# Patient Record
Sex: MALE | Race: WHITE | NOT HISPANIC OR LATINO | Employment: OTHER | URBAN - METROPOLITAN AREA
[De-identification: names, ages, dates, MRNs, and addresses within clinical notes are randomized per-mention and may not be internally consistent; named-entity substitution may affect disease eponyms.]

---

## 2017-11-17 ENCOUNTER — APPOINTMENT (INPATIENT)
Dept: ULTRASOUND IMAGING | Facility: HOSPITAL | Age: 72
DRG: 682 | End: 2017-11-17
Payer: MEDICARE

## 2017-11-17 ENCOUNTER — HOSPITAL ENCOUNTER (INPATIENT)
Facility: HOSPITAL | Age: 72
LOS: 14 days | DRG: 682 | End: 2017-12-01
Attending: EMERGENCY MEDICINE | Admitting: INTERNAL MEDICINE
Payer: MEDICARE

## 2017-11-17 DIAGNOSIS — F32.A DEPRESSION: Primary | ICD-10-CM

## 2017-11-17 DIAGNOSIS — R31.9 HEMATURIA: ICD-10-CM

## 2017-11-17 DIAGNOSIS — N39.490 OVERFLOW INCONTINENCE OF URINE: ICD-10-CM

## 2017-11-17 DIAGNOSIS — R33.8 ACUTE URINARY RETENTION: ICD-10-CM

## 2017-11-17 DIAGNOSIS — N17.9 ACUTE RENAL FAILURE (ARF) (HCC): ICD-10-CM

## 2017-11-17 DIAGNOSIS — R41.0 DELIRIUM: ICD-10-CM

## 2017-11-17 DIAGNOSIS — N19 ACUTE UREMIA: ICD-10-CM

## 2017-11-17 DIAGNOSIS — Z01.89 ENCOUNTER FOR COMPETENCY EVALUATION: ICD-10-CM

## 2017-11-17 DIAGNOSIS — R94.31 ABNORMAL EKG: ICD-10-CM

## 2017-11-17 PROBLEM — R32 URINARY INCONTINENCE: Status: ACTIVE | Noted: 2017-11-17

## 2017-11-17 LAB
ALBUMIN SERPL BCP-MCNC: 4 G/DL (ref 3.5–5)
ALP SERPL-CCNC: 64 U/L (ref 46–116)
ALT SERPL W P-5'-P-CCNC: 16 U/L (ref 12–78)
ANION GAP SERPL CALCULATED.3IONS-SCNC: 17 MMOL/L (ref 4–13)
ANION GAP SERPL CALCULATED.3IONS-SCNC: 18 MMOL/L (ref 4–13)
APTT PPP: 28 SECONDS (ref 23–35)
AST SERPL W P-5'-P-CCNC: 7 U/L (ref 5–45)
ATRIAL RATE: 77 BPM
BASE EX.OXY STD BLDV CALC-SCNC: 53.2 % (ref 60–80)
BASE EXCESS BLDV CALC-SCNC: -11.5 MMOL/L
BASOPHILS # BLD AUTO: 0.04 THOUSANDS/ΜL (ref 0–0.1)
BASOPHILS NFR BLD AUTO: 1 % (ref 0–1)
BILIRUB DIRECT SERPL-MCNC: 0.15 MG/DL (ref 0–0.2)
BILIRUB SERPL-MCNC: 0.7 MG/DL (ref 0.2–1)
BILIRUB UR QL STRIP: NEGATIVE
BUN SERPL-MCNC: 121 MG/DL (ref 5–25)
BUN SERPL-MCNC: 124 MG/DL (ref 5–25)
CALCIUM SERPL-MCNC: 8.6 MG/DL (ref 8.3–10.1)
CALCIUM SERPL-MCNC: 9.2 MG/DL (ref 8.3–10.1)
CHLORIDE SERPL-SCNC: 100 MMOL/L (ref 100–108)
CHLORIDE SERPL-SCNC: 102 MMOL/L (ref 100–108)
CLARITY UR: CLEAR
CLARITY, POC: CLEAR
CO2 SERPL-SCNC: 18 MMOL/L (ref 21–32)
CO2 SERPL-SCNC: 18 MMOL/L (ref 21–32)
COLOR UR: YELLOW
COLOR, POC: YELLOW
CREAT SERPL-MCNC: 11.49 MG/DL (ref 0.6–1.3)
CREAT SERPL-MCNC: 12.47 MG/DL (ref 0.6–1.3)
EOSINOPHIL # BLD AUTO: 0.12 THOUSAND/ΜL (ref 0–0.61)
EOSINOPHIL NFR BLD AUTO: 2 % (ref 0–6)
ERYTHROCYTE [DISTWIDTH] IN BLOOD BY AUTOMATED COUNT: 14.8 % (ref 11.6–15.1)
EXT BILIRUBIN, UA: NEGATIVE
EXT BLOOD URINE: NEGATIVE
EXT GLUCOSE, UA: NEGATIVE
EXT KETONES: NEGATIVE
EXT NITRITE, UA: NORMAL
EXT PH, UA: 5
EXT PROTEIN, UA: NORMAL
EXT SPECIFIC GRAVITY, UA: 1
EXT UROBILINOGEN: NEGATIVE
GFR SERPL CREATININE-BSD FRML MDRD: 4 ML/MIN/1.73SQ M
GFR SERPL CREATININE-BSD FRML MDRD: 4 ML/MIN/1.73SQ M
GLUCOSE SERPL-MCNC: 120 MG/DL (ref 65–140)
GLUCOSE SERPL-MCNC: 217 MG/DL (ref 65–140)
GLUCOSE UR STRIP-MCNC: NEGATIVE MG/DL
HCO3 BLDV-SCNC: 14.8 MMOL/L (ref 24–30)
HCT VFR BLD AUTO: 28 % (ref 36.5–49.3)
HCT VFR BLD AUTO: 31.8 % (ref 36.5–49.3)
HGB BLD-MCNC: 10.8 G/DL (ref 12–17)
HGB BLD-MCNC: 9.8 G/DL (ref 12–17)
HGB UR QL STRIP.AUTO: NEGATIVE
INR PPP: 1.01 (ref 0.86–1.16)
KETONES UR STRIP-MCNC: NEGATIVE MG/DL
LACTATE SERPL-SCNC: 1.2 MMOL/L (ref 0.5–2)
LEUKOCYTE ESTERASE UR QL STRIP: NEGATIVE
LYMPHOCYTES # BLD AUTO: 1.16 THOUSANDS/ΜL (ref 0.6–4.47)
LYMPHOCYTES NFR BLD AUTO: 17 % (ref 14–44)
MCH RBC QN AUTO: 27.6 PG (ref 26.8–34.3)
MCHC RBC AUTO-ENTMCNC: 34 G/DL (ref 31.4–37.4)
MCV RBC AUTO: 81 FL (ref 82–98)
MONOCYTES # BLD AUTO: 0.4 THOUSAND/ΜL (ref 0.17–1.22)
MONOCYTES NFR BLD AUTO: 6 % (ref 4–12)
NEUTROPHILS # BLD AUTO: 5.29 THOUSANDS/ΜL (ref 1.85–7.62)
NEUTS SEG NFR BLD AUTO: 74 % (ref 43–75)
NITRITE UR QL STRIP: NEGATIVE
O2 CT BLDV-SCNC: 9 ML/DL
P AXIS: 63 DEGREES
PCO2 BLDV: 35.2 MM HG (ref 42–50)
PH BLDV: 7.24 [PH] (ref 7.3–7.4)
PH UR STRIP.AUTO: 5 [PH] (ref 4.5–8)
PLATELET # BLD AUTO: 201 THOUSANDS/UL (ref 149–390)
PLATELET # BLD AUTO: 216 THOUSANDS/UL (ref 149–390)
PMV BLD AUTO: 9.5 FL (ref 8.9–12.7)
PMV BLD AUTO: 9.9 FL (ref 8.9–12.7)
PO2 BLDV: 29.2 MM HG (ref 35–45)
POTASSIUM SERPL-SCNC: 4.5 MMOL/L (ref 3.5–5.3)
POTASSIUM SERPL-SCNC: 4.6 MMOL/L (ref 3.5–5.3)
PR INTERVAL: 144 MS
PROT SERPL-MCNC: 7.6 G/DL (ref 6.4–8.2)
PROT UR STRIP-MCNC: NEGATIVE MG/DL
PROTHROMBIN TIME: 13.6 SECONDS (ref 12.1–14.4)
QRS AXIS: -66 DEGREES
QRSD INTERVAL: 136 MS
QT INTERVAL: 412 MS
QTC INTERVAL: 466 MS
RBC # BLD AUTO: 3.92 MILLION/UL (ref 3.88–5.62)
SODIUM SERPL-SCNC: 135 MMOL/L (ref 136–145)
SODIUM SERPL-SCNC: 138 MMOL/L (ref 136–145)
SP GR UR STRIP.AUTO: <=1.005 (ref 1–1.03)
T WAVE AXIS: 113 DEGREES
TROPONIN I SERPL-MCNC: 0.02 NG/ML
UROBILINOGEN UR QL STRIP.AUTO: 0.2 E.U./DL
VENTRICULAR RATE: 77 BPM
WBC # BLD AUTO: 7.01 THOUSAND/UL (ref 4.31–10.16)
WBC # BLD EST: NEGATIVE 10*3/UL

## 2017-11-17 PROCEDURE — 80048 BASIC METABOLIC PNL TOTAL CA: CPT | Performed by: PHYSICIAN ASSISTANT

## 2017-11-17 PROCEDURE — 82805 BLOOD GASES W/O2 SATURATION: CPT | Performed by: EMERGENCY MEDICINE

## 2017-11-17 PROCEDURE — 81002 URINALYSIS NONAUTO W/O SCOPE: CPT | Performed by: EMERGENCY MEDICINE

## 2017-11-17 PROCEDURE — 76770 US EXAM ABDO BACK WALL COMP: CPT

## 2017-11-17 PROCEDURE — 36415 COLL VENOUS BLD VENIPUNCTURE: CPT | Performed by: EMERGENCY MEDICINE

## 2017-11-17 PROCEDURE — 85018 HEMOGLOBIN: CPT | Performed by: PHYSICIAN ASSISTANT

## 2017-11-17 PROCEDURE — 84484 ASSAY OF TROPONIN QUANT: CPT | Performed by: EMERGENCY MEDICINE

## 2017-11-17 PROCEDURE — 85049 AUTOMATED PLATELET COUNT: CPT | Performed by: INTERNAL MEDICINE

## 2017-11-17 PROCEDURE — 81003 URINALYSIS AUTO W/O SCOPE: CPT | Performed by: EMERGENCY MEDICINE

## 2017-11-17 PROCEDURE — 85025 COMPLETE CBC W/AUTO DIFF WBC: CPT | Performed by: EMERGENCY MEDICINE

## 2017-11-17 PROCEDURE — 80048 BASIC METABOLIC PNL TOTAL CA: CPT | Performed by: EMERGENCY MEDICINE

## 2017-11-17 PROCEDURE — 85014 HEMATOCRIT: CPT | Performed by: PHYSICIAN ASSISTANT

## 2017-11-17 PROCEDURE — 99285 EMERGENCY DEPT VISIT HI MDM: CPT

## 2017-11-17 PROCEDURE — 83605 ASSAY OF LACTIC ACID: CPT | Performed by: EMERGENCY MEDICINE

## 2017-11-17 PROCEDURE — 85610 PROTHROMBIN TIME: CPT | Performed by: EMERGENCY MEDICINE

## 2017-11-17 PROCEDURE — 87040 BLOOD CULTURE FOR BACTERIA: CPT | Performed by: EMERGENCY MEDICINE

## 2017-11-17 PROCEDURE — 93005 ELECTROCARDIOGRAM TRACING: CPT | Performed by: EMERGENCY MEDICINE

## 2017-11-17 PROCEDURE — 85730 THROMBOPLASTIN TIME PARTIAL: CPT | Performed by: EMERGENCY MEDICINE

## 2017-11-17 PROCEDURE — 80076 HEPATIC FUNCTION PANEL: CPT | Performed by: EMERGENCY MEDICINE

## 2017-11-17 RX ORDER — ONDANSETRON 2 MG/ML
4 INJECTION INTRAMUSCULAR; INTRAVENOUS EVERY 6 HOURS PRN
Status: DISCONTINUED | OUTPATIENT
Start: 2017-11-17 | End: 2017-11-30

## 2017-11-17 RX ORDER — ACETAMINOPHEN 325 MG/1
650 TABLET ORAL EVERY 6 HOURS PRN
Status: DISCONTINUED | OUTPATIENT
Start: 2017-11-17 | End: 2017-12-01 | Stop reason: HOSPADM

## 2017-11-17 RX ORDER — DULOXETIN HYDROCHLORIDE 60 MG/1
60 CAPSULE, DELAYED RELEASE ORAL DAILY
Status: DISCONTINUED | OUTPATIENT
Start: 2017-11-18 | End: 2017-12-01 | Stop reason: HOSPADM

## 2017-11-17 RX ORDER — LISINOPRIL 5 MG/1
TABLET ORAL
COMMUNITY
End: 2017-11-17

## 2017-11-17 RX ORDER — NORTRIPTYLINE HYDROCHLORIDE 25 MG/1
50 CAPSULE ORAL
Status: DISCONTINUED | OUTPATIENT
Start: 2017-11-17 | End: 2017-11-17

## 2017-11-17 RX ORDER — HEPARIN SODIUM 5000 [USP'U]/ML
5000 INJECTION, SOLUTION INTRAVENOUS; SUBCUTANEOUS EVERY 8 HOURS SCHEDULED
Status: DISCONTINUED | OUTPATIENT
Start: 2017-11-17 | End: 2017-11-17

## 2017-11-17 RX ORDER — LEVOTHYROXINE SODIUM 0.07 MG/1
75 TABLET ORAL DAILY
Status: DISCONTINUED | OUTPATIENT
Start: 2017-11-18 | End: 2017-12-01 | Stop reason: HOSPADM

## 2017-11-17 RX ORDER — AMLODIPINE BESYLATE 5 MG/1
5 TABLET ORAL DAILY
Status: DISCONTINUED | OUTPATIENT
Start: 2017-11-17 | End: 2017-11-19

## 2017-11-17 RX ORDER — DULOXETIN HYDROCHLORIDE 30 MG/1
CAPSULE, DELAYED RELEASE ORAL
COMMUNITY
End: 2017-11-17

## 2017-11-17 RX ORDER — DOCUSATE SODIUM 100 MG/1
100 CAPSULE, LIQUID FILLED ORAL 2 TIMES DAILY
Status: DISCONTINUED | OUTPATIENT
Start: 2017-11-17 | End: 2017-12-01 | Stop reason: HOSPADM

## 2017-11-17 RX ORDER — DIVALPROEX SODIUM 250 MG/1
TABLET, EXTENDED RELEASE ORAL
COMMUNITY
End: 2017-12-11 | Stop reason: HOSPADM

## 2017-11-17 RX ORDER — MIRTAZAPINE 7.5 MG/1
7.5 TABLET, FILM COATED ORAL
COMMUNITY
End: 2017-12-01 | Stop reason: HOSPADM

## 2017-11-17 RX ORDER — SODIUM CHLORIDE 9 MG/ML
125 INJECTION, SOLUTION INTRAVENOUS CONTINUOUS
Status: DISCONTINUED | OUTPATIENT
Start: 2017-11-17 | End: 2017-11-17

## 2017-11-17 RX ORDER — NORTRIPTYLINE HYDROCHLORIDE 50 MG/1
50 CAPSULE ORAL
COMMUNITY
End: 2017-12-11 | Stop reason: HOSPADM

## 2017-11-17 RX ORDER — DULOXETIN HYDROCHLORIDE 60 MG/1
CAPSULE, DELAYED RELEASE ORAL
COMMUNITY
End: 2017-11-17

## 2017-11-17 RX ORDER — NORTRIPTYLINE HYDROCHLORIDE 25 MG/1
25 CAPSULE ORAL
Status: DISCONTINUED | OUTPATIENT
Start: 2017-11-17 | End: 2017-11-17

## 2017-11-17 RX ORDER — DIVALPROEX SODIUM 250 MG/1
250 TABLET, EXTENDED RELEASE ORAL DAILY
Status: DISCONTINUED | OUTPATIENT
Start: 2017-11-17 | End: 2017-12-01 | Stop reason: HOSPADM

## 2017-11-17 RX ORDER — CALCIUM CARBONATE 200(500)MG
1000 TABLET,CHEWABLE ORAL DAILY PRN
Status: DISCONTINUED | OUTPATIENT
Start: 2017-11-17 | End: 2017-12-01 | Stop reason: HOSPADM

## 2017-11-17 RX ORDER — DULOXETIN HYDROCHLORIDE 30 MG/1
30 CAPSULE, DELAYED RELEASE ORAL DAILY
Status: DISCONTINUED | OUTPATIENT
Start: 2017-11-18 | End: 2017-12-01 | Stop reason: HOSPADM

## 2017-11-17 RX ORDER — MIRTAZAPINE 15 MG/1
7.5 TABLET, FILM COATED ORAL
Status: DISCONTINUED | OUTPATIENT
Start: 2017-11-17 | End: 2017-11-22

## 2017-11-17 RX ORDER — TAMSULOSIN HYDROCHLORIDE 0.4 MG/1
0.4 CAPSULE ORAL
Status: DISCONTINUED | OUTPATIENT
Start: 2017-11-17 | End: 2017-12-01 | Stop reason: HOSPADM

## 2017-11-17 RX ADMIN — SODIUM BICARBONATE 125 ML/HR: 84 INJECTION, SOLUTION INTRAVENOUS at 13:57

## 2017-11-17 RX ADMIN — AMLODIPINE BESYLATE 5 MG: 5 TABLET ORAL at 16:45

## 2017-11-17 RX ADMIN — DIVALPROEX SODIUM 250 MG: 250 TABLET, FILM COATED, EXTENDED RELEASE ORAL at 21:06

## 2017-11-17 RX ADMIN — SODIUM CHLORIDE 1000 ML: 0.9 INJECTION, SOLUTION INTRAVENOUS at 11:03

## 2017-11-17 RX ADMIN — TAMSULOSIN HYDROCHLORIDE 0.4 MG: 0.4 CAPSULE ORAL at 16:45

## 2017-11-17 RX ADMIN — HEPARIN SODIUM 5000 UNITS: 5000 INJECTION, SOLUTION INTRAVENOUS; SUBCUTANEOUS at 16:46

## 2017-11-17 RX ADMIN — MIRTAZAPINE 7.5 MG: 15 TABLET, FILM COATED ORAL at 21:06

## 2017-11-17 NOTE — ED PROVIDER NOTES
History  Chief Complaint   Patient presents with    Urinary Incontinence     Pt states that he is having incontinence  Pt states that he doesn't feel like he has to go and tthen all of a sudden it just lets go  History provided by:  Patient and relative (Sister-in-law)  Urinary Frequency   Location:  Bladder  Quality:  Unable to control bladder function, stating he is urinating on himself and increased frequency of urination  Severity:  Moderate  Onset quality:  Gradual  Duration:  1 week  Timing:  Intermittent  Progression:  Worsening  Chronicity:  Recurrent  Context:  As per sister-in-law he gets very depressed this time here and she feels it is related to this, but he has also had Wood catheters in the past due to urinary incontinence has not had 1 in over a year  Increasing confusion as per patient and sister in law  Relieved by:  Nothing  Worsened by:  None tried  Ineffective treatments:  Nothing  Associated symptoms: no abdominal pain, no chest pain, no congestion, no cough, no diarrhea, no fever, no headaches, no nausea, no rash, no shortness of breath, no sore throat, no vomiting and no wheezing        Prior to Admission Medications   Prescriptions Last Dose Informant Patient Reported? Taking? DULoxetine (CYMBALTA) 30 mg delayed release capsule   Yes No   Sig: Take 30 mg by mouth daily  DULoxetine (CYMBALTA) 60 mg delayed release capsule   Yes No   Sig: Take 60 mg by mouth daily  acetaminophen (TYLENOL) 325 mg tablet   No No   Sig: Take 2 tablets (650 mg total) by mouth every 6 (six) hours as needed for mild pain, headaches or fever (temp > 100 5)  levothyroxine 50 mcg tablet   Yes No   Sig: Take 75 mcg by mouth daily  lisinopril (ZESTRIL) 5 mg tablet   Yes No   Sig: Take 5 mg by mouth daily Indications: High Blood Pressure  nortriptyline (PAMELOR) 25 mg capsule   Yes No   Sig: Take 25 mg by mouth daily at bedtime        Facility-Administered Medications: None       Past Medical History:   Diagnosis Date    Disease of thyroid gland     H/O Clostridium difficile infection     5/16    Hypertension     Psychiatric disorder     Seizures ([facility] [address] )        History reviewed  No pertinent surgical history  History reviewed  No pertinent family history  I have reviewed and agree with the history as documented  Social History   Substance Use Topics    Smoking status: Former Smoker    Smokeless tobacco: Not on file    Alcohol use No        Review of Systems   Constitutional: Negative for activity change, chills, diaphoresis and fever  HENT: Negative for congestion, sinus pressure and sore throat  Eyes: Negative for pain and visual disturbance  Respiratory: Negative for cough, chest tightness, shortness of breath, wheezing and stridor  Cardiovascular: Negative for chest pain and palpitations  Gastrointestinal: Negative for abdominal distention, abdominal pain, constipation, diarrhea, nausea and vomiting  Genitourinary: Positive for difficulty urinating ( Urinary incontinence frequently urinating on self), frequency and urgency  Negative for dysuria  Musculoskeletal: Negative for neck pain and neck stiffness  Skin: Negative for rash  Neurological: Negative for dizziness, speech difficulty, light-headedness, numbness and headaches  Psychiatric/Behavioral: Positive for confusion and decreased concentration  Physical Exam  ED Triage Vitals [11/17/17 1042]   Temp Pulse Respirations Blood Pressure SpO2   -- 89 16 157/92 95 %      Temp src Heart Rate Source Patient Position - Orthostatic VS BP Location FiO2 (%)   -- Monitor Lying Right arm --      Pain Score       No Pain           Orthostatic Vital Signs  Vitals:    11/17/17 1042   BP: 157/92   Pulse: 89   Patient Position - Orthostatic VS: Lying       Physical Exam   Constitutional: He is oriented to person, place, and time  He appears well-developed  No distress  HENT:   Head: Normocephalic and atraumatic     Eyes: Pupils are equal, round, and reactive to light  Neck: Normal range of motion  Neck supple  No tracheal deviation present  Cardiovascular: Normal rate, regular rhythm, normal heart sounds and intact distal pulses  No murmur heard  Pulmonary/Chest: Effort normal and breath sounds normal  No stridor  No respiratory distress  Abdominal: Soft  He exhibits no distension  There is no tenderness  There is no rebound and no guarding  Musculoskeletal: Normal range of motion  Neurological: He is alert and oriented to person, place, and time  GCS 15 a degree of slurred speech, but as per sister-in-law this is unchanged from baseline  Nonfocal neurological examination   Skin: Skin is warm and dry  He is not diaphoretic  No erythema  No pallor  Psychiatric: He has a normal mood and affect  Vitals reviewed        ED Medications  Medications   sodium chloride 0 9 % bolus 1,000 mL (1,000 mL Intravenous New Bag 11/17/17 1103)   sodium chloride 0 9 % bolus 1,000 mL (not administered)   sodium chloride 0 9 % infusion (not administered)       Diagnostic Studies  Results Reviewed     Procedure Component Value Units Date/Time    UA w Reflex to Microscopic w Reflex to Culture [[id number]]  (Normal) Collected:  11/17/17 1132    Lab Status:  Final result Specimen:  Urine from Urine, Indwelling Wood Catheter Updated:  11/17/17 1141     Color, UA Yellow     Clarity, UA Clear     Specific Gravity, UA <=1 005     pH, UA 5 0     Leukocytes, UA Negative     Nitrite, UA Negative     Protein, UA Negative mg/dl      Glucose, UA Negative mg/dl      Ketones, UA Negative mg/dl      Urobilinogen, UA 0 2 E U /dl      Bilirubin, UA Negative     Blood, UA Negative    Troponin I [[id number]]     Lab Status:  No result Specimen:  Blood     POCT urinalysis dipstick [[id number]]  (Normal) Resulted:  11/17/17 1137    Lab Status:  Final result Specimen:  Urine Updated:  11/17/17 1137     Color, UA yellow     Clarity, UA clear     EXT Glucose, UA negative     EXT Bilirubin, UA (Ref: Negative) negative     EXT Ketones, UA (Ref: Negative) negative     EXT Spec Grav, UA 1 005     EXT Blood, UA (Ref: Negative) negative     EXT pH, UA 5 0     EXT Protein, UA (Ref: Negative) trace     EXT Urobilinogen, UA (Ref: 0 2- 1 0) negative     EXT Leukocytes, UA (Ref: Negative) negative     EXT Nitrite, UA (Ref: Negative) negatve    Lactic acid, plasma [[id number]]  (Normal) Collected:  11/17/17 1045    Lab Status:  Final result Specimen:  Blood from Arm, Right Updated:  11/17/17 1113     LACTIC ACID 1 2 mmol/L     Narrative:         Result may be elevated if tourniquet was used during collection  Basic metabolic panel [[id number]]  (Abnormal) Collected:  11/17/17 1044    Lab Status:  Final result Specimen:  Blood from Arm, Right Updated:  11/17/17 1111     Sodium 138 mmol/L      Potassium 4 6 mmol/L      Chloride 102 mmol/L      CO2 18 (L) mmol/L      Anion Gap 18 (H) mmol/L       (H) mg/dL      Creatinine 12 47 (H) mg/dL      Glucose 120 mg/dL      Calcium 9 2 mg/dL      eGFR 4 ml/min/1 73sq m     Narrative:         National Kidney Disease Education Program recommendations are as follows:  GFR calculation is accurate only with a steady state creatinine  Chronic Kidney disease less than 60 ml/min/1 73 sq  meters  Kidney failure less than 15 ml/min/1 73 sq  meters  Hepatic function panel [[id number]]  (Normal) Collected:  11/17/17 1044    Lab Status:  Final result Specimen:  Blood from Arm, Right Updated:  11/17/17 1111     Total Bilirubin 0 70 mg/dL      Bilirubin, Direct 0 15 mg/dL      Alkaline Phosphatase 64 U/L      AST 7 U/L      ALT 16 U/L      Total Protein 7 6 g/dL      Albumin 4 0 g/dL     Blood culture #1 [[id number]] Collected:  11/17/17 1101    Lab Status:   In process Specimen:  Blood from Arm, Left Updated:  11/17/17 1105    Protime-INR [[id number]]  (Normal) Collected:  11/17/17 1044    Lab Status:  Final result Specimen:  Blood from Arm, Right Updated: 11/17/17 1103     Protime 13 6 seconds      INR 1 01    APTT [[id number]]  (Normal) Collected:  11/17/17 1044    Lab Status:  Final result Specimen:  Blood from Arm, Right Updated:  11/17/17 1103     PTT 28 seconds     Narrative: Therapeutic Heparin Range = 60-90 seconds    CBC and differential [[id number]]  (Abnormal) Collected:  11/17/17 1044    Lab Status:  Final result Specimen:  Blood from Arm, Right Updated:  11/17/17 1054     WBC 7 01 Thousand/uL      RBC 3 92 Million/uL      Hemoglobin 10 8 (L) g/dL      Hematocrit 31 8 (L) %      MCV 81 (L) fL      MCH 27 6 pg      MCHC 34 0 g/dL      RDW 14 8 %      MPV 9 5 fL      Platelets 016 Thousands/uL      Neutrophils Relative 74 %      Lymphocytes Relative 17 %      Monocytes Relative 6 %      Eosinophils Relative 2 %      Basophils Relative 1 %      Neutrophils Absolute 5 29 Thousands/µL      Lymphocytes Absolute 1 16 Thousands/µL      Monocytes Absolute 0 40 Thousand/µL      Eosinophils Absolute 0 12 Thousand/µL      Basophils Absolute 0 04 Thousands/µL     Blood gas, venous [[id number]]  (Abnormal) Collected:  11/17/17 1044    Lab Status:  Final result Specimen:  Blood from Arm, Right Updated:  11/17/17 1053     pH, Domingo 7 243 (L)     pCO2, Domingo 35 2 (L) mm Hg      pO2, Domingo 29 2 (L) mm Hg      HCO3, Domingo 14 8 (L) mmol/L      Base Excess, Domingo -11 5 mmol/L      O2 Content, Domingo 9 0 ml/dL      O2 HGB, VENOUS 53 2 (L) %     Blood culture #2 [[id number]] Collected:  11/17/17 1044    Lab Status:   In process Specimen:  Blood from Arm, Right Updated:  11/17/17 1051                 US retroperitoneal complete    (Results Pending)              Procedures  CriticalCare Time  Performed by: Mary Grace Alves  Authorized by: Mary Grace Alves     Critical care provider statement:     Critical care time (minutes):  50    Critical care time was exclusive of:  Separately billable procedures and treating other patients    Critical care was necessary to treat or prevent imminent or life-threatening deterioration of the following conditions:  Renal failure    Critical care was time spent personally by me on the following activities:  Obtaining history from patient or surrogate, development of treatment plan with patient or surrogate, discussions with consultants, evaluation of patient's response to treatment, examination of patient, ordering and performing treatments and interventions, ordering and review of laboratory studies, ordering and review of radiographic studies, re-evaluation of patient's condition and review of old charts    ECG 12 Lead Documentation  Date/Time: 11/17/2017 11:41 AM  Performed by: Anayeli Apodaca by: Katt Colby     ECG reviewed by me, the ED Provider: yes    Patient location:  ED  Previous ECG:     Previous ECG:  Compared to current    Comparison ECG info:  5 8 2016    Similarity:  Changes noted  Interpretation:     Interpretation: abnormal    Rate:     ECG rate:  77    ECG rate assessment: normal    Rhythm:     Rhythm: sinus rhythm    Ectopy:     Ectopy: none    QRS:     QRS axis:  Left    QRS intervals: Wide  Conduction:     Conduction: abnormal      Abnormal conduction: bifascicular block    ST segments:     ST segments:  Non-specific  T waves:     T waves: inverted      Inverted:  I, aVL and V2           Phone Contacts  ED Phone Contact    ED Course  ED Course as of Nov 17 1142   Fri Nov 17, 2017   1120 Patient noted to be in severe acute renal failure, creatinine of 12 47, bladder scan showing greater than 800+    Will place a Wood catheter, will consult Nephrology as patient does have signs of acidosis, pH 7 24 with a bicarb of 18, anion gap of 18     1129 Discussed caseWith Nephrology, Dr Ryne Stevens, agrees with plan to aggressively hydrate patient especially with base excess of-11 5, no rush to dialyze the patient at this time but will follow BMPs closely and will see in consult urgently in the hospital , also requesting retroperitoneal ultrasound  if condition worsens then patient may receive dialysis but no indication for emergent dialysis right now    1141 Patient with an abnormal EKG, deep T-wave inversions in lead 1 and aVL, nonspecific changes throughout, no evidence of ST-elevation MI in patient has not been having any chest pain, tripped will check a troponin although I do expect this to be elevated due to patient's significantly elevated creatinine, this will be monitored closely by admitting team                                MDM  Number of Diagnoses or Management Options  Abnormal EKG:   Acute renal failure (ARF) ([facility] [address] ): new and requires workup  Acute uremia: new and requires workup  Acute urinary retention: new and requires workup  Delirium: new and requires workup  Overflow incontinence of urine: new and requires workup  Diagnosis management comments: 68-year-old male presents with increasing frequency of urination, urinary incontinence     DDx includes but is not limited to:   Bladder infection, overflow incontinence, depression, uremia, acute renal failure      Initial ED Plan:   Blood work, bladder scan urinalysis, catheter if indicated , disposition pending ED workup       Amount and/or Complexity of Data Reviewed  Clinical lab tests: reviewed and ordered  Decide to obtain previous medical records or to obtain history from someone other than the patient: yes  Obtain history from someone other than the patient: yes  Review and summarize past medical records: yes      The patient presented with a condition in which there was a high probability of imminent or life-threatening deterioration, and critical care services (excluding separately billable procedures) totalled 30-74 minutes          Disposition  Final diagnoses:   Acute renal failure (ARF) ([facility] [address] )   Acute urinary retention   Delirium   Acute uremia   Overflow incontinence of urine   Abnormal EKG     Time reflects when diagnosis was documented in both MDM as applicable and the Disposition within this note     Time User Action Codes Description Comment    11/17/2017 11:23 AM Caleb KOLB Add [N17 9] Acute renal failure (ARF) ([address] )     11/17/2017 11:28 AM Caleb KOLB Add [R33 8] Acute urinary retention     11/17/2017 11:28 AM Caleb KOLB Add [R41 0] Delirium     11/17/2017 11:28 AM Fely Claudette Add [N19] Uremia     11/17/2017 11:28 AM Fely Claudette Add [N19] Acute uremia     11/17/2017 11:28 AM Fely Claudette Add [N39 490] Overflow incontinence of urine     11/17/2017 11:28 AM Krishna Garcia Remove [N19] Uremia     11/17/2017 11:40 AM Fely Claudette Add [R94 31] Abnormal EKG       ED Disposition     ED Disposition Condition Comment    Admit  Case was discussed with Dr Amber Perkins and the patient's admission status was agreed to be Admission Status: inpatient status to the service of Dr Amber Perkins   Follow-up Information    None       Patient's Medications   Discharge Prescriptions    No medications on file     No discharge procedures on file      ED Provider  Electronically Signed by           Brenna Kellgog DO  11/17/17 2596

## 2017-11-17 NOTE — CONSULTS
Consultation - Nephrology   Jorge Watson 81 y o  male MRN: [id number]  Unit/Bed#: ED 11 Encounter: [id number]    ASSESSMENT:  1  Acute Kidney Injury- Likely secondary to post renal urinary retention plus prerenal with decreased oral intake  - unknown baseline creatinine  - continue IVF  - no acute indication for hemodialysis at this time  - monitor labs, repeat bmp at 1600  - monitor intake/output/weights  - avoid hypotension  - avoid nephrotoxics, hold lisinopril  - pending renal ultrasound  2  Urinary Retention- frias catheter placed  - PVR before catheterization was 800ml  - previously followed with urology  - would recommend urology consult pending renal ultrasound  - urology thoughts were BPH vs neurogenic bladder in the past  - he was supposed to be on tamsulosin according to past urology notes, will restart this  - renal ultrasound pending, rule out hydronephrosis  3  Low bicarbonate- change IVF to bicarbonate drip  - secondary to SHENA  4  Hypertension- BP acceptable, monitor  - hold lisinopril due to SHENA  - can use amlodipine 5mg daily instead if BP rises    PLAN:  Start bicarb drip with 1/2NS and 75meq sodium bicarbonate  Frias catheter placed  Renal ultrasound  Repeat BMP at 4pm  Start tamsulosin    HISTORY OF PRESENT ILLNESS:  Requesting Physician: Claude Jeffries DO  Reason for Consult: SHENA    Jorge Watson is a 81y o  year old male who was admitted to [address] [place] [address] after presenting with urinary incontinence noticed by family  The patient is a poor historian as he keeps saying, "I don't know, I am very confused "  Unfortunately, family is no longer in his ER room  A renal consultation is requested today for assistance in the management of acute kidney injury  A frias catheter was placed by the ER staff and he was started on IVF  The patient is able to tell me that he was not eating a lot at home and whenever he ate he vomited  He is unsure if he was nauseated    He is unsure if he was taking any NSAIDs  He cannot describe his problems with urination or give specifics as to why he was brought to the ER  He denies any history of kidney stones or kidney problems  He knows he had a frias catheter in the past but cannot elaborate  It appears he followed with urology in their office previously in 2016  PAST MEDICAL HISTORY:  Past Medical History:   Diagnosis Date    Disease of thyroid gland     H/O Clostridium difficile infection     5/16    Hypertension     Psychiatric disorder     Seizures ([facility] [place] [address] )        PAST SURGICAL HISTORY:  History reviewed  No pertinent surgical history  ALLERGIES:  No Known Allergies    SOCIAL HISTORY:  History   Alcohol Use No     History   Drug Use No     History   Smoking Status    Former Smoker   Smokeless Tobacco    Not on file       FAMILY HISTORY:  History reviewed  No pertinent family history  MEDICATIONS:    Current Facility-Administered Medications:     sodium bicarbonate 75 mEq in sodium chloride 0 45 % 1,000 mL infusion, 125 mL/hr, Intravenous, Continuous, Ariana Berrios PA-C    sodium chloride 0 9 % bolus 1,000 mL, 1,000 mL, Intravenous, Once, Krishna Harrison DO, Last Rate: 1,000 mL/hr at 11/17/17 1103, 1,000 mL at 11/17/17 1103    sodium chloride 0 9 % bolus 1,000 mL, 1,000 mL, Intravenous, Once, Krishna Cottrell DO    Current Outpatient Prescriptions:     acetaminophen (TYLENOL) 325 mg tablet, Take 2 tablets (650 mg total) by mouth every 6 (six) hours as needed for mild pain, headaches or fever (temp > 100 5)  , Disp: 30 tablet, Rfl: 0    DULoxetine (CYMBALTA) 30 mg delayed release capsule, Take 30 mg by mouth daily  , Disp: , Rfl:     DULoxetine (CYMBALTA) 60 mg delayed release capsule, Take 60 mg by mouth daily  , Disp: , Rfl:     levothyroxine 50 mcg tablet, Take 75 mcg by mouth daily    , Disp: , Rfl:     lisinopril (ZESTRIL) 5 mg tablet, Take 5 mg by mouth daily Indications: High Blood Pressure , Disp: , Rfl:     nortriptyline (PAMELOR) 25 mg capsule, Take 25 mg by mouth daily at bedtime  , Disp: , Rfl:     REVIEW OF SYSTEMS:  A complete review of systems was performed and found to be negative unless otherwise noted in the history of present illness  General: No fevers, chills  Cardiovascular:  No chest pain, No leg edema  Respiratory: No cough, sputum production,  No shortness of breath  Gastrointestinal:  + nausea/vomiting,  No diarrhea,  No abdominal pain  Genitourinary: Unable to describe due to confusion    PHYSICAL EXAM:  Current Weight: Weight - Scale: 96 kg (211 lb 10 3 oz)  First Weight: Weight - Scale: 96 kg (211 lb 10 3 oz)  Vitals:    11/17/17 1042   BP: 157/92   Pulse: 89   Resp: 16   SpO2: 95%   Weight: 96 kg (211 lb 10 3 oz)       Intake/Output Summary (Last 24 hours) at 11/17/17 1145  Last data filed at 11/17/17 1133   Gross per 24 hour   Intake                0 ml   Output             2000 ml   Net            -2000 ml     General: NAD  Skin: no rash  HEENT: normocephalic atraumatic  Neck: supple  Chest: CTAB  CVS: RRR  Abdomen: soft, nt, nd  Extremities: no edema  Neuro: alert awake  Psych: confused    Invasive Devices:   Urethral Catheter  16 Fr   (Active)   Amt returned on insertion(mL) 1000 mL 11/17/2017 11:33 AM   Site Assessment Clean;Skin intact 11/17/2017 11:33 AM   Securement Method Securing device (Describe) 11/17/2017 11:33 AM   Output (mL) 1000 mL 11/17/2017 11:33 AM     Lab Results:     Results from last 7 days  Lab Units 11/17/17  1044   WBC Thousand/uL 7 01   HEMOGLOBIN g/dL 10 8*   HEMATOCRIT % 31 8*   PLATELETS Thousands/uL 216   SODIUM mmol/L 138   POTASSIUM mmol/L 4 6   CHLORIDE mmol/L 102   CO2 mmol/L 18*   BUN mg/dL 124*   CREATININE mg/dL 12 47*   CALCIUM mg/dL 9 2   ALBUMIN g/dL 4 0   TOTAL PROTEIN g/dL 7 6   BILIRUBIN TOTAL mg/dL 0 70   ALK PHOS U/L 64   ALT U/L 16   AST U/L 7   GLUCOSE RANDOM mg/dL 120

## 2017-11-17 NOTE — H&P
History and Physical - [facility] Internal Medicine    Patient Information: Dona Burgos 81 y o  male MRN: [id number]  Unit/Bed#: -01 Encounter: [id number]  Admitting Physician: Kayla Savage MD  PCP: Dayna Ritchie MD  Date of Admission:  11/17/17    Assessment/Plan:    Hospital Problem List:     Principal Problem:    Acute renal failure (ARF) ([address] )  Active Problems:    Multiple falls    Delirium    Hypothyroidism    Depression    Acute urinary retention    Urinary incontinence    Acute uremia      Plan for the Primary Problem(s):    · Acute renal failure likely postobstructive a Wood catheter has been placed, continue IV fluids avoid nephrotoxins continue Wood catheter follow-up on retroperitoneal ultrasound, nephrology input noted  · Urinary retention - Wood catheter has been placed, he had similar presentation previously and follows with Urology as outpatient, continue Flomax  · Metabolic acidosis from acute renal failure on bicarbonate GTT monitor kidney functions electrolytes  · Delirium likely due to SHENA      Plan for Additional Problems:     · Hypothyroidism continue levothyroxine  · Depression continue anti depressants monitor    VTE Prophylaxis: Heparin  / sequential compression device   Code Status:  Full code  POLST: There is no POLST form on file for this patient (pre-hospital)    Anticipated Length of Stay:  Patient will be admitted on an Inpatient basis with an anticipated length of stay of  More than 2 midnights  Justification for Hospital Stay:  Acute renal failure metabolic acidosis urinary retention requiring close monitoring as outlined      Chief Complaint:       Urinary hesitancy, retention 2-3 weeks      History of Present Illness:    Dona Burgos is a 81 y o  male who presents with urinary hesitancy, retention of 2-3 weeks duration  Patient is a poor historian, he reports he is confused  History is obtained from the ED physician and review of charts    He presented with urinary retention he was evaluated in the ER a Wood catheter was placed he is noted to have a creatinine of 12 with metabolic acidosis  He has been evaluated by Nephrology and is placed on a bicarbonate GTT  Patient denies history of dysuria hematuria  Denies fever chills sweats or constitutional symptom  No history of chest pain shortness of breath palpitations presyncope or syncope  Denies cough chest tightness wheezing  Denies abdominal pain nausea vomiting diarrhea  He reports poor appetite and poor p o  intake    Review of Systems:    Review of Systems   All other systems reviewed and are negative  Past Medical and Surgical History:     Past Medical History:   Diagnosis Date    Disease of thyroid gland     H/O Clostridium difficile infection     5/16    Hypertension     Psychiatric disorder     Seizures ([facility] [address] )        History reviewed  No pertinent surgical history  Meds/Allergies:    Prior to Admission medications    Medication Sig Start Date End Date Taking? Authorizing Provider   divalproex sodium (DEPAKOTE ER) 250 mg 24 hr tablet Take by mouth   Yes Historical Provider, MD   DULoxetine (CYMBALTA) 30 mg delayed release capsule Take 30 mg by mouth daily  Yes Historical Provider, MD   DULoxetine (CYMBALTA) 60 mg delayed release capsule Take 60 mg by mouth daily  Yes Historical Provider, MD   levothyroxine 50 mcg tablet Take 75 mcg by mouth daily  Yes Historical Provider, MD   lisinopril (ZESTRIL) 5 mg tablet Take 5 mg by mouth daily Indications: High Blood Pressure  Yes Historical Provider, MD   mirtazapine (REMERON) 7 5 MG tablet Take 7 5 mg by mouth daily at bedtime   Yes Historical Provider, MD   nortriptyline (PAMELOR) 25 mg capsule Take 25 mg by mouth daily at bedtime     Yes Historical Provider, MD   nortriptyline (PAMELOR) 50 mg capsule Take 50 mg by mouth daily at bedtime   Yes Historical Provider, MD   acetaminophen (TYLENOL) 325 mg tablet Take 2 tablets (650 mg total) by mouth every 6 (six) hours as needed for mild pain, headaches or fever (temp > 100 5)  5/20/16 11/17/17  Shelbie Deras MD   DULoxetine (CYMBALTA) 30 mg delayed release capsule Take by mouth  11/17/17  Historical Provider, MD   DULoxetine (CYMBALTA) 60 mg delayed release capsule Take by mouth  11/17/17  Historical Provider, MD   lisinopril (ZESTRIL) 5 mg tablet Take by mouth  11/17/17  Historical Provider, MD     I have reviewed home medications with patient personally  Allergies: No Known Allergies    Social History:     Marital Status: Single   Occupation:   Patient Pre-hospital Living Situation: home  Patient Pre-hospital Level of Mobility:  Independent  Patient Pre-hospital Diet Restrictions: no  Substance Use History:   History   Alcohol Use No     History   Smoking Status    Former Smoker   Smokeless Tobacco    Not on file     History   Drug Use No       Family History:    History reviewed  No pertinent family history  Physical Exam:     Vitals:   Blood Pressure: 152/70 (11/17/17 1543)  Pulse: 80 (11/17/17 1437)  Temperature: 98 2 °F (36 8 °C) (11/17/17 1437)  Temp Source: Oral (11/17/17 1437)  Respirations: 16 (11/17/17 1437)  Weight - Scale: 90 6 kg (199 lb 11 8 oz) (11/17/17 1239)  SpO2: 100 % (11/17/17 1437)    Physical Exam   Constitutional: No distress  Awake  Tolerating p o  feeds  Reports confusion   HENT:   Head: Normocephalic  Mouth/Throat: No oropharyngeal exudate  Eyes: Pupils are equal, round, and reactive to light  Right eye exhibits no discharge  Left eye exhibits no discharge  No scleral icterus  Neck: Normal range of motion  No JVD present  No tracheal deviation present  Cardiovascular: Normal rate  No murmur heard  Pulmonary/Chest: Effort normal  No respiratory distress  He has no wheezes  He has no rales  He exhibits no tenderness  Abdominal: Soft  He exhibits no mass  There is no tenderness  There is no rebound and no guarding     Musculoskeletal: Normal range of motion  He exhibits no edema  Neurological:   Awake, obeys simple commands  Reports confusion   Skin: Skin is warm  No rash noted  He is not diaphoretic  Vitals reviewed  Additional Data:     Lab Results: I have personally reviewed pertinent reports  Results from last 7 days  Lab Units 11/17/17  1044   WBC Thousand/uL 7 01   HEMOGLOBIN g/dL 10 8*   HEMATOCRIT % 31 8*   PLATELETS Thousands/uL 216   NEUTROS PCT % 74   LYMPHS PCT % 17   MONOS PCT % 6   EOS PCT % 2       Results from last 7 days  Lab Units 11/17/17  1044   SODIUM mmol/L 138   POTASSIUM mmol/L 4 6   CHLORIDE mmol/L 102   CO2 mmol/L 18*   BUN mg/dL 124*   CREATININE mg/dL 12 47*   CALCIUM mg/dL 9 2   TOTAL PROTEIN g/dL 7 6   BILIRUBIN TOTAL mg/dL 0 70   ALK PHOS U/L 64   ALT U/L 16   AST U/L 7   GLUCOSE RANDOM mg/dL 120       Results from last 7 days  Lab Units 11/17/17  1044   INR  1 01       Imaging: I have personally reviewed pertinent reports  No results found  EKG, Pathology, and Other Studies Reviewed on Admission:   · EKG:  Right bundle-branch block, left ventricular hypertrophy, normal sinus rhythm, poor R-wave progression    Allscripts / Epic Records Reviewed: Yes     ** Please Note: This note has been constructed using a voice recognition system   **

## 2017-11-18 LAB
ANION GAP SERPL CALCULATED.3IONS-SCNC: 15 MMOL/L (ref 4–13)
BUN SERPL-MCNC: 118 MG/DL (ref 5–25)
CALCIUM SERPL-MCNC: 7.8 MG/DL (ref 8.3–10.1)
CHLORIDE SERPL-SCNC: 102 MMOL/L (ref 100–108)
CO2 SERPL-SCNC: 18 MMOL/L (ref 21–32)
CREAT SERPL-MCNC: 10.52 MG/DL (ref 0.6–1.3)
ERYTHROCYTE [DISTWIDTH] IN BLOOD BY AUTOMATED COUNT: 14.9 % (ref 11.6–15.1)
FERRITIN SERPL-MCNC: 385 NG/ML (ref 8–388)
GFR SERPL CREATININE-BSD FRML MDRD: 4 ML/MIN/1.73SQ M
GLUCOSE SERPL-MCNC: 108 MG/DL (ref 65–140)
HCT VFR BLD AUTO: 27 % (ref 36.5–49.3)
HGB BLD-MCNC: 9.3 G/DL (ref 12–17)
IRON SATN MFR SERPL: 24 %
IRON SERPL-MCNC: 88 UG/DL (ref 65–175)
MAGNESIUM SERPL-MCNC: 1.9 MG/DL (ref 1.6–2.6)
MCH RBC QN AUTO: 28.4 PG (ref 26.8–34.3)
MCHC RBC AUTO-ENTMCNC: 34.4 G/DL (ref 31.4–37.4)
MCV RBC AUTO: 82 FL (ref 82–98)
PHOSPHATE SERPL-MCNC: 6.4 MG/DL (ref 2.3–4.1)
PLATELET # BLD AUTO: 192 THOUSANDS/UL (ref 149–390)
PMV BLD AUTO: 10.1 FL (ref 8.9–12.7)
POTASSIUM SERPL-SCNC: 4.2 MMOL/L (ref 3.5–5.3)
RBC # BLD AUTO: 3.28 MILLION/UL (ref 3.88–5.62)
SODIUM SERPL-SCNC: 135 MMOL/L (ref 136–145)
TIBC SERPL-MCNC: 366 UG/DL (ref 250–450)
TSH SERPL DL<=0.05 MIU/L-ACNC: 1.53 UIU/ML (ref 0.36–3.74)
WBC # BLD AUTO: 8.24 THOUSAND/UL (ref 4.31–10.16)

## 2017-11-18 PROCEDURE — 80177 DRUG SCRN QUAN LEVETIRACETAM: CPT | Performed by: INTERNAL MEDICINE

## 2017-11-18 PROCEDURE — 83550 IRON BINDING TEST: CPT | Performed by: INTERNAL MEDICINE

## 2017-11-18 PROCEDURE — 85027 COMPLETE CBC AUTOMATED: CPT | Performed by: PHYSICIAN ASSISTANT

## 2017-11-18 PROCEDURE — 84443 ASSAY THYROID STIM HORMONE: CPT | Performed by: INTERNAL MEDICINE

## 2017-11-18 PROCEDURE — 80048 BASIC METABOLIC PNL TOTAL CA: CPT | Performed by: PHYSICIAN ASSISTANT

## 2017-11-18 PROCEDURE — 84100 ASSAY OF PHOSPHORUS: CPT | Performed by: PHYSICIAN ASSISTANT

## 2017-11-18 PROCEDURE — 83735 ASSAY OF MAGNESIUM: CPT | Performed by: PHYSICIAN ASSISTANT

## 2017-11-18 PROCEDURE — 83540 ASSAY OF IRON: CPT | Performed by: INTERNAL MEDICINE

## 2017-11-18 PROCEDURE — 82728 ASSAY OF FERRITIN: CPT | Performed by: INTERNAL MEDICINE

## 2017-11-18 RX ORDER — HEPARIN SODIUM 5000 [USP'U]/ML
5000 INJECTION, SOLUTION INTRAVENOUS; SUBCUTANEOUS EVERY 8 HOURS SCHEDULED
Status: DISCONTINUED | OUTPATIENT
Start: 2017-11-18 | End: 2017-12-01 | Stop reason: HOSPADM

## 2017-11-18 RX ORDER — LANOLIN ALCOHOL/MO/W.PET/CERES
6 CREAM (GRAM) TOPICAL
Status: DISCONTINUED | OUTPATIENT
Start: 2017-11-18 | End: 2017-12-01 | Stop reason: HOSPADM

## 2017-11-18 RX ADMIN — HEPARIN SODIUM 5000 UNITS: 5000 INJECTION, SOLUTION INTRAVENOUS; SUBCUTANEOUS at 21:24

## 2017-11-18 RX ADMIN — LEVOTHYROXINE SODIUM 75 MCG: 75 TABLET ORAL at 05:37

## 2017-11-18 RX ADMIN — MIRTAZAPINE 7.5 MG: 15 TABLET, FILM COATED ORAL at 21:23

## 2017-11-18 RX ADMIN — MELATONIN 6 MG: 3 TAB ORAL at 21:23

## 2017-11-18 RX ADMIN — TAMSULOSIN HYDROCHLORIDE 0.4 MG: 0.4 CAPSULE ORAL at 16:53

## 2017-11-18 RX ADMIN — SODIUM BICARBONATE 125 ML/HR: 84 INJECTION, SOLUTION INTRAVENOUS at 09:00

## 2017-11-18 RX ADMIN — HEPARIN SODIUM 5000 UNITS: 5000 INJECTION, SOLUTION INTRAVENOUS; SUBCUTANEOUS at 16:45

## 2017-11-18 RX ADMIN — DOCUSATE SODIUM 100 MG: 100 CAPSULE, LIQUID FILLED ORAL at 17:45

## 2017-11-18 RX ADMIN — SODIUM BICARBONATE 125 ML/HR: 84 INJECTION, SOLUTION INTRAVENOUS at 18:54

## 2017-11-18 RX ADMIN — DULOXETINE HYDROCHLORIDE 30 MG: 30 CAPSULE, DELAYED RELEASE ORAL at 09:01

## 2017-11-18 RX ADMIN — DULOXETINE HYDROCHLORIDE 60 MG: 60 CAPSULE, DELAYED RELEASE ORAL at 09:00

## 2017-11-18 RX ADMIN — AMLODIPINE BESYLATE 5 MG: 5 TABLET ORAL at 09:00

## 2017-11-18 RX ADMIN — DOCUSATE SODIUM 100 MG: 100 CAPSULE, LIQUID FILLED ORAL at 09:00

## 2017-11-18 RX ADMIN — CALCIUM GLUCONATE 1 G: 94 INJECTION, SOLUTION INTRAVENOUS at 16:45

## 2017-11-18 RX ADMIN — DIVALPROEX SODIUM 250 MG: 250 TABLET, FILM COATED, EXTENDED RELEASE ORAL at 21:27

## 2017-11-18 NOTE — PLAN OF CARE
RN brought to my attention that patient is now having vianney hematuria with clot mixed in coming out of Wood  Does not seem occluded at this time  Hgb was 10 today  Will recheck H&H, monitor UOP, consult urology  Can consider CBI during the night if Wood becomes occluded  Reddy Sapp PA-C

## 2017-11-18 NOTE — CONSULTS
Consultation - Urology   Josef Peraza 81 y o  male MRN: [id number]  Unit/Bed#: -01 Encounter: [id number]      Assessment/Plan      Assessment:  Urinary retention in a hostile, noncompliant bladder with thickening of the bladder wall  There may be a BPH component to this as well causing obstruction  This has led to bilateral hydronephrosis with acute renal failure  His creatinine is decreasing with Wood catheter drainage and hydration  Plan:  Continue Flomax and Wood catheter drainage  He will need another imaging study such as a CT scan without contrast to make sure hydronephrosis resolves with Wood catheter drainage  I discussed removing the catheter eventually with the patient, but I am pessimistic in that he may go back into urinary retention and suffer an of another episode of renal failure quite easily  Eventually he will need cystoscopy, but this could be done as an outpatient  Will follow and order reimaging in a couple of days  History of Present Illness   Attending: Mari Hawkins MD  Reason for Consult / Principal Problem:  Urinary retention, gross hematuria  HPI: Josef Peraza is a 81y o  year old male who presents with a history of traumatic brain injury who presented with urinary incontinence and was found to have 800+ cc of urine in his bladder when a Wood catheter was placed  He also was found to be in acute renal failure with a creatinine of 12 47 and it has come down to 10 52 today with Wood catheter drainage  After that, the urine became bloody  Urinalysis on admission was completely normal   He is normally seen in [facility] office of [address] [facility]  Last office visit was September 2016  He has had episodes of retention requiring Wood catheter placement in the past   He also is having delirium and what is thought to be and encephalopathy  He has been afebrile, lactic acid is normal and with his negative urinalysis I doubt that he is uroseptic  Hemoglobin is 10  Platelets are normal and his INR is normal   Renal ultrasound shows moderate right-sided hydronephrosis and moderate to severe left-sided hydronephrosis  No calculi  His bladder wall is thickened and the Wood catheter is in good position  Bladder is decompressed with a Wood catheter  He is currently on Flomax  He was not on that prior to admission  He says he currently resides at home  Today his urine is clear yellow  Inpatient consult to Urology  Consult performed by: Verónica Magana ordered by: Isaiah Nagel          Review of Systems   Gastrointestinal: Negative  Genitourinary: Positive for difficulty urinating and hematuria  Hematological: Bruises/bleeds easily  Psychiatric/Behavioral: Positive for confusion  Historical Information   Past Medical History:   Diagnosis Date    Disease of thyroid gland     H/O Clostridium difficile infection     5/16    Hypertension     Psychiatric disorder     Seizures ([address] )      History reviewed  No pertinent surgical history    Social History   History   Alcohol Use No     History   Drug Use No     History   Smoking Status    Former Smoker   Smokeless Tobacco    Not on file     Family History: non-contributory    Meds/Allergies   current meds:   Current Facility-Administered Medications   Medication Dose Route Frequency    acetaminophen (TYLENOL) tablet 650 mg  650 mg Oral Q6H PRN    amLODIPine (NORVASC) tablet 5 mg  5 mg Oral Daily    calcium carbonate (TUMS) chewable tablet 1,000 mg  1,000 mg Oral Daily PRN    divalproex sodium (DEPAKOTE ER) 24 hr tablet 250 mg  250 mg Oral Daily    docusate sodium (COLACE) capsule 100 mg  100 mg Oral BID    DULoxetine (CYMBALTA) delayed release capsule 30 mg  30 mg Oral Daily    DULoxetine (CYMBALTA) delayed release capsule 60 mg  60 mg Oral Daily    levothyroxine tablet 75 mcg  75 mcg Oral Daily    melatonin tablet 6 mg  6 mg Oral HS    mirtazapine (REMERON) tablet 7 5 mg  7 5 mg Oral HS    ondansetron (ZOFRAN) injection 4 mg  4 mg Intravenous Q6H PRN    sodium bicarbonate 75 mEq in sodium chloride 0 45 % 1,000 mL infusion  125 mL/hr Intravenous Continuous    sodium chloride 0 9 % bolus 1,000 mL  1,000 mL Intravenous Once    tamsulosin (FLOMAX) capsule 0 4 mg  0 4 mg Oral Daily With Dinner    and PTA meds:   Prior to Admission Medications   Prescriptions Last Dose Informant Patient Reported? Taking? DULoxetine (CYMBALTA) 30 mg delayed release capsule   Yes Yes   Sig: Take 30 mg by mouth daily  DULoxetine (CYMBALTA) 60 mg delayed release capsule   Yes Yes   Sig: Take 60 mg by mouth daily  divalproex sodium (DEPAKOTE ER) 250 mg 24 hr tablet   Yes Yes   Sig: Take by mouth   levothyroxine 50 mcg tablet   Yes Yes   Sig: Take 75 mcg by mouth daily  lisinopril (ZESTRIL) 5 mg tablet   Yes Yes   Sig: Take 5 mg by mouth daily Indications: High Blood Pressure  mirtazapine (REMERON) 7 5 MG tablet   Yes Yes   Sig: Take 7 5 mg by mouth daily at bedtime   nortriptyline (PAMELOR) 25 mg capsule   Yes Yes   Sig: Take 25 mg by mouth daily at bedtime  nortriptyline (PAMELOR) 50 mg capsule   Yes Yes   Sig: Take 50 mg by mouth daily at bedtime      Facility-Administered Medications: None     No Known Allergies    Objective   Vitals: Blood pressure 119/60, pulse 94, temperature 99 5 °F (37 5 °C), temperature source Oral, resp  rate 20, weight 91 kg (200 lb 9 9 oz), SpO2 98 %  I/O last 24 hours: In: 2460 [P O :2460]  Out: 5800 [Urine:5800]    Invasive Devices     Peripheral Intravenous Line            Peripheral IV 11/17/17 Left Antecubital less than 1 day          Drain            Urethral Catheter  16 Fr  less than 1 day                Physical Exam   Constitutional: He appears well-developed and well-nourished  HENT:   Head: Normocephalic and atraumatic  Eyes: Conjunctivae and EOM are normal    Neck: Normal range of motion  Pulmonary/Chest: Effort normal    Abdominal: Soft   He exhibits no distension and no mass  There is no tenderness  There is no rebound and no guarding  Genitourinary: Rectum normal, prostate normal and penis normal    Genitourinary Comments: Prostate exam reveals a gland that is about 40 g smooth symmetric and benign  Musculoskeletal: Normal range of motion  Neurological: He is alert  Skin: Skin is warm and dry  Psychiatric: He has a normal mood and affect  His behavior is normal  Thought content normal        Lab Results:   CBC:   Lab Results   Component Value Date    WBC 8 24 11/18/2017    HGB 9 3 (L) 11/18/2017    HCT 27 0 (L) 11/18/2017    MCV 82 11/18/2017     11/18/2017    MCH 28 4 11/18/2017    MCHC 34 4 11/18/2017    RDW 14 9 11/18/2017    MPV 10 1 11/18/2017     CMP:   Lab Results   Component Value Date     (L) 11/18/2017     11/18/2017    CO2 18 (L) 11/18/2017    ANIONGAP 15 (H) 11/18/2017     (H) 11/18/2017    CREATININE 10 52 (H) 11/18/2017    GLUCOSE 108 11/18/2017    CALCIUM 7 8 (L) 11/18/2017    AST 7 11/17/2017    ALT 16 11/17/2017    ALKPHOS 64 11/17/2017    PROT 7 6 11/17/2017    ALBUMIN 4 0 11/17/2017    BILITOT 0 70 11/17/2017    EGFR 4 11/18/2017     Urinalysis:   Lab Results   Component Value Date    COLORU yellow 11/17/2017    COLORU Yellow 11/17/2017    CLARITYU clear 11/17/2017    CLARITYU Clear 11/17/2017    SPECGRAV <=1 005 11/17/2017    PHUR 5 0 11/17/2017    LEUKOCYTESUR Negative 11/17/2017    NITRITE Negative 11/17/2017    PROTEINUA Negative 11/17/2017    GLUCOSEU Negative 11/17/2017    KETONESU Negative 11/17/2017    BILIRUBINUR Negative 11/17/2017    BLOODU Negative 11/17/2017     Urine Culture: No results found for: URINECX  Imaging Studies: I have personally reviewed pertinent reports  EKG, Pathology, and Other Studies: I have personally reviewed pertinent reports      VTE Prophylaxis: Sequential compression device (Venodyne)     Code Status: Level 1 - Full Code  Advance Directive and Living Will: Power of :    POLST:      Counseling / Coordination of Care  Total floor / unit time spent today 30 minutes  Greater than 50% of total time was spent with the patient and / or family counseling and / or coordination of care

## 2017-11-18 NOTE — PROGRESS NOTES
NEPHROLOGY PROGRESS NOTE   Wing Felton 81 y o  male MRN: [id number]  Unit/Bed#: -01 Encounter: [id number]  Reason for Consult: SHENA    ASSESSMENT and PLAN:    80 yo male with PMHx of HTN, hypothyroid, prior remote TBI, urinary retention with prior frias, admission in May of 2016 for urinary retention, mult falls, C diff, encephalopathy who now presents after having difficulty voiding  Pt is currently eating lunch and allows for interview to take place, but then did not allow me to complete an exam, and agree with exam findings of Jaky Ramos, PAC  Urine frias bag with clearing urine  Pt denies fevers, chills, nausea, vomiting, diarrhea, prior renal dysfunction  During admission in May of 2016, Cr had peaked to 2 5       1) SHENA - baseline Cr appears to be 1 mg/dL  Rising to 12 5 on admission  Pt states symptoms were present for one week with urinary symptoms, but has Hb 10 8 which is lower than prior  If this is due to renal disease, then suggests that patient has had renal dysfunction for longer period  Does have microcytic anemia  Will evaluate further also       Etiology - felt to be secondary to post obstructive given  cc in ER and now with UOP with frias placement  Rule out other etiologies also       - renal u/s with mod R hydro and mod to severe L sided hydro  No calculi  Abnormal appearance of urinary bladder  - Urology on board - future cysto possible to evaluate further  - repeat imaging per Urology - likely Monday or tuesday  - UA bland  - electrolytes stable  - azotemia slowly improving    - continue frias   - IVF with 1/2 NS and 75 meq bicarb - continue  - give calcium gluconate 1 gm IV - ordered  - monitor for post obstructive diuresis  Na thus far stable  - renal diet  - tamsulosin  - no urgent indication for RRT  - I/O; Avoid Nephrotoxic agents      2) Anemia -      - iron sat not sig low 24     - Hb relatively stable  - hematuria   - monitoring for now     3) HTN     - amlodipine - if BP remains marginal, hold amlodipine  Hold parameters on board  - hold ACEi     6) acid/base - fluids as above       7) hypothyroid     - as per Primary Team  - would restart levothyroxine  Defer to Primary Team  - check TSH in AM    8) hyperphos - cont renal diet    - check phos Monday  - cautiously replete calcium given phos elevation    SUBJECTIVE / INTERVAL HISTORY:    Patient denies complaints  Urine output 5 8 L  Input 2 4 L  Blood pressure is now 378 systolic  Yesterday 170s to 190s      OBJECTIVE:  Current Weight: Weight - Scale: 91 kg (200 lb 9 9 oz)  Vitals:    11/17/17 1543 11/17/17 2147 11/18/17 0600 11/18/17 0659   BP: 152/70 116/65  119/60   Pulse:  85  94   Resp:  20  20   Temp:  98 4 °F (36 9 °C)  99 5 °F (37 5 °C)   TempSrc:  Oral  Oral   SpO2:  96%  98%   Weight:   91 kg (200 lb 9 9 oz)        Intake/Output Summary (Last 24 hours) at 11/18/17 1533  Last data filed at 11/18/17 1444   Gross per 24 hour   Intake             1260 ml   Output             4450 ml   Net            -3190 ml     General: pt allows for limited exam  Skin: no rash  Eyes: anicteric sclera  ENT: dry mucous membrane  Neck: supple  Chest: CTA  CVS: s1s2  Abdomen: soft  Extremities: no edema  : frias with some blood  Neuro: alert  Psych: alert      Medications:    Current Facility-Administered Medications:     acetaminophen (TYLENOL) tablet 650 mg, 650 mg, Oral, Q6H PRN, Mari Hawkins MD    amLODIPine (NORVASC) tablet 5 mg, 5 mg, Oral, Daily, Corazon Mir MD, 5 mg at 11/18/17 0900    calcium carbonate (TUMS) chewable tablet 1,000 mg, 1,000 mg, Oral, Daily PRN, Mari Hawkins MD    divalproex sodium (DEPAKOTE ER) 24 hr tablet 250 mg, 250 mg, Oral, Daily, Mari Hawkins MD, 250 mg at 11/17/17 2106    docusate sodium (COLACE) capsule 100 mg, 100 mg, Oral, BID, Mari Hawkins MD, 100 mg at 11/18/17 0900    DULoxetine (CYMBALTA) delayed release capsule 30 mg, 30 mg, Oral, Daily, Kemar Gtz MD, 30 mg at 11/18/17 0901    DULoxetine (CYMBALTA) delayed release capsule 60 mg, 60 mg, Oral, Daily, Kemar Gtz MD, 60 mg at 11/18/17 0900    heparin (porcine) subcutaneous injection 5,000 Units, 5,000 Units, Subcutaneous, Q8H [address], Kemar Gtz MD    levothyroxine tablet 75 mcg, 75 mcg, Oral, Daily, Kemar Gtz MD, 75 mcg at 11/18/17 0537    melatonin tablet 6 mg, 6 mg, Oral, HS, Chayo Muñiz PA-C    mirtazapine (REMERON) tablet 7 5 mg, 7 5 mg, Oral, HS, Kemar Gtz MD, 7 5 mg at 11/17/17 2106    ondansetron (ZOFRAN) injection 4 mg, 4 mg, Intravenous, Q6H PRN, Kemar Gtz MD    sodium bicarbonate 75 mEq in sodium chloride 0 45 % 1,000 mL infusion, 125 mL/hr, Intravenous, Continuous, Ariana Berrios PA-C, Last Rate: 125 mL/hr at 11/18/17 0900, 125 mL/hr at 11/18/17 0900    sodium chloride 0 9 % bolus 1,000 mL, 1,000 mL, Intravenous, Once, Krishna Harrison DO    tamsulosin [facility]) capsule 0 4 mg, 0 4 mg, Oral, Daily With Dinner, [facility] Hospital[facility] Berrios PA-C, 0 4 mg at 11/17/17 1645    Laboratory Results:    Results from last 7 days  Lab Units 11/18/17  0559 11/18/17  0545 11/17/17  2105 11/17/17  1702 11/17/17  1605 11/17/17  1044   WBC Thousand/uL 8 24  --   --   --   --  7 01   HEMOGLOBIN g/dL 9 3*  --  9 8*  --   --  10 8*   HEMATOCRIT % 27 0*  --  28 0*  --   --  31 8*   PLATELETS Thousands/uL 192  --   --   --  201 216   SODIUM mmol/L  --  135*  --  135*  --  138   POTASSIUM mmol/L  --  4 2  --  4 5  --  4 6   CHLORIDE mmol/L  --  102  --  100  --  102   CO2 mmol/L  --  18*  --  18*  --  18*   BUN mg/dL  --  118*  --  121*  --  124*   CREATININE mg/dL  --  10 52*  --  11 49*  --  12 47*   CALCIUM mg/dL  --  7 8*  --  8 6  --  9 2   MAGNESIUM mg/dL  --  1 9  --   --   --   --    PHOSPHORUS mg/dL  --  6 4*  --   --   --   --    ALBUMIN g/dL  --   --   --   --   --  4 0   TOTAL PROTEIN g/dL  --   --   --   --   --  7 6   GLUCOSE RANDOM mg/dL  --  108  --  217*  --  120

## 2017-11-18 NOTE — PROGRESS NOTES
[facility] Progress Note  Patient: Angelina Sung 81 y o  male   MRN: [id number]  PCP: Ricarda Stephen MD  Unit/Bed#: -81 Encounter: [id number]  Date Of Visit: 17    Assessment:    Principal Problem:    Acute renal failure (ARF) ([facility] [address] )  Active Problems:    Multiple falls    Delirium    Hypothyroidism    Depression    Acute urinary retention    Urinary incontinence    Acute uremia      Plan:    · Acute renal failure likely due to obstructive uropathy Wood catheter has been placed he may have underlying chronic kidney disease monitor kidney function closely, avoid nephrotoxins, nephrology input noted  · Metabolic acidosis on bicarbonate drip continue to monitor  · Obstructive uropathy due to bladder outlet obstruction, likely BPH a Wood catheter has been placed, urology input noted  · Hematuria urology input noted  · Depression continue anti depressants  · Hypothyroid  · Ambulatory dysfunction with history of falls physical therapy out of bed as able, safe ambulation      VTE Pharmacologic Prophylaxis:   Pharmacologic: Heparin  Mechanical VTE Prophylaxis in Place: Yes    Patient Centered Rounds: I have performed bedside rounds with nursing staff today  Discussions with Specialists or Other Care Team Provider:     Education and Discussions with Family / Patient: pt    Time Spent for Care: 30 minutes  More than 50% of total time spent on counseling and coordination of care as described above      Current Length of Stay: 1 day(s)    Current Patient Status: Inpatient   Certification Statement: The patient will continue to require additional inpatient hospital stay due to As mentioned    Discharge Plan / Estimated Discharge Date:  Acute kidney injury as outlined requiring close monitoring    Code Status: Level 1 - Full Code      Subjective:     Reports feeling okay  Patient is not very communicative    Objective:     Vitals:   Temp (24hrs), Av °F (37 2 °C), Min:98 4 °F (36 9 °C), Max:99 5 °F (37 5 °C)    HR:  [85-94] 94  Resp:  [20] 20  BP: (116-152)/(60-70) 119/60  SpO2:  [96 %-98 %] 98 %  Body mass index is 27 98 kg/m²  Input and Output Summary (last 24 hours): Intake/Output Summary (Last 24 hours) at 11/18/17 1503  Last data filed at 11/18/17 1444   Gross per 24 hour   Intake             1440 ml   Output             4450 ml   Net            -3010 ml       Physical Exam:     Physical Exam     Comfortably lying in bed  Neck supple  Lungs diminished breath sounds at bases  Heart sounds distant no murmurs appreciable  Abdomen soft nontender  Pulses present  No pedal edema  Awake but not very communicative        Additional Data:     Labs:      Results from last 7 days  Lab Units 11/18/17  0559  11/17/17  1044   WBC Thousand/uL 8 24  --  7 01   HEMOGLOBIN g/dL 9 3*  < > 10 8*   HEMATOCRIT % 27 0*  < > 31 8*   PLATELETS Thousands/uL 192  < > 216   NEUTROS PCT %  --   --  74   LYMPHS PCT %  --   --  17   MONOS PCT %  --   --  6   EOS PCT %  --   --  2   < > = values in this interval not displayed  Results from last 7 days  Lab Units 11/18/17  0545  11/17/17  1044   SODIUM mmol/L 135*  < > 138   POTASSIUM mmol/L 4 2  < > 4 6   CHLORIDE mmol/L 102  < > 102   CO2 mmol/L 18*  < > 18*   BUN mg/dL 118*  < > 124*   CREATININE mg/dL 10 52*  < > 12 47*   CALCIUM mg/dL 7 8*  < > 9 2   TOTAL PROTEIN g/dL  --   --  7 6   BILIRUBIN TOTAL mg/dL  --   --  0 70   ALK PHOS U/L  --   --  64   ALT U/L  --   --  16   AST U/L  --   --  7   GLUCOSE RANDOM mg/dL 108  < > 120   < > = values in this interval not displayed  Results from last 7 days  Lab Units 11/17/17  1044   INR  1 01       * I Have Reviewed All Lab Data Listed Above  * Additional Pertinent Lab Tests Reviewed:  All Labs Within Last 24 Hours Reviewed    Imaging:    Imaging Reports Reviewed Today Include:   Imaging Personally Reviewed by Myself Includes:     Recent Cultures (last 7 days):       Results from last 7 days  Lab Units 11/17/17  1101 11/17/17  1044   BLOOD CULTURE  No Growth at 24 hrs  No Growth at 24 hrs  Last 24 Hours Medication List:     amLODIPine 5 mg Oral Daily   divalproex sodium 250 mg Oral Daily   docusate sodium 100 mg Oral BID   DULoxetine 30 mg Oral Daily   DULoxetine 60 mg Oral Daily   levothyroxine 75 mcg Oral Daily   melatonin 6 mg Oral HS   mirtazapine 7 5 mg Oral HS   sodium chloride 1,000 mL Intravenous Once   tamsulosin 0 4 mg Oral Daily With Dinner        Today, Patient Was Seen By: Jimmy Carroll MD    ** Please Note: This note has been constructed using a voice recognition system   **

## 2017-11-19 LAB
ALBUMIN SERPL BCP-MCNC: 2.6 G/DL (ref 3.5–5)
ALP SERPL-CCNC: 42 U/L (ref 46–116)
ALT SERPL W P-5'-P-CCNC: 15 U/L (ref 12–78)
ANION GAP SERPL CALCULATED.3IONS-SCNC: 12 MMOL/L (ref 4–13)
AST SERPL W P-5'-P-CCNC: 8 U/L (ref 5–45)
BILIRUB SERPL-MCNC: 0.3 MG/DL (ref 0.2–1)
BUN SERPL-MCNC: 108 MG/DL (ref 5–25)
CALCIUM SERPL-MCNC: 7.3 MG/DL (ref 8.3–10.1)
CHLORIDE SERPL-SCNC: 101 MMOL/L (ref 100–108)
CO2 SERPL-SCNC: 23 MMOL/L (ref 21–32)
CREAT SERPL-MCNC: 8.93 MG/DL (ref 0.6–1.3)
ERYTHROCYTE [DISTWIDTH] IN BLOOD BY AUTOMATED COUNT: 14.9 % (ref 11.6–15.1)
GFR SERPL CREATININE-BSD FRML MDRD: 5 ML/MIN/1.73SQ M
GLUCOSE SERPL-MCNC: 133 MG/DL (ref 65–140)
HCT VFR BLD AUTO: 22.7 % (ref 36.5–49.3)
HGB BLD-MCNC: 7.5 G/DL (ref 12–17)
MCH RBC QN AUTO: 27.5 PG (ref 26.8–34.3)
MCHC RBC AUTO-ENTMCNC: 33 G/DL (ref 31.4–37.4)
MCV RBC AUTO: 83 FL (ref 82–98)
PLATELET # BLD AUTO: 144 THOUSANDS/UL (ref 149–390)
PMV BLD AUTO: 10.6 FL (ref 8.9–12.7)
POTASSIUM SERPL-SCNC: 3.2 MMOL/L (ref 3.5–5.3)
PROT SERPL-MCNC: 5 G/DL (ref 6.4–8.2)
RBC # BLD AUTO: 2.73 MILLION/UL (ref 3.88–5.62)
SODIUM SERPL-SCNC: 136 MMOL/L (ref 136–145)
WBC # BLD AUTO: 5.86 THOUSAND/UL (ref 4.31–10.16)

## 2017-11-19 PROCEDURE — 85027 COMPLETE CBC AUTOMATED: CPT | Performed by: INTERNAL MEDICINE

## 2017-11-19 PROCEDURE — 80053 COMPREHEN METABOLIC PANEL: CPT | Performed by: INTERNAL MEDICINE

## 2017-11-19 RX ORDER — POTASSIUM CHLORIDE 20 MEQ/1
20 TABLET, EXTENDED RELEASE ORAL ONCE
Status: COMPLETED | OUTPATIENT
Start: 2017-11-19 | End: 2017-11-19

## 2017-11-19 RX ADMIN — HEPARIN SODIUM 5000 UNITS: 5000 INJECTION, SOLUTION INTRAVENOUS; SUBCUTANEOUS at 14:15

## 2017-11-19 RX ADMIN — HEPARIN SODIUM 5000 UNITS: 5000 INJECTION, SOLUTION INTRAVENOUS; SUBCUTANEOUS at 05:51

## 2017-11-19 RX ADMIN — LEVOTHYROXINE SODIUM 75 MCG: 75 TABLET ORAL at 05:51

## 2017-11-19 RX ADMIN — SODIUM BICARBONATE 125 ML/HR: 84 INJECTION, SOLUTION INTRAVENOUS at 14:15

## 2017-11-19 RX ADMIN — DIVALPROEX SODIUM 250 MG: 250 TABLET, FILM COATED, EXTENDED RELEASE ORAL at 21:46

## 2017-11-19 RX ADMIN — POTASSIUM CHLORIDE 20 MEQ: 1500 TABLET, EXTENDED RELEASE ORAL at 15:41

## 2017-11-19 RX ADMIN — SODIUM BICARBONATE 125 ML/HR: 84 INJECTION, SOLUTION INTRAVENOUS at 04:53

## 2017-11-19 RX ADMIN — TAMSULOSIN HYDROCHLORIDE 0.4 MG: 0.4 CAPSULE ORAL at 15:41

## 2017-11-19 RX ADMIN — MELATONIN 6 MG: 3 TAB ORAL at 21:47

## 2017-11-19 RX ADMIN — DULOXETINE HYDROCHLORIDE 60 MG: 60 CAPSULE, DELAYED RELEASE ORAL at 08:48

## 2017-11-19 RX ADMIN — DOCUSATE SODIUM 100 MG: 100 CAPSULE, LIQUID FILLED ORAL at 08:48

## 2017-11-19 RX ADMIN — DOCUSATE SODIUM 100 MG: 100 CAPSULE, LIQUID FILLED ORAL at 18:09

## 2017-11-19 RX ADMIN — MIRTAZAPINE 7.5 MG: 15 TABLET, FILM COATED ORAL at 21:46

## 2017-11-19 RX ADMIN — DULOXETINE HYDROCHLORIDE 30 MG: 30 CAPSULE, DELAYED RELEASE ORAL at 08:48

## 2017-11-19 RX ADMIN — HEPARIN SODIUM 5000 UNITS: 5000 INJECTION, SOLUTION INTRAVENOUS; SUBCUTANEOUS at 21:47

## 2017-11-19 NOTE — PROGRESS NOTES
[facility] Internal Medicine Progress Note  Patient: Maryjane Slaughter 81 y o  male   MRN: [id number]  PCP: Arabella Ramirez MD  Unit/Bed#: -90 Encounter: [id number]  Date Of Visit: 11/19/17    Assessment:    Principal Problem:    Acute renal failure (ARF) ([address] )  Active Problems:    Multiple falls    Delirium    Hypothyroidism    Depression    Acute urinary retention    Urinary incontinence    Acute uremia      Plan:    · Acute renal failure slowly improving continue to monitor, he likely has underlying chronic kidney disease, discussed with Nephrology avoid nephrotoxins monitor kidney function closely  · Obstructive uropathy due to bladder outlet obstruction continue Wood catheter, urology following  · Hematuria monitor  · Anemia normocytic continue to monitor no obvious source of bleeding, follow up on Hemoccult  · Hypertension his blood pressures have been low, amlodipine on hold monitor blood pressures, avoid hypotension  · Hypothyroid  · Ambulatory dysfunction with history of falls  · Depression      VTE Pharmacologic Prophylaxis:   Pharmacologic: Heparin  Mechanical VTE Prophylaxis in Place: Yes    Patient Centered Rounds: I have performed bedside rounds with nursing staff today  Discussions with Specialists or Other Care Team Provider:  Nephrology Dr Elen Reyes    Education and Discussions with Family / Patient: pt, called and left a message for Gerardo Damian     Time Spent for Care: 30 minutes  More than 50% of total time spent on counseling and coordination of care as described above      Current Length of Stay: 2 day(s)    Current Patient Status: Inpatient   Certification Statement: The patient will continue to require additional inpatient hospital stay due to as mentioned    Discharge Plan / Estimated Discharge Date: SHENA needs close monitoring as outlined    Code Status: Level 1 - Full Code      Subjective:     Patient is not communicative  He only answers briefly with a "yes" and nods his head Objective:     Vitals:   Temp (24hrs), Av °F (36 7 °C), Min:97 7 °F (36 5 °C), Max:98 2 °F (36 8 °C)    HR:  [78-82] 78  Resp:  [18-20] 18  BP: ()/(54-62) 101/57  SpO2:  [94 %-98 %] 94 %  Body mass index is 27 83 kg/m²  Input and Output Summary (last 24 hours): Intake/Output Summary (Last 24 hours) at 17 1512  Last data filed at 17 1431   Gross per 24 hour   Intake              720 ml   Output             3550 ml   Net            -2830 ml       Physical Exam:     Physical Exam     Comfortably sitting up in chair  Neck supple  Lungs diminished breath sounds at the bases  Heart sounds no murmurs appreciable  Abdomen soft  Pulses present  Awake  Obeys simple commands      Additional Data:     Labs:      Results from last 7 days  Lab Units 17  0436  17  1044   WBC Thousand/uL 5 86  < > 7 01   HEMOGLOBIN g/dL 7 5*  < > 10 8*   HEMATOCRIT % 22 7*  < > 31 8*   PLATELETS Thousands/uL 144*  < > 216   NEUTROS PCT %  --   --  74   LYMPHS PCT %  --   --  17   MONOS PCT %  --   --  6   EOS PCT %  --   --  2   < > = values in this interval not displayed  Results from last 7 days  Lab Units 17  0436   SODIUM mmol/L 136   POTASSIUM mmol/L 3 2*   CHLORIDE mmol/L 101   CO2 mmol/L 23   BUN mg/dL 108*   CREATININE mg/dL 8 93*   CALCIUM mg/dL 7 3*   TOTAL PROTEIN g/dL 5 0*   BILIRUBIN TOTAL mg/dL 0 30   ALK PHOS U/L 42*   ALT U/L 15   AST U/L 8   GLUCOSE RANDOM mg/dL 133       Results from last 7 days  Lab Units 17  1044   INR  1 01       * I Have Reviewed All Lab Data Listed Above  * Additional Pertinent Lab Tests Reviewed: All Labs Within Last 24 Hours Reviewed    Imaging:    Imaging Reports Reviewed Today Include:   Imaging Personally Reviewed by Myself Includes:      Recent Cultures (last 7 days):       Results from last 7 days  Lab Units 17  1101 17  1044   BLOOD CULTURE  No Growth at 48 hrs  No Growth at 48 hrs         Last 24 Hours Medication List: divalproex sodium 250 mg Oral Daily   docusate sodium 100 mg Oral BID   DULoxetine 30 mg Oral Daily   DULoxetine 60 mg Oral Daily   heparin (porcine) 5,000 Units Subcutaneous Q8H [facility]   levothyroxine 75 mcg Oral Daily   melatonin 6 mg Oral HS   mirtazapine 7 5 mg Oral HS   potassium chloride 20 mEq Oral Once   sodium chloride 1,000 mL Intravenous Once   tamsulosin 0 4 mg Oral Daily With Dinner        Today, Patient Was Seen By: Madina Sosa MD    ** Please Note: This note has been constructed using a voice recognition system   **

## 2017-11-19 NOTE — PROGRESS NOTES
NEPHROLOGY PROGRESS NOTE   Lianne Chacon 81 y o  male MRN: [id number]  Unit/Bed#: -01 Encounter: [id number]  Reason for Consult: SHENA     ASSESSMENT and PLAN:    82 yo male with PMHx of HTN, hypothyroid, prior remote TBI, urinary retention with prior frias, admission in May of 2016 for urinary retention, mult falls, C diff, encephalopathy who now presents after having difficulty voiding  Pt is currently eating lunch and allows for interview to take place, but then did not allow me to complete an exam, and agree with exam findings of [facility], PAC  Urine frias bag with clearing urine  Pt denies fevers, chills, nausea, vomiting, diarrhea, prior renal dysfunction  During admission in May of 2016, Cr had peaked to 2 5       1) SHENA - baseline Cr appears to be 1 mg/dL  Rising to 12 5 on admission  Pt states symptoms were present for one week with urinary symptoms, but has Hb 10 8 which is lower than prior       Etiology - felt to be secondary to post obstructive given  cc in ER and now with UOP with frias placement       - renal u/s with mod R hydro and mod to severe L sided hydro  No calculi  Abnormal appearance of urinary bladder  - Urology on board - future cysto possible to evaluate further  - repeat imaging per Urology - likely Monday or Tuesday  - to note, Cr is improving, but not as robust as expected  Therefore, long standing obstruction vs CKD of other etiology  - UA bland  - electrolytes stable  - azotemia slowly improving    - continue frias   - hold IVF and bolus as needed for hypotension  - Ca corrected stable  - potassium repleted with 20 meq kdur  - monitor for post obstructive diuresis  Na thus far stable  - renal diet  - tamsulosin  - no urgent indication for RRT  - I/O;  Avoid Nephrotoxic agents      2) Anemia -      - iron sat not sig low 24    - hematuria   - Hb decreased 2 gm today; dilutional? Vs bleed given hypotension - discussed with Primary Team  No sign of bleed that is noticeable  Primary Team Attending will monitor and determine next step     3) HTN - now with marginal BP     - amlodipine - held  - no ACEi     6) acid/base - improving bicarb     7) hypothyroid     - as per Primary Team     8) hyperphos - cont renal diet     - check phos Monday    9) mentation    - certainly uremia could be contributing, but renal function is improving  Baseline unclear  SUBJECTIVE / INTERVAL HISTORY:    Pt denies complaints, but then states does not sleep well  Does not elaborate   Does not allow for full exam      OBJECTIVE:  Current Weight: Weight - Scale: 90 5 kg (199 lb 8 3 oz)  Vitals:    11/18/17 1935 11/18/17 2306 11/19/17 0600 11/19/17 0728   BP: 98/58 93/54  101/57   Pulse:  80  78   Resp:  20  18   Temp:  98 2 °F (36 8 °C)  98 1 °F (36 7 °C)   TempSrc:  Oral  Oral   SpO2:  97%  94%   Weight:   90 5 kg (199 lb 8 3 oz)        Intake/Output Summary (Last 24 hours) at 11/19/17 1445  Last data filed at 11/19/17 1431   Gross per 24 hour   Intake              720 ml   Output             3550 ml   Net            -2830 ml     General: pt did not allow for full exam  Skin: no rash  Eyes: anicteric sclera  ENT: dry mucous membrane  Neck:   Chest:   CVS:  Abdomen:   Extremities: no edema  : frias with some blood  Neuro: alert  Psych: alert    Medications:    Current Facility-Administered Medications:     acetaminophen (TYLENOL) tablet 650 mg, 650 mg, Oral, Q6H PRN, Maria M Soto MD    amLODIPine (NORVASC) tablet 5 mg, 5 mg, Oral, Daily, Cyndi Pereyra MD, 5 mg at 11/18/17 0900    calcium carbonate (TUMS) chewable tablet 1,000 mg, 1,000 mg, Oral, Daily PRN, Maria M Soto MD    divalproex sodium (DEPAKOTE ER) 24 hr tablet 250 mg, 250 mg, Oral, Daily, Maria M Soto MD, 250 mg at 11/18/17 2127    docusate sodium (COLACE) capsule 100 mg, 100 mg, Oral, BID, Maria M Soto MD, 100 mg at 11/19/17 0848    DULoxetine (CYMBALTA) delayed release capsule 30 mg, 30 mg, Oral, Daily, Gudelia Stewart MD, 30 mg at 11/19/17 0848    DULoxetine (CYMBALTA) delayed release capsule 60 mg, 60 mg, Oral, Daily, Gudelia Stewart MD, 60 mg at 11/19/17 0848    heparin (porcine) subcutaneous injection 5,000 Units, 5,000 Units, Subcutaneous, Q8H [facility], Gudelia Stewart MD, 5,000 Units at 11/19/17 1415    levothyroxine tablet 75 mcg, 75 mcg, Oral, Daily, Gudelia Stewart MD, 75 mcg at 11/19/17 0551    melatonin tablet 6 mg, 6 mg, Oral, HS, Cecilia Mustafa PA-C, 6 mg at 11/18/17 2123    mirtazapine (REMERON) tablet 7 5 mg, 7 5 mg, Oral, HS, Gudelia Stewart MD, 7 5 mg at 11/18/17 2123    ondansetron (ZOFRAN) injection 4 mg, 4 mg, Intravenous, Q6H PRN, Gudelia Stewart MD    sodium bicarbonate 75 mEq in sodium chloride 0 45 % 1,000 mL infusion, 125 mL/hr, Intravenous, Continuous, Ariana Berrios PA-C, Last Rate: 125 mL/hr at 11/19/17 1415, 125 mL/hr at 11/19/17 1415    sodium chloride 0 9 % bolus 1,000 mL, 1,000 mL, Intravenous, Once, Krishna Harrison DO    tamsulosin [facility]) capsule 0 4 mg, 0 4 mg, Oral, Daily With Dinner, Ariana Berrios PA-C, 0 4 mg at 11/18/17 1653    Laboratory Results:    Results from last 7 days  Lab Units 11/19/17  0436 11/18/17  0559 11/18/17  0545 11/17/17  2105 11/17/17  1702 11/17/17  1605 11/17/17  1044   WBC Thousand/uL 5 86 8 24  --   --   --   --  7 01   HEMOGLOBIN g/dL 7 5* 9 3*  --  9 8*  --   --  10 8*   HEMATOCRIT % 22 7* 27 0*  --  28 0*  --   --  31 8*   PLATELETS Thousands/uL 144* 192  --   --   --  201 216   SODIUM mmol/L 136  --  135*  --  135*  --  138   POTASSIUM mmol/L 3 2*  --  4 2  --  4 5  --  4 6   CHLORIDE mmol/L 101  --  102  --  100  --  102   CO2 mmol/L 23  --  18*  --  18*  --  18*   BUN mg/dL 108*  --  118*  --  121*  --  124*   CREATININE mg/dL 8 93*  --  10 52*  --  11 49*  --  12 47*   CALCIUM mg/dL 7 3*  --  7 8*  --  8 6  --  9 2   MAGNESIUM mg/dL  --   --  1 9  --   --   --   -- PHOSPHORUS mg/dL  --   --  6 4*  --   --   --   --    ALBUMIN g/dL 2 6*  --   --   --   --   --  4 0   TOTAL PROTEIN g/dL 5 0*  --   --   --   --   --  7 6   GLUCOSE RANDOM mg/dL 133  --  108  --  217*  --  120

## 2017-11-19 NOTE — CASE MANAGEMENT
Initial Clinical Review    Admission: Date/Time/Statement: 11/17/17 @ 1128     Orders Placed This Encounter   Procedures    Inpatient Admission (expected length of stay for this patient is greater than two midnights)     Standing Status:   Standing     Number of Occurrences:   1     Order Specific Question:   Admitting Physician     Answer:   Janis Partida     Order Specific Question:   Level of Care     Answer:   Med Surg [16]     Order Specific Question:   Estimated length of stay     Answer:   More than 2 Midnights     Order Specific Question:   Certification     Answer:   I certify that inpatient services are medically necessary for this patient for a duration of greater than two midnights  See H&P and MD Progress Notes for additional information about the patient's course of treatment  ED: Date/Time/Mode of Arrival:   ED Arrival Information     Expected Arrival Acuity Means of Arrival Escorted By Service Admission Type    - 11/17/2017 10:20 Urgent Walk-In Family Member General Medicine Urgent    Arrival Complaint    urinary incontience          Chief Complaint:   Chief Complaint   Patient presents with    Urinary Incontinence     Pt states that he is having incontinence  Pt states that he doesn't feel like he has to go and tthen all of a sudden it just lets go  History of Illness: 81 y o  male who presents with urinary hesitancy, retention of 2-3 weeks duration  Patient is a poor historian, he reports he is confused  History is obtained from the ED physician and review of charts  He presented with urinary retention he was evaluated in the ER a Wood catheter was placed he is noted to have a creatinine of 12 with metabolic acidosis  He has been evaluated by Nephrology and is placed on a bicarbonate GTT        Patient denies history of dysuria hematuria  Denies fever chills sweats or constitutional symptom    No history of chest pain shortness of breath palpitations presyncope or syncope  Denies cough chest tightness wheezing  Denies abdominal pain nausea vomiting diarrhea  He reports poor appetite and poor p o  Intake  ED Vital Signs:   ED Triage Vitals   Temperature Pulse Respirations Blood Pressure SpO2   11/17/17 1239 11/17/17 1042 11/17/17 1042 11/17/17 1042 11/17/17 1042   98 °F (36 7 °C) 89 16 157/92 95 %      Temp Source Heart Rate Source Patient Position - Orthostatic VS BP Location FiO2 (%)   11/17/17 1239 11/17/17 1042 11/17/17 1042 11/17/17 1042 --   Oral Monitor Lying Right arm       Pain Score       11/17/17 1042       No Pain        Wt Readings from Last 1 Encounters:   11/19/17 90 5 kg (199 lb 8 3 oz)     Abnormal Labs/Diagnostic Test Results:   HEMOGLOBIN g/dL 10 8*   HEMATOCRIT % 31 8*     CO2 mmol/L 18*   BUN mg/dL 124*   CREATININE mg/dL 12 47*     EKG: Normal Sinus Rhythm, RBBB    Blood gas, venous [[id number]] (Abnormal)   Order Status: Completed  11/17/2017 Lab Status: Final result   Specimen: Blood from Arm, Right    pH, Domingo 7 243 (L) 7 300 - 7 400    pCO2, Domingo 35 2 (L) 42 0 - 50 0 mm Hg    pO2, Domingo 29 2 (L) 35 0 - 45 0 mm Hg    HCO3, Domingo 14 8 (L) 24 - 30 mmol/L    Base Excess, Domingo -11 5 mmol/L    O2 Content, Domingo 9 0 ml/dL    O2 HGB, VENOUS 53 2 (L) 60 0 - 80 0 %     Retroperitoneal Ultrasound: 1   Moderate right-sided hydronephrosis and moderate to severe left-sided hydronephrosis   No obstructing renal calculi  2   Abnormal appearance of the urinary bladder   Wood catheter present in the urinary bladder   Although the findings may be related to decompressed urinary bladder, bladder neoplasm not excluded        ED Treatment:   Medication Administration from 11/17/2017 1020 to 11/17/2017 1235       Date/Time Order Dose Route Action Action by Comments     11/17/2017 1103 sodium chloride 0 9 % bolus 1,000 mL 1,000 mL Intravenous New Bag Josafat Osborne RN         Physical Exam   Constitutional: No distress     Awake  Tolerating p o  feeds  Reports confusion Cardiovascular: Normal rate  No murmur heard  Pulmonary/Chest: Effort normal  No respiratory distress  He has no wheezes  He has no rales  He exhibits no tenderness  Past Medical/Surgical History:    Active Ambulatory Problems     Diagnosis Date Noted    Fall 05/08/2016    Multiple falls 05/08/2016    Delirium 05/08/2016    Scalp Laceration 05/08/2016    Multiple bruises 05/08/2016    Hypothyroidism 05/09/2016    HTN (hypertension) 05/09/2016    Depression 05/09/2016    Acute urinary retention 05/16/2016    Clostridium difficile infection 05/16/2016     Resolved Ambulatory Problems     Diagnosis Date Noted    No Resolved Ambulatory Problems     Past Medical History:   Diagnosis Date    Disease of thyroid gland     H/O Clostridium difficile infection     Hypertension     Psychiatric disorder     Seizures ([facility] [address] )        Admitting Diagnosis: Delirium [R41 0]  Urinary incontinence [R32]  Abnormal EKG [R94 31]  Overflow incontinence of urine [N39 490]  Acute renal failure (ARF) (HCC) [N17 9]  Acute uremia [N19]  Acute urinary retention [R33 8]    Age/Sex: 81 y o  male    Assessment/Plan:     Hospital Problem List:      Principal Problem:    Acute renal failure (ARF) ([facility] [address] )  Active Problems:    Multiple falls    Delirium    Hypothyroidism    Depression    Acute urinary retention    Urinary incontinence    Acute uremia        Plan for the Primary Problem(s):     · Acute renal failure likely postobstructive a Wood catheter has been placed, continue IV fluids avoid nephrotoxins continue Wood catheter follow-up on retroperitoneal ultrasound, nephrology input noted  · Urinary retention - Wood catheter has been placed, he had similar presentation previously and follows with Urology as outpatient, continue Flomax  · Metabolic acidosis from acute renal failure on bicarbonate GTT monitor kidney functions electrolytes  · Delirium likely due to SHENA        Plan for Additional Problems:      · Hypothyroidism continue levothyroxine  · Depression continue anti depressants monitor     VTE Prophylaxis: Heparin  / sequential compression device   Code Status:  Full code  POLST: There is no POLST form on file for this patient (pre-hospital)     Anticipated Length of Stay:  Patient will be admitted on an Inpatient basis with an anticipated length of stay of  More than 2 midnights     Justification for Hospital Stay:  Acute renal failure metabolic acidosis urinary retention requiring close monitoring as outlined    Admission Orders:  Inpatient/MedSurg  Consult Renal r/e acute renal failure  Consult Uro r/e hematuria  Bilateral Sequential Compression Device  PT Consult  Wood Cath  IV Na BiCarb 75 mEq @ 125    Scheduled Meds:   amLODIPine 5 mg Oral Daily   divalproex sodium 250 mg Oral Daily   docusate sodium 100 mg Oral BID   DULoxetine 30 mg Oral Daily   DULoxetine 60 mg Oral Daily   heparin (porcine) 5,000 Units Subcutaneous Q8H [facility]   levothyroxine 75 mcg Oral Daily   melatonin 6 mg Oral HS   mirtazapine 7 5 mg Oral HS   sodium chloride 1,000 mL Intravenous Once   tamsulosin 0 4 mg Oral Daily With [employer]

## 2017-11-20 PROBLEM — G93.40 ENCEPHALOPATHY: Status: ACTIVE | Noted: 2017-11-20

## 2017-11-20 LAB
ANION GAP SERPL CALCULATED.3IONS-SCNC: 12 MMOL/L (ref 4–13)
BUN SERPL-MCNC: 89 MG/DL (ref 5–25)
CALCIUM SERPL-MCNC: 7.4 MG/DL (ref 8.3–10.1)
CHLORIDE SERPL-SCNC: 104 MMOL/L (ref 100–108)
CO2 SERPL-SCNC: 26 MMOL/L (ref 21–32)
CREAT SERPL-MCNC: 7.15 MG/DL (ref 0.6–1.3)
ERYTHROCYTE [DISTWIDTH] IN BLOOD BY AUTOMATED COUNT: 15.1 % (ref 11.6–15.1)
GFR SERPL CREATININE-BSD FRML MDRD: 7 ML/MIN/1.73SQ M
GLUCOSE SERPL-MCNC: 104 MG/DL (ref 65–140)
HCT VFR BLD AUTO: 23.8 % (ref 36.5–49.3)
HGB BLD-MCNC: 8 G/DL (ref 12–17)
MCH RBC QN AUTO: 28.5 PG (ref 26.8–34.3)
MCHC RBC AUTO-ENTMCNC: 33.6 G/DL (ref 31.4–37.4)
MCV RBC AUTO: 85 FL (ref 82–98)
PHOSPHATE SERPL-MCNC: 5.2 MG/DL (ref 2.3–4.1)
PLATELET # BLD AUTO: 155 THOUSANDS/UL (ref 149–390)
PMV BLD AUTO: 10.2 FL (ref 8.9–12.7)
POTASSIUM SERPL-SCNC: 3.5 MMOL/L (ref 3.5–5.3)
RBC # BLD AUTO: 2.81 MILLION/UL (ref 3.88–5.62)
SODIUM SERPL-SCNC: 142 MMOL/L (ref 136–145)
WBC # BLD AUTO: 5.78 THOUSAND/UL (ref 4.31–10.16)

## 2017-11-20 PROCEDURE — 85027 COMPLETE CBC AUTOMATED: CPT | Performed by: INTERNAL MEDICINE

## 2017-11-20 PROCEDURE — G8978 MOBILITY CURRENT STATUS: HCPCS

## 2017-11-20 PROCEDURE — G8979 MOBILITY GOAL STATUS: HCPCS

## 2017-11-20 PROCEDURE — 80048 BASIC METABOLIC PNL TOTAL CA: CPT | Performed by: INTERNAL MEDICINE

## 2017-11-20 PROCEDURE — 97163 PT EVAL HIGH COMPLEX 45 MIN: CPT

## 2017-11-20 PROCEDURE — 84100 ASSAY OF PHOSPHORUS: CPT | Performed by: INTERNAL MEDICINE

## 2017-11-20 RX ORDER — POTASSIUM CHLORIDE 20 MEQ/1
20 TABLET, EXTENDED RELEASE ORAL ONCE
Status: COMPLETED | OUTPATIENT
Start: 2017-11-20 | End: 2017-11-20

## 2017-11-20 RX ORDER — POTASSIUM CHLORIDE 20 MEQ/1
40 TABLET, EXTENDED RELEASE ORAL ONCE
Status: COMPLETED | OUTPATIENT
Start: 2017-11-20 | End: 2017-11-20

## 2017-11-20 RX ADMIN — HEPARIN SODIUM 5000 UNITS: 5000 INJECTION, SOLUTION INTRAVENOUS; SUBCUTANEOUS at 15:03

## 2017-11-20 RX ADMIN — ACETAMINOPHEN 650 MG: 325 TABLET ORAL at 18:10

## 2017-11-20 RX ADMIN — POTASSIUM CHLORIDE 40 MEQ: 1500 TABLET, EXTENDED RELEASE ORAL at 18:15

## 2017-11-20 RX ADMIN — TAMSULOSIN HYDROCHLORIDE 0.4 MG: 0.4 CAPSULE ORAL at 17:08

## 2017-11-20 RX ADMIN — DOCUSATE SODIUM 100 MG: 100 CAPSULE, LIQUID FILLED ORAL at 09:27

## 2017-11-20 RX ADMIN — LEVOTHYROXINE SODIUM 75 MCG: 75 TABLET ORAL at 05:46

## 2017-11-20 RX ADMIN — DOCUSATE SODIUM 100 MG: 100 CAPSULE, LIQUID FILLED ORAL at 17:08

## 2017-11-20 RX ADMIN — MIRTAZAPINE 7.5 MG: 15 TABLET, FILM COATED ORAL at 21:24

## 2017-11-20 RX ADMIN — DULOXETINE HYDROCHLORIDE 60 MG: 60 CAPSULE, DELAYED RELEASE ORAL at 09:27

## 2017-11-20 RX ADMIN — HEPARIN SODIUM 5000 UNITS: 5000 INJECTION, SOLUTION INTRAVENOUS; SUBCUTANEOUS at 05:46

## 2017-11-20 RX ADMIN — DULOXETINE HYDROCHLORIDE 30 MG: 30 CAPSULE, DELAYED RELEASE ORAL at 09:27

## 2017-11-20 RX ADMIN — DIVALPROEX SODIUM 250 MG: 250 TABLET, FILM COATED, EXTENDED RELEASE ORAL at 21:24

## 2017-11-20 RX ADMIN — HEPARIN SODIUM 5000 UNITS: 5000 INJECTION, SOLUTION INTRAVENOUS; SUBCUTANEOUS at 21:25

## 2017-11-20 RX ADMIN — POTASSIUM CHLORIDE 20 MEQ: 1500 TABLET, EXTENDED RELEASE ORAL at 15:03

## 2017-11-20 RX ADMIN — MELATONIN 6 MG: 3 TAB ORAL at 21:24

## 2017-11-20 NOTE — PHYSICAL THERAPY NOTE
PHYSICAL THERAPY NOTE          Patient Name: Justin FLOWERS Date: 11/20/2017 11/20/17 1132   Note Type   Note type Eval only   Pain Assessment   Pain Assessment No/denies pain   Pain Score No Pain   Home Living   Type of [address] Two level   Home Equipment Walker   Additional Comments Pt reports "2-3" ANDRAE with rail  Prior Function   Level of [place] Independent with ADLs and functional mobility   Lives With Alone   Receives Help From (None)   ADL Assistance Independent   IADLs Independent   Falls in the last 6 months 1 to 4   Comments Pt verbally reports no falls  Per documentation this visit in [facility] pt has mutiple falls  Per documentation on 5/30/16 pt has > 15 falls  Restrictions/Precautions   Weight Bearing Precautions Per Order No   Other Precautions Chair Alarm; Bed Alarm; Fall Risk;Visual impairment;Multiple lines   General   Additional Pertinent History Per documentation: pt has history of delerium, depression, HTN, seizures, and mutiple fallls  11/20 at 0555: Hemoglobin 8 0 g/dl and hematocrit 23 8 % and creatinine 8 93 mg/dlL  Family/Caregiver Present No   Cognition   Overall Cognitive Status Impaired   Arousal/Participation Lethargic   Orientation Level Oriented to person;Oriented to place   Memory Decreased recall of recent events   Following Commands Follows one step commands inconsistently   Comments Difficulty staying awake during evaluation  Pt redirected numerous times throughout evaluation      RUE Assessment   RUE Assessment X  (grossly 4/5)   LUE Assessment   LUE Assessment X  (grossly 4/5)   RLE Assessment   RLE Assessment WFL  (grossly 5/5)   LLE Assessment   LLE Assessment WFL  (grossly 5/5)   Coordination   Movements are Fluid and Coordinated 0   Coordination and Movement Description Decreased speed with movement   Sensation X  (vision impaired, unable to assess hearing  ) Light Touch   RLE Light Touch Grossly intact  (Pt able to identify bilateral light touch but not location  )   LLE Light Touch Grossly intact  (falling asleep during sensation testing)   Sharp/Dull   RLE Sharp/Dull Not tested   LLE Sharp/Dull Not tested   Proprioception   RLE Proprioception Not tested   LLE Proprioception Not tested   Bed Mobility   Rolling R Unable to assess   Sit to Supine 6  Modified independent   Additional items Increased time required;Verbal cues  (cues for scooting up in bed)   Transfers   Stand to Sit 5  Supervision   Additional items Increased time required; Impulsive;Verbal cues   Ambulation/Elevation   Gait pattern Shuffling; Forward Flexion; Inconsistent lina; Short stride; Excessively slow  (Pt up and OOB when entering room  walked 5 feet  )   Gait Assistance 5  Supervision  (Attempted to redirect walking toward door  Pt declined  )   Additional items Assist x 1   Assistive Device Rolling walker   Distance 5 ft   Stair Management Assistance Not tested   Balance   Static Sitting Fair   Static Standing Fair   Ambulatory Fair   Endurance Deficit   Endurance Deficit Yes   Endurance Deficit Description Pt fatigue requiring him to sit at EOB  Activity Tolerance   Activity Tolerance Patient limited by fatigue   Nurse Made Aware Spoke to RN    Assessment   Prognosis Fair   Problem List Decreased endurance; Impaired balance;Decreased mobility; Decreased cognition;Decreased coordination; Impaired judgement;Decreased safety awareness; Impaired vision  (Gait deviations)   Assessment Pt is 81year old male diagnosed acute renal failure on 11/17/17  PT ordered for eval and treat with activity orders of up with A  Prior to admission pt was living in a 2  with 2-3 ANDRAE with rail  Pt was mod I for transfers, ambulation, and stair negotiation with rolling walker  Pt was I with ADL's  Pt is currently supervision sit to stand and  ambulation with rolling walker and mod I for bed mobility   Pt presents as unstable/unpredictable given the following functional mobility deficits of impaired mobility, impaired balance, decreased endurance with the addition of impaired vision, impaired judgement, decreased safety awareness, fall risk due to recent history of falls(inconsistent reports), and abnormal lab values including H &H and renal funtion  Pt recommended for PT to improve the above listed deficits and return to baseline functional mobility  Recommend performance and assessment of TUG, continue gait training, perform 3 steps with least restrictive device, OT consult  Comorbidities adding to the complexity of the pt presentation consist of delirium, HTN and reports of a TBI per Urology  Personal factors consist of depression, recent history of falls, steps to enter environment, limited home support, inability to ambulate household distances and inability to navigate community distances, and noncompliance vs lack of follow up  Barriers to Discharge Inaccessible home environment;Decreased caregiver support   Goals   Patient Goals to go home    STG Expiration Date 11/27/17   Short Term Goal #1 Pt will be mod I for ambulation with rolling walker  Pt will be I with transfers and bed mobility  Perform and assess 3 steps with least restrictive device at mod I  Pt will improve barthel index to > 60/100  Perform TUG and set goals for classification cutoff  Treatment Day 0   Plan   Treatment/Interventions Functional transfer training; Therapeutic exercise; Endurance training;LE strengthening/ROM; Elevations; Bed mobility;Gait training;Spoke to nursing;Cognitive reorientation;Equipment eval/education;Patient/family training   PT Frequency 5x/wk   Recommendation   Recommendation Post acute IP rehab  (Pt would bene fit from rehab upon discharge )   Equipment Recommended Walker   Barthel Index   Feeding 10   Bathing 5   Grooming Score 5   Dressing Score 10   Bladder Score 0   Bowels Score 10   Toilet Use Score 5   Transfers (Bed/Chair) Score 10   Mobility (Level Surface) Score 0   Stairs Score 0   Barthel Index Score 55   Ankit Moreira SPT

## 2017-11-20 NOTE — PROGRESS NOTES
[facility] Progress Note  Patient: Nithin Martins 81 y o  male   MRN: [id number]  PCP: Mark Jovel MD  Unit/Bed#: -26 Encounter: [id number]  Date Of Visit: 11/20/17    Assessment:    Principal Problem:    Acute renal failure (ARF) ([facility] [address] )  Active Problems:    Multiple falls    Delirium    Hypothyroidism    Depression    Acute urinary retention    Urinary incontinence    Acute uremia    Encephalopathy      Plan:    · Acute renal failure slowly improving obstructive uropathy contributing likely has underlying chronic kidney disease nephrology following, avoid nephrotoxins  · Obstructive uropathy due to bladder outlet obstruction continue Wood catheter, Urology input noted follow-up on renal ultrasound  · Encephalopathy likely toxin mediated due to renal failure improving continue to monitor  · Anemia normocytic monitor hemoglobin likely due to chronic inflammation  · Hypothyroid  · Depression  · Ambulatory dysfunction history of falls discussed with physical therapy occupational therapy consultation, PT recommends inpatient rehab      VTE Pharmacologic Prophylaxis:   Pharmacologic: Heparin  Mechanical VTE Prophylaxis in Place: Yes    Patient Centered Rounds: I have performed bedside rounds with nursing staff today  Discussions with Specialists or Other Care Team Provider:     Education and Discussions with Family / Patient:  Discussed with the patient, his brother and sister-in-law are at bedside detailed updated was provided    Time Spent for Care: 30 minutes  More than 50% of total time spent on counseling and coordination of care as described above      Current Length of Stay: 3 day(s)    Current Patient Status: Inpatient   Certification Statement: The patient will continue to require additional inpatient hospital stay due to As mentioned    Discharge Plan / Estimated Discharge Date:  Pending nephrology urology clearance, will likely need inpatient rehab discussed with case management    Code Status: Level 1 - Full Code      Subjective:     Reports feeling slightly better today  Appetite fair    Objective:     Vitals:   Temp (24hrs), Av 1 °F (36 7 °C), Min:97 5 °F (36 4 °C), Max:98 8 °F (37 1 °C)    HR:  [81-93] 85  Resp:  [18] 18  BP: ()/(52-59) 129/58  SpO2:  [96 %-100 %] 96 %  Body mass index is 27 83 kg/m²  Input and Output Summary (last 24 hours): Intake/Output Summary (Last 24 hours) at 17 1452  Last data filed at 17 1357   Gross per 24 hour   Intake             2740 ml   Output             5025 ml   Net            -2285 ml       Physical Exam:     Physical Exam     Comfortably sitting up in bed  Neck supple  Lungs diminished breath sounds at bases  Heart sounds no murmurs appreciable  Abdomen soft nontender  Pulses present  No pedal edema  Awake alert, obeys simple verbal commands  No rash      Additional Data:     Labs:      Results from last 7 days  Lab Units 17  0555  17  1044   WBC Thousand/uL 5 78  < > 7 01   HEMOGLOBIN g/dL 8 0*  < > 10 8*   HEMATOCRIT % 23 8*  < > 31 8*   PLATELETS Thousands/uL 155  < > 216   NEUTROS PCT %  --   --  74   LYMPHS PCT %  --   --  17   MONOS PCT %  --   --  6   EOS PCT %  --   --  2   < > = values in this interval not displayed  Results from last 7 days  Lab Units 17  0555 17  0436   SODIUM mmol/L 142 136   POTASSIUM mmol/L 3 5 3 2*   CHLORIDE mmol/L 104 101   CO2 mmol/L 26 23   BUN mg/dL 89* 108*   CREATININE mg/dL 7 15* 8 93*   CALCIUM mg/dL 7 4* 7 3*   TOTAL PROTEIN g/dL  --  5 0*   BILIRUBIN TOTAL mg/dL  --  0 30   ALK PHOS U/L  --  42*   ALT U/L  --  15   AST U/L  --  8   GLUCOSE RANDOM mg/dL 104 133       Results from last 7 days  Lab Units 17  1044   INR  1 01       * I Have Reviewed All Lab Data Listed Above  * Additional Pertinent Lab Tests Reviewed:  All Labs Within Last 24 Hours Reviewed    Imaging:    Imaging Reports Reviewed Today Include:   Imaging Personally Reviewed by Myself Includes:     Recent Cultures (last 7 days):       Results from last 7 days  Lab Units 11/17/17  1101 11/17/17  1044   BLOOD CULTURE  No Growth at 48 hrs  No Growth at 48 hrs  Last 24 Hours Medication List:     divalproex sodium 250 mg Oral Daily   docusate sodium 100 mg Oral BID   DULoxetine 30 mg Oral Daily   DULoxetine 60 mg Oral Daily   heparin (porcine) 5,000 Units Subcutaneous Q8H [address]   levothyroxine 75 mcg Oral Daily   melatonin 6 mg Oral HS   mirtazapine 7 5 mg Oral HS   potassium chloride 20 mEq Oral Once   sodium chloride 1,000 mL Intravenous Once   tamsulosin 0 4 mg Oral Daily With Dinner        Today, Patient Was Seen By: Matteo Cooper MD    ** Please Note: This note has been constructed using a voice recognition system   **

## 2017-11-20 NOTE — PROGRESS NOTES
NEPHROLOGY PROGRESS NOTE   Rebecca Bullock 81 y o  male MRN: [id number]  Unit/Bed#: -01 Encounter: [id number]  Reason for Consult:  Acute kidney injury    ASSESSMENT and PLAN:  The patient is a 17-year-old male with a history of hypertension, urinary retention with prior Wood catheter, hypothyroidism encephalopathy, C diff, ambulatory dysfunction who presents with difficulty voiding x 2 weeks  1  Acute kidney injury:  Baseline creatinine appears to be ~1 mg/dL  SHENA related to obstructive uropathy  · Creatinine down to 7 15 (peak 12 47 on admission)  · UA bland  · Monitor for postobstructive diuresis-urine output increasing  Urine output 4800 mL the last 24 hrs  Patient and family report robust fluid intake  Sodium level 142 will continue to monitor  May need to add back half-normal saline  2  Urinary retention with moderate right hydronephrosis and moderate to severe left hydronephrosis noted on imaging  Likely a component of BPH  Patient will need cystoscopy follow-up  · Urology following  · History of urinary retention last seen by urologist July 2016 but lost to follow-up  · Wood catheter placed with improvement in renal function  · Re imaging planned when renal function/creatinine reaches mayuri  · On Flomax  3  Anemia:  Iron saturation 24%, ferritin 385  Hemoglobin fluctuating- currently 8 0  4  Hyperphosphatemia:  Improving    SUMMARY OF RECOMMENDATIONS:  · Patient encouraged to drink plenty of fluids  His family reports that he is drinking a great deal  · Avoid nephrotoxic agents, hypotension  · Wood catheter per Urology  · Check labs in the a m  · Give 1 dose of K-Dur 20 mEq    SUBJECTIVE / INTERVAL HISTORY:  Complaints that food does not taste good  Intermittent nausea    Inability to sleep    OBJECTIVE:  Current Weight: Weight - Scale: 90 5 kg (199 lb 8 3 oz)  Vitals:    11/19/17 0728 11/19/17 1605 11/19/17 2203 11/20/17 0747   BP: 101/57 97/53 107/59 94/52   Pulse: 78 81 93 85   Resp: 18 18 18 18   Temp: 98 1 °F (36 7 °C) 98 3 °F (36 8 °C) 98 8 °F (37 1 °C) 97 8 °F (36 6 °C)   TempSrc: Oral Oral Oral Oral   SpO2: 94% 96% 96% 96%   Weight:           Intake/Output Summary (Last 24 hours) at 11/20/17 1238  Last data filed at 11/20/17 0900   Gross per 24 hour   Intake             1660 ml   Output             4525 ml   Net            -2865 ml     General:  No acute distress  Skin:  Warm and dry, no rash  Eyes:  Sclera clear, anicteric  ENT:  Oropharynx moist  Neck:  supple no JVD  Chest:  Clear bilaterally  CVS:  Regular rhythm, no murmur, rub, gallop appreciated  Abdomen:  Soft, nondistended, nontender, bowel sounds present  Extremities:  No edema  :  Wood catheter draining clear yellow urine  Neuro:  Alert, oriented, no acute deficits  Psych:  Appropriate    Medications:    Current Facility-Administered Medications:     acetaminophen (TYLENOL) tablet 650 mg, 650 mg, Oral, Q6H PRN, Meseret Keith MD    calcium carbonate (TUMS) chewable tablet 1,000 mg, 1,000 mg, Oral, Daily PRN, Meseret Keith MD    divalproex sodium (DEPAKOTE ER) 24 hr tablet 250 mg, 250 mg, Oral, Daily, Meseret Keith MD, 250 mg at 11/19/17 2146    docusate sodium (COLACE) capsule 100 mg, 100 mg, Oral, BID, Meseret Keith MD, 100 mg at 11/20/17 0090    DULoxetine (CYMBALTA) delayed release capsule 30 mg, 30 mg, Oral, Daily, Meseret Keith MD, 30 mg at 11/20/17 2606    DULoxetine (CYMBALTA) delayed release capsule 60 mg, 60 mg, Oral, Daily, Meseret Keith MD, 60 mg at 11/20/17 0927    heparin (porcine) subcutaneous injection 5,000 Units, 5,000 Units, Subcutaneous, Q8H [facility], Meseret Keith MD, 5,000 Units at 11/20/17 0546    levothyroxine tablet 75 mcg, 75 mcg, Oral, Daily, Meseret Keith MD, 75 mcg at 11/20/17 0546    melatonin tablet 6 mg, 6 mg, Oral, HS, Rodriguez Matters, PA-C, 6 mg at 11/19/17 2147    mirtazapine (REMERON) tablet 7 5 mg, 7 5 mg, Oral, HS, Loi Don MD, 7 5 mg at 11/19/17 2146    ondansetron [facility]) injection 4 mg, 4 mg, Intravenous, Q6H PRN, Loi Don MD    sodium chloride 0 9 % bolus 1,000 mL, 1,000 mL, Intravenous, Once, Krishna Harrison DO    tamsulosin [facility]) capsule 0 4 mg, 0 4 mg, Oral, Daily With Dinner, Ariana Berrios PA-C, 0 4 mg at 11/19/17 1541    Laboratory Results:    Results from last 7 days  Lab Units 11/20/17  0555 11/19/17  0436 11/18/17  0559 11/18/17  0545 11/17/17  2105 11/17/17  1702 11/17/17  1605 11/17/17  1044   WBC Thousand/uL 5 78 5 86 8 24  --   --   --   --  7 01   HEMOGLOBIN g/dL 8 0* 7 5* 9 3*  --  9 8*  --   --  10 8*   HEMATOCRIT % 23 8* 22 7* 27 0*  --  28 0*  --   --  31 8*   PLATELETS Thousands/uL 155 144* 192  --   --   --  201 216   SODIUM mmol/L 142 136  --  135*  --  135*  --  138   POTASSIUM mmol/L 3 5 3 2*  --  4 2  --  4 5  --  4 6   CHLORIDE mmol/L 104 101  --  102  --  100  --  102   CO2 mmol/L 26 23  --  18*  --  18*  --  18*   BUN mg/dL 89* 108*  --  118*  --  121*  --  124*   CREATININE mg/dL 7 15* 8 93*  --  10 52*  --  11 49*  --  12 47*   CALCIUM mg/dL 7 4* 7 3*  --  7 8*  --  8 6  --  9 2   MAGNESIUM mg/dL  --   --   --  1 9  --   --   --   --    PHOSPHORUS mg/dL 5 2*  --   --  6 4*  --   --   --   --    ALBUMIN g/dL  --  2 6*  --   --   --   --   --  4 0   TOTAL PROTEIN g/dL  --  5 0*  --   --   --   --   --  7 6   GLUCOSE RANDOM mg/dL 104 133  --  108  --  217*  --  120     Previous work up:

## 2017-11-20 NOTE — PROGRESS NOTES
Progress Note - Gilberto Meyers 81 y o  male MRN: [id number]    Unit/Bed#: -01 Encounter: [id number]      Assessment:  82-year-old male known to our service refractory urinary retention last seen in our office July 2016  Patient was lost to follow-up and in the interim chronic Wood catheter was removed  Presents this admission with urinary hesitancy and retention for 2 weeks  He has a history of TBI and is a poor historian, admitting to being confused and is slow to respond  On admission, Wood catheter placed and drained 800 mL with SHENA and  creatinine of 12  Patient has been afebrile and there is no evidence of leukocytosis  Renal ultrasound revealed bilateral hydronephrosis left >right with no obstructing calculi  Flomax was initiated  Patient afebrile  Creatinine improving; currently 7  15    C/o intermittent headache and general fatigue and no other irritative or obstructive  symtpoms  States he just "does not feel well"; but could not specifically articulate  symtpoms  Denies pain, nausea, dizziness, and SOB  Plan:  Maintain urinary catheter  Do not remove  Will Repeat Ultrasound when creatinine nadirs  Will follow intermittently  Subjective:   Complaints of headache and general fatigue  States he just does not feel well  Denies shortness of breath, chest pain, nausea, dizziness, vomiting, fevers and chills  Objective:     Vitals: Blood pressure 94/52, pulse 85, temperature 97 8 °F (36 6 °C), temperature source Oral, resp  rate 18, weight 90 5 kg (199 lb 8 3 oz), SpO2 96 %  ,Body mass index is 27 83 kg/m²        Intake/Output Summary (Last 24 hours) at 11/20/17 0853  Last data filed at 11/20/17 0749   Gross per 24 hour   Intake             1820 ml   Output             5475 ml   Net            -3655 ml       Physical Exam: General appearance: alert, appears older than stated age and cooperative  Lungs: clear to auscultation bilaterally  Heart: regular rate and rhythm, S1, S2 normal, no murmur, click, rub or gallop  Abdomen: soft, non-tender; bowel sounds normal; no masses,  no organomegaly  Pulses: 2+ and symmetric  Skin: Skin color, texture, turgor normal  No rashes or lesions  Neurologic: Slow to respond  Responses are appropriate when he answers  Invasive Devices     Peripheral Intravenous Line            Peripheral IV 11/17/17 Left Antecubital 2 days    Peripheral IV 11/18/17 Right Hand 1 day          Drain            Urethral Catheter  16 Fr  2 days              Lab Results   Component Value Date    WBC 5 78 11/20/2017    HGB 8 0 (L) 11/20/2017    HCT 23 8 (L) 11/20/2017    MCV 85 11/20/2017     11/20/2017     Lab Results   Component Value Date    GLUCOSE 104 11/20/2017    CALCIUM 7 4 (L) 11/20/2017     11/20/2017    K 3 5 11/20/2017    CO2 26 11/20/2017     11/20/2017    BUN 89 (H) 11/20/2017    CREATININE 7 15 (H) 11/20/2017       Lab, Imaging and other studies: I have personally reviewed pertinent reports      VTE Pharmacologic Prophylaxis: Heparin  VTE Mechanical Prophylaxis: sequential compression device

## 2017-11-20 NOTE — SOCIAL WORK
CM attempted to meet with pt to discuss BPCI and DCP but Pt refused to speak with CM and said "I'm tired"  CM dept will attempt to meet with Pt again to discuss DCP

## 2017-11-20 NOTE — PLAN OF CARE
Problem: DISCHARGE PLANNING - CARE MANAGEMENT  Goal: Discharge to post-acute care or home with appropriate resources  INTERVENTIONS:  - Conduct assessment to determine patient/family and health care team treatment goals, and need for post-acute services based on payer coverage, community resources, and patient preferences, and barriers to discharge  - Address psychosocial, clinical, and financial barriers to discharge as identified in assessment in conjunction with the patient/family and health care team  - Arrange appropriate level of post-acute services according to patients   needs and preference and payer coverage in collaboration with the physician and health care team  - Communicate with and update the patient/family, physician, and health care team regarding progress on the discharge plan  - Arrange appropriate transportation to post-acute venues  Outcome: Progressing  CM attempted to meet with pt to discuss BPCI and DCP but Pt refused to speak with CM and said "I'm tired"  CM dept will attempt to meet with Pt again to discuss DCP

## 2017-11-20 NOTE — PLAN OF CARE
Problem: PHYSICAL THERAPY ADULT  Goal: Performs mobility at highest level of function for planned discharge setting  See evaluation for individualized goals  Treatment/Interventions: Functional transfer training, Therapeutic exercise, Endurance training, LE strengthening/ROM, Elevations, Bed mobility, Gait training, Spoke to nursing, Cognitive reorientation, Equipment eval/education, Patient/family training  Equipment Recommended: Alessandra Flores       See flowsheet documentation for full assessment, interventions and recommendations  Prognosis: Fair  Problem List: Decreased endurance, Impaired balance, Decreased mobility, Decreased cognition, Decreased coordination, Impaired judgement, Decreased safety awareness, Impaired vision (Gait deviations)  Assessment: Pt is 81year old male diagnosed acute renal failure on 11/17/17  PT ordered for eval and treat with activity orders of up with A  Prior to admission pt was living in a 2 SH with 2-3 ANDRAE with rail  Pt was mod I for transfers, ambulation, and stair negotiation with rolling walker  Pt was I with ADL's  Pt is currently supervision sit to stand and  ambulation with rolling walker and mod I for bed mobility  Pt presents as unstable/unpredictable given the following functional mobility deficits of impaired mobility, impaired balance, decreased endurance with the addition of impaired vision, impaired judgement, decreased safety awareness, fall risk due to recent history of falls(inconsistent reports), and abnormal lab values including H &H and renal funtion  Pt recommended for PT to improve the above listed deficits and return to baseline functional mobility  Recommend performance and assessment of TUG, continue gait training, perform 3 steps with least restrictive device, OT consult  Comorbidities adding to the complexity of the pt presentation consist of delirium, HTN and reports of a TBI per Urology   Personal factors consist of depression, recent history of falls, steps to enter environment, limited home support, inability to ambulate household distances and inability to navigate community distances, and noncompliance vs lack of follow up  Barriers to Discharge: Inaccessible home environment, Decreased caregiver support     Recommendation: Post acute IP rehab (Pt would bene fit from rehab upon discharge )          See flowsheet documentation for full assessment

## 2017-11-21 PROBLEM — R35.89 POLYURIA: Status: ACTIVE | Noted: 2017-11-21

## 2017-11-21 PROBLEM — E83.42 HYPOMAGNESEMIA: Status: ACTIVE | Noted: 2017-11-21

## 2017-11-21 LAB
ANION GAP SERPL CALCULATED.3IONS-SCNC: 10 MMOL/L (ref 4–13)
BUN SERPL-MCNC: 74 MG/DL (ref 5–25)
CALCIUM SERPL-MCNC: 7.8 MG/DL (ref 8.3–10.1)
CHLORIDE SERPL-SCNC: 105 MMOL/L (ref 100–108)
CO2 SERPL-SCNC: 27 MMOL/L (ref 21–32)
CREAT SERPL-MCNC: 6.14 MG/DL (ref 0.6–1.3)
GFR SERPL CREATININE-BSD FRML MDRD: 8 ML/MIN/1.73SQ M
GLUCOSE SERPL-MCNC: 100 MG/DL (ref 65–140)
MAGNESIUM SERPL-MCNC: 1.5 MG/DL (ref 1.6–2.6)
POTASSIUM SERPL-SCNC: 4.1 MMOL/L (ref 3.5–5.3)
SODIUM SERPL-SCNC: 142 MMOL/L (ref 136–145)

## 2017-11-21 PROCEDURE — 80048 BASIC METABOLIC PNL TOTAL CA: CPT | Performed by: NURSE PRACTITIONER

## 2017-11-21 PROCEDURE — 83735 ASSAY OF MAGNESIUM: CPT | Performed by: NURSE PRACTITIONER

## 2017-11-21 RX ORDER — MAGNESIUM SULFATE HEPTAHYDRATE 40 MG/ML
2 INJECTION, SOLUTION INTRAVENOUS ONCE
Status: COMPLETED | OUTPATIENT
Start: 2017-11-21 | End: 2017-11-29

## 2017-11-21 RX ADMIN — DULOXETINE HYDROCHLORIDE 60 MG: 60 CAPSULE, DELAYED RELEASE ORAL at 08:48

## 2017-11-21 RX ADMIN — HEPARIN SODIUM 5000 UNITS: 5000 INJECTION, SOLUTION INTRAVENOUS; SUBCUTANEOUS at 21:56

## 2017-11-21 RX ADMIN — MELATONIN 6 MG: 3 TAB ORAL at 21:55

## 2017-11-21 RX ADMIN — MAGNESIUM SULFATE HEPTAHYDRATE 2 G: 40 INJECTION, SOLUTION INTRAVENOUS at 18:29

## 2017-11-21 RX ADMIN — HEPARIN SODIUM 5000 UNITS: 5000 INJECTION, SOLUTION INTRAVENOUS; SUBCUTANEOUS at 06:28

## 2017-11-21 RX ADMIN — DULOXETINE HYDROCHLORIDE 30 MG: 30 CAPSULE, DELAYED RELEASE ORAL at 08:48

## 2017-11-21 RX ADMIN — DIVALPROEX SODIUM 250 MG: 250 TABLET, FILM COATED, EXTENDED RELEASE ORAL at 21:55

## 2017-11-21 RX ADMIN — DOCUSATE SODIUM 100 MG: 100 CAPSULE, LIQUID FILLED ORAL at 17:02

## 2017-11-21 RX ADMIN — DOCUSATE SODIUM 100 MG: 100 CAPSULE, LIQUID FILLED ORAL at 08:47

## 2017-11-21 RX ADMIN — TAMSULOSIN HYDROCHLORIDE 0.4 MG: 0.4 CAPSULE ORAL at 17:02

## 2017-11-21 RX ADMIN — LEVOTHYROXINE SODIUM 75 MCG: 75 TABLET ORAL at 06:28

## 2017-11-21 RX ADMIN — MIRTAZAPINE 7.5 MG: 15 TABLET, FILM COATED ORAL at 21:55

## 2017-11-21 RX ADMIN — HEPARIN SODIUM 5000 UNITS: 5000 INJECTION, SOLUTION INTRAVENOUS; SUBCUTANEOUS at 13:43

## 2017-11-21 NOTE — PROGRESS NOTES
NEPHROLOGY PROGRESS NOTE   Angelina Sung 81 y o  male MRN: [id number]  Unit/Bed#: -01 Encounter: [id number]  Reason for Consult: SHENA    ASSESSMENT and PLAN:  The patient is a 72-year-old male with a history of hypertension, urinary retention with prior Wood catheter, hypothyroidism encephalopathy, C diff, ambulatory dysfunction who presents with difficulty voiding x 2 weeks  1  Acute kidney injury:  Baseline creatinine appears to be ~1 mg/dL  SHENA related to obstructive uropathy  · Creatinine down to 6 14 (peak 12 47 on admission)  · UA bland  · Urine output remains ~4 L  Good intake, electrolytes stable  2  Urinary retention with moderate right hydronephrosis and moderate to severe left hydronephrosis noted on imaging  Likely a component of BPH  Patient will need cystoscopy follow-up  · Urology following  · History of urinary retention last seen by urologist July 2016 but lost to follow-up  · Wood catheter continued  · Re- imaging planned when renal function/creatinine reaches mayuri  · On Flomax  3  Anemia:  Iron saturation 24%, ferritin 385  Hemoglobin fluctuating- currently 8 0  4  Hyperphosphatemia:  Improving  5  Hypomagnesemia: replete    SUMMARY OF RECOMMENDATIONS:  · Wood per urology  · Replete magnesium    SUBJECTIVE / INTERVAL HISTORY:  Complaining that he can't sleep  This is the only issue right now       OBJECTIVE:  Current Weight: Weight - Scale: 94 2 kg (207 lb 10 8 oz)  Vitals:    11/20/17 1444 11/20/17 2215 11/21/17 0545 11/21/17 0716   BP: 129/58 100/60  124/74   Pulse: 85 79  83   Resp: 18 18  18   Temp: 97 5 °F (36 4 °C) 98 4 °F (36 9 °C)  98 3 °F (36 8 °C)   TempSrc: Oral Oral  Oral   SpO2: 96% 95%  98%   Weight:   94 2 kg (207 lb 10 8 oz)        Intake/Output Summary (Last 24 hours) at 11/21/17 1358  Last data filed at 11/21/17 1300   Gross per 24 hour   Intake             3040 ml   Output             2925 ml   Net              115 ml      General:  No acute distress  Skin:  Warm and dry, no rash  Eyes:  Sclera clear, anicteric  ENT:  Oropharynx moist  Neck:  supple no JVD  Chest:  Clear bilaterally  CVS:  Regular rhythm, no murmur, rub, gallop appreciated  Abdomen:  Soft, nondistended, nontender, bowel sounds present  Extremities:  No edema  :  Wood catheter draining clear yellow urine  Neuro:  Alert, oriented, no acute deficits  Psych:  Appropriate      Medications:    Current Facility-Administered Medications:     acetaminophen (TYLENOL) tablet 650 mg, 650 mg, Oral, Q6H PRN, Aminah Rose MD, 650 mg at 11/20/17 1810    calcium carbonate (TUMS) chewable tablet 1,000 mg, 1,000 mg, Oral, Daily PRN, Aminah Rose MD    divalproex sodium (DEPAKOTE ER) 24 hr tablet 250 mg, 250 mg, Oral, Daily, Aminah Rose MD, 250 mg at 11/20/17 2124    docusate sodium (COLACE) capsule 100 mg, 100 mg, Oral, BID, Aminah Rose MD, 100 mg at 11/21/17 0847    DULoxetine (CYMBALTA) delayed release capsule 30 mg, 30 mg, Oral, Daily, Aminah Rose MD, 30 mg at 11/21/17 0848    DULoxetine (CYMBALTA) delayed release capsule 60 mg, 60 mg, Oral, Daily, Aminah Rose MD, 60 mg at 11/21/17 0848    heparin (porcine) subcutaneous injection 5,000 Units, 5,000 Units, Subcutaneous, Q8H [address], Aminah Rose MD, 5,000 Units at 11/21/17 1343    levothyroxine tablet 75 mcg, 75 mcg, Oral, Daily, Aminah Rose MD, 75 mcg at 11/21/17 4602    melatonin tablet 6 mg, 6 mg, Oral, HS, Rosamaria Luna PA-C, 6 mg at 11/20/17 2124    mirtazapine (REMERON) tablet 7 5 mg, 7 5 mg, Oral, HS, Aminah Rose MD, 7 5 mg at 11/20/17 2124    ondansetron (ZOFRAN) injection 4 mg, 4 mg, Intravenous, Q6H PRN, Aminah Rose MD    sodium chloride 0 9 % bolus 1,000 mL, 1,000 mL, Intravenous, Once, Krishna Harrison DO    tamsulosin (FLOMAX) capsule 0 4 mg, 0 4 mg, Oral, Daily With Dinner, [employer] RUB[employer] Ruzi, 0 4 mg at 11/20/17 1708    Laboratory Results:    Results from last 7 days  Lab Units 11/21/17  0445 11/20/17  0555 11/19/17  0436 11/18/17  0559 11/18/17  0545 11/17/17  2105 11/17/17  1702 11/17/17  1605 11/17/17  1044   WBC Thousand/uL  --  5 78 5 86 8 24  --   --   --   --  7 01   HEMOGLOBIN g/dL  --  8 0* 7 5* 9 3*  --  9 8*  --   --  10 8*   HEMATOCRIT %  --  23 8* 22 7* 27 0*  --  28 0*  --   --  31 8*   PLATELETS Thousands/uL  --  155 144* 192  --   --   --  201 216   SODIUM mmol/L 142 142 136  --  135*  --  135*  --  138   POTASSIUM mmol/L 4 1 3 5 3 2*  --  4 2  --  4 5  --  4 6   CHLORIDE mmol/L 105 104 101  --  102  --  100  --  102   CO2 mmol/L 27 26 23  --  18*  --  18*  --  18*   BUN mg/dL 74* 89* 108*  --  118*  --  121*  --  124*   CREATININE mg/dL 6 14* 7 15* 8 93*  --  10 52*  --  11 49*  --  12 47*   CALCIUM mg/dL 7 8* 7 4* 7 3*  --  7 8*  --  8 6  --  9 2   MAGNESIUM mg/dL 1 5*  --   --   --  1 9  --   --   --   --    PHOSPHORUS mg/dL  --  5 2*  --   --  6 4*  --   --   --   --    ALBUMIN g/dL  --   --  2 6*  --   --   --   --   --  4 0   TOTAL PROTEIN g/dL  --   --  5 0*  --   --   --   --   --  7 6   GLUCOSE RANDOM mg/dL 100 104 133  --  108  --  217*  --  120     Previous work up:

## 2017-11-21 NOTE — PROGRESS NOTES
Progress Note - Monisha Salamanca 81 y o  male MRN: [id number]    Unit/Bed#: -01 Encounter: [id number]      Assessment:  82-year-old male known to our service refractory urinary retention last seen in our office July 2016  Patient was lost to follow-up and in the interim chronic Wood catheter was removed  Presents this admission with urinary hesitancy and retention for 2 weeks  He has a history of TBI and is a poor historian, admitting to being confused and is slow to respond  On admission, Wood catheter placed and drained 800 mL with SHENA and  creatinine of 12  Patient has been afebrile and there is no evidence of leukocytosis  Patient remains comfortable without complaints of bladder spasms  Today's creatinine is 6 14    Plan:  Continue to monitor creatinine and repeat ultrasound once normalized  Will follow along with you  Subjective:   Complaints of headache and general fatigue  States he just does not feel well  Denies shortness of breath, chest pain, nausea, dizziness, vomiting, fevers and chills  Objective:     Vitals: Blood pressure 124/74, pulse 83, temperature 98 3 °F (36 8 °C), temperature source Oral, resp  rate 18, weight 94 2 kg (207 lb 10 8 oz), SpO2 98 %  ,Body mass index is 28 96 kg/m²  Intake/Output Summary (Last 24 hours) at 11/21/17 0827  Last data filed at 11/21/17 8655   Gross per 24 hour   Intake             3120 ml   Output             4125 ml   Net            -1005 ml       Physical Exam: General appearance: alert, appears older than stated age and cooperative  Lungs: clear to auscultation bilaterally  Heart: regular rate and rhythm, S1, S2 normal, no murmur, click, rub or gallop  Abdomen: soft, non-tender; bowel sounds normal; no masses,  no organomegaly  Pulses: 2+ and symmetric  Skin: Skin color, texture, turgor normal  No rashes or lesions  Neurologic: Slow to respond  Responses are appropriate when he answers       Invasive Devices     Peripheral Intravenous Line Peripheral IV 11/17/17 Left Antecubital 3 days    Peripheral IV 11/18/17 Right Hand 2 days          Drain            Urethral Catheter  16 Fr  3 days              Lab Results   Component Value Date    WBC 5 78 11/20/2017    HGB 8 0 (L) 11/20/2017    HCT 23 8 (L) 11/20/2017    MCV 85 11/20/2017     11/20/2017     Lab Results   Component Value Date    GLUCOSE 100 11/21/2017    CALCIUM 7 8 (L) 11/21/2017     11/21/2017    K 4 1 11/21/2017    CO2 27 11/21/2017     11/21/2017    BUN 74 (H) 11/21/2017    CREATININE 6 14 (H) 11/21/2017       Lab, Imaging and other studies: I have personally reviewed pertinent reports      VTE Pharmacologic Prophylaxis: Heparin  VTE Mechanical Prophylaxis: sequential compression device

## 2017-11-21 NOTE — PLAN OF CARE
Problem: Potential for Falls  Goal: Patient will remain free of falls  INTERVENTIONS:  - Assess patient frequently for physical needs  -  Identify cognitive and physical deficits and behaviors that affect risk of falls    -  [place] fall precautions as indicated by assessment   - Educate patient/family on patient safety including physical limitations  - Instruct patient to call for assistance with activity based on assessment  - Modify environment to reduce risk of injury  - Consider OT/PT consult to assist with strengthening/mobility   Outcome: Progressing      Problem: Prexisting or High Potential for Compromised Skin Integrity  Goal: Skin integrity is maintained or improved  INTERVENTIONS:  - Identify patients at risk for skin breakdown  - Assess and monitor skin integrity  - Assess and monitor nutrition and hydration status  - Monitor labs (i e  albumin)  - Assess for incontinence   - Turn and reposition patient  - Assist with mobility/ambulation  - Relieve pressure over bony prominences  - Avoid friction and shearing  - Provide appropriate hygiene as needed including keeping skin clean and dry  - Evaluate need for skin moisturizer/barrier cream  - Collaborate with interdisciplinary team (i e  Nutrition, Rehabilitation, etc )   - Patient/family teaching   Outcome: Progressing      Problem: DISCHARGE PLANNING - CARE MANAGEMENT  Goal: Discharge to post-acute care or home with appropriate resources  INTERVENTIONS:  - Conduct assessment to determine patient/family and health care team treatment goals, and need for post-acute services based on payer coverage, community resources, and patient preferences, and barriers to discharge  - Address psychosocial, clinical, and financial barriers to discharge as identified in assessment in conjunction with the patient/family and health care team  - Arrange appropriate level of post-acute services according to patient's   needs and preference and payer coverage in collaboration with the physician and health care team  - Communicate with and update the patient/family, physician, and health care team regarding progress on the discharge plan  - Arrange appropriate transportation to post-acute venues    Outcome: Progressing

## 2017-11-21 NOTE — CASE MANAGEMENT
Continued Stay Review    Date: 11/21/2017     Vital Signs: /74   Pulse 83   Temp 98 3 °F (36 8 °C) (Oral)   Resp 18   Wt 94 2 kg (207 lb 10 8 oz)   SpO2 98%   BMI 28 96 kg/m²     Medications:   Scheduled Meds:   divalproex sodium 250 mg Oral Daily   docusate sodium 100 mg Oral BID   DULoxetine 30 mg Oral Daily   DULoxetine 60 mg Oral Daily   heparin (porcine) 5,000 Units Subcutaneous Q8H [address]   levothyroxine 75 mcg Oral Daily   melatonin 6 mg Oral HS   mirtazapine 7 5 mg Oral HS   sodium chloride 1,000 mL Intravenous Once   tamsulosin 0 4 mg Oral Daily With Dinner     Continuous Infusions:    PRN Meds: not used:   acetaminophen    calcium carbonate    ondansetron    Abnormal Labs/Diagnostic Results:   Bun 74  Creatinine 6  14  Magnesium 1 5  Age/Sex: 81 y o  male   Assessment/Plan: Acute renal failure 2/2 obstructive uropathy   ? BMP daily  ? Appreciate urology input  ? Continue frias  · Obstructive uropathy due to bladder outlet obstruction   ? continue Frias catheter, Urology input noted   ? Renal US:   · Encephalopathy likely toxin mediated due to renal failure  ?  Improving   ? continue to monitor  · Anemia normocytic likely due to chronic inflammation  ? monitor hemoglobin   · Hypothyroid  · Depression  · Ambulatory dysfunction history of falls   ? discussed with physical therapy, occupational therapy consultation      Discharge Plan: PT recommends inpatient rehab

## 2017-11-22 LAB
ALBUMIN SERPL BCP-MCNC: 3.4 G/DL (ref 3.5–5)
ANION GAP SERPL CALCULATED.3IONS-SCNC: 9 MMOL/L (ref 4–13)
BACTERIA BLD CULT: NORMAL
BACTERIA BLD CULT: NORMAL
BUN SERPL-MCNC: 65 MG/DL (ref 5–25)
CALCIUM SERPL-MCNC: 8.8 MG/DL (ref 8.3–10.1)
CHLORIDE SERPL-SCNC: 102 MMOL/L (ref 100–108)
CO2 SERPL-SCNC: 30 MMOL/L (ref 21–32)
CREAT SERPL-MCNC: 5.03 MG/DL (ref 0.6–1.3)
GFR SERPL CREATININE-BSD FRML MDRD: 11 ML/MIN/1.73SQ M
GLUCOSE SERPL-MCNC: 101 MG/DL (ref 65–140)
HEMOCCULT STL QL: NEGATIVE
LEVETIRACETAM SERPL-MCNC: NORMAL UG/ML (ref 10–40)
MAGNESIUM SERPL-MCNC: 1.8 MG/DL (ref 1.6–2.6)
PHOSPHATE SERPL-MCNC: 3.5 MG/DL (ref 2.3–4.1)
POTASSIUM SERPL-SCNC: 4.5 MMOL/L (ref 3.5–5.3)
SODIUM SERPL-SCNC: 141 MMOL/L (ref 136–145)

## 2017-11-22 PROCEDURE — G8987 SELF CARE CURRENT STATUS: HCPCS

## 2017-11-22 PROCEDURE — 80069 RENAL FUNCTION PANEL: CPT | Performed by: NURSE PRACTITIONER

## 2017-11-22 PROCEDURE — G8988 SELF CARE GOAL STATUS: HCPCS

## 2017-11-22 PROCEDURE — 97167 OT EVAL HIGH COMPLEX 60 MIN: CPT

## 2017-11-22 PROCEDURE — 82272 OCCULT BLD FECES 1-3 TESTS: CPT | Performed by: INTERNAL MEDICINE

## 2017-11-22 PROCEDURE — 83735 ASSAY OF MAGNESIUM: CPT | Performed by: INTERNAL MEDICINE

## 2017-11-22 RX ORDER — MIRTAZAPINE 15 MG/1
15 TABLET, FILM COATED ORAL
Status: DISCONTINUED | OUTPATIENT
Start: 2017-11-22 | End: 2017-12-01 | Stop reason: HOSPADM

## 2017-11-22 RX ADMIN — HEPARIN SODIUM 5000 UNITS: 5000 INJECTION, SOLUTION INTRAVENOUS; SUBCUTANEOUS at 05:28

## 2017-11-22 RX ADMIN — TAMSULOSIN HYDROCHLORIDE 0.4 MG: 0.4 CAPSULE ORAL at 15:45

## 2017-11-22 RX ADMIN — DULOXETINE HYDROCHLORIDE 60 MG: 60 CAPSULE, DELAYED RELEASE ORAL at 09:27

## 2017-11-22 RX ADMIN — HEPARIN SODIUM 5000 UNITS: 5000 INJECTION, SOLUTION INTRAVENOUS; SUBCUTANEOUS at 21:19

## 2017-11-22 RX ADMIN — LEVOTHYROXINE SODIUM 75 MCG: 75 TABLET ORAL at 05:30

## 2017-11-22 RX ADMIN — MAGNESIUM OXIDE TAB 400 MG (241.3 MG ELEMENTAL MG) 400 MG: 400 (241.3 MG) TAB at 14:19

## 2017-11-22 RX ADMIN — DOCUSATE SODIUM 100 MG: 100 CAPSULE, LIQUID FILLED ORAL at 17:54

## 2017-11-22 RX ADMIN — MELATONIN 6 MG: 3 TAB ORAL at 21:19

## 2017-11-22 RX ADMIN — DIVALPROEX SODIUM 250 MG: 250 TABLET, FILM COATED, EXTENDED RELEASE ORAL at 21:19

## 2017-11-22 RX ADMIN — HEPARIN SODIUM 5000 UNITS: 5000 INJECTION, SOLUTION INTRAVENOUS; SUBCUTANEOUS at 14:13

## 2017-11-22 RX ADMIN — MIRTAZAPINE 15 MG: 15 TABLET, FILM COATED ORAL at 21:19

## 2017-11-22 RX ADMIN — DULOXETINE HYDROCHLORIDE 30 MG: 30 CAPSULE, DELAYED RELEASE ORAL at 09:34

## 2017-11-22 RX ADMIN — DOCUSATE SODIUM 100 MG: 100 CAPSULE, LIQUID FILLED ORAL at 09:34

## 2017-11-22 NOTE — PHYSICAL THERAPY NOTE
Physical Therapy Cancellation Note      Chart reviewed and spoke to pt  PT cancelled today per pt request due to lethargy  Will f/o and treat as appropriate       Beau HOPE

## 2017-11-22 NOTE — PROGRESS NOTES
Progress Note - Vicki Joaquin 81 y o  male MRN: [id number]    Unit/Bed#: -01 Encounter: [id number]      Assessment:  68-year-old male with traumatic brain injury known to our service refractory urinary retention last seen in our office July 2016  Patient was lost to follow-up and in the interim chronic Wood catheter was removed  Patient presented to the emergency room with several day history of stranguria  Wood catheter was inserted for bladder volumes exceeding 800 mL  On admission lab values were demonstrative for severe acute kidney injury with creatinine greater than 12  Patient is post bladder decompression for several days and creatinine today is slightly greater than 5  Patient has remained afebrile, comfortable and nontoxic  Plan:  Maintain urinary catheter  Do not remove  Not nursing or other department managed  Monitor creatinine trend  We will follow patient peripherally and repeat ultrasound to re-evaluate for resolution of bilateral hydronephrosis once creatinine is at baseline  Subjective:   Complaints of headache and general fatigue  States he just does not feel well  Denies shortness of breath, chest pain, nausea, dizziness, vomiting, fevers and chills  Objective:     Vitals: Blood pressure 135/68, pulse 82, temperature 98 1 °F (36 7 °C), temperature source Oral, resp  rate 18, weight 93 kg (205 lb 0 4 oz), SpO2 96 %  ,Body mass index is 28 6 kg/m²        Intake/Output Summary (Last 24 hours) at 11/22/17 0800  Last data filed at 11/22/17 0201   Gross per 24 hour   Intake             3180 ml   Output             4200 ml   Net            -1020 ml       Physical Exam: General appearance: alert, appears older than stated age and cooperative  Lungs: clear to auscultation bilaterally  Heart: regular rate and rhythm, S1, S2 normal, no murmur, click, rub or gallop  Abdomen: soft, non-tender; bowel sounds normal; no masses,  no organomegaly  Pulses: 2+ and symmetric  Skin: Skin color, texture, turgor normal  No rashes or lesions  Neurologic: Slow to respond  Responses are appropriate when he answers  Invasive Devices     Peripheral Intravenous Line            Peripheral IV 11/18/17 Right Hand 3 days          Drain            Urethral Catheter  16 Fr  4 days              Lab Results   Component Value Date    WBC 5 78 11/20/2017    HGB 8 0 (L) 11/20/2017    HCT 23 8 (L) 11/20/2017    MCV 85 11/20/2017     11/20/2017     Lab Results   Component Value Date    GLUCOSE 101 11/22/2017    CALCIUM 8 8 11/22/2017     11/22/2017    K 4 5 11/22/2017    CO2 30 11/22/2017     11/22/2017    BUN 65 (H) 11/22/2017    CREATININE 5 03 (H) 11/22/2017       Lab, Imaging and other studies: I have personally reviewed pertinent reports      VTE Pharmacologic Prophylaxis: Heparin  VTE Mechanical Prophylaxis: sequential compression device

## 2017-11-22 NOTE — PLAN OF CARE
Problem: OCCUPATIONAL THERAPY ADULT  Goal: Performs self-care activities at highest level of function for planned discharge setting  See evaluation for individualized goals  Treatment Interventions: ADL retraining, Functional transfer training, Cognitive reorientation, Patient/family training, Equipment evaluation/education, Activityengagement, Continued evaluation          See flowsheet documentation for full assessment, interventions and recommendations  Limitation: Decreased ADL status, Decreased cognition, Decreased Safe judgement during ADL, Decreased self-care trans, Decreased high-level ADLs     Assessment: Pt is a 82 yo male admitted to [address] [place] [address] on 11/17/2017  Pt presents w/ acute renal failure and significant PMH impacting his occupational performance including HTN, depression,  Pt reports no falls, but per chart + fall history  Pt reports living in [facility] w/ 5 [facility]RA[facility] PTA  Pt reports living alone and limited support / assistance PTA  Pt reports I w/ ADL, and walking to [employer] restaurant next door for meals daily  Pt completed functional transfer to arm chair w/ S and increaed time using RW  Pt engaged in UB ADL's w/ S and increaed time to complete after set- up  Pt benefits  from  cues throughout for encoruagement to participate  Pt demonstrated B UE AROM WFL, and reports right hand dominance  Pt presents w/ decreased activity tolerance, decreased activity engagement, generalized weakness, and limited insight into deficits impacting his I w/ dressing, bathing, oral hygiene, clothing mgmt, functional mobility, functional transfers, community mobility  Pt would benefit from OT while in acute care to address deficits  From an OT perspective, pt would benefit from post acute rehab when medically stable for discharge from acute care  Recommend continued evaluation to assess functional cognitive skills  Will continue to follow pt while in acute care to assist in safe dsicharge planning       OT Discharge Recommendation: (post acute rehab)

## 2017-11-22 NOTE — OCCUPATIONAL THERAPY NOTE
[address]  Evaluation     Patient Name: Molli Barthel  [id number] Date: 11/22/2017  Problem List  Patient Active Problem List   Diagnosis    Fall    Multiple falls    Delirium    Scalp Laceration    Multiple bruises    Hypothyroidism    HTN (hypertension)    Depression    Acute urinary retention    Clostridium difficile infection    Urinary incontinence    Acute uremia    Acute renal failure (ARF) (HCC)    Encephalopathy    Hypomagnesemia    Polyuria     Past Medical History  Past Medical History:   Diagnosis Date    Disease of thyroid gland     H/O Clostridium difficile infection     5/16    Hypertension     Psychiatric disorder     Seizures ([facility] [address] )      Past Surgical History  History reviewed  No pertinent surgical history  11/22/17 5197   Note Type   Note type Eval/Treat   Restrictions/Precautions   Weight Bearing Precautions Per Order No   Other Precautions Chair Alarm;Cognitive; Fall Risk;Multiple lines   Pain Assessment   Pain Assessment No/denies pain   Pain Score No Pain   Home Living   Type of [address] [place] [address] Two level   Bathroom Shower/Tub Tub/shower unit   Bathroom Toilet Standard   Bathroom Equipment Shower chair   Bathroom Accessibility Other (Comment)  (1/2 bath main level of house)   [address] [facility]   Additional Comments Pt reports living alone in [address] [place] [address] w/ 5 ANDRAE  Pt reports 1/2 bath on main level, and tub/shower up flight of steps   Prior Function   Level of Towns Independent with ADLs and functional mobility   Lives With Alone   Receives Help From Other (Comment)  (pt reports that his sister in law assists w/ cleaning/laundr)   ADL Assistance Independent   IADLs Independent   Falls in the last 6 months 1 to 4  (per chart; pt reports 0 falls)   Vocational Retired   Comments Pt reports I w/ ADL and using walker for functional mobility  Pt reports walking next door to [employer] for meals     Lifestyle   Autonomy Pt reports I w/ ADL PTA, and ambulating w/ walker  Pt reports walking to [employer] restaurant next door for meals  Pt reports that his sister in law assists w/ cleaning and laundry   Reciprocal Relationships Pt reports living alone and limited support system   Service to Others Pt reports retired  from [employer] Pt reports enjoying dogs  Pt added that he misses his dog, but enjoys spending time w/ his frirends [facility]  Pt reports limited leisure interests   ADL   Eating Assistance 5  Supervision/Setup   Eating Deficit Setup;Supervision/safety   Grooming Assistance 5  Supervision/Setup   Grooming Deficit Setup;Verbal cueing; Other (Comment)  (cues for encouragement to actively participate)   [address]  Minimal Assistance  (seated in chair)   UB Bathing Deficit Setup;Supervision/safety   LB Bathing Assistance 3  Moderate Assistance   LB Bathing Deficit Buttocks    [address]  Supervision/Setup   UB Dressing Deficit Setup   Additional Comments Pt benefit from cues throughout for encoaurgement to actively participate   Bed Mobility   Supine to Sit Unable to assess   Sit to Supine Unable to assess   Additional Comments Pt standing at EOB w/ Robert CABRAL upon therapist arrival   Transfers   Sit to Stand 5  Supervision   Additional items Assist x 1; Armrests; Verbal cues; Increased time required   Stand to Sit 5  Supervision   Additional items Increased time required;Armrests;Assist x 1;Verbal cues   Stand pivot 5  Supervision   Additional items Assist x 1; Increased time required;Verbal cues   Balance   Static Sitting Fair +   Dynamic Sitting Fair -   Static Standing Fair   Activity Tolerance   Activity Tolerance Patient limited by fatigue   Nurse Made Aware per Robert CABRAL ok to see pt   RUE Assessment   RUE Assessment WFL   RUE Strength   RUE Overall Strength Within Functional Limits - able to perform ADL tasks with strength   LUE Assessment   LUE Assessment WFL   LUE Strength   LUE Overall Strength Within Functional Limits - able to perform ADL tasks with strength   Hand Function   Fine Motor Coordination Functional  (right hand dominance)   Vision-Basic Assessment   Current Vision (pt has glasses with him, not wearing)   Cognition   Overall Cognitive Status Impaired   Arousal/Participation Alert   Attention Attends with cues to redirect   Orientation Level Oriented to person;Disoriented to time;Disoriented to situation;Oriented to place   Memory Decreased recall of precautions;Decreased recall of recent events   Following Commands Follows one step commands with increased time or repetition  (encouragement)   Comments Pt agreed to participate in OT eval w/ encouragement  Pt able to provide limited social history  Flat affedt; pt reports being depressed  Pt moer animated when engaged in converation about dogs  Assessment   Limitation Decreased ADL status; Decreased cognition;Decreased Safe judgement during ADL;Decreased self-care trans;Decreased high-level ADLs   Assessment Pt is a 80 yo male [facility]  Pt presents w/ acute renal failure and significant PMH impacting his occupational performance including HTN, depression,  Pt reports no falls, but per chart + fall history  Pt reports living in [address] [place] [address] w/ 5 ANDRAE PTA  Pt reports living alone and limited support / assistance PTA  Pt reports I w/ ADL, and walking to [employer] next door for meals daily  Pt completed functional transfer to arm chair w/ S and increaed time using RW  Pt engaged in UB ADL's w/ S and increaed time to complete after set- up  Pt benefits  from  cues throughout for encoruagement to participate  Pt demonstrated B UE AROM WFL, and reports right hand dominance   Pt presents w/ decreased activity tolerance, decreased activity engagement, generalized weakness, and limited insight into deficits impacting his I w/ dressing, bathing, oral hygiene, clothing mgmt, functional mobility, functional transfers, community mobility  Pt would benefit from OT while in acute care to address deficits  From an OT perspective, pt would benefit from post acute rehab when medically stable for discharge from acute care  Recommend continued evaluation to assess functional cognitive skills  Will continue to follow pt while in acute care to assist in safe dsicharge planning  Goals   Patient Goals to go home   Plan   Treatment Interventions ADL retraining;Functional transfer training;Cognitive reorientation;Patient/family training;Equipment evaluation/education; Activityengagement;Continued evaluation   Goal Expiration Date 11/29/17   OT Frequency 3-5x/wk   Recommendation   OT Discharge Recommendation (post acute rehab)   Barthel Index   Feeding 5   Bathing 0   Grooming Score 5   Dressing Score 5   Bladder Score 0   Bowels Score 10   Toilet Use Score 5   Transfers (Bed/Chair) Score 10   Mobility (Level Surface) Score 0   Stairs Score 0   Barthel Index Score 40   Pt goals to be met in 7 days:  1  Pt will demonstrate good attention and continued evaluation to assess functional cognitive skills to assist in safe discharge planning  2  Pt will complete functional transfers to bed, chair, and toilet w/ mod I using AD  3  Pt will complete functional tub transfer to tub seat w/ mod I using AD  4  Pt will complete UBD w/ mod I after set- up  5  Pt will complete LBD w/ mod I after set- up  6  Pt will complete oral hygiene w/ mod I after set- up standing at sink w/ AD  7  Pt will demonstrate increased activity tolerance and good balance while seated at EOB during ADL performance for at least 15 minutes  8  Pt will demonstrate good attention and participation in leisure activity exploration and will identify three interests  9  Pt will demonstrate good attention and participation in continued evaluation to assess for DME needs upon discharge from acute care    Ban Li OT

## 2017-11-22 NOTE — PLAN OF CARE
Problem: DISCHARGE PLANNING - CARE MANAGEMENT  Goal: Discharge to post-acute care or home with appropriate resources  INTERVENTIONS:  - Conduct assessment to determine patient/family and health care team treatment goals, and need for post-acute services based on payer coverage, community resources, and patient preferences, and barriers to discharge  - Address psychosocial, clinical, and financial barriers to discharge as identified in assessment in conjunction with the patient/family and health care team  - Arrange appropriate level of post-acute services according to patient's   needs and preference and payer coverage in collaboration with the physician and health care team  - Communicate with and update the patient/family, physician, and health care team regarding progress on the discharge plan  - Arrange appropriate transportation to post-acute venues    Outcome: Progressing  Met with pt this am  Pt refusing to talk with Cm, stating he is so sleepy  Will f/u with family  TC to sister-in-law Xiao Avila at [phone] to discuss discharge plan with her  Explained about the IMM and left copy in room  Pt has been here 5 days and is a bundle  Explained about the bundle and left a copy in room  Pt live in [place] alone in a 2 story house with 5 steps to enter  There is a half bathroom on the first floor  Pt was independent with ADLs and drove a car  Pt would amb at times with Rw  Pt was not currently receiving VNA  Pt uses [employer] or through the [place] [employer]  Pt does not have a hx of D&A  Pt does have a hx of MH and last had inpt [facility] stay last year  Colin Crockett stated pt often gets very depressed at this time of year  Pt will need to be option from the [facility] for SNF level of care  Pt has POA/AD  Family could transport to SNF and Colin Crockett stated pt was at [facility] for rehab in the past and agreeable to referral made there  This was done today  Referral made to [facility] and pt needs option by [facility]

## 2017-11-22 NOTE — SOCIAL WORK
Met with pt this am  Pt refusing to talk with Cm, stating he is so sleepy  Will f/u with family  TC to sister-in-law Matt Baker at [phone] to discuss discharge plan with her  Explained about the IMM and left copy in room  Pt has been here 5 days and is a bundle  Explained about the bundle and left a copy in room  Pt live in [facility] alone in a 2 story house with 5 steps to enter  There is a half bathroom on the first floor  Pt was independent with ADLs and drove a car  Pt would amb at times with Rw  Pt was not currently receiving VNA  Pt uses [employer] or through the [place] [employer]  Pt does not have a hx of D&A  Pt does have a hx of MH and last had inpt [address] stay last year  Denise Stroud stated pt often gets very depressed at this time of year  [facility]  Pt has POA/AD  Family could transport to SNF and Denise Stroud stated pt was at [facility] for rehab in the past and agreeable to referral made there  This was done today  [facility] and [facility] needs [facility] by [facility]

## 2017-11-22 NOTE — PROGRESS NOTES
[facility] Internal Medicine Progress Note  Patient: Mayra Kemp 81 y o  male   MRN: [id number]  PCP: Jocy Martinez MD  Unit/Bed#: [facility] Encounter: [id number]  Date Of Visit: 11/22/17    Assessment:    Principal Problem:    Acute renal failure (ARF) ([facility] [address] )  Active Problems:    Multiple falls    Delirium    Hypothyroidism    Depression    Acute urinary retention    Urinary incontinence    Acute uremia    Encephalopathy    Hypomagnesemia    Polyuria      Plan:    · Acute renal failure 2/2 obstructive uropathy   ? BMP daily  ? Appreciate urology input  ? Continue frias  · Obstructive uropathy due to bladder outlet obstruction   ? continue Frias catheter, Urology input noted   ? Repeat Renal US once Cr plateaus  · Encephalopathy likely toxin mediated due to renal failure  ? Improving   ? continue to monitor  · Anemia normocytic likely due to chronic inflammation  ? monitor hemoglobin  · Insomnia: chronic issue  ? Increased remeron to 15mg from 7 5mg   ? Nortriptyline held due to concern for SS (with multiple serotinergic agents)  · Hypothyroid  · Depression  · Ambulatory dysfunction history of falls   ? PT recommends inpatient rehab     VTE Pharmacologic Prophylaxis:   Pharmacologic: Heparin  Mechanical VTE Prophylaxis in Place: Yes    Patient Centered Rounds: I have performed bedside rounds with nursing staff today  Discussions with Specialists or Other Care Team Provider: nephrology    Education and Discussions with Family / Patient: patient     Time Spent for Care: 20 minutes  More than 50% of total time spent on counseling and coordination of care as described above  Current Length of Stay: 5 day(s)    Current Patient Status: Inpatient   Certification Statement: The patient will continue to require additional inpatient hospital stay due to obstructive uropathy requiring lab monitoring       Discharge Plan / Estimated Discharge Date: TBD based on clinical course, pending timing of Cr improvement     Code Status: Level 1 - Full Code    Subjective:   Pt is very tired  Not sleeping well  Denies other complaints at this time, but not very participatory in exam/assessment     Objective:     Vitals:   Temp (24hrs), Av 2 °F (36 8 °C), Min:98 1 °F (36 7 °C), Max:98 3 °F (36 8 °C)    HR:  [82-87] 82  Resp:  [18] 18  BP: ()/(55-68) 135/68  SpO2:  [96 %-100 %] 96 %  Body mass index is 28 6 kg/m²  Input and Output Summary (last 24 hours): Intake/Output Summary (Last 24 hours) at 17 0845  Last data filed at 17 0201   Gross per 24 hour   Intake             3180 ml   Output             4200 ml   Net            -1020 ml       Physical Exam:     Physical Exam   Constitutional: He appears well-developed  No distress  HENT:   Mouth/Throat: Oropharynx is clear and moist  No oropharyngeal exudate  Cardiovascular: Normal rate  No murmur heard  Pulmonary/Chest: Effort normal and breath sounds normal  No respiratory distress  He has no wheezes  Abdominal: Soft  Bowel sounds are normal  He exhibits no distension  There is no tenderness  Musculoskeletal: He exhibits no edema  Skin: Skin is warm and dry  He is not diaphoretic  No erythema  Psychiatric: He has a normal mood and affect  His behavior is normal    Nursing note and vitals reviewed  Additional Data:     Labs:      Results from last 7 days  Lab Units 17  0555  17  1044   WBC Thousand/uL 5 78  < > 7 01   HEMOGLOBIN g/dL 8 0*  < > 10 8*   HEMATOCRIT % 23 8*  < > 31 8*   PLATELETS Thousands/uL 155  < > 216   NEUTROS PCT %  --   --  74   LYMPHS PCT %  --   --  17   MONOS PCT %  --   --  6   EOS PCT %  --   --  2   < > = values in this interval not displayed      Results from last 7 days  Lab Units 17  0545  17  0436   SODIUM mmol/L 141  < > 136   POTASSIUM mmol/L 4 5  < > 3 2*   CHLORIDE mmol/L 102  < > 101   CO2 mmol/L 30  < > 23   BUN mg/dL 65*  < > 108*   CREATININE mg/dL 5 03*  < > 8 93*   CALCIUM mg/dL 8 8  < > 7 3*   TOTAL PROTEIN g/dL  --   --  5 0*   BILIRUBIN TOTAL mg/dL  --   --  0 30   ALK PHOS U/L  --   --  42*   ALT U/L  --   --  15   AST U/L  --   --  8   GLUCOSE RANDOM mg/dL 101  < > 133   < > = values in this interval not displayed  Results from last 7 days  Lab Units 11/17/17  1044   INR  1 01       * I Have Reviewed All Lab Data Listed Above  * Additional Pertinent Lab Tests Reviewed: All Labs Within Last 24 Hours Reviewed    Imaging:    Imaging Reports Reviewed Today Include:   Imaging Personally Reviewed by Myself Includes:      Recent Cultures (last 7 days):       Results from last 7 days  Lab Units 11/17/17  1101 11/17/17  1044   BLOOD CULTURE  No Growth After 4 Days  No Growth After 4 Days  Last 24 Hours Medication List:     divalproex sodium 250 mg Oral Daily   docusate sodium 100 mg Oral BID   DULoxetine 30 mg Oral Daily   DULoxetine 60 mg Oral Daily   heparin (porcine) 5,000 Units Subcutaneous Q8H [address]   levothyroxine 75 mcg Oral Daily   melatonin 6 mg Oral HS   mirtazapine 7 5 mg Oral HS   sodium chloride 1,000 mL Intravenous Once   tamsulosin 0 4 mg Oral Daily With Dinner        Today, Patient Was Seen By: Natalie Hendrix MD    ** Please Note: This note has been constructed using a voice recognition system   **

## 2017-11-22 NOTE — PROGRESS NOTES
NEPHROLOGY PROGRESS NOTE   Gilberto Meyers 81 y o  male MRN: [id number]  Unit/Bed#: -01 Encounter: [id number]  Reason for Consult: SHENA    ASSESSMENT and PLAN:  The patient is a 63-year-old male with a history of hypertension, urinary retention with prior Wood catheter, hypothyroidism encephalopathy, C diff, ambulatory dysfunction who presents with difficulty voiding x 2 weeks and SHENA  1  Acute kidney injury:  Baseline creatinine appears to be ~1 mg/dL   SHENA related to obstructive uropathy  · Creatinine down to 5 03 (peak 12 47 on admission)  · UA bland  · Continues to have robust urine output with stable electrolytes  2  Urinary retention with moderate right hydronephrosis and moderate to severe left hydronephrosis noted on imaging   Likely a component of BPH   Patient will need cystoscopy follow-up  · Urology following  · History of urinary retention last seen by urologist July 2016 but lost to follow-up  · Wood catheter continued  · Re- imaging planned when renal function/creatinine reaches mayuri  · On Flomax  3  Anemia:  Iron saturation 24%, ferritin 385   Hemoglobin fluctuating  No evidence of blood loss  4  Hyperphosphatemia:  Resolved  Phosphorus 3 5  5  Hypomagnesemia:  Resolved- Magnesium level 1 8  Received 2 g IV on 11/21  · Will start patient on a daily dose of oral magnesium  6  History of traumatic brain injury    SUMMARY OF RECOMMENDATIONS:  · Wood catheter management per Urology  · Daily magnesium supplement  · Check labs in the a m  SUBJECTIVE / INTERVAL HISTORY:  Very sleepy  No complaints       OBJECTIVE:  Current Weight: Weight - Scale: 93 kg (205 lb 0 4 oz)  Vitals:    11/21/17 1452 11/21/17 2300 11/22/17 0600 11/22/17 0724   BP: 93/55 105/55  135/68   Pulse: 86 87  82   Resp: 18 18  18   Temp: 98 1 °F (36 7 °C) 98 3 °F (36 8 °C)  98 1 °F (36 7 °C)   TempSrc: Oral Oral  Oral   SpO2: 100% 96%  96%   Weight:   93 kg (205 lb 0 4 oz)        Intake/Output Summary (Last 24 hours) at 11/22/17 1251  Last data filed at 11/22/17 1001   Gross per 24 hour   Intake             2340 ml   Output             5350 ml   Net            -3010 ml   General:  No acute distress  Skin:  Warm and dry, no rash  Eyes:  Sclera clear, anicteric  ENT:  Oropharynx moist  Neck:  supple no JVD  Chest:  Clear bilaterally  CVS:  Regular rhythm, no murmur, rub, gallop appreciated  Abdomen:  Soft, nondistended, nontender, bowel sounds present  Extremities:  No edema  :  Wood catheter draining clear yellow urine  Neuro:  Alert, oriented, no acute deficits  Psych:  Appropriate      Medications:    Current Facility-Administered Medications:     acetaminophen (TYLENOL) tablet 650 mg, 650 mg, Oral, Q6H PRN, Melissa Allen MD, 650 mg at 11/20/17 1810    calcium carbonate (TUMS) chewable tablet 1,000 mg, 1,000 mg, Oral, Daily PRN, Melissa Allen MD    divalproex sodium (DEPAKOTE ER) 24 hr tablet 250 mg, 250 mg, Oral, Daily, Melissa Allen MD, 250 mg at 11/21/17 2155    docusate sodium (COLACE) capsule 100 mg, 100 mg, Oral, BID, Melissa Allen MD, 100 mg at 11/22/17 0934    DULoxetine (CYMBALTA) delayed release capsule 30 mg, 30 mg, Oral, Daily, Melissa Allen MD, 30 mg at 11/22/17 0934    DULoxetine (CYMBALTA) delayed release capsule 60 mg, 60 mg, Oral, Daily, Melissa Allen MD, 60 mg at 11/22/17 6276    heparin (porcine) subcutaneous injection 5,000 Units, 5,000 Units, Subcutaneous, Q8H [address], Melissa Allen MD, 5,000 Units at 11/22/17 8225    levothyroxine tablet 75 mcg, 75 mcg, Oral, Daily, Melissa Allen MD, 75 mcg at 11/22/17 0530    melatonin tablet 6 mg, 6 mg, Oral, HS, Sheila Carias MD, 6 mg at 11/21/17 2155    mirtazapine (REMERON) tablet 15 mg, 15 mg, Oral, HS, Sheila Carias MD    ondansetron [facility]) injection 4 mg, 4 mg, Intravenous, Q6H PRN, Melissa Allen MD    sodium chloride 0 9 % bolus 1,000 mL, 1,000 mL, Intravenous, Once, Krishna KOLB Mukund Henley DO    tamsulosin [facility]) capsule 0 4 mg, 0 4 mg, Oral, Daily With Dinner, Ariana Berrios PA-C, 0 4 mg at 11/21/17 1702    Laboratory Results:    Results from last 7 days  Lab Units 11/22/17  0545 11/21/17  0445 11/20/17  0555 11/19/17  0436 11/18/17  0559 11/18/17  0545 11/17/17  2105 11/17/17  1702 11/17/17  1605 11/17/17  1044   WBC Thousand/uL  --   --  5 78 5 86 8 24  --   --   --   --  7 01   HEMOGLOBIN g/dL  --   --  8 0* 7 5* 9 3*  --  9 8*  --   --  10 8*   HEMATOCRIT %  --   --  23 8* 22 7* 27 0*  --  28 0*  --   --  31 8*   PLATELETS Thousands/uL  --   --  155 144* 192  --   --   --  201 216   SODIUM mmol/L 141 142 142 136  --  135*  --  135*  --  138   POTASSIUM mmol/L 4 5 4 1 3 5 3 2*  --  4 2  --  4 5  --  4 6   CHLORIDE mmol/L 102 105 104 101  --  102  --  100  --  102   CO2 mmol/L 30 27 26 23  --  18*  --  18*  --  18*   BUN mg/dL 65* 74* 89* 108*  --  118*  --  121*  --  124*   CREATININE mg/dL 5 03* 6 14* 7 15* 8 93*  --  10 52*  --  11 49*  --  12 47*   CALCIUM mg/dL 8 8 7 8* 7 4* 7 3*  --  7 8*  --  8 6  --  9 2   MAGNESIUM mg/dL 1 8 1 5*  --   --   --  1 9  --   --   --   --    PHOSPHORUS mg/dL 3 5  --  5 2*  --   --  6 4*  --   --   --   --    ALBUMIN g/dL 3 4*  --   --  2 6*  --   --   --   --   --  4 0   TOTAL PROTEIN g/dL  --   --   --  5 0*  --   --   --   --   --  7 6   GLUCOSE RANDOM mg/dL 101 100 104 133  --  108  --  217*  --  120     Previous work up:

## 2017-11-23 LAB
ANION GAP SERPL CALCULATED.3IONS-SCNC: 8 MMOL/L (ref 4–13)
BUN SERPL-MCNC: 64 MG/DL (ref 5–25)
CALCIUM SERPL-MCNC: 8.7 MG/DL (ref 8.3–10.1)
CHLORIDE SERPL-SCNC: 100 MMOL/L (ref 100–108)
CO2 SERPL-SCNC: 29 MMOL/L (ref 21–32)
CREAT SERPL-MCNC: 4.47 MG/DL (ref 0.6–1.3)
ERYTHROCYTE [DISTWIDTH] IN BLOOD BY AUTOMATED COUNT: 14.7 % (ref 11.6–15.1)
GFR SERPL CREATININE-BSD FRML MDRD: 12 ML/MIN/1.73SQ M
GLUCOSE SERPL-MCNC: 102 MG/DL (ref 65–140)
HCT VFR BLD AUTO: 29.8 % (ref 36.5–49.3)
HGB BLD-MCNC: 9.5 G/DL (ref 12–17)
MCH RBC QN AUTO: 27.5 PG (ref 26.8–34.3)
MCHC RBC AUTO-ENTMCNC: 31.9 G/DL (ref 31.4–37.4)
MCV RBC AUTO: 86 FL (ref 82–98)
PLATELET # BLD AUTO: 190 THOUSANDS/UL (ref 149–390)
PMV BLD AUTO: 10.4 FL (ref 8.9–12.7)
POTASSIUM SERPL-SCNC: 4.4 MMOL/L (ref 3.5–5.3)
RBC # BLD AUTO: 3.46 MILLION/UL (ref 3.88–5.62)
SODIUM SERPL-SCNC: 137 MMOL/L (ref 136–145)
WBC # BLD AUTO: 6.68 THOUSAND/UL (ref 4.31–10.16)

## 2017-11-23 PROCEDURE — 80048 BASIC METABOLIC PNL TOTAL CA: CPT | Performed by: NURSE PRACTITIONER

## 2017-11-23 PROCEDURE — 85027 COMPLETE CBC AUTOMATED: CPT | Performed by: NURSE PRACTITIONER

## 2017-11-23 RX ADMIN — DULOXETINE HYDROCHLORIDE 30 MG: 30 CAPSULE, DELAYED RELEASE ORAL at 09:14

## 2017-11-23 RX ADMIN — DOCUSATE SODIUM 100 MG: 100 CAPSULE, LIQUID FILLED ORAL at 16:03

## 2017-11-23 RX ADMIN — MAGNESIUM OXIDE TAB 400 MG (241.3 MG ELEMENTAL MG) 400 MG: 400 (241.3 MG) TAB at 09:13

## 2017-11-23 RX ADMIN — DOCUSATE SODIUM 100 MG: 100 CAPSULE, LIQUID FILLED ORAL at 09:14

## 2017-11-23 RX ADMIN — LEVOTHYROXINE SODIUM 75 MCG: 75 TABLET ORAL at 06:27

## 2017-11-23 RX ADMIN — HEPARIN SODIUM 5000 UNITS: 5000 INJECTION, SOLUTION INTRAVENOUS; SUBCUTANEOUS at 14:04

## 2017-11-23 RX ADMIN — DULOXETINE HYDROCHLORIDE 60 MG: 60 CAPSULE, DELAYED RELEASE ORAL at 09:14

## 2017-11-23 RX ADMIN — TAMSULOSIN HYDROCHLORIDE 0.4 MG: 0.4 CAPSULE ORAL at 16:03

## 2017-11-23 RX ADMIN — MELATONIN 6 MG: 3 TAB ORAL at 21:27

## 2017-11-23 RX ADMIN — HEPARIN SODIUM 5000 UNITS: 5000 INJECTION, SOLUTION INTRAVENOUS; SUBCUTANEOUS at 21:27

## 2017-11-23 RX ADMIN — HEPARIN SODIUM 5000 UNITS: 5000 INJECTION, SOLUTION INTRAVENOUS; SUBCUTANEOUS at 06:27

## 2017-11-23 RX ADMIN — MIRTAZAPINE 15 MG: 15 TABLET, FILM COATED ORAL at 21:27

## 2017-11-23 RX ADMIN — DIVALPROEX SODIUM 250 MG: 250 TABLET, FILM COATED, EXTENDED RELEASE ORAL at 21:27

## 2017-11-23 NOTE — PROGRESS NOTES
[address] Internal Medicine Progress Note  Patient: Maryjane Slaughter 81 y o  male   MRN: [id number]  PCP: Arabella Ramirez MD  Unit/Bed#: -69 Encounter: [id number]  Date Of Visit: 11/23/17    Assessment:    Principal Problem:    Acute renal failure (ARF) ([facility] [address] )  Active Problems:    Multiple falls    Delirium    Hypothyroidism    Depression    Acute urinary retention    Urinary incontinence    Acute uremia    Encephalopathy    Hypomagnesemia    Polyuria      Plan:    · Acute renal failure 2/2 obstructive uropathy   ? BMP daily  ? Appreciate urology input  ? Continue frias  · Obstructive uropathy due to bladder outlet obstruction   ? continue Frias catheter, Urology input noted   ? Repeat Renal US   · Encephalopathy likely toxin mediated due to renal failure  ? resolved  · Anemia normocytic likely due to chronic inflammation  ? monitor hemoglobin  · Insomnia: chronic issue  ? Increased remeron to 15mg from 7 5mg   ? Nortriptyline held due to concern for SS (with multiple serotinergic agents)  · Hypothyroid  · Depression  · Ambulatory dysfunction history of falls   ? PT recommends inpatient rehab      VTE Pharmacologic Prophylaxis:   Pharmacologic: Heparin  Mechanical VTE Prophylaxis in Place: Yes    Patient Centered Rounds: I have performed bedside rounds with nursing staff today  Discussions with Specialists or Other Care Team Provider: nephrology    Education and Discussions with Family / Patient: patient     Time Spent for Care: 20 minutes  More than 50% of total time spent on counseling and coordination of care as described above      Current Length of Stay: 6 day(s)    Current Patient Status: Inpatient   Certification Statement: The patient will continue to require additional inpatient hospital stay due to obstructive uropathy with encephalopathy, requires lab monitoring      Discharge Plan / Estimated Discharge Date: TBD based on clinical course; anticipate dc in 24-48hrs pending labs     Code Status: Level 1 - Full Code    Subjective:   Pt still complaining of being tired (nursing reports he slept overnight and through the morning)  C/o poor appetite but is eating full meals  Objective:     Vitals:   Temp (24hrs), Av °F (36 7 °C), Min:97 5 °F (36 4 °C), Max:98 2 °F (36 8 °C)    HR:  [] 82  Resp:  [18-20] 18  BP: (111-122)/(56-83) 114/56  SpO2:  [95 %-99 %] 95 %  Body mass index is 28 41 kg/m²  Input and Output Summary (last 24 hours): Intake/Output Summary (Last 24 hours) at 17 1137  Last data filed at 17 1101   Gross per 24 hour   Intake             1320 ml   Output             2675 ml   Net            -1355 ml       Physical Exam:     Physical Exam   Constitutional: He is oriented to person, place, and time  He appears well-developed  Cardiovascular: Normal rate and regular rhythm  No murmur heard  Pulmonary/Chest: Effort normal and breath sounds normal    Abdominal: Soft  Bowel sounds are normal  He exhibits no distension  There is no tenderness  Musculoskeletal: He exhibits no edema  Neurological: He is alert and oriented to person, place, and time  Skin: Skin is warm and dry  He is not diaphoretic  No erythema  Nursing note and vitals reviewed  Additional Data:     Labs:      Results from last 7 days  Lab Units 17  0617  1044   WBC Thousand/uL 6 68  < > 7 01   HEMOGLOBIN g/dL 9 5*  < > 10 8*   HEMATOCRIT % 29 8*  < > 31 8*   PLATELETS Thousands/uL 190  < > 216   NEUTROS PCT %  --   --  74   LYMPHS PCT %  --   --  17   MONOS PCT %  --   --  6   EOS PCT %  --   --  2   < > = values in this interval not displayed      Results from last 7 days  Lab Units 17  0617  0436   SODIUM mmol/L 137  < > 136   POTASSIUM mmol/L 4 4  < > 3 2*   CHLORIDE mmol/L 100  < > 101   CO2 mmol/L 29  < > 23   BUN mg/dL 64*  < > 108*   CREATININE mg/dL 4 47*  < > 8 93*   CALCIUM mg/dL 8 7  < > 7 3*   TOTAL PROTEIN g/dL  --   --  5 0*   BILIRUBIN TOTAL mg/dL  -- --  0 30   ALK PHOS U/L  --   --  42*   ALT U/L  --   --  15   AST U/L  --   --  8   GLUCOSE RANDOM mg/dL 102  < > 133   < > = values in this interval not displayed  Results from last 7 days  Lab Units 11/17/17  1044   INR  1 01       * I Have Reviewed All Lab Data Listed Above  * Additional Pertinent Lab Tests Reviewed: All Labs Within Last 24 Hours Reviewed    Imaging:    Imaging Reports Reviewed Today Include:   Imaging Personally Reviewed by Myself Includes:      Recent Cultures (last 7 days):       Results from last 7 days  Lab Units 11/17/17  1101 11/17/17  1044   BLOOD CULTURE  No Growth After 5 Days  No Growth After 5 Days  Last 24 Hours Medication List:     divalproex sodium 250 mg Oral Daily   docusate sodium 100 mg Oral BID   DULoxetine 30 mg Oral Daily   DULoxetine 60 mg Oral Daily   heparin (porcine) 5,000 Units Subcutaneous Q8H [address]   levothyroxine 75 mcg Oral Daily   magnesium oxide 400 mg Oral Daily   melatonin 6 mg Oral HS   mirtazapine 15 mg Oral HS   sodium chloride 1,000 mL Intravenous Once   tamsulosin 0 4 mg Oral Daily With Dinner        Today, Patient Was Seen By: Avis Samuel MD    ** Please Note: This note has been constructed using a voice recognition system   **

## 2017-11-23 NOTE — PROGRESS NOTES
Progress Note - Urology Progress  Luis Escobedo 81 y o  male MRN: [id number]  Unit/Bed#: -01 Encounter: [id number]    Assessment:  Frias remains, slow and steady improvement in creat  No apparent penile irritation from frias  Blood pressure 114/56, pulse 82, temperature 98 2 °F (36 8 °C), temperature source Oral, resp  rate 18, weight 92 4 kg (203 lb 11 3 oz), SpO2 95 %  ,Body mass index is 28 41 kg/m²        Intake/Output Summary (Last 24 hours) at 11/23/17 1208  Last data filed at 11/23/17 1101   Gross per 24 hour   Intake             1320 ml   Output             2675 ml   Net            -1355 ml       Invasive Devices     Peripheral Intravenous Line            Peripheral IV 11/22/17 Left Arm less than 1 day          Drain            Urethral Catheter  16 Fr  6 days                Physical Exam: General appearance: alert, appears stated age and cooperative  Abdomen: soft, non-tender; bowel sounds normal; no masses,  no organomegaly  Male genitalia: normal    Lab, Imaging and other studies:  CBC:   Lab Results   Component Value Date    WBC 6 68 11/23/2017    HGB 9 5 (L) 11/23/2017    HCT 29 8 (L) 11/23/2017    MCV 86 11/23/2017     11/23/2017    MCH 27 5 11/23/2017    MCHC 31 9 11/23/2017    RDW 14 7 11/23/2017    MPV 10 4 11/23/2017   , CMP:   Lab Results   Component Value Date     11/23/2017    K 4 4 11/23/2017     11/23/2017    CO2 29 11/23/2017    ANIONGAP 8 11/23/2017    BUN 64 (H) 11/23/2017    CREATININE 4 47 (H) 11/23/2017    GLUCOSE 102 11/23/2017    CALCIUM 8 7 11/23/2017    EGFR 12 11/23/2017     }

## 2017-11-23 NOTE — PROGRESS NOTES
NEPHROLOGY PROGRESS NOTE   Rebecca Bullock 81 y o  male MRN: [id number]  Unit/Bed#: -01 Encounter: [id number]  Reason for Consult:     ASSESSMENT and PLAN:  The patient is a 74-year-old male with a history of hypertension, urinary retention with prior Wood catheter, hypothyroidism encephalopathy, C diff, ambulatory dysfunction who presents with difficulty voiding x 2 weeks and SHENA       1  Acute kidney injury:  Baseline creatinine appears to be ~1 mg/dL   SHENA related to obstructive uropathy  · Creatinine down to 4 47 (peak 12 47 on admission)  · UA bland  · Good urine output with stable electrolytes  Output remains extremely good but slowing slightly there was likely an element of postobstructive diuresis previously  2  Urinary retention with moderate right hydronephrosis and moderate to severe left hydronephrosis noted on imaging   Likely a component of BPH   Patient will need cystoscopy follow-up  · Urology following  · History of urinary retention last seen by urologist July 2016 but lost to follow-up  · Wood catheter continued  · Re- imaging planned when renal function/creatinine reaches mayuri  · On Flomax  3  Anemia:  Iron saturation 24%, ferritin 385   Hemoglobin fluctuating  No evidence of blood loss  Hemoglobin improving  4  Hyperphosphatemia:  Resolved  Phosphorus 3 5  5  Hypomagnesemia:  Resolved-  last Magnesium level 1 8  Received 2 g IV on 11/21  · Started on a oral dose of magnesium on 11/22  6  History of traumatic brain injury    SUMMARY OF RECOMMENDATIONS:  · Continue to monitor renal function  · Avoid hypotension and nephrotoxic agents  · Wood management per Urology    SUBJECTIVE / INTERVAL HISTORY:  Patient tired wants to sleep    No complaints    OBJECTIVE:  Current Weight: Weight - Scale: 92 4 kg (203 lb 11 3 oz)  Vitals:    11/22/17 1430 11/22/17 2124 11/23/17 0600 11/23/17 0806   BP: 111/83 122/71  114/56   Pulse: 100 87  82   Resp: 20 18  18   Temp: 98 2 °F (36 8 °C) 97 5 °F (36 4 °C)  98 2 °F (36 8 °C)   TempSrc: Axillary Oral  Oral   SpO2: 95% 99%  95%   Weight:   92 4 kg (203 lb 11 3 oz)        Intake/Output Summary (Last 24 hours) at 11/23/17 1114  Last data filed at 11/23/17 6306   Gross per 24 hour   Intake             1320 ml   Output             2075 ml   Net             -755 ml   General:  No acute distress  Skin:  Warm and dry, no rash  Eyes:  Sclera clear, anicteric  ENT:  Oropharynx moist  Neck:  supple no JVD  Chest:  Clear bilaterally  CVS:  Regular rhythm, no murmur, rub, gallop appreciated  Abdomen:  Soft, nondistended, nontender, bowel sounds present  Extremities:  No edema  :  Wood catheter draining clear yellow urine  Neuro:  Alert, oriented, no acute deficits      Medications:    Current Facility-Administered Medications:     acetaminophen (TYLENOL) tablet 650 mg, 650 mg, Oral, Q6H PRN, Gildardo Betancourt MD, 650 mg at 11/20/17 1810    calcium carbonate (TUMS) chewable tablet 1,000 mg, 1,000 mg, Oral, Daily PRN, Gildardo Betancourt MD    divalproex sodium (DEPAKOTE ER) 24 hr tablet 250 mg, 250 mg, Oral, Daily, Gildardo Betancourt MD, 250 mg at 11/22/17 2119    docusate sodium (COLACE) capsule 100 mg, 100 mg, Oral, BID, Gildardo Betancourt MD, 100 mg at 11/23/17 0914    DULoxetine (CYMBALTA) delayed release capsule 30 mg, 30 mg, Oral, Daily, Gildardo Betancourt MD, 30 mg at 11/23/17 0914    DULoxetine (CYMBALTA) delayed release capsule 60 mg, 60 mg, Oral, Daily, Gildardo Betancourt MD, 60 mg at 11/23/17 0914    heparin (porcine) subcutaneous injection 5,000 Units, 5,000 Units, Subcutaneous, Q8H [address], Gildardo Betancourt MD, 5,000 Units at 11/23/17 9649    levothyroxine tablet 75 mcg, 75 mcg, Oral, Daily, Gildardo Betancourt MD, 75 mcg at 11/23/17 1708    magnesium oxide (MAG-OX) tablet 400 mg, 400 mg, Oral, Daily, SHERRIE Thibodeaux, 400 mg at 11/23/17 0913    melatonin tablet 6 mg, 6 mg, Oral, HS, Karinadg Oakley MD, 6 mg at 11/22/17 4271   mirtazapine (REMERON) tablet 15 mg, 15 mg, Oral, HS, Judah Tavarez MD, 15 mg at 11/22/17 2119    ondansetron [facility]) injection 4 mg, 4 mg, Intravenous, Q6H PRN, Vaelria Carcamo MD    sodium chloride 0 9 % bolus 1,000 mL, 1,000 mL, Intravenous, Once, Krishna Harrison DO    tamsulosin [facility]) capsule 0 4 mg, 0 4 mg, Oral, Daily With Dinner, Ariana Berrios PA-C, 0 4 mg at 11/22/17 1545    Laboratory Results:    Results from last 7 days  Lab Units 11/23/17  0627 11/22/17  0545 11/21/17  0445 11/20/17  0555 11/19/17  0436 11/18/17  0559 11/18/17  0545 11/17/17  2105 11/17/17  1702 11/17/17  1605 11/17/17  1044   WBC Thousand/uL 6 68  --   --  5 78 5 86 8 24  --   --   --   --  7 01   HEMOGLOBIN g/dL 9 5*  --   --  8 0* 7 5* 9 3*  --  9 8*  --   --  10 8*   HEMATOCRIT % 29 8*  --   --  23 8* 22 7* 27 0*  --  28 0*  --   --  31 8*   PLATELETS Thousands/uL 190  --   --  155 144* 192  --   --   --  201 216   SODIUM mmol/L 137 141 142 142 136  --  135*  --  135*  --  138   POTASSIUM mmol/L 4 4 4 5 4 1 3 5 3 2*  --  4 2  --  4 5  --  4 6   CHLORIDE mmol/L 100 102 105 104 101  --  102  --  100  --  102   CO2 mmol/L 29 30 27 26 23  --  18*  --  18*  --  18*   BUN mg/dL 64* 65* 74* 89* 108*  --  118*  --  121*  --  124*   CREATININE mg/dL 4 47* 5 03* 6 14* 7 15* 8 93*  --  10 52*  --  11 49*  --  12 47*   CALCIUM mg/dL 8 7 8 8 7 8* 7 4* 7 3*  --  7 8*  --  8 6  --  9 2   MAGNESIUM mg/dL  --  1 8 1 5*  --   --   --  1 9  --   --   --   --    PHOSPHORUS mg/dL  --  3 5  --  5 2*  --   --  6 4*  --   --   --   --    ALBUMIN g/dL  --  3 4*  --   --  2 6*  --   --   --   --   --  4 0   TOTAL PROTEIN g/dL  --   --   --   --  5 0*  --   --   --   --   --  7 6   GLUCOSE RANDOM mg/dL 102 101 100 104 133  --  108  --  217*  --  120     Previous work up:

## 2017-11-24 ENCOUNTER — APPOINTMENT (INPATIENT)
Dept: ULTRASOUND IMAGING | Facility: HOSPITAL | Age: 72
DRG: 682 | End: 2017-11-24
Payer: MEDICARE

## 2017-11-24 LAB
ANION GAP SERPL CALCULATED.3IONS-SCNC: 8 MMOL/L (ref 4–13)
BUN SERPL-MCNC: 62 MG/DL (ref 5–25)
CALCIUM SERPL-MCNC: 9 MG/DL (ref 8.3–10.1)
CHLORIDE SERPL-SCNC: 101 MMOL/L (ref 100–108)
CO2 SERPL-SCNC: 30 MMOL/L (ref 21–32)
CREAT SERPL-MCNC: 4.21 MG/DL (ref 0.6–1.3)
GFR SERPL CREATININE-BSD FRML MDRD: 13 ML/MIN/1.73SQ M
GLUCOSE SERPL-MCNC: 98 MG/DL (ref 65–140)
POTASSIUM SERPL-SCNC: 4.3 MMOL/L (ref 3.5–5.3)
SODIUM SERPL-SCNC: 139 MMOL/L (ref 136–145)

## 2017-11-24 PROCEDURE — 80048 BASIC METABOLIC PNL TOTAL CA: CPT | Performed by: HOSPITALIST

## 2017-11-24 PROCEDURE — 76770 US EXAM ABDO BACK WALL COMP: CPT

## 2017-11-24 RX ADMIN — MELATONIN 6 MG: 3 TAB ORAL at 21:41

## 2017-11-24 RX ADMIN — DOCUSATE SODIUM 100 MG: 100 CAPSULE, LIQUID FILLED ORAL at 09:59

## 2017-11-24 RX ADMIN — DULOXETINE HYDROCHLORIDE 60 MG: 60 CAPSULE, DELAYED RELEASE ORAL at 09:59

## 2017-11-24 RX ADMIN — MIRTAZAPINE 15 MG: 15 TABLET, FILM COATED ORAL at 21:41

## 2017-11-24 RX ADMIN — TAMSULOSIN HYDROCHLORIDE 0.4 MG: 0.4 CAPSULE ORAL at 16:41

## 2017-11-24 RX ADMIN — DIVALPROEX SODIUM 250 MG: 250 TABLET, FILM COATED, EXTENDED RELEASE ORAL at 21:41

## 2017-11-24 RX ADMIN — DOCUSATE SODIUM 100 MG: 100 CAPSULE, LIQUID FILLED ORAL at 17:18

## 2017-11-24 RX ADMIN — HEPARIN SODIUM 5000 UNITS: 5000 INJECTION, SOLUTION INTRAVENOUS; SUBCUTANEOUS at 06:01

## 2017-11-24 RX ADMIN — LEVOTHYROXINE SODIUM 75 MCG: 75 TABLET ORAL at 06:01

## 2017-11-24 RX ADMIN — HEPARIN SODIUM 5000 UNITS: 5000 INJECTION, SOLUTION INTRAVENOUS; SUBCUTANEOUS at 14:19

## 2017-11-24 RX ADMIN — HEPARIN SODIUM 5000 UNITS: 5000 INJECTION, SOLUTION INTRAVENOUS; SUBCUTANEOUS at 21:41

## 2017-11-24 RX ADMIN — MAGNESIUM OXIDE TAB 400 MG (241.3 MG ELEMENTAL MG) 400 MG: 400 (241.3 MG) TAB at 09:59

## 2017-11-24 RX ADMIN — DULOXETINE HYDROCHLORIDE 30 MG: 30 CAPSULE, DELAYED RELEASE ORAL at 09:59

## 2017-11-24 NOTE — PROGRESS NOTES
[address] Internal Medicine Progress Note  Patient: Callie Mitchell 81 y o  male   MRN: [id number]  PCP: Savanah Fuentes MD  Unit/Bed#: [facility] [address] Encounter: [id number]  Date Of Visit: 11/24/17    Assessment:    Principal Problem:    Acute renal failure (ARF) ([facility] [address] )  Active Problems:    Multiple falls    Delirium    Hypothyroidism    Depression    Acute urinary retention    Urinary incontinence    Acute uremia    Encephalopathy    Hypomagnesemia    Polyuria      Plan:    · Acute renal failure 2/2 obstructive uropathy   ? BMP daily  ? Appreciate urology input  ? Continue frias  · Obstructive uropathy due to bladder outlet obstruction   ? continue Frias catheter, Urology input noted   ? Repeat Renal US   · Encephalopathy likely toxin mediated due to renal failure  ? resolved  · Anemia normocytic likely due to chronic inflammation  ? monitor hemoglobin  · Insomnia: chronic issue  ? Increased remeron to 15mg from 7 5mg   ? Nortriptyline held due to concern for SS (with multiple serotinergic agents)  · Hypothyroid  · Depression  · Ambulatory dysfunction history of falls   ? PT recommends inpatient rehab    VTE Pharmacologic Prophylaxis:   Pharmacologic: Heparin  Mechanical VTE Prophylaxis in Place: Yes    Patient Centered Rounds: I have performed bedside rounds with nursing staff today  Discussions with Specialists or Other Care Team Provider: nephrology    Education and Discussions with Family / Patient: patient     Time Spent for Care: 20 minutes  More than 50% of total time spent on counseling and coordination of care as described above  Current Length of Stay: 7 day(s)    Current Patient Status: Inpatient   Certification Statement: The patient will continue to require additional inpatient hospital stay due to resolving obstructive uropathy  Discharge Plan / Estimated Discharge Date: TBD based on clinical course, but hopeful over the weekend    Code Status: Level 1 - Full Code    Subjective:   Pt doing well   No pain or discomfort  Still with good UOP  Objective:     Vitals:   Temp (24hrs), Av 4 °F (36 9 °C), Min:98 2 °F (36 8 °C), Max:98 6 °F (37 °C)    HR:  [] 88  Resp:  [14-16] 16  BP: (101-116)/(55-67) 116/63  SpO2:  [94 %-98 %] 98 %  Body mass index is 28 41 kg/m²  Input and Output Summary (last 24 hours): Intake/Output Summary (Last 24 hours) at 17 1144  Last data filed at 17 0601   Gross per 24 hour   Intake             1960 ml   Output             3925 ml   Net            -1965 ml       Physical Exam:     Physical Exam   Constitutional: He is oriented to person, place, and time  He appears well-developed  No distress  HENT:   Mouth/Throat: Oropharynx is clear and moist    Cardiovascular: Normal rate and regular rhythm  No murmur heard  Pulmonary/Chest: Effort normal and breath sounds normal  No respiratory distress  He has no wheezes  Abdominal: Soft  Bowel sounds are normal  He exhibits no distension  There is no tenderness  Neurological: He is alert and oriented to person, place, and time  Skin: Skin is warm and dry  He is not diaphoretic  Nursing note and vitals reviewed  Additional Data:     Labs:      Results from last 7 days  Lab Units 17  0627   WBC Thousand/uL 6 68   HEMOGLOBIN g/dL 9 5*   HEMATOCRIT % 29 8*   PLATELETS Thousands/uL 190       Results from last 7 days  Lab Units 17  1004  17  0436   SODIUM mmol/L 139  < > 136   POTASSIUM mmol/L 4 3  < > 3 2*   CHLORIDE mmol/L 101  < > 101   CO2 mmol/L 30  < > 23   BUN mg/dL 62*  < > 108*   CREATININE mg/dL 4 21*  < > 8 93*   CALCIUM mg/dL 9 0  < > 7 3*   TOTAL PROTEIN g/dL  --   --  5 0*   BILIRUBIN TOTAL mg/dL  --   --  0 30   ALK PHOS U/L  --   --  42*   ALT U/L  --   --  15   AST U/L  --   --  8   GLUCOSE RANDOM mg/dL 98  < > 133   < > = values in this interval not displayed  * I Have Reviewed All Lab Data Listed Above  * Additional Pertinent Lab Tests Reviewed:  All Labs Within Last 24 Hours Reviewed    Imaging:    Imaging Reports Reviewed Today Include:   Imaging Personally Reviewed by Myself Includes:      Recent Cultures (last 7 days):           Last 24 Hours Medication List:     divalproex sodium 250 mg Oral Daily   docusate sodium 100 mg Oral BID   DULoxetine 30 mg Oral Daily   DULoxetine 60 mg Oral Daily   heparin (porcine) 5,000 Units Subcutaneous Q8H [address]   levothyroxine 75 mcg Oral Daily   magnesium oxide 400 mg Oral Daily   melatonin 6 mg Oral HS   mirtazapine 15 mg Oral HS   sodium chloride 1,000 mL Intravenous Once   tamsulosin 0 4 mg Oral Daily With Dinner        Today, Patient Was Seen By: Blanka Rios MD    ** Please Note: This note has been constructed using a voice recognition system   **

## 2017-11-24 NOTE — PROGRESS NOTES
Progress Note - Adrianlong Llanes 81 y o  male MRN: [id number]    Unit/Bed#: -01 Encounter: [id number]      Assessment:  81-year-old male with traumatic brain injury known to our service refractory urinary retention last seen in our office July 2016  Patient was lost to follow-up and in the interim chronic Wood catheter was removed  Patient presented to the emergency room with several day history of stranguria  Wood catheter was inserted for bladder volumes exceeding 800 mL  On admission lab values were demonstrative for severe acute kidney injury with creatinine greater than 12  Patient is post bladder decompression for several days and creatinine today is slightly greater than 4 21  Patient has remained afebrile, comfortable and nontoxic  Plan:  Creatinine continues to gradually declined  Slow decrease in creatinine may be indicative of longstanding upper tract obstruction  Maintain urinary catheter  Do not remove  Not nursing or other department managed  Encourage ambulation, or fluid intake if not otherwise medically contraindicated, and prevent/treat constipation  These measures how to improve/maximize bladder function  Will follow-up patient on Monday  Subjective:   Patient denies fever or chills  Objective:     Vitals: Blood pressure 116/63, pulse 88, temperature 98 4 °F (36 9 °C), temperature source Oral, resp  rate 16, weight 92 4 kg (203 lb 11 3 oz), SpO2 98 %  ,Body mass index is 28 41 kg/m²        Intake/Output Summary (Last 24 hours) at 11/24/17 1052  Last data filed at 11/24/17 0601   Gross per 24 hour   Intake             1960 ml   Output             4525 ml   Net            -2565 ml       Physical Exam: General appearance: alert, appears older than stated age and cooperative  Lungs: clear to auscultation bilaterally  Heart: regular rate and rhythm, S1, S2 normal, no murmur, click, rub or gallop  Abdomen: soft, non-tender; bowel sounds normal; no masses,  no organomegaly  Pulses: 2+ and symmetric  Skin: Skin color, texture, turgor normal  No rashes or lesions  Neurologic: Slow to respond  Responses are appropriate when he answers  Invasive Devices     Peripheral Intravenous Line            Peripheral IV 11/22/17 Left Arm 1 day          Drain            Urethral Catheter  16 Fr  6 days              Lab Results   Component Value Date    WBC 6 68 11/23/2017    HGB 9 5 (L) 11/23/2017    HCT 29 8 (L) 11/23/2017    MCV 86 11/23/2017     11/23/2017     Lab Results   Component Value Date    GLUCOSE 98 11/24/2017    CALCIUM 9 0 11/24/2017     11/24/2017    K 4 3 11/24/2017    CO2 30 11/24/2017     11/24/2017    BUN 62 (H) 11/24/2017    CREATININE 4 21 (H) 11/24/2017       Lab, Imaging and other studies: I have personally reviewed pertinent reports      VTE Pharmacologic Prophylaxis: Heparin  VTE Mechanical Prophylaxis: sequential compression device

## 2017-11-24 NOTE — PROGRESS NOTES
NEPHROLOGY PROGRESS NOTE   Zheng Vieira 81 y o  male MRN: [id number]  Unit/Bed#: -01 Encounter: [id number]  Reason for Consult:  Acute kidney injury    ASSESSMENT and PLAN:  The patient is a 82-year-old male with a history of hypertension, urinary retention with prior Wood catheter, hypothyroidism encephalopathy, C diff, ambulatory dysfunction who presents with difficulty voiding x 2 weeks and SHENA       1  Acute kidney injury(POA):  Baseline creatinine appears to be ~1 mg/dL   SHENA related to obstructive uropathy  · Creatinine down to 4 47 as of 11/23  Awaiting a m  labs   (peak 12 47 on admission)  · UA bland  · Urine output 4 5 L  · Renal ultrasound 11/24 (repeat) resolving/resolved hydronephrosis  Echogenic kidneys bilaterally  2  Urinary retention with moderate right hydronephrosis and moderate to severe left hydronephrosis noted on imaging   Likely a component of BPH   Patient will need cystoscopy follow-up  · Urology following  · History of urinary retention last seen by urologist July 2016 but lost to follow-up  · Wood catheter continued  · Follow-up ultrasound shows resolution of right hydronephrosis and mild left hydronephrosis  · On Flomax  3  Anemia:  Iron saturation 24%, ferritin 385  Hemoglobin stable 9 5  4  Hyperphosphatemia:  Resolved   Phosphorus 3 5  5  Hypomagnesemia:  Resolved-  last Magnesium level 1 8   Received 2 g IV on 11/21     · Started on a oral dose of magnesium on 11/22  6  History of traumatic brain injury   Disposition:  Stabilizing from a renal standpoint  SUMMARY OF RECOMMENDATIONS:  Check labs       SUBJECTIVE / INTERVAL HISTORY:  Tired  Just wants to sleep      OBJECTIVE:  Current Weight: Weight - Scale: 92 4 kg (203 lb 11 3 oz)  Vitals:    11/23/17 0806 11/23/17 1520 11/23/17 2246 11/24/17 0739   BP: 114/56 105/67 101/55 116/63   Pulse: 82 100 88 88   Resp: 18 14 16 16   Temp: 98 2 °F (36 8 °C) 98 2 °F (36 8 °C) 98 6 °F (37 °C) 98 4 °F (36 9 °C)   TempSrc: Oral Oral Oral Oral   SpO2: 95% 98% 94% 98%   Weight:           Intake/Output Summary (Last 24 hours) at 11/24/17 4835  Last data filed at 11/24/17 0601   Gross per 24 hour   Intake             1960 ml   Output             4525 ml   Net            -2565 ml     General:  No acute distress  Skin:  Warm and dry, no rash  Eyes:  Sclera clear, anicteric  ENT:  Oropharynx moist  Neck:  supple no JVD  Chest:  Clear bilaterally  CVS:  Regular rhythm, no murmur, rub, gallop appreciated  Abdomen:  Soft, nondistended, nontender, bowel sounds present  Extremities:  No edema  :  Wood catheter draining clear yellow urine  Neuro:  No acute deficits    Medications:    Current Facility-Administered Medications:     acetaminophen (TYLENOL) tablet 650 mg, 650 mg, Oral, Q6H PRN, Noe Salamanca MD, 650 mg at 11/20/17 1810    calcium carbonate (TUMS) chewable tablet 1,000 mg, 1,000 mg, Oral, Daily PRN, Noe Salamanca MD    divalproex sodium (DEPAKOTE ER) 24 hr tablet 250 mg, 250 mg, Oral, Daily, Noe Salamanca MD, 250 mg at 11/23/17 2127    docusate sodium (COLACE) capsule 100 mg, 100 mg, Oral, BID, Noe Salamanca MD, 100 mg at 11/23/17 1603    DULoxetine (CYMBALTA) delayed release capsule 30 mg, 30 mg, Oral, Daily, Noe Salamanca MD, 30 mg at 11/23/17 0914    DULoxetine (CYMBALTA) delayed release capsule 60 mg, 60 mg, Oral, Daily, Noe Salamanca MD, 60 mg at 11/23/17 0914    heparin (porcine) subcutaneous injection 5,000 Units, 5,000 Units, Subcutaneous, Q8H [address], Noe Salamanca MD, 5,000 Units at 11/24/17 0601    levothyroxine tablet 75 mcg, 75 mcg, Oral, Daily, Noe Salamanca MD, 75 mcg at 11/24/17 0601    magnesium oxide (MAG-OX) tablet 400 mg, 400 mg, Oral, Daily, Sperry Gal, CRNP, 400 mg at 11/23/17 0913    melatonin tablet 6 mg, 6 mg, Oral, HS, Gladys Anaya MD, 6 mg at 11/23/17 2127    mirtazapine (REMERON) tablet 15 mg, 15 mg, Oral, HS, Gladys Anaya MD, 15 mg at 11/23/17 2127    ondansetron (ZOFRAN) injection 4 mg, 4 mg, Intravenous, Q6H PRN, Mari Hawkins MD    sodium chloride 0 9 % bolus 1,000 mL, 1,000 mL, Intravenous, Once, Krishna Harrison DO    tamsulosin [facility]) capsule 0 4 mg, 0 4 mg, Oral, Daily With Dinner, Ariana Berrios PA-C, 0 4 mg at 11/23/17 1603    Laboratory Results:    Results from last 7 days  Lab Units 11/23/17  0627 11/22/17  0545 11/21/17  0445 11/20/17  0555 11/19/17  0436 11/18/17  0559 11/18/17  0545 11/17/17  2105 11/17/17  1702 11/17/17  1605 11/17/17  1044   WBC Thousand/uL 6 68  --   --  5 78 5 86 8 24  --   --   --   --  7 01   HEMOGLOBIN g/dL 9 5*  --   --  8 0* 7 5* 9 3*  --  9 8*  --   --  10 8*   HEMATOCRIT % 29 8*  --   --  23 8* 22 7* 27 0*  --  28 0*  --   --  31 8*   PLATELETS Thousands/uL 190  --   --  155 144* 192  --   --   --  201 216   SODIUM mmol/L 137 141 142 142 136  --  135*  --  135*  --  138   POTASSIUM mmol/L 4 4 4 5 4 1 3 5 3 2*  --  4 2  --  4 5  --  4 6   CHLORIDE mmol/L 100 102 105 104 101  --  102  --  100  --  102   CO2 mmol/L 29 30 27 26 23  --  18*  --  18*  --  18*   BUN mg/dL 64* 65* 74* 89* 108*  --  118*  --  121*  --  124*   CREATININE mg/dL 4 47* 5 03* 6 14* 7 15* 8 93*  --  10 52*  --  11 49*  --  12 47*   CALCIUM mg/dL 8 7 8 8 7 8* 7 4* 7 3*  --  7 8*  --  8 6  --  9 2   MAGNESIUM mg/dL  --  1 8 1 5*  --   --   --  1 9  --   --   --   --    PHOSPHORUS mg/dL  --  3 5  --  5 2*  --   --  6 4*  --   --   --   --    ALBUMIN g/dL  --  3 4*  --   --  2 6*  --   --   --   --   --  4 0   TOTAL PROTEIN g/dL  --   --   --   --  5 0*  --   --   --   --   --  7 6   GLUCOSE RANDOM mg/dL 102 101 100 104 133  --  108  --  217*  --  120     Previous work up:

## 2017-11-24 NOTE — PROGRESS NOTES
This RN called by ultrasound tech to check on pt due to increased confusion  Pt found to be resting comfortably in bed with c/o "I'm so tired "  Denies pain  Drowsy but OX4 with delayed responses    Will continue to monitor

## 2017-11-25 LAB
ANION GAP SERPL CALCULATED.3IONS-SCNC: 9 MMOL/L (ref 4–13)
BUN SERPL-MCNC: 63 MG/DL (ref 5–25)
CALCIUM SERPL-MCNC: 8.8 MG/DL (ref 8.3–10.1)
CHLORIDE SERPL-SCNC: 100 MMOL/L (ref 100–108)
CO2 SERPL-SCNC: 26 MMOL/L (ref 21–32)
CREAT SERPL-MCNC: 3.82 MG/DL (ref 0.6–1.3)
GFR SERPL CREATININE-BSD FRML MDRD: 15 ML/MIN/1.73SQ M
GLUCOSE SERPL-MCNC: 105 MG/DL (ref 65–140)
POTASSIUM SERPL-SCNC: 3.9 MMOL/L (ref 3.5–5.3)
SODIUM SERPL-SCNC: 135 MMOL/L (ref 136–145)

## 2017-11-25 PROCEDURE — 80048 BASIC METABOLIC PNL TOTAL CA: CPT | Performed by: HOSPITALIST

## 2017-11-25 RX ADMIN — MAGNESIUM OXIDE TAB 400 MG (241.3 MG ELEMENTAL MG) 400 MG: 400 (241.3 MG) TAB at 08:14

## 2017-11-25 RX ADMIN — MELATONIN 6 MG: 3 TAB ORAL at 21:00

## 2017-11-25 RX ADMIN — DOCUSATE SODIUM 100 MG: 100 CAPSULE, LIQUID FILLED ORAL at 08:14

## 2017-11-25 RX ADMIN — HEPARIN SODIUM 5000 UNITS: 5000 INJECTION, SOLUTION INTRAVENOUS; SUBCUTANEOUS at 13:23

## 2017-11-25 RX ADMIN — LEVOTHYROXINE SODIUM 75 MCG: 75 TABLET ORAL at 06:21

## 2017-11-25 RX ADMIN — HEPARIN SODIUM 5000 UNITS: 5000 INJECTION, SOLUTION INTRAVENOUS; SUBCUTANEOUS at 06:21

## 2017-11-25 RX ADMIN — DULOXETINE HYDROCHLORIDE 60 MG: 60 CAPSULE, DELAYED RELEASE ORAL at 08:14

## 2017-11-25 RX ADMIN — MIRTAZAPINE 15 MG: 15 TABLET, FILM COATED ORAL at 21:00

## 2017-11-25 RX ADMIN — DOCUSATE SODIUM 100 MG: 100 CAPSULE, LIQUID FILLED ORAL at 17:13

## 2017-11-25 RX ADMIN — HEPARIN SODIUM 5000 UNITS: 5000 INJECTION, SOLUTION INTRAVENOUS; SUBCUTANEOUS at 21:00

## 2017-11-25 RX ADMIN — DULOXETINE HYDROCHLORIDE 30 MG: 30 CAPSULE, DELAYED RELEASE ORAL at 08:14

## 2017-11-25 RX ADMIN — TAMSULOSIN HYDROCHLORIDE 0.4 MG: 0.4 CAPSULE ORAL at 17:13

## 2017-11-25 RX ADMIN — DIVALPROEX SODIUM 250 MG: 250 TABLET, FILM COATED, EXTENDED RELEASE ORAL at 20:59

## 2017-11-25 NOTE — PROGRESS NOTES
NEPHROLOGY PROGRESS NOTE   Kg Riojas 81 y o  male MRN: [id number]  Unit/Bed#: -01 Encounter: [id number]  Reason for Consult: SHENA    ASSESSMENT AND PLAN:  Patient is 74-year-old male with hypertension, hypothyroidism, urinary retention history, presented with difficulty voiding and was found to have severe SHENA   We are consulted for the same      SHENA with baseline creatinine one  - peak creatinine on admission 12 4 continued to significantly improved to 3 8 today  - SHENA likely secondary to obstructive uropathy  - currently has Wood catheter placed with excellent urine output  - avoid nephrotoxins or NSAIDs  - electrolytes overall stable  - now remains off IV fluid  - encourage liquid intake  - overall stable from Nephrology standpoint for discharge with repeat BMP need in one week      Polyuria, likely post obstructive diuresis  - closely monitor electrolytes which are overall stable  - overall much improved on last 24 hours     Hypomagnesemia, improved with replacement  azotemia, significantly improving with improving renal function   Continue to closely monitor     Urinary retention with bilateral hydronephrosis  - currently has Wood catheter placed, close urology follow-up  Signa Dakins secondary to BPH  - repeat renal ultrasound shows significant improving hydronephrosis      Blood pressure overall acceptable with often lower readings    SUBJECTIVE:  Patient seen and examined at bedside  Overall sleepy  Does not answer all my questions  Urine output remains good       OBJECTIVE:  Current Weight: Weight - Scale: 92 4 kg (203 lb 11 3 oz)  Vitals:    11/25/17 0828   BP: 106/59   Pulse: 78   Resp: 17   Temp: 98 6 °F (37 °C)   SpO2: 97%       Intake/Output Summary (Last 24 hours) at 11/25/17 1017  Last data filed at 11/25/17 0801   Gross per 24 hour   Intake              240 ml   Output             2150 ml   Net            -1910 ml       Physical Examination:  General:  Lying in bed, no acute distress   HEENT:  Mild conjunctival pallor,  Neck:  Supple  Respiratory:  Bilateral air entry present, poor respiratory effort  CVS:  S1, S2 present  GI:  Soft, nontender, nondistended  CNS:  overall sleepy although opens eyes and answer some questions, does not maintain eye contact and goes back to sleep  Extremities:  No significant edema in lower extremities  Skin:  Unremarkable    Medications:    Current Facility-Administered Medications:     acetaminophen (TYLENOL) tablet 650 mg, 650 mg, Oral, Q6H PRN, Sammie Peralta MD, 650 mg at 11/20/17 1810    calcium carbonate (TUMS) chewable tablet 1,000 mg, 1,000 mg, Oral, Daily PRN, Sammie Peralta MD    divalproex sodium (DEPAKOTE ER) 24 hr tablet 250 mg, 250 mg, Oral, Daily, Sammie Peralta MD, 250 mg at 11/24/17 2141    docusate sodium (COLACE) capsule 100 mg, 100 mg, Oral, BID, Smamie Peralta MD, 100 mg at 11/25/17 0814    DULoxetine (CYMBALTA) delayed release capsule 30 mg, 30 mg, Oral, Daily, Sammie Peralta MD, 30 mg at 11/25/17 0814    DULoxetine (CYMBALTA) delayed release capsule 60 mg, 60 mg, Oral, Daily, Sammie Peralta MD, 60 mg at 11/25/17 0814    heparin (porcine) subcutaneous injection 5,000 Units, 5,000 Units, Subcutaneous, Q8H [address], Sammie Peralta MD, 5,000 Units at 11/25/17 2546    levothyroxine tablet 75 mcg, 75 mcg, Oral, Daily, Sammie Peralta MD, 75 mcg at 11/25/17 1253    magnesium oxide (MAG-OX) tablet 400 mg, 400 mg, Oral, Daily, SHERRIE Grimaldo, 400 mg at 11/25/17 6471    melatonin tablet 6 mg, 6 mg, Oral, HS, Eric Hamm MD, 6 mg at 11/24/17 2141    mirtazapine (REMERON) tablet 15 mg, 15 mg, Oral, HS, Eric Hamm MD, 15 mg at 11/24/17 2141    ondansetron (ZOFRAN) injection 4 mg, 4 mg, Intravenous, Q6H PRN, Sammie Peralta MD    sodium chloride 0 9 % bolus 1,000 mL, 1,000 mL, Intravenous, Once, Krishna Harrison DO    tamsulosin (FLOMAX) capsule 0 4 mg, 0 4 mg, Oral, Daily With Dinner, Ariana Berrios PA-C, 0 4 mg at 11/24/17 1641    Laboratory Results:    Results from last 7 days  Lab Units 11/25/17  0515 11/24/17  1004 11/23/17  0627 11/22/17  0545 11/21/17  0445 11/20/17  0555 11/19/17  0436   WBC Thousand/uL  --   --  6 68  --   --  5 78 5 86   HEMOGLOBIN g/dL  --   --  9 5*  --   --  8 0* 7 5*   HEMATOCRIT %  --   --  29 8*  --   --  23 8* 22 7*   PLATELETS Thousands/uL  --   --  190  --   --  155 144*   SODIUM mmol/L 135* 139 137 141 142 142 136   POTASSIUM mmol/L 3 9 4 3 4 4 4 5 4 1 3 5 3 2*   CHLORIDE mmol/L 100 101 100 102 105 104 101   CO2 mmol/L 26 30 29 30 27 26 23   BUN mg/dL 63* 62* 64* 65* 74* 89* 108*   CREATININE mg/dL 3 82* 4 21* 4 47* 5 03* 6 14* 7 15* 8 93*   CALCIUM mg/dL 8 8 9 0 8 7 8 8 7 8* 7 4* 7 3*   MAGNESIUM mg/dL  --   --   --  1 8 1 5*  --   --    PHOSPHORUS mg/dL  --   --   --  3 5  --  5 2*  --    ALBUMIN g/dL  --   --   --  3 4*  --   --  2 6*   TOTAL PROTEIN g/dL  --   --   --   --   --   --  5 0*   GLUCOSE RANDOM mg/dL 105 98 102 101 100 104 133       No results found for this or any previous visit  Results for orders placed during the hospital encounter of 05/08/16   XR chest 2 views    Narrative CHEST    INDICATION: Frequent falls    COMPARISON: August 31, 2004    VIEWS:  AP and lateral; 2 images    FINDINGS: Submaximal inspiration  The cardiomediastinal silhouette is unremarkable  The lungs are clear  No pleural effusions  No pneumothorax  Bony thorax unremarkable  Impression No active pulmonary disease         Workstation performed: [id number]       No results found for this or any previous visit  No results found for this or any previous visit  No results found for this or any previous visit  Results for orders placed during the hospital encounter of 11/17/17   US retroperitoneal complete    Narrative RENAL ULTRASOUND    INDICATION: Acute renal failure  COMPARISON: None      TECHNIQUE:   Ultrasound of the retroperitoneum was performed with a curvilinear transducer utilizing volumetric sweeps and still imaging techniques  FINDINGS:    KIDNEYS:  Symmetric and normal size  Right kidney:  9 3 x 6 1 cm  Normal echogenicity and contour  No suspicious masses detected  Moderate hydronephrosis  No shadowing calculi  No perinephric fluid collections  Left kidney:  10 7 x 6 0 cm  Normal echogenicity and contour  No suspicious masses detected  Moderate to severe hydronephrosis  No shadowing calculi  No perinephric fluid collections  URETERS:  Nonvisualized  BLADDER:   Decompressed  Wood catheter present  Irregular, circumferential bladder wall thickening  Ureteral jets are not detected  Impression 1  Moderate right-sided hydronephrosis and moderate to severe left-sided hydronephrosis  No obstructing renal calculi  2   Abnormal appearance of the urinary bladder  Wood catheter present in the urinary bladder  Although the findings may be related to decompressed urinary bladder, bladder neoplasm not excluded  Recommend follow-up urologic consultation and   cystoscopy for further evaluation  Workstation performed: [id number]         Portions of the record may have been created with voice recognition software  Occasional wrong word or "sound a like" substitutions may have occurred due to the inherent limitations of voice recognition software  Read the chart carefully and recognize, using context, where substitutions have occurred

## 2017-11-25 NOTE — PROGRESS NOTES
Progress Note - Urology Progress  Arturo Barry 81 y o  male MRN: [id number]  Unit/Bed#: -01 Encounter: [id number]    Assessment:  Slow improvement of creatinine  With renal atrophy seen on ultrasound, I suspect this has been really long-term, and if we do perform TURP at some point he would still have significant chronic renal insufficiency  Patient not very conversant today, our discussions with him have shown he does not really grasp the seriousness of the situation  (I called home number to speak with wife, no answer)    Plan:  Continue to follow creatinine, if patient understands and agreeable, cysto at some point to further evaluate once creatinine has nadired  Blood pressure 106/59, pulse 78, temperature 98 6 °F (37 °C), temperature source Oral, resp  rate 17, weight 92 4 kg (203 lb 11 3 oz), SpO2 97 %  ,Body mass index is 28 41 kg/m²        Intake/Output Summary (Last 24 hours) at 11/25/17 1013  Last data filed at 11/25/17 0801   Gross per 24 hour   Intake                0 ml   Output             2150 ml   Net            -2150 ml       Invasive Devices     Peripheral Intravenous Line            Peripheral IV 11/22/17 Left Arm 2 days          Drain            Urethral Catheter  16 Fr  7 days                Physical Exam: General appearance: alert, appears stated age, cooperative and distracted  Abdomen: soft, non-tender; bowel sounds normal; no masses,  no organomegaly  Male genitalia: normal    Lab, Imaging and other studies:  CBC: No results found for: WBC, HGB, HCT, MCV, PLT, ADJUSTEDWBC, MCH, MCHC, RDW, MPV, NRBC, CMP:   Lab Results   Component Value Date     (L) 11/25/2017    K 3 9 11/25/2017     11/25/2017    CO2 26 11/25/2017    ANIONGAP 9 11/25/2017    BUN 63 (H) 11/25/2017    CREATININE 3 82 (H) 11/25/2017    GLUCOSE 105 11/25/2017    CALCIUM 8 8 11/25/2017    EGFR 15 11/25/2017     }

## 2017-11-25 NOTE — CASE MANAGEMENT
Continued Stay Review    Date: 11/25/2017     Vital Signs: /59   Pulse 78   Temp 98 6 °F (37 °C) (Oral)   Resp 17   Wt 92 4 kg (203 lb 11 3 oz)   SpO2 97%   BMI 28 41 kg/m²     Medications:   Scheduled Meds:   divalproex sodium 250 mg Oral Daily   docusate sodium 100 mg Oral BID   DULoxetine 30 mg Oral Daily   DULoxetine 60 mg Oral Daily   heparin (porcine) 5,000 Units Subcutaneous Q8H [address]   levothyroxine 75 mcg Oral Daily   magnesium oxide 400 mg Oral Daily   melatonin 6 mg Oral HS   mirtazapine 15 mg Oral HS   sodium chloride 1,000 mL Intravenous Once   tamsulosin 0 4 mg Oral Daily With Dinner     Continuous Infusions:    PRN Meds:  Not used:   acetaminophen    calcium carbonate    ondansetron    Abnormal Labs/Diagnostic Results:   Na 135  Bun 62  Creatinine 3 82    Age/Sex: 81 y o  male   Assessment/Plan: Acute renal failure 2/2 obstructive uropathy   ? BMP daily  ? Appreciate urology input  ? Continue frias  · Obstructive uropathy due to bladder outlet obstruction   ? continue Frias catheter, Urology input noted   ? Renal US: completed;   · Encephalopathy likely toxin mediated due to renal failure  ? resolved  · Anemia normocytic likely due to chronic inflammation  ? monitor hemoglobin  · Insomnia: chronic issue  ? Increased remeron to 15mg from 7 5mg   ?  Nortriptyline held due to concern for SS (with multiple serotinergic agents)  · Hypothyroid  · Depression  · Ambulatory dysfunction history of falls   PT recommends inpatient rehab    Discharge Plan:  PT recommends inpatient rehab

## 2017-11-25 NOTE — PROGRESS NOTES
[address] Internal Medicine Progress Note  Patient: Ja Jensen 81 y o  male   MRN: [id number]  PCP: Janneth Oswald MD  Unit/Bed#: -55 Encounter: [id number]  Date Of Visit: 11/25/17    Assessment:    Principal Problem:    Acute renal failure (ARF) ([facility] [address] )  Active Problems:    Multiple falls    Delirium    Hypothyroidism    Depression    Acute urinary retention    Urinary incontinence    Acute uremia    Encephalopathy    Hypomagnesemia    Polyuria      Plan:    · Acute renal failure 2/2 obstructive uropathy   ? BMP daily  ? Appreciate urology input  ? Continue frias  · Obstructive uropathy due to bladder outlet obstruction   ? continue Frias catheter, Urology input noted   ? Renal US: completed;   · Encephalopathy likely toxin mediated due to renal failure  ? resolved  · Anemia normocytic likely due to chronic inflammation  ? monitor hemoglobin  · Insomnia: chronic issue  ? Increased remeron to 15mg from 7 5mg   ? Nortriptyline held due to concern for SS (with multiple serotinergic agents)  · Hypothyroid  · Depression  · Ambulatory dysfunction history of falls   ? PT recommends inpatient rehab     VTE Pharmacologic Prophylaxis:   Pharmacologic: Heparin  Mechanical VTE Prophylaxis in Place: Yes    Patient Centered Rounds: I have performed bedside rounds with nursing staff today  Discussions with Specialists or Other Care Team Provider: nephrology    Education and Discussions with Family / Patient: patient     Time Spent for Care: 20 minutes  More than 50% of total time spent on counseling and coordination of care as described above  Current Length of Stay: 8 day(s)    Current Patient Status: Inpatient   Certification Statement: Pt is medically stable for discharge; awaiting authorization/approval for discharge plan  Discharge Plan / Estimated Discharge Date: TBD based on clinical course; pending insurance authorization     Code Status: Level 1 - Full Code    Subjective:   Pt c/o fatigue   Also states he is confused about everything  Unable to be more specific, but is oriented appropriately  Objective:     Vitals:   Temp (24hrs), Av 6 °F (37 °C), Min:98 3 °F (36 8 °C), Max:98 8 °F (37 1 °C)    HR:  [78-88] 78  Resp:  [17-18] 17  BP: ()/(54-59) 106/59  SpO2:  [94 %-97 %] 97 %  Body mass index is 28 41 kg/m²  Input and Output Summary (last 24 hours): Intake/Output Summary (Last 24 hours) at 17 1202  Last data filed at 17 0801   Gross per 24 hour   Intake              240 ml   Output             2150 ml   Net            -1910 ml       Physical Exam:   Physical Exam   Constitutional: He appears well-developed  No distress  Cardiovascular: Normal rate  No murmur heard  Pulmonary/Chest: Effort normal and breath sounds normal  No respiratory distress  Abdominal: Soft  Bowel sounds are normal  He exhibits no distension  There is no tenderness  Musculoskeletal: He exhibits no edema  Neurological: He is alert  Oriented to person and place    Skin: Skin is warm and dry  He is not diaphoretic  Psychiatric: He has a normal mood and affect  His behavior is normal    Nursing note and vitals reviewed  Additional Data:     Labs:    Results from last 7 days  Lab Units 17  0627   WBC Thousand/uL 6 68   HEMOGLOBIN g/dL 9 5*   HEMATOCRIT % 29 8*   PLATELETS Thousands/uL 190       Results from last 7 days  Lab Units 17  0515  17  0436   SODIUM mmol/L 135*  < > 136   POTASSIUM mmol/L 3 9  < > 3 2*   CHLORIDE mmol/L 100  < > 101   CO2 mmol/L 26  < > 23   BUN mg/dL 63*  < > 108*   CREATININE mg/dL 3 82*  < > 8 93*   CALCIUM mg/dL 8 8  < > 7 3*   TOTAL PROTEIN g/dL  --   --  5 0*   BILIRUBIN TOTAL mg/dL  --   --  0 30   ALK PHOS U/L  --   --  42*   ALT U/L  --   --  15   AST U/L  --   --  8   GLUCOSE RANDOM mg/dL 105  < > 133   < > = values in this interval not displayed  * I Have Reviewed All Lab Data Listed Above    * Additional Pertinent Lab Tests Reviewed: All Labs Within Last 24 Hours Reviewed    Imaging:    Imaging Reports Reviewed Today Include:   Imaging Personally Reviewed by Myself Includes:      Recent Cultures (last 7 days):           Last 24 Hours Medication List:     divalproex sodium 250 mg Oral Daily   docusate sodium 100 mg Oral BID   DULoxetine 30 mg Oral Daily   DULoxetine 60 mg Oral Daily   heparin (porcine) 5,000 Units Subcutaneous Q8H [address]   levothyroxine 75 mcg Oral Daily   magnesium oxide 400 mg Oral Daily   melatonin 6 mg Oral HS   mirtazapine 15 mg Oral HS   sodium chloride 1,000 mL Intravenous Once   tamsulosin 0 4 mg Oral Daily With Dinner        Today, Patient Was Seen By: Meaghan Rivera MD    ** Please Note: This note has been constructed using a voice recognition system   **

## 2017-11-26 LAB
ANION GAP SERPL CALCULATED.3IONS-SCNC: 8 MMOL/L (ref 4–13)
BUN SERPL-MCNC: 62 MG/DL (ref 5–25)
CALCIUM SERPL-MCNC: 8.6 MG/DL (ref 8.3–10.1)
CHLORIDE SERPL-SCNC: 100 MMOL/L (ref 100–108)
CO2 SERPL-SCNC: 28 MMOL/L (ref 21–32)
CREAT SERPL-MCNC: 3.59 MG/DL (ref 0.6–1.3)
GFR SERPL CREATININE-BSD FRML MDRD: 16 ML/MIN/1.73SQ M
GLUCOSE SERPL-MCNC: 101 MG/DL (ref 65–140)
POTASSIUM SERPL-SCNC: 4.2 MMOL/L (ref 3.5–5.3)
SODIUM SERPL-SCNC: 136 MMOL/L (ref 136–145)
TROPONIN I SERPL-MCNC: <0.02 NG/ML

## 2017-11-26 PROCEDURE — 80048 BASIC METABOLIC PNL TOTAL CA: CPT | Performed by: HOSPITALIST

## 2017-11-26 PROCEDURE — 84484 ASSAY OF TROPONIN QUANT: CPT | Performed by: HOSPITALIST

## 2017-11-26 RX ADMIN — DULOXETINE HYDROCHLORIDE 30 MG: 30 CAPSULE, DELAYED RELEASE ORAL at 08:56

## 2017-11-26 RX ADMIN — DOCUSATE SODIUM 100 MG: 100 CAPSULE, LIQUID FILLED ORAL at 16:28

## 2017-11-26 RX ADMIN — HEPARIN SODIUM 5000 UNITS: 5000 INJECTION, SOLUTION INTRAVENOUS; SUBCUTANEOUS at 13:23

## 2017-11-26 RX ADMIN — DIVALPROEX SODIUM 250 MG: 250 TABLET, FILM COATED, EXTENDED RELEASE ORAL at 22:02

## 2017-11-26 RX ADMIN — TAMSULOSIN HYDROCHLORIDE 0.4 MG: 0.4 CAPSULE ORAL at 16:28

## 2017-11-26 RX ADMIN — DOCUSATE SODIUM 100 MG: 100 CAPSULE, LIQUID FILLED ORAL at 08:55

## 2017-11-26 RX ADMIN — MELATONIN 6 MG: 3 TAB ORAL at 22:02

## 2017-11-26 RX ADMIN — HEPARIN SODIUM 5000 UNITS: 5000 INJECTION, SOLUTION INTRAVENOUS; SUBCUTANEOUS at 22:03

## 2017-11-26 RX ADMIN — DULOXETINE HYDROCHLORIDE 60 MG: 60 CAPSULE, DELAYED RELEASE ORAL at 08:56

## 2017-11-26 RX ADMIN — MIRTAZAPINE 15 MG: 15 TABLET, FILM COATED ORAL at 22:03

## 2017-11-26 RX ADMIN — MAGNESIUM OXIDE TAB 400 MG (241.3 MG ELEMENTAL MG) 400 MG: 400 (241.3 MG) TAB at 08:55

## 2017-11-26 RX ADMIN — ACETAMINOPHEN 650 MG: 325 TABLET ORAL at 17:04

## 2017-11-26 RX ADMIN — HEPARIN SODIUM 5000 UNITS: 5000 INJECTION, SOLUTION INTRAVENOUS; SUBCUTANEOUS at 05:28

## 2017-11-26 RX ADMIN — LEVOTHYROXINE SODIUM 75 MCG: 75 TABLET ORAL at 05:30

## 2017-11-26 NOTE — PROGRESS NOTES
[facility] Internal Medicine Progress Note  Patient: Maryjane Slaughter 81 y o  male   MRN: [id number]  PCP: Arabella Ramirez MD  Unit/Bed#: -21 Encounter: [id number]  Date Of Visit: 11/26/17    Assessment:    Principal Problem:    Acute renal failure (ARF) ([address] )  Active Problems:    Multiple falls    Delirium    Hypothyroidism    Depression    Acute urinary retention    Urinary incontinence    Acute uremia    Encephalopathy    Hypomagnesemia    Polyuria      Plan:    · Acute renal failure 2/2 obstructive uropathy   ? BMP daily  ? Appreciate urology input  ? Continue frias  · Obstructive uropathy due to bladder outlet obstruction   ? continue Frias catheter, Urology input noted   ? Renal US: completed;   · Encephalopathy likely toxin mediated due to renal failure  ? resolved  · Anemia normocytic likely due to chronic inflammation  ? monitor hemoglobin  · Insomnia: chronic issue  ? Increased remeron to 15mg from 7 5mg   ? Nortriptyline held due to concern for SS (with multiple serotinergic agents)  · Hypothyroid  · Depression  ? Psychiatry consult   · Ambulatory dysfunction history of falls   ? PT recommends inpatient rehab    VTE Pharmacologic Prophylaxis:   Pharmacologic: Heparin  Mechanical VTE Prophylaxis in Place: Yes    Patient Centered Rounds: I have performed bedside rounds with nursing staff today  Discussions with Specialists or Other Care Team Provider: none    Education and Discussions with Family / Patient: patient     Time Spent for Care: 20 minutes  More than 50% of total time spent on counseling and coordination of care as described above      Current Length of Stay: 9 day(s)    Current Patient Status: Inpatient   Certification Statement: The patient will continue to require additional inpatient hospital stay due to awaiting placement in SNF     Discharge Plan / Estimated Discharge Date: TBD based on clinical course; awaiting placement     Code Status: Level 1 - Full Code    Subjective:   No complaints  Not very interactive but denies specific complaints  Objective:     Vitals:   Temp (24hrs), Av 4 °F (36 9 °C), Min:97 9 °F (36 6 °C), Max:98 6 °F (37 °C)    HR:  [80-92] 80  Resp:  [16-18] 18  BP: ()/(55-58) 129/58  SpO2:  [94 %-99 %] 98 %  Body mass index is 28 41 kg/m²  Input and Output Summary (last 24 hours): Intake/Output Summary (Last 24 hours) at 17 1355  Last data filed at 17 1101   Gross per 24 hour   Intake              960 ml   Output             2600 ml   Net            -1640 ml       Physical Exam:     Physical Exam   Constitutional: He appears well-developed  No distress  HENT:   Mouth/Throat: Oropharynx is clear and moist  No oropharyngeal exudate  Cardiovascular: Normal rate and regular rhythm  Pulmonary/Chest: Effort normal and breath sounds normal  No respiratory distress  He has no wheezes  Abdominal: Soft  Bowel sounds are normal  He exhibits no distension  There is no tenderness  Musculoskeletal: He exhibits no edema  Neurological: He is alert  Skin: Skin is warm and dry  No rash noted  He is not diaphoretic  No erythema  Nursing note and vitals reviewed  Additional Data:     Labs:      Results from last 7 days  Lab Units 17  0627   WBC Thousand/uL 6 68   HEMOGLOBIN g/dL 9 5*   HEMATOCRIT % 29 8*   PLATELETS Thousands/uL 190       Results from last 7 days  Lab Units 17  0525   SODIUM mmol/L 136   POTASSIUM mmol/L 4 2   CHLORIDE mmol/L 100   CO2 mmol/L 28   BUN mg/dL 62*   CREATININE mg/dL 3 59*   CALCIUM mg/dL 8 6   GLUCOSE RANDOM mg/dL 101           * I Have Reviewed All Lab Data Listed Above  * Additional Pertinent Lab Tests Reviewed:  All Labs Within Last 24 Hours Reviewed    Imaging:    Imaging Reports Reviewed Today Include:   Imaging Personally Reviewed by Myself Includes:      Recent Cultures (last 7 days):           Last 24 Hours Medication List:     divalproex sodium 250 mg Oral Daily   docusate sodium 100 mg Oral BID   DULoxetine 30 mg Oral Daily   DULoxetine 60 mg Oral Daily   heparin (porcine) 5,000 Units Subcutaneous Q8H [address]   levothyroxine 75 mcg Oral Daily   magnesium oxide 400 mg Oral Daily   melatonin 6 mg Oral HS   mirtazapine 15 mg Oral HS   sodium chloride 1,000 mL Intravenous Once   tamsulosin 0 4 mg Oral Daily With Dinner        Today, Patient Was Seen By: Rosa Painting MD    ** Please Note: This note has been constructed using a voice recognition system   **

## 2017-11-26 NOTE — CONSULTS
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  PLAN:   1) patient currently has depression and is started on medication for that he is reported responding to medication however he has complied with the treatment is willing to go to physical rehab  He is currently not a danger to himself or anybody else and would benefit from a physical rehab facility  2) continue current psychotropic medication as prescribed  3)  Communicated with patients' medical team       Chief Complaint: "I am just ready confused  "    History of Present Illness: This is a 74-year-old male with a traumatic brain injury presented to emergency room with several days history of stranguria, on 11/17/2017  Patient been treated for his medical condition since then and now medically stable plans of made to  transfer him to a medical rehab facility psych consult was requested to evaluate him for his current psych condition and evaluate is psychiatrically stable to be transferred to physical rehab facility  Patient has depression has been treated for depression he is responding well for his medication he continues to be withdrawn and does not want to talk much however he has never attempted to harm himself taking his medication compliant with his treatment  He told this writer if you confused and he just wants to move from her now he is not responding to unseen stimuli he said he is not suicidal homicidal       PAST PSYCH HISTORY:    Patient history of major depression    Substance Abuse History:    History   Alcohol Use No         Family Psychiatric History:   none    Social History:  Social History     Social History    Marital status: Single     Spouse name: N/A    Number of children: N/A    Years of education: N/A     Occupational History    Not on file       Social History Main Topics    Smoking status: Former Smoker  Smokeless tobacco: Not on file    Alcohol use No    Drug use: No    Sexual activity: Not on file     Other Topics Concern    Not on file     Social History Narrative    No narrative on file         Traumatic History:   Abuse: None  Other Traumatic Events: None  Past Medical History:   Diagnosis Date    Disease of thyroid gland     H/O Clostridium difficile infection     5/16    Hypertension     Psychiatric disorder     Seizures [facility])        Medical Review Of Systems:  All 12 point review of system is normal except for what is mention in medical hisotry  Scheduled Meds:  divalproex sodium 250 mg Oral Daily   docusate sodium 100 mg Oral BID   DULoxetine 30 mg Oral Daily   DULoxetine 60 mg Oral Daily   heparin (porcine) 5,000 Units Subcutaneous Q8H [address]   levothyroxine 75 mcg Oral Daily   magnesium oxide 400 mg Oral Daily   melatonin 6 mg Oral HS   mirtazapine 15 mg Oral HS   sodium chloride 1,000 mL Intravenous Once   tamsulosin 0 4 mg Oral Daily With Dinner     Continuous Infusions:   PRN Meds:   acetaminophen    calcium carbonate    ondansetron     No Known Allergies     Vitals:    11/26/17 0720   BP: 129/58   Pulse: 80   Resp: 18   Temp: 98 5 °F (36 9 °C)   SpO2: 98%             Mental Status Evaluation:  Appearance:   appeared of age and had good hygiene    Behavior:  Patient was not very cooperative however he gave most of the answers he kept his eyes closed and said that he feels very confused   Speech:  Patient was spontaneous goal-directed   Mood:  Depressed   Affect Sad attempt   Language: naming objects and Goal directed  Thought Process:   logical and linear    Thought Content:  Normal   Perceptual Disturbances:  denying any auditory and visual hallucinations      Risk Potential: Any suicidal homicidal ideas not exhibited any self-harm behaviors compliant with his treatment   Sensorium:  person, place, time/date, situation, day of week, month of year and year   Cognition:  grossly intact   Consciousness:   alert, awake, and oriented ×3    Attention: Good attention span   Intellect: Normal   Fund of Knowledge: awareness of current events: normal    Insight:  Fair insight   Judgment: Fair judgment   Muscle Strength and Tone: Normal    Gait/Station:  gait was not assessed    Motor Activity: no abnormal movements     Lab Results: I have personally reviewed pertinent lab results  NOTE:  Total of 30 minutes were spent in talking to patient completing this medical record reviewing medical chart medical decision making    Abbie Riedel, MD

## 2017-11-26 NOTE — PROGRESS NOTES
NEPHROLOGY PROGRESS NOTE   Jose Manuel Bell 81 y o  male MRN: [id number]  Unit/Bed#: -01 Encounter: [id number]  Reason for Consult: SHENA    ASSESSMENT AND PLAN:  Patient is 73-year-old male with hypertension, hypothyroidism, urinary retention history, presented with difficulty voiding and was found to have severe SHENA   We are consulted for the same      SHENA with baseline creatinine one  - peak creatinine on admission 12 4 continued to significantly improved to 3 5 today  - SHENA likely secondary to obstructive uropathy  - currently has Wood catheter placed with excellent urine output  - avoid nephrotoxins or NSAIDs  - electrolytes overall stable  - now remains off IV fluid  - encourage liquid intake  - overall stable from Nephrology standpoint for discharge with repeat BMP need in one week      Polyuria, improving, likely post obstructive diuresis  - closely monitor electrolytes which are overall stable  - overall much improved      Hypomagnesemia, improved with replacement  azotemia, significantly improving with improving renal function   Continue to closely monitor     Urinary retention with bilateral hydronephrosis  - currently has Wood catheter placed, close urology follow-up  Denis Locket secondary to BPH  - will eventually need cystoscopy as per Urology  - repeat renal ultrasound shows significant improving hydronephrosis      Blood pressure overall acceptable with often lower readings    SUBJECTIVE:  Patient seen and examined at bedside  He remains overall sleepy and does not answer any of my questions       OBJECTIVE:  Current Weight: Weight - Scale: 92 4 kg (203 lb 11 3 oz)  Vitals:    11/26/17 0720   BP: 129/58   Pulse: 80   Resp: 18   Temp: 98 5 °F (36 9 °C)   SpO2: 98%       Intake/Output Summary (Last 24 hours) at 11/26/17 0927  Last data filed at 11/26/17 0228   Gross per 24 hour   Intake              480 ml   Output             1750 ml   Net            -1270 ml       Physical Examination:  General: Lying in bed, no acute distress   HEENT:  Mild conjunctival pallor  Neck:  Supple  Respiratory:  Poor respiratory effort, bilateral air entry present  CVS:  S1, S2 present  GI:  Soft, nontender, nondistended, Wood catheter present  CNS:  Overall sleepy, answers his name although does not answer other questions    Seems to be not interested in communicating  Extremities:  No significant edema  Skin:  Unremarkable    Medications:    Current Facility-Administered Medications:     acetaminophen (TYLENOL) tablet 650 mg, 650 mg, Oral, Q6H PRN, Gildardo Betancourt MD, 650 mg at 11/20/17 1810    calcium carbonate (TUMS) chewable tablet 1,000 mg, 1,000 mg, Oral, Daily PRN, Gildardo Betancourt MD    divalproex sodium (DEPAKOTE ER) 24 hr tablet 250 mg, 250 mg, Oral, Daily, Gildardo Betancourt MD, 250 mg at 11/25/17 2059    docusate sodium (COLACE) capsule 100 mg, 100 mg, Oral, BID, Gildardo Betancourt MD, 100 mg at 11/26/17 0855    DULoxetine (CYMBALTA) delayed release capsule 30 mg, 30 mg, Oral, Daily, Gildardo Betancourt MD, 30 mg at 11/26/17 0856    DULoxetine (CYMBALTA) delayed release capsule 60 mg, 60 mg, Oral, Daily, Gildardo Betancourt MD, 60 mg at 11/26/17 0856    heparin (porcine) subcutaneous injection 5,000 Units, 5,000 Units, Subcutaneous, Q8H [facility], Gildardo Betancourt MD, 5,000 Units at 11/26/17 6724    levothyroxine tablet 75 mcg, 75 mcg, Oral, Daily, Gildardo Betancourt MD, 75 mcg at 11/26/17 0530    magnesium oxide (MAG-OX) tablet 400 mg, 400 mg, Oral, Daily, SHERRIE Thibodeaux, 400 mg at 11/26/17 0855    melatonin tablet 6 mg, 6 mg, Oral, HS, Karina Oakley MD, 6 mg at 11/25/17 2100    mirtazapine (REMERON) tablet 15 mg, 15 mg, Oral, HS, Karina Oakley MD, 15 mg at 11/25/17 2100    ondansetron (ZOFRAN) injection 4 mg, 4 mg, Intravenous, Q6H PRN, Gildardo Betancourt MD    sodium chloride 0 9 % bolus 1,000 mL, 1,000 mL, Intravenous, Once, Krishna Harrison DO    tamsulosin [facility]) capsule 0 4 mg, 0 4 mg, Oral, Daily With Dinner, Ariana Berrios, PA-ISAIAH, 0 4 mg at 11/25/17 1713    Laboratory Results:    Results from last 7 days  Lab Units 11/26/17  0525 11/25/17  0515 11/24/17  1004 11/23/17  0627 11/22/17  0545 11/21/17  0445 11/20/17  0555   WBC Thousand/uL  --   --   --  6 68  --   --  5 78   HEMOGLOBIN g/dL  --   --   --  9 5*  --   --  8 0*   HEMATOCRIT %  --   --   --  29 8*  --   --  23 8*   PLATELETS Thousands/uL  --   --   --  190  --   --  155   SODIUM mmol/L 136 135* 139 137 141 142 142   POTASSIUM mmol/L 4 2 3 9 4 3 4 4 4 5 4 1 3 5   CHLORIDE mmol/L 100 100 101 100 102 105 104   CO2 mmol/L 28 26 30 29 30 27 26   BUN mg/dL 62* 63* 62* 64* 65* 74* 89*   CREATININE mg/dL 3 59* 3 82* 4 21* 4 47* 5 03* 6 14* 7 15*   CALCIUM mg/dL 8 6 8 8 9 0 8 7 8 8 7 8* 7 4*   MAGNESIUM mg/dL  --   --   --   --  1 8 1 5*  --    PHOSPHORUS mg/dL  --   --   --   --  3 5  --  5 2*   ALBUMIN g/dL  --   --   --   --  3 4*  --   --    GLUCOSE RANDOM mg/dL 101 105 98 102 101 100 104       No results found for this or any previous visit  Results for orders placed during the hospital encounter of 05/08/16   XR chest 2 views    Narrative CHEST    INDICATION: Frequent falls    COMPARISON: August 31, 2004    VIEWS:  AP and lateral; 2 images    FINDINGS: Submaximal inspiration  The cardiomediastinal silhouette is unremarkable  The lungs are clear  No pleural effusions  No pneumothorax  Bony thorax unremarkable  Impression No active pulmonary disease         Workstation performed: [id number]       No results found for this or any previous visit  No results found for this or any previous visit  No results found for this or any previous visit  Results for orders placed during the hospital encounter of 11/17/17   US retroperitoneal complete    Narrative RENAL ULTRASOUND    INDICATION: Acute renal failure  COMPARISON: None      TECHNIQUE:   Ultrasound of the retroperitoneum was performed with a curvilinear transducer utilizing volumetric sweeps and still imaging techniques  FINDINGS:    KIDNEYS:  Symmetric and normal size  Right kidney:  9 3 x 6 1 cm  Normal echogenicity and contour  No suspicious masses detected  Moderate hydronephrosis  No shadowing calculi  No perinephric fluid collections  Left kidney:  10 7 x 6 0 cm  Normal echogenicity and contour  No suspicious masses detected  Moderate to severe hydronephrosis  No shadowing calculi  No perinephric fluid collections  URETERS:  Nonvisualized  BLADDER:   Decompressed  Wood catheter present  Irregular, circumferential bladder wall thickening  Ureteral jets are not detected  Impression 1  Moderate right-sided hydronephrosis and moderate to severe left-sided hydronephrosis  No obstructing renal calculi  2   Abnormal appearance of the urinary bladder  Wood catheter present in the urinary bladder  Although the findings may be related to decompressed urinary bladder, bladder neoplasm not excluded  Recommend follow-up urologic consultation and   cystoscopy for further evaluation  Workstation performed: [id number]         Portions of the record may have been created with voice recognition software  Occasional wrong word or "sound a like" substitutions may have occurred due to the inherent limitations of voice recognition software  Read the chart carefully and recognize, using context, where substitutions have occurred

## 2017-11-27 LAB
ANION GAP SERPL CALCULATED.3IONS-SCNC: 10 MMOL/L (ref 4–13)
BUN SERPL-MCNC: 57 MG/DL (ref 5–25)
CALCIUM SERPL-MCNC: 8.7 MG/DL (ref 8.3–10.1)
CHLORIDE SERPL-SCNC: 100 MMOL/L (ref 100–108)
CO2 SERPL-SCNC: 28 MMOL/L (ref 21–32)
CREAT SERPL-MCNC: 3.55 MG/DL (ref 0.6–1.3)
GFR SERPL CREATININE-BSD FRML MDRD: 16 ML/MIN/1.73SQ M
GLUCOSE SERPL-MCNC: 98 MG/DL (ref 65–140)
POTASSIUM SERPL-SCNC: 4.1 MMOL/L (ref 3.5–5.3)
SODIUM SERPL-SCNC: 138 MMOL/L (ref 136–145)

## 2017-11-27 PROCEDURE — 80048 BASIC METABOLIC PNL TOTAL CA: CPT | Performed by: HOSPITALIST

## 2017-11-27 RX ADMIN — HEPARIN SODIUM 5000 UNITS: 5000 INJECTION, SOLUTION INTRAVENOUS; SUBCUTANEOUS at 21:47

## 2017-11-27 RX ADMIN — LEVOTHYROXINE SODIUM 75 MCG: 75 TABLET ORAL at 06:38

## 2017-11-27 RX ADMIN — HEPARIN SODIUM 5000 UNITS: 5000 INJECTION, SOLUTION INTRAVENOUS; SUBCUTANEOUS at 14:16

## 2017-11-27 RX ADMIN — MIRTAZAPINE 15 MG: 15 TABLET, FILM COATED ORAL at 21:47

## 2017-11-27 RX ADMIN — DULOXETINE HYDROCHLORIDE 30 MG: 30 CAPSULE, DELAYED RELEASE ORAL at 09:16

## 2017-11-27 RX ADMIN — MAGNESIUM OXIDE TAB 400 MG (241.3 MG ELEMENTAL MG) 400 MG: 400 (241.3 MG) TAB at 09:15

## 2017-11-27 RX ADMIN — DULOXETINE HYDROCHLORIDE 60 MG: 60 CAPSULE, DELAYED RELEASE ORAL at 09:14

## 2017-11-27 RX ADMIN — MELATONIN 6 MG: 3 TAB ORAL at 21:47

## 2017-11-27 RX ADMIN — DIVALPROEX SODIUM 250 MG: 250 TABLET, FILM COATED, EXTENDED RELEASE ORAL at 21:47

## 2017-11-27 RX ADMIN — DOCUSATE SODIUM 100 MG: 100 CAPSULE, LIQUID FILLED ORAL at 09:15

## 2017-11-27 RX ADMIN — DOCUSATE SODIUM 100 MG: 100 CAPSULE, LIQUID FILLED ORAL at 17:54

## 2017-11-27 RX ADMIN — HEPARIN SODIUM 5000 UNITS: 5000 INJECTION, SOLUTION INTRAVENOUS; SUBCUTANEOUS at 06:38

## 2017-11-27 RX ADMIN — TAMSULOSIN HYDROCHLORIDE 0.4 MG: 0.4 CAPSULE ORAL at 16:16

## 2017-11-27 NOTE — SOCIAL WORK
CCm met with patient a bedside to review mental health Hx for PASSAR and Option process  Patient was willing to give CCM information about inpatient hospital stay within the last year however, does not know which hospital and state that he believed that it was [facility]  CCm also asked patient to sign MA-51 after reviewing the information and patient stated that he wanted to give verbal permission  CCM documented that on the MA-51 and notified the treatment team  CCM completed and faxed option packet to Isabel Esteban from [facility]   Norma Cunningham will follow patient until discharge to SNF  Information passed back to Norma Cunningham

## 2017-11-27 NOTE — PROGRESS NOTES
NEPHROLOGY PROGRESS NOTE   Jorge Watson 81 y o  male MRN: [id number]  Unit/Bed#: -01 Encounter: [id number]  Reason for Consult: SHENA    ASSESSMENT/PLAN:  1  Acute Kidney Injury- Secondary to obstructive uropathy with bilateral hydronephrosis  - baseline creatinine of 1  - unclear if he will have some residual renal dysfunction after prolonged hydronephrosis  - creatinine slowly improving  - encourage oral intake  2  Polyuria- improved  3  Hypomagnesemia- improved  4  Bilateral Hydronephrosis with urinary retention s/p frias catheter  - follow up outpatient with urology for renal ultrasound and possible cystoscopy  5  Blood pressure- soft, monitor, not on antihypertensives  6  Depression- on cymbalta    Disposition:  Okay for discharge from a renal standpoint  Will need follow up in our office    SUBJECTIVE:  Patient states, "I am depressed and I don't know if anything will help me out of it "  States, "I can't comprehend anything    I don't know "    OBJECTIVE:  Current Weight: Weight - Scale: 90 6 kg (199 lb 11 8 oz)  Vitals:    11/26/17 1532 11/26/17 2208 11/27/17 0600 11/27/17 0700   BP: 97/53 105/61  116/60   Pulse: 97 81  80   Resp: 20 20  18   Temp: 97 9 °F (36 6 °C) 99 6 °F (37 6 °C)  97 6 °F (36 4 °C)   TempSrc: Oral Oral  Oral   SpO2: 99% 96%  97%   Weight:   90 6 kg (199 lb 11 8 oz)        Intake/Output Summary (Last 24 hours) at 11/27/17 1321  Last data filed at 11/27/17 1108   Gross per 24 hour   Intake              120 ml   Output             2375 ml   Net            -2255 ml     General: NAD  Skin: no rash  HEENT: normocephalic atraumatic  Neck: supple  Chest: decreased breath sounds  Heart: RRR  Abdomen: soft, nt, nd  Extremities: no edema  Neuro: alert awake  Psych: depressed    Medications:    Current Facility-Administered Medications:     acetaminophen (TYLENOL) tablet 650 mg, 650 mg, Oral, Q6H PRN, Jaycee Reynoso MD, 650 mg at 11/26/17 1704    calcium carbonate (TUMS) chewable tablet 1,000 mg, 1,000 mg, Oral, Daily PRN, Matteo Cooper MD    divalproex sodium (DEPAKOTE ER) 24 hr tablet 250 mg, 250 mg, Oral, Daily, Matteo Cooper MD, 250 mg at 11/26/17 2202    docusate sodium (COLACE) capsule 100 mg, 100 mg, Oral, BID, Matteo Cooper MD, 100 mg at 11/27/17 0915    DULoxetine (CYMBALTA) delayed release capsule 30 mg, 30 mg, Oral, Daily, Matteo Cooper MD, 30 mg at 11/27/17 0916    DULoxetine (CYMBALTA) delayed release capsule 60 mg, 60 mg, Oral, Daily, Matteo Cooper MD, 60 mg at 11/27/17 0914    heparin (porcine) subcutaneous injection 5,000 Units, 5,000 Units, Subcutaneous, Q8H [address], Matteo Cooper MD, 5,000 Units at 11/27/17 1178    levothyroxine tablet 75 mcg, 75 mcg, Oral, Daily, Matteo Cooper MD, 75 mcg at 11/27/17 9103    magnesium oxide (MAG-OX) tablet 400 mg, 400 mg, Oral, Daily, Eduardo Inks, CRNP, 400 mg at 11/27/17 0915    melatonin tablet 6 mg, 6 mg, Oral, HS, Avis Samuel MD, 6 mg at 11/26/17 2202    mirtazapine (REMERON) tablet 15 mg, 15 mg, Oral, HS, Avis Samuel MD, 15 mg at 11/26/17 2203    ondansetron (ZOFRAN) injection 4 mg, 4 mg, Intravenous, Q6H PRN, Matteo Cooper MD    sodium chloride 0 9 % bolus 1,000 mL, 1,000 mL, Intravenous, Once, Krishna Harrison DO    tamsulosin [facility]) capsule 0 4 mg, 0 4 mg, Oral, Daily With Dinner, Ariana Berrios PA-C, 0 4 mg at 11/26/17 1628    Laboratory Results:    Results from last 7 days  Lab Units 11/27/17  0635 11/26/17  0525 11/25/17  0515 11/24/17  1004 11/23/17  0627 11/22/17  0545 11/21/17  0445   WBC Thousand/uL  --   --   --   --  6 68  --   --    HEMOGLOBIN g/dL  --   --   --   --  9 5*  --   --    HEMATOCRIT %  --   --   --   --  29 8*  --   --    PLATELETS Thousands/uL  --   --   --   --  190  --   --    SODIUM mmol/L 138 136 135* 139 137 141 142   POTASSIUM mmol/L 4 1 4 2 3 9 4 3 4 4 4 5 4 1   CHLORIDE mmol/L 100 100 100 101 100 102 105   CO2 mmol/L 28 28 26 30 29 30 27   BUN mg/dL 57* 62* 63* 62* 64* 65* 74*   CREATININE mg/dL 3 55* 3 59* 3 82* 4 21* 4 47* 5 03* 6 14*   CALCIUM mg/dL 8 7 8 6 8 8 9 0 8 7 8 8 7 8*   MAGNESIUM mg/dL  --   --   --   --   --  1 8 1 5*   PHOSPHORUS mg/dL  --   --   --   --   --  3 5  --    ALBUMIN g/dL  --   --   --   --   --  3 4*  --    GLUCOSE RANDOM mg/dL 98 101 105 98 102 101 100

## 2017-11-27 NOTE — PROGRESS NOTES
[address] Internal Medicine Progress Note  Patient: Rebecca Bullock 81 y o  male   MRN: [id number]  PCP: Santiago Christiansen MD  Unit/Bed#: -55 Encounter: [id number]  Date Of Visit: 11/27/17    Assessment:    Principal Problem:    Acute renal failure (ARF) ([facility] [address] )  Active Problems:    Multiple falls    Delirium    Hypothyroidism    Depression    Acute urinary retention    Urinary incontinence    Acute uremia    Encephalopathy    Hypomagnesemia    Polyuria      Plan:    · Acute renal failure 2/2 obstructive uropathy   ? BMP daily  ? Appreciate urology input  ? Continue frias  · Obstructive uropathy due to bladder outlet obstruction   ? continue Frias catheter, Urology input noted   ? Renal US: completed;   · Encephalopathy likely toxin mediated due to renal failure  ? resolved  · Anemia normocytic likely due to chronic inflammation  ? monitor hemoglobin  · Insomnia: chronic issue  ? Increased remeron to 15mg from 7 5mg   ? Nortriptyline held due to concern for SS (with multiple serotinergic agents)  · Hypothyroid  · Depression  ? Psychiatry consult   · Ambulatory dysfunction history of falls   ? PT recommends inpatient rehab      VTE Pharmacologic Prophylaxis:   Pharmacologic: Heparin  Mechanical VTE Prophylaxis in Place: Yes    Patient Centered Rounds: I have performed bedside rounds with nursing staff today  Discussions with Specialists or Other Care Team Provider: urology     Education and Discussions with Family / Patient: patient     Time Spent for Care: 20 minutes  More than 50% of total time spent on counseling and coordination of care as described above  Current Length of Stay: 10 day(s)    Current Patient Status: Inpatient   Certification Statement: The patient will continue to require additional inpatient hospital stay due to resolving obstructive uropathy   Awaiting placement determination     Discharge Plan / Estimated Discharge Date: TBD based on clinical course; pending placement     Code Status: Level 1 - Full Code    Subjective:   Pt continues to complain of not understanding the plan or "anything"  Denies pain or discomfort  Objective:     Vitals:   Temp (24hrs), Av 4 °F (36 9 °C), Min:97 6 °F (36 4 °C), Max:99 6 °F (37 6 °C)    HR:  [80-97] 80  Resp:  [18-20] 18  BP: ()/(53-61) 116/60  SpO2:  [96 %-99 %] 97 %  Body mass index is 27 86 kg/m²  Input and Output Summary (last 24 hours): Intake/Output Summary (Last 24 hours) at 17 1004  Last data filed at 17 0410   Gross per 24 hour   Intake              240 ml   Output             2550 ml   Net            -2310 ml       Physical Exam:     Physical Exam   Constitutional: He is oriented to person, place, and time  No distress  Cardiovascular: Normal rate and regular rhythm  Pulmonary/Chest: Effort normal  No respiratory distress  He has no wheezes  He has no rales  Abdominal: Soft  He exhibits no distension  There is no tenderness  Musculoskeletal: He exhibits no edema  Neurological: He is alert and oriented to person, place, and time  No cranial nerve deficit  Despite endorsing confusion, pt is completely oriented on exam    Skin: Skin is warm and dry  He is not diaphoretic  No erythema  Additional Data:     Labs:      Results from last 7 days  Lab Units 17  0627   WBC Thousand/uL 6 68   HEMOGLOBIN g/dL 9 5*   HEMATOCRIT % 29 8*   PLATELETS Thousands/uL 190       Results from last 7 days  Lab Units 17  0635   SODIUM mmol/L 138   POTASSIUM mmol/L 4 1   CHLORIDE mmol/L 100   CO2 mmol/L 28   BUN mg/dL 57*   CREATININE mg/dL 3 55*   CALCIUM mg/dL 8 7   GLUCOSE RANDOM mg/dL 98           * I Have Reviewed All Lab Data Listed Above  * Additional Pertinent Lab Tests Reviewed:  All Labs Within Last 24 Hours Reviewed    Imaging:    Imaging Reports Reviewed Today Include:   Imaging Personally Reviewed by Myself Includes:      Recent Cultures (last 7 days):           Last 24 Hours Medication List: divalproex sodium 250 mg Oral Daily   docusate sodium 100 mg Oral BID   DULoxetine 30 mg Oral Daily   DULoxetine 60 mg Oral Daily   heparin (porcine) 5,000 Units Subcutaneous Q8H [address]   levothyroxine 75 mcg Oral Daily   magnesium oxide 400 mg Oral Daily   melatonin 6 mg Oral HS   mirtazapine 15 mg Oral HS   sodium chloride 1,000 mL Intravenous Once   tamsulosin 0 4 mg Oral Daily With Dinner        Today, Patient Was Seen By: Ashley Anton MD    ** Please Note: This note has been constructed using a voice recognition system   **

## 2017-11-27 NOTE — PROGRESS NOTES
Progress Note - Luis Escobedo 81 y o  male MRN: [id number]    Unit/Bed#: -01 Encounter: [id number]      Assessment:  26-year-old male with traumatic brain injury known to our service refractory urinary retention last seen in our office July 2016  Patient was lost to follow-up and in the interim chronic Wood catheter was removed  Patient presented to the emergency room with several day history of stranguria  Wood catheter was inserted for bladder volumes exceeding 800 mL  On admission lab values were demonstrative for severe acute kidney injury with creatinine greater than 12  Patient is post bladder decompression for several days and creatinine today is 2 55  Patient has remained afebrile, comfortable and nontoxic  Plan:  Slowly declining creatinine  As mentioned on prior notes, modest improvement in creatinine is due to long-standing hydronephrosis  Maintain Wood catheter and continue to monitor creatinine trend  Patient will require repeat renal ultrasound for evaluation; as well as, cystoscopic examination as an outpatient for direct visualization and determine resectability of prostate  Subjective:   Patient denies fever or chills  Objective:     Vitals: Blood pressure 116/60, pulse 80, temperature 97 6 °F (36 4 °C), temperature source Oral, resp  rate 18, weight 90 6 kg (199 lb 11 8 oz), SpO2 97 %  ,Body mass index is 27 86 kg/m²        Intake/Output Summary (Last 24 hours) at 11/27/17 0804  Last data filed at 11/27/17 0410   Gross per 24 hour   Intake              720 ml   Output             2550 ml   Net            -1830 ml       Physical Exam: General appearance: alert, appears older than stated age and cooperative  Lungs: clear to auscultation bilaterally  Heart: regular rate and rhythm, S1, S2 normal, no murmur, click, rub or gallop  Abdomen: soft, non-tender; bowel sounds normal; no masses,  no organomegaly  Pulses: 2+ and symmetric  Skin: Skin color, texture, turgor normal  No rashes or lesions  Neurologic: Slow to respond  Responses are appropriate when he answers  Invasive Devices     Peripheral Intravenous Line            Peripheral IV 11/22/17 Left Arm 4 days          Drain            Urethral Catheter  16 Fr  9 days              Lab Results   Component Value Date    WBC 6 68 11/23/2017    HGB 9 5 (L) 11/23/2017    HCT 29 8 (L) 11/23/2017    MCV 86 11/23/2017     11/23/2017     Lab Results   Component Value Date    GLUCOSE 98 11/27/2017    CALCIUM 8 7 11/27/2017     11/27/2017    K 4 1 11/27/2017    CO2 28 11/27/2017     11/27/2017    BUN 57 (H) 11/27/2017    CREATININE 3 55 (H) 11/27/2017       Lab, Imaging and other studies: I have personally reviewed pertinent reports      VTE Pharmacologic Prophylaxis: Heparin  VTE Mechanical Prophylaxis: sequential compression device

## 2017-11-27 NOTE — PLAN OF CARE
Problem: DISCHARGE PLANNING - CARE MANAGEMENT  Goal: Discharge to post-acute care or home with appropriate resources  INTERVENTIONS:  - Conduct assessment to determine patient/family and health care team treatment goals, and need for post-acute services based on payer coverage, community resources, and patient preferences, and barriers to discharge  - Address psychosocial, clinical, and financial barriers to discharge as identified in assessment in conjunction with the patient/family and health care team  - Arrange appropriate level of post-acute services according to patient's   needs and preference and payer coverage in collaboration with the physician and health care team  - Communicate with and update the patient/family, physician, and health care team regarding progress on the discharge plan  - Arrange appropriate transportation to post-acute venues    Outcome: Progressing  CCm met with patient a bedside to review mental health Hx for [facility] and Option process  Patient was willing to give CCM information about inpatient hospital stay within the last year however, does not know which hospital and state that he believed that it was [facility]  CCm also asked patient to sign MA-51 after reviewing the information and patient stated that he wanted to give verbal permission  CCM documented that on the MA-51 and notified the treatment team  CCM completed and faxed option packet to Alison from [facility] will follow patient until discharge to SNF  Information passed back to [facility]

## 2017-11-27 NOTE — PHYSICAL THERAPY NOTE
Physical Therapy Cancellation Note  PT session cancelled as patient refused  Patient reported "I wish I was not here" repeatedly  Patient would not answer any questions asked except the above

## 2017-11-28 PROCEDURE — 97164 PT RE-EVAL EST PLAN CARE: CPT

## 2017-11-28 PROCEDURE — G8978 MOBILITY CURRENT STATUS: HCPCS

## 2017-11-28 PROCEDURE — 97535 SELF CARE MNGMENT TRAINING: CPT

## 2017-11-28 PROCEDURE — G8979 MOBILITY GOAL STATUS: HCPCS

## 2017-11-28 PROCEDURE — 97116 GAIT TRAINING THERAPY: CPT

## 2017-11-28 RX ADMIN — HEPARIN SODIUM 5000 UNITS: 5000 INJECTION, SOLUTION INTRAVENOUS; SUBCUTANEOUS at 13:42

## 2017-11-28 RX ADMIN — DULOXETINE HYDROCHLORIDE 60 MG: 60 CAPSULE, DELAYED RELEASE ORAL at 09:23

## 2017-11-28 RX ADMIN — LEVOTHYROXINE SODIUM 75 MCG: 75 TABLET ORAL at 09:22

## 2017-11-28 RX ADMIN — DULOXETINE HYDROCHLORIDE 30 MG: 30 CAPSULE, DELAYED RELEASE ORAL at 09:23

## 2017-11-28 RX ADMIN — MELATONIN 6 MG: 3 TAB ORAL at 21:32

## 2017-11-28 RX ADMIN — TAMSULOSIN HYDROCHLORIDE 0.4 MG: 0.4 CAPSULE ORAL at 16:39

## 2017-11-28 RX ADMIN — HEPARIN SODIUM 5000 UNITS: 5000 INJECTION, SOLUTION INTRAVENOUS; SUBCUTANEOUS at 21:32

## 2017-11-28 RX ADMIN — DIVALPROEX SODIUM 250 MG: 250 TABLET, FILM COATED, EXTENDED RELEASE ORAL at 21:32

## 2017-11-28 RX ADMIN — MIRTAZAPINE 15 MG: 15 TABLET, FILM COATED ORAL at 21:32

## 2017-11-28 RX ADMIN — DOCUSATE SODIUM 100 MG: 100 CAPSULE, LIQUID FILLED ORAL at 09:23

## 2017-11-28 RX ADMIN — ACETAMINOPHEN 650 MG: 325 TABLET ORAL at 21:38

## 2017-11-28 RX ADMIN — MAGNESIUM OXIDE TAB 400 MG (241.3 MG ELEMENTAL MG) 400 MG: 400 (241.3 MG) TAB at 09:23

## 2017-11-28 RX ADMIN — DOCUSATE SODIUM 100 MG: 100 CAPSULE, LIQUID FILLED ORAL at 17:30

## 2017-11-28 NOTE — PLAN OF CARE
Problem: DISCHARGE PLANNING - CARE MANAGEMENT  Goal: Discharge to post-acute care or home with appropriate resources  INTERVENTIONS:  - Conduct assessment to determine patient/family and health care team treatment goals, and need for post-acute services based on payer coverage, community resources, and patient preferences, and barriers to discharge  - Address psychosocial, clinical, and financial barriers to discharge as identified in assessment in conjunction with the patient/family and health care team  - Arrange appropriate level of post-acute services according to patient's   needs and preference and payer coverage in collaboration with the physician and health care team  - Communicate with and update the patient/family, physician, and health care team regarding progress on the discharge plan  - Arrange appropriate transportation to post-acute venues    Outcome: Progressing  Referral also sent to [facility] for possible short term placement  CCM will follow pt

## 2017-11-28 NOTE — PHYSICAL THERAPY NOTE
Physical Therapy Cancellation Note  Attempted to see pt who reports " I don't want to do nothing", " I am so depressed", " I want to be 6 foot under"  Pt did not response when asked why he did not participate with Dr Yojana Carpenter consultation  I spoke to Luma Diaz from case management by phone re: pt's 3 refusals over the last 3 attempts @ PT  Per P&P, we will sign off from PT services  Please reconsult us if pt's participation improves   Kathy Gomez, PT

## 2017-11-28 NOTE — PLAN OF CARE
Problem: OCCUPATIONAL THERAPY ADULT  Goal: Performs self-care activities at highest level of function for planned discharge setting  See evaluation for individualized goals  Treatment Interventions: ADL retraining, Functional transfer training, Cognitive reorientation, Patient/family training, Equipment evaluation/education, Activityengagement, Continued evaluation          See flowsheet documentation for full assessment, interventions and recommendations  Outcome: Progressing  Limitation: Decreased ADL status, Decreased cognition, Decreased Safe judgement during ADL, Decreased self-care trans, Decreased high-level ADLs     Assessment: Pt seen for OT session focusing on activity engagement  Pt completed bed mobility supine <> sit w/ min A and cues for encouragement  Pt engaged in grooming seated at EOB w/ S after set- up to coomb hair  Pt enjoys talking about dogs, and reports that he has had 3 [address]  Pt also enjoyed talking about his favorite football team, [employer]  Pt benefits from encouragement to participate, and engaging in conversation is helpful distraction  Pt would benefit from continued OT while in acute care to address deficits and goals for inital evaluation  Goals extended to 12/6/2017  Continue to recommend post acute rehab when medically stable for discharge from acute care to return to baseline level of I   Will continue to follow pt while in acute care  Recommendation: Psych Consult  OT Discharge Recommendation:  (post acute rehab)  OT - OK to Discharge:  (to post acute rehab when medically stable)

## 2017-11-28 NOTE — SOCIAL WORK
Referral also sent to [facility] for possible short term placement  CCM will follow pt

## 2017-11-28 NOTE — CONSULTS
Consultation - Neuropsychology/Psychology Department  Tracie Sherwood 81 y o  male MRN: [id number]  Unit/Bed#: -01 Encounter: [id number]        BACKGROUND:  Tracie Sherwood is a 81y o  year old male who was referred for a Neuropsychological Exam to assess cognitive functioning and comment on capacity to make decisions  History of Present Illness  Presented with urinary hesitancy, retention 2-3 week duration  Physician Requesting Consult: Patric Penn MD      Consults  Historical Information   Past Medical History:   Diagnosis Date    Disease of thyroid gland     H/O Clostridium difficile infection     5/16    Hypertension     Psychiatric disorder     Seizures ([address] )      History reviewed  No pertinent surgical history  Social History   History   Alcohol Use No     History   Drug Use No     History   Smoking Status    Former Smoker   Smokeless Tobacco    Not on file     Family History: History reviewed  No pertinent family history      Meds/Allergies   current meds:   Current Facility-Administered Medications   Medication Dose Route Frequency    acetaminophen (TYLENOL) tablet 650 mg  650 mg Oral Q6H PRN    calcium carbonate (TUMS) chewable tablet 1,000 mg  1,000 mg Oral Daily PRN    divalproex sodium (DEPAKOTE ER) 24 hr tablet 250 mg  250 mg Oral Daily    docusate sodium (COLACE) capsule 100 mg  100 mg Oral BID    DULoxetine (CYMBALTA) delayed release capsule 30 mg  30 mg Oral Daily    DULoxetine (CYMBALTA) delayed release capsule 60 mg  60 mg Oral Daily    heparin (porcine) subcutaneous injection 5,000 Units  5,000 Units Subcutaneous Q8H [facility]    levothyroxine tablet 75 mcg  75 mcg Oral Daily    magnesium oxide (MAG-OX) tablet 400 mg  400 mg Oral Daily    melatonin tablet 6 mg  6 mg Oral HS    mirtazapine (REMERON) tablet 15 mg  15 mg Oral HS    ondansetron (ZOFRAN) injection 4 mg  4 mg Intravenous Q6H PRN    sodium chloride 0 9 % bolus 1,000 mL  1,000 mL Intravenous Once    tamsulosin (FLOMAX) capsule 0 4 mg  0 4 mg Oral Daily With Dinner       No Known Allergies    Imaging Studies:       Family and Social Support:   CM Handoff Comments: Patient is CCM case as of 11/28/17  patient has capacity eval, optioning process in the works but require Neruppsych eval prior to its completion  CCM or covering CCM will need to continue to follow case  Patient refused to participate in examination  Please re-consult if there are changes in patient's level of cooperation

## 2017-11-28 NOTE — PLAN OF CARE
Problem: DISCHARGE PLANNING - CARE MANAGEMENT  Goal: Discharge to post-acute care or home with appropriate resources  INTERVENTIONS:  - Conduct assessment to determine patient/family and health care team treatment goals, and need for post-acute services based on payer coverage, community resources, and patient preferences, and barriers to discharge  - Address psychosocial, clinical, and financial barriers to discharge as identified in assessment in conjunction with the patient/family and health care team  - Arrange appropriate level of post-acute services according to patient's   needs and preference and payer coverage in collaboration with the physician and health care team  - Communicate with and update the patient/family, physician, and health care team regarding progress on the discharge plan  - Arrange appropriate transportation to post-acute venues    Outcome: Progressing  CCM received call from Esau Ewing from [facility] and determined that patient was not willing to proceed with evaluation yesterday and she believes that after the TBI and psych Hx that a neruopsychiatry evaluation as most appropriate to ensure patient has capacity  CCM will work with treatment team concrete discharge plan

## 2017-11-28 NOTE — PLAN OF CARE
Problem: PHYSICAL THERAPY ADULT  Goal: Performs mobility at highest level of function for planned discharge setting  See evaluation for individualized goals  Treatment/Interventions: Functional transfer training, Therapeutic exercise, Endurance training, LE strengthening/ROM, Elevations, Bed mobility, Gait training, Spoke to nursing, Cognitive reorientation, Equipment eval/education, Patient/family training  Equipment Recommended: Hamida Salcido       See flowsheet documentation for full assessment, interventions and recommendations  Outcome: Progressing  Prognosis: Fair  Problem List: Decreased endurance, Impaired balance, Decreased mobility, Decreased cognition, Decreased coordination, Impaired judgement, Decreased safety awareness, Impaired vision (Gait deviations)  Assessment: Pt is a 81 y o  male seen for PT re-evaluation s/p admit to [facility] on 11/17/2017 w/ Acute renal failure (ARF) ([address] )  PT evaluated earlier in this admission, but PT DCed due to limited participation  PT reordered by [facility]IM post escalation of care meeting  Per Prior PT eval this admission, pt was living in a 2  with 2-3 ANDRAE with rail  Pt was mod I for transfers, ambulation, and stair negotiation with rolling walker  Pt was I with ADL's  Upon evaluation: Pt requires A of 1 for bed mobility, intermittent min A for transfers, and min A for ambulation with rolling walker  Impairments and limitations also listed above, especially due to  weakness, impaired balance, decreased endurance, gait deviations, decreased activity tolerance, altered sensation, impaired judgement, fall risk and decreased cognition  The following objective measures performed on IE also reveal limitations: Barthel Index 45/100   Pt's clinical presentation is currently unstable/unpredictable seen in pt's presentation of limited mobility compared to his premorbid baseline, coupled with fall risk due to limited cognition, hx of falls per prior PT eval (inconsistent reports), impaired vision, decreased safety awareness, and limited participation  Pt to benefit from continued skilled PT tx while in hospital and upon DC to address deficits as defined above and maximize level of functional mobility  From PT/mobility standpoint, recommendation at time of d/c would be IP rehab and with walker pending progress in order to maximize pt's functional independence and consistency w/ mobility in order to facilitate return to PLOF  Recommend trial with walker next session and ther ex with goal directed tasks as able     Barriers to Discharge: Inaccessible home environment, Decreased caregiver support     Recommendation: Post acute IP rehab          See flowsheet documentation for full assessment

## 2017-11-28 NOTE — SOCIAL WORK
[facility] held treatment team meeting today at 12:20pm  Presented at meeting was LIO, CM intern, attending physician, attending nurse, and PCA  Patient brother was also present during meeting  Meeting consisted of discuss about lack of cooperation by the patient with treatment and explained that in order to get him place at a SNF he would need to be more compliant with treatment  CCM reviewed information with patient and verified that he still was agreeable to going to [place]  Patient is agreeable to [place] and is interest in "better food"  Possible change in diet order a this time pending attending determination  At this time, [facility] spoke with [facility] worker who asked for psychiatrist to meet with the patient to discuss capacity due to TBI  CCM explained that he has the ability to refuse the assessment  CCM consulted for additional assessment and will wait for determination  CCM will continue to communicate with family

## 2017-11-28 NOTE — PLAN OF CARE
Problem: DISCHARGE PLANNING - CARE MANAGEMENT  Goal: Discharge to post-acute care or home with appropriate resources  INTERVENTIONS:  - Conduct assessment to determine patient/family and health care team treatment goals, and need for post-acute services based on payer coverage, community resources, and patient preferences, and barriers to discharge  - Address psychosocial, clinical, and financial barriers to discharge as identified in assessment in conjunction with the patient/family and health care team  - Arrange appropriate level of post-acute services according to patient's   needs and preference and payer coverage in collaboration with the physician and health care team  - Communicate with and update the patient/family, physician, and health care team regarding progress on the discharge plan  - Arrange appropriate transportation to post-acute venues    Outcome: Progressing  CCM spoke with emily from [employer] and informed her of the refusal of Nerupsych and PT  At this time, CCM, nurse, and attending will be meeting with patient to discuss discharge plan  Patient will need to cooperate with discharge plan otherwise it will be hard to get him into placement  CCM will document follow up not from meeting treatment team meeting

## 2017-11-28 NOTE — SOCIAL WORK
CCM spoke with emily from [facility] [employer] [facility] and informed her of the refusal of Nerupsych and PT  At this time, CCM, nurse, and attending will be meeting with patient to discuss discharge plan  Patient will need to cooperate with discharge plan otherwise it will be hard to get him into placement  CCM will document follow up not from meeting treatment team meeting

## 2017-11-28 NOTE — PLAN OF CARE
Problem: DISCHARGE PLANNING - CARE MANAGEMENT  Goal: Discharge to post-acute care or home with appropriate resources  INTERVENTIONS:  - Conduct assessment to determine patient/family and health care team treatment goals, and need for post-acute services based on payer coverage, community resources, and patient preferences, and barriers to discharge  - Address psychosocial, clinical, and financial barriers to discharge as identified in assessment in conjunction with the patient/family and health care team  - Arrange appropriate level of post-acute services according to patient's   needs and preference and payer coverage in collaboration with the physician and health care team  - Communicate with and update the patient/family, physician, and health care team regarding progress on the discharge plan  - Arrange appropriate transportation to post-acute venues    Outcome: Progressing  [facility] held treatment team meeting today at 12:20pm  Presented at meeting was LIO, CM intern, attending physician, attending nurse, and PCA  Patient brother was also present during meeting  Meeting consisted of discuss about lack of cooperation by the patient with treatment and explained that in order to get him place at a SNF he would need to be more compliant with treatment  CCM reviewed information with patient and verified that he still was agreeable to going to [facility]  Patient is agreeable to [facility] and is interest in "better food"  Possible change in diet order a this time pending attending determination  At this time, [facility] spoke with [facility] worker who asked for psychiatrist to meet with the patient to discuss capacity due to TBI  CCM explained that he has the ability to refuse the assessment  CCM consulted for additional assessment and will wait for determination  CCM will continue to communicate with family

## 2017-11-28 NOTE — PROGRESS NOTES
NEPHROLOGY PROGRESS NOTE   Saint Crick 81 y o  male MRN: [id number]  Unit/Bed#: -01 Encounter: [id number]  Reason for Consult: SHENA/hydronephrosis    ASSESSMENT/PLAN:  1  Acute Kidney Injury- Secondary to obstructive uropathy with bilateral hydronephrosis  - baseline creatinine of 1  - unclear if he will have some residual renal dysfunction after prolonged hydronephrosis  - creatinine slowly improving, no longer on IVF  - encourage oral intake  2  Polyuria- improved  3  Hypomagnesemia- improved  4  Bilateral Hydronephrosis with urinary retention s/p frias catheter  - follow up outpatient with urology for renal ultrasound and possible cystoscopy  5  Blood pressure- soft, monitor, not on antihypertensives  6  Depression- on cymbalta     Disposition:  Okay for discharge from a renal standpoint  Will need follow up in our office    SUBJECTIVE:  Patient unwilling to talk to me today      OBJECTIVE:  Current Weight: Weight - Scale: 90 8 kg (200 lb 2 8 oz)  Vitals:    11/27/17 1519 11/27/17 2232 11/28/17 0600 11/28/17 0818   BP: 100/58 112/55  119/68   Pulse: 87 89  76   Resp: 18 18  20   Temp: 98 5 °F (36 9 °C) 98 8 °F (37 1 °C)  97 6 °F (36 4 °C)   TempSrc: Oral Oral  Oral   SpO2: 98% 96%  94%   Weight:   90 8 kg (200 lb 2 8 oz)        Intake/Output Summary (Last 24 hours) at 11/28/17 1227  Last data filed at 11/28/17 0900   Gross per 24 hour   Intake             1380 ml   Output             2100 ml   Net             -720 ml     General: NAD  Skin: no rash  HEENT: normocephalic atraumatic  Neck: supple  Chest: CTAB  Heart: RRR  Abdomen: soft, nt, nd  Extremities: no edema  Neuro: alert awake    Medications:    Current Facility-Administered Medications:     acetaminophen (TYLENOL) tablet 650 mg, 650 mg, Oral, Q6H PRN, Sunil Rogers MD, 650 mg at 11/26/17 1704    calcium carbonate (TUMS) chewable tablet 1,000 mg, 1,000 mg, Oral, Daily PRN, Sunil Rogers MD    divalproex sodium (DEPAKOTE ER) 24 hr tablet 250 mg, 250 mg, Oral, Daily, Gildardo Betancourt MD, 250 mg at 11/27/17 2147    docusate sodium (COLACE) capsule 100 mg, 100 mg, Oral, BID, Gildardo Betancourt MD, 100 mg at 11/28/17 5867    DULoxetine (CYMBALTA) delayed release capsule 30 mg, 30 mg, Oral, Daily, Gildardo Betancourt MD, 30 mg at 11/28/17 0154    DULoxetine (CYMBALTA) delayed release capsule 60 mg, 60 mg, Oral, Daily, Gildardo Betancourt MD, 60 mg at 11/28/17 0204    heparin (porcine) subcutaneous injection 5,000 Units, 5,000 Units, Subcutaneous, Q8H [address], Gildardo Betancourt MD, 5,000 Units at 11/27/17 2147    levothyroxine tablet 75 mcg, 75 mcg, Oral, Daily, Gildardo Betancourt MD, 75 mcg at 11/28/17 3092    magnesium oxide (MAG-OX) tablet 400 mg, 400 mg, Oral, Daily, SHERRIE Thibodeaux, 400 mg at 11/28/17 8023    melatonin tablet 6 mg, 6 mg, Oral, HS, Karina Oakley MD, 6 mg at 11/27/17 2147    mirtazapine (REMERON) tablet 15 mg, 15 mg, Oral, HS, Karina Oakley MD, 15 mg at 11/27/17 2147    ondansetron (ZOFRAN) injection 4 mg, 4 mg, Intravenous, Q6H PRN, Gildardo Betancourt MD    sodium chloride 0 9 % bolus 1,000 mL, 1,000 mL, Intravenous, Once, Krishna Harrison DO    tamsulosin [facility]) capsule 0 4 mg, 0 4 mg, Oral, Daily With Dinner, Ariana Berrios PA-C, 0 4 mg at 11/27/17 1616    Laboratory Results:    Results from last 7 days  Lab Units 11/27/17  0635 11/26/17  0525 11/25/17  0515 11/24/17  1004 11/23/17  0627 11/22/17  0545   WBC Thousand/uL  --   --   --   --  6 68  --    HEMOGLOBIN g/dL  --   --   --   --  9 5*  --    HEMATOCRIT %  --   --   --   --  29 8*  --    PLATELETS Thousands/uL  --   --   --   --  190  --    SODIUM mmol/L 138 136 135* 139 137 141   POTASSIUM mmol/L 4 1 4 2 3 9 4 3 4 4 4 5   CHLORIDE mmol/L 100 100 100 101 100 102   CO2 mmol/L 28 28 26 30 29 30   BUN mg/dL 57* 62* 63* 62* 64* 65*   CREATININE mg/dL 3 55* 3 59* 3 82* 4 21* 4 47* 5 03*   CALCIUM mg/dL 8 7 8 6 8 8 9 0 8 7 8 8   MAGNESIUM mg/dL  --   --   --   --   --  1 8   PHOSPHORUS mg/dL  --   --   --   --   --  3 5   ALBUMIN g/dL  --   --   --   --   --  3 4*   GLUCOSE RANDOM mg/dL 98 101 105 98 102 101

## 2017-11-28 NOTE — PLAN OF CARE
GENITOURINARY - ADULT     Absence of urinary retention Progressing     Urinary catheter remains patent Progressing        Potential for Falls     Patient will remain free of falls Progressing        Prexisting or High Potential for Compromised Skin Integrity     Skin integrity is maintained or improved Progressing

## 2017-11-28 NOTE — PROGRESS NOTES
[facility] Internal Medicine Progress Note  Patient: Hal Alexander 81 y o  male   MRN: [id number]  PCP: Hoa Love MD  Unit/Bed#: -01 Encounter: [id number]  Date Of Visit: 17    Assessment:    Principal Problem:    Acute renal failure (ARF) ([facility] [address] )  Active Problems:    Multiple falls    Delirium    Hypothyroidism    Depression    Acute urinary retention    Urinary incontinence    Acute uremia    Encephalopathy    Hypomagnesemia    Polyuria      Plan:    · Acute renal failure due to obstructive uropathy improving, nephrology input noted  · Obstructive uropathy due to bladder outlet obstruction continue Wood catheter, urology input noted outpatient Urology follow-up  · Encephalopathy likely toxin mediated improved  · Anemia monitor  · Depression continue anti depressants, psychiatry consulted  · Hypothyroid  · Ambulatory dysfunction physical therapy      VTE Pharmacologic Prophylaxis:   Pharmacologic: Heparin  Mechanical VTE Prophylaxis in Place: Yes    Patient Centered Rounds: I have performed bedside rounds with nursing staff today  Discussions with Specialists or Other Care Team Provider:     Education and Discussions with Family / Patient:  Discussed with the patient and his brother who is here at bedside    Time Spent for Care: 30 minutes  More than 50% of total time spent on counseling and coordination of care as described above      Current Length of Stay: 11 day(s)    Current Patient Status: Inpatient   Certification Statement: The patient will continue to require additional inpatient hospital stay due to As mentioned    Discharge Plan / Estimated Discharge Date:  Pending placement discussed with case management    Code Status: Level 1 - Full Code      Subjective:     Comfortably lying in bed  Chart history Labs medications reviewed    Objective:     Vitals:   Temp (24hrs), Av °F (36 7 °C), Min:97 6 °F (36 4 °C), Max:98 8 °F (37 1 °C)    HR:  [76-89] 84  Resp:  [17-20] 17  BP: (112-138)/(55-85) 138/85  SpO2:  [94 %-99 %] 99 %  Body mass index is 27 92 kg/m²  Input and Output Summary (last 24 hours): Intake/Output Summary (Last 24 hours) at 11/28/17 1643  Last data filed at 11/28/17 1441   Gross per 24 hour   Intake             1500 ml   Output             2100 ml   Net             -600 ml       Physical Exam:     Physical Exam     Comfortably lying in bed  Neck supple  Lungs clear to auscultation  Heart sounds no murmurs appreciable  Abdomen soft  Pulses present  Awake, obeys simple commands  No rash      Additional Data:     Labs:      Results from last 7 days  Lab Units 11/23/17  0627   WBC Thousand/uL 6 68   HEMOGLOBIN g/dL 9 5*   HEMATOCRIT % 29 8*   PLATELETS Thousands/uL 190       Results from last 7 days  Lab Units 11/27/17  0635   SODIUM mmol/L 138   POTASSIUM mmol/L 4 1   CHLORIDE mmol/L 100   CO2 mmol/L 28   BUN mg/dL 57*   CREATININE mg/dL 3 55*   CALCIUM mg/dL 8 7   GLUCOSE RANDOM mg/dL 98           * I Have Reviewed All Lab Data Listed Above  * Additional Pertinent Lab Tests Reviewed: All Labs Within Last 24 Hours Reviewed    Imaging:    Imaging Reports Reviewed Today Include:   Imaging Personally Reviewed by Myself Includes:     Recent Cultures (last 7 days):           Last 24 Hours Medication List:     divalproex sodium 250 mg Oral Daily   docusate sodium 100 mg Oral BID   DULoxetine 30 mg Oral Daily   DULoxetine 60 mg Oral Daily   heparin (porcine) 5,000 Units Subcutaneous Q8H [address]   levothyroxine 75 mcg Oral Daily   magnesium oxide 400 mg Oral Daily   melatonin 6 mg Oral HS   mirtazapine 15 mg Oral HS   sodium chloride 1,000 mL Intravenous Once   tamsulosin 0 4 mg Oral Daily With Dinner        Today, Patient Was Seen By: Loi Don MD    ** Please Note: This note has been constructed using a voice recognition system   **

## 2017-11-28 NOTE — ESCALATED TEAM TX
[facility] held treatment team meeting today at 12:20pm  Presented at meeting was LIO, CM intern, attending physician, attending nurse, and PCA  Patient brother was also present during meeting  Meeting consisted of discuss about lack of cooperation by the patient with treatment and explained that in order to get him place at a SNF he would need to be more compliant with treatment  CCM reviewed information with patient and verified that he still was agreeable to going to [place]  Patient is agreeable to [place] and is interest in "better food"  Possible change in diet order a this time pending attending determination  At this time, [facility] spoke with [facility] worker who asked for psychiatrist to meet with the patient to discuss capacity due to TBI  CCM explained that he has the ability to refuse the assessment  CCM consulted for additional assessment and will wait for determination  CCM will continue to communicate with family

## 2017-11-28 NOTE — PROGRESS NOTES
Progress Note - Joe Velasquez 81 y o  male MRN: [id number]    Unit/Bed#: -01 Encounter: [id number]      Assessment:  81-year-old male with traumatic brain injury known to our service refractory urinary retention last seen in our office July 2016  Patient was lost to follow-up and in the interim chronic Wood catheter was removed  Patient presented to the emergency room with several day history of stranguria  Wood catheter was inserted for bladder volumes exceeding 800 mL  On admission lab values were demonstrative for severe acute kidney injury with creatinine greater than 12  Patient is post bladder decompression for several days and creatinine today is 2 55  Patient has remained afebrile, comfortable and nontoxic  Plan:  Slowly declining creatinine  As mentioned on prior notes, modest improvement in creatinine is due to long-standing hydronephrosis  Maintain Wood catheter and continue to monitor creatinine trend  Creatinine has remained essentially unchanged for 2 days  Obtain renal ultrasound  Subjective:   Patient denies fever or chills  Objective:     Vitals: Blood pressure 138/85, pulse 84, temperature 97 6 °F (36 4 °C), temperature source Oral, resp  rate 17, weight 90 8 kg (200 lb 2 8 oz), SpO2 99 %  ,Body mass index is 27 92 kg/m²  Intake/Output Summary (Last 24 hours) at 11/28/17 1516  Last data filed at 11/28/17 1441   Gross per 24 hour   Intake             1500 ml   Output             2100 ml   Net             -600 ml       Physical Exam: General appearance: alert, appears older than stated age and cooperative  Lungs: clear to auscultation bilaterally  Heart: regular rate and rhythm, S1, S2 normal, no murmur, click, rub or gallop  Abdomen: soft, non-tender; bowel sounds normal; no masses,  no organomegaly  Pulses: 2+ and symmetric  Skin: Skin color, texture, turgor normal  No rashes or lesions  Neurologic: Slow to respond  Responses are appropriate when he answers  Invasive Devices     Peripheral Intravenous Line            Peripheral IV 11/22/17 Left Arm 5 days          Drain            Urethral Catheter  16 Fr  11 days              Lab Results   Component Value Date    WBC 6 68 11/23/2017    HGB 9 5 (L) 11/23/2017    HCT 29 8 (L) 11/23/2017    MCV 86 11/23/2017     11/23/2017     Lab Results   Component Value Date    GLUCOSE 98 11/27/2017    CALCIUM 8 7 11/27/2017     11/27/2017    K 4 1 11/27/2017    CO2 28 11/27/2017     11/27/2017    BUN 57 (H) 11/27/2017    CREATININE 3 55 (H) 11/27/2017       Lab, Imaging and other studies: I have personally reviewed pertinent reports      VTE Pharmacologic Prophylaxis: Heparin  VTE Mechanical Prophylaxis: sequential compression device

## 2017-11-28 NOTE — PLAN OF CARE
Problem: DISCHARGE PLANNING - CARE MANAGEMENT  Goal: Discharge to post-acute care or home with appropriate resources  INTERVENTIONS:  - Conduct assessment to determine patient/family and health care team treatment goals, and need for post-acute services based on payer coverage, community resources, and patient preferences, and barriers to discharge  - Address psychosocial, clinical, and financial barriers to discharge as identified in assessment in conjunction with the patient/family and health care team  - Arrange appropriate level of post-acute services according to patient's   needs and preference and payer coverage in collaboration with the physician and health care team  - Communicate with and update the patient/family, physician, and health care team regarding progress on the discharge plan  - Arrange appropriate transportation to post-acute venues    Outcome: Progressing  [facility] [place] treatment team meeting today at 12:20pm  Presented at meeting was LIO, CM intern, attending physician, attending nurse, and PCA  Patient brother was also present during meeting  Meeting consisted of discuss about lack of cooperation by the patient with treatment and explained that in order to get him place at a SNF he would need to be more compliant with treatment  CCM reviewed information with patient and verified that he still was agreeable to going to [address]  Patient is agreeable to [facility] and is interest in "better food"  Possible change in diet order a this time pending attending determination  At this time, [facility] spoke with [facility] worker who asked for psychiatrist to meet with the patient to discuss capacity due to TBI  CCM explained that he has the ability to refuse the assessment  CCM consulted for additional assessment and will wait for determination  CCM will continue to communicate with family

## 2017-11-28 NOTE — SOCIAL WORK
CCM received call from Evelina from [facility] and determined that patient was not willing to proceed with evaluation yesterday and she believes that after the TBI and psych Hx that a neruopsychiatry evaluation as most appropriate to ensure patient has capacity  CCM will work with treatment team concrete discharge plan

## 2017-11-28 NOTE — PHYSICAL THERAPY NOTE
PHYSICAL THERAPY Re-EVALUATION  NAME:  Zheng Vieira  DATE: 11/28/17    AGE:   81 y o  Mrn:   [id number]  ADMIT DX:  Delirium [R41 0]  Urinary incontinence [R32]  Abnormal EKG [R94 31]  Overflow incontinence of urine [N39 490]  Acute renal failure (ARF) (HCC) [N17 9]  Acute uremia [N19]  Acute urinary retention [R33 8]    Past Medical History:   Diagnosis Date    Disease of thyroid gland     H/O Clostridium difficile infection     5/16    Hypertension     Psychiatric disorder     Seizures ([address] )      Length Of Stay: 11  PHYSICAL THERAPY Re-EVALUATION :    11/28/17 1300   Note Type   Note type Re-eval;Progress   Pain Assessment   Pain Assessment No/denies pain   Home Living   Type of [address] Two level   Home Equipment Walker   Additional Comments Pt reports "2-3" ANDRAE with rail   Prior Function   Level of [place] Independent with ADLs and functional mobility   Lives With Alone   Receives Help From (None)   ADL Assistance Independent   IADLs Independent   Falls in the last 6 months 1 to 4   Restrictions/Precautions   Weight Bearing Precautions Per Order No   Other Precautions Bed Alarm; Chair Alarm;Cognitive;Hard of hearing; Fall Risk   General   Additional Pertinent History After pt was DCed from PT services (refused 3 sessions per policy and procedure), team ecalation meeting took place in pt's room ([facility] Dr Dewayne Gonzales RN , ion RN)   Did received verbal orders from Dr Malik Taylor to reeval pt      Family/Caregiver Present No   Cognition   Overall Cognitive Status Impaired   Arousal/Participation Lethargic   Orientation Level Oriented to person;Oriented to place   Memory Decreased recall of recent events   Following Commands Follows one step commands inconsistently   RUE Assessment   RUE Assessment X   RUE Strength   RUE Overall Strength Within Functional Limits - able to perform ADL tasks with strength   LUE Assessment   LUE Assessment X   LUE Strength   LUE Overall Strength Within Functional Limits - able to perform ADL tasks with strength   RLE Assessment   RLE Assessment (grossly 5/5)   LLE Assessment   LLE Assessment (grossly 5/5)   Coordination   Movements are Fluid and Coordinated 0   Coordination and Movement Description Decreased speed with movement   Sensation (vision impaired, [place])   Sharp/Dull   RLE Sharp/Dull Not tested   LLE Sharp/Dull Not tested   Proprioception   RLE Proprioception Not tested   LLE Proprioception Not tested   Bed Mobility   Supine to Sit 3  Moderate assistance  (reports lightehadedness with change of position to upright)   Additional items Assist x 1; Increased time required;Verbal cues   Transfers   Sit to Stand 4  Minimal assistance  (steadying A; 2 trials)   Additional items Assist x 1; Increased time required;Verbal cues   Stand to Sit 5  Supervision  (2 trials)   Additional items Assist x 1;Verbal cues; Increased time required  (hand placement)   Ambulation/Elevation   Gait pattern Forward Flexion; Excessively slow; Shuffling   Gait Assistance 4  Minimal assist   Additional items Verbal cues  (second A for chair follow due to c/o lightheadedness)   Assistive Device Rolling walker   Distance 40'x2   Stair Management Assistance Not tested   Endurance Deficit   Endurance Deficit Yes   Endurance Deficit Description limited amb distance, needed therapeutic rest periods    Activity Tolerance   Activity Tolerance Patient limited by fatigue   Medical Staff Made Aware spoke to multiple staff   Nurse Made Aware spoke to RN   Assessment   Prognosis Fair   Problem List Decreased endurance; Impaired balance;Decreased mobility; Decreased cognition;Decreased coordination; Impaired judgement;Decreased safety awareness; Impaired vision  (Gait deviations)   Barriers to Discharge Inaccessible home environment;Decreased caregiver support   Goals   Patient Goals agreeable to walk with encouragement   STG Expiration Date 12/08/17   Treatment Day 1   Plan Treatment/Interventions Functional transfer training;LE strengthening/ROM; Therapeutic exercise; Endurance training;Cognitive reorientation;Patient/family training;Equipment eval/education; Bed mobility;Gait training;Spoke to nursing;Spoke to MD;Spoke to case management   PT Frequency 5x/wk   Recommendation   Recommendation Post acute IP rehab   Equipment Recommended Walker   Barthel Index   Feeding 5   Bathing 0   Grooming Score 5   Dressing Score 10   Bladder Score 0   Bowels Score 10   Toilet Use Score 5   Transfers (Bed/Chair) Score 10   Mobility (Level Surface) Score 0   Stairs Score 0   Barthel Index Score 45   (Please find full objective findings from PT assessment regarding body systems outlined above)  Assessment: Pt is a 81 y o  male seen for PT re-evaluation s/p admit to [facility] on 11/17/2017 w/ Acute renal failure (ARF) ([address] )  PT evaluated earlier in this admission, but PT DCed due to limited participation  PT reordered by [facility] post escalation of care meeting  Per Prior PT eval this admission, pt was living in a 2  with 2-3 ANDRAE with rail  Pt was mod I for transfers, ambulation, and stair negotiation with rolling walker  Pt was I with ADL's  Upon evaluation: Pt requires A of 1 for bed mobility, intermittent min A for transfers, and min A for ambulation with rolling walker  Impairments and limitations also listed above, especially due to  weakness, impaired balance, decreased endurance, gait deviations, decreased activity tolerance, altered sensation, impaired judgement, fall risk and decreased cognition  The following objective measures performed on IE also reveal limitations: Barthel Index 45/100   Pt's clinical presentation is currently unstable/unpredictable seen in pt's presentation of limited mobility compared to his premorbid baseline, coupled with fall risk due to limited cognition, hx of falls per prior PT eval (inconsistent reports), impaired vision, decreased safety awareness, and limited participation  Pt to benefit from continued skilled PT tx while in hospital and upon DC to address deficits as defined above and maximize level of functional mobility  From PT/mobility standpoint, recommendation at time of d/c would be IP rehab and with walker pending progress in order to maximize pt's functional independence and consistency w/ mobility in order to facilitate return to PLOF  Recommend trial with walker next session and ther ex with goal directed tasks as able         Comorbidities affecting pt's physical performance at time of assessment include: HTN, limited vision, limited hearing and delerium, reports of TBI  Personal factors affecting pt at time of IE include: depression, steps to enter environment, limited home support, inability to navigate community distances, limited insight into impairments, recent fall(s) and non compliance vs lack of follow up   Janna Khanna, PT, DPT

## 2017-11-28 NOTE — OCCUPATIONAL THERAPY NOTE
Occupational Therapy Progress Note      Patient Name: Jayson Tiwari    SPDSW'Q Date: 11/28/2017    Problem List:   Patient Active Problem List   Diagnosis    Fall    Multiple falls    Delirium    Scalp Laceration    Multiple bruises    Hypothyroidism    HTN (hypertension)    Depression    Acute urinary retention    Clostridium difficile infection    Urinary incontinence    Acute uremia    Acute renal failure (ARF) (HCC)    Encephalopathy    Hypomagnesemia    Polyuria          11/28/17 1505   Restrictions/Precautions   Weight Bearing Precautions Per Order No   Other Precautions Cognitive; Bed Alarm; Fall Risk;Fluid restriction;Hard of hearing   General   Response to Previous Treatment Patient with no complaints from previous session   Lifestyle   Autonomy Pt reports difficutly finding motivation to participate in ADL's  Pt added that he thinks of everything negative  Intrinsic Gratification Pt reports that he used to walk to the store everyday and get the [id number] reports that he enjoyed reading it every day  Pain Assessment   Pain Assessment No/denies pain   Pain Score No Pain   ADL   Eating Assistance 6  Modified independent   Eating Deficit Increased time to complete;Setup   Eating Comments seated at EOB to reach for drink off tray table   Grooming Assistance 5  Supervision/Setup   Grooming Deficit Setup;Verbal cueing; Other (Comment)  (cues for encoruagement to increase participation)   Grooming Comments seated at EOB to comb hair and wipe / blow nose   Bed Mobility   Supine to Sit 4  Minimal assistance   Additional items Assist x 1;HOB elevated;Verbal cues; Increased time required   Sit to Supine 4  Minimal assistance   Additional items Assist x 1;HOB elevated; Increased time required   Additional Comments Pt tolerated sitting at EOB for 10 minutes while engaged in grooming and conversation about dogs     Transfers   Sit to Stand Unable to assess  (pt declined sit to stand or OOB  ) Additional Comments Pt declined sit to stand or OOB  Pt added that he just got back into bed   Cognition   Overall Cognitive Status Impaired   Arousal/Participation Alert   Attention Attends with cues to redirect  (hard of hearing)   Orientation Level Oriented to person;Oriented to place; Disoriented to time;Disoriented to situation   Memory Decreased recall of recent events   Following Commands Follows one step commands with increased time or repetition  (encouragement to participate)   Comments Pt agreed to participate in OT session w/ encouragement form therapist  Pt enjoys talking about dogs espeically [facility]  [employer], and fooball ([facility])  Activity Tolerance   Activity Tolerance Patient limited by fatigue   Medical Staff Made Aware per RN, Breana mclaughlin to see   Assessment   Assessment Pt seen for OT session focusing on activity engagement  Pt completed bed mobility supine <> sit w/ min A and cues for encouragement  Pt engaged in grooming seated at EOB w/ S after set- up to coomb hair  Pt enjoys talking about dogs, and reports that he has had 3 [address]  Pt also enjoyed talking about his favorite football team, [address] [facility]  Pt benefits from encouragement to participate, and engaging in conversation is helpful distraction  Pt would benefit from continued OT while in acute care to address deficits and goals for inital evaluation  Goals extended to 12/6/2017  Continue to recommend post acute rehab when medically stable for discharge from acute care to return to baseline level of I  Will continue to follow pt while in acute care   Plan   Treatment Interventions ADL retraining;Functional transfer training; Activityengagement; Endurance training;Cognitive reorientation   Goal Expiration Date 12/05/17   Treatment Day 1   OT Frequency 3-5x/wk   Recommendation   Recommendation Psych Consult   OT Discharge Recommendation (post acute rehab)   OT - OK to Discharge (to post acute rehab when medically stable)   Ghazal Mahoney, DANYA

## 2017-11-29 ENCOUNTER — APPOINTMENT (INPATIENT)
Dept: ULTRASOUND IMAGING | Facility: HOSPITAL | Age: 72
DRG: 682 | End: 2017-11-29
Payer: MEDICARE

## 2017-11-29 LAB
ANION GAP SERPL CALCULATED.3IONS-SCNC: 7 MMOL/L (ref 4–13)
BUN SERPL-MCNC: 65 MG/DL (ref 5–25)
CALCIUM SERPL-MCNC: 8.6 MG/DL (ref 8.3–10.1)
CHLORIDE SERPL-SCNC: 103 MMOL/L (ref 100–108)
CO2 SERPL-SCNC: 29 MMOL/L (ref 21–32)
CREAT SERPL-MCNC: 3.21 MG/DL (ref 0.6–1.3)
GFR SERPL CREATININE-BSD FRML MDRD: 19 ML/MIN/1.73SQ M
GLUCOSE SERPL-MCNC: 104 MG/DL (ref 65–140)
POTASSIUM SERPL-SCNC: 4.5 MMOL/L (ref 3.5–5.3)
SODIUM SERPL-SCNC: 139 MMOL/L (ref 136–145)

## 2017-11-29 PROCEDURE — 80048 BASIC METABOLIC PNL TOTAL CA: CPT | Performed by: PHYSICIAN ASSISTANT

## 2017-11-29 PROCEDURE — 76770 US EXAM ABDO BACK WALL COMP: CPT

## 2017-11-29 RX ADMIN — TAMSULOSIN HYDROCHLORIDE 0.4 MG: 0.4 CAPSULE ORAL at 17:00

## 2017-11-29 RX ADMIN — MIRTAZAPINE 15 MG: 15 TABLET, FILM COATED ORAL at 21:00

## 2017-11-29 RX ADMIN — LEVOTHYROXINE SODIUM 75 MCG: 75 TABLET ORAL at 06:06

## 2017-11-29 RX ADMIN — DOCUSATE SODIUM 100 MG: 100 CAPSULE, LIQUID FILLED ORAL at 09:06

## 2017-11-29 RX ADMIN — MAGNESIUM OXIDE TAB 400 MG (241.3 MG ELEMENTAL MG) 400 MG: 400 (241.3 MG) TAB at 09:06

## 2017-11-29 RX ADMIN — HEPARIN SODIUM 5000 UNITS: 5000 INJECTION, SOLUTION INTRAVENOUS; SUBCUTANEOUS at 21:00

## 2017-11-29 RX ADMIN — DOCUSATE SODIUM 100 MG: 100 CAPSULE, LIQUID FILLED ORAL at 17:00

## 2017-11-29 RX ADMIN — HEPARIN SODIUM 5000 UNITS: 5000 INJECTION, SOLUTION INTRAVENOUS; SUBCUTANEOUS at 06:07

## 2017-11-29 RX ADMIN — DULOXETINE HYDROCHLORIDE 60 MG: 60 CAPSULE, DELAYED RELEASE ORAL at 09:06

## 2017-11-29 RX ADMIN — HEPARIN SODIUM 5000 UNITS: 5000 INJECTION, SOLUTION INTRAVENOUS; SUBCUTANEOUS at 13:56

## 2017-11-29 RX ADMIN — DULOXETINE HYDROCHLORIDE 30 MG: 30 CAPSULE, DELAYED RELEASE ORAL at 09:06

## 2017-11-29 RX ADMIN — DIVALPROEX SODIUM 250 MG: 250 TABLET, FILM COATED, EXTENDED RELEASE ORAL at 20:56

## 2017-11-29 RX ADMIN — MELATONIN 6 MG: 3 TAB ORAL at 21:00

## 2017-11-29 NOTE — CONSULTS
Progress Note - Behavioral Health   Rebecca Bullock 81 y o  male MRN: [id number]  Unit/Bed#: -01 Encounter: [id number]    Patient reports being depressed and confused and complains of depression  Allows US of kidneys and bladder this AM after interview  He states he is here because his kidneys and urine are bad, that they are treating him with" labs and pills and shots" and can not say what would happen if he did not get treatment  He reports the plan is to go to [employer], does not know when  He feels "fed up" with everything and wants us to let him sleep  No SI but reports he has had enough of life  Interview ends with patient requesting juice and getting Ultrasound  Behavior over the last 24 hours:  unchanged  Sleep: hypersomnia  Appetite: "the food here is awful I have not had a good meal since I was home"  Medication side effects: No  ROS: confusion    Mental Status Evaluation:  Appearance:  bearded   Behavior:  mostlty cooperative   Speech:  delayed and loud   Mood:  depressed   Affect:  mood-congruent   Language: naming objects   Thought Process:  blocked   Associations: intact associations   Thought Content:  nothing overt   Perceptual Disturbances: latency to answers, preoccupied   Risk Potential: Suicidal Ideations none and Homicidal Ideations none but passive wish to die "fed up with everything"   Sensorium:  person, place   Memory:  recent and remote memory: unable to assess due to lack of cooperation   Cognition:  grossly intact   Consciousness:  alert and awake    Attention: attention span appeared shorter than expected for age   Intellect: not examined   Fund of Knowledge: awareness of current events: limited   Insight:  limited   Judgment: limited   Muscle Strength and Tone: in bed   Gait/Station: in bed   Motor Activity: no abnormal movements         Assessment/Plan  Rebecca Bullock is a 81 y o  male     Recommended Treatment:   MDD, severe, without psychosis    1   He has a overall grasp of his medical illness but is vague on the details of treatment and becomes irritable very quickly with questions  While he is agreeing to Ross Busing he is reporting depression with passive with die and that is likely also impacting his participation as he stated "what is the point"  A surrogate decision maker should be found at this time to help his with decision making  2  He may benefit from inpatient psychiatric treatment prior to rehab to help improve mood which may improve effort and compliance with treatment    Medications:   current meds:   Current Facility-Administered Medications   Medication Dose Route Frequency    acetaminophen (TYLENOL) tablet 650 mg  650 mg Oral Q6H PRN    calcium carbonate (TUMS) chewable tablet 1,000 mg  1,000 mg Oral Daily PRN    divalproex sodium (DEPAKOTE ER) 24 hr tablet 250 mg  250 mg Oral Daily    docusate sodium (COLACE) capsule 100 mg  100 mg Oral BID    DULoxetine (CYMBALTA) delayed release capsule 30 mg  30 mg Oral Daily    DULoxetine (CYMBALTA) delayed release capsule 60 mg  60 mg Oral Daily    heparin (porcine) subcutaneous injection 5,000 Units  5,000 Units Subcutaneous Q8H [address]    levothyroxine tablet 75 mcg  75 mcg Oral Daily    magnesium oxide (MAG-OX) tablet 400 mg  400 mg Oral Daily    melatonin tablet 6 mg  6 mg Oral HS    mirtazapine (REMERON) tablet 15 mg  15 mg Oral HS    ondansetron (ZOFRAN) injection 4 mg  4 mg Intravenous Q6H PRN    sodium chloride 0 9 % bolus 1,000 mL  1,000 mL Intravenous Once    tamsulosin (FLOMAX) capsule 0 4 mg  0 4 mg Oral Daily With Dinner         Risks, benefits and possible side effects of Medications:     No new medications at this time for depression    Labs: I have personally reviewed all pertinent laboratory results       Franci Guerrero MD

## 2017-11-29 NOTE — SOCIAL WORK
Left a message for Dr Ubaldo Zuniga to clarify if pt needs to go to inpt psych before going to [address] will accept pt and message sent to [place] that pt does not have secondary insurance of [place]

## 2017-11-29 NOTE — PROGRESS NOTES
NEPHROLOGY PROGRESS NOTE   Tracie Sherwood 81 y o  male MRN: [id number]  Unit/Bed#: -01 Encounter: [id number]  Reason for Consult: SHENA    ASSESSMENT/PLAN:  1  Acute Kidney Injury- Secondary to obstructive uropathy with bilateral hydronephrosis  - baseline creatinine of 1  - unclear if he will have some residual renal dysfunction after prolonged hydronephrosis  - creatinine slowly improving, no longer on IVF  - encourage oral intake  2  Polyuria- improved  3  Hypomagnesemia- improved  4  Bilateral Hydronephrosis with urinary retention s/p frias catheter  - follow up outpatient with urology for cystoscopy and frias catheter management  - renal ultrasound on 11/29 showed almost full resolution of hydronephrosis  5  Blood pressure- soft, monitor, not on antihypertensives  6  Depression- on cymbalta    Disposition:  Okay for discharge from a renal standpoint  Follow up in our office    SUBJECTIVE:  Patient depressed affect    States "I don't know" multiple times    OBJECTIVE:  Current Weight: Weight - Scale: 90 8 kg (200 lb 2 8 oz)  Vitals:    11/28/17 1441 11/28/17 2257 11/28/17 2307 11/29/17 0703   BP: 138/85 (!) 81/51 100/50 120/76   Pulse: 84 88  89   Resp: 17 16  18   Temp: 97 6 °F (36 4 °C) 97 7 °F (36 5 °C)  98 7 °F (37 1 °C)   TempSrc: Oral Oral  Oral   SpO2: 99% 96%  98%   Weight:           Intake/Output Summary (Last 24 hours) at 11/29/17 1455  Last data filed at 11/29/17 1200   Gross per 24 hour   Intake              932 ml   Output             2175 ml   Net            -1243 ml     General: NAD  Skin: no rash  HEENT: normocephalic atraumatic  Neck: supple  Chest: CTAB  Heart: RRR  Abdomen: soft, nt, nd  Extremities: no edema  Neuro: alert awake  Psych: mood and affect appropriate    Medications:    Current Facility-Administered Medications:     acetaminophen (TYLENOL) tablet 650 mg, 650 mg, Oral, Q6H PRN, Patric Penn MD, 650 mg at 11/28/17 2138    calcium carbonate (TUMS) chewable tablet 1,000 mg, 1,000 mg, Oral, Daily PRN, Bucky Calderon MD    divalproex sodium (DEPAKOTE ER) 24 hr tablet 250 mg, 250 mg, Oral, Daily, Bucky Calderon MD, 250 mg at 11/28/17 2132    docusate sodium (COLACE) capsule 100 mg, 100 mg, Oral, BID, Bucky Calderon MD, 100 mg at 11/29/17 0906    DULoxetine (CYMBALTA) delayed release capsule 30 mg, 30 mg, Oral, Daily, Bucky Calderon MD, 30 mg at 11/29/17 0906    DULoxetine (CYMBALTA) delayed release capsule 60 mg, 60 mg, Oral, Daily, Bucky Calderon MD, 60 mg at 11/29/17 0906    heparin (porcine) subcutaneous injection 5,000 Units, 5,000 Units, Subcutaneous, Q8H [address], Bucky Calderon MD, 5,000 Units at 11/29/17 1356    levothyroxine tablet 75 mcg, 75 mcg, Oral, Daily, Bucky Calderon MD, 75 mcg at 11/29/17 0606    magnesium oxide (MAG-OX) tablet 400 mg, 400 mg, Oral, Daily, SHERRIE Mayer, 400 mg at 11/29/17 0285    melatonin tablet 6 mg, 6 mg, Oral, HS, Braeden Ceballos MD, 6 mg at 11/28/17 2132    mirtazapine (REMERON) tablet 15 mg, 15 mg, Oral, HS, Braeden Ceballos MD, 15 mg at 11/28/17 2132    ondansetron (ZOFRAN) injection 4 mg, 4 mg, Intravenous, Q6H PRN, Bucky Calderon MD    sodium chloride 0 9 % bolus 1,000 mL, 1,000 mL, Intravenous, Once, Krishna Harrison DO    tamsulosin [facility]) capsule 0 4 mg, 0 4 mg, Oral, Daily With Dinner, Ariana Berrios PA-C, 0 4 mg at 11/28/17 1639    Laboratory Results:    Results from last 7 days  Lab Units 11/29/17  0607 11/27/17  0635 11/26/17  0525 11/25/17  0515 11/24/17  1004 11/23/17  0627   WBC Thousand/uL  --   --   --   --   --  6 68   HEMOGLOBIN g/dL  --   --   --   --   --  9 5*   HEMATOCRIT %  --   --   --   --   --  29 8*   PLATELETS Thousands/uL  --   --   --   --   --  190   SODIUM mmol/L 139 138 136 135* 139 137   POTASSIUM mmol/L 4 5 4 1 4 2 3 9 4 3 4 4   CHLORIDE mmol/L 103 100 100 100 101 100   CO2 mmol/L 29 28 28 26 30 29   BUN mg/dL 65* 57* 62* 63* 62* 64*   CREATININE mg/dL 3 21* 3 55* 3 59* 3 82* 4 21* 4 47*   CALCIUM mg/dL 8 6 8 7 8 6 8 8 9 0 8 7   GLUCOSE RANDOM mg/dL 104 98 101 105 98 102

## 2017-11-29 NOTE — PLAN OF CARE
Problem: DISCHARGE PLANNING - CARE MANAGEMENT  Goal: Discharge to post-acute care or home with appropriate resources  INTERVENTIONS:  - Conduct assessment to determine patient/family and health care team treatment goals, and need for post-acute services based on payer coverage, community resources, and patient preferences, and barriers to discharge  - Address psychosocial, clinical, and financial barriers to discharge as identified in assessment in conjunction with the patient/family and health care team  - Arrange appropriate level of post-acute services according to patient's   needs and preference and payer coverage in collaboration with the physician and health care team  - Communicate with and update the patient/family, physician, and health care team regarding progress on the discharge plan  - Arrange appropriate transportation to post-acute venues    Outcome: Progressing  Left a message for Dr Ubaldo Zuniga to clarify if pt needs to go to in psych before going to [address] will accept pt and message sent to [place] that pt does not have secondary insurance of [place]

## 2017-11-29 NOTE — SOCIAL WORK
Psych eval completed and recommend treatment is a surrogate decision maker should be found at this time to help him with decision making  Pt may also benefit from inpt psych before going to [address] [place] [address] [place]  Rec a call from the [facility] requesting if pt has capacity and copy of the eval  A message with left about the recommendation and eval was fax to [phone] today  Will follow

## 2017-11-29 NOTE — CASE MANAGEMENT
Continued Stay Review    Date: 11/29/17    Vital Signs: /76   Pulse 89   Temp 98 7 °F (37 1 °C) (Oral)   Resp 18   Wt 90 8 kg (200 lb 2 8 oz)   SpO2 98%   BMI 27 92 kg/m²     Medications:   Scheduled Meds:   divalproex sodium 250 mg Oral Daily   docusate sodium 100 mg Oral BID   DULoxetine 30 mg Oral Daily   DULoxetine 60 mg Oral Daily   heparin (porcine) 5,000 Units Subcutaneous Q8H [address]   levothyroxine 75 mcg Oral Daily   magnesium oxide 400 mg Oral Daily   melatonin 6 mg Oral HS   mirtazapine 15 mg Oral HS   sodium chloride 1,000 mL Intravenous Once   tamsulosin 0 4 mg Oral Daily With Dinner     Continuous Infusions:    PRN Meds:   acetaminophen    calcium carbonate    ondansetron    Abnormal Labs/Diagnostic Results:   HEMOGLOBIN 9 5*   HEMATOCRIT 29 8*     BUN 65*   CREATININE 3 21*     Age/Sex: 81 y o  male     Assessment:     Principal Problem:    Acute renal failure (ARF) ([facility])  Active Problems:    Multiple falls    Delirium    Hypothyroidism    Depression    Acute urinary retention    Urinary incontinence    Acute uremia    Encephalopathy    Hypomagnesemia    Polyuria        Plan:     · Acute renal failure due to obstructive uropathy improving, discussed with Nephrology  · Obstructive uropathy due to bladder outlet obstruction continue Wood catheter, urology following outpatient Urology follow-up  · Encephalopathy improved  · Anemia monitor  · Depression psychiatry input noted, discussed with psychiatry  · Ambulatory dysfunction physical therapy  · Hypothyroid        VTE Pharmacologic Prophylaxis:   Pharmacologic: Heparin  Mechanical VTE Prophylaxis in Place:  Yes     Current Patient Status: Inpatient   Certification Statement: The patient will continue to require additional inpatient hospital stay due to As mentioned     Discharge Plan / Estimated Discharge Date:  Pending placement discussed with case management      [address] [place] [address] in the [facility] by Judah Sprague for 2017  Network Utilization Review Department  Phone: [phone]; Fax [phone]  ATTENTION: The Network Utilization Review Department is now centralized for our 7 Facilities  Make a note that we have a new phone and fax numbers for our Department  Please call with any questions or concerns to [phone] and carefully follow the prompts so that you are directed to the right person  All voicemails are confidential  Fax any determinations, approvals, denials, and requests for initial or continue stay review clinical to [phone]  Due to HIGH CALL volume, it would be easier if you could please send faxed requests to expedite your requests and in part, help us provide discharge notifications faster

## 2017-11-29 NOTE — PLAN OF CARE
Problem: Potential for Falls  Goal: Patient will remain free of falls  INTERVENTIONS:  - Assess patient frequently for physical needs  -  Identify cognitive and physical deficits and behaviors that affect risk of falls    -  [place] fall precautions as indicated by assessment   - Educate patient/family on patient safety including physical limitations  - Instruct patient to call for assistance with activity based on assessment  - Modify environment to reduce risk of injury  - Consider OT/PT consult to assist with strengthening/mobility   Outcome: Progressing      Problem: Prexisting or High Potential for Compromised Skin Integrity  Goal: Skin integrity is maintained or improved  INTERVENTIONS:  - Identify patients at risk for skin breakdown  - Assess and monitor skin integrity  - Assess and monitor nutrition and hydration status  - Monitor labs (i e  albumin)  - Assess for incontinence   - Turn and reposition patient  - Assist with mobility/ambulation  - Relieve pressure over bony prominences  - Avoid friction and shearing  - Provide appropriate hygiene as needed including keeping skin clean and dry  - Evaluate need for skin moisturizer/barrier cream  - Collaborate with interdisciplinary team (i e  Nutrition, Rehabilitation, etc )   - Patient/family teaching   Outcome: Progressing      Problem: DISCHARGE PLANNING - CARE MANAGEMENT  Goal: Discharge to post-acute care or home with appropriate resources  INTERVENTIONS:  - Conduct assessment to determine patient/family and health care team treatment goals, and need for post-acute services based on payer coverage, community resources, and patient preferences, and barriers to discharge  - Address psychosocial, clinical, and financial barriers to discharge as identified in assessment in conjunction with the patient/family and health care team  - Arrange appropriate level of post-acute services according to patient's   needs and preference and payer coverage in collaboration with the physician and health care team  - Communicate with and update the patient/family, physician, and health care team regarding progress on the discharge plan  - Arrange appropriate transportation to post-acute venues    Outcome: Progressing      Problem: Nutrition/Hydration-ADULT  Goal: Nutrient/Hydration intake appropriate for improving, restoring or maintaining nutritional needs  Monitor and assess patient's nutrition/hydration status for malnutrition (ex- brittle hair, bruises, dry skin, pale skin and conjunctiva, muscle wasting, smooth red tongue, and disorientation)  Collaborate with interdisciplinary team and initiate plan and interventions as ordered  Monitor patient's weight and dietary intake as ordered or per policy  Utilize nutrition screening tool and intervene per policy  Determine patient's food preferences and provide high-protein, high-caloric foods as appropriate       INTERVENTIONS:  - Monitor oral intake, urinary output, labs, and treatment plans  - Assess nutrition and hydration status and recommend course of action  - Evaluate amount of meals eaten  - Assist patient with eating if necessary   - Allow adequate time for meals  - Recommend/ encourage appropriate diets, oral nutritional supplements, and vitamin/mineral supplements  - Order, calculate, and assess calorie counts as needed  - Recommend, monitor, and adjust tube feedings and TPN/PPN based on assessed needs  - Assess need for intravenous fluids  - Provide specific nutrition/hydration education as appropriate  - Include patient/family/caregiver in decisions related to nutrition   Outcome: Progressing      Problem: GENITOURINARY - ADULT  Goal: Absence of urinary retention  INTERVENTIONS:  - Assess patient's ability to void and empty bladder  - Monitor I/O  - Bladder scan as needed  - Discuss with physician/AP medications to alleviate retention as needed  - Discuss catheterization for long term situations as appropriate Outcome: Progressing    Goal: Urinary catheter remains patent  INTERVENTIONS:  - Assess patency of urinary catheter  - If patient has a chronic frias, consider changing catheter if non-functioning  - Follow guidelines for intermittent irrigation of non-functioning urinary catheter   Outcome: Progressing

## 2017-11-29 NOTE — PROGRESS NOTES
[facility] Internal Medicine Progress Note  Patient: Cesar David 81 y o  male   MRN: [id number]  PCP: Braeden Foster MD  Unit/Bed#: -55 Encounter: [id number]  Date Of Visit: 17    Assessment:    Principal Problem:    Acute renal failure (ARF) ([address] )  Active Problems:    Multiple falls    Delirium    Hypothyroidism    Depression    Acute urinary retention    Urinary incontinence    Acute uremia    Encephalopathy    Hypomagnesemia    Polyuria      Plan:    · Acute renal failure due to obstructive uropathy improving, discussed with Nephrology  · Obstructive uropathy due to bladder outlet obstruction continue Wood catheter, urology following outpatient Urology follow-up  · Encephalopathy improved  · Anemia monitor  · Depression psychiatry input noted, discussed with psychiatry  · Ambulatory dysfunction physical therapy  · Hypothyroid      VTE Pharmacologic Prophylaxis:   Pharmacologic: Heparin  Mechanical VTE Prophylaxis in Place: Yes    Patient Centered Rounds: I have performed bedside rounds with nursing staff today  Discussions with Specialists or Other Care Team Provider:  Psychiatry, nephrology    Education and Discussions with Family / Patient: pt    Time Spent for Care: 20 minutes  More than 50% of total time spent on counseling and coordination of care as described above  Current Length of Stay: 12 day(s)    Current Patient Status: Inpatient   Certification Statement: The patient will continue to require additional inpatient hospital stay due to As mentioned    Discharge Plan / Estimated Discharge Date:  Pending placement discussed with case management    Code Status: Level 1 - Full Code      Subjective:     Comfortably lying in bed  Appetite improving    Objective:     Vitals:   Temp (24hrs), Av °F (36 7 °C), Min:97 6 °F (36 4 °C), Max:98 7 °F (37 1 °C)    HR:  [84-89] 89  Resp:  [16-18] 18  BP: ()/(50-85) 120/76  SpO2:  [96 %-99 %] 98 %  Body mass index is 27 92 kg/m²  Input and Output Summary (last 24 hours): Intake/Output Summary (Last 24 hours) at 11/29/17 1403  Last data filed at 11/29/17 0954   Gross per 24 hour   Intake             1172 ml   Output             1825 ml   Net             -653 ml       Physical Exam:     Physical Exam    Comfortably lying in bed  Neck supple  Lungs diminished breath sounds at the bases no additional sounds  Heart sounds no murmurs appreciable  Abdomen soft nontender  Pulses present  No pedal edema  No rash  Awake obeys simple commands        Additional Data:     Labs:      Results from last 7 days  Lab Units 11/23/17  0627   WBC Thousand/uL 6 68   HEMOGLOBIN g/dL 9 5*   HEMATOCRIT % 29 8*   PLATELETS Thousands/uL 190       Results from last 7 days  Lab Units 11/29/17  0607   SODIUM mmol/L 139   POTASSIUM mmol/L 4 5   CHLORIDE mmol/L 103   CO2 mmol/L 29   BUN mg/dL 65*   CREATININE mg/dL 3 21*   CALCIUM mg/dL 8 6   GLUCOSE RANDOM mg/dL 104           * I Have Reviewed All Lab Data Listed Above  * Additional Pertinent Lab Tests Reviewed: All Labs Within Last 24 Hours Reviewed    Imaging:    Imaging Reports Reviewed Today Include:   Imaging Personally Reviewed by Myself Includes:     Recent Cultures (last 7 days):           Last 24 Hours Medication List:     divalproex sodium 250 mg Oral Daily   docusate sodium 100 mg Oral BID   DULoxetine 30 mg Oral Daily   DULoxetine 60 mg Oral Daily   heparin (porcine) 5,000 Units Subcutaneous Q8H [address]   levothyroxine 75 mcg Oral Daily   magnesium oxide 400 mg Oral Daily   melatonin 6 mg Oral HS   mirtazapine 15 mg Oral HS   sodium chloride 1,000 mL Intravenous Once   tamsulosin 0 4 mg Oral Daily With Dinner        Today, Patient Was Seen By: Leticia Sexton MD    ** Please Note: This note has been constructed using a voice recognition system   **

## 2017-11-29 NOTE — PLAN OF CARE
Problem: DISCHARGE PLANNING - CARE MANAGEMENT  Goal: Discharge to post-acute care or home with appropriate resources  INTERVENTIONS:  - Conduct assessment to determine patient/family and health care team treatment goals, and need for post-acute services based on payer coverage, community resources, and patient preferences, and barriers to discharge  - Address psychosocial, clinical, and financial barriers to discharge as identified in assessment in conjunction with the patient/family and health care team  - Arrange appropriate level of post-acute services according to patient's   needs and preference and payer coverage in collaboration with the physician and health care team  - Communicate with and update the patient/family, physician, and health care team regarding progress on the discharge plan  - Arrange appropriate transportation to post-acute venues    Outcome: Progressing  Psych eval completed and recommend treatment is a surrogate decision maker should be found at this time to help him with decision making  Pt may also benefit from inpt psych before going to [facility]  Rec a call from the [facility] requesting if pt has capacity and copy of the eval  A message with left about the recommendation and eval was fax to [phone] today  Will follow

## 2017-11-30 RX ORDER — RISPERIDONE 0.5 MG/1
0.5 TABLET, ORALLY DISINTEGRATING ORAL EVERY 8 HOURS PRN
Status: CANCELLED | OUTPATIENT
Start: 2017-11-30

## 2017-11-30 RX ORDER — HYDROXYZINE HYDROCHLORIDE 25 MG/1
25 TABLET, FILM COATED ORAL EVERY 4 HOURS PRN
Status: CANCELLED | OUTPATIENT
Start: 2017-11-30

## 2017-11-30 RX ORDER — HALOPERIDOL 1 MG/1
1 TABLET ORAL EVERY 8 HOURS PRN
Status: CANCELLED | OUTPATIENT
Start: 2017-11-30

## 2017-11-30 RX ORDER — ACETAMINOPHEN 325 MG/1
650 TABLET ORAL EVERY 6 HOURS PRN
Status: CANCELLED | OUTPATIENT
Start: 2017-11-30

## 2017-11-30 RX ADMIN — HEPARIN SODIUM 5000 UNITS: 5000 INJECTION, SOLUTION INTRAVENOUS; SUBCUTANEOUS at 13:55

## 2017-11-30 RX ADMIN — MIRTAZAPINE 15 MG: 15 TABLET, FILM COATED ORAL at 22:02

## 2017-11-30 RX ADMIN — HEPARIN SODIUM 5000 UNITS: 5000 INJECTION, SOLUTION INTRAVENOUS; SUBCUTANEOUS at 22:02

## 2017-11-30 RX ADMIN — MELATONIN 6 MG: 3 TAB ORAL at 22:02

## 2017-11-30 RX ADMIN — DOCUSATE SODIUM 100 MG: 100 CAPSULE, LIQUID FILLED ORAL at 09:59

## 2017-11-30 RX ADMIN — DULOXETINE HYDROCHLORIDE 30 MG: 30 CAPSULE, DELAYED RELEASE ORAL at 09:59

## 2017-11-30 RX ADMIN — HEPARIN SODIUM 5000 UNITS: 5000 INJECTION, SOLUTION INTRAVENOUS; SUBCUTANEOUS at 07:07

## 2017-11-30 RX ADMIN — LEVOTHYROXINE SODIUM 75 MCG: 75 TABLET ORAL at 07:07

## 2017-11-30 RX ADMIN — DOCUSATE SODIUM 100 MG: 100 CAPSULE, LIQUID FILLED ORAL at 18:16

## 2017-11-30 RX ADMIN — DIVALPROEX SODIUM 250 MG: 250 TABLET, FILM COATED, EXTENDED RELEASE ORAL at 22:02

## 2017-11-30 RX ADMIN — DULOXETINE HYDROCHLORIDE 60 MG: 60 CAPSULE, DELAYED RELEASE ORAL at 09:59

## 2017-11-30 RX ADMIN — TAMSULOSIN HYDROCHLORIDE 0.4 MG: 0.4 CAPSULE ORAL at 16:11

## 2017-11-30 RX ADMIN — MAGNESIUM OXIDE TAB 400 MG (241.3 MG ELEMENTAL MG) 400 MG: 400 (241.3 MG) TAB at 09:58

## 2017-11-30 NOTE — PROGRESS NOTES
[address] [facility] Internal Medicine Progress Note  Patient: Lianne Chacon 81 y o  male   MRN: [id number]  PCP: Yvette Bliss MD  Unit/Bed#: -22 Encounter: [id number]  Date Of Visit: 17    Assessment:    Principal Problem:    Acute renal failure (ARF) ([address] )  Active Problems:    Multiple falls    Delirium    Hypothyroidism    Depression    Acute urinary retention    Urinary incontinence    Acute uremia    Encephalopathy    Hypomagnesemia    Polyuria      Plan:    · Acute renal failure due to obstructive uropathy  · Obstructive uropathy due to bladder outlet obstruction continue Wood catheter outpatient Urology follow-up  · Encephalopathy resolved  · Depression psychiatry following  · Anemia monitor hemoglobin  · Ambulatory dysfunction  · Hypothyroid      VTE Pharmacologic Prophylaxis:   Pharmacologic: Heparin  Mechanical VTE Prophylaxis in Place: Yes    Patient Centered Rounds: I have performed bedside rounds with nursing staff today  Discussions with Specialists or Other Care Team Provider:     Education and Discussions with Family / Patient: pt    Time Spent for Care: 20 minutes  More than 50% of total time spent on counseling and coordination of care as described above  Current Length of Stay: 13 day(s)    Current Patient Status: Inpatient   Certification Statement: The patient will continue to require additional inpatient hospital stay due to As mentioned    Discharge Plan / Estimated Discharge Date:  Pending placement discussed with     Code Status: Level 1 - Full Code      Subjective:     Comfortably lying in bed    Objective:     Vitals:   Temp (24hrs), Av 9 °F (36 6 °C), Min:97 5 °F (36 4 °C), Max:98 3 °F (36 8 °C)    HR:  [85-90] 89  Resp:  [18] 18  BP: (105-119)/(65-75) 105/66  SpO2:  [96 %-99 %] 99 %  Body mass index is 27 89 kg/m²  Input and Output Summary (last 24 hours):        Intake/Output Summary (Last 24 hours) at 17 1509  Last data filed at 17 1330 Gross per 24 hour   Intake             1220 ml   Output             1775 ml   Net             -555 ml       Physical Exam:     Physical Exam    Comfortably lying in bed  Neck supple  Lungs clear to auscultation diminished at the bases  Heart sounds no murmurs appreciable  Abdomen soft nontender  Pulses present  Awake alert obeys simple commands  No rash        Additional Data:     Labs:          Results from last 7 days  Lab Units 11/29/17  0607   SODIUM mmol/L 139   POTASSIUM mmol/L 4 5   CHLORIDE mmol/L 103   CO2 mmol/L 29   BUN mg/dL 65*   CREATININE mg/dL 3 21*   CALCIUM mg/dL 8 6   GLUCOSE RANDOM mg/dL 104           * I Have Reviewed All Lab Data Listed Above  * Additional Pertinent Lab Tests Reviewed: No New Labs Available For Today    Imaging:    Imaging Reports Reviewed Today Include:   Imaging Personally Reviewed by Myself Includes:     Recent Cultures (last 7 days):           Last 24 Hours Medication List:     divalproex sodium 250 mg Oral Daily   docusate sodium 100 mg Oral BID   DULoxetine 30 mg Oral Daily   DULoxetine 60 mg Oral Daily   heparin (porcine) 5,000 Units Subcutaneous Q8H [address]   levothyroxine 75 mcg Oral Daily   magnesium oxide 400 mg Oral Daily   melatonin 6 mg Oral HS   mirtazapine 15 mg Oral HS   tamsulosin 0 4 mg Oral Daily With Dinner        Today, Patient Was Seen By: Ximena Schmid MD    ** Please Note: This note has been constructed using a voice recognition system   **

## 2017-11-30 NOTE — SOCIAL WORK
Patient has been accepted by Bonny Brooks at [facility]  [facility] will wait for discharge time frame and patient will transfer [facility] to [facility]  CCM will setup transport time after CCM is informed of the time patient can be admitted   CCM will update treatment team

## 2017-11-30 NOTE — PHYSICAL THERAPY NOTE
Physical Therapy Cancellation Note      Spoke c nurse chela  Ok if pt will work c PT  Spoke c pt resting on his r side in bed  Pt not answering at first  I asked identification of pt and date of birth as I check name tag he stated   "I don't want to do nothing  I want to be out of this world  "denied pain or wanting to work PT c mobility to chair for mealtime  cx PT

## 2017-11-30 NOTE — PLAN OF CARE
Problem: DISCHARGE PLANNING - CARE MANAGEMENT  Goal: Discharge to post-acute care or home with appropriate resources  INTERVENTIONS:  - Conduct assessment to determine patient/family and health care team treatment goals, and need for post-acute services based on payer coverage, community resources, and patient preferences, and barriers to discharge  - Address psychosocial, clinical, and financial barriers to discharge as identified in assessment in conjunction with the patient/family and health care team  - Arrange appropriate level of post-acute services according to patient's   needs and preference and payer coverage in collaboration with the physician and health care team  - Communicate with and update the patient/family, physician, and health care team regarding progress on the discharge plan  - Arrange appropriate transportation to post-acute venues    Outcome: Progressing  CCM met with patient at bedside to discuss 302 process and patient was unable to understand rights  302 was completed and will be sent to [facility] for review  CCM will continue to follow pt and will work on getting patient into [facility] placement  Patient does not require auth process due to Medicare  Patient has been optioned by [facility] but change in care has led patient to require inpatient mental health treatment  At this time, 302 completed [facility] left detailed message to Karinjennifer from [facility] to inform her of the change in treatment plan  [facility] will notify family of information and will continue to follow pt until discharge  CCm will wait for return call from [facility] regarding opening beds  Comments: CCM met with patient at bedside to discuss 302 process and patient was unable to understand rights  302 was completed and will be sent to [facility] for review  CCM will continue to follow pt and will work on getting patient into [facility] placement  Patient does not require auth process due to Medicare   Patient has been optioned by [facility] but change in care has led patient to require inpatient mental health treatment  At this time, 302 completed which will be sent to [facility] left detailed message to Ishmael from [facility] to inform her of the change in treatment plan  [facility] will notify family of information and will continue to follow pt until discharge  [facility] will wait for return call from [facility] regarding opening beds

## 2017-11-30 NOTE — SOCIAL WORK
CCM met attempted to make contact with rudy however, phone continued to ring and CCm was unable to leave message  CCM will try later today

## 2017-11-30 NOTE — SOCIAL WORK
[facility] met with patient at bedside to discuss 302 process and patient was unable to understand rights  302 was completed and will be sent to [facility] for review  [facility] will continue to follow pt and will work on getting patient into [facility] placement  Patient does not require auth process due to Medicare  Patient has been optioned by [facility] but change in care has led patient to require inpatient mental health treatment  At this time, 302 completed which will be sent to [facility] left detailed message to Clydeelen from [facility] to inform her of the change in treatment plan  [facility] will notify family of information and will continue to follow pt until discharge  CCm will wait for return call from [facility] regarding opening beds

## 2017-11-30 NOTE — PLAN OF CARE
DISCHARGE PLANNING - CARE MANAGEMENT     Discharge to post-acute care or home with appropriate resources Progressing        GENITOURINARY - ADULT     Absence of urinary retention Progressing     Urinary catheter remains patent Progressing        Nutrition/Hydration-ADULT     Nutrient/Hydration intake appropriate for improving, restoring or maintaining nutritional needs Progressing        Potential for Falls     Patient will remain free of falls Progressing        Prexisting or High Potential for Compromised Skin Integrity     Skin integrity is maintained or improved Progressing

## 2017-12-01 ENCOUNTER — HOSPITAL ENCOUNTER (INPATIENT)
Facility: HOSPITAL | Age: 72
LOS: 10 days | Discharge: NURSING, REHAB OR LONG-TERM CARE | DRG: 885 | End: 2017-12-11
Attending: PSYCHIATRY & NEUROLOGY | Admitting: PSYCHIATRY & NEUROLOGY
Payer: MEDICARE

## 2017-12-01 VITALS
DIASTOLIC BLOOD PRESSURE: 71 MMHG | OXYGEN SATURATION: 99 % | SYSTOLIC BLOOD PRESSURE: 122 MMHG | HEART RATE: 82 BPM | RESPIRATION RATE: 18 BRPM | TEMPERATURE: 98 F | BODY MASS INDEX: 27.89 KG/M2 | WEIGHT: 199.96 LBS

## 2017-12-01 DIAGNOSIS — S06.9X9S MOOD DISORDER AS LATE EFFECT OF TRAUMATIC BRAIN INJURY (HCC): Chronic | ICD-10-CM

## 2017-12-01 DIAGNOSIS — F06.30 MOOD DISORDER AS LATE EFFECT OF TRAUMATIC BRAIN INJURY (HCC): Chronic | ICD-10-CM

## 2017-12-01 DIAGNOSIS — G40.909 SEIZURE DISORDER (HCC): Primary | ICD-10-CM

## 2017-12-01 PROBLEM — G93.40 ENCEPHALOPATHY: Status: RESOLVED | Noted: 2017-11-20 | Resolved: 2017-12-01

## 2017-12-01 PROBLEM — N19 ACUTE UREMIA: Status: RESOLVED | Noted: 2017-11-17 | Resolved: 2017-12-01

## 2017-12-01 PROBLEM — E83.42 HYPOMAGNESEMIA: Status: RESOLVED | Noted: 2017-11-21 | Resolved: 2017-12-01

## 2017-12-01 RX ORDER — MIRTAZAPINE 15 MG/1
15 TABLET, FILM COATED ORAL
Refills: 0
Start: 2017-12-01 | End: 2017-12-11 | Stop reason: HOSPADM

## 2017-12-01 RX ORDER — TAMSULOSIN HYDROCHLORIDE 0.4 MG/1
0.4 CAPSULE ORAL
Refills: 0
Start: 2017-12-01 | End: 2018-01-24

## 2017-12-01 RX ORDER — MAGNESIUM HYDROXIDE/ALUMINUM HYDROXICE/SIMETHICONE 120; 1200; 1200 MG/30ML; MG/30ML; MG/30ML
30 SUSPENSION ORAL EVERY 4 HOURS PRN
Status: DISCONTINUED | OUTPATIENT
Start: 2017-12-01 | End: 2017-12-11 | Stop reason: HOSPADM

## 2017-12-01 RX ORDER — HALOPERIDOL 5 MG/ML
2 INJECTION INTRAMUSCULAR EVERY 6 HOURS PRN
Status: DISCONTINUED | OUTPATIENT
Start: 2017-12-01 | End: 2017-12-11 | Stop reason: HOSPADM

## 2017-12-01 RX ORDER — BENZTROPINE MESYLATE 1 MG/1
1 TABLET ORAL EVERY 6 HOURS PRN
Status: DISCONTINUED | OUTPATIENT
Start: 2017-12-01 | End: 2017-12-11 | Stop reason: HOSPADM

## 2017-12-01 RX ORDER — LEVOTHYROXINE SODIUM 0.07 MG/1
75 TABLET ORAL DAILY
Status: DISCONTINUED | OUTPATIENT
Start: 2017-12-02 | End: 2017-12-11 | Stop reason: HOSPADM

## 2017-12-01 RX ORDER — TAMSULOSIN HYDROCHLORIDE 0.4 MG/1
0.4 CAPSULE ORAL
Status: DISCONTINUED | OUTPATIENT
Start: 2017-12-01 | End: 2017-12-11 | Stop reason: HOSPADM

## 2017-12-01 RX ORDER — HALOPERIDOL 2 MG/1
2 TABLET ORAL EVERY 6 HOURS PRN
Status: DISCONTINUED | OUTPATIENT
Start: 2017-12-01 | End: 2017-12-11 | Stop reason: HOSPADM

## 2017-12-01 RX ORDER — BENZTROPINE MESYLATE 1 MG/ML
1 INJECTION INTRAMUSCULAR; INTRAVENOUS EVERY 8 HOURS PRN
Status: DISCONTINUED | OUTPATIENT
Start: 2017-12-01 | End: 2017-12-11 | Stop reason: HOSPADM

## 2017-12-01 RX ORDER — LEVOTHYROXINE SODIUM 0.07 MG/1
75 TABLET ORAL DAILY
Refills: 0
Start: 2017-12-02 | End: 2018-01-24

## 2017-12-01 RX ADMIN — DOCUSATE SODIUM 100 MG: 100 CAPSULE, LIQUID FILLED ORAL at 09:12

## 2017-12-01 RX ADMIN — HEPARIN SODIUM 5000 UNITS: 5000 INJECTION, SOLUTION INTRAVENOUS; SUBCUTANEOUS at 05:37

## 2017-12-01 RX ADMIN — TAMSULOSIN HYDROCHLORIDE 0.4 MG: 0.4 CAPSULE ORAL at 18:49

## 2017-12-01 RX ADMIN — DULOXETINE HYDROCHLORIDE 30 MG: 30 CAPSULE, DELAYED RELEASE ORAL at 09:12

## 2017-12-01 RX ADMIN — HEPARIN SODIUM 5000 UNITS: 5000 INJECTION, SOLUTION INTRAVENOUS; SUBCUTANEOUS at 13:08

## 2017-12-01 RX ADMIN — MAGNESIUM OXIDE TAB 400 MG (241.3 MG ELEMENTAL MG) 400 MG: 400 (241.3 MG) TAB at 09:12

## 2017-12-01 RX ADMIN — DULOXETINE HYDROCHLORIDE 60 MG: 60 CAPSULE, DELAYED RELEASE ORAL at 09:12

## 2017-12-01 RX ADMIN — LEVOTHYROXINE SODIUM 75 MCG: 75 TABLET ORAL at 05:37

## 2017-12-01 NOTE — PROGRESS NOTES
82 y/o male arrived on the unit from [facility] [address]RER[address] on a 302 ( on )  Admits with increased depression  Psy Dx: depression  Pt lives in [place] alone in a 2 story house, was independent with ADL and drove car  Pt was admitted to [facility] 87 2° ARF and 2-week urinary retention on   Pts sister-in-law Prasanna Vásquez reported pt often gets very depressed at this time of year  Pt calm and cooperative upon arrival  Alert and oriented to person, place and time, not situation, with delayed responses and mild fogetfulness  Poor historian  Denies SI/HI/VH/AH/anxiety  Reported he was here because "I have suffered from depression for 37 yrs " Reported increased depression after a head injury 38yrs ago, but unable to tell more details  Have family support from brother and sisiter-in-law, never , no kids  When asking about whether he followed with any psychiatrist, pt answered:"I don't know " Reported recent poor appetite and sleep, but pt unable to tell any weight lost or how many hrs sleep daily  Denies alcohol/drug use  Former smoker  Wood catheter intact, 16Fr  Walked with walker occasionally  PMH: Acute renal failure, multiple falls, delirium, scalp laceration, multiple bruises, hypothyroidism, HTN, acute urinary retention, h/o C-diff, urinary incontinence, TBI, anemia, insomnia, and polyuria  Will continue to monitor

## 2017-12-01 NOTE — DISCHARGE SUMMARY
Discharge Summary - [address] Internal Medicine    Patient Information: Erika Llanes 81 y o  male MRN: [id number]  Unit/Bed#: -01 Encounter: [id number]    Discharging Physician / Practitioner: Carlos Balderas MD  PCP: Camacho Suarez MD  Admission Date: 11/17/2017  Discharge Date: 12/01/17    Reason for Admission:  Urinary hesitancy retention    Discharge Diagnoses:     Principal Problem:    Acute renal failure (ARF) ([address] )  Active Problems:    Multiple falls    Hypothyroidism    Depression    Acute urinary retention    Urinary incontinence    Polyuria  Resolved Problems:    Delirium    Acute uremia    Encephalopathy    Hypomagnesemia      Consultations During Hospital Stay:  · Urology  · Nephrology  · Psychiatry  · Neuropsychology    Procedures Performed:     · Retroperitoneal ultrasound moderate right-sided hydronephrosis and moderate to severe left-sided hydronephrosis, thickened urinary bladder  · Ultrasound retroperitoneum resolution of bilateral hydronephrosis, diffuse generalized bladder wall thickening      Hospital Course: Erika Llanes is a 81 y o  male patient who originally presented to the hospital on 11/17/2017 due to urinary hesitancy and retention  Patient was noted to have acute kidney injury with metabolic acidosis on admission his creatinine was noted to be 12 47 and   He was provided with bicarbonate GTT, IV fluids and a Wood catheter was placed  His workup revealed obstructive uropathy due to bladder outlet obstruction, he was evaluated by Nephrology and Urology  His kidney function is slowly improving he continues to have Wood catheter in place, this will be continued at discharge and is requested to follow up with Urology for outpatient cystoscopy and further evaluation  Patient has history of depression, he was evaluated by Psychiatry and is being discharged to [facility]    Kindly review the chart for details patient had lengthy stay in the hospital    Condition at Discharge: fair     Discharge Day Visit / Exam:     Subjective:      Comfortably lying in bed      Vitals: Blood Pressure: 122/71 (12/01/17 0719)  Pulse: 82 (12/01/17 0719)  Temperature: 98 °F (36 7 °C) (12/01/17 0719)  Temp Source: Oral (12/01/17 0719)  Respirations: 18 (12/01/17 0719)  Weight - Scale: 90 7 kg (199 lb 15 3 oz) (11/30/17 0600)  SpO2: 99 % (12/01/17 0719)  Exam:   Physical Exam     Comfortably lying in bed  Neck supple  Lungs diminished breath sounds at the bases  Heart sounds no murmurs appreciable regular  Abdomen soft nontender  Pulses present  No pedal edema  Awake stay, obeys simple  No rash        Discharge instructions/Information to patient and family:   See after visit summary for information provided to patient and family  Provisions for Follow-Up Care:  See after visit summary for information related to follow-up care and any pertinent home health orders  Disposition:     Inpatient Psychiatry at [facility] 5    For Discharges to [facility] SNF:   · Not Applicable to this Patient - Not Applicable to this Patient    Planned Readmission: no     Discharge Statement:  I spent 55 minutes discharging the patient  This time was spent on the day of discharge  I had direct contact with the patient on the day of discharge  Greater than 50% of the total time was spent examining patient, answering all patient questions, arranging and discussing plan of care with patient as well as directly providing post-discharge instructions  Additional time then spent on discharge activities  Discharge Medications:  See after visit summary for reconciled discharge medications provided to patient and family        ** Please Note: This note has been constructed using a voice recognition system **

## 2017-12-01 NOTE — PLAN OF CARE
Problem: DISCHARGE PLANNING - CARE MANAGEMENT  Goal: Discharge to post-acute care or home with appropriate resources  INTERVENTIONS:  - Conduct assessment to determine patient/family and health care team treatment goals, and need for post-acute services based on payer coverage, community resources, and patient preferences, and barriers to discharge  - Address psychosocial, clinical, and financial barriers to discharge as identified in assessment in conjunction with the patient/family and health care team  - Arrange appropriate level of post-acute services according to patient's   needs and preference and payer coverage in collaboration with the physician and health care team  - Communicate with and update the patient/family, physician, and health care team regarding progress on the discharge plan  - Arrange appropriate transportation to post-acute venues    Outcome: Progressing  Message left to [facility] for inpatient [facility] psych placement  CCM will work on discharging patient today pending bed opening

## 2017-12-01 NOTE — SOCIAL WORK
[facility] [place] [facility]  CCM will work on discharging patient today pending bed opening

## 2017-12-01 NOTE — PLAN OF CARE
Problem: DISCHARGE PLANNING - CARE MANAGEMENT  Goal: Discharge to post-acute care or home with appropriate resources  INTERVENTIONS:  - Conduct assessment to determine patient/family and health care team treatment goals, and need for post-acute services based on payer coverage, community resources, and patient preferences, and barriers to discharge  - Address psychosocial, clinical, and financial barriers to discharge as identified in assessment in conjunction with the patient/family and health care team  - Arrange appropriate level of post-acute services according to patient's   needs and preference and payer coverage in collaboration with the physician and health care team  - Communicate with and update the patient/family, physician, and health care team regarding progress on the discharge plan  - Arrange appropriate transportation to post-acute venues    Outcome: Progressing  Patient still being reviewed at [facility]  Patient is on [id number] and patient has tentively been setup for transport from [facility] to [facility] at 4pm via [facility]  Patient will mopst likely need SNF placement at [facility] after discharge  Patient was optioned and information has been placed in [facility] under fax attach  Patient determination was also scanned into medical records  Once patient is clear contact with [facility] will need to take place and discharge will need to take place with [facility] regarding their ability to accept patient  CCM will continue to follow pt and informed treatment team once patient is accept  Medical necessity form will be place in the sticker chart with the patient facesheet  CCM will continue to follow pt until discharge

## 2017-12-01 NOTE — SOCIAL WORK
Patient still being reviewed at [facility]  Patient is on 36 and patient has tentively been setup for transport from [facility] to [facility] at 4pm via [facility]  Patient will mopst likely need SNF placement at [place] after discharge  Patient was optioned and information has been placed in [facility] under fax attach  Patient determination was also scanned into medical records  Once patient is clear contact with [facility] will need to take place and discharge will need to [facility] regarding their ability to accept patient  CCM will continue to follow pt and informed treatment team once patient is accept  Medical necessity form will be place in the sticker chart with the patient facesheet  CCM will continue to follow pt until discharge

## 2017-12-02 PROBLEM — S06.9XAS MOOD DISORDER AS LATE EFFECT OF TRAUMATIC BRAIN INJURY (HCC): Chronic | Status: ACTIVE | Noted: 2017-12-02

## 2017-12-02 PROBLEM — F32.89 DEPRESSIVE DISORDER, ATYPICAL: Chronic | Status: ACTIVE | Noted: 2017-12-02

## 2017-12-02 PROBLEM — F06.30 MOOD DISORDER AS LATE EFFECT OF TRAUMATIC BRAIN INJURY (HCC): Chronic | Status: ACTIVE | Noted: 2017-12-02

## 2017-12-02 PROBLEM — S06.9X9S MOOD DISORDER AS LATE EFFECT OF TRAUMATIC BRAIN INJURY (HCC): Chronic | Status: ACTIVE | Noted: 2017-12-02

## 2017-12-02 RX ORDER — DULOXETIN HYDROCHLORIDE 30 MG/1
30 CAPSULE, DELAYED RELEASE ORAL DAILY
Status: DISCONTINUED | OUTPATIENT
Start: 2017-12-02 | End: 2017-12-07

## 2017-12-02 RX ORDER — MIRTAZAPINE 15 MG/1
15 TABLET, FILM COATED ORAL
Status: DISCONTINUED | OUTPATIENT
Start: 2017-12-02 | End: 2017-12-10

## 2017-12-02 RX ORDER — DIVALPROEX SODIUM 500 MG/1
500 TABLET, EXTENDED RELEASE ORAL 2 TIMES DAILY
Status: DISCONTINUED | OUTPATIENT
Start: 2017-12-02 | End: 2017-12-09

## 2017-12-02 RX ORDER — TEMAZEPAM 15 MG/1
15 CAPSULE ORAL
Status: DISCONTINUED | OUTPATIENT
Start: 2017-12-02 | End: 2017-12-11 | Stop reason: HOSPADM

## 2017-12-02 RX ORDER — DULOXETIN HYDROCHLORIDE 60 MG/1
60 CAPSULE, DELAYED RELEASE ORAL DAILY
Status: DISCONTINUED | OUTPATIENT
Start: 2017-12-02 | End: 2017-12-02

## 2017-12-02 RX ADMIN — DIVALPROEX SODIUM 500 MG: 500 TABLET, FILM COATED, EXTENDED RELEASE ORAL at 17:17

## 2017-12-02 RX ADMIN — Medication 400 MG: at 08:35

## 2017-12-02 RX ADMIN — TEMAZEPAM 15 MG: 15 CAPSULE ORAL at 21:15

## 2017-12-02 RX ADMIN — DIVALPROEX SODIUM 750 MG: 500 TABLET, FILM COATED, EXTENDED RELEASE ORAL at 14:10

## 2017-12-02 RX ADMIN — DULOXETINE HYDROCHLORIDE 30 MG: 30 CAPSULE, DELAYED RELEASE ORAL at 14:10

## 2017-12-02 RX ADMIN — TAMSULOSIN HYDROCHLORIDE 0.4 MG: 0.4 CAPSULE ORAL at 17:14

## 2017-12-02 RX ADMIN — LEVOTHYROXINE SODIUM 75 MCG: 75 TABLET ORAL at 08:35

## 2017-12-02 RX ADMIN — MIRTAZAPINE 15 MG: 15 TABLET, FILM COATED ORAL at 21:14

## 2017-12-02 NOTE — PROGRESS NOTES
Pt did not attend group tonight  Was in room most of evening  Urinary catheter draining without issue  Pt upset that no sleeping meds available for him on this unit  Will monitor progress

## 2017-12-02 NOTE — H&P
Psychiatric Evaluation - Behavioral Health     Identification Data:William Stark Opitz 81 y o  male MRN: [id number]  Unit/Bed#: WQ4 565-01 Encounter: [id number]    Chief Complaint:  Depressive symptoms    History of present illness:    Patient is a 81years old  male who is admitted on a 302 because of depressive symptoms  He presented to [facility] with dysuria and after proper treatment was noted to be depressed and Psychiatric consultation was requested  The patient indicated that he was sick and tired of dealing with doctors and that he had had enough of life  However he denies suicidal ideation when interviewed by myself  He reports that his sleep and appetite have been variable  He is not interested in the interview process and does not provide much information  Apparently he has had traumatic brain injury and has seizure disorder      Psychiatric Review Of Systems:  Change in sleep: yes  appetite changes: yes  weight changes:  Unknown  Change in energy/anergy: yes  Change in interest/pleasure/anhedonia: yes  somatic symptoms: yes  anxiety/panic: no  sharif: no  guilty/hopeless: no  self injurious behavior/risky behavior: no    Historical Information     Past Psychiatric History:   None is reported    Substance Abuse History:  Insufficient data    Family Psychiatric History:   Unknown    Social History:  Developmental:  Insufficient data  Education:  No information available  Marital history: single  Living arrangement, social support: lives alone  Occupational History: unknown occupation the patient indicates that he is a [employer]   Access to firearms:Denies    Traumatic History:   Abuse:none is reported  Other Traumatic Events: none    Past Medical History:   Diagnosis Date    Disease of thyroid gland     H/O Clostridium difficile infection     5/16    Hypertension     Psychiatric disorder     Seizures ([address] )        Medical Review Of Systems:  Review of systems not obtained due to patient factors  Meds/Allergies   all current active meds have been reviewed  No Known Allergies  Objective   Vital signs in last 24 hours:  Temp:  [98 1 °F (36 7 °C)-98 2 °F (36 8 °C)] 98 2 °F (36 8 °C)  HR:  [84-86] 86  Resp:  [16-18] 18  BP: (121-122)/(56-72) 122/56      Intake/Output Summary (Last 24 hours) at 12/02/17 1355  Last data filed at 12/01/17 2206   Gross per 24 hour   Intake                0 ml   Output              400 ml   Net             -400 ml       Mental Status Evaluation:  Appearance:  {Poor eye contact and disheveled   Behavior:  uncooperative   Speech:   Language Sparse  No overt abnormality   Mood:  angry and irritable   Affect: Thought process constricted  Poverty of thoughts   Associations: intact associations   Thought Content:  Paranoid and mistrustful   Perceptual Disturbances: Denies hallucinations and does not appear to be responding to internal stimuli   Risk Potential: No suicidal or homicidal ideation   Orientation  Oriented to self only   Memory Unable to assess due to patient factors   Attention/Concentration attention span appeared shorter than expected for age   Fund of knowledge Poor   Insight:  No insight   Judgment: Poor judgment   Gait/Station: Not observed   Motor Activity: No abnormal movement noted         Lab Results: I have personally reviewed pertinent lab results  Imaging Studies:   MRI of brain 2016  Moderate atrophy  Moderate chronic microangiopathic disease    Encephalomalacia and gliosis within the anterior medial frontal lobes bilaterally and to a lesser degree within the right posterior lateral temporal lobe suggesting old infarctions    No evidence of acute ischemia, mass or hemorrhage    EKG, Pathology, and Other Studies:   Ventricular Rate BPM 77  87      Atrial Rate BPM 77  87     TX Interval ms 144  136     QRSD Interval ms 136  170     QT Interval ms 412  410     QTC Interval ms 466  493     P Axis degrees 63  56     QRS Axis degrees -66  -79     T Wave Axis degrees 113  32    Narrative      Age and gender specific ECG analysis   Normal sinus rhythm  Right bundle branch block  Left anterior fascicular block  Bifascicular block   Septal infarct (cited on or before 17-NOV-2017)          Code Status: Prior  Advance Directive and Living Will:      Power of :    POLST:      Patient Strengths/Assets: To be determined    Patient Barriers/Limitations: difficulty adapting, financial instability, impaired cognition, lack of financial means, limited support system    Assessment/Plan     Principal Problem:    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

## 2017-12-02 NOTE — PLAN OF CARE
Depression     Attend and participate in unit activities, including therapeutic, recreational, and educational groups Not Progressing     Complete daily ADLs, including personal hygiene independently, as able Not Progressing          Depression     Treatment Goal: Demonstrate behavioral control of depressive symptoms, verbalize feelings of improved mood/affect, and adopt new coping skills prior to discharge Progressing     Verbalize thoughts and feelings Progressing     Refrain from harming self Progressing     Refrain from [address] from self-neglect Progressing        Nutrition/Hydration-ADULT     Nutrient/Hydration intake appropriate for improving, restoring or maintaining nutritional needs Progressing        Prexisting or High Potential for Compromised Skin Integrity     Skin integrity is maintained or improved Progressing

## 2017-12-02 NOTE — CMS CERTIFICATION NOTE
Certification: Based upon physical, mental and social evaluations, I certify that inpatient psychiatric services are medically necessary for this patient for a duration of 7 midnights for the treatment of depression  Available alternative community resources do not meet the patient's mental health care needs  I further attest that an established written individualized plan of care has been implemented and is outlined in the patient's medical records

## 2017-12-02 NOTE — PROGRESS NOTES
Pt alert and oriented to person and date, not place and situation  Denies SI/HI/AH/VH  Reports increased depression d/t poor sleep last night, no PRN sleeping meds currently  Pt is resting in bed and refused breakfast now  Wood intact  Will continue to monitor

## 2017-12-02 NOTE — PROGRESS NOTES
Pt was out of room in yina chair for lunch and dinner, fair appetite  Limited interaction with peers  Will continue to monitor

## 2017-12-02 NOTE — TREATMENT PLAN
TREATMENT PLAN REVIEW - [address] Al[address] Slaughter 81 y o  1945 male MRN: [id number]    [place] Room / Bed: [place] [address] 565-01 Encounter: [id number]          Admit Date/Time:  12/1/2017  5:35 PM    Treatment Team: Attending Provider: Duy Llanes MD; Registered Nurse: Hernando Dickerson RN; Patient Care Technician: Mono Lyons    Diagnosis: Principal Problem:    Depressive disorder, atypical      Patient Strengths/Assets: cooperative    Patient Barriers/Limitations: difficulty adapting, financial instability, lack of social/family support    Short Term Goals: decrease in depressive symptoms, decrease in anxiety symptoms    Long Term Goals: improvement in depression, improvement in anxiety    Progress Towards Goals:  Starting pharmacotherapy  Recommended Treatment: medication management, patient medication education, group therapy, milieu therapy, continued Behavioral Health psychiatric evaluation/assessment process    Treatment Frequency: daily medication monitoring, group and milieu therapy daily, monitoring through interdisciplinary rounds, monitoring through weekly patient care conferences    Expected Discharge Date:  7    Discharge Plan: return to previous living arrangement    Treatment Plan Created/Updated By: Duy Llanes MD

## 2017-12-03 RX ADMIN — DIVALPROEX SODIUM 500 MG: 500 TABLET, FILM COATED, EXTENDED RELEASE ORAL at 08:30

## 2017-12-03 RX ADMIN — MIRTAZAPINE 15 MG: 15 TABLET, FILM COATED ORAL at 21:05

## 2017-12-03 RX ADMIN — LEVOTHYROXINE SODIUM 75 MCG: 75 TABLET ORAL at 08:30

## 2017-12-03 RX ADMIN — DIVALPROEX SODIUM 500 MG: 500 TABLET, FILM COATED, EXTENDED RELEASE ORAL at 17:14

## 2017-12-03 RX ADMIN — Medication 400 MG: at 08:30

## 2017-12-03 RX ADMIN — TEMAZEPAM 15 MG: 15 CAPSULE ORAL at 21:05

## 2017-12-03 RX ADMIN — TAMSULOSIN HYDROCHLORIDE 0.4 MG: 0.4 CAPSULE ORAL at 17:14

## 2017-12-03 RX ADMIN — DULOXETINE HYDROCHLORIDE 30 MG: 30 CAPSULE, DELAYED RELEASE ORAL at 08:30

## 2017-12-03 NOTE — PROGRESS NOTES
Pt remained in his room during group  Denies SI/HI  Reports that he needs to get a good night's rest  Pt medicated with Restoril PRN  Pt calm and pleasant, frias patent and draining clear yellow urine

## 2017-12-03 NOTE — PROGRESS NOTES
Pt calm and cooperative  Medication compliant  Denies all s/s  Reports poor sleep, however, per night shift nurse report, pt was observed sleeping all night  Refused breakfast, stated:"I don't usually eat breakfast " Out of room for lunch  No interaction with staffs  Remains seclusive in his room after lunch, despite encouragement  Israel saldaña  Will continue to monitor

## 2017-12-03 NOTE — PROGRESS NOTES
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[place] [address]  Mental Status Evaluation:  Appearance:  Sporadic eye contact   Behavior:  Withdrawn and isolative   Speech: Sparse   Mood:  irritable   Affect:   Associations: blunted  Tightly connected   Thought Process:  Generally coherent   Thought Content:  Paranoid and mistrustful   Perceptual Disturbances: Reports none   Risk Potential: No SI/HI   Attention/Concentration Fair   Gait/Station: Not observed   Motor Activity: No abnormal movement noted     Progress Toward Goals: no change    Assessment/Plan    Principal Problem:    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

## 2017-12-03 NOTE — PLAN OF CARE
Depression     Verbalize thoughts and feelings Not Progressing     Refrain from isolation Not Progressing     Attend and participate in unit activities, including therapeutic, recreational, and educational groups Not Progressing     Complete daily ADLs, including personal hygiene independently, as able Not Progressing          Depression     Treatment Goal: Demonstrate behavioral control of depressive symptoms, verbalize feelings of improved mood/affect, and adopt new coping skills prior to discharge Progressing     Refrain from harming self Progressing     Refrain from self-neglect Progressing        Nutrition/Hydration-ADULT     Nutrient/Hydration intake appropriate for improving, restoring or maintaining nutritional needs Progressing        Prexisting or High Potential for Compromised Skin Integrity     Skin integrity is maintained or improved Progressing

## 2017-12-04 RX ADMIN — Medication 400 MG: at 09:06

## 2017-12-04 RX ADMIN — TEMAZEPAM 15 MG: 15 CAPSULE ORAL at 22:53

## 2017-12-04 RX ADMIN — TAMSULOSIN HYDROCHLORIDE 0.4 MG: 0.4 CAPSULE ORAL at 17:29

## 2017-12-04 RX ADMIN — DIVALPROEX SODIUM 500 MG: 500 TABLET, FILM COATED, EXTENDED RELEASE ORAL at 17:29

## 2017-12-04 RX ADMIN — MIRTAZAPINE 15 MG: 15 TABLET, FILM COATED ORAL at 21:24

## 2017-12-04 RX ADMIN — DULOXETINE HYDROCHLORIDE 30 MG: 30 CAPSULE, DELAYED RELEASE ORAL at 09:06

## 2017-12-04 RX ADMIN — LEVOTHYROXINE SODIUM 75 MCG: 75 TABLET ORAL at 09:06

## 2017-12-04 RX ADMIN — DIVALPROEX SODIUM 500 MG: 500 TABLET, FILM COATED, EXTENDED RELEASE ORAL at 09:07

## 2017-12-04 NOTE — PROGRESS NOTES
Progress Note - Behavioral Health   Tracey Mcmahon 81 y o  male MRN: [id number]  Unit/Bed#: MM4 565-01 Encounter: [id number]    Patient seen, chart reviewed, discussed with staff  Patient has been isolative to self and to room  Patient states that he feels no different  Patient states that he is sleeping at night however he is somnolent during the day  He has a variable appetite  Patient states he continues with depression and anxiety  Patient is difficult to engage in conversation and lacks spontaneity  He appears apathetic  No aggressive or agitated behavior and he is compliant with his medications  The patient is not attending groups and states that he has been going to groups for many years and that they are not helpful  The patient states he feels no difference since being here in the hospital   He states that he was at [facility] in the past for physical therapy and is ambivalent about returning there  The patient states that he is agreeable to stay in the hospital for a few days to improve his moods      Behavior over the last 24 hours:  unchanged  Sleep: hypersomnia  Appetite:   Variable  Medication side effects: No  ROS: no complaints  /69   Pulse 80   Temp 97 9 °F (36 6 °C) (Tympanic)   Resp 16   Ht 5' 11" (1 803 m)   Wt 98 9 kg (218 lb)   SpO2 98%   BMI 30 40 kg/m²     Medications:   Current Facility-Administered Medications   Medication Dose Route Frequency    aluminum-magnesium hydroxide-simethicone (MYLANTA) 200-200-20 mg/5 mL oral suspension 30 mL  30 mL Oral Q4H PRN    benztropine (COGENTIN) injection 1 mg  1 mg Intramuscular Q8H PRN    benztropine (COGENTIN) tablet 1 mg  1 mg Oral Q6H PRN    divalproex sodium (DEPAKOTE ER) 24 hr tablet 500 mg  500 mg Oral BID    DULoxetine (CYMBALTA) delayed release capsule 30 mg  30 mg Oral Daily    haloperidol (HALDOL) tablet 2 mg  2 mg Oral Q6H PRN    haloperidol lactate (HALDOL) injection 2 mg  2 mg Intramuscular Q6H PRN    influenza inactivated quadrivalent vaccine (FLULAVAL) IM injection 0 5 mL  0 5 mL Intramuscular Prior to discharge    levothyroxine tablet 75 mcg  75 mcg Oral Daily    magnesium hydroxide (MILK OF MAGNESIA) 400 mg/5 mL oral suspension 30 mL  30 mL Oral Daily PRN    magnesium oxide (MAG-OX) tablet 400 mg  400 mg Oral Daily    mirtazapine (REMERON) tablet 15 mg  15 mg Oral HS    tamsulosin (FLOMAX) capsule 0 4 mg  0 4 mg Oral Daily With Dinner    temazepam (RESTORIL) capsule 15 mg  15 mg Oral HS PRN       Labs:   No visits with results within 1 Day(s) from this visit  Latest known visit with results is:   Admission on 11/17/2017, Discharged on 12/01/2017   No results displayed because visit has over 200 results  Mental Status Evaluation:  Appearance:  age appropriate and disheveled   Behavior:  guarded and psychomotor retardation   Speech:  Minimally verbal   Mood:  anxious and depressed   Affect:  constricted   Thought Process:  blocked and Poverty of thought   Thought Content:  normal   Perceptual Disturbances: None   Risk Potential: none   Sensorium:  person and place   Cognition:  Limited   Consciousness:  awake    Attention: attention span appeared shorter than expected for age   Insight:  Poor   Judgment: Poor   Gait/Station: Patient lying in bed   Motor Activity: no abnormal movements     Progress Toward Goals:   No changeProgress Toward Goals:    Assessment/Plan   Principal Problem:    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 ([facility] [address] )      Recommended Treatment:  Patient appears agreeable at this time to sign a 201 commitment  Monitor with addition of Depakote for mood stabilization  Will check a Depakote level  Continue with mirtazapine 15 mg p  o  q h s   Continue with Cymbalta which was decreased to 30 mg due to renal impairment and monitor  Continue with group therapy, milieu therapy and occupational therapy        Risks, benefits and possible side effects of Medications: Risks, benefits, and possible side effects of medications explained to patient and patient verbalizes understanding  Counseling / Coordination of Care  Total floor / unit time spent today 25 minutes  Greater than 50% of total time was spent with the patient and / or family counseling and / or coordination of care  A description of the counseling / coordination of care:  Medication management, chart review, patient interview

## 2017-12-04 NOTE — CASE MANAGEMENT
I attempted x 2 to meet with patient, x1 in am and x1 in PM, patient ignored my attempts  I did shake his foot to where he opened his eyes, grimaced and then closed again  I asked for him to sign ANDREA's so I could obtain more history  Patient would not respond to any of my prompts  Patient was a Jackie 302 d/t depressive s/s while inpatient @ [facility]-  He agreed to sign 201 with Attending MD, paperwork in chart  He was transferred from [facility] med/surg after SHENA  Per their notes, he needs a 'decision maker' also according to CM/SW notes from [facility] med/surg, he has a [address] brother noted as his [place] [phone] & sister in [place] [phone] as an emergency contact  As patient was 302'ed, the MA 51 & PASRR process needs to be restarted  Both paperwork completed and faxed to [facility] this am @ [phone], confirmed with their  at 1:15p of receipt  Message was left with [facility] [address] @ [phone] as the paperwork was completed and via [employer] and he was approved for services, a/w pc back  CM noted from [facility] noted that pt was accepted to [facility],  I spoke with Jenn Good in [facility] admissions department and she noted that patient was not accepted to their [facility] as they do not have beds, she said if this information changed she would contact me back  Will continue to try to have patient sign ANDREA's when awake

## 2017-12-04 NOTE — PLAN OF CARE
Depression     Treatment Goal: Demonstrate behavioral control of depressive symptoms, verbalize feelings of improved mood/affect, and adopt new coping skills prior to discharge Not Progressing     Verbalize thoughts and feelings Not Progressing     Refrain from isolation Not Progressing     Attend and participate in unit activities, including therapeutic, recreational, and educational groups Not Progressing     Complete daily ADLs, including personal hygiene independently, as able Not Progressing        Ineffective Coping     Participates in unit activities Not Progressing          Depression     Refrain from harming self Progressing     Refrain from self-neglect Progressing        Nutrition/Hydration-ADULT     Nutrient/Hydration intake appropriate for improving, restoring or maintaining nutritional needs Progressing        Prexisting or High Potential for Compromised Skin Integrity     Skin integrity is maintained or improved Progressing

## 2017-12-04 NOTE — PROGRESS NOTES
Pt continues to be cooperative and medication compliant  Pt is seclusive to room and scant in conversation  Pt currently denies s/s  Will continue to monitor

## 2017-12-04 NOTE — PROGRESS NOTES
Pt remained in bed most of evening and did not attend group  Remained calm and medication compliant  Wood patent draining clear yellow urine  Restoril given for sleep   Pt noted to be snoring in bed since 2300

## 2017-12-05 ENCOUNTER — GENERIC CONVERSION - ENCOUNTER (OUTPATIENT)
Dept: OTHER | Facility: OTHER | Age: 72
End: 2017-12-05

## 2017-12-05 PROBLEM — G40.909 SEIZURE DISORDER (HCC): Status: ACTIVE | Noted: 2017-11-20

## 2017-12-05 RX ADMIN — TAMSULOSIN HYDROCHLORIDE 0.4 MG: 0.4 CAPSULE ORAL at 17:14

## 2017-12-05 RX ADMIN — LEVOTHYROXINE SODIUM 75 MCG: 75 TABLET ORAL at 08:11

## 2017-12-05 RX ADMIN — MIRTAZAPINE 15 MG: 15 TABLET, FILM COATED ORAL at 21:41

## 2017-12-05 RX ADMIN — TEMAZEPAM 15 MG: 15 CAPSULE ORAL at 22:39

## 2017-12-05 RX ADMIN — DULOXETINE HYDROCHLORIDE 30 MG: 30 CAPSULE, DELAYED RELEASE ORAL at 08:11

## 2017-12-05 RX ADMIN — DIVALPROEX SODIUM 500 MG: 500 TABLET, FILM COATED, EXTENDED RELEASE ORAL at 17:14

## 2017-12-05 RX ADMIN — DIVALPROEX SODIUM 500 MG: 500 TABLET, FILM COATED, EXTENDED RELEASE ORAL at 08:11

## 2017-12-05 RX ADMIN — Medication 400 MG: at 08:11

## 2017-12-05 NOTE — PROGRESS NOTES
Pt calm and cooperative  Continues to be seclusive to room  Continues to be med compliant  Pt denies any s/s  Per pt request PRN Temazepam given for c/o insomnia   Monitoring

## 2017-12-05 NOTE — PLAN OF CARE
Problem: DISCHARGE PLANNING  Goal: Discharge to home or other facility with appropriate resources  INTERVENTIONS:  - Identify barriers to discharge w/patient and caregiver  - Arrange for needed discharge resources and transportation as appropriate  - Identify discharge learning needs (meds, wound care, etc )  - Arrange for interpretive services to assist at discharge as needed  - Refer to Case Management Department for coordinating discharge planning if the patient needs post-hospital services based on physician/advanced practitioner order or complex needs related to functional status, cognitive ability, or social support system   Outcome: Not Progressing  Patient refused to sign his treatment plan, stating it was a "crock of shit "  Letter was received from [facility] that he is not an option  Letter, MA 46 & PASRR were forwarded to [facility] admissions department

## 2017-12-05 NOTE — PROGRESS NOTES
Progress Note - Behavioral Health   Nithin Martins 81 y o  male MRN: [id number]  Unit/Bed#: UM4 565-01 Encounter: [id number]    Patient seen, chart reviewed, discussed with staff  Patient remains seclusive to self and to room  Patient has been spending much of his day in bed  Patient is not attending groups  Patient refused his Depakote level this morning  Patient is somewhat irritable on approach and is hard of hearing  Patient offers no complaints or concerns  He states he feels no different since he was admitted"  Patient does deny any suicidal ideation  He does report residual depression and anxiety and states that this has been ongoing for many years  Patient complained of insomnia last night and received a p r n   Temazepam     Behavior over the last 24 hours:  unchanged  Sleep: hypersomnia  Appetite: normal  Medication side effects: No  ROS: no complaints  /83   Pulse 75   Temp 98 1 °F (36 7 °C) (Tympanic)   Resp 16   Ht 5' 11" (1 803 m)   Wt 98 9 kg (218 lb)   SpO2 98%   BMI 30 40 kg/m²     Medications:   Current Facility-Administered Medications   Medication Dose Route Frequency    aluminum-magnesium hydroxide-simethicone (MYLANTA) 200-200-20 mg/5 mL oral suspension 30 mL  30 mL Oral Q4H PRN    benztropine (COGENTIN) injection 1 mg  1 mg Intramuscular Q8H PRN    benztropine (COGENTIN) tablet 1 mg  1 mg Oral Q6H PRN    divalproex sodium (DEPAKOTE ER) 24 hr tablet 500 mg  500 mg Oral BID    DULoxetine (CYMBALTA) delayed release capsule 30 mg  30 mg Oral Daily    haloperidol (HALDOL) tablet 2 mg  2 mg Oral Q6H PRN    haloperidol lactate (HALDOL) injection 2 mg  2 mg Intramuscular Q6H PRN    influenza inactivated quadrivalent vaccine (FLULAVAL) IM injection 0 5 mL  0 5 mL Intramuscular Prior to discharge    levothyroxine tablet 75 mcg  75 mcg Oral Daily    magnesium hydroxide (MILK OF MAGNESIA) 400 mg/5 mL oral suspension 30 mL  30 mL Oral Daily PRN    magnesium oxide (MAG-OX) tablet 400 mg  400 mg Oral Daily    mirtazapine (REMERON) tablet 15 mg  15 mg Oral HS    tamsulosin (FLOMAX) capsule 0 4 mg  0 4 mg Oral Daily With Dinner    temazepam (RESTORIL) capsule 15 mg  15 mg Oral HS PRN       Labs:   No visits with results within 1 Day(s) from this visit  Latest known visit with results is:   Admission on 11/17/2017, Discharged on 12/01/2017   No results displayed because visit has over 200 results  Mental Status Evaluation:  Appearance:  disheveled   Behavior:  psychomotor retardation and Decreased eye contact   Speech:  delayed and Sparse   Mood:  dysthymic   Affect:  blunted   Thought Process:  Poverty of thought   Thought Content:  No noted delusions   Perceptual Disturbances: None   Risk Potential: Patient denies SI and HI   Sensorium:  person and place   Cognition:  Patient uncooperative with questions   Consciousness:  Easily arousable    Attention: attention span appeared shorter than expected for age   Insight:  limited   Judgment: limited   Gait/Station: Patient seen lying in bed   Motor Activity: no abnormal movements     Progress Toward Goals:   No change    Assessment/Plan   Principal Problem:    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 ([facility] [address] )      Recommended Treatment:  Continue with Depakote 500 milligrams p  o  b i d  and Depakote level is pending  Patient refused level this morning will attempt to obtain at a later time  Consult physical therapy due to gait dysfunction  Disposition is pending to [address] [place] [address] for further physical therapy  Encourage groups and out of room  Continue with Remeron 15 milligrams p o  q h s  and Cymbalta 30 milligrams p o  daily for depression   Continue with group therapy, milieu therapy and occupational therapy  Risks, benefits and possible side effects of Medications:   Risks, benefits, and possible side effects of medications explained to patient and patient verbalizes understanding          Counseling / Coordination of Care  Total floor / unit time spent today 25 minutes  Greater than 50% of total time was spent with the patient and / or family counseling and / or coordination of care  A description of the counseling / coordination of care:   Medicatio management, chart review, patient interview

## 2017-12-05 NOTE — PROGRESS NOTES
Pt refused AM bloodwork even after education provided  Pt slept all night, frias patent with yellow urine flowing

## 2017-12-05 NOTE — CASE MANAGEMENT
Attempted x1 this am to meet with patient with no success  I spoke with Jennifer Clement at [facility] who noted that I needed to forward updated notes and psychiatric evaluation to [facility] in [place] to review whether patient would be a level II option or not  All notes faxed to [facility] @ [phone] @ 1040am    A/w call back  Jason Castillo from [facility] admissions called and said that patient WAS accepted to [facility] and they DO have a bed for him once all options paperwork is received by their facility

## 2017-12-05 NOTE — PLAN OF CARE
Depression     Treatment Goal: Demonstrate behavioral control of depressive symptoms, verbalize feelings of improved mood/affect, and adopt new coping skills prior to discharge Progressing     Verbalize thoughts and feelings Progressing     Refrain from harming self Progressing     Refrain from [address] from self-neglect Progressing     Attend and participate in unit activities, including therapeutic, recreational, and educational groups Progressing     Complete daily ADLs, including personal hygiene independently, as able [address] Discharge to home or other facility with appropriate resources Progressing        Ineffective Coping     Participates in unit activities Progressing        Nutrition/Hydration-ADULT     Nutrient/Hydration intake appropriate for improving, restoring or maintaining nutritional needs Progressing        Prexisting or High Potential for Compromised Skin Integrity     Skin integrity is maintained or improved Progressing

## 2017-12-05 NOTE — PROGRESS NOTES
Pt difficult to arouse this AM, but when pt did, he states 'I am tired, leave me go ' Pt took his medication with much encouragement  Pt refused to get OOB for breakfast and morning group, but did eventually agree to get OOB and sit in his room  Pt seclusive and scant in conversation, irritable when staff attempting to communicate  Will continue to monitor

## 2017-12-05 NOTE — CASE MANAGEMENT
I was able to meet with patient, he is hard of hearing, but we were able to communicate effectively when I spoke louder  Patient signed ANDREA for his sister in law Williams Dyson and brother Mariposa Rocha  I explained the treatment plan and patient refused stating "this is all a crock of shit " When I asked him what he was frustrated with, he said  "everything " I inquired about a transfer to a SNF for STR and he shrugged  I said that we were hoping to have him transferred this week for continued PT and strength building  Patient shook his head at me and closed his eyes and put his head down on his table  Cordelia Malloy from [facility] faxed over the approval letter that patient will not have to be optioned  The letter along with the updated MA 46 & PASRR was faxed @ 1pm to [facility]'s admissions department  VM left for Millie Arango and Caryle Guardian @ [phone] to obtain information and history about patient  A VM was left for Rohith Lorenzana @ phone # [phone]  A/w returned calls back

## 2017-12-06 RX ADMIN — LEVOTHYROXINE SODIUM 75 MCG: 75 TABLET ORAL at 08:52

## 2017-12-06 RX ADMIN — DULOXETINE HYDROCHLORIDE 30 MG: 30 CAPSULE, DELAYED RELEASE ORAL at 08:52

## 2017-12-06 RX ADMIN — DIVALPROEX SODIUM 500 MG: 500 TABLET, FILM COATED, EXTENDED RELEASE ORAL at 08:52

## 2017-12-06 RX ADMIN — Medication 400 MG: at 08:52

## 2017-12-06 RX ADMIN — MIRTAZAPINE 15 MG: 15 TABLET, FILM COATED ORAL at 22:07
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
## 2017-12-06 NOTE — PHYSICAL THERAPY NOTE
Physical Therapy Cancellation Note:    Phone call placed from [facility] staff on NW5 for tenative D/C of pt and need for PT eval prior to D/C  Spoke with NSG and CM on NW5 concerning above upon arrival  Pt declines PT services at this time with inc motivation and encouragement from PT,NSG and CM without success   Cancel PT services for today and will cont to follow as able to initiate PT eval

## 2017-12-06 NOTE — PROGRESS NOTES
Pt has been sleeping all evening, refused to get up for dinner but did eat at bedside  Needed encouragement to take medications  Will continue to monitor

## 2017-12-06 NOTE — CASE MANAGEMENT
Voicemail left for Cari CABRAL clinical liaison at [facility] to schedule transfer for today or tomorrow  A/w call back for further details  Still a/w call back from patient's mother

## 2017-12-06 NOTE — PROGRESS NOTES
Progress Note - Behavioral Health   Ja Jensen 81 y o  male MRN: [id number]  Unit/Bed#: ZD0 565-01 Encounter: [id number]    Patient seen, chart reviewed, discussed with staff  Patient remains isolative to his room and not participating in milieu therapies  Patient is spending much of his time in bed  He is irritable on approach  He offers no complaints or concerns however he does state that he feels depressed but is unable to elaborate  Patient is sleeping well after receiving p r n  temazepam   He is ambivalent about going to [place] for therapy  He has been compliant with his medications and no aggressive or agitated behavior      Behavior over the last 24 hours:  unchanged  Sleep: hypersomnia  Appetite: normal  Medication side effects: No  ROS: no complaints  /62   Pulse 88   Temp 98 1 °F (36 7 °C)   Resp 18   Ht 5' 11" (1 803 m)   Wt 98 9 kg (218 lb)   SpO2 95%   BMI 30 40 kg/m²     Medications:   Current Facility-Administered Medications   Medication Dose Route Frequency    aluminum-magnesium hydroxide-simethicone (MYLANTA) 200-200-20 mg/5 mL oral suspension 30 mL  30 mL Oral Q4H PRN    benztropine (COGENTIN) injection 1 mg  1 mg Intramuscular Q8H PRN    benztropine (COGENTIN) tablet 1 mg  1 mg Oral Q6H PRN    divalproex sodium (DEPAKOTE ER) 24 hr tablet 500 mg  500 mg Oral BID    DULoxetine (CYMBALTA) delayed release capsule 30 mg  30 mg Oral Daily    haloperidol (HALDOL) tablet 2 mg  2 mg Oral Q6H PRN    haloperidol lactate (HALDOL) injection 2 mg  2 mg Intramuscular Q6H PRN    influenza inactivated quadrivalent vaccine (FLULAVAL) IM injection 0 5 mL  0 5 mL Intramuscular Prior to discharge    levothyroxine tablet 75 mcg  75 mcg Oral Daily    magnesium hydroxide (MILK OF MAGNESIA) 400 mg/5 mL oral suspension 30 mL  30 mL Oral Daily PRN    magnesium oxide (MAG-OX) tablet 400 mg  400 mg Oral Daily    mirtazapine (REMERON) tablet 15 mg  15 mg Oral HS    tamsulosin (FLOMAX) capsule 0 4 mg  0 4 mg Oral Daily With Dinner    temazepam (RESTORIL) capsule 15 mg  15 mg Oral HS PRN       Labs:   No visits with results within 1 Day(s) from this visit  Latest known visit with results is:   Admission on 11/17/2017, Discharged on 12/01/2017   No results displayed because visit has over 200 results  Mental Status Evaluation:  Appearance:  disheveled   Behavior:  guarded and uncooperative   Speech:  increased latency of response   Mood:  irritable   Affect:  constricted   Thought Process:  Poverty of thought   Thought Content:  normal   Perceptual Disturbances: None   Risk Potential: Suicidal Ideations none   Sensorium:  person and place   Cognition:  Patient uncooperative with questions   Consciousness:  Arousable    Attention: attention span appeared shorter than expected for age   Insight:  limited   Judgment: limited   Gait/Station: Lying in bed   Motor Activity: no abnormal movements     Progress Toward Goals:  Minimal change    Assessment/Plan   Principal Problem:    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 [facility])  Active Problems:    Seizure disorder ([facility] [address] )      Recommended Treatment:   Transfer pending to [place] for further physical therapy  Continue with mirtazapine and Cymbalta for depression  At this point uncertain if patient is at his baseline from a psychiatric standpoint  Continue to encourage out of bed and participation in therapies however patient prefers to spend much of his time in his room  He is not aggressive or agitated and compliant with his medications however he did refuse lab work  Patient is on Depakote 500 mg p o  b i d  will continue to attempt to obtain blood work  Continue with group therapy, milieu therapy and occupational therapy  Risks, benefits and possible side effects of Medications:   Risks, benefits, and possible side effects of medications explained to patient and patient verbalizes understanding          Counseling / Coordination of Care  Total floor / unit time spent today 25 minutes  Greater than 50% of total time was spent with the patient and / or family counseling and / or coordination of care  A description of the counseling / coordination of care:  Medication management, chart review, patient interview

## 2017-12-06 NOTE — CASE MANAGEMENT
PT came to unit @ 240p and patient refused to comply  I went in to speak with patient, he ignored me until I continued to say his name  He was made aware that he will not be able to leave the hospital if he doesn't comply  Also, he will not be able to just sleep all day  Patient rolled his eyes and then closed again and went back to sleep

## 2017-12-06 NOTE — PROGRESS NOTES
Pt remains irritable and refused evening medications  Pt in bed - seclusive to his room the entire shift

## 2017-12-06 NOTE — PROGRESS NOTES
Pt slept most of the morning, refused am vitals and was awake briefly to take medications- at the start of this note pt is still in bed  Wood care completed, draining yellow urine and no signs of infection noted  Will continue to monitor

## 2017-12-06 NOTE — CASE MANAGEMENT
Spoke to Shannon at [address] [place] [address] and she needs updated notes  PT/OT notes sent, although PT has not been on unit since patient on unit  Pending PT evaluation, recommendation and then certification for STR, patient is ready for DC tomorrow  I spoke with patient's nurse and she contacted PT  Patient's mother call back and said that she would be out of the home until 4/430p  Will attempt to contact her again tomorrow in the AM for next

## 2017-12-06 NOTE — PLAN OF CARE
Depression     Treatment Goal: Demonstrate behavioral control of depressive symptoms, verbalize feelings of improved mood/affect, and adopt new coping skills prior to discharge Progressing     Verbalize thoughts and feelings Progressing     Refrain from harming self Progressing     Refrain from [address] from self-neglect Progressing     Attend and participate in unit activities, including therapeutic, recreational, and educational groups Progressing     Complete daily ADLs, including personal hygiene independently, as able Progressing        Ineffective Coping     Participates in unit activities Progressing        Nutrition/Hydration-ADULT     Nutrient/Hydration intake appropriate for improving, restoring or maintaining nutritional needs Progressing        Prexisting or High Potential for Compromised Skin Integrity     Skin integrity is maintained or improved Progressing

## 2017-12-07 PROCEDURE — G8978 MOBILITY CURRENT STATUS: HCPCS

## 2017-12-07 PROCEDURE — G8979 MOBILITY GOAL STATUS: HCPCS

## 2017-12-07 PROCEDURE — 97163 PT EVAL HIGH COMPLEX 45 MIN: CPT

## 2017-12-07 RX ORDER — DESVENLAFAXINE 50 MG/1
50 TABLET, EXTENDED RELEASE ORAL DAILY
Status: DISCONTINUED | OUTPATIENT
Start: 2017-12-07 | End: 2017-12-11 | Stop reason: HOSPADM

## 2017-12-07 RX ORDER — QUETIAPINE FUMARATE 25 MG/1
25 TABLET, FILM COATED ORAL
Status: DISCONTINUED | OUTPATIENT
Start: 2017-12-07 | End: 2017-12-11 | Stop reason: HOSPADM

## 2017-12-07 RX ADMIN — TEMAZEPAM 15 MG: 15 CAPSULE ORAL at 23:15

## 2017-12-07 RX ADMIN — MIRTAZAPINE 15 MG: 15 TABLET, FILM COATED ORAL at 21:15

## 2017-12-07 RX ADMIN — QUETIAPINE FUMARATE 25 MG: 25 TABLET ORAL at 21:15

## 2017-12-07 RX ADMIN — LEVOTHYROXINE SODIUM 75 MCG: 75 TABLET ORAL at 08:08

## 2017-12-07 RX ADMIN — Medication 400 MG: at 08:08

## 2017-12-07 RX ADMIN — DIVALPROEX SODIUM 500 MG: 500 TABLET, FILM COATED, EXTENDED RELEASE ORAL at 17:14

## 2017-12-07 RX ADMIN — TAMSULOSIN HYDROCHLORIDE 0.4 MG: 0.4 CAPSULE ORAL at 17:14

## 2017-12-07 RX ADMIN — DIVALPROEX SODIUM 500 MG: 500 TABLET, FILM COATED, EXTENDED RELEASE ORAL at 08:08

## 2017-12-07 RX ADMIN — DESVENLAFAXINE SUCCINATE 50 MG: 50 TABLET, EXTENDED RELEASE ORAL at 11:53

## 2017-12-07 RX ADMIN — DULOXETINE HYDROCHLORIDE 30 MG: 30 CAPSULE, DELAYED RELEASE ORAL at 08:08

## 2017-12-07 NOTE — PLAN OF CARE
Problem: PHYSICAL THERAPY ADULT  Goal: Performs mobility at highest level of function for planned discharge setting  See evaluation for individualized goals  Treatment/Interventions: Functional transfer training, LE strengthening/ROM, Elevations, Therapeutic exercise, Endurance training, Patient/family training, Equipment eval/education, Bed mobility, Gait training, Spoke to nursing, Spoke to case management, OT, Spoke to advanced practitioner  Equipment Recommended: Padmini Torres       See flowsheet documentation for full assessment, interventions and recommendations  Prognosis: Fair  Problem List: Decreased endurance, Impaired balance, Decreased mobility, Decreased coordination, Decreased cognition, Decreased safety awareness  Assessment: Pt is a 81year old male initially presenting to [facility] with urinary hesitancy and retention on 11/17/17  Pt transferring to [facility]'s behavioral health unit 12/1/17 for depressive symptoms  Pt with a problem list which includes falls, scalp laceration, HTN, urinary incontinence, acute renal failure, seizure disorder, polyuria and mood disorder as late effect of TBI  PMH includes psychiatric disorder  PTA, pt living at home alone  Pt (I) with ADLs and functional mobility  On eval, pt presenting with the following deficits: impaired balance, poor activity tolerance, decreased strength and ROM, and decreased overall functional mobility  Pt required Antoinette for bed mobility, transfers and ambulation with a RW  Pt unsafe to return home alone at this time and would benefit from rehab at d/c to maximize function and safety  Barriers to Discharge: Decreased caregiver support  Barriers to Discharge Comments: lives alone   Recommendation: Post acute IP rehab          See flowsheet documentation for full assessment

## 2017-12-07 NOTE — CASE MANAGEMENT
CM spoke with pt's mother, Mallissa Goodpasture [phone]  Per mother, pt lives alone, and pt's parents live 3 blocks away  Generally speaking, pt does take his meds independently and on time  He eats meals and cares for basic ADL's  Parents check on him daily  Mother reports pt's depression gets worse at Holiday time yearly and his walking declines as well  She has been working with the [place] to have pt seen more frequently, however, they have not been able to get this scheduled yet  Mother will come in today at 2pm to see pt and assess for current status vs baseline  Updated PT notes/recommendation faxed to Cari/Keith

## 2017-12-07 NOTE — PROGRESS NOTES
Pt spent a majority of the morning in his room  Pt very difficult to get OOB and out into the dayroom, but eventually did and has been since  Pt bright upon approach, joking around with staff  Pt minimally social with peers, but will communicate with them at time  Pt continues to be med compliant and denies s/s  Will continue to monitor

## 2017-12-07 NOTE — PHYSICAL THERAPY NOTE
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 [facility])       Past Medical History  Past Medical History:   Diagnosis Date    Disease of thyroid gland     H/O Clostridium difficile infection     5/16    Hypertension     Psychiatric disorder     Seizures ([address] )        Past Surgical History  No past surgical history on file  12/07/17 1033   Note Type   Note type Eval/Treat   Pain Assessment   Pain Assessment No/denies pain   Pain Score No Pain   Home Living   Type of [address] [place] [address] Two level  (2-3 ANDRAE)   Bathroom Shower/Tub Tub/shower unit   Bathroom Toilet Standard   Bathroom Equipment Shower chair   [address] [place]   Additional Comments Pt offerring little to no conversation  Pt's PLOF and home set up obtained from chart/ prior admissions    Prior Function   Level of [place] Independent with ADLs and functional mobility   Lives With Alone   Receives Help From Family   ADL Assistance Independent   IADLs Independent   Falls in the last 6 months 1 to 4   Vocational Retired   Comments Pt lives alone and was fully (I)  Family lives locally    Restrictions/Precautions   Weight Bearing Precautions Per Order No   Other Precautions Cognitive; Chair Alarm; Bed Alarm;Pain;Multiple lines   General   Family/Caregiver Present No   Cognition   Overall Cognitive Status Impaired   Arousal/Participation Lethargic   Attention Attends with cues to redirect   Orientation Level Oriented to person;Oriented to situation   Memory Decreased recall of recent events;Decreased recall of precautions   Following Commands Follows one step commands inconsistently   Comments delay in processing noted   RUE Assessment   RUE Assessment WFL   LUE Assessment   LUE Assessment WFL   RLE Assessment   RLE Assessment WFL   LLE Assessment   LLE Assessment WFL   Coordination   Movements are Fluid and Coordinated 0   Coordination and Movement Description bradykinetic    Sensation WFL   Light Touch   RLE Light Touch Grossly intact   LLE Light Touch Grossly intact   Proprioception   RLE Proprioception Grossly intact   LLE Proprioception Grossly Intact   Bed Mobility   Supine to Sit 4  Minimal assistance   Additional items Assist x 1;HOB elevated; Increased time required;Verbal cues   Additional Comments With increased time and motivation, pt agreeable to participate in PT session  Pt required Antoinette and verbal instruciton for safety awareness during mobility     Transfers   Sit to Stand 4  Minimal assistance   Additional items Assist x 1   Stand to Sit 4  Minimal assistance   Additional items Assist x 1   Stand pivot 4  Minimal assistance   Additional items Assist x 1   Ambulation/Elevation   Gait pattern Shuffling; Foward flexed   Gait Assistance 4  Minimal assist   Additional items Assist x 1   Assistive Device Rolling walker   Distance 5'   Balance   Static Sitting Fair +   Dynamic Sitting Fair   Static Standing Fair   Dynamic Standing Fair -   Ambulatory Fair -   Endurance Deficit   Endurance Deficit Yes   Endurance Deficit Description poor tolerance to mobility, fatigue, ongoing deconditioning    Activity Tolerance   Activity Tolerance Patient limited by fatigue   Medical Staff Made Aware RN and PA-C present during session    Assessment   Prognosis Fair   Problem List Decreased endurance; Impaired balance;Decreased mobility; Decreased coordination;Decreased cognition;Decreased safety awareness   Assessment Pt is a 81year old male initially presenting to T with urinary hesitancy and retention on 11/17/17  [facility] for depressive symptoms  Pt with a problem list which includes falls, scalp laceration, HTN, urinary incontinence, acute renal failure, seizure disorder, polyuria and mood disorder as late effect of TBI  PMH includes psychiatric disorder  PTA, pt living at home alone  Pt (I) with ADLs and functional mobility  On eval, pt presenting with the following deficits: impaired balance, poor activity tolerance, decreased strength and ROM, and decreased overall functional mobility  Pt required Antoinette for bed mobility, transfers and ambulation with a RW  Pt unsafe to return home alone at this time and would benefit from rehab at d/c to maximize function and safety  Barriers to Discharge Decreased caregiver support   Barriers to Discharge Comments lives alone    Goals   Patient Goals to rest   LTG Expiration Date 12/21/17   Long Term Goal #1 Pt will be S with bed mobility  Pt will be S with transfers  Pt will be S with ambulation using least restrictive AD  Pt will be S with stair negotiation  Pt will particiapte in HEP  Treatment Day 0   Plan   Treatment/Interventions Functional transfer training;LE strengthening/ROM; Elevations; Therapeutic exercise; Endurance training;Patient/family training;Equipment eval/education; Bed mobility;Gait training;Spoke to nursing;Spoke to case management;OT;Spoke to advanced practitioner   PT Frequency 5x/wk   Recommendation   Recommendation Post acute IP rehab   Equipment Recommended Walker   Additional Comments OOB in chair in day room with alarm activated and all needs within reach    Barthel Index   Feeding 10   Bathing 0   Grooming Score 5   Dressing Score 5   Bladder Score 0   Bowels Score 10   Toilet Use Score 5   Transfers (Bed/Chair) Score 10   Mobility (Level Surface) Score 0   Stairs Score 0   Barthel Index Score 45         Apple Hart, PT

## 2017-12-07 NOTE — PHYSICAL THERAPY NOTE
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 [facility])       Past Medical History  Past Medical History:   Diagnosis Date    Disease of thyroid gland     H/O Clostridium difficile infection     5/16    Hypertension     Psychiatric disorder     Seizures ([facility] [address] )        Past Surgical History  No past surgical history on file  12/07/17 1033   Note Type   Note type Eval/Treat   Pain Assessment   Pain Assessment No/denies pain   Pain Score No Pain   Home Living   Type of [address] [place] [address] Two level  (2-3 ANDRAE)   Bathroom Shower/Tub Tub/shower unit   Bathroom Toilet Standard   Bathroom Equipment Shower chair   [address] [place]   Additional Comments Pt offerring little to no conversation  Pt's PLOF and home set up obtained from chart/ prior admissions    Prior Function   Level of [place] Independent with ADLs and functional mobility   Lives With Alone   Receives Help From Family   ADL Assistance Independent   IADLs Independent   Falls in the last 6 months 1 to 4   Vocational Retired   Comments Pt lives alone and was fully (I)  Family lives locally    Restrictions/Precautions   Weight Bearing Precautions Per Order No   Other Precautions Cognitive; Chair Alarm; Bed Alarm;Pain;Multiple lines   General   Family/Caregiver Present No   Cognition   Overall Cognitive Status Impaired   Arousal/Participation Lethargic   Attention Attends with cues to redirect   Orientation Level Oriented to person;Oriented to situation   Memory Decreased recall of recent events;Decreased recall of precautions   Following Commands Follows one step commands inconsistently   Comments delay in processing noted   RUE Assessment   RUE Assessment WFL   LUE Assessment   LUE Assessment WFL   RLE Assessment   RLE Assessment WFL   LLE Assessment   LLE Assessment WFL   Coordination   Movements are Fluid and Coordinated 0   Coordination and Movement Description bradykinetic    Sensation WFL   Light Touch   RLE Light Touch Grossly intact   LLE Light Touch Grossly intact   Proprioception   RLE Proprioception Grossly intact   LLE Proprioception Grossly Intact   Bed Mobility   Supine to Sit 4  Minimal assistance   Additional items Assist x 1;HOB elevated; Increased time required;Verbal cues   Additional Comments With increased time and motivation, pt agreeable to participate in PT session  Pt required Antoinette and verbal instruciton for safety awareness during mobility     Transfers   Sit to Stand 4  Minimal assistance   Additional items Assist x 1   Stand to Sit 4  Minimal assistance   Additional items Assist x 1   Stand pivot 4  Minimal assistance   Additional items Assist x 1   Ambulation/Elevation   Gait pattern Shuffling; Foward flexed   Gait Assistance 4  Minimal assist   Additional items Assist x 1   Assistive Device Rolling walker   Distance 5'   Balance   Static Sitting Fair +   Dynamic Sitting Fair   Static Standing Fair   Dynamic Standing Fair -   Ambulatory Fair -   Endurance Deficit   Endurance Deficit Yes   Endurance Deficit Description poor tolerance to mobility, fatigue, ongoing deconditioning    Activity Tolerance   Activity Tolerance Patient limited by fatigue   Medical Staff Made Aware RN and PA-C present during session    Assessment   Prognosis Fair   Problem List Decreased endurance; Impaired balance;Decreased mobility; Decreased coordination;Decreased cognition;Decreased safety awareness   Assessment Pt is a 81year old male initially presenting to T with urinary hesitancy and retention on 11/17/17  Pt transferring to \[facility]\"" behavioral health unit 12/1/17 for depressive symptoms  Pt with a problem list which includes falls, scalp laceration, HTN, urinary incontinence, acute renal failure, seizure disorder, polyuria and mood disorder as late effect of TBI  PMH includes psychiatric disorder  PTA, pt living at home alone  Pt (I) with ADLs and functional mobility  On eval, pt presenting with the following deficits: impaired balance, poor activity tolerance, decreased strength and ROM, and decreased overall functional mobility  Pt required Antoinette for bed mobility, transfers and ambulation with a RW  Pt unsafe to return home alone at this time and would benefit from rehab at d/c to maximize function and safety  Barriers to Discharge Decreased caregiver support   Barriers to Discharge Comments lives alone    Goals   Patient Goals to rest   LTG Expiration Date 12/21/17   Long Term Goal #1 Pt will be S with bed mobility  Pt will be S with transfers  Pt will be S with ambulation using least restrictive AD  Pt will be S with stair negotiation  Pt will particiapte in HEP  Treatment Day 0   Plan   Treatment/Interventions Functional transfer training;LE strengthening/ROM; Elevations; Therapeutic exercise; Endurance training;Patient/family training;Equipment eval/education; Bed mobility;Gait training;Spoke to nursing;Spoke to case management;OT;Spoke to advanced practitioner   PT Frequency 5x/wk   Recommendation   Recommendation Post acute IP rehab   Equipment Recommended Walker   Additional Comments OOB in chair in day room with alarm activated and all needs within reach    Barthel Index   Feeding 10   Bathing 0   Grooming Score 5   Dressing Score 5   Bladder Score 0   Bowels Score 10   Toilet Use Score 5   Transfers (Bed/Chair) Score 10   Mobility (Level Surface) Score 0   Stairs Score 0   Barthel Index Score 45         Apple Hart, PT

## 2017-12-07 NOTE — PROGRESS NOTES
Progress Note - Behavioral Health   Arturo Barry 81 y o  male MRN: [id number]  Unit/Bed#: KQ6 565-01 Encounter: [id number]    Patient seen, chart reviewed, discussed with staff  Patient refused his physical therapy evaluation yesterday and was refusing today  Patient requires much encouragement and did eventually sit up on the edge of his bed  Patient remains irritable and isolative to self and to room  He is not attending any groups  He is not leaving his room for any meals  The patient is eating in his room  Patient also refused his medications yesterday evening  Patient is largely uncooperative and agitated with questions  No physical agitation  Patient lacks motivation is apathetic      Behavior over the last 24 hours:  unchanged  Sleep: hypersomnia  Appetite: normal  Medication side effects: No  ROS: no complaints  /77   Pulse 75   Temp 97 8 °F (36 6 °C) (Tympanic)   Resp 18   Ht 5' 11" (1 803 m)   Wt 98 9 kg (218 lb)   SpO2 99%   BMI 30 40 kg/m²     Medications:   Current Facility-Administered Medications   Medication Dose Route Frequency    aluminum-magnesium hydroxide-simethicone (MYLANTA) 200-200-20 mg/5 mL oral suspension 30 mL  30 mL Oral Q4H PRN    benztropine (COGENTIN) injection 1 mg  1 mg Intramuscular Q8H PRN    benztropine (COGENTIN) tablet 1 mg  1 mg Oral Q6H PRN    desvenlafaxine succinate (PRISTIQ) 24 hr tablet 50 mg  50 mg Oral Daily    divalproex sodium (DEPAKOTE ER) 24 hr tablet 500 mg  500 mg Oral BID    haloperidol (HALDOL) tablet 2 mg  2 mg Oral Q6H PRN    haloperidol lactate (HALDOL) injection 2 mg  2 mg Intramuscular Q6H PRN    influenza inactivated quadrivalent vaccine (FLULAVAL) IM injection 0 5 mL  0 5 mL Intramuscular Prior to discharge    levothyroxine tablet 75 mcg  75 mcg Oral Daily    magnesium hydroxide (MILK OF MAGNESIA) 400 mg/5 mL oral suspension 30 mL  30 mL Oral Daily PRN    magnesium oxide (MAG-OX) tablet 400 mg  400 mg Oral Daily    mirtazapine (REMERON) tablet 15 mg  15 mg Oral HS    tamsulosin (FLOMAX) capsule 0 4 mg  0 4 mg Oral Daily With Dinner    temazepam (RESTORIL) capsule 15 mg  15 mg Oral HS PRN       Labs:   No visits with results within 1 Day(s) from this visit  Latest known visit with results is:   Admission on 11/17/2017, Discharged on 12/01/2017   No results displayed because visit has over 200 results  Mental Status Evaluation:  Appearance:  disheveled   Behavior:  uncooperative and Apathetic, poor eye contact   Speech:  Scant   Mood:  angry, depressed and irritable   Affect:  labile   Thought Process:  Poverty of thought   Thought Content:   Guarded   Perceptual Disturbances: Uncertain   Risk Potential: Patient denies SI   Sensorium:  person and place   Cognition:  impaired due to TBI   Consciousness:  awake    Attention: attention span appeared shorter than expected for age   Insight:  Poor   Judgment: Poor   Gait/Station: In bed   Motor Activity: no abnormal movements     Progress Toward Goals:  No improvement    Assessment/Plan   Principal Problem:    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 ([facility])  Active Problems:    Seizure disorder ([facility])      Recommended Treatment:   Discontinue Cymbalta 30 mg p  o  daily  Start Pristiq 50 mg p  o  daily for depression  At quetiapine 25 mg p  o  q h s  for augmentation of Pristiq as well as for sleeping  Continue with Depakote 500 mg p o  b i d   Patient unfortunately has been refusing a Depakote level continue to try to obtain this  Continue to encourage patient's compliance with medications and treatment plan  Continue with group therapy, milieu therapy and occupational therapy  Risks, benefits and possible side effects of Medications:   Patient does not verbalize understanding at this time and will require further explanation  Counseling / Coordination of Care  Total floor / unit time spent today 25 minutes   Greater than 50% of total time was spent with the patient and / or family counseling and / or coordination of care  A description of the counseling / coordination of care:  Med management, patient interview, chart review

## 2017-12-08 ENCOUNTER — GENERIC CONVERSION - ENCOUNTER (OUTPATIENT)
Dept: OTHER | Facility: OTHER | Age: 72
End: 2017-12-08

## 2017-12-08 RX ADMIN — MIRTAZAPINE 15 MG: 15 TABLET, FILM COATED ORAL at 21:00

## 2017-12-08 RX ADMIN — DIVALPROEX SODIUM 500 MG: 500 TABLET, FILM COATED, EXTENDED RELEASE ORAL at 17:08

## 2017-12-08 RX ADMIN — TAMSULOSIN HYDROCHLORIDE 0.4 MG: 0.4 CAPSULE ORAL at 17:08

## 2017-12-08 RX ADMIN — DIVALPROEX SODIUM 500 MG: 500 TABLET, FILM COATED, EXTENDED RELEASE ORAL at 09:06

## 2017-12-08 RX ADMIN — DESVENLAFAXINE SUCCINATE 50 MG: 50 TABLET, EXTENDED RELEASE ORAL at 09:06

## 2017-12-08 RX ADMIN — LEVOTHYROXINE SODIUM 75 MCG: 75 TABLET ORAL at 09:06

## 2017-12-08 RX ADMIN — QUETIAPINE FUMARATE 25 MG: 25 TABLET ORAL at 21:00

## 2017-12-08 RX ADMIN — Medication 400 MG: at 09:06

## 2017-12-08 RX ADMIN — TEMAZEPAM 15 MG: 15 CAPSULE ORAL at 22:26

## 2017-12-08 NOTE — PROGRESS NOTES
Pt social with staff and compliant with medications  Denies sx  Remained in room most of night  Monitoring

## 2017-12-08 NOTE — PLAN OF CARE
Depression     Treatment Goal: Demonstrate behavioral control of depressive symptoms, verbalize feelings of improved mood/affect, and adopt new coping skills prior to discharge Progressing     Verbalize thoughts and feelings Progressing     Refrain from harming self Progressing     Refrain from [address] from self-neglect Progressing     Attend and participate in unit activities, including therapeutic, recreational, and educational groups Progressing     Complete daily ADLs, including personal hygiene independently, as able [address] Discharge to home or other facility with appropriate resources Progressing        Nutrition/Hydration-ADULT     Nutrient/Hydration intake appropriate for improving, restoring or maintaining nutritional needs Progressing        Prexisting or High Potential for Compromised Skin Integrity     Skin integrity is maintained or improved Progressing

## 2017-12-08 NOTE — PROGRESS NOTES
Progress Note - Behavioral Health   Jorge [place] 81 y o  male MRN: [id number]  Unit/Bed#: RH9 565-01 Encounter: [id number]    The patient was seen for continuing care and reviewed with treatment team   He has been compliant with medications  Mostly withdrawn to his room  Slept well  Oral intake has been poor    Mental Status Evaluation:  Appearance:  Poor eye contact and disheveled   Behavior:  Withdrawn and isolative   Speech: Sparse   Mood:  irritable and depressed   Affect:   Associations: constricted  Unable to assess due to scant verbalization   Thought Process:  Poverty of thoughts   Thought Content:  Does not verbalize delusional material   Perceptual Disturbances: Uncertain   Risk Potential: No suicidal or homicidal ideation   Attention/Concentration attention span appeared shorter than expected for age   Gait/Station: Not observed   Motor Activity: No abnormal movement noted     Progress Toward Goals:  Remains withdrawn and isolative    Assessment/Plan    Principal Problem:    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 [facility])  Active Problems:    Seizure disorder ([facility] [address] )      Recommended Treatment: We will check valproic acid level Continue with pharmacotherapy, group therapy, milieu therapy and occupational therapy  The patient will be maintained on the following medications:    desvenlafaxine succinate 50 mg Oral Daily   divalproex sodium 500 mg Oral BID   levothyroxine 75 mcg Oral Daily   magnesium oxide 400 mg Oral Daily   mirtazapine 15 mg Oral HS   QUEtiapine 25 mg Oral HS   tamsulosin 0 4 mg Oral Daily With Dinner       Risks, benefits and possible side effects of Medications:   Patient does not verbalize understanding at this time and will require further explanation

## 2017-12-08 NOTE — PROGRESS NOTES
Pt continues to be calm and cooperative with staff  Pt is medication compliante and out of bed for meals  Pt currently denies s/s  Pt is currently in bed  Will continue to monitor

## 2017-12-09 LAB — VALPROATE SERPL-MCNC: 62 UG/ML (ref 50–100)

## 2017-12-09 PROCEDURE — 80164 ASSAY DIPROPYLACETIC ACD TOT: CPT | Performed by: PSYCHIATRY & NEUROLOGY

## 2017-12-09 RX ORDER — ACETAMINOPHEN 325 MG/1
650 TABLET ORAL EVERY 6 HOURS PRN
Status: DISCONTINUED | OUTPATIENT
Start: 2017-12-09 | End: 2017-12-11 | Stop reason: HOSPADM

## 2017-12-09 RX ORDER — TRAMADOL HYDROCHLORIDE 50 MG/1
50 TABLET ORAL EVERY 6 HOURS PRN
Status: DISCONTINUED | OUTPATIENT
Start: 2017-12-09 | End: 2017-12-11 | Stop reason: HOSPADM

## 2017-12-09 RX ORDER — DIVALPROEX SODIUM 500 MG/1
1000 TABLET, EXTENDED RELEASE ORAL
Status: DISCONTINUED | OUTPATIENT
Start: 2017-12-09 | End: 2017-12-11 | Stop reason: HOSPADM

## 2017-12-09 RX ADMIN — LEVOTHYROXINE SODIUM 75 MCG: 75 TABLET ORAL at 08:23

## 2017-12-09 RX ADMIN — TAMSULOSIN HYDROCHLORIDE 0.4 MG: 0.4 CAPSULE ORAL at 17:01

## 2017-12-09 RX ADMIN — DIVALPROEX SODIUM 1000 MG: 500 TABLET, FILM COATED, EXTENDED RELEASE ORAL at 21:30

## 2017-12-09 RX ADMIN — DIVALPROEX SODIUM 500 MG: 500 TABLET, FILM COATED, EXTENDED RELEASE ORAL at 08:24

## 2017-12-09 RX ADMIN — Medication 400 MG: at 08:23

## 2017-12-09 RX ADMIN — QUETIAPINE FUMARATE 25 MG: 25 TABLET ORAL at 21:30

## 2017-12-09 RX ADMIN — MIRTAZAPINE 15 MG: 15 TABLET, FILM COATED ORAL at 21:30

## 2017-12-09 RX ADMIN — DESVENLAFAXINE SUCCINATE 50 MG: 50 TABLET, EXTENDED RELEASE ORAL at 08:24

## 2017-12-09 RX ADMIN — TEMAZEPAM 15 MG: 15 CAPSULE ORAL at 22:25

## 2017-12-09 NOTE — PROGRESS NOTES
Progress Note - Behavioral Health   Tracey Mcmahon 81 y o  male MRN: [id number]  Unit/Bed#: YK1 565-01 Encounter: [id number]    Patient seen, chart reviewed, discussed with staff  Nursing staff reports the patient refused physical therapy yesterday  Staff reports that he was isolative to his room and slept much of the day  On approach this morning patient initially withdrawn  Patient then was cooperative and much more conversive that he has been in the past   Patient states that he feels as though his head is foggy   He reports that when he was hospitalized in the past he was started on the Cymbalta at after about four days he felt as though [address] had lifted   Patient states that he had felt much better with the Cymbalta initially however unfortunately this did stop to work for him after a period of time  Patient states that he feels as though his mind is not right quote  Patient denies any suicidal thoughts  He does continue with depression and apathy  He reports he has no motivation or interest   Patient states he feels as though he is unable to do anything  The patient states that he does prefer to [place] to himself   He is not [place] group person   Patient describes himself as a loner  He states that he has felt much better than this in the past and is frustrated with his depression and his traumatic brain injury  The patient has been compliant with his medications and did comply with his blood work  Depakote level was 62  Patient states that he had been on Depakote for a number of years for his seizure disorder      Behavior over the last 24 hours:  unchanged  Sleep: hypersomnia  Appetite: normal  Medication side effects: No   ROS: no complaints  /60   Pulse 73   Temp (!) 96 8 °F (36 °C) (Tympanic)   Resp 16   Ht 5' 11" (1 803 m)   Wt 98 9 kg (218 lb)   SpO2 97%   BMI 30 40 kg/m²     Medications:   Current Facility-Administered Medications   Medication Dose Route Frequency    acetaminophen (TYLENOL) tablet 650 mg  650 mg Oral Q6H PRN    aluminum-magnesium hydroxide-simethicone (MYLANTA) 200-200-20 mg/5 mL oral suspension 30 mL  30 mL Oral Q4H PRN    benztropine (COGENTIN) injection 1 mg  1 mg Intramuscular Q8H PRN    benztropine (COGENTIN) tablet 1 mg  1 mg Oral Q6H PRN    desvenlafaxine succinate (PRISTIQ) 24 hr tablet 50 mg  50 mg Oral Daily    divalproex sodium (DEPAKOTE ER) 24 hr tablet 1,000 mg  1,000 mg Oral HS    haloperidol (HALDOL) tablet 2 mg  2 mg Oral Q6H PRN    haloperidol lactate (HALDOL) injection 2 mg  2 mg Intramuscular Q6H PRN    influenza inactivated quadrivalent vaccine (FLULAVAL) IM injection 0 5 mL  0 5 mL Intramuscular Prior to discharge    levothyroxine tablet 75 mcg  75 mcg Oral Daily    magnesium hydroxide (MILK OF MAGNESIA) 400 mg/5 mL oral suspension 30 mL  30 mL Oral Daily PRN    magnesium oxide (MAG-OX) tablet 400 mg  400 mg Oral Daily    mirtazapine (REMERON) tablet 15 mg  15 mg Oral HS    QUEtiapine (SEROquel) tablet 25 mg  25 mg Oral HS    tamsulosin (FLOMAX) capsule 0 4 mg  0 4 mg Oral Daily With Dinner    temazepam (RESTORIL) capsule 15 mg  15 mg Oral HS PRN    traMADol (ULTRAM) tablet 50 mg  50 mg Oral Q6H PRN       Labs:   Admission on 12/01/2017   Component Date Value Ref Range Status    Valproic Acid, Total 12/09/2017 62  50 - 100 ug/mL Final       Mental Status Evaluation:  Appearance:  age appropriate and disheveled   Behavior:  Initially uncooperative then improved eye contact and more conversive and engaged in conversation   Speech:  normal pitch and normal volume   Mood:  anxious and depressed   Affect:  constricted   Thought Process:  More goal-directed   Thought Content:  normal   Perceptual Disturbances: No auditory or visual hallucinations   Risk Potential: none   Sensorium:  person and place   Cognition:  grossly intact   Consciousness:  alert and awake    Attention: attention span appeared shorter than expected for age Insight:  limited   Judgment: limited   Gait/Station: Seen lying in bed   Motor Activity: no abnormal movements     Progress Toward Goals:  Minimal change    Assessment/Plan   Principal Problem:    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 [facility])  Active Problems:    Seizure disorder (HCC)      Recommended Treatment:   Monitor with addition of Pristiq 50 mg p  o  daily which was started yesterday  Discussed the addition of this medication and change from Cymbalta  Depakote level was 62  Will change Depakote from 500 mg b i d  to 1000 mg at bedtime since patient is on the extended release Depakote  Continue with mirtazapine and Seroquel at bedtime  Continue with group therapy, milieu therapy and occupational therapy  Risks, benefits and possible side effects of Medications:   Risks, benefits, and possible side effects of medications explained to patient and patient verbalizes understanding  Counseling / Coordination of Care  Total floor / unit time spent today 25 minutes  Greater than 50% of total time was spent with the patient and / or family counseling and / or coordination of care  A description of the counseling / coordination of care:  Medication management, chart review, patient interview

## 2017-12-09 NOTE — PLAN OF CARE
Depression     Refrain from isolation Not Progressing     Attend and participate in unit activities, including therapeutic, recreational, and educational groups Not Progressing     Complete daily ADLs, including personal hygiene independently, as able Not Progressing        Ineffective Coping     Participates in unit activities Not Progressing          Depression     Treatment Goal: Demonstrate behavioral control of depressive symptoms, verbalize feelings of improved mood/affect, and adopt new coping skills prior to discharge Progressing     Verbalize thoughts and feelings Progressing     Refrain from harming self Progressing     Refrain from self-neglect [address] Discharge to home or other facility with appropriate resources Progressing        Nutrition/Hydration-ADULT     Nutrient/Hydration intake appropriate for improving, restoring or maintaining nutritional needs Progressing        Prexisting or High Potential for Compromised Skin Integrity     Skin integrity is maintained or improved Progressing

## 2017-12-09 NOTE — PROGRESS NOTES
Pt remains seclusive in bed  Medication compliant  Denies SI/HI/AH/VH  Report less depression and anxiety  Fair appetite  Will continue to monitor

## 2017-12-10 RX ORDER — MIRTAZAPINE 15 MG/1
22.5 TABLET, FILM COATED ORAL
Status: DISCONTINUED | OUTPATIENT
Start: 2017-12-10 | End: 2017-12-11 | Stop reason: HOSPADM

## 2017-12-10 RX ADMIN — DIVALPROEX SODIUM 1000 MG: 500 TABLET, FILM COATED, EXTENDED RELEASE ORAL at 21:17

## 2017-12-10 RX ADMIN — MIRTAZAPINE 22.5 MG: 15 TABLET, FILM COATED ORAL at 21:18

## 2017-12-10 RX ADMIN — TEMAZEPAM 15 MG: 15 CAPSULE ORAL at 22:24

## 2017-12-10 RX ADMIN — DESVENLAFAXINE SUCCINATE 50 MG: 50 TABLET, EXTENDED RELEASE ORAL at 08:19

## 2017-12-10 RX ADMIN — TAMSULOSIN HYDROCHLORIDE 0.4 MG: 0.4 CAPSULE ORAL at 17:10

## 2017-12-10 RX ADMIN — Medication 400 MG: at 08:19

## 2017-12-10 RX ADMIN — QUETIAPINE FUMARATE 25 MG: 25 TABLET ORAL at 21:17

## 2017-12-10 RX ADMIN — LEVOTHYROXINE SODIUM 75 MCG: 75 TABLET ORAL at 08:19

## 2017-12-10 NOTE — PROGRESS NOTES
Pt calm and pleasant  Medication compliant  Denies all s/s  Out for lunch  More interactive with staffs  Pt was satisfied about Remeron dose increased, and wished to have a good sleep tonight  Will continue to monitor

## 2017-12-10 NOTE — PROGRESS NOTES
Pt seclusive to his room the remainder of the evening  Denies all S/S  Cooperative and able to make needs known  Med compliant as well  PRN Restoril given at HS  Will monitor

## 2017-12-10 NOTE — PROGRESS NOTES
Progress Note - Behavioral Health   Krystle Shelley 81 y o  male MRN: [id number]  Unit/Bed#: PV8 565-01 Encounter: [id number]    Patient seen, chart reviewed, discussed with staff  Nursing staff notes patient was seclusive to his room and to bed for much of the day yesterday  Patient did report less depression anxiety though  Patient slept through the night with p r n  Restoril  On approach this morning the patient is much more conversive and cooperative  He has improved eye contact  He is more polite and easier to engage in conversation  Patient states that he still does not feel much better although he does acknowledge she feels more talkative  He states he does not yet feel ready to participate in groups as he feels he will not benefit from a group at this time  He denies any suicidal ideation  He states he was feeling tired often during the day which is why prefers to stay in bed  Discussed medications and changes with patient and he was receptive to this      Behavior over the last 24 hours:  Slightly improved  Sleep: normal  Appetite: normal  Medication side effects: No  ROS: no complaints  /70   Pulse 77   Temp 97 5 °F (36 4 °C) (Tympanic)   Resp 16   Ht 5' 11" (1 803 m)   Wt 98 9 kg (218 lb)   SpO2 98%   BMI 30 40 kg/m²     Medications:   Current Facility-Administered Medications   Medication Dose Route Frequency    acetaminophen (TYLENOL) tablet 650 mg  650 mg Oral Q6H PRN    aluminum-magnesium hydroxide-simethicone (MYLANTA) 200-200-20 mg/5 mL oral suspension 30 mL  30 mL Oral Q4H PRN    benztropine (COGENTIN) injection 1 mg  1 mg Intramuscular Q8H PRN    benztropine (COGENTIN) tablet 1 mg  1 mg Oral Q6H PRN    desvenlafaxine succinate (PRISTIQ) 24 hr tablet 50 mg  50 mg Oral Daily    divalproex sodium (DEPAKOTE ER) 24 hr tablet 1,000 mg  1,000 mg Oral HS    haloperidol (HALDOL) tablet 2 mg  2 mg Oral Q6H PRN    haloperidol lactate (HALDOL) injection 2 mg  2 mg Intramuscular Q6H PRN    influenza inactivated quadrivalent vaccine (FLULAVAL) IM injection 0 5 mL  0 5 mL Intramuscular Prior to discharge    levothyroxine tablet 75 mcg  75 mcg Oral Daily    magnesium hydroxide (MILK OF MAGNESIA) 400 mg/5 mL oral suspension 30 mL  30 mL Oral Daily PRN    magnesium oxide (MAG-OX) tablet 400 mg  400 mg Oral Daily    mirtazapine (REMERON) tablet 15 mg  15 mg Oral HS    QUEtiapine (SEROquel) tablet 25 mg  25 mg Oral HS    tamsulosin (FLOMAX) capsule 0 4 mg  0 4 mg Oral Daily With Dinner    temazepam (RESTORIL) capsule 15 mg  15 mg Oral HS PRN    traMADol (ULTRAM) tablet 50 mg  50 mg Oral Q6H PRN       Labs:   Admission on 12/01/2017   Component Date Value Ref Range Status    Valproic Acid, Total 12/09/2017 62  50 - 100 ug/mL Final       Mental Status Evaluation:  Appearance:  age appropriate, casually dressed and Hospital attire   Behavior:  More cooperative, polite   Speech:  normal pitch and normal volume   Mood:  anxious and depressed   Affect:  Slightly more reactive   Thought Process:  goal directed   Thought Content:  normal   Perceptual Disturbances: None   Risk Potential: none   Sensorium:  person, place, month of year and year   Cognition:  grossly intact   Consciousness:  alert and awake    Attention: attention span appeared shorter than expected for age   Insight:  limited   Judgment: limited   Gait/Station: Lying in bed   Motor Activity: no abnormal movements     Progress Toward Goals:  Slight improvement    Assessment/Plan   Principal Problem:    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 ([facility] [address] )  Active Problems:    Seizure disorder ([facility] [address] )      Recommended Treatment:   Monitor with changing Depakote from 500 mg p  o  b i d  to a 1000 mg p o  q h s   Continue with Pristiq 50 mg p o  daily    Increase mirtazapine to 22 5 mg p o  q h s   Continue Seroquel 25 mg p  o  q h s   Continue to encourage out of bed and in milieu participation  Continue with group therapy, milieu therapy and occupational therapy  Risks, benefits and possible side effects of Medications:   Risks, benefits, and possible side effects of medications explained to patient and patient verbalizes understanding  Counseling / Coordination of Care  Total floor / unit time spent today 25 minutes  Greater than 50% of total time was spent with the patient and / or family counseling and / or coordination of care  A description of the counseling / coordination of care:  Medication management, chart review, patient interview

## 2017-12-11 VITALS
DIASTOLIC BLOOD PRESSURE: 83 MMHG | SYSTOLIC BLOOD PRESSURE: 132 MMHG | HEART RATE: 75 BPM | OXYGEN SATURATION: 97 % | BODY MASS INDEX: 30.52 KG/M2 | HEIGHT: 71 IN | WEIGHT: 218 LBS | TEMPERATURE: 97.4 F | RESPIRATION RATE: 16 BRPM

## 2017-12-11 PROCEDURE — 90686 IIV4 VACC NO PRSV 0.5 ML IM: CPT | Performed by: PSYCHIATRY & NEUROLOGY

## 2017-12-11 PROCEDURE — G0008 ADMIN INFLUENZA VIRUS VAC: HCPCS | Performed by: PSYCHIATRY & NEUROLOGY

## 2017-12-11 RX ORDER — DESVENLAFAXINE 50 MG/1
50 TABLET, EXTENDED RELEASE ORAL DAILY
Qty: 30 TABLET | Refills: 0 | Status: SHIPPED | OUTPATIENT
Start: 2017-12-12 | End: 2018-09-24 | Stop reason: HOSPADM

## 2017-12-11 RX ORDER — MIRTAZAPINE 7.5 MG/1
22.5 TABLET, FILM COATED ORAL
Qty: 90 TABLET | Refills: 0 | Status: SHIPPED | OUTPATIENT
Start: 2017-12-11 | End: 2018-01-24

## 2017-12-11 RX ORDER — DIVALPROEX SODIUM 500 MG/1
1000 TABLET, EXTENDED RELEASE ORAL
Qty: 60 TABLET | Refills: 0 | Status: SHIPPED | OUTPATIENT
Start: 2017-12-11 | End: 2018-01-24

## 2017-12-11 RX ORDER — QUETIAPINE FUMARATE 25 MG/1
25 TABLET, FILM COATED ORAL
Qty: 30 TABLET | Refills: 0 | Status: SHIPPED | OUTPATIENT
Start: 2017-12-11 | End: 2018-01-24

## 2017-12-11 RX ORDER — TEMAZEPAM 15 MG/1
15 CAPSULE ORAL
Qty: 30 CAPSULE | Refills: 0 | Status: SHIPPED | OUTPATIENT
Start: 2017-12-11 | End: 2018-01-24

## 2017-12-11 RX ADMIN — LEVOTHYROXINE SODIUM 75 MCG: 75 TABLET ORAL at 08:52

## 2017-12-11 RX ADMIN — DESVENLAFAXINE SUCCINATE 50 MG: 50 TABLET, EXTENDED RELEASE ORAL at 08:51

## 2017-12-11 RX ADMIN — INFLUENZA VIRUS VACCINE 0.5 ML: 15; 15; 15; 15 SUSPENSION INTRAMUSCULAR at 13:12

## 2017-12-11 RX ADMIN — Medication 400 MG: at 08:51

## 2017-12-11 NOTE — DISCHARGE INSTR - LAB
Contact Information: If you have any questions, concerns, pended studies, tests and/or procedures, or emergencies regarding your inpatient behavioral health visit  Please contact [facility] unit [phone] and ask to speak to a , nurse or physician  A contact is available 24 hours/ 7 days a week at this number  Summary of Procedures Performed During your Stay:  Below is a list of major procedures performed during your hospital stay and a summary of results:  {PROCEDURES:75392}    Pending Studies     None        If studies are pending at discharge, follow up with your PCP and/or referring provider

## 2017-12-11 NOTE — CASE MANAGEMENT
PC & MATTHEW left for Cari CABRAL Liaison at [employer] to inquire about a transfer for patient to STR   A/w call back

## 2017-12-11 NOTE — PROGRESS NOTES
Pt continues to remain in his room unless he is OOB for meals  Denies all S/S  Is focused on poor sleep he has claimed to have recently  Pt was compliant with HS medications but was requesting a PRN for sleep about an hour later  PRN Restoril was given per pt request but since then pt has been awake twice since claiming not being able to sleep  This writer upon hearing these claims went to access pt to find him asleep already  At this time pt is currently sleeping  Will monitor

## 2017-12-11 NOTE — PROGRESS NOTES
Pt calm and cooperative  Pt denies s/s at current  Pt has been out in the milieu most of morning  Pt continues to be med compliant

## 2017-12-11 NOTE — PLAN OF CARE
Problem: Ineffective Coping  Goal: Participates in unit activities  Interventions:  - Provide therapeutic environment   - Provide required programming   - Redirect inappropriate behaviors    Outcome: Adequate for Discharge  Not attending groups yet calm in his room environment

## 2017-12-11 NOTE — PLAN OF CARE
Problem: DISCHARGE PLANNING  Goal: Discharge to home or other facility with appropriate resources  INTERVENTIONS:  - Identify barriers to discharge w/patient and caregiver  - Arrange for needed discharge resources and transportation as appropriate  - Identify discharge learning needs (meds, wound care, etc )  - Arrange for interpretive services to assist at discharge as needed  - Refer to Case Management Department for coordinating discharge planning if the patient needs post-hospital services based on physician/advanced practitioner order or complex needs related to functional status, cognitive ability, or social support system   Outcome: Adequate for Discharge  Patient is for DC today to [place] for STR

## 2017-12-11 NOTE — DISCHARGE SUMMARY
[facility] 81 [facility] MRN: [id number]  Unit/Bed#: XF7 565-01 Encounter: [id number]     Admission Date: 12/1/2017         Discharge Date: 12/11/2017    Attending Psychiatrist: Yousif Matson MD    Reason for Admission/HPI:     History of Present Illness     Patient is a 81 y o  male with a history of depression who was admitted to the inpatient psychiatric unit on a involuntary 302 commitment basis due to Increased depression and apathy  Patient was initially admitted to [address] [place] [address] with dysuria  Patient was treated for that and Psychiatry consult was ordered due to patient's depression  The patient was disinterested, hopeless, helpless and made suicidal statements to the psychiatrist in consultation therefore he was transferred to [facility] for inpatient treatment  Symptoms prior to admission included worsening depression  Onset of symptoms was gradual starting a few weeks  ago with gradually worsening course since that time  Stressors preceding admission included health issues and limited support  Historical Information     Past Psychiatric History:     Past Inpatient Psychiatric Treatment:  No history of past inpatient psychiatric admissions  Past Outpatient Psychiatric Treatment: No history of past outpatient psychiatric treatment    Past Suicide attempts: no  Past Violent behavior: no  Past Psychiatric medication trials: cymbalta    Substance Abuse History:    Social History     Tobacco History     Smoking Status  Former Smoker    Smokeless Tobacco Use  Never Used          Alcohol History     Alcohol Use Status  No          Drug Use     Drug Use Status  No          Sexual Activity     Sexually Active  Not Asked          Activities of Daily Living    Not Asked                 Alcohol use: denies    Recreational drug use:   denies   History of Inpatient/Outpatient rehabilitation program: no    Family Psychiatric History: Psychiatric Illness: unknown    Social History:    Education: high school diploma/GED  Learning Disabilities: none  Marital History: single  Living arrangement, social support: The patient lives alone  Occupational History: retired  Functioning Relationships:   Limited support system  Legal History: none   History: [place]         Traumatic History:     Abuse: none  Other Traumatic Events:none     Past Medical History:    History of seizures: yes  History of head injury with loss of consciousness: yes    Past Medical History:   Diagnosis Date    Disease of thyroid gland     H/O Clostridium difficile infection     5/16    Hypertension     Psychiatric disorder     Seizures ([facility] [address] )        Meds/Allergies     all current active meds have been reviewed    No Known Allergies    Objective     Vital signs in last 24 hours:  Temp:  [97 4 °F (36 3 °C)-98 3 °F (36 8 °C)] 97 4 °F (36 3 °C)  HR:  [75-99] 75  Resp:  [16-18] 16  BP: (109-132)/(63-83) 132/83      Intake/Output Summary (Last 24 hours) at 12/11/17 1157  Last data filed at 12/11/17 0900   Gross per 24 hour   Intake                0 ml   Output             1375 ml   Net            -1375 ml       Hospital Course: The patient was admitted to the inpatient psychiatric unit and started on every 15 minutes precautions  During the hospitalization the patient was attending individual therapy, group therapy, milieu therapy and occupational therapy  Psychiatric medications were titrated over the hospital stay  To address depression the patient was started on antidepressant Pristiq  Medication doses were titrated during the hospital course  Prior to beginning of treatment medications risks and benefits and possible side effects including risk of suicidality related to treatment with antidepressants were reviewed with the patient  The patient verbalized understanding and agreement for treatment    The patient was also started on Depakote extended release 1000 mg p  o  q h s  for his seizure disorder also for mood stability  Patient was started on Remeron which was titrated to 22 5 mg p  o  q h s  for sleep, appetite, depression  He was also started on a low-dose of Seroquel 25 mg at bedtime for sleep as well as for augmentation of his   antidepressant  Patient was seen in consultation by physical therapy while on the older adult unit  Initially the patient had been refusing in [place] therapy and had been isolative to himself as well as to his room  Toward the end of his stay he had improved and was visible in the milieu much more compliant and cooperative with care and treatment  Patient's symptoms improved gradually over the hospital course  At the end of treatment the patient was doing well  Mood was stable at the time of discharge  The patient denied suicidal ideation, intent or plan at the time of discharge and denied homicidal ideation, intent or plan at the time of discharge  There was no overt psychosis at the time of discharge  Sleep and appetite were improved  The patient was tolerating medications and was not reporting any significant side effects at the time of discharge  Since the patient was doing well at the end of the hospitalization, treatment team felt that the patient could be safely discharged to outpatient care  Since he was doing well at the end of the hospitalization, treatment team felt that he could be safely discharged to outpatient care  The outpatient follow up with a psychiatrist at [facility] was arranged by the unit  upon discharge      Mental Status at Time of Discharge:     Appearance:  age appropriate, casually dressed   Behavior:  Calmer, much more cooperative, improved eye contact, more conversive   Speech:  normal rate and volume   Mood:  dysphoric   Affect:  more reactive, slightly brighter   Thought Process:  goal directed   Thought Content:  normal   Perceptual Disturbances: none   Risk Potential: Suicidal ideation - None   Sensorium:  person, place and time/date   Cognition:  recent and remote memory grossly intact   Consciousness:  alert and awake    Attention: attention span and concentration appear shorter than expected for age   Insight:  improving   Judgment: improved   Gait/Station: Milla chair   Motor Activity: no abnormal movements     Admission Diagnosis:  Principal Problem:    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 ([facility] )  Active Problems:    Seizure disorder [facility])      Discharge Diagnosis:     Principal Problem:    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 ([facility] )  Active Problems:    Seizure disorder ([place] [address] )  Resolved Problems:    * No resolved hospital problems  *      Lab results:    Admission on 12/01/2017   Component Date Value    Valproic Acid, Total 12/09/2017 62        Discharge Medications:    See after visit summary for reconciled discharge medications provided to patient and family  Discharge instructions/Information to patient and family:     See after visit summary for information provided to patient and family  Provisions for Follow-Up Care:    See after visit summary for information related to follow-up care and any pertinent home health orders  Discharge Statement     I spent 30 minutes discharging the patient  This time was spent on the day of discharge  I had direct contact with the patient on the day of discharge  Additional documentation is required if more than 30 minutes were spent on discharge:    I reviewed with Maru Dawson importance of compliance with medications and outpatient treatment after discharge

## 2017-12-11 NOTE — CASE MANAGEMENT
Patient is for DC today to [place] for STR  He will be going to [place] 1 Report # [phone]  [facility] transport scheduled for 1500  Patient's family has been made aware of transfer  He is refusing to sign any paperwork  IMM letter not signed

## 2017-12-19 ENCOUNTER — GENERIC CONVERSION - ENCOUNTER (OUTPATIENT)
Dept: OTHER | Facility: OTHER | Age: 72
End: 2017-12-19

## 2017-12-29 ENCOUNTER — GENERIC CONVERSION - ENCOUNTER (OUTPATIENT)
Dept: OTHER | Facility: OTHER | Age: 72
End: 2017-12-29

## 2018-01-05 ENCOUNTER — ALLSCRIPTS OFFICE VISIT (OUTPATIENT)
Dept: OTHER | Facility: OTHER | Age: 73
End: 2018-01-05

## 2018-01-10 ENCOUNTER — HOSPITAL ENCOUNTER (EMERGENCY)
Facility: HOSPITAL | Age: 73
Discharge: HOME | End: 2018-01-10
Attending: EMERGENCY MEDICINE
Payer: MEDICARE

## 2018-01-10 VITALS
SYSTOLIC BLOOD PRESSURE: 176 MMHG | DIASTOLIC BLOOD PRESSURE: 97 MMHG | RESPIRATION RATE: 18 BRPM | OXYGEN SATURATION: 89 % | TEMPERATURE: 97.8 F | HEART RATE: 94 BPM

## 2018-01-10 DIAGNOSIS — T83.9XXA PROBLEM WITH FOLEY CATHETER, INITIAL ENCOUNTER (HCC): Primary | ICD-10-CM

## 2018-01-10 PROCEDURE — 99283 EMERGENCY DEPT VISIT LOW MDM: CPT

## 2018-01-11 NOTE — ED PROVIDER NOTES
History  Chief Complaint   Patient presents with    Urinary Catheter Problem     Pt presents to the ED for evaluation of a urinary catheter "bag leaking" problem  Denies complaints     27-year-old male presents to emergency room for evaluation of his urinary catheter bag  States each night it over flows and needs a bigger bag  States the catheter is otherwise working, properly denies pain  Denies fever nausea vomiting abdominal pain or back pain  History provided by:  Patient      Prior to Admission Medications   Prescriptions Last Dose Informant Patient Reported? Taking? QUEtiapine (SEROquel) 25 mg tablet   No No   Sig: Take 1 tablet by mouth daily at bedtime   desvenlafaxine succinate (PRISTIQ) 50 mg 24 hr tablet   No No   Sig: Take 1 tablet by mouth daily   divalproex sodium (DEPAKOTE ER) 500 mg 24 hr tablet   No No   Sig: Take 2 tablets by mouth daily at bedtime   influenza inactivated quadrivalent vaccine (FLULAVAL) 0 5 ML JOE   No No   Sig: Inject 0 5 mL into the shoulder, thigh, or buttocks prior to discharge (inlfuenza) for up to 1 dose   levothyroxine 75 mcg tablet   No No   Sig: Take 1 tablet by mouth daily   magnesium oxide (MAG-OX) 400 mg   No No   Sig: Take 1 tablet by mouth daily   mirtazapine (REMERON) 7 5 MG tablet   No No   Sig: Take 3 tablets by mouth daily at bedtime   tamsulosin (FLOMAX) 0 4 mg   No No   Sig: Take 1 capsule by mouth daily with dinner   temazepam (RESTORIL) 15 mg capsule   No No   Sig: Take 1 capsule by mouth daily at bedtime as needed for sleep for up to 10 days      Facility-Administered Medications: None       Past Medical History:   Diagnosis Date    Disease of thyroid gland     H/O Clostridium difficile infection     5/16    Hypertension     Psychiatric disorder     Seizures ([facility] [address] )        History reviewed  No pertinent surgical history  History reviewed  No pertinent family history  I have reviewed and agree with the history as documented      Social History   Substance Use Topics    Smoking status: Former Smoker    Smokeless tobacco: Never Used    Alcohol use No        Review of Systems   Constitutional: Negative for chills and fever  Gastrointestinal: Negative for abdominal pain, nausea and vomiting  Genitourinary: Negative for flank pain, hematuria, penile pain and testicular pain  Musculoskeletal: Negative for back pain  Skin: Negative for rash and wound  Physical Exam  ED Triage Vitals [01/10/18 1922]   Temperature Pulse Respirations Blood Pressure SpO2   97 8 °F (36 6 °C) 94 18 (!) 176/97 (!) 89 %      Temp Source Heart Rate Source Patient Position - Orthostatic VS BP Location FiO2 (%)   Oral Monitor Sitting Left arm --      Pain Score       No Pain           Orthostatic Vital Signs  Vitals:    01/10/18 1922   BP: (!) 176/97   Pulse: 94   Patient Position - Orthostatic VS: Sitting       Physical Exam   Constitutional: He appears well-developed and well-nourished  Abdominal: Soft  There is no tenderness  There is no CVA tenderness  Skin: Skin is warm and dry  Capillary refill takes less than 2 seconds  Psychiatric: He has a normal mood and affect  Nursing note and vitals reviewed  ED Medications  Medications - No data to display    Diagnostic Studies  Results Reviewed     None                 No orders to display              Procedures  Procedures       Phone Contacts  ED Phone Contact    ED Course  ED Course                                MDM  Number of Diagnoses or Management Options  Problem with Wood catheter, initial encounter [facility]):   Risk of Complications, Morbidity, and/or Mortality  General comments: New leg bag attached by RN, she informed amount of how to switch bag to a a larger different type of bag to wear at bedtime      Patient Progress  Patient progress: stable    CritCare Time    Disposition  Final diagnoses:   Problem with Wood catheter, initial encounter [facility])     Time reflects when diagnosis was documented in both MDM as applicable and the Disposition within this note     Time User Action Codes Description Comment    1/10/2018 10:08 PM Oanh Mallory  9XXA] Problem with Wood catheter, initial encounter [facility])       ED Disposition     ED Disposition Condition Comment    Discharge  Rubén Orr  discharge to home/self care  Condition at discharge: Good        Follow-up Information     Follow up With Specialties Details Why Contact Info Additional Information    Your Urologist  In 3 days       [address] [facility] Emergency Medicine  If symptoms worsen [address] [facility]  [id number], [address], [place], [place], [address]        Patient's Medications   Discharge Prescriptions    No medications on file     No discharge procedures on file      ED Provider  Electronically Signed by           Bucky Penny PA-C  01/10/18 9870

## 2018-01-11 NOTE — ED NOTES
Leg bag changed for patient  Drainage bag provided for night time       [address] [facility], RN  01/10/18 1679

## 2018-01-11 NOTE — DISCHARGE INSTRUCTIONS
Urinary Leg Bag   WHAT YOU NEED TO KNOW:   What is a urinary leg bag? A urinary leg bag holds urine that drains from your catheter  It fits under your clothes and allows you to do your normal daily activities  How do I use a urinary leg bag? · Wash your hands  before and after you touch your catheter, tubing, or drainage bag  Use soap and water  This reduces the risk of infection  · Strap your leg bag to your thigh or calf  Make sure the straps are comfortable  The straps can cause problems with blood flow in your leg if they are too tight  · Clean the tip of the drainage tube with alcohol  before attaching it to your catheter  This helps prevent bacteria from getting into your catheter  · The connecting tube should not pull on your catheter  Skin breakdown can occur if there is constant pulling on the catheter  · Check the tube often to make sure it is not kinked or twisted  Blockage in the tube can cause urine to back up into your bladder  Your urine must flow straight through the tube into your leg bag  · Always keep the leg bag below your bladder  This prevents urine from the bag going back into your bladder, which may cause an infection  · Empty your leg bag when it is ½ full, or every 3 hours  A full bag may break or disconnect from the catheter  · Change to your bedside bag before you go to bed  Your bedside bag can hold more urine  Do not use your leg bag at night because it could become too full or break  · Clean your leg bag after every use  Fill the bag with 2 parts vinegar and 3 parts water  Let it soak for 20 minutes, then rinse and let dry  Follow your healthcare provider's instruction on replacing your leg bag with a new one  CARE AGREEMENT:   You have the right to help plan your care  Learn about your health condition and how it may be treated  Discuss treatment options with your caregivers to decide what care you want to receive   You always have the right to refuse treatment  The above information is an  only  It is not intended as medical advice for individual conditions or treatments  Talk to your doctor, nurse or pharmacist before following any medical regimen to see if it is safe and effective for you  © 2017 [employer] Information is for End User's use only and may not be sold, redistributed or otherwise used for commercial purposes  All illustrations and images included in CareNotes® are the copyrighted property of [employer]  or [employer]

## 2018-01-13 ENCOUNTER — HOSPITAL ENCOUNTER (EMERGENCY)
Facility: HOSPITAL | Age: 73
Discharge: HOME | End: 2018-01-13
Attending: EMERGENCY MEDICINE | Admitting: EMERGENCY MEDICINE
Payer: MEDICARE

## 2018-01-13 VITALS
BODY MASS INDEX: 28.84 KG/M2 | HEART RATE: 92 BPM | OXYGEN SATURATION: 96 % | SYSTOLIC BLOOD PRESSURE: 173 MMHG | WEIGHT: 206.79 LBS | RESPIRATION RATE: 20 BRPM | DIASTOLIC BLOOD PRESSURE: 105 MMHG | TEMPERATURE: 98.1 F

## 2018-01-13 DIAGNOSIS — T83.9XXA: Primary | ICD-10-CM

## 2018-01-13 LAB
BACTERIA UR QL AUTO: ABNORMAL /HPF
BILIRUB UR QL STRIP: NEGATIVE
CLARITY UR: CLEAR
COLOR UR: YELLOW
GLUCOSE UR STRIP-MCNC: NEGATIVE MG/DL
HGB UR QL STRIP.AUTO: ABNORMAL
KETONES UR STRIP-MCNC: NEGATIVE MG/DL
LEUKOCYTE ESTERASE UR QL STRIP: NEGATIVE
NITRITE UR QL STRIP: NEGATIVE
NON-SQ EPI CELLS URNS QL MICRO: ABNORMAL /HPF
PH UR STRIP.AUTO: 7 [PH] (ref 4.5–8)
PROT UR STRIP-MCNC: >=300 MG/DL
RBC #/AREA URNS AUTO: ABNORMAL /HPF
SP GR UR STRIP.AUTO: 1.02 (ref 1–1.03)
UROBILINOGEN UR QL STRIP.AUTO: 0.2 E.U./DL
WBC #/AREA URNS AUTO: ABNORMAL /HPF

## 2018-01-13 PROCEDURE — 81001 URINALYSIS AUTO W/SCOPE: CPT | Performed by: EMERGENCY MEDICINE

## 2018-01-13 PROCEDURE — 99283 EMERGENCY DEPT VISIT LOW MDM: CPT

## 2018-01-13 NOTE — ED PROVIDER NOTES
History  Chief Complaint   Patient presents with    Urinary Catheter Problem     pt with urinary catheter since December, reports catheter leaking at tip of penis since last night, pt has not seen a urologist since insertion of catheter     53-year-old male presents to the emergency department relating that he has been passing urine around his Wood catheter  This started overnight  Patient reports some discomfort in his abdomen and relates that it feels like his bladder is filling up  He last emptied his leg bag last night  There has been no drainage overnight  Aside from the pressure in the lower abdomen he feels well  He denies having had any fevers, nausea or vomiting  Catheter was placed in December  Patient unsure reason for requiring this  He denies having had difficulty with its placement relates that he did have a catheter on another occasion in the past   He relates that a family member coordinate his follow-up appointments  He is uncertain as to the name of his urologist   He is uncertain as to whether not he is on any antibiotics  Prior to Admission Medications   Prescriptions Last Dose Informant Patient Reported? Taking?    QUEtiapine (SEROquel) 25 mg tablet   No No   Sig: Take 1 tablet by mouth daily at bedtime   desvenlafaxine succinate (PRISTIQ) 50 mg 24 hr tablet   No No   Sig: Take 1 tablet by mouth daily   divalproex sodium (DEPAKOTE ER) 500 mg 24 hr tablet   No No   Sig: Take 2 tablets by mouth daily at bedtime   influenza inactivated quadrivalent vaccine (FLULAVAL) 0 5 ML JOE   No No   Sig: Inject 0 5 mL into the shoulder, thigh, or buttocks prior to discharge (inlfuenza) for up to 1 dose   levothyroxine 75 mcg tablet   No No   Sig: Take 1 tablet by mouth daily   magnesium oxide (MAG-OX) 400 mg   No No   Sig: Take 1 tablet by mouth daily   mirtazapine (REMERON) 7 5 MG tablet   No No   Sig: Take 3 tablets by mouth daily at bedtime   tamsulosin (FLOMAX) 0 4 mg   No No   Sig: Take 1 capsule by mouth daily with dinner   temazepam (RESTORIL) 15 mg capsule   No No   Sig: Take 1 capsule by mouth daily at bedtime as needed for sleep for up to 10 days      Facility-Administered Medications: None       Past Medical History:   Diagnosis Date    Disease of thyroid gland     H/O Clostridium difficile infection     5/16    Hypertension     Psychiatric disorder     Seizures ([facility] [address] )        History reviewed  No pertinent surgical history  History reviewed  No pertinent family history  I have reviewed and agree with the history as documented  Social History   Substance Use Topics    Smoking status: Former Smoker    Smokeless tobacco: Never Used    Alcohol use No        Review of Systems   All other systems reviewed and are negative  Physical Exam  ED Triage Vitals [01/13/18 0838]   Temperature Pulse Respirations Blood Pressure SpO2   98 1 °F (36 7 °C) 104 22 (!) 194/117 96 %      Temp Source Heart Rate Source Patient Position - Orthostatic VS BP Location FiO2 (%)   Oral Monitor Lying Right arm --      Pain Score       --           Orthostatic Vital Signs  Vitals:    01/13/18 0838 01/13/18 0900   BP: (!) 194/117 (!) 173/105   Pulse: 104 92   Patient Position - Orthostatic VS: Lying Sitting       Physical Exam   Constitutional: He is oriented to person, place, and time  He appears well-developed and well-nourished  Eyes: Conjunctivae and EOM are normal    Cardiovascular: Normal rate and regular rhythm  Pulmonary/Chest: Effort normal and breath sounds normal    Abdominal: Soft  Bowel sounds are normal  He exhibits distension (suprapubic)  No CVA tenderness   Musculoskeletal: Normal range of motion  Neurological: He is alert and oriented to person, place, and time  Skin: Skin is warm and dry  Psychiatric: He has a normal mood and affect  His behavior is normal    Nursing note and vitals reviewed        ED Medications  Medications - No data to display    Diagnostic Studies  Results Reviewed     Procedure Component Value Units Date/Time    Urine Microscopic [[id number]]  (Abnormal) Collected:  01/13/18 0942    Lab Status:  Final result Specimen:  Urine from Urine, Indwelling Frias Catheter Updated:  01/13/18 0958     RBC, UA 10-20 (A) /hpf      WBC, UA 1-2 (A) /hpf      Epithelial Cells None Seen /hpf      Bacteria, UA Occasional /hpf     UA w Reflex to Microscopic w Reflex to Culture [[id number]]  (Abnormal) Collected:  01/13/18 0942    Lab Status:  Final result Specimen:  Urine from Urine, Indwelling Frias Catheter Updated:  01/13/18 0951     Color, UA Yellow     Clarity, UA Clear     Specific Morris, UA 1 020     pH, UA 7 0     Leukocytes, UA Negative     Nitrite, UA Negative     Protein, UA >=300 (A) mg/dl      Glucose, UA Negative mg/dl      Ketones, UA Negative mg/dl      Urobilinogen, UA 0 2 E U /dl      Bilirubin, UA Negative     Blood, UA Large (A)                 No orders to display              Procedures  Bladder Cath  Date/Time: 1/13/2018 9:31 AM  Performed by: Sanam Zheng  Authorized by: Sanam NANCE     Patient location:  ED and bedside  Consent:     Consent obtained:  Verbal    Consent given by:  Patient  Pre-procedure details:     Procedure purpose:  Therapeutic  Procedure details:     Procedure performed by provider due to: replacement of obstructed frias  Catheter insertion:  Indwelling    Approach: natural orifice      Catheter type:  Latex    Catheter size:  16 Fr    Number of attempts:  1    Successful placement: yes      Urine appearance: Initially cloudy followed by clear light yellow urine  Post-procedure details:     Patient tolerance of procedure:   Tolerated well, no immediate complications           Phone Contacts  ED Phone Contact    ED Course  ED Course                                MDM  CritCare Time    Disposition  Final diagnoses:   Urinary catheter complication [facility])     Time reflects when diagnosis was documented in both MDM as applicable and the Disposition within this note     Time User Action Codes Description Comment    1/13/2018  9:55 AM La NANCE Add [T83  9XXA] Urinary catheter complication [facility])       ED Disposition     ED Disposition Condition Comment    Discharge  Rubén Orr  discharge to home/self care  Condition at discharge: Good        Follow-up Information     Follow up With Specialties Details Why Contact Info    With your urologist   Schedule an appointment for the next couple of weeks if you do not already have one upcoming  Discharge Medication List as of 1/13/2018  9:56 AM      CONTINUE these medications which have NOT CHANGED    Details   desvenlafaxine succinate (PRISTIQ) 50 mg 24 hr tablet Take 1 tablet by mouth daily, Starting Tue 12/12/2017, Print      divalproex sodium (DEPAKOTE ER) 500 mg 24 hr tablet Take 2 tablets by mouth daily at bedtime, Starting Mon 12/11/2017, Print      influenza inactivated quadrivalent vaccine (FLULAVAL) 0 5 ML JOE Inject 0 5 mL into the shoulder, thigh, or buttocks prior to discharge (inlfuenza) for up to 1 dose, Starting Mon 12/11/2017, Print      levothyroxine 75 mcg tablet Take 1 tablet by mouth daily, Starting Sat 12/2/2017, No Print      magnesium oxide (MAG-OX) 400 mg Take 1 tablet by mouth daily, Starting Sat 12/2/2017, No Print      mirtazapine (REMERON) 7 5 MG tablet Take 3 tablets by mouth daily at bedtime, Starting Mon 12/11/2017, Print      QUEtiapine (SEROquel) 25 mg tablet Take 1 tablet by mouth daily at bedtime, Starting Mon 12/11/2017, Print      tamsulosin (FLOMAX) 0 4 mg Take 1 capsule by mouth daily with dinner, Starting Fri 12/1/2017, No Print      temazepam (RESTORIL) 15 mg capsule Take 1 capsule by mouth daily at bedtime as needed for sleep for up to 10 days, Starting Mon 12/11/2017, Until Thu 12/21/2017, Print           No discharge procedures on file      ED Provider  Electronically Signed by Karrie Martinez MD  01/13/18 0425

## 2018-01-13 NOTE — DISCHARGE INSTRUCTIONS
Wood Catheter Placement and Care   WHAT YOU NEED TO KNOW:   A Wood catheter is a sterile tube that is inserted into your bladder to drain urine  It is also called an indwelling urinary catheter  The tip of the catheter has a small balloon filled with solution that holds the catheter inside your bladder  DISCHARGE INSTRUCTIONS:   Seek care immediately if:   · Your catheter comes out  · You suddenly have material that looks like sand in the tubing or drainage bag  · No urine is draining into the bag and you have checked the system  · You have pain in your hip, back, pelvis, or lower abdomen  · You are confused or cannot think clearly  Contact your healthcare provider if:   · You have a fever  · You have bladder spasms for more than 1 day after the catheter is placed  · You see blood in the tubing or drainage bag  · You have a rash or itching where the catheter tube is secured to your skin  · Urine leaks from or around the catheter, tubing, or drainage bag  · The closed drainage system has accidently come open or apart  · You see a layer of crystals inside the tubing  · You have questions or concerns about your condition or care  Care for your Wood catheter:   · Clean your genital area 2 times every day  Clean your catheter and the area around where it was inserted  Use soap and water  Clean your anal opening and catheter area after every bowel movement  · Secure the catheter tube  so you do not pull or move the catheter  This helps prevent pain and bladder spasms  Healthcare providers will show you how to use medical tape or a strap to secure the catheter tube to your body  · Keep a closed drainage system  Your Wood catheter should always be attached to the drainage bag to form a closed system  Do not disconnect any part of the closed system unless you need to change the bag  Care for your drainage bag:   · Ask if a leg bag is right for you    A leg bag can be worn under your clothes  Ask your healthcare provider for more information about a leg bag  · Keep the drainage bag below the level of your waist   This helps stop urine from moving back up the tubing and into your bladder  Do not loop or kink the tubing  This can cause urine to back up and collect in your bladder  Do not let the drainage bag touch or lie on the floor  · Empty the drainage bag when needed  The weight of a full drainage bag can be painful  Empty the drainage bag every 3 to 6 hours or when it is ? full  · Clean and change the drainage bag as directed  Ask your healthcare provider how often you should change the drainage bag and what cleaning solution to use  Wear disposable gloves when you change the bag  Do not allow the end of the catheter or tubing to touch anything  Clean the ends with an alcohol pad before you reconnect them  What to do if problems develop:   · No urine is draining into the bag:      ¨ Check for kinks in the tubing and straighten them out  ¨ Check the tape or strap used to secure the catheter tube to your skin  Make sure it is not blocking the tube  ¨ Make sure you are not sitting or lying on the tubing  ¨ Make sure the urine bag is hanging below the level of your waist     · Urine leaks from or around the catheter, tubing, or drainage bag:  Check if the closed drainage system has accidently come open or apart  Clean the catheter and tubing ends with a new alcohol pad and reconnect them  Prevent an infection:   · Wash your hands often  Wash before and after you touch your catheter, tubing, or drainage bag  Use soap and water  Wear clean disposable gloves when you care for your catheter or disconnect the drainage bag  Wash your hands before you prepare or eat food  · Drink liquids as directed  Ask your healthcare provider how much liquid to drink each day and which liquids are best for you   Liquids will help flush your kidneys and bladder to help prevent infection  Follow up with your healthcare provider as directed:  Write down your questions so you remember to ask them during your visits  © 2017 [employer] Information is for End User's use only and may not be sold, redistributed or otherwise used for commercial purposes  All illustrations and images included in CareNotes® are the copyrighted property of [employer]  or [employer]  The above information is an  only  It is not intended as medical advice for individual conditions or treatments  Talk to your doctor, nurse or pharmacist before following any medical regimen to see if it is safe and effective for you

## 2018-01-16 ENCOUNTER — GENERIC CONVERSION - ENCOUNTER (OUTPATIENT)
Dept: OTHER | Facility: OTHER | Age: 73
End: 2018-01-16

## 2018-01-16 NOTE — PROGRESS NOTES
Chief Complaint  Patient presents for f/u urinary retention      Active Problems    1  Urinary retention (788 20) (R33 9)    Current Meds   1  Cymbalta 30 MG Oral Capsule Delayed Release Particles (DULoxetine HCl); Therapy: (Recorded:13Jun2016) to Recorded   2  Cymbalta 60 MG Oral Capsule Delayed Release Particles (DULoxetine HCl); Therapy: (Recorded:13Jun2016) to Recorded   3  Divalproex Sodium  MG Oral Tablet Extended Release 24 Hour; Therapy: (Recorded:13Jun2016) to Recorded   4  Flomax 0 4 MG Oral Capsule (Tamsulosin HCl); Therapy: (Recorded:13Jun2016) to Recorded   5  Keflex 500 MG Oral Capsule (Cephalexin Monohydrate); Therapy: (Recorded:13Jun2016) to Recorded   6  Levothyroxine Sodium 75 MCG Oral Tablet; Therapy: (Recorded:13Jun2016) to Recorded   7  Lisinopril 5 MG Oral Tablet; Therapy: (Recorded:13Jun2016) to Recorded   8  Melatonin 3 MG Oral Capsule; Therapy: (Recorded:13Jun2016) to Recorded   9  Nortriptyline HCl - 25 MG Oral Capsule; Therapy: (Recorded:13Jun2016) to Recorded   10  Oxycodone-Acetaminophen 5-325 MG Oral Tablet; Therapy: (Recorded:13Jun2016) to Recorded   11  Tylenol 8 Hour 650 MG Oral Tablet Extended Release; Therapy: (Recorded:13Jun2016) to Recorded    Allergies    1  No Known Drug Allergies    Procedure    Procedure: Uroflow PVR   Patient stated that he voided 3-4 times throughout the day   94ml      Assessment    1  Urinary retention (788 20) (R33 9)    Plan  Urinary retention    · Follow-up visit in 5 days Evaluation and Treatment  Follow-up PVR  Status: Hold For -  Scheduling,Retrospective By Protocol Authorization  Requested for: [id number]   Ordered; For: Urinary retention; Ordered By: Joselin Watters Performed:  Due: [address]   Electronically signed by :  Mago Velasco, ; Jul 13 2016  3:24PM EST                       (Author)    Electronically signed by : KHRIS Frausto ; Jul 19 2016  1:52PM EST

## 2018-01-16 NOTE — PROGRESS NOTES
Chief Complaint  PVR: Patient called office and stated he cannot urinate  Active Problems    1  Urinary retention (788 20) (R33 9)    Current Meds   1  Cymbalta 30 MG Oral Capsule Delayed Release Particles (DULoxetine HCl); Therapy: (Recorded:13Jun2016) to Recorded   2  Cymbalta 60 MG Oral Capsule Delayed Release Particles (DULoxetine HCl); Therapy: (Recorded:13Jun2016) to Recorded   3  Divalproex Sodium  MG Oral Tablet Extended Release 24 Hour; Therapy: (Recorded:13Jun2016) to Recorded   4  Flomax 0 4 MG Oral Capsule (Tamsulosin HCl); Therapy: (Recorded:13Jun2016) to Recorded   5  Keflex 500 MG Oral Capsule (Cephalexin); Therapy: (Recorded:13Jun2016) to Recorded   6  Levothyroxine Sodium 75 MCG Oral Tablet; Therapy: (Recorded:13Jun2016) to Recorded   7  Lisinopril 5 MG Oral Tablet; Therapy: (Recorded:13Jun2016) to Recorded   8  Melatonin 3 MG Oral Capsule; Therapy: (Recorded:13Jun2016) to Recorded   9  Nortriptyline HCl - 25 MG Oral Capsule; Therapy: (Recorded:13Jun2016) to Recorded   10  Oxycodone-Acetaminophen 5-325 MG Oral Tablet; Therapy: (Recorded:13Jun2016) to Recorded   11  Tylenol 8 Hour 650 MG Oral Tablet Extended Release; Therapy: (Recorded:13Jun2016) to Recorded    Allergies    1  No Known Drug Allergies    Vitals  Signs    Systolic: 723  Diastolic: 80  Heart Rate: 74  Height: 5 ft 11 in  Weight: 219 lb   BMI Calculated: 30 54  BSA Calculated: 2 19    Procedure    Procedure: Bladder Ultrasound Post Void Residual    Test indication: Urinary Retention  The procedure's were discussed with the patient  Equipment And Procedure: The patient Patient voided prior to PVR  Amount not measured  U/S Findings: US PVR 48 ml  Assessment    1  Urinary retention (788 20) (R33 9)    Plan  Urinary retention    · (1) URINALYSIS WITH MICROSCOPIC; Status:Active - Retrospective By Protocol  Authorization;  Requested LNM:[id number];    Perform:[facility]; Due:59Nkz5655; Last Updated Ruben Brice; 9/20/2016 3:35:44 PM;Ordered; For:Urinary retention; Ordered By:Pelon Yao;   · (1) URINE CULTURE; Source:Urine, Clean Catch; Status:Active - Retrospective By  Protocol Authorization; Requested NJI:[id number];    Perform:[facility]; Due:04Yvq0937; Last Updated Ruben Brice; 9/20/2016 3:35:44 PM;Ordered; For:Urinary retention; Ordered By:Pelon Yao;   · Cystourethroscopy - POC; Status:Active - Perform Order,Retrospective By Protocol  Authorization; Requested for:[id number];    Perform: In Office; Due:22Ijb1852;Ordered; For:Urinary retention; Ordered By:Savanna Sanders;   · Follow-up visit in 6 weeks Evaluation and Treatment  Follow-up  Status: Hold For -  Scheduling,Retrospective By Protocol Authorization  Requested for: [id number]   Ordered; For: Urinary retention; Ordered By: Shannen Sanders Performed:  Due: 10QIX6264    Discussion/Summary    Patient went into bathroom and voided prior to getting called back  Urine sample not collected  Patient's brother is with him  Discussed follow up with SHERRIE Poole (brother) aware that patient needs UA, C&S and that patient cannot void now  He will take patient to [facility] lab        Signatures   Electronically signed by : Jose Leone, ; Sep 20 2016  3:42PM EST                       (Author)    Electronically signed by : KHRIS Reynoso ; Sep 20 2016  8:54PM EST

## 2018-01-17 NOTE — PROGRESS NOTES
Chief Complaint  pt presents for PVR h/o urinary retention  Active Problems    1  Urinary retention (788 20) (R33 9)    Current Meds   1  Cymbalta 30 MG Oral Capsule Delayed Release Particles (DULoxetine HCl); Therapy: (Recorded:13Jun2016) to Recorded   2  Cymbalta 60 MG Oral Capsule Delayed Release Particles (DULoxetine HCl); Therapy: (Recorded:13Jun2016) to Recorded   3  Divalproex Sodium  MG Oral Tablet Extended Release 24 Hour; Therapy: (Recorded:13Jun2016) to Recorded   4  Flomax 0 4 MG Oral Capsule (Tamsulosin HCl); Therapy: (Recorded:13Jun2016) to Recorded   5  Keflex 500 MG Oral Capsule (Cephalexin); Therapy: (Recorded:13Jun2016) to Recorded   6  Levothyroxine Sodium 75 MCG Oral Tablet; Therapy: (Recorded:13Jun2016) to Recorded   7  Lisinopril 5 MG Oral Tablet; Therapy: (Recorded:13Jun2016) to Recorded   8  Melatonin 3 MG Oral Capsule; Therapy: (Recorded:13Jun2016) to Recorded   9  Nortriptyline HCl - 25 MG Oral Capsule; Therapy: (Recorded:13Jun2016) to Recorded   10  Oxycodone-Acetaminophen 5-325 MG Oral Tablet; Therapy: (Recorded:13Jun2016) to Recorded   11  Tylenol 8 Hour 650 MG Oral Tablet Extended Release; Therapy: (Recorded:13Jun2016) to Recorded    Allergies    1  No Known Drug Allergies    Vitals  Signs    Systolic: 387  Diastolic: 88  Heart Rate: 88  Height: 5 ft 11 in  Weight: 216 lb   BMI Calculated: 30 13  BSA Calculated: 2 18    Procedure    Procedure: Bladder Ultrasound Post Void Residual    Equipment And Procedure: The patient voided 50 ml denies problems voiding over the weekend and states bowels are moving well  U/S Findings: PVR:184 82 ml  Assessment    1  Urinary retention (788 20) (R33 9)    Plan  Urinary retention    · Follow-up visit in 6 weeks Evaluation and Treatment  Follow-up  Status: Hold For -  Scheduling,Retrospective By Protocol Authorization  Requested for: [id number]   Ordered;  For: Urinary retention; Ordered By: Marcellus Billy Performed:  Due: 75Jaf8869   · Measure Post Void Residual - POC; Status:Unauthorized - Requires Signature; Requested for:32Ems2152;    Perform: In Office; KFT:[id number];DIPTI; For:Urinary retention; Ordered By:Daniela Yadav; Discussion/Summary    PVR improving  Per Misty CAMPBELL, pt will return in 6 weeks for another visit with PVR to ensure continued emptying of the bladder  Pt to continue tamsulosin  pt knows to call back in the meantime should he develop any difficulty urinating or have any questions  This was reviewed with pt's sister in law as well        Future Appointments    Date/Time Provider Specialty Site   08/30/2016 09:15 AM Jhonny Carvalho [facility] [address]     Signatures   Electronically signed by : Debora Haas RN; Jul 18 2016 10:14AM EST                       (Author)    Electronically signed by : KHRIS Castelan ; Jul 18 2016 12:44PM EST

## 2018-01-18 ENCOUNTER — ALLSCRIPTS OFFICE VISIT (OUTPATIENT)
Dept: OTHER | Facility: OTHER | Age: 73
End: 2018-01-18

## 2018-01-18 NOTE — PROCEDURES
Procedures by Bridger Myles MD at 2016   4:38 PM      Author:  Bridger Myles MD Service:  Neurology Author Type:  Physician     Filed:  2016  4:45 PM Date of Service:  2016  4:38 PM Status:  Signed     :  Bridger Myles MD (Physician)            ELECTROENCEPHALOGRAM    Patient Name:  Cesar David  MRN: [id number]   :  1945 File #: SLT    Age: 73 y o  Encounter #: [id number]   Date performed: 2016 Referring Provider: SHERRIE Beal          Report date: 2016          Study type: awake and drowsy EEG    ICD 10 diagnosis: Other Epilepsy G40 909 and Transient alteration of awareness R40 4    Patient History:  EEG is requested to assess for seizures and/or classification of epilepsy  Patient is 73 y o  male with questionable history of a seizure disorder, traumatic brain injury, gait dysfunction, who was admitted to hospital for multiple falls,  found on the floor by his brother, and disheveled appearance  Current AEDs:  Medications include:   DULoxetine 30 mg Oral Daily   DULoxetine 60 mg Oral Daily   enoxaparin 40 mg Subcutaneous Daily   levothyroxine 50 mcg Oral Daily   lisinopril 5 mg Oral Daily   mirtazapine 7 5 mg Oral HS   nortriptyline 25 mg Oral HS   valproic acid 250 mg Oral Daily       Description of Procedure: The EEG was performed with electrodes applied using the International 10-20 System  Additional electrodes used included EOG, ECG and T1/T2 electrodes  A single lead ECG channel is also  present  At least 16 channels are reviewed at a time  and formatted into longitudinal bipolar, transverse bipolar, and referential (to common reference or calculated common reference) montages  The EEG was recorded  with the patient awake  The recording was technically satisfactory  EEG was recorded from 15:30 to 16:00  Findings:   Background Activity: The background is grossly symmetric with respect to voltages and activities    During wakefulness, the background is fairly organized with anterior  low amplitude beta activity and low-moderate amplitude posterior alpha activity  There is a symmetric 10 5-11 Hz posterior dominant rhythm that attenuates with  eye opening  Activation Procedures:   Hyperventilation was not performed  Stepped photic stimulation from 1 to 30 fps was performed and produced no abnormality  Abnormal Findings: There is intermittent low-moderate voltage polymorphic 1-3 Hz delta activity over the bilateral anterior temporal regions  Other findings: The single lead ECG shows a regular sinus cardiac rhythm with a widened QRS complex  Interpretation: This is an abnormal 30 minutes awake EEG due to intermittent focal slowing in the bilateral anterior temporal regions  This finding indicates the presence of bilateral anterior temporal focal cerebral dysfunction, which may be structural in origin  Ina Fulton MD  [facility]  Janet GUTIERREZ    May  9 2016  4:45PM [facility] Standard Time

## 2018-01-23 NOTE — MISCELLANEOUS
Message   Recorded as Task   Date: 01/16/2018 10:08 AM, Created By: Lynn Real   Task Name: Call Back   Assigned To: Devon Eid,TEAM   Regarding Patient: Meri Domínguez, Status: Active   CommentMadhuri Olson - 16 Jan 2018 10:08 AM     TASK CREATED  Patient would like to speak to a nurse about his catheter  Please call him at [phone]  Courtney Jennings - 16 Jan 2018 10:33 AM     TASK EDITED  Spoke to patient's brother who wanted to make an appt  with Dr Lashawn Ann to discuss surgery  Made an appt  1/18/18 @ 1045A  Active Problems    1  Urinary retention (788 20) (R33 9)    Current Meds   1  Cymbalta 30 MG Oral Capsule Delayed Release Particles (DULoxetine HCl); Therapy: (Recorded:13Jun2016) to Recorded   2  Cymbalta 60 MG Oral Capsule Delayed Release Particles (DULoxetine HCl); Therapy: (Recorded:13Jun2016) to Recorded   3  Divalproex Sodium  MG Oral Tablet Extended Release 24 Hour; Therapy: (Recorded:13Jun2016) to Recorded   4  Finasteride 5 MG Oral Tablet; TAKE 1 TABLET DAILY; Therapy: 32WEA7196 to (Evaluate:31Ucf3479); Last Rx:45Jnz9272 Ordered   5  Keflex 500 MG Oral Capsule (Cephalexin); Therapy: (Recorded:13Jun2016) to Recorded   6  Levothyroxine Sodium 75 MCG Oral Tablet; Therapy: (Recorded:13Jun2016) to Recorded   7  Lisinopril 5 MG Oral Tablet; Therapy: (Recorded:13Jun2016) to Recorded   8  Melatonin 3 MG Oral Capsule; Therapy: (Recorded:13Jun2016) to Recorded   9  Nortriptyline HCl - 25 MG Oral Capsule; Therapy: (Recorded:13Jun2016) to Recorded   10  Oxycodone-Acetaminophen 5-325 MG Oral Tablet; Therapy: (Recorded:13Jun2016) to Recorded   11  Tamsulosin HCl - 0 4 MG Oral Capsule; Take 1 capsule twice daily; Therapy: 92VZR6509 to (Evaluate:98Lzm6256); Last Rx:90Dkn5241 Ordered   12  Tylenol 8 Hour 650 MG Oral Tablet Extended Release; Therapy: (Recorded:13Jun2016) to Recorded    Allergies    1   No Known Drug Allergies    Signatures   Electronically signed by : Coreen Lagunas, ; Jan 16 2018 10:33AM EST                       (Author)

## 2018-01-23 NOTE — MISCELLANEOUS
Provider Comments  Provider Comments:   Patient did not show for 12/19/17 appt   [facility]      Signatures   Electronically signed by : Messi Arguello, ; Dec 19 2017  1:09PM EST                       (Author)

## 2018-01-23 NOTE — MISCELLANEOUS
Message   Recorded as Task   Date: 11/29/2017 09:43 AM, Created By: Sebastian Cantor   Task Name: Care Coordination   Assigned To: Devon Hyman 240,TEAM   Regarding Patient: Adina Wright, Status: In Progress   Jody Long - 29 Nov 2017 9:43 AM     TASK CREATED  Please contact patient/caregiver with hospital follow-up appointment with Dr Ana Cristina Otero in 2 weeks  Patient was seen in consultation regarding severe urinary retention, bilateral hydronephrosis, acute kidney injury and bladder wall thickening  Patient will require cystoscopic evaluation  Patient remains currently hospitalized  Please call early next week  Thank you   Jewell Boss - 29 Nov 2017 9:46 AM     TASK REASSIGNED: Previously Assigned To Devon PachecoB,TEAM   Jeremy Desouza - 04 Dec 2017 9:13 AM     TASK EDITED  [facility] for pt to return call to sched follow up appt   Jeremy Desouza - 04 Dec 2017 9:13 AM     TASK IN PROGRESS   Tanya Robles - 05 Dec 2017 2:35 PM     TASK EDITED  [facility] FOR PT  TO CALL BACK AND SCHEDULE APPT  Active Problems    1  Urinary retention (788 20) (R33 9)    Current Meds   1  Cymbalta 30 MG Oral Capsule Delayed Release Particles (DULoxetine HCl); Therapy: (Recorded:13Jun2016) to Recorded   2  Cymbalta 60 MG Oral Capsule Delayed Release Particles (DULoxetine HCl); Therapy: (Recorded:13Jun2016) to Recorded   3  Divalproex Sodium  MG Oral Tablet Extended Release 24 Hour; Therapy: (Recorded:13Jun2016) to Recorded   4  Flomax 0 4 MG Oral Capsule (Tamsulosin HCl); Therapy: (Recorded:13Jun2016) to Recorded   5  Keflex 500 MG Oral Capsule (Cephalexin); Therapy: (Recorded:13Jun2016) to Recorded   6  Levothyroxine Sodium 75 MCG Oral Tablet; Therapy: (Recorded:13Jun2016) to Recorded   7  Lisinopril 5 MG Oral Tablet; Therapy: (Recorded:13Jun2016) to Recorded   8  Melatonin 3 MG Oral Capsule; Therapy: (Recorded:13Jun2016) to Recorded   9   Nortriptyline HCl - 25 MG Oral Capsule; Therapy: (Recorded:13Jun2016) to Recorded   10  Oxycodone-Acetaminophen 5-325 MG Oral Tablet; Therapy: (Recorded:13Jun2016) to Recorded   11  Tylenol 8 Hour 650 MG Oral Tablet Extended Release; Therapy: (Recorded:13Jun2016) to Recorded    Allergies    1   No Known Drug Allergies    Signatures   Electronically signed by : Tiffanie Sandra, ; Dec  5 2017  2:35PM EST                       (Author)

## 2018-01-23 NOTE — MISCELLANEOUS
Message   Recorded as Task   Date: 11/29/2017 09:43 AM, Created By: Junior Leyden   Task Name: Care Coordination   Assigned To: Devon Hyman 240,TEAM   Regarding Patient: Taina Speaker, Status: In Progress   Adeola Rodarte - 29 Nov 2017 9:43 AM     TASK CREATED  Please contact patient/caregiver with hospital follow-up appointment with Dr Val Paniagua in 2 weeks  Patient was seen in consultation regarding severe urinary retention, bilateral hydronephrosis, acute kidney injury and bladder wall thickening  Patient will require cystoscopic evaluation  Patient remains currently hospitalized  Please call early next week  Thank you   Jewell Boss - 29 Nov 2017 9:46 AM     TASK REASSIGNED: Previously Assigned To Devon Hyman 101B,TEAM   Jeremy Desouza - 04 Dec 2017 9:13 AM     TASK EDITED  [facility] for pt to return call to sched follow up appt   Jeremy Desouza - 04 Dec 2017 9:13 AM     TASK IN PROGRESS   Tanya Robles - 05 Dec 2017 2:35 PM     TASK EDITED  formerly [facility] FOR PT  TO CALL BACK AND SCHEDULE APPT  Tanya Robles - 08 Dec 2017 8:10 AM     TASK EDITED  WILL ATTEMPT TO MAKE APPT  LATER TODAY  Active Problems    1  Urinary retention (788 20) (R33 9)    Current Meds   1  Cymbalta 30 MG Oral Capsule Delayed Release Particles (DULoxetine HCl); Therapy: (Recorded:13Jun2016) to Recorded   2  Cymbalta 60 MG Oral Capsule Delayed Release Particles (DULoxetine HCl); Therapy: (Recorded:13Jun2016) to Recorded   3  Divalproex Sodium  MG Oral Tablet Extended Release 24 Hour; Therapy: (Recorded:13Jun2016) to Recorded   4  Flomax 0 4 MG Oral Capsule (Tamsulosin HCl); Therapy: (Recorded:13Jun2016) to Recorded   5  Keflex 500 MG Oral Capsule (Cephalexin); Therapy: (Recorded:13Jun2016) to Recorded   6  Levothyroxine Sodium 75 MCG Oral Tablet; Therapy: (Recorded:13Jun2016) to Recorded   7  Lisinopril 5 MG Oral Tablet; Therapy: (Recorded:13Jun2016) to Recorded   8   Melatonin 3 MG Oral Capsule; Therapy: (Recorded:96Wwa0139) to Recorded   9  Nortriptyline HCl - 25 MG Oral Capsule; Therapy: (Recorded:13Jun2016) to Recorded   10  Oxycodone-Acetaminophen 5-325 MG Oral Tablet; Therapy: (Recorded:13Jun2016) to Recorded   11  Tylenol 8 Hour 650 MG Oral Tablet Extended Release; Therapy: (Recorded:69Lvo3367) to Recorded    Allergies    1   No Known Drug Allergies    Signatures   Electronically signed by : Angelique Roberson, ; Dec  8 2017  8:10AM EST                       (Author)

## 2018-01-24 ENCOUNTER — HOSPITAL ENCOUNTER (OUTPATIENT)
Dept: NON INVASIVE DIAGNOSTICS | Facility: HOSPITAL | Age: 73
Discharge: HOME | DRG: 666 | End: 2018-01-24
Attending: UROLOGY
Payer: MEDICARE

## 2018-01-24 ENCOUNTER — TRANSCRIBE ORDERS (OUTPATIENT)
Dept: ADMINISTRATIVE | Facility: HOSPITAL | Age: 73
End: 2018-01-24

## 2018-01-24 ENCOUNTER — ANESTHESIA EVENT (INPATIENT)
Dept: PERIOP | Facility: HOSPITAL | Age: 73
DRG: 666 | End: 2018-01-24
Payer: MEDICARE

## 2018-01-24 ENCOUNTER — APPOINTMENT (OUTPATIENT)
Dept: LAB | Facility: HOSPITAL | Age: 73
DRG: 666 | End: 2018-01-24
Attending: UROLOGY
Payer: MEDICARE

## 2018-01-24 ENCOUNTER — APPOINTMENT (OUTPATIENT)
Dept: PREADMISSION TESTING | Facility: HOSPITAL | Age: 73
DRG: 666 | End: 2018-01-24
Payer: MEDICARE

## 2018-01-24 VITALS
HEIGHT: 71 IN | SYSTOLIC BLOOD PRESSURE: 140 MMHG | WEIGHT: 209 LBS | BODY MASS INDEX: 29.26 KG/M2 | DIASTOLIC BLOOD PRESSURE: 80 MMHG

## 2018-01-24 VITALS
WEIGHT: 220 LBS | BODY MASS INDEX: 30.8 KG/M2 | DIASTOLIC BLOOD PRESSURE: 84 MMHG | SYSTOLIC BLOOD PRESSURE: 124 MMHG | HEIGHT: 71 IN

## 2018-01-24 VITALS
WEIGHT: 219 LBS | SYSTOLIC BLOOD PRESSURE: 122 MMHG | HEIGHT: 71 IN | DIASTOLIC BLOOD PRESSURE: 80 MMHG | BODY MASS INDEX: 30.66 KG/M2 | RESPIRATION RATE: 16 BRPM

## 2018-01-24 DIAGNOSIS — N40.1 ENLARGED PROSTATE WITH URINARY OBSTRUCTION: ICD-10-CM

## 2018-01-24 DIAGNOSIS — N40.1 ENLARGED PROSTATE WITH URINARY RETENTION: Primary | ICD-10-CM

## 2018-01-24 DIAGNOSIS — Z01.818 PREOP EXAMINATION: ICD-10-CM

## 2018-01-24 DIAGNOSIS — R33.8 ENLARGED PROSTATE WITH URINARY RETENTION: Primary | ICD-10-CM

## 2018-01-24 DIAGNOSIS — N13.8 ENLARGED PROSTATE WITH URINARY OBSTRUCTION: ICD-10-CM

## 2018-01-24 DIAGNOSIS — R33.8 ENLARGED PROSTATE WITH URINARY RETENTION: ICD-10-CM

## 2018-01-24 DIAGNOSIS — N40.1 ENLARGED PROSTATE WITH URINARY RETENTION: ICD-10-CM

## 2018-01-24 LAB
ALBUMIN SERPL BCP-MCNC: 3.4 G/DL (ref 3.5–5)
ALP SERPL-CCNC: 48 U/L (ref 46–116)
ALT SERPL W P-5'-P-CCNC: 21 U/L (ref 12–78)
ANION GAP SERPL CALCULATED.3IONS-SCNC: 11 MMOL/L (ref 4–13)
AST SERPL W P-5'-P-CCNC: 17 U/L (ref 5–45)
ATRIAL RATE: 64 BPM
BASOPHILS # BLD AUTO: 0.03 THOUSANDS/ΜL (ref 0–0.1)
BASOPHILS NFR BLD AUTO: 1 % (ref 0–1)
BILIRUB SERPL-MCNC: 0.27 MG/DL (ref 0.2–1)
BUN SERPL-MCNC: 30 MG/DL (ref 5–25)
CALCIUM SERPL-MCNC: 8.4 MG/DL (ref 8.3–10.1)
CHLORIDE SERPL-SCNC: 106 MMOL/L (ref 100–108)
CO2 SERPL-SCNC: 27 MMOL/L (ref 21–32)
CREAT SERPL-MCNC: 1.82 MG/DL (ref 0.6–1.3)
EOSINOPHIL # BLD AUTO: 0.1 THOUSAND/ΜL (ref 0–0.61)
EOSINOPHIL NFR BLD AUTO: 2 % (ref 0–6)
ERYTHROCYTE [DISTWIDTH] IN BLOOD BY AUTOMATED COUNT: 14.2 % (ref 11.6–15.1)
GFR SERPL CREATININE-BSD FRML MDRD: 37 ML/MIN/1.73SQ M
GLUCOSE SERPL-MCNC: 80 MG/DL (ref 65–140)
HCT VFR BLD AUTO: 37 % (ref 36.5–49.3)
HGB BLD-MCNC: 12 G/DL (ref 12–17)
LYMPHOCYTES # BLD AUTO: 1.41 THOUSANDS/ΜL (ref 0.6–4.47)
LYMPHOCYTES NFR BLD AUTO: 26 % (ref 14–44)
MCH RBC QN AUTO: 29 PG (ref 26.8–34.3)
MCHC RBC AUTO-ENTMCNC: 32.4 G/DL (ref 31.4–37.4)
MCV RBC AUTO: 89 FL (ref 82–98)
MONOCYTES # BLD AUTO: 0.41 THOUSAND/ΜL (ref 0.17–1.22)
MONOCYTES NFR BLD AUTO: 8 % (ref 4–12)
NEUTROPHILS # BLD AUTO: 3.53 THOUSANDS/ΜL (ref 1.85–7.62)
NEUTS SEG NFR BLD AUTO: 63 % (ref 43–75)
NRBC BLD AUTO-RTO: 0 /100 WBCS
P AXIS: 67 DEGREES
PLATELET # BLD AUTO: 207 THOUSANDS/UL (ref 149–390)
PMV BLD AUTO: 10.8 FL (ref 8.9–12.7)
POTASSIUM SERPL-SCNC: 3.6 MMOL/L (ref 3.5–5.3)
PR INTERVAL: 150 MS
PROT SERPL-MCNC: 6.9 G/DL (ref 6.4–8.2)
QRS AXIS: -67 DEGREES
QRSD INTERVAL: 150 MS
QT INTERVAL: 458 MS
QTC INTERVAL: 472 MS
RBC # BLD AUTO: 4.14 MILLION/UL (ref 3.88–5.62)
SODIUM SERPL-SCNC: 144 MMOL/L (ref 136–145)
T WAVE AXIS: 77 DEGREES
VENTRICULAR RATE: 64 BPM
WBC # BLD AUTO: 5.48 THOUSAND/UL (ref 4.31–10.16)

## 2018-01-24 PROCEDURE — 93010 ELECTROCARDIOGRAM REPORT: CPT | Performed by: INTERNAL MEDICINE

## 2018-01-24 PROCEDURE — 36415 COLL VENOUS BLD VENIPUNCTURE: CPT

## 2018-01-24 PROCEDURE — 87086 URINE CULTURE/COLONY COUNT: CPT

## 2018-01-24 PROCEDURE — 80053 COMPREHEN METABOLIC PANEL: CPT

## 2018-01-24 PROCEDURE — 87077 CULTURE AEROBIC IDENTIFY: CPT

## 2018-01-24 PROCEDURE — 85025 COMPLETE CBC W/AUTO DIFF WBC: CPT

## 2018-01-24 PROCEDURE — 93005 ELECTROCARDIOGRAM TRACING: CPT

## 2018-01-24 PROCEDURE — 87186 SC STD MICRODIL/AGAR DIL: CPT

## 2018-01-24 NOTE — ANESTHESIA PREPROCEDURE EVALUATION
Review of Systems/Medical History  Patient summary reviewed  Chart reviewed      Cardiovascular  Exercise tolerance: good,  Hypertension ,    Pulmonary  Negative pulmonary ROS        GI/Hepatic  Negative GI/hepatic ROS          Negative  ROS        Endo/Other  History of thyroid disease , hypothyroidism,      GYN  Negative gynecology ROS          Hematology  Negative hematology ROS      Musculoskeletal  Negative musculoskeletal ROS        Neurology  Seizures well controlled,     Psychology   Negative psychology ROS              Physical Exam    Airway    Mallampati score: III  TM Distance: <3 FB  Neck ROM: full     Dental       Cardiovascular  Rhythm: regular, Rate: normal,     Pulmonary      Other Findings        Anesthesia Plan  ASA Score- 3     Anesthesia Type- general with ASA Monitors  Additional Monitors:   Airway Plan: ETT  Plan Factors- Patient instructed to abstain from smoking on day of procedure  Patient did not smoke on day of surgery  Induction- intravenous  Postoperative Plan- Plan for postoperative opioid use  Planned trial extubation    Informed Consent- Anesthetic plan and risks discussed with patient  I personally reviewed this patient with the CRNA  Discussed and agreed on the Anesthesia Plan with the CRNA  Jennifer Islas

## 2018-01-26 ENCOUNTER — TELEPHONE (OUTPATIENT)
Dept: UROLOGY | Facility: MEDICAL CENTER | Age: 73
End: 2018-01-26

## 2018-01-26 ENCOUNTER — HOSPITAL ENCOUNTER (INPATIENT)
Facility: HOSPITAL | Age: 73
LOS: 13 days | Discharge: NURSING, REHAB OR LONG-TERM CARE | DRG: 666 | End: 2018-02-08
Attending: UROLOGY | Admitting: UROLOGY
Payer: MEDICARE

## 2018-01-26 DIAGNOSIS — I10 ESSENTIAL HYPERTENSION: Primary | ICD-10-CM

## 2018-01-26 DIAGNOSIS — T83.511S URINARY TRACT INFECTION ASSOCIATED WITH INDWELLING URETHRAL CATHETER, SEQUELA: ICD-10-CM

## 2018-01-26 DIAGNOSIS — N39.0 URINARY TRACT INFECTION ASSOCIATED WITH INDWELLING URETHRAL CATHETER, SEQUELA: ICD-10-CM

## 2018-01-26 DIAGNOSIS — R41.0 CONFUSION: ICD-10-CM

## 2018-01-26 DIAGNOSIS — R33.8 OTHER RETENTION OF URINE (CODE): ICD-10-CM

## 2018-01-26 DIAGNOSIS — N40.1 ENLARGED PROSTATE WITH LOWER URINARY TRACT SYMPTOMS (LUTS): ICD-10-CM

## 2018-01-26 LAB — BACTERIA UR CULT: ABNORMAL

## 2018-01-26 PROCEDURE — 99223 1ST HOSP IP/OBS HIGH 75: CPT | Performed by: UROLOGY

## 2018-01-26 RX ORDER — ONDANSETRON 2 MG/ML
4 INJECTION INTRAMUSCULAR; INTRAVENOUS EVERY 6 HOURS PRN
Status: DISCONTINUED | OUTPATIENT
Start: 2018-01-26 | End: 2018-02-08 | Stop reason: HOSPADM

## 2018-01-26 RX ORDER — DIPHENHYDRAMINE HYDROCHLORIDE 50 MG/ML
25 INJECTION INTRAMUSCULAR; INTRAVENOUS EVERY 6 HOURS PRN
Status: DISCONTINUED | OUTPATIENT
Start: 2018-01-26 | End: 2018-02-03

## 2018-01-26 RX ORDER — ACETAMINOPHEN 325 MG/1
650 TABLET ORAL EVERY 6 HOURS PRN
Status: DISCONTINUED | OUTPATIENT
Start: 2018-01-26 | End: 2018-01-30

## 2018-01-26 RX ADMIN — Medication 1000 MG: at 22:01

## 2018-01-26 NOTE — TELEPHONE ENCOUNTER
I spoke with Giulia Marcos, sister-in-law who he runs all of his decisions by  He has a multiple drug-resistant urinary tract infection and is due to undergo trans urethral resection of the prostate by me this upcoming Wednesday  He needs to be admitted for IV antibiotics so we will make the arrangements to call bed control and get him directly admitted for intravenous antibiotics, Infectious Disease consultation and by that time his urine should be sterilized so I can safely perform the operation

## 2018-01-27 PROBLEM — T83.511A URINARY TRACT INFECTION ASSOCIATED WITH INDWELLING URETHRAL CATHETER (HCC): Status: ACTIVE | Noted: 2018-01-27

## 2018-01-27 PROBLEM — N39.0 URINARY TRACT INFECTION ASSOCIATED WITH INDWELLING URETHRAL CATHETER (HCC): Status: ACTIVE | Noted: 2018-01-27

## 2018-01-27 PROCEDURE — 99223 1ST HOSP IP/OBS HIGH 75: CPT | Performed by: INTERNAL MEDICINE

## 2018-01-27 PROCEDURE — 87040 BLOOD CULTURE FOR BACTERIA: CPT | Performed by: INTERNAL MEDICINE

## 2018-01-27 PROCEDURE — 99231 SBSQ HOSP IP/OBS SF/LOW 25: CPT | Performed by: UROLOGY

## 2018-01-27 RX ADMIN — ACETAMINOPHEN 650 MG: 325 TABLET, FILM COATED ORAL at 15:51

## 2018-01-27 RX ADMIN — VANCOMYCIN 125 MG: KIT at 15:09

## 2018-01-27 RX ADMIN — SODIUM CHLORIDE 1000 MG: 9 INJECTION, SOLUTION INTRAVENOUS at 17:20

## 2018-01-27 RX ADMIN — VANCOMYCIN 125 MG: KIT at 20:03

## 2018-01-27 RX ADMIN — Medication 1000 MG: at 08:59

## 2018-01-27 NOTE — PLAN OF CARE
GENITOURINARY - ADULT     Maintains or returns to baseline urinary function Progressing     Absence of urinary retention Progressing     Urinary catheter remains patent Progressing        INFECTION - ADULT     Absence or prevention of progression during hospitalization Progressing        Potential for Falls     Patient will remain free of falls Progressing

## 2018-01-27 NOTE — PROGRESS NOTES
T-max 100 7°  Admitted for urinary tract infection ESBL resistant to all oral medications  He is currently on meropenem  Infectious Disease has been consulted as well as Internal Medicine  My plan is to proceed with trans urethral resection of the prostate as planned on Wednesday and to keep him on IV antibiotics in the hospital until then  Cultures have confirmed sensitivity to meropenem  He otherwise is doing well

## 2018-01-28 PROBLEM — F33.9 RECURRENT MAJOR DEPRESSIVE DISORDER (HCC): Chronic | Status: ACTIVE | Noted: 2018-01-28

## 2018-01-28 PROBLEM — N18.30 CKD (CHRONIC KIDNEY DISEASE) STAGE 3, GFR 30-59 ML/MIN (HCC): Chronic | Status: ACTIVE | Noted: 2018-01-28

## 2018-01-28 PROCEDURE — 99222 1ST HOSP IP/OBS MODERATE 55: CPT | Performed by: INTERNAL MEDICINE

## 2018-01-28 PROCEDURE — 99232 SBSQ HOSP IP/OBS MODERATE 35: CPT | Performed by: INTERNAL MEDICINE

## 2018-01-28 PROCEDURE — 99231 SBSQ HOSP IP/OBS SF/LOW 25: CPT | Performed by: UROLOGY

## 2018-01-28 RX ORDER — MIRTAZAPINE 15 MG/1
15 TABLET, FILM COATED ORAL
Status: DISCONTINUED | OUTPATIENT
Start: 2018-01-28 | End: 2018-02-08 | Stop reason: HOSPADM

## 2018-01-28 RX ORDER — HYDRALAZINE HYDROCHLORIDE 20 MG/ML
10 INJECTION INTRAMUSCULAR; INTRAVENOUS EVERY 6 HOURS PRN
Status: DISCONTINUED | OUTPATIENT
Start: 2018-01-28 | End: 2018-02-08 | Stop reason: HOSPADM

## 2018-01-28 RX ADMIN — VANCOMYCIN 125 MG: KIT at 22:01

## 2018-01-28 RX ADMIN — VANCOMYCIN 125 MG: KIT at 09:54

## 2018-01-28 RX ADMIN — SODIUM CHLORIDE 1000 MG: 9 INJECTION, SOLUTION INTRAVENOUS at 17:55

## 2018-01-28 RX ADMIN — MIRTAZAPINE 15 MG: 15 TABLET, FILM COATED ORAL at 22:01

## 2018-01-28 NOTE — PROGRESS NOTES
T-max 102 6°, so I am glad I admitted him preoperatively for IV antibiotics  He obviously had quite a serious urinary tract infection brewing  This is ESBL E coli and Infectious Disease has seen him and he had stopped his meropenem and changed him to ertapenem  He is also on vancomycin for prophylaxis for C diff  Plan remains to perform trans urethral resection of the prostate on Wednesday and by that time his urine should be sterilized  Appreciate all input and management from consultants  Diagnosis; ESBL E coli urinary tract infection, probably due to indwelling Wood catheter                    BPH with obstruction with urinary retention, currently Wood catheter dependent                    Fever  Plan:  As above

## 2018-01-28 NOTE — PROGRESS NOTES
Progress Note - Infectious Disease   Spring Chung  81 y o  male MRN: [id number]  Unit/Bed#: [address] [id number] Encounter: [id number]      Impression/Plan:  1  ESBL Proteus UTI-in the setting of urinary retention secondary to BPH  Patient is status post Wood catheter placement and now appears to be having fever  He seems to be tolerating the antibiotics without difficulty  -continue ertapenem  -Wood drainage  -await TURP  -recheck CBC with diff and creatinine tomorrow     2  Fever-likely secondary to 1  No other clear source appreciated  Consideration for the possibility of bacteremia although this is less likely  He does not have any other localizing symptoms   -antibiotics as above  -follow-up blood cultures  -no additional ID workup for now  -monitor temperature  -recommend imaging of the kidneys if the fever would persist despite antibiotic     3  History of C difficile colitis-no current symptomatology  With the patient going to be on broad antibiotics for several days he is at increased risk of relapsing   -continue oral vancomycin prophylaxis  -monitor for the development of diarrhea     4  Obstructive uropathy-secondary to BPH  The patient now has a Wood catheter in place   -Wood drainage  -await TURP  -close Urology follow-up     5  History traumatic brain injury    Antibiotics:  Ertapenem 2  Antibiotics 3  Oral vancomycin 2    Subjective:  Patient has continued to spike fever; no nausea, vomiting, diarrhea; no cough, shortness of breath; no pain  No new symptoms  He is feeling quite tired  Objective:  Vitals:  HR:  [73-93] 73  Resp:  [18-20] 20  BP: ()/(50-83) 133/83  SpO2:  [96 %-97 %] 97 %  Temp (24hrs), Av 9 °F (37 7 °C), Min:98 4 °F (36 9 °C), Max:102 6 °F (39 2 °C)  Current: Temperature: 98 8 °F (37 1 °C)    Physical Exam:   General Appearance:  Somnolent, easily arousable, nontoxic, no acute distress  Throat: Oropharynx moist without lesions      Lungs:   Clear to auscultation bilaterally; no wheezes, rhonchi or rales; respirations unlabored   Heart:  RRR; no murmur, rub or gallop   Abdomen:   Soft, non-tender, non-distended, positive bowel sounds  Extremities: No clubbing, cyanosis or edema   Skin: No new rashes or lesions  No draining wounds noted         Labs, Imaging, & Other studies:   All pertinent labs and imaging studies were personally reviewed    Results from last 7 days  Lab Units 01/24/18  1146   WBC Thousand/uL 5 48   HEMOGLOBIN g/dL 12 0   PLATELETS Thousands/uL 207       Results from last 7 days  Lab Units 01/24/18  1146   SODIUM mmol/L 144   POTASSIUM mmol/L 3 6   CHLORIDE mmol/L 106   CO2 mmol/L 27   ANION GAP mmol/L 11   BUN mg/dL 30*   CREATININE mg/dL 1 82*   EGFR ml/min/1 73sq m 37   GLUCOSE RANDOM mg/dL 80   CALCIUM mg/dL 8 4   AST U/L 17   ALT U/L 21   ALK PHOS U/L 48   TOTAL PROTEIN g/dL 6 9   BILIRUBIN TOTAL mg/dL 0 27       Results from last 7 days  Lab Units 01/24/18  1146   URINE CULTURE  >100,000 cfu/ml Proteus mirabilis ESBL*     Blood cultures x2 sets pending
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
## 2018-01-29 PROBLEM — A49.9 ESBL (EXTENDED SPECTRUM BETA-LACTAMASE) PRODUCING BACTERIA INFECTION: Status: ACTIVE | Noted: 2018-01-29

## 2018-01-29 PROBLEM — Z16.12 ESBL (EXTENDED SPECTRUM BETA-LACTAMASE) PRODUCING BACTERIA INFECTION: Status: ACTIVE | Noted: 2018-01-29

## 2018-01-29 LAB
ANION GAP SERPL CALCULATED.3IONS-SCNC: 7 MMOL/L (ref 4–13)
BASOPHILS # BLD AUTO: 0.03 THOUSANDS/ΜL (ref 0–0.1)
BASOPHILS NFR BLD AUTO: 0 % (ref 0–1)
BUN SERPL-MCNC: 33 MG/DL (ref 5–25)
CALCIUM SERPL-MCNC: 8 MG/DL (ref 8.3–10.1)
CHLORIDE SERPL-SCNC: 107 MMOL/L (ref 100–108)
CO2 SERPL-SCNC: 27 MMOL/L (ref 21–32)
CREAT SERPL-MCNC: 2.3 MG/DL (ref 0.6–1.3)
EOSINOPHIL # BLD AUTO: 0.15 THOUSAND/ΜL (ref 0–0.61)
EOSINOPHIL NFR BLD AUTO: 2 % (ref 0–6)
ERYTHROCYTE [DISTWIDTH] IN BLOOD BY AUTOMATED COUNT: 14.3 % (ref 11.6–15.1)
GFR SERPL CREATININE-BSD FRML MDRD: 28 ML/MIN/1.73SQ M
GLUCOSE SERPL-MCNC: 93 MG/DL (ref 65–140)
HCT VFR BLD AUTO: 32.8 % (ref 36.5–49.3)
HGB BLD-MCNC: 10.6 G/DL (ref 12–17)
LYMPHOCYTES # BLD AUTO: 1.46 THOUSANDS/ΜL (ref 0.6–4.47)
LYMPHOCYTES NFR BLD AUTO: 20 % (ref 14–44)
MCH RBC QN AUTO: 29.1 PG (ref 26.8–34.3)
MCHC RBC AUTO-ENTMCNC: 32.3 G/DL (ref 31.4–37.4)
MCV RBC AUTO: 90 FL (ref 82–98)
MONOCYTES # BLD AUTO: 0.99 THOUSAND/ΜL (ref 0.17–1.22)
MONOCYTES NFR BLD AUTO: 13 % (ref 4–12)
NEUTROPHILS # BLD AUTO: 4.87 THOUSANDS/ΜL (ref 1.85–7.62)
NEUTS SEG NFR BLD AUTO: 65 % (ref 43–75)
NRBC BLD AUTO-RTO: 0 /100 WBCS
PLATELET # BLD AUTO: 139 THOUSANDS/UL (ref 149–390)
PMV BLD AUTO: 11.4 FL (ref 8.9–12.7)
POTASSIUM SERPL-SCNC: 3.8 MMOL/L (ref 3.5–5.3)
RBC # BLD AUTO: 3.64 MILLION/UL (ref 3.88–5.62)
SODIUM SERPL-SCNC: 141 MMOL/L (ref 136–145)
WBC # BLD AUTO: 7.5 THOUSAND/UL (ref 4.31–10.16)

## 2018-01-29 PROCEDURE — 80048 BASIC METABOLIC PNL TOTAL CA: CPT | Performed by: INTERNAL MEDICINE

## 2018-01-29 PROCEDURE — 99233 SBSQ HOSP IP/OBS HIGH 50: CPT | Performed by: INTERNAL MEDICINE

## 2018-01-29 PROCEDURE — 99231 SBSQ HOSP IP/OBS SF/LOW 25: CPT | Performed by: UROLOGY

## 2018-01-29 PROCEDURE — 85025 COMPLETE CBC W/AUTO DIFF WBC: CPT | Performed by: INTERNAL MEDICINE

## 2018-01-29 PROCEDURE — 99221 1ST HOSP IP/OBS SF/LOW 40: CPT | Performed by: PHYSICIAN ASSISTANT

## 2018-01-29 RX ORDER — LANOLIN ALCOHOL/MO/W.PET/CERES
6 CREAM (GRAM) TOPICAL
Status: DISCONTINUED | OUTPATIENT
Start: 2018-01-29 | End: 2018-02-08 | Stop reason: HOSPADM

## 2018-01-29 RX ORDER — SODIUM CHLORIDE 9 MG/ML
75 INJECTION, SOLUTION INTRAVENOUS CONTINUOUS
Status: DISCONTINUED | OUTPATIENT
Start: 2018-01-29 | End: 2018-01-30

## 2018-01-29 RX ADMIN — DIPHENHYDRAMINE HYDROCHLORIDE 25 MG: 50 INJECTION, SOLUTION INTRAMUSCULAR; INTRAVENOUS at 21:42

## 2018-01-29 RX ADMIN — VANCOMYCIN 125 MG: KIT at 09:41

## 2018-01-29 RX ADMIN — VANCOMYCIN 125 MG: KIT at 20:13

## 2018-01-29 RX ADMIN — ACETAMINOPHEN 650 MG: 325 TABLET, FILM COATED ORAL at 23:47

## 2018-01-29 RX ADMIN — SODIUM CHLORIDE 1000 MG: 9 INJECTION, SOLUTION INTRAVENOUS at 18:54

## 2018-01-29 RX ADMIN — MIRTAZAPINE 15 MG: 15 TABLET, FILM COATED ORAL at 21:42

## 2018-01-29 RX ADMIN — MELATONIN TAB 3 MG 6 MG: 3 TAB at 20:13

## 2018-01-29 RX ADMIN — SODIUM CHLORIDE 75 ML/HR: 0.9 INJECTION, SOLUTION INTRAVENOUS at 14:15

## 2018-01-29 NOTE — PROGRESS NOTES
Patient indicated that he was not able to sleep for the last few nights and wanted something to help  Called on call and s/w Basilio Fraser who reviewed patient medications and stated that she would add melatonin to help patient fall asleep

## 2018-01-29 NOTE — PROGRESS NOTES
Tm100 1  Continues on IV abx for ESBL UTI in preparation for TURP this Wednesday  He has no new complaints  Appreciate all consultants management

## 2018-01-29 NOTE — CASE MANAGEMENT
Initial Clinical Review    Admission: Date/Time/Statement: 1/26/18 @ 1958     Orders Placed This Encounter   Procedures    Inpatient Admission     Standing Status:   Standing     Number of Occurrences:   1     Order Specific Question:   Admitting Physician     Answer:   Dileep Sanford [[id number]]     Order Specific Question:   Level of Care     Answer:   Med Surg [[id number]]     Order Specific Question:   Estimated length of stay     Answer:   More than 2 Midnights     Order Specific Question:   Certification     Answer:   I certify that inpatient services are medically necessary for this patient for a duration of greater than two midnights  See H&P and MD Progress Notes for additional information about the patient's course of treatment  ED: Date/Time/Mode of Arrival:   ED Arrival Information     Patient not seen in ED                       Chief Complaint: No chief complaint on file  History of Illness:    Inocencia Velasco  is a 81 y o  male who presents with ESBL E Coli , resistant to all PO meds, found incidentally on preop urine culture for planned TURP for retention 1/31 by me  He is being directly admitted for IV abx prior to TURP to sterilize urine prior to procedure  Is asymptomatic, and has indwelling catheter due to retention   POC relative is sister in law Mrs Raquel Casillas    ED Vital Signs:   ED Triage Vitals   Temperature Pulse Respirations Blood Pressure SpO2   01/26/18 1930 01/26/18 1930 01/26/18 1930 01/26/18 1930 01/26/18 1930   98 4 °F (36 9 °C) 95 18 (!) 170/104 100 %      Temp Source Heart Rate Source Patient Position - Orthostatic VS BP Location FiO2 (%)   01/26/18 1930 01/28/18 0947 01/26/18 1930 01/26/18 1930 --   Tympanic Monitor Lying Right arm       Pain Score       01/26/18 2231       No Pain        Wt Readings from Last 1 Encounters:   01/26/18 93 4 kg (206 lb)       Vital Signs (abnormal):   above    Abnormal Labs/Diagnostic Test Results:    BUN/Creat    33/2 30  (  1/29)    ED Treatment:   Medication Administration - No Administrations Displayed (No Start Event Found)     None          Past Medical/Surgical History: Active Ambulatory Problems     Diagnosis Date Noted    Fall 05/08/2016    Multiple falls 05/08/2016    Scalp Laceration 05/08/2016    Multiple bruises 05/08/2016    Hypothyroidism 05/09/2016    Essential hypertension 05/09/2016    Acute urinary retention 05/16/2016    Clostridium difficile infection 05/16/2016    Urinary incontinence 11/17/2017    Acute renal failure ([facility]F) ([facility] [address] ) 11/17/2017    [facility] 11/20/2017    Polyuria 11/21/2017    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 ([facility] [address] ) 12/02/2017     Resolved Ambulatory Problems     Diagnosis Date Noted    Delirium 05/08/2016    Acute uremia 11/17/2017    Hypomagnesemia 11/21/2017     Past Medical History:   Diagnosis Date    Balance problems     Depression     Disease of thyroid gland     Flat affect     Wood catheter in place     H/O Clostridium difficile infection     History of traumatic brain injury     Hypertension     Poor historian     Risk for falls     Seizures (HCC)     Shortness of breath     Shoulder pain, bilateral     Teeth missing     Uses walker     Wears glasses        Admitting Diagnosis: UTI (urinary tract infection) [N39 0]    Age/Sex: 81 y o  male    Assessment/Plan:   ESBL UTI  Urinary retention  BPH with obstruction, planned for TURP 1/31  Plan:  Admit for IV Meropenem  Id, SLIM consultation for comanagemt    Admission Orders:    IP   1/26  @    2011  Scheduled Meds:   ertapenem 1,000 mg Intravenous Q24H   mirtazapine 15 mg Oral HS   vancomycin 125 mg Oral Q12H [facility]     Continuous Infusions:   sodium chloride 75 mL/hr Last Rate: 75 mL/hr (01/29/18 1415)     PRN Meds:   acetaminophen    diphenhydrAMINE    hydrALAZINE    ondansetron     Reg diet  Cons  IM  Cons  ID  PT/OT    PROGRESS  NOTE  1/27  T-max 100 7°    Admitted for urinary tract infection ESBL resistant to all oral medications  He is currently on meropenem  Infectious Disease has been consulted as well as Internal Medicine  My plan is to proceed with trans urethral resection of the prostate as planned on Wednesday and to keep him on IV antibiotics in the hospital until then  Cultures have confirmed sensitivity to meropenem  He otherwise is doing well  Progress  Note  1/28  -max 102 6°, so I am glad I admitted him preoperatively for IV antibiotics  He obviously had quite a serious urinary tract infection brewing  This is ESBL E coli and Infectious Disease has seen him and he had stopped his meropenem and changed him to ertapenem  He is also on vancomycin for prophylaxis for C diff  Plan remains to perform trans urethral resection of the prostate on Wednesday and by that time his urine should be sterilized  Appreciate all input and management from consultants  Diagnosis; ESBL E coli urinary tract infection, probably due to indwelling Wood catheter                    BPH with obstruction with urinary retention, currently Wood catheter dependent                    Fever    Per  ID  ESBL Proteus UTI-in the setting of urinary retention secondary to BPH  Patient is status post Wood catheter placement and now appears to be having fever  He seems to be tolerating the antibiotics without difficulty   -discontinue meropenem  -ertapenem 1 g IV Q 24 hours  -Wood drainage  -await TURP     2  Fever-likely secondary to 1  No other clear source appreciated  Consideration for the possibility of bacteremia although this is less likely  He does not have any other localizing symptoms   -antibiotics as above  -check blood cultures x2 sets  -no additional ID workup for now  -monitor temperature     3  History of C difficile colitis-no current symptomatology    With the patient going to be on broad antibiotics for several days he is at increased risk of relapsing   -will begin vancomycin 125 mg p  o  q 12 hours for prophylaxis  -monitor for the development of diarrhea

## 2018-01-29 NOTE — PROGRESS NOTES
Progress Note - Infectious Disease   Gary Castro  81 y o  male MRN: [id number]  Unit/Bed#: [facility] [address] 2 -01 Encounter: [id number]      Impression/Plan:  1   ESBL Proteus UTI-in the setting of urinary retention secondary to BPH   Patient is status post Wood catheter placement and now appears to be having fever   He seems to be tolerating the antibiotics without difficulty  -continue ertapenem  -monitor the GFR closely  -if the GFR decreases to below 30, decrease ertapenem dose to 500 mg IV Q 24 hours  -Wood drainage  -await TURP  -recheck BMP tomorrow     2   Fever-likely secondary to 1   No other clear source appreciated   Consideration for the possibility of bacteremia although this is less likely  Tonny Menjivar does not have any other localizing symptoms  Thus far the blood cultures are negative   -antibiotics as above  -follow-up blood cultures  -no additional ID workup for now  -monitor temperature  -recommend imaging of the kidneys if the fever would persist despite antibiotic     3   History of C difficile colitis-no current symptomatology   With the patient going to be on broad antibiotics for several days he is at increased risk of relapsing   -continue oral vancomycin prophylaxis  -monitor for the development of diarrhea     4   Obstructive uropathy-secondary to BPH   The patient now has a Wood catheter in place   -Wood drainage  -await TURP for later this week  -close Urology follow-up     5   History traumatic brain injury    6  Acute kidney injury-complicating chronic kidney disease  The renal function has worsened  Suspect this is a pre renal issue  Consideration for the possibility of sepsis as a cause    -recheck BMP tomorrow  -fluid management as per the primary service  -dose adjust the ertapenem as above  -no additional ID workup for now    Antibiotics:  Ertapenem 3  Antibiotics 4  Oral vancomycin 3    Subjective:  Patient with decreased temperature curve; no reported chills or sweats; no nausea, vomiting, diarrhea; no cough, shortness of breath; no pain  No new symptoms  He is feeling quite fatigued    Objective:  Vitals:  HR:  [80-81] 81  Resp:  [18] 18  BP: (104-143)/(53-86) 107/53  SpO2:  [94 %-97 %] 95 %  Temp (24hrs), Av 2 °F (36 8 °C), Min:97 4 °F (36 3 °C), Max:98 8 °F (37 1 °C)  Current: Temperature: 98 5 °F (36 9 °C)    Physical Exam:   General Appearance:  Somnolent, easily arousable, nontoxic, no acute distress  Throat: Oropharynx moist without lesions  Lungs:   Clear to auscultation bilaterally; no wheezes, rhonchi or rales; respirations unlabored   Heart:  RRR; no murmur, rub or gallop   Abdomen:   Soft, non-tender, non-distended, positive bowel sounds  Extremities: No clubbing, cyanosis or edema   Skin: No new rashes or lesions  No draining wounds noted  Labs, Imaging, & Other studies:   All pertinent labs and imaging studies were personally reviewed    Results from last 7 days  Lab Units 18  0605 18  1146   WBC Thousand/uL 7 50 5 48   HEMOGLOBIN g/dL 10 6* 12 0   PLATELETS Thousands/uL 139* 207       Results from last 7 days  Lab Units 18  0605 18  1146   SODIUM mmol/L 141 144   POTASSIUM mmol/L 3 8 3 6   CHLORIDE mmol/L 107 106   CO2 mmol/L 27 27   ANION GAP mmol/L 7 11   BUN mg/dL 33* 30*   CREATININE mg/dL 2 30* 1 82*   EGFR ml/min/1 73sq m 28 37   GLUCOSE RANDOM mg/dL 93 80   CALCIUM mg/dL 8 0* 8 4   AST U/L  --  17   ALT U/L  --  21   ALK PHOS U/L  --  48   TOTAL PROTEIN g/dL  --  6 9   BILIRUBIN TOTAL mg/dL  --  0 27       Results from last 7 days  Lab Units 18  1400 18  1146   BLOOD CULTURE  No Growth at 24 hrs    No Growth at 24 hrs   --    URINE CULTURE   --  >100,000 cfu/ml Proteus mirabilis ESBL*

## 2018-01-29 NOTE — PROGRESS NOTES
Progress Note -  Internal Medicine / Hospitalists  Gary Castro  81 y o  male MRN: [id number]  Unit/Bed#: [address] 226-01 Encounter: [id number]      ASSESSMENT AND PLAN:  1  ESBL Proteus UTI-secondary to urinary retention from BPH now with Wood  T-max 101 1° last 24 hours  WBC within normal limits  Blood culture no growth  On ertapenem per Infectious Disease  Planned TURP on 2018  2  Essential hypertension-SBP the last 24 hours 107-143  Lisinopril held secondary to rising creatinine  Continue hydralazine p r n   3   Chronic kidney disease stage 3-creatinine 2 3, unclear baseline had acute kidney injury and prior admits  Will provide IVF at hydration  If creatinine continues to increase will consult Nephrology  4  Depression - stable on remeron   5  History of C Diff - he Vanco p o  while on IV antibiotics  6  Asthenia - PT/OT consult        VTE Prophylaxis: Sequential compression device Pita Rodriguez)     Dispo: Uro primary; For TURP ; monitor t/wbc/cr  ______________________________________________________________________    SUBJECTIVE:   Patient seen and examined  Resting comfortably  Denies any pain  Says he feels tired but denies any fevers chills chest pain shortness of breath or palpitations    The patient claims that he has been using his bowels    OBJECTIVE:   Principal Problem:    ESBL (extended spectrum beta-lactamase) producing bacteria infection  Active Problems:    Acute urinary retention    Urinary tract infection associated with indwelling urethral catheter (HCC)    CKD (chronic kidney disease) stage 3, GFR 30-59 ml/min    Recurrent major depressive disorder (HCC)      Vitals:   HR:  [80-81] 81  Resp:  [18] 18  BP: (104-143)/(53-86) 107/53  SpO2:  [94 %-97 %] 95 %  Temp (24hrs), Av 2 °F (36 8 °C), Min:97 4 °F (36 3 °C), Max:98 8 °F (37 1 °C)  Current: Temperature: 98 5 °F (36 9 °C)    Intake/Output Summary (Last 24 hours) at 18 9815  Last data filed at 01/29/18 0612   Gross per 24 hour   Intake              310 ml   Output             2450 ml   Net            -2140 ml       Physical Exam:     General Appearance:    Alert, cooperative, no distress   Head:    Normocephalic, without obvious abnormality, atraumatic   Eyes:    Conjunctiva/corneas clear, EOM's intact       Neck:   Supple, no adenopathy, no JVD   Back:     Symmetric, no curvature, ROM normal, no CVA tenderness   Lungs:     Coarse with poor effort but clear to auscultation bilaterally, no wheezing or rhonchi   Heart:    Regular rate and rhythm, S1 and S2 normal, no murmur, rub   or gallop   Abdomen:     Soft, non-tender, bowel sounds active   : Wood draining clear yellow urine   Extremities:   Extremities normal, atraumatic, no cyanosis or edema   Psych:   Flat Affect   Neurologic:   CNII-XII intact  Weak/deconditioned     Lab, Imaging and other studies:      Results from last 7 days  Lab Units 01/29/18  0605   WBC Thousand/uL 7 50   HEMOGLOBIN g/dL 10 6*   HEMATOCRIT % 32 8*   PLATELETS Thousands/uL 139*       Results from last 7 days  Lab Units 01/29/18  0605 01/24/18  1146   SODIUM mmol/L 141 144   POTASSIUM mmol/L 3 8 3 6   CHLORIDE mmol/L 107 106   CO2 mmol/L 27 27   BUN mg/dL 33* 30*   CREATININE mg/dL 2 30* 1 82*   CALCIUM mg/dL 8 0* 8 4   TOTAL PROTEIN g/dL  --  6 9   BILIRUBIN TOTAL mg/dL  --  0 27   ALK PHOS U/L  --  48   ALT U/L  --  21   AST U/L  --  17   GLUCOSE RANDOM mg/dL 93 80         Lab Results   Component Value Date    BLOODCX No Growth at 24 hrs  01/27/2018    BLOODCX No Growth at 24 hrs  01/27/2018    BLOODCX No Growth After 5 Days   11/17/2017    URINECX >100,000 cfu/ml Proteus mirabilis ESBL (A) 01/24/2018    URINECX <10,000 cfu/ml Mixed Contaminants X2 09/20/2016    URINECX >100,000 cfu/ml Serratia marcescens 06/10/2016       Scheduled Meds:    ertapenem 1,000 mg Intravenous Q24H   mirtazapine 15 mg Oral HS   vancomycin 125 mg Oral Q12H [facility]       Continuous Infusions: PRN Meds:    acetaminophen    diphenhydrAMINE    hydrALAZINE    ondansetron      Counseling / Coordination of Care  Total floor / unit time spent today 30 minutes  minutes  Greater than 50% of total time was spent with the patient and / or family counseling and / or coordination of care         This note has been constructed using a voice recognition system

## 2018-01-30 LAB
ANION GAP SERPL CALCULATED.3IONS-SCNC: 9 MMOL/L (ref 4–13)
BUN SERPL-MCNC: 29 MG/DL (ref 5–25)
CALCIUM SERPL-MCNC: 7.7 MG/DL (ref 8.3–10.1)
CHLORIDE SERPL-SCNC: 107 MMOL/L (ref 100–108)
CO2 SERPL-SCNC: 25 MMOL/L (ref 21–32)
CREAT SERPL-MCNC: 2.06 MG/DL (ref 0.6–1.3)
GFR SERPL CREATININE-BSD FRML MDRD: 32 ML/MIN/1.73SQ M
GLUCOSE SERPL-MCNC: 114 MG/DL (ref 65–140)
POTASSIUM SERPL-SCNC: 3.1 MMOL/L (ref 3.5–5.3)
SODIUM SERPL-SCNC: 141 MMOL/L (ref 136–145)

## 2018-01-30 PROCEDURE — 80048 BASIC METABOLIC PNL TOTAL CA: CPT | Performed by: PHYSICIAN ASSISTANT

## 2018-01-30 PROCEDURE — 99233 SBSQ HOSP IP/OBS HIGH 50: CPT | Performed by: INTERNAL MEDICINE

## 2018-01-30 PROCEDURE — 99232 SBSQ HOSP IP/OBS MODERATE 35: CPT | Performed by: PHYSICIAN ASSISTANT

## 2018-01-30 RX ORDER — TEMAZEPAM 7.5 MG/1
7.5 CAPSULE ORAL
Status: DISCONTINUED | OUTPATIENT
Start: 2018-01-30 | End: 2018-02-03

## 2018-01-30 RX ORDER — ACETAMINOPHEN 325 MG/1
650 TABLET ORAL EVERY 6 HOURS PRN
Status: DISCONTINUED | OUTPATIENT
Start: 2018-01-30 | End: 2018-02-08 | Stop reason: HOSPADM

## 2018-01-30 RX ORDER — SODIUM CHLORIDE AND POTASSIUM CHLORIDE .9; .15 G/100ML; G/100ML
75 SOLUTION INTRAVENOUS CONTINUOUS
Status: DISCONTINUED | OUTPATIENT
Start: 2018-01-30 | End: 2018-02-01

## 2018-01-30 RX ADMIN — VANCOMYCIN 125 MG: KIT at 08:31

## 2018-01-30 RX ADMIN — VANCOMYCIN 125 MG: KIT at 21:30

## 2018-01-30 RX ADMIN — SODIUM CHLORIDE 1000 MG: 9 INJECTION, SOLUTION INTRAVENOUS at 17:08

## 2018-01-30 RX ADMIN — SODIUM CHLORIDE AND POTASSIUM CHLORIDE 75 ML/HR: .9; .15 SOLUTION INTRAVENOUS at 11:56

## 2018-01-30 RX ADMIN — MIRTAZAPINE 15 MG: 15 TABLET, FILM COATED ORAL at 21:48

## 2018-01-30 RX ADMIN — SODIUM CHLORIDE 75 ML/HR: 0.9 INJECTION, SOLUTION INTRAVENOUS at 04:20

## 2018-01-30 NOTE — PROGRESS NOTES
Progress Note -  Internal Medicine / Hospitalists  Inocencia Velasco  81 y o  male MRN: [id number]  Unit/Bed#: [facility] [address] 2 [address] 226-01 Encounter: [id number]      ASSESSMENT AND PLAN:  1  ESBL Proteus UTI-secondary to urinary retention from BPH now with Wood  T-max 100 5° last 24 hours  WBC wnl  Blood culture NG 48hx2  On ertapenem per Infectious Disease  Planned TURP on Wednesday 01/31/2018  NPO after midnight  2  Essential hypertension-SBP the last 24 hours 109-125  Lisinopril held secondary to rising creatinine  Continue hydralazine p r n   3   Chronic kidney disease stage 3-creatinine 2 0 today vs  2 8 yesterday prior to IVF  Unclear baseline, 1 82 POA  Had acute kidney injury on prior admits  Continue IVF  If creatinine increases will consult Nephrology  4  Depression - stable on remeron        5  History of C Diff - he is Vanco p o  while on IV antibiotics per ID  6  Asthenia - PT/OT consult pending  7  Hypokalemia - likely due to poor intake & NSS  Will change to NSS + 20meq KCL  8  Insomnia - patient complains of poor sleep with melatonin and Remeron  Will cautiously add low-dose temazepam      VTE Prophylaxis: Sequential compression device Isamar Hook)     Dispo: Uro primary; For TURP Weds 1/31  Monitor t/k/cr  ______________________________________________________________________    SUBJECTIVE:   Patient seen and examined  Patient complains of poor sleep since he has been in the hospital   He says he has been sweaty at night but no overt chills  He denies any chest pain shortness of breath or palpitations  He is tolerating his diet without any nausea vomiting diarrhea or constipation    Patient denies dysuria    OBJECTIVE:   Principal Problem:    ESBL (extended spectrum beta-lactamase) producing bacteria infection  Active Problems:    Acute urinary retention    Urinary tract infection associated with indwelling urethral catheter (HCC)    CKD (chronic kidney disease) stage 3, GFR 30-59 ml/min Recurrent major depressive disorder (HCC)      Vitals:   HR:  [64-82] 67  Resp:  [17-18] 18  BP: (109-125)/(70-75) 125/75  SpO2:  [95 %-96 %] 95 %  Temp (24hrs), Av 7 °F (37 1 °C), Min:97 °F (36 1 °C), Max:100 5 °F (38 1 °C)  Current: Temperature: (!) 97 °F (36 1 °C)    Intake/Output Summary (Last 24 hours) at 18 1052  Last data filed at 18 0701   Gross per 24 hour   Intake          1328 75 ml   Output             1700 ml   Net          -371 25 ml       Physical Exam:     General Appearance:    Alert, cooperative, no distress   Head:    Normocephalic, without obvious abnormality, atraumatic   Eyes:    Conjunctiva/corneas clear, EOM's intact       Neck:   Supple, no adenopathy, no JVD   Back:     Symmetric, no curvature, ROM normal, no CVA tenderness   Lungs:     Coarse but clear to auscultation bilaterally, no wheezing or rhonchi   Heart:    Regular rate and rhythm, S1 and S2 normal, no murmur, rub   or gallop   Abdomen:     Soft, non-tender, bowel sounds active   : frias draining clear yellow urine   Extremities:   Extremities normal, atraumatic, no cyanosis or edema   Psych:   Depressed Affect   Neurologic:   CNII-XII intact  Weak/deconditioned     Lab, Imaging and other studies:      Results from last 7 days  Lab Units 18  0605   WBC Thousand/uL 7 50   HEMOGLOBIN g/dL 10 6*   HEMATOCRIT % 32 8*   PLATELETS Thousands/uL 139*       Results from last 7 days  Lab Units 18  0458  18  1146   SODIUM mmol/L 141  < > 144   POTASSIUM mmol/L 3 1*  < > 3 6   CHLORIDE mmol/L 107  < > 106   CO2 mmol/L 25  < > 27   BUN mg/dL 29*  < > 30*   CREATININE mg/dL 2 06*  < > 1 82*   CALCIUM mg/dL 7 7*  < > 8 4   TOTAL PROTEIN g/dL  --   --  6 9   BILIRUBIN TOTAL mg/dL  --   --  0 27   ALK PHOS U/L  --   --  48   ALT U/L  --   --  21   AST U/L  --   --  17   GLUCOSE RANDOM mg/dL 114  < > 80   < > = values in this interval not displayed        Lab Results   Component Value Date    BLOODCX No Growth at 48 hrs  01/27/2018    BLOODCX No Growth at 48 hrs  01/27/2018    BLOODCX No Growth After 5 Days  11/17/2017    URINECX >100,000 cfu/ml Proteus mirabilis ESBL (A) 01/24/2018    URINECX <10,000 cfu/ml Mixed Contaminants X2 09/20/2016    URINECX >100,000 cfu/ml Serratia marcescens 06/10/2016       Scheduled Meds:    Current Facility-Administered Medications:  acetaminophen 650 mg Oral Q6H PRN Severa Chol, MD    diphenhydrAMINE 25 mg Intravenous Q6H PRN Severa Chol, MD    ertapenem 1,000 mg Intravenous Q24H Valerie Cortes MD Last Rate: Stopped (01/29/18 1926)   hydrALAZINE 10 mg Intravenous Q6H PRN Alejandro Coker, DO    melatonin 6 mg Oral HS Tonja Anne PA-C    mirtazapine 15 mg Oral HS Alejandro Coker, DO    ondansetron 4 mg Intravenous Q6H PRN Severa Chol, MD    sodium chloride 75 mL/hr Intravenous Continuous Jaren Barry PA-C Last Rate: 75 mL/hr (01/30/18 0420)   vancomycin 125 mg Oral Q12H [address] Valerie Cortes MD        Continuous Infusions:    sodium chloride 75 mL/hr Last Rate: 75 mL/hr (01/30/18 0420)       PRN Meds:    acetaminophen    diphenhydrAMINE    hydrALAZINE    ondansetron      Counseling / Coordination of Care  Total floor / unit time spent today 30 minutes  minutes  Greater than 50% of total time was spent with the patient and / or family counseling and / or coordination of care         This note has been constructed using a voice recognition system

## 2018-01-30 NOTE — CASE MANAGEMENT
Continued Stay Review    Date:    1/30/2018    Vital Signs: /75 (BP Location: Left arm)   Pulse 67   Temp (!) 97 °F (36 1 °C) (Temporal)   Resp 18   Wt 93 4 kg (206 lb)   SpO2 95%   BMI 28 73 kg/m²     Medications:   Scheduled Meds:   Current Facility-Administered Medications:  acetaminophen 650 mg Oral Q6H PRN Linda Tijerina PA-C    diphenhydrAMINE 25 mg Intravenous Q6H PRN Ruth Whaley MD    ertapenem 1,000 mg Intravenous Q24H Maddy Huang MD Last Rate: Stopped (01/29/18 1926)   hydrALAZINE 10 mg Intravenous Q6H PRN Roylene Oz, DO    melatonin 6 mg Oral HS Tonja Anne PA-C    mirtazapine 15 mg Oral HS Roylene Oz, DO    ondansetron 4 mg Intravenous Q6H PRN Ruth Whaley MD    sodium chloride 0 9 % with KCl 20 mEq/L 75 mL/hr Intravenous Continuous Linda Tijerina PA-C Last Rate: 75 mL/hr (01/30/18 1156)   temazepam 7 5 mg Oral HS PRN Linda Tijerina PA-C    vancomycin 125 mg Oral Q12H [address] Maddy Huang MD      Continuous Infusions:   sodium chloride 0 9 % with KCl 20 mEq/L 75 mL/hr Last Rate: 75 mL/hr (01/30/18 1156)     PRN Meds:   acetaminophen    diphenhydrAMINE    hydrALAZINE    ondansetron    temazepam    Abnormal Labs/Diagnostic Results:    K  3 1  BUN/Creat   29/2 06    Age/Sex: 81 y o  male     Assessment/Plan:    ESBL Proteus UTI-secondary to urinary retention from BPH now with Wood   T-max 100 5° last 24 hours   WBC wnl   Blood culture NG 48hx2  On ertapenem per Infectious Disease   Planned TURP on Wednesday 01/31/2018  NPO after midnight  2   Essential hypertension-SBP the last 24 hours 109-125   Lisinopril held secondary to rising creatinine   Continue hydralazine p r n   3   Chronic kidney disease stage 3-creatinine 2 0 today vs  2 8 yesterday prior to IVF  Unclear baseline, 1 82 POA  Had acute kidney injury on prior admits  Continue IVF   If creatinine increases will consult Nephrology  4  Depression - stable on remeron        5  History of C Diff - he is Vanco p o  while on IV antibiotics per ID  6  Asthenia - PT/OT consult pending  7  Hypokalemia - likely due to poor intake & NSS  Will change to NSS + 20meq KCL  8  Insomnia - patient complains of poor sleep with melatonin and Remeron  Will cautiously add low-dose temazepam       Discharge Plan:    Uro primary; For TURP Weds 1/31   Monitor t/k/cr

## 2018-01-30 NOTE — PROGRESS NOTES
Progress Note - Infectious Disease   Lorna Tilley  81 y o  male MRN: [id number]  Unit/Bed#: [place] 2 -01 Encounter: [id number]      Impression/Plan:  1   ESBL Proteus UTI-in the setting of urinary retention secondary to BPH   Patient is status post Wood catheter placement and now appears to be having fever   He seems to be tolerating the antibiotics without difficulty  -continue ertapenem  -monitor the GFR closely  -if the GFR decreases to below 30, decrease ertapenem dose to 500 mg IV Q 24 hours  -Wood drainage  -await TURP  -recheck BMP tomorrow     2   Fever-likely secondary to 1   No other clear source appreciated   Consideration for the possibility of bacteremia although this is less likely  Jaquan Jeffries does not have any other localizing symptoms  Thus far the blood cultures are negative  The temperature has come down although he is still having a low-grade fever   -antibiotics as above  -follow-up blood cultures  -no additional ID workup for now  -monitor temperature  -recommend imaging of the kidneys if the fever would persist despite antibiotic  -will start incentive spirometry in case atelectasis is playing a role     3   History of C difficile colitis-no current symptomatology   With the patient going to be on broad antibiotics for several days he is at increased risk of relapsing   -continue oral vancomycin prophylaxis  -monitor for the development of diarrhea     4   Obstructive uropathy-secondary to BPH   The patient now has a Wood catheter in place   -Wood drainage  -await TURP  for tomorrow  -close Urology follow-up     5   History traumatic brain injury     6  Acute kidney injury-complicating chronic kidney disease  Suspect this is a pre renal issue  Consideration for the possibility of sepsis as a cause  Renal function has improved a bit since yesterday    -recheck BMP tomorrow  -fluid management as per the primary service  -dose adjust the ertapenem as above  -no additional ID workup for now    Antibiotics:  Ertapenem 4  Antibiotics 5  Oral vancomycin 4    Subjective:  Patient still with occasional low-grade fever; no nausea, vomiting, diarrhea; no cough, shortness of breath; no pain  No new symptoms  He is quite tired today  Objective:  Vitals:  HR:  [64-82] 67  Resp:  [17-18] 18  BP: (109-125)/(70-75) 125/75  SpO2:  [95 %-96 %] 95 %  Temp (24hrs), Av 7 °F (37 1 °C), Min:97 °F (36 1 °C), Max:100 5 °F (38 1 °C)  Current: Temperature: (!) 97 °F (36 1 °C)    Physical Exam:   General Appearance:  Lethargic, arousable, nontoxic, no acute distress  Throat: Oropharynx moist without lesions  Lungs:   Clear to auscultation bilaterally; no wheezes, rhonchi or rales; respirations unlabored   Heart:  RRR; no murmur, rub or gallop   Abdomen:   Soft, non-tender, non-distended, positive bowel sounds  Extremities: No clubbing, cyanosis or edema   Skin: No new rashes or lesions  No draining wounds noted  Labs, Imaging, & Other studies:   All pertinent labs and imaging studies were personally reviewed    Results from last 7 days  Lab Units 18  0605 18  1146   WBC Thousand/uL 7 50 5 48   HEMOGLOBIN g/dL 10 6* 12 0   PLATELETS Thousands/uL 139* 207       Results from last 7 days  Lab Units 18  0458 18  0605 18  1146   SODIUM mmol/L 141 141 144   POTASSIUM mmol/L 3 1* 3 8 3 6   CHLORIDE mmol/L 107 107 106   CO2 mmol/L 25 27 27   ANION GAP mmol/L 9 7 11   BUN mg/dL 29* 33* 30*   CREATININE mg/dL 2 06* 2 30* 1 82*   EGFR ml/min/1 73sq m 32 28 37   GLUCOSE RANDOM mg/dL 114 93 80   CALCIUM mg/dL 7 7* 8 0* 8 4   AST U/L  --   --  17   ALT U/L  --   --  21   ALK PHOS U/L  --   --  48   TOTAL PROTEIN g/dL  --   --  6 9   BILIRUBIN TOTAL mg/dL  --   --  0 27       Results from last 7 days  Lab Units 18  1400 18  1146   BLOOD CULTURE  No Growth at 48 hrs  No Growth at 48 hrs    --    URINE CULTURE   --  >100,000 cfu/ml Proteus mirabilis ESBL*

## 2018-01-30 NOTE — PROGRESS NOTES
Afebrile  Wood draining clear urine  Patient c/o "cant sleep"  For TURP tomorrow  Continues on abx for ESBL  Appreciate medical management  NPO after midnight

## 2018-01-30 NOTE — OCCUPATIONAL THERAPY NOTE
OT Cancellation Note:    Madelaine Modesto   1/30/2018     OT order received  Chart reviewed  Pt scheduled for Transurethral resection of prostate (TURP) tomorrow, 01/31/18  Therefore, pt cx'd this PM  OT to continue to follow pt on caseload as able to assess ADL, IADL, and functional mobility/transfer needs and A w/ safe d/c planning/recommendations after TURP procedure       Libra Felix, OTR/L

## 2018-01-31 ENCOUNTER — APPOINTMENT (INPATIENT)
Dept: RADIOLOGY | Facility: HOSPITAL | Age: 73
DRG: 666 | End: 2018-01-31
Payer: MEDICARE

## 2018-01-31 ENCOUNTER — ANESTHESIA (INPATIENT)
Dept: PERIOP | Facility: HOSPITAL | Age: 73
DRG: 666 | End: 2018-01-31
Payer: MEDICARE

## 2018-01-31 LAB
ANION GAP SERPL CALCULATED.3IONS-SCNC: 7 MMOL/L (ref 4–13)
BASOPHILS # BLD MANUAL: 0 THOUSAND/UL (ref 0–0.1)
BASOPHILS NFR MAR MANUAL: 0 % (ref 0–1)
BUN SERPL-MCNC: 24 MG/DL (ref 5–25)
CALCIUM SERPL-MCNC: 7.9 MG/DL (ref 8.3–10.1)
CHLORIDE SERPL-SCNC: 108 MMOL/L (ref 100–108)
CO2 SERPL-SCNC: 27 MMOL/L (ref 21–32)
CREAT SERPL-MCNC: 1.94 MG/DL (ref 0.6–1.3)
EOSINOPHIL # BLD MANUAL: 0.13 THOUSAND/UL (ref 0–0.4)
EOSINOPHIL NFR BLD MANUAL: 2 % (ref 0–6)
ERYTHROCYTE [DISTWIDTH] IN BLOOD BY AUTOMATED COUNT: 13.9 % (ref 11.6–15.1)
GFR SERPL CREATININE-BSD FRML MDRD: 34 ML/MIN/1.73SQ M
GLUCOSE SERPL-MCNC: 89 MG/DL (ref 65–140)
HCT VFR BLD AUTO: 32.9 % (ref 36.5–49.3)
HGB BLD-MCNC: 10.6 G/DL (ref 12–17)
LYMPHOCYTES # BLD AUTO: 2.08 THOUSAND/UL (ref 0.6–4.47)
LYMPHOCYTES # BLD AUTO: 32 % (ref 14–44)
MCH RBC QN AUTO: 29 PG (ref 26.8–34.3)
MCHC RBC AUTO-ENTMCNC: 32.2 G/DL (ref 31.4–37.4)
MCV RBC AUTO: 90 FL (ref 82–98)
MONOCYTES # BLD AUTO: 0.39 THOUSAND/UL (ref 0–1.22)
MONOCYTES NFR BLD: 6 % (ref 4–12)
NEUTROPHILS # BLD MANUAL: 3.91 THOUSAND/UL (ref 1.85–7.62)
NEUTS BAND NFR BLD MANUAL: 3 % (ref 0–8)
NEUTS SEG NFR BLD AUTO: 57 % (ref 43–75)
NRBC BLD AUTO-RTO: 0 /100 WBCS
PLATELET # BLD AUTO: 167 THOUSANDS/UL (ref 149–390)
PLATELET BLD QL SMEAR: ADEQUATE
PMV BLD AUTO: 11.3 FL (ref 8.9–12.7)
POTASSIUM SERPL-SCNC: 3.6 MMOL/L (ref 3.5–5.3)
RBC # BLD AUTO: 3.66 MILLION/UL (ref 3.88–5.62)
RBC MORPH BLD: NORMAL
SODIUM SERPL-SCNC: 142 MMOL/L (ref 136–145)
TOTAL CELLS COUNTED SPEC: 100
WBC # BLD AUTO: 6.51 THOUSAND/UL (ref 4.31–10.16)

## 2018-01-31 PROCEDURE — 85007 BL SMEAR W/DIFF WBC COUNT: CPT | Performed by: INTERNAL MEDICINE

## 2018-01-31 PROCEDURE — 99232 SBSQ HOSP IP/OBS MODERATE 35: CPT | Performed by: PHYSICIAN ASSISTANT

## 2018-01-31 PROCEDURE — 0VT08ZZ RESECTION OF PROSTATE, VIA NATURAL OR ARTIFICIAL OPENING ENDOSCOPIC: ICD-10-PCS | Performed by: UROLOGY

## 2018-01-31 PROCEDURE — 88305 TISSUE EXAM BY PATHOLOGIST: CPT | Performed by: PATHOLOGY

## 2018-01-31 PROCEDURE — 52601 PROSTATECTOMY (TURP): CPT | Performed by: UROLOGY

## 2018-01-31 PROCEDURE — 51102 DRAIN BL W/CATH INSERTION: CPT | Performed by: UROLOGY

## 2018-01-31 PROCEDURE — 99232 SBSQ HOSP IP/OBS MODERATE 35: CPT | Performed by: INTERNAL MEDICINE

## 2018-01-31 PROCEDURE — C2627 CATH, SUPRAPUBIC/CYSTOSCOPIC: HCPCS | Performed by: UROLOGY

## 2018-01-31 PROCEDURE — 88305 TISSUE EXAM BY PATHOLOGIST: CPT | Performed by: UROLOGY

## 2018-01-31 PROCEDURE — 85027 COMPLETE CBC AUTOMATED: CPT | Performed by: INTERNAL MEDICINE

## 2018-01-31 PROCEDURE — 80048 BASIC METABOLIC PNL TOTAL CA: CPT | Performed by: INTERNAL MEDICINE

## 2018-01-31 PROCEDURE — 0T9B30Z DRAINAGE OF BLADDER WITH DRAINAGE DEVICE, PERCUTANEOUS APPROACH: ICD-10-PCS | Performed by: UROLOGY

## 2018-01-31 RX ORDER — POTASSIUM CHLORIDE 14.9 MG/ML
INJECTION INTRAVENOUS CONTINUOUS PRN
Status: DISCONTINUED | OUTPATIENT
Start: 2018-01-31 | End: 2018-01-31 | Stop reason: SURG

## 2018-01-31 RX ORDER — HYDROCODONE BITARTRATE AND ACETAMINOPHEN 5; 325 MG/1; MG/1
1 TABLET ORAL EVERY 6 HOURS PRN
Status: DISCONTINUED | OUTPATIENT
Start: 2018-01-31 | End: 2018-02-08 | Stop reason: HOSPADM

## 2018-01-31 RX ORDER — MAGNESIUM HYDROXIDE 1200 MG/15ML
LIQUID ORAL AS NEEDED
Status: DISCONTINUED | OUTPATIENT
Start: 2018-01-31 | End: 2018-01-31 | Stop reason: HOSPADM

## 2018-01-31 RX ORDER — GLYCOPYRROLATE 0.2 MG/ML
INJECTION INTRAMUSCULAR; INTRAVENOUS AS NEEDED
Status: DISCONTINUED | OUTPATIENT
Start: 2018-01-31 | End: 2018-01-31 | Stop reason: SURG

## 2018-01-31 RX ORDER — ONDANSETRON 2 MG/ML
4 INJECTION INTRAMUSCULAR; INTRAVENOUS EVERY 6 HOURS PRN
Status: DISCONTINUED | OUTPATIENT
Start: 2018-01-31 | End: 2018-01-31 | Stop reason: HOSPADM

## 2018-01-31 RX ORDER — ROCURONIUM BROMIDE 10 MG/ML
INJECTION, SOLUTION INTRAVENOUS AS NEEDED
Status: DISCONTINUED | OUTPATIENT
Start: 2018-01-31 | End: 2018-01-31 | Stop reason: SURG

## 2018-01-31 RX ORDER — ONDANSETRON 2 MG/ML
INJECTION INTRAMUSCULAR; INTRAVENOUS AS NEEDED
Status: DISCONTINUED | OUTPATIENT
Start: 2018-01-31 | End: 2018-01-31 | Stop reason: SURG

## 2018-01-31 RX ORDER — SODIUM CHLORIDE 9 MG/ML
INJECTION, SOLUTION INTRAVENOUS CONTINUOUS PRN
Status: DISCONTINUED | OUTPATIENT
Start: 2018-01-31 | End: 2018-01-31 | Stop reason: SURG

## 2018-01-31 RX ORDER — MORPHINE SULFATE 4 MG/ML
4 INJECTION, SOLUTION INTRAMUSCULAR; INTRAVENOUS
Status: DISCONTINUED | OUTPATIENT
Start: 2018-01-31 | End: 2018-02-03

## 2018-01-31 RX ORDER — FENTANYL CITRATE 50 UG/ML
INJECTION, SOLUTION INTRAMUSCULAR; INTRAVENOUS AS NEEDED
Status: DISCONTINUED | OUTPATIENT
Start: 2018-01-31 | End: 2018-01-31 | Stop reason: SURG

## 2018-01-31 RX ORDER — EPHEDRINE SULFATE 50 MG/ML
INJECTION, SOLUTION INTRAVENOUS AS NEEDED
Status: DISCONTINUED | OUTPATIENT
Start: 2018-01-31 | End: 2018-01-31 | Stop reason: SURG

## 2018-01-31 RX ORDER — OXYBUTYNIN CHLORIDE 5 MG/1
5 TABLET ORAL 3 TIMES DAILY PRN
Status: DISCONTINUED | OUTPATIENT
Start: 2018-01-31 | End: 2018-02-08 | Stop reason: HOSPADM

## 2018-01-31 RX ORDER — PROPOFOL 10 MG/ML
INJECTION, EMULSION INTRAVENOUS CONTINUOUS PRN
Status: DISCONTINUED | OUTPATIENT
Start: 2018-01-31 | End: 2018-01-31 | Stop reason: SURG

## 2018-01-31 RX ORDER — FENTANYL CITRATE 50 UG/ML
25 INJECTION, SOLUTION INTRAMUSCULAR; INTRAVENOUS
Status: DISCONTINUED | OUTPATIENT
Start: 2018-01-31 | End: 2018-01-31 | Stop reason: HOSPADM

## 2018-01-31 RX ORDER — SODIUM CHLORIDE 9 MG/ML
125 INJECTION, SOLUTION INTRAVENOUS CONTINUOUS
Status: DISCONTINUED | OUTPATIENT
Start: 2018-01-31 | End: 2018-02-01

## 2018-01-31 RX ORDER — PROPOFOL 10 MG/ML
INJECTION, EMULSION INTRAVENOUS AS NEEDED
Status: DISCONTINUED | OUTPATIENT
Start: 2018-01-31 | End: 2018-01-31 | Stop reason: SURG

## 2018-01-31 RX ADMIN — SODIUM CHLORIDE 125 ML/HR: 0.9 INJECTION, SOLUTION INTRAVENOUS at 07:33

## 2018-01-31 RX ADMIN — LIDOCAINE HYDROCHLORIDE 100 MG: 20 INJECTION, SOLUTION INTRAVENOUS at 07:46

## 2018-01-31 RX ADMIN — SODIUM CHLORIDE 1000 MG: 9 INJECTION, SOLUTION INTRAVENOUS at 16:53

## 2018-01-31 RX ADMIN — SODIUM CHLORIDE AND POTASSIUM CHLORIDE 75 ML/HR: .9; .15 SOLUTION INTRAVENOUS at 06:00

## 2018-01-31 RX ADMIN — POTASSIUM CHLORIDE: 200 INJECTION, SOLUTION INTRAVENOUS at 07:50

## 2018-01-31 RX ADMIN — DEXAMETHASONE SODIUM PHOSPHATE 4 MG: 10 INJECTION INTRAMUSCULAR; INTRAVENOUS at 08:01

## 2018-01-31 RX ADMIN — PROPOFOL 120 MG: 10 INJECTION, EMULSION INTRAVENOUS at 07:46

## 2018-01-31 RX ADMIN — PROPOFOL 90 MCG/KG/MIN: 10 INJECTION, EMULSION INTRAVENOUS at 07:47

## 2018-01-31 RX ADMIN — ROCURONIUM BROMIDE 25 MG: 10 INJECTION INTRAVENOUS at 07:55

## 2018-01-31 RX ADMIN — FENTANYL CITRATE 25 MCG: 50 INJECTION, SOLUTION INTRAMUSCULAR; INTRAVENOUS at 09:42

## 2018-01-31 RX ADMIN — GLYCOPYRROLATE 0.6 MG: 0.2 INJECTION, SOLUTION INTRAMUSCULAR; INTRAVENOUS at 08:48

## 2018-01-31 RX ADMIN — ROCURONIUM BROMIDE 5 MG: 10 INJECTION INTRAVENOUS at 07:46

## 2018-01-31 RX ADMIN — MIRTAZAPINE 15 MG: 15 TABLET, FILM COATED ORAL at 21:27

## 2018-01-31 RX ADMIN — EPHEDRINE SULFATE 10 MG: 50 INJECTION, SOLUTION INTRAMUSCULAR; INTRAVENOUS; SUBCUTANEOUS at 07:53

## 2018-01-31 RX ADMIN — ONDANSETRON HYDROCHLORIDE 4 MG: 2 INJECTION, SOLUTION INTRAVENOUS at 08:50

## 2018-01-31 RX ADMIN — VANCOMYCIN 125 MG: KIT at 21:27

## 2018-01-31 RX ADMIN — FENTANYL CITRATE 50 MCG: 50 INJECTION INTRAMUSCULAR; INTRAVENOUS at 07:21

## 2018-01-31 RX ADMIN — SODIUM CHLORIDE: 0.9 INJECTION, SOLUTION INTRAVENOUS at 07:15

## 2018-01-31 RX ADMIN — SODIUM CHLORIDE AND POTASSIUM CHLORIDE 75 ML/HR: .9; .15 SOLUTION INTRAVENOUS at 16:52

## 2018-01-31 RX ADMIN — SODIUM CHLORIDE: 0.9 INJECTION, SOLUTION INTRAVENOUS at 09:07

## 2018-01-31 RX ADMIN — ONDANSETRON HYDROCHLORIDE 4 MG: 2 INJECTION, SOLUTION INTRAVENOUS at 08:01

## 2018-01-31 RX ADMIN — NEOSTIGMINE METHYLSULFATE 5 MG: 1 INJECTION, SOLUTION INTRAMUSCULAR; INTRAVENOUS; SUBCUTANEOUS at 08:48

## 2018-01-31 RX ADMIN — FENTANYL CITRATE 25 MCG: 50 INJECTION, SOLUTION INTRAMUSCULAR; INTRAVENOUS at 09:51

## 2018-01-31 RX ADMIN — FENTANYL CITRATE 50 MCG: 50 INJECTION INTRAMUSCULAR; INTRAVENOUS at 07:43

## 2018-01-31 RX ADMIN — TEMAZEPAM 7.5 MG: 7.5 CAPSULE ORAL at 21:27

## 2018-01-31 NOTE — PROGRESS NOTES
Vital signs with in normal limits, CBI assessed and draining clear pink tint urine, no complaints of pain at this time, and call bell with in reach  Will continue to monitor

## 2018-01-31 NOTE — OP NOTE
OPERATIVE REPORT  PATIENT NAME: Marcial Bliss  :  1945  MRN: [id number]  Pt Location: [facility] OR ROOM 07    SURGERY DATE: 2018    Surgeon(s) and Role:     * Madeline Corbett MD - Primary    Preop Diagnosis:  Other retention of urine (CODE) [R33 8]  Enlarged prostate with lower urinary tract symptoms (LUTS) [N40 1]    Post-Op Diagnosis Codes:     * Other retention of urine (CODE) [R33 8]     * Enlarged prostate with lower urinary tract symptoms (LUTS) [N40 1]    Procedure(s) (LRB):  TRANSURETHRAL RESECTION OF PROSTATE (TURP) (N/A)  SUPRAPUBIC TUBE PLACEMENT (N/A)    Specimen(s):  ID Type Source Tests Collected by Time Destination   1 :  Tissue Prostate TISSUE EXAM Madeline Corbett MD 2018 0732        Estimated Blood Loss:   Minimal    Drains:  Urethral Catheter Latex (Active)   Site Assessment Clean;Skin intact 2018  5:59 AM   Collection Container Standard drainage bag 2018  5:59 AM   Securement Method Securing device (Describe) 2018  5:59 AM   Output (mL) 900 mL 2018  7:00 AM   Number of days: 5       Anesthesia Type:   General    Operative Indications: Other retention of urine (CODE) [R33 8]  Enlarged prostate with lower urinary tract symptoms (LUTS) [N40 1]  Chronic cystitis     Operative Findings:  3+ trabeculated bladder, very large anterior zone with intrusion into the bladder    Complications:   None    Procedure and Technique:  Patient is a 22-year-old man with urinary retention managed with a Wood catheter  He has been having urinary tract infections with the catheter in the most recent 1 was in ESBL infection for which he had to be hospitalized up until the date of surgery  I have offered him trans urethral resection of the prostate in the past and he now wishes to have this done  The risks of bleeding, infection, persistent urinary retention, incontinence have been explained he gives informed consent    He will also get a suprapubic tube and the risks for bleeding and infection and damage to adjacent organs was explained  The patient was brought to the operating room and identified  General endotracheal anesthesia was induced the patient was prepped and draped in the dorsal lithotomy position in the usual fashion  A time-out was performed  Cystoscopy was carried out with a 22 Lao cystoscopy sheath and 30 and 70 degree lenses  The prostate was enlarged and obstructing the above described manner  There were no urethral strictures  The bladder was 3+ trabeculated there were no stones tumors or other lesions  I could not see the orifices due to the trabeculation  I pointed the 70 degree lens at the dome and filled the bladder and placed the [place] finer needle into the bladder through his old suprapubic tube tract  I then fed a wire through this after I saw that was properly in the bladder and removed the needle, and used the fascial incisor to open the tract  I then dilated the tract with the dilator in the kidney, and then placed the dilator through the sheath and placed the sheath into the bladder direct visual guidance and placed an 18 Lao Wood catheter through that  Balloon was inflated sheath was removed intact, and then this was attached to the skin with a 2 0 nylon stitch  The 32 Lao resectoscope sheath was then introduced into the bladder and trans urethral resection of prostate was carried out  Starting at 6 o'clock I resected circumferentially and the patient had a nice wide channel at the end of the procedure  The verumontanum was left intact  All chips were evacuated and hemostasis was excellent  The suprapubic tube was capped and the 3 way 24 Lao Wood catheter was placed to continuous bladder irrigation     I was present for the entire procedure and A qualified resident physician was not available    Patient Disposition:  PACU  and extubated and stable    SIGNATURE: Miguel Chau MD  DATE: January 31, 2018  TIME: 8:48 AM

## 2018-01-31 NOTE — NURSING NOTE
Patient returned to floor from procedure  CBI running with 3 way frias  Capped SP cath noted to abdomen  Pt resting in bed at this time

## 2018-01-31 NOTE — PROGRESS NOTES
Progress Note -  Internal Medicine / Hospitalists  Camelia Rodarte  81 y o  male MRN: [id number]  Unit/Bed#: [place] 68 2 -01 Encounter: [id number]      ASSESSMENT AND PLAN:  1   ESBL Proteus UTI-secondary to urinary retention from BPH s/p TURP POD #0 - Per primary team/Uro  Tolerated procedure well, no complications   WBC wnl   Blood culture NG 72hx2  On ertapenem per Infectious Disease  2   Essential hypertension-SBP the last 24 hours 142-170   Lisinopril held secondary to #3  Pt asymptomatic  Continue hydralazine p r n   3   Chronic kidney disease stage 3-creatinine trending down 1 94 today  Unclear baseline, 1 82 POA  Had acute kidney injury on prior admits  Continue IVF   Monitor, If creatinine increases will consult Nephrology  4  Depression - stable on remeron        5  History of C Diff - he is Vanco p o  while on IV antibiotics per ID  6  Asthenia - PT/OT consult pending  7  Hypokalemia - resolved with NSS + 20meq KCL  8  Insomnia - patient complains of poor sleep with melatonin and Remeron  Added temazepam last night  VTE Prophylaxis: Sequential compression device (Venodyne)     Dispo: per clinical course  ______________________________________________________________________    SUBJECTIVE:   Patient seen and examined status post TURP  The patient is fatigued  He denies any fevers or chills  He denies any chest pain shortness of breath or palpitations    He claims he is not currently hungry    OBJECTIVE:   Principal Problem:    ESBL (extended spectrum beta-lactamase) producing bacteria infection  Active Problems:    Acute urinary retention    Urinary tract infection associated with indwelling urethral catheter (HCC)    CKD (chronic kidney disease) stage 3, GFR 30-59 ml/min    Recurrent major depressive disorder (HCC)      Vitals:   HR:  [58-80] 62  Resp:  [11-19] 16  BP: (120-176)/(68-97) 149/88  SpO2:  [94 %-97 %] 97 %  Temp (24hrs), Av 3 °F (36 8 °C), Min:97 °F (36 1 °C), Max:100 4 °F (38 °C)  Current: Temperature: (!) 97 °F (36 1 °C)    Intake/Output Summary (Last 24 hours) at 01/31/18 1216  Last data filed at 01/31/18 1138   Gross per 24 hour   Intake          3323 75 ml   Output             3475 ml   Net          -151 25 ml       Physical Exam:     General Appearance:    Alert, cooperative, no distress   Head:    Normocephalic, without obvious abnormality, atraumatic   Eyes:    Conjunctiva/corneas clear, EOM's intact       Neck:   Supple, no adenopathy, no JVD   Back:     Symmetric, no curvature, ROM normal, no CVA tenderness   Lungs:     Clear to auscultation bilaterally, no wheezing or rhonchi   Heart:    Regular rate and rhythm, S1 and S2 normal, no murmur, rub   or gallop   Abdomen:     Soft, non-tender, bowel sounds decrased  : frias draining red urine s/p TURP   Extremities:   Extremities normal, atraumatic, no cyanosis or edema   Psych:   Weak Affect   Neurologic:   CNII-XII intact  Weak/deconditioned     Lab, Imaging and other studies:      Results from last 7 days  Lab Units 01/31/18  0624   WBC Thousand/uL 6 51   HEMOGLOBIN g/dL 10 6*   HEMATOCRIT % 32 9*   PLATELETS Thousands/uL 167       Results from last 7 days  Lab Units 01/31/18  0624   SODIUM mmol/L 142   POTASSIUM mmol/L 3 6   CHLORIDE mmol/L 108   CO2 mmol/L 27   BUN mg/dL 24   CREATININE mg/dL 1 94*   CALCIUM mg/dL 7 9*   GLUCOSE RANDOM mg/dL 89         Lab Results   Component Value Date    BLOODCX No Growth at 72 hrs  01/27/2018    BLOODCX No Growth at 72 hrs  01/27/2018    BLOODCX No Growth After 5 Days   11/17/2017    URINECX >100,000 cfu/ml Proteus mirabilis ESBL (A) 01/24/2018    URINECX <10,000 cfu/ml Mixed Contaminants X2 09/20/2016    URINECX >100,000 cfu/ml Serratia marcescens 06/10/2016       Scheduled Meds:    Current Facility-Administered Medications:  acetaminophen 650 mg Oral Q6H PRN Linda Tijerina PA-C    diphenhydrAMINE 25 mg Intravenous Q6H PRN Sherryle Alice, MD    ertapenem 1,000 mg Intravenous Q24H Buck Hull MD Major Last Rate: Stopped (01/30/18 6995)   hydrALAZINE 10 mg Intravenous Q6H PRN Virgin Chivo, DO    HYDROcodone-acetaminophen 1 tablet Oral Q6H PRN Aspen Thompson MD    melatonin 6 mg Oral HS Tonja Anne PA-C    mirtazapine 15 mg Oral HS [place] Chivo, DO    morphine injection 4 mg Intravenous Q3H PRN Aspen Thompson MD    ondansetron 4 mg Intravenous Q6H PRN Aspen Thompson MD    oxybutynin 5 mg Oral TID PRN Aspen Thompson MD    sodium chloride 125 mL/hr Intravenous Continuous Juline Portal, DO Last Rate: 125 mL/hr (01/31/18 0733)   sodium chloride 0 9 % with KCl 20 mEq/L 75 mL/hr Intravenous Continuous Linda Tijerina PA-C Last Rate: Stopped (01/31/18 0732)   temazepam 7 5 mg Oral HS PRN Linda Tijerina PA-C    vancomycin 125 mg Oral Q12H [address] Bina Bush MD        Continuous Infusions:    sodium chloride 125 mL/hr Last Rate: 125 mL/hr (01/31/18 0733)   sodium chloride 0 9 % with KCl 20 mEq/L 75 mL/hr Last Rate: Stopped (01/31/18 0732)       PRN Meds:    acetaminophen    diphenhydrAMINE    hydrALAZINE    HYDROcodone-acetaminophen    morphine injection    ondansetron    oxybutynin    temazepam      Counseling / Coordination of Care  Total floor / unit time spent today 30 minutes  minutes  Greater than 50% of total time was spent with the patient and / or family counseling and / or coordination of care        This note has been constructed using a voice recognition system

## 2018-01-31 NOTE — PROGRESS NOTES
Progress Note - Infectious Disease   Marcial Bliss  81 y o  male MRN: [id number]  Unit/Bed#: [place] 2 -01 Encounter: [id number]      Impression/Plan:  1   ESBL Proteus UTI-in the setting of urinary retention secondary to BPH  Now status post TURP and suprapubic catheter placement   He seems to be tolerating the antibiotics without difficulty  -continue ertapenem  -monitor the GFR closely  -if the GFR decreases to below 30, decrease ertapenem dose to 500 mg IV Q 24 hours  -suprapubic catheter drainage  -recheck BMP tomorrow     2   Fever-likely secondary to 1   No other clear source appreciated   Consideration for the possibility of bacteremia although this is less likely   He does not have any other localizing symptoms   Thus far the blood cultures are negative  The temperature has come down although he is still having a low-grade fever   -antibiotics as above  -follow-up blood cultures  -no additional ID workup for now  -monitor temperature  -recommend imaging of the kidneys if the fever would persist despite antibiotic  -continue incentive spirometry in case atelectasis is playing a role     3   History of C difficile colitis-no current symptomatology   With the patient going to be on broad antibiotics for several days he is at increased risk of relapsing   -continue oral vancomycin prophylaxis until 4 days after completion of systemic antibiotic  -monitor for the development of diarrhea     4   Obstructive uropathy-secondary to BPH  The patient is now status post TURP and suprapubic catheter placement   -catheter drainage  -bladder irrigations  -close Urology follow-up     5   History traumatic brain injury     6   Acute kidney injury-complicating chronic kidney disease  Suspect this is a pre renal issue   Consideration for the possibility of sepsis as a cause    Renal function has continued to improve  -recheck BMP tomorrow  -fluid management as per the primary service  -dose adjust the ertapenem as above  -no additional ID workup for now    Antibiotics:  Ertapenem 5  Antibiotics 6  Oral vancomycin 5    Subjective:  Patient has no fever, chills, sweats; no nausea, vomiting, diarrhea; no cough, shortness of breath; no pain  No new symptoms  He still had some low-grade fever yesterday  He is now status post TURP in suprapubic catheter placement  He is quite somnolent in the postoperative period  Objective:  Vitals:  HR:  [58-80] 64  Resp:  [11-19] 16  BP: (120-176)/(68-97) 144/87  SpO2:  [94 %-97 %] 94 %  Temp (24hrs), Av 3 °F (36 8 °C), Min:97 °F (36 1 °C), Max:100 4 °F (38 °C)  Current: Temperature: (!) 97 °F (36 1 °C)    Physical Exam:   General Appearance:  Somnolent, nontoxic, no acute distress  Throat: Oropharynx moist without lesions  Lungs:   Clear to auscultation bilaterally; no wheezes, rhonchi or rales; respirations unlabored   Heart:  RRR; no murmur, rub or gallop   Abdomen:   Soft, non-tender, non-distended, positive bowel sounds  Suprapubic catheter in place  Extremities: No clubbing, cyanosis or edema   Skin: No new rashes or lesions  No draining wounds noted  Labs, Imaging, & Other studies:   All pertinent labs and imaging studies were personally reviewed    Results from last 7 days  Lab Units 18  0624 18  0605   WBC Thousand/uL 6 51 7 50   HEMOGLOBIN g/dL 10 6* 10 6*   PLATELETS Thousands/uL 167 139*       Results from last 7 days  Lab Units 18  0624 18  0458 18  0605   SODIUM mmol/L 142 141 141   POTASSIUM mmol/L 3 6 3 1* 3 8   CHLORIDE mmol/L 108 107 107   CO2 mmol/L 27 25 27   ANION GAP mmol/L 7 9 7   BUN mg/dL 24 29* 33*   CREATININE mg/dL 1 94* 2 06* 2 30*   EGFR ml/min/1 73sq m 34 32 28   GLUCOSE RANDOM mg/dL 89 114 93   CALCIUM mg/dL 7 9* 7 7* 8 0*       Results from last 7 days  Lab Units 01/27/18  1400   BLOOD CULTURE  No Growth at 72 hrs  No Growth at 72 hrs

## 2018-01-31 NOTE — ANESTHESIA POSTPROCEDURE EVALUATION
Post-Op Assessment Note      CV Status:  Stable    Mental Status:  Alert and awake    Hydration Status:  Euvolemic    PONV Controlled:  Controlled    Airway Patency:  Patent    Post Op Vitals Reviewed:  Yes              BP     Temp      Pulse 64 (01/31/18 0942)   Resp 19 (01/31/18 0942)    SpO2 94 % (01/31/18 0942)

## 2018-02-01 LAB
ANION GAP SERPL CALCULATED.3IONS-SCNC: 8 MMOL/L (ref 4–13)
BACTERIA BLD CULT: NORMAL
BACTERIA BLD CULT: NORMAL
BASOPHILS # BLD AUTO: 0.02 THOUSANDS/ΜL (ref 0–0.1)
BASOPHILS NFR BLD AUTO: 0 % (ref 0–1)
BUN SERPL-MCNC: 23 MG/DL (ref 5–25)
CALCIUM SERPL-MCNC: 7.7 MG/DL (ref 8.3–10.1)
CHLORIDE SERPL-SCNC: 108 MMOL/L (ref 100–108)
CO2 SERPL-SCNC: 26 MMOL/L (ref 21–32)
CREAT SERPL-MCNC: 1.6 MG/DL (ref 0.6–1.3)
EOSINOPHIL # BLD AUTO: 0.08 THOUSAND/ΜL (ref 0–0.61)
EOSINOPHIL NFR BLD AUTO: 1 % (ref 0–6)
ERYTHROCYTE [DISTWIDTH] IN BLOOD BY AUTOMATED COUNT: 13.6 % (ref 11.6–15.1)
GFR SERPL CREATININE-BSD FRML MDRD: 43 ML/MIN/1.73SQ M
GLUCOSE SERPL-MCNC: 90 MG/DL (ref 65–140)
HCT VFR BLD AUTO: 31.2 % (ref 36.5–49.3)
HGB BLD-MCNC: 10.2 G/DL (ref 12–17)
LYMPHOCYTES # BLD AUTO: 1.66 THOUSANDS/ΜL (ref 0.6–4.47)
LYMPHOCYTES NFR BLD AUTO: 22 % (ref 14–44)
MCH RBC QN AUTO: 29 PG (ref 26.8–34.3)
MCHC RBC AUTO-ENTMCNC: 32.7 G/DL (ref 31.4–37.4)
MCV RBC AUTO: 89 FL (ref 82–98)
MONOCYTES # BLD AUTO: 0.65 THOUSAND/ΜL (ref 0.17–1.22)
MONOCYTES NFR BLD AUTO: 9 % (ref 4–12)
NEUTROPHILS # BLD AUTO: 5.04 THOUSANDS/ΜL (ref 1.85–7.62)
NEUTS SEG NFR BLD AUTO: 68 % (ref 43–75)
NRBC BLD AUTO-RTO: 0 /100 WBCS
PLATELET # BLD AUTO: 178 THOUSANDS/UL (ref 149–390)
PMV BLD AUTO: 10.5 FL (ref 8.9–12.7)
POTASSIUM SERPL-SCNC: 4.2 MMOL/L (ref 3.5–5.3)
RBC # BLD AUTO: 3.52 MILLION/UL (ref 3.88–5.62)
SODIUM SERPL-SCNC: 142 MMOL/L (ref 136–145)
WBC # BLD AUTO: 7.45 THOUSAND/UL (ref 4.31–10.16)

## 2018-02-01 PROCEDURE — 85025 COMPLETE CBC W/AUTO DIFF WBC: CPT | Performed by: NURSE PRACTITIONER

## 2018-02-01 PROCEDURE — 99233 SBSQ HOSP IP/OBS HIGH 50: CPT | Performed by: INTERNAL MEDICINE

## 2018-02-01 PROCEDURE — G8987 SELF CARE CURRENT STATUS: HCPCS

## 2018-02-01 PROCEDURE — 99232 SBSQ HOSP IP/OBS MODERATE 35: CPT | Performed by: PHYSICIAN ASSISTANT

## 2018-02-01 PROCEDURE — 97166 OT EVAL MOD COMPLEX 45 MIN: CPT

## 2018-02-01 PROCEDURE — G8979 MOBILITY GOAL STATUS: HCPCS

## 2018-02-01 PROCEDURE — G8988 SELF CARE GOAL STATUS: HCPCS

## 2018-02-01 PROCEDURE — 97163 PT EVAL HIGH COMPLEX 45 MIN: CPT

## 2018-02-01 PROCEDURE — 99024 POSTOP FOLLOW-UP VISIT: CPT | Performed by: PHYSICIAN ASSISTANT

## 2018-02-01 PROCEDURE — 80048 BASIC METABOLIC PNL TOTAL CA: CPT | Performed by: NURSE PRACTITIONER

## 2018-02-01 PROCEDURE — G8978 MOBILITY CURRENT STATUS: HCPCS

## 2018-02-01 RX ADMIN — TEMAZEPAM 7.5 MG: 7.5 CAPSULE ORAL at 21:16

## 2018-02-01 RX ADMIN — VANCOMYCIN 125 MG: KIT at 09:22

## 2018-02-01 RX ADMIN — VANCOMYCIN 125 MG: KIT at 21:15

## 2018-02-01 RX ADMIN — MIRTAZAPINE 15 MG: 15 TABLET, FILM COATED ORAL at 21:15

## 2018-02-01 RX ADMIN — SODIUM CHLORIDE 1000 MG: 9 INJECTION, SOLUTION INTRAVENOUS at 17:39

## 2018-02-01 RX ADMIN — SODIUM CHLORIDE AND POTASSIUM CHLORIDE 75 ML/HR: .9; .15 SOLUTION INTRAVENOUS at 11:55

## 2018-02-01 NOTE — PLAN OF CARE

## 2018-02-01 NOTE — SOCIAL WORK
CM met with patient at bedside to address discharge needs  Patient was not initially cooperative, but CM continued with assessment and patient reluctantly answered questions  Patient reports he lives alone in a house; bedroom is located on the 2nd floor, patient denies difficulty with steps  Patient is independent with ADLs and functional mobility  Support system seems to be Caryn Hawkins, his sister-in-law  Food shopping & meal prep is done by sister-in-law  Patient claims use of RW  Patient claims to be able to ambulate without assistance  Hx of VNA reported  Patient is on SSD  Patient was unable to identify his PCP  POA identified as patients Raphael ortiz  Sister-in-law is his primary/designated caregiver if needed  Patient uses the [place] for Rx needs  Patient does not drive; reports sister-in-law available at discharge to transport home  Help/support reported  Patient made aware of CM's name, number and role  CM to follow as case progresses and needs are determined  CM provided name and contact number on white board in room prior to leaving  Bundle and rehab list was also provided  PCP not identified as patient reports he doesnt remember  Spokesperson would be his sister-in-law, Caryn Hawkins

## 2018-02-01 NOTE — PROGRESS NOTES
Progress Note - Urology  Nina Sherman  81 y o  male MRN: [id number]    Assessment & Plan:   1  1 Day Post-Op status post    Procedure(s):  TRANSURETHRAL RESECTION OF PROSTATE (TURP)  SUPRAPUBIC TUBE PLACEMENT   by Jason Mota MD on 1/31/2018  ESBL Proteus UTI - Ertapenem, being followed by infectious disease  Clear pink urine  DC Wood catheter/CBI  Leave suprapubic tube to bag drainage  PT/OT consult ordered  To be performed now that TURP procedure is complete  Appreciate case management help and will also need to know duration of Ertapenem from Infectious Disease  Augusta Adams PA-C  Date: 2/1/2018 Time: 9:31 AM     Subjective:  Patient denies any complaints at this time  Reports he feels he has to have a bowel movement  Denies any discomfort at his Wood catheter drain site  With able to sleep a little bit better last night  Objective:    Vitals:   Vitals:    01/31/18 1500 01/31/18 1946 02/01/18 0527 02/01/18 0759   BP: 124/75 117/58 128/63 147/70   BP Location: Right arm Right arm Right arm Left arm   Pulse: 75 81 60 59   Resp: 16 16 18 16   Temp: 98 5 °F (36 9 °C) 97 6 °F (36 4 °C) 98 3 °F (36 8 °C) 97 5 °F (36 4 °C)   TempSrc: Tympanic Tympanic Tympanic Tympanic   SpO2: 98% 97% 97% 97%   Weight:           I/O:   I/O       01/30 0701 - 01/31 0700 01/31 0701 - 02/01 0700 02/01 0701 - 02/02 0700    P  O  480 120     I V  (mL/kg) 1963 8 (21) 1647 9 (17 6)     IV Piggyback 50      Total Intake(mL/kg) 2493 8 (26 7) 1767 9 (18 9)     Urine (mL/kg/hr) 4225 (1 9) 4450 (2)     Total Output 4225 4450      Net -1733 3 -5693 1                 Labs:  Hgb stable at 10 2   Cr trending down to 1 60     Exam:    General appearance: normal, moderately obese, appears somewhat disheveled  Sitting upright in bed  Denies any discomfort or pain at the time of examination  Abdomen:  Soft nontender with suprapubic catheter capped in place  Male genitalia:  Wood catheter with CBI present   No external genitalia abnormality noted  Draining clear pink tinged urine  Some portions of this record may have been generated with voice recognition software  There may be translation, syntax,  or grammatical errors  Occasional wrong word or "sound-a-like" substitutions may have occurred due to the inherent limitations of the voice recognition software  Read the chart carefully and recognize, using context, where substations may have occurred  If you have any questions, please contact the dictating provider for clarification or correction, as needed         Dorita Gibbons PA-C  Date: 2/1/2018 Time: 9:31 AM

## 2018-02-01 NOTE — PROGRESS NOTES
Progress Note -  Internal Medicine / Hospitalists  Barbra Bill  81 y o  male MRN: [id number]  Unit/Bed#: [facility] 2 -01 Encounter: [id number]      ASSESSMENT AND PLAN:  1   ESBL Proteus UTI-secondary to urinary retention from BPH s/p TURP POD #1 - Per primary team/Uro  WBC wnl  Hgb stable  Blood culture NG 4dx2  On ertapenem per Infectious Disease  2   Essential hypertension-SBP the last 24 hours 117-147   Lisinopril held secondary to #3  Pt asymptomatic  Continue hydralazine p r n   3   Chronic kidney disease stage 3-creatinine trending down 1 6 today  Unclear baseline, 1 82 POA  Had acute kidney injury on prior admits  Will d/c IVF, continue to hold on lisinopril   If creatinine stays stable consider re-starting lisinopril tomorrow if BP warrants  4  Depression - stable on remeron        5  History of C Diff - he is Vanco p o  while on IV antibiotics per ID  6  Asthenia - PT/OT [facility], d/w Yareli Monaco/primary team  7  Hypokalemia - resolved  8  Insomnia - patient complains of poor sleep with melatonin and Remeron   Improved with low dose temazepam       VTE Prophylaxis: Sequential compression device (Venodyne)     Dispo: if creatine stable tomorrow, fri 2/2 medically cleared for d/c to rehab to complete antibiotic & strength training  ______________________________________________________________________    SUBJECTIVE:   Patient seen and examined  Sleeping but easily arousable  Denies any pain  No fevers or chills  No chest pain shortness of breath or palpitations  Tolerating his diet    Claims he slept well last night      OBJECTIVE:   Principal Problem:    ESBL (extended spectrum beta-lactamase) producing bacteria infection  Active Problems:    Acute urinary retention    Urinary tract infection associated with indwelling urethral catheter (HCC)    CKD (chronic kidney disease) stage 3, GFR 30-59 ml/min    Recurrent major depressive disorder (HCC)      Vitals:   HR:  [59-81] 65  Resp: [16-18] 16  BP: (117-147)/(58-87) 119/72  SpO2:  [94 %-98 %] 98 %  Temp (24hrs), Av 8 °F (36 6 °C), Min:97 3 °F (36 3 °C), Max:98 5 °F (36 9 °C)  Current: Temperature: (!) 97 3 °F (36 3 °C)    Intake/Output Summary (Last 24 hours) at 18 1203  Last data filed at 18 1155   Gross per 24 hour   Intake             1110 ml   Output             4550 ml   Net            -3440 ml       Physical Exam:     General Appearance:    Alert, cooperative, no distress   Head:    Normocephalic, without obvious abnormality, atraumatic   Eyes:    Conjunctiva/corneas clear, EOM's intact       Neck:   Supple, no adenopathy, no JVD   Back:     Symmetric, no curvature, ROM normal, no CVA tenderness   Lungs:     Clear to auscultation bilaterally, no wheezing or rhonchi   Heart:    Regular rate and rhythm, S1 and S2 normal, no murmur, rub   or gallop   Abdomen:     Soft, non-tender, bowel sounds decreased  : frias draining dark red urine   Extremities:   Extremities normal, atraumatic, no cyanosis or edema   Psych:   Flat Affect   Neurologic:   CNII-XII intact  Weak/deconditioned     Lab, Imaging and other studies:      Results from last 7 days  Lab Units 18  0522   WBC Thousand/uL 7 45   HEMOGLOBIN g/dL 10 2*   HEMATOCRIT % 31 2*   PLATELETS Thousands/uL 178       Results from last 7 days  Lab Units 18  0522   SODIUM mmol/L 142   POTASSIUM mmol/L 4 2   CHLORIDE mmol/L 108   CO2 mmol/L 26   BUN mg/dL 23   CREATININE mg/dL 1 60*   CALCIUM mg/dL 7 7*   GLUCOSE RANDOM mg/dL 90         Lab Results   Component Value Date    BLOODCX No Growth After 4 Days  2018    BLOODCX No Growth After 4 Days  2018    BLOODCX No Growth After 5 Days   2017    URINECX >100,000 cfu/ml Proteus mirabilis ESBL (A) 2018    URINECX <10,000 cfu/ml Mixed Contaminants X2 2016    URINECX >100,000 cfu/ml Serratia marcescens 06/10/2016       Scheduled Meds:    Current Facility-Administered Medications:  acetaminophen 650 mg Oral Q6H PRN Manuel Baldwin PA-C    diphenhydrAMINE 25 mg Intravenous Q6H PRN Sherryle Alice, MD    ertapenem 1,000 mg Intravenous Q24H Kiara Rubi MD Last Rate: 1,000 mg (01/31/18 1653)   hydrALAZINE 10 mg Intravenous Q6H PRN Chito Deleon, DO    HYDROcodone-acetaminophen 1 tablet Oral Q6H PRN Sherryle Alice, MD    melatonin 6 mg Oral HS Tonja Anne PA-C    mirtazapine 15 mg Oral HS Alfgarfield Deleon, DO    morphine injection 4 mg Intravenous Q3H PRN Sherryle Alice, MD    ondansetron 4 mg Intravenous Q6H PRN Sherryle Alice, MD    oxybutynin 5 mg Oral TID PRN Sherryle Alice, MD    sodium chloride 125 mL/hr Intravenous Continuous Irving Lopez DO Last Rate: Stopped (01/31/18 1200)   sodium chloride 0 9 % with KCl 20 mEq/L 75 mL/hr Intravenous Continuous Linda Tijerina PA-C Last Rate: 75 mL/hr (02/01/18 1155)   temazepam 7 5 mg Oral HS PRN Linda Tijerina PA-C    vancomycin 125 mg Oral Q12H [address] Kiara Rubi MD        Continuous Infusions:    sodium chloride 125 mL/hr Last Rate: Stopped (01/31/18 1200)   sodium chloride 0 9 % with KCl 20 mEq/L 75 mL/hr Last Rate: 75 mL/hr (02/01/18 1155)       PRN Meds:    acetaminophen    diphenhydrAMINE    hydrALAZINE    HYDROcodone-acetaminophen    morphine injection    ondansetron    oxybutynin    temazepam      Counseling / Coordination of Care  Total floor / unit time spent today 30 minutes  minutes  Greater than 50% of total time was spent with the patient and / or family counseling and / or coordination of care         This note has been constructed using a voice recognition system

## 2018-02-01 NOTE — PROGRESS NOTES
Progress Note - Infectious Disease   Pilar Bernheim  81 y o  male MRN: [id number]  Unit/Bed#: [place] [facility] 2 -01 Encounter: [id number]      Impression/Plan:  1   ESBL Proteus UTI-in the setting of urinary retention secondary to BPH  Now status post TURP and suprapubic catheter placement   He seems to be tolerating the antibiotics without difficulty  -continue ertapenem at current dose through 2/7/2018  -monitor CBC with diff and creatinine as needed  -if the GFR decreases to below 30, decrease ertapenem dose to 500 mg IV Q 24 hours  -suprapubic catheter drainage  -with negative blood cultures, okay to place PICC line from infectious disease standpoint     2   Fever-likely secondary to 1   No other clear source appreciated   Consideration for the possibility of bacteremia although this is less likely  Jason Izaguirre does not have any other localizing symptoms   Thus far the blood cultures are negative   Fever seems to have resolved  -antibiotics as above  -follow-up blood cultures  -no additional ID workup for now  -monitor temperature  -continue incentive spirometry in case atelectasis is playing a role     3   History of C difficile colitis-no current symptomatology   With the patient going to be on broad antibiotics for several days he is at increased risk of relapsing   -continue oral vancomycin prophylaxis  through 2/11/18  -monitor for the development of diarrhea     4   Obstructive uropathy-secondary to BPH  The patient is now status post TURP and suprapubic catheter placement   -catheter drainage  -bladder irrigations  -close Urology follow-up     5   History traumatic brain injury     6   Acute kidney injury-complicating chronic kidney disease  Suspect this is a pre renal issue   Consideration for the possibility of sepsis as a cause   Renal function has continued to improve  -monitor BMP  -fluid management as per the primary service  -dose adjust the ertapenem as above  -no additional ID workup for now    7  Disposition-okay to discharge from infectious disease standpoint once arrangements made for the patient to receive intravenous antibiotics after discharge as outlined above  I suspect the patient will need a transition to subacute rehab to complete his antibiotic course  Discussed in detail with Dr Jon Guerrero    Antibiotics:  Ertapenem 6  Antibiotics 7  Oral vancomycin 6  Postop day 1    Subjective:  Patient has no fever, chills, sweats; no nausea, vomiting, diarrhea; no cough, shortness of breath; no pain  No new symptoms  He seems very tired  Objective:  Vitals:  HR:  [58-81] 59  Resp:  [11-18] 16  BP: (117-153)/(58-97) 147/70  SpO2:  [94 %-98 %] 97 %  Temp (24hrs), Av 8 °F (36 6 °C), Min:97 °F (36 1 °C), Max:98 5 °F (36 9 °C)  Current: Temperature: 97 5 °F (36 4 °C)    Physical Exam:   General Appearance:  Alert, interactive, nontoxic, no acute distress  Throat: Oropharynx moist without lesions  Lungs:   Clear to auscultation bilaterally; no wheezes, rhonchi or rales; respirations unlabored   Heart:  RRR; no murmur, rub or gallop   Abdomen:   Soft, non-tender, non-distended, positive bowel sounds  Suprapubic catheter in place     Extremities: No clubbing, cyanosis or edema   Skin: No new rashes or lesions  No draining wounds noted         Labs, Imaging, & Other studies:   All pertinent labs and imaging studies were personally reviewed    Results from last 7 days  Lab Units 18  0518  0618  0605   WBC Thousand/uL 7 45 6 51 7 50   HEMOGLOBIN g/dL 10 2* 10 6* 10 6*   PLATELETS Thousands/uL 178 167 139*       Results from last 7 days  Lab Units 18  0518  0624 18  0458   SODIUM mmol/L 142 142 141   POTASSIUM mmol/L 4 2 3 6 3 1*   CHLORIDE mmol/L 108 108 107   CO2 mmol/L 26 27 25   ANION GAP mmol/L 8 7 9   BUN mg/dL 23 24 29*   CREATININE mg/dL 1 60* 1 94* 2 06*   EGFR ml/min/1 73sq m 43 34 32   GLUCOSE RANDOM mg/dL 90 89 114   CALCIUM mg/dL 7 7* 7 9* 7 7* Results from last 7 days  Lab Units 01/27/18  1400   BLOOD CULTURE  No Growth After 4 Days  No Growth After 4 Days
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
## 2018-02-01 NOTE — PLAN OF CARE
Problem: OCCUPATIONAL THERAPY ADULT  Goal: Performs self-care activities at highest level of function for planned discharge setting  See evaluation for individualized goals  Treatment Interventions: ADL retraining, Functional transfer training, UE strengthening/ROM, Endurance training, Cognitive reorientation, Patient/family training, Equipment evaluation/education, Compensatory technique education, Continued evaluation, Activityengagement          See flowsheet documentation for full assessment, interventions and recommendations  Outcome: Progressing  Limitation: Decreased ADL status, Decreased cognition, Decreased Safe judgement during ADL, Decreased self-care trans, Decreased high-level ADLs  Prognosis: Fair  Assessment: Pt is a 81 y o  male seen for OT evaluation s/p adm to [facility] on 1/26/2018 w/ ESBL and s/p TURP and suprapubic catheter placement on 1/31/18  Comorbidities affecting pts functional performance include a significant PMH of depression, HTN, seizures, SOB, and hx of TBI resulting in memory loss  Pt with active OT orders and activity orders for activity as tolerated  Pt lives alone in a 2 story home w/ 3 ANDRAE and bed/bath upstairs  At baseline, pt was I w/ ADLs, required assist w/ IADLs, and utilized RW for functional mobility/transfers  Upon evaluation, pt currently requires Min A for UB ADLs, Mod A for LB ADLs, Mod A for toileting, Min A for bed mobility, and Min A for functional mobility/transfers 2* the following deficits impacting occupational performance: weakness, decreased strength, decreased balance, decreased tolerance and decreased safety awareness    These impairments, as well at pts steps to enter environment, limited home support and difficulty performing ADLS limit pts ability to safely engage in all baseline areas of occupation, including grooming, bathing, dressing, toileting, functional mobility/transfers, community mobility and leisure activities    Pt scored overall 45/100 on the Barthel Index  Based on the aforementioned OT evaluation, functional performance deficits, and assessments, pt has been identified as a moderate complexity evaluation  Pt to continue to benefit from continued acute OT services during hospital stay to address defined deficits and to maximize level of functional independence in the following Occupational Performance areas: grooming, bathing/shower, toilet hygiene, dressing, health maintenance, functional mobility, community mobility, clothing management and social participation  From OT standpoint, recommend STR vs HOME OT pending progress upon D/C   OT will continue to follow pt 3-5x/wk to address the following goals to  w/in 10-14 days:     OT Discharge Recommendation: Short Term Rehab (vs HOME OT pending progress)  OT - OK to Discharge: Yes (yes to STR when medically cleared)

## 2018-02-01 NOTE — PLAN OF CARE
Problem: PHYSICAL THERAPY ADULT  Goal: Performs mobility at highest level of function for planned discharge setting  See evaluation for individualized goals  Treatment/Interventions: Functional transfer training, LE strengthening/ROM, Elevations, Therapeutic exercise, Endurance training, Patient/family training, Equipment eval/education, Bed mobility, Gait training, Spoke to nursing, OT  Equipment Recommended: Stephen Hernandez       See flowsheet documentation for full assessment, interventions and recommendations  Prognosis: Fair  Problem List: Decreased strength, Decreased range of motion, Decreased endurance, Impaired balance, Decreased mobility, Decreased safety awareness, Decreased cognition  Assessment: Pt is 81 y o  male seen for PT evaluation s/p admit to [facility] on 1/26/2018  Two pt identifiers were used to confirm  Pt presented w/ ESBL which was found incidentally on preop urine culture  Pt underwent TURP on 1/31/18  Pt was admitted with a primary dx of: ESBL, fever  PT now consulted for assessment of mobility and d/c needs  Pts current co morbidities effecting treatment include: depression, HTN, seizures, and personal fctorss including living alone   Pts current clinical presentation is unstable/ unpredictable (high complexity) due to Ongoing medical management for primary dx, Increased reliance on more restrictive AD compared to baseline, Decreased activity tolerance compared to baseline, Fall risk, Increased assistance needed from caregiver at current time, Cog status    Prior to admission, pt was I with ambulation in the home with the use of a RW  Upon evaluation, pt currently is requiring min A for bed mobility; min A for transfers and min A for ambulation w/ RW   Pt denies any lightheadedness and dizziness with ambulation   Pt presents at PT eval functioning below baseline and currently w/ overall mobility deficits 2* to: weakness, decreased ROM, impaired balance, decreased endurance, gait deviations, decreased activity tolerance, decreased safety awareness and fall risk  Pt currently at a fall risk 2* to impairments listed above  Pt will continue to benefit from skilled PT interventions to address stated impairments; to maximize functional mobility; for ongoing pt/ family training; and DME needs  At conclusion of PT session pt returned BTB with phone and call bell within reach  Pt denies any further questions at this time  PT is currently recommending rehab vs home PT pending progress with PT   Pt/ family agreeable to plan and goals as stated on evaluation  PT will continue to follow during hospital stay  Barriers to Discharge: Decreased caregiver support     Recommendation: Short-term skilled PT, Home PT     PT - OK to Discharge: Yes (to rehab when medically cleared )    See flowsheet documentation for full assessment
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
## 2018-02-02 LAB
ANION GAP SERPL CALCULATED.3IONS-SCNC: 5 MMOL/L (ref 4–13)
BUN SERPL-MCNC: 27 MG/DL (ref 5–25)
CALCIUM SERPL-MCNC: 7.4 MG/DL (ref 8.3–10.1)
CHLORIDE SERPL-SCNC: 106 MMOL/L (ref 100–108)
CO2 SERPL-SCNC: 27 MMOL/L (ref 21–32)
CREAT SERPL-MCNC: 1.77 MG/DL (ref 0.6–1.3)
GFR SERPL CREATININE-BSD FRML MDRD: 38 ML/MIN/1.73SQ M
GLUCOSE SERPL-MCNC: 91 MG/DL (ref 65–140)
POTASSIUM SERPL-SCNC: 3.9 MMOL/L (ref 3.5–5.3)
SODIUM SERPL-SCNC: 138 MMOL/L (ref 136–145)

## 2018-02-02 PROCEDURE — 99024 POSTOP FOLLOW-UP VISIT: CPT | Performed by: PHYSICIAN ASSISTANT

## 2018-02-02 PROCEDURE — 99232 SBSQ HOSP IP/OBS MODERATE 35: CPT | Performed by: PHYSICIAN ASSISTANT

## 2018-02-02 PROCEDURE — 99233 SBSQ HOSP IP/OBS HIGH 50: CPT | Performed by: INTERNAL MEDICINE

## 2018-02-02 PROCEDURE — 80048 BASIC METABOLIC PNL TOTAL CA: CPT | Performed by: PHYSICIAN ASSISTANT

## 2018-02-02 RX ADMIN — SODIUM CHLORIDE 1000 MG: 9 INJECTION, SOLUTION INTRAVENOUS at 17:14

## 2018-02-02 RX ADMIN — VANCOMYCIN 125 MG: KIT at 21:12

## 2018-02-02 RX ADMIN — VANCOMYCIN 125 MG: KIT at 08:34

## 2018-02-02 RX ADMIN — MIRTAZAPINE 15 MG: 15 TABLET, FILM COATED ORAL at 21:12

## 2018-02-02 NOTE — PROGRESS NOTES
Progress Note -  Internal Medicine / Hospitalists  Gideon Kanner  81 y o  male MRN: [id number]  Unit/Bed#: [place] 2 -01 Encounter: [id number]      ASSESSMENT AND PLAN:  1   ESBL Proteus UTI-secondary to urinary retention from BPH s/p TURP POD #2 - Per primary team/Uro  Afebrile  Blood culture NG 5dx2   On ertapenem per Infectious Disease  2   Essential hypertension-SBP the last 24 hours 119-130  Lisinopril held secondary to #3  Pt asymptomatic  Continue hydralazine p r n   3   Chronic kidney disease stage 3-creatinine trending down 1 77 today s/p stopping IVF  Unclear baseline, 1 82 POA  Had acute kidney injury on prior admits  Continue to hold lisinopril   If creatinine stays stable consider re-starting lisinopril tomorrow if BP warrants  4  Depression - stable on remeron        5  History of C Diff - he is Vanco p o  while on IV antibiotics per ID  6  Asthenia - PT/OT [facility], d/w Alexis Urbina yesterday, Sx/primary team  7  Hypokalemia - resolved  8  Insomnia - patient complains of poor sleep with melatonin and Remeron and low dose temazepam added this admit      VTE Prophylaxis: Sequential compression device (Venodyne)     Dispo: medically stable; d/c to rehab for iv antibx & strength training  ______________________________________________________________________    SUBJECTIVE:   Patient seen and examined  Patient complains of poor sleep today  He denies any pain chest pain or shortness of breath  No sweats or chills    He is tolerating his diet and moving his bowels    OBJECTIVE:   Principal Problem:    ESBL (extended spectrum beta-lactamase) producing bacteria infection  Active Problems:    Acute urinary retention    Urinary tract infection associated with indwelling urethral catheter (HCC)    CKD (chronic kidney disease) stage 3, GFR 30-59 ml/min    Recurrent major depressive disorder (HCC)      Vitals:   HR:  [65-71] 68  Resp:  [16] 16  BP: (119-130)/(57-72) 130/67  SpO2:  [93 %-98 %] 93 %  Temp (24hrs), Av °F (36 1 °C), Min:96 1 °F (35 6 °C), Max:97 4 °F (36 3 °C)  Current: Temperature: (!) 97 °F (36 1 °C)    Intake/Output Summary (Last 24 hours) at 18 0947  Last data filed at 18 3176   Gross per 24 hour   Intake             1065 ml   Output             3000 ml   Net            -1935 ml       Physical Exam:     General Appearance:    Alert, cooperative, no distress   Head:    Normocephalic, without obvious abnormality, atraumatic   Eyes:    Conjunctiva/corneas clear, EOM's intact       Neck:   Supple, no adenopathy, no JVD   Back:     Symmetric, no curvature, ROM normal, no CVA tenderness   Lungs:     Course but Clear to auscultation bilaterally, no wheezing or rhonchi   Heart:    Regular rate and rhythm, S1 and S2 normal, no murmur, rub   or gallop   Abdomen:     Soft, non-tender, bowel sounds decreased  :  Wood draining red urine   Extremities:   Extremities normal, atraumatic, no cyanosis or edema   Psych:   Flat Affect   Neurologic:   CNII-XII intact  Weak/deconditioned     Lab, Imaging and other studies:      Results from last 7 days  Lab Units 18  0522   WBC Thousand/uL 7 45   HEMOGLOBIN g/dL 10 2*   HEMATOCRIT % 31 2*   PLATELETS Thousands/uL 178       Results from last 7 days  Lab Units 18  0445   SODIUM mmol/L 138   POTASSIUM mmol/L 3 9   CHLORIDE mmol/L 106   CO2 mmol/L 27   BUN mg/dL 27*   CREATININE mg/dL 1 77*   CALCIUM mg/dL 7 4*   GLUCOSE RANDOM mg/dL 91         Lab Results   Component Value Date    BLOODCX No Growth After 5 Days  2018    BLOODCX No Growth After 5 Days  2018    BLOODCX No Growth After 5 Days   2017    URINECX >100,000 cfu/ml Proteus mirabilis ESBL (A) 2018    URINECX <10,000 cfu/ml Mixed Contaminants X2 2016    URINECX >100,000 cfu/ml Serratia marcescens 06/10/2016       Scheduled Meds:    Current Facility-Administered Medications:  acetaminophen 650 mg Oral Q6H PRN Linda Tijerina PA-C    diphenhydrAMINE 25 mg Intravenous Q6H PRN Timmy Shaw MD    ertapenem 1,000 mg Intravenous Q24H Peng Mchugh MD Last Rate: 1,000 mg (02/01/18 8335)   hydrALAZINE 10 mg Intravenous Q6H PRN Sharmila Zuniga, DO    HYDROcodone-acetaminophen 1 tablet Oral Q6H PRN Timmy Shaw MD    melatonin 6 mg Oral HS Tonja Anne PADavinC    mirtazapine 15 mg Oral HS Sharmila Zuniga, DO    morphine injection 4 mg Intravenous Q3H PRN Timmy Shaw MD    ondansetron 4 mg Intravenous Q6H PRN Timmy Shaw MD    oxybutynin 5 mg Oral TID PRN Timmy Shaw MD    temazepam 7 5 mg Oral HS PRN U S  Ferdinand PADavinC    vancomycin 125 mg Oral Q12H [address] Peng Mchugh MD        Continuous Infusions:       PRN Meds:    acetaminophen    diphenhydrAMINE    hydrALAZINE    HYDROcodone-acetaminophen    morphine injection    ondansetron    oxybutynin    temazepam      Counseling / Coordination of Care  Total floor / unit time spent today 30 minutes  minutes  Greater than 50% of total time was spent with the patient and / or family counseling and / or coordination of care         This note has been constructed using a voice recognition system

## 2018-02-02 NOTE — SOCIAL WORK
"Preferred Provider" list given to patient for rehab options  Patient implied he didn't care, and wasn't interested  CM left list at bedside with patient anyway  Called his sister-in-law, Daniella, to let her know that the list was provided to him, and if she knew of anywhere that patient might be willing to go  She said that she lives in [facility] and would like facilities close to her and her   She asked referrals to be sent to [facility] and [address] [place] [address]  Referrals sent with an anticipated discharge date of early next week sometime

## 2018-02-02 NOTE — PLAN OF CARE
DISCHARGE PLANNING - CARE MANAGEMENT     Discharge to post-acute care or home with appropriate resources Progressing        GENITOURINARY - ADULT     Maintains or returns to baseline urinary function Progressing     Absence of urinary retention Progressing     Urinary catheter remains patent Progressing        INFECTION - ADULT     Absence or prevention of progression during hospitalization Progressing        Potential for Falls     Patient will remain free of falls Progressing

## 2018-02-02 NOTE — PROGRESS NOTES
Progress Note - Urology  Gayatri Carcamo  81 y o  male MRN: [id number]  Unit/Bed#: [place] 2 -01 Encounter: [id number]    Assessment & Plan:  ESLB Proteus UTI s/p TURP 1/31/2018  SPTube draining well with pink urine  No clots  Recommendation for Rehab, if he will agree  At this point questioning his decision making capabilities given some of his statements during our conversation this morning - will order Psych eval   Needs ongoing Ertapenem, may be able to keep peripheral IV, depending on Rehab site  Will coordinate with Case Management  From a urology standpoint is stable to be transitioned to STR, if can be placed and is deemed competent from RUBENS Singh  Date: 2/2/2018 Time: 8:55 AM     Subjective:  Reports no sleep overnight  Refuses to consider rehab "I don't want any of that"  Cursing throughout questioning, uncooperative and refusing to answer most questions  Reports "I just want to die", "I'm sick of hospitals"   Refuses to consent to PICC or discuss discharge planning  Reports "sick of hospitals"   Denies abdominal pain, nausea or vomiting  Objective:    Vitals:   Vitals:    02/01/18 1153 02/01/18 1520 02/02/18 0025 02/02/18 0715   BP: 119/72 120/59 119/57 130/67   BP Location: Right arm Left arm Left arm Left arm   Pulse: 65 71 69 68   Resp: 16 16 16 16   Temp: (!) 97 3 °F (36 3 °C) (!) 97 4 °F (36 3 °C) (!) 96 1 °F (35 6 °C) (!) 97 °F (36 1 °C)   TempSrc: Temporal Tympanic Tympanic Tympanic   SpO2: 98% 94% 95% 93%   Weight:           I/O:   I/O       01/31 0701 - 02/01 0700 02/01 0701 - 02/02 0700 02/02 0701 - 02/03 0700    P  O  120      I V  (mL/kg) 1647 9 (17 6) 1065 (11 4)     IV Piggyback       Total Intake(mL/kg) 1767 9 (18 9) 1065 (11 4)     Urine (mL/kg/hr) 4450 (2) 2250 (1)     Total Output 4450 2250      Net -2682 1 -1185                   Exam:  General appearance: normal, uncooperative, no distress, moderately obese, disheveled, sleeping/resting in bed  Abdomen: SPTube with clear pink urine  Male genitalia: penis: no lesions or discharge  testes: no masses or tenderness  no hernias      Some portions of this record may have been generated with voice recognition software  There may be translation, syntax,  or grammatical errors  Occasional wrong word or "sound-a-like" substitutions may have occurred due to the inherent limitations of the voice recognition software  Read the chart carefully and recognize, using context, where substations may have occurred  If you have any questions, please contact the dictating provider for clarification or correction, as needed         Mike Graves PA-C  Date: 2/2/2018 Time: 8:55 AM

## 2018-02-02 NOTE — PROGRESS NOTES
Progress Note - Infectious Disease   Isidoro Salamanca  81 y o  male MRN: [id number]  Unit/Bed#: [place] 2 -01 Encounter: [id number]      Impression/Plan:  1   ESBL Proteus UTI-in the setting of urinary retention secondary to BPH   Now status post TURP and suprapubic catheter placement   He seems to be tolerating the antibiotics without difficulty  -continue ertapenem at current dose through 2/7/2018  -monitor CBC with diff and creatinine as needed  -if the GFR decreases to below 30, decrease ertapenem dose to 500 mg IV Q 24 hours  -suprapubic catheter drainage  -with negative blood cultures, okay to place PICC line from infectious disease standpoint     2   Fever-likely secondary to 1   No other clear source appreciated   Consideration for the possibility of bacteremia although this is less likely  [facility] does not have any other localizing symptoms   Thus far the blood cultures are negative   Fever seems to have resolved  -antibiotics as above  -follow-up blood cultures  -no additional ID workup for now  -monitor temperature  -continue incentive spirometry in case atelectasis is playing a role     3   History of C difficile colitis-no current symptomatology   With the patient going to be on broad antibiotics for several days he is at increased risk of relapsing   -continue oral vancomycin prophylaxis  through 2/11/18  -monitor for the development of diarrhea     4   Obstructive uropathy-secondary to BPH   The patient is now status post TURP and suprapubic catheter placement   -catheter drainage  -bladder irrigations  -close Urology follow-up     5   History traumatic brain injury     6   Acute kidney injury-complicating chronic kidney disease   Suspect this is a pre renal issue   Consideration for the possibility of sepsis as a cause   Renal function has been relatively stable, although the creatinine increased a bit since yesterday  -monitor BMP  -fluid management as per the primary service  -dose adjust the ertapenem as above  -no additional ID workup for now     7  Disposition-okay to discharge from infectious disease standpoint once arrangements made for the patient to receive intravenous antibiotics after discharge as outlined above  I suspect the patient will need a transition to subacute rehab to complete his antibiotic course  Discussed in detail with urology  Will see the patient again 2018 if not discharged  Please call if questions  Antibiotics:  Ertapenem 7  Antibiotics 8  Oral vancomycin 7  Postop day 2    Subjective:  Patient has no fever, chills, sweats; no nausea, vomiting, diarrhea; no cough, shortness of breath; no pain  No new symptoms  He seems a bit despondent    Objective:  Vitals:  HR:  [65-71] 68  Resp:  [16] 16  BP: (119-130)/(57-72) 130/67  SpO2:  [93 %-98 %] 93 %  Temp (24hrs), Av °F (36 1 °C), Min:96 1 °F (35 6 °C), Max:97 4 °F (36 3 °C)  Current: Temperature: (!) 97 °F (36 1 °C)    Physical Exam:   General Appearance:  Alert, interactive, nontoxic, no acute distress  Throat: Oropharynx moist without lesions  Lungs:   Clear to auscultation bilaterally; no wheezes, rhonchi or rales; respirations unlabored   Heart:  RRR; no murmur, rub or gallop   Abdomen:   Soft, non-tender, non-distended, positive bowel sounds  Extremities: No clubbing, cyanosis or edema   Skin: No new rashes or lesions  No draining wounds noted         Labs, Imaging, & Other studies:   All pertinent labs and imaging studies were personally reviewed    Results from last 7 days  Lab Units 18  0518  0624 18  0605   WBC Thousand/uL 7 45 6 51 7 50   HEMOGLOBIN g/dL 10 2* 10 6* 10 6*   PLATELETS Thousands/uL 178 167 139*       Results from last 7 days  Lab Units 18  0445 18  0522 18  0624   SODIUM mmol/L 138 142 142   POTASSIUM mmol/L 3 9 4 2 3 6   CHLORIDE mmol/L 106 108 108   CO2 mmol/L 27 26 27   ANION GAP mmol/L 5 8 7   BUN mg/dL 27* 23 24   CREATININE mg/dL 1 77* 1 60* 1 94*   EGFR ml/min/1 73sq m 38 43 34   GLUCOSE RANDOM mg/dL 91 90 89   CALCIUM mg/dL 7 4* 7 7* 7 9*       Results from last 7 days  Lab Units 01/27/18  1400   BLOOD CULTURE  No Growth After 5 Days  No Growth After 5 Days

## 2018-02-03 LAB
ANION GAP SERPL CALCULATED.3IONS-SCNC: 10 MMOL/L (ref 4–13)
BASOPHILS # BLD AUTO: 0.05 THOUSANDS/ΜL (ref 0–0.1)
BASOPHILS NFR BLD AUTO: 1 % (ref 0–1)
BUN SERPL-MCNC: 31 MG/DL (ref 5–25)
CALCIUM SERPL-MCNC: 7.5 MG/DL (ref 8.3–10.1)
CHLORIDE SERPL-SCNC: 106 MMOL/L (ref 100–108)
CO2 SERPL-SCNC: 25 MMOL/L (ref 21–32)
CREAT SERPL-MCNC: 1.83 MG/DL (ref 0.6–1.3)
EOSINOPHIL # BLD AUTO: 0.36 THOUSAND/ΜL (ref 0–0.61)
EOSINOPHIL NFR BLD AUTO: 5 % (ref 0–6)
ERYTHROCYTE [DISTWIDTH] IN BLOOD BY AUTOMATED COUNT: 13.8 % (ref 11.6–15.1)
GFR SERPL CREATININE-BSD FRML MDRD: 37 ML/MIN/1.73SQ M
GLUCOSE SERPL-MCNC: 94 MG/DL (ref 65–140)
HCT VFR BLD AUTO: 32.9 % (ref 36.5–49.3)
HGB BLD-MCNC: 10.7 G/DL (ref 12–17)
LYMPHOCYTES # BLD AUTO: 1.92 THOUSANDS/ΜL (ref 0.6–4.47)
LYMPHOCYTES NFR BLD AUTO: 27 % (ref 14–44)
MCH RBC QN AUTO: 28.6 PG (ref 26.8–34.3)
MCHC RBC AUTO-ENTMCNC: 32.5 G/DL (ref 31.4–37.4)
MCV RBC AUTO: 88 FL (ref 82–98)
MONOCYTES # BLD AUTO: 0.61 THOUSAND/ΜL (ref 0.17–1.22)
MONOCYTES NFR BLD AUTO: 9 % (ref 4–12)
NEUTROPHILS # BLD AUTO: 4.09 THOUSANDS/ΜL (ref 1.85–7.62)
NEUTS SEG NFR BLD AUTO: 58 % (ref 43–75)
NRBC BLD AUTO-RTO: 0 /100 WBCS
PLATELET # BLD AUTO: 229 THOUSANDS/UL (ref 149–390)
PMV BLD AUTO: 10.1 FL (ref 8.9–12.7)
POTASSIUM SERPL-SCNC: 3.5 MMOL/L (ref 3.5–5.3)
RBC # BLD AUTO: 3.74 MILLION/UL (ref 3.88–5.62)
SODIUM SERPL-SCNC: 141 MMOL/L (ref 136–145)
WBC # BLD AUTO: 7.03 THOUSAND/UL (ref 4.31–10.16)

## 2018-02-03 PROCEDURE — 80048 BASIC METABOLIC PNL TOTAL CA: CPT | Performed by: PHYSICIAN ASSISTANT

## 2018-02-03 PROCEDURE — 85025 COMPLETE CBC W/AUTO DIFF WBC: CPT | Performed by: PHYSICIAN ASSISTANT

## 2018-02-03 PROCEDURE — 99232 SBSQ HOSP IP/OBS MODERATE 35: CPT | Performed by: PHYSICIAN ASSISTANT

## 2018-02-03 RX ORDER — SODIUM CHLORIDE 9 MG/ML
75 INJECTION, SOLUTION INTRAVENOUS CONTINUOUS
Status: DISCONTINUED | OUTPATIENT
Start: 2018-02-03 | End: 2018-02-08 | Stop reason: HOSPADM

## 2018-02-03 RX ORDER — DOCUSATE SODIUM 100 MG/1
100 CAPSULE, LIQUID FILLED ORAL 2 TIMES DAILY
Status: DISCONTINUED | OUTPATIENT
Start: 2018-02-03 | End: 2018-02-08 | Stop reason: HOSPADM

## 2018-02-03 RX ADMIN — VANCOMYCIN 125 MG: KIT at 21:47

## 2018-02-03 RX ADMIN — DOCUSATE SODIUM 100 MG: 100 CAPSULE, LIQUID FILLED ORAL at 09:27

## 2018-02-03 RX ADMIN — HYDROCODONE BITARTRATE AND ACETAMINOPHEN 1 TABLET: 5; 325 TABLET ORAL at 18:31

## 2018-02-03 RX ADMIN — SODIUM CHLORIDE 75 ML/HR: 0.9 INJECTION, SOLUTION INTRAVENOUS at 09:22

## 2018-02-03 RX ADMIN — MIRTAZAPINE 15 MG: 15 TABLET, FILM COATED ORAL at 21:47

## 2018-02-03 RX ADMIN — SODIUM CHLORIDE 1000 MG: 9 INJECTION, SOLUTION INTRAVENOUS at 17:17

## 2018-02-03 RX ADMIN — MELATONIN TAB 3 MG 6 MG: 3 TAB at 21:48

## 2018-02-03 RX ADMIN — VANCOMYCIN 125 MG: KIT at 07:58

## 2018-02-03 NOTE — PROGRESS NOTES
Progress Note -  Internal Medicine / Hospitalists  Yakelin Ledezma  81 y o  male MRN: [id number]  Unit/Bed#: [place] 2 -01 Encounter: [id number]      ASSESSMENT AND PLAN:  1   ESBL Proteus UTI-secondary to urinary retention from BPH s/p TURP POD #3 - Per primary team/Uro  Afebrile  Blood culture NG 5dx2   On ertapenem per Infectious Disease  2   Essential hypertension-SBP the last 24 hours 118-136   Lisinopril held secondary to #3  Pt asymptomatic  Continue hydralazine p r n   3   Chronic kidney disease stage 3-creatinine variable during admit as high as 2 3 and low 1 6  Unclear baseline, 1 82 POA  Had acute kidney injury on prior admits  Resume IVF  Continue to hold lisinopril  4 Depression - on remeron; refused to talk with Psych yesterday for d/c planning      5  History of C Diff - he is Vanco p o  while on IV antibiotics per ID  6  Asthenia - PT/[facility], and needs placement for IV antibiotics, pt refusing  Psych input appreciated  7  Hypokalemia - resolved  8  Insomnia - patient complains of poor sleep overall, but better last night  On melatonin and Remeron outpatient, and low dose temazepam added this admit  Appreciate psych input    Dispo: Monitor Cr  D/c planning per primary team/CM  Updated family at bedside  ______________________________________________________________________    SUBJECTIVE:   Patient seen and examined  Patient feels fatigued but denies any fevers or chills  No chest pain shortness of breath or palpitations  Denies any abdominal pain  Claims that he is eating well, any moving his bowels without difficulty       OBJECTIVE:   Principal Problem:    ESBL (extended spectrum beta-lactamase) producing bacteria infection  Active Problems:    Acute urinary retention    Urinary tract infection associated with indwelling urethral catheter (HCC)    CKD (chronic kidney disease) stage 3, GFR 30-59 ml/min    Recurrent major depressive disorder (HCC)      Vitals:   HR:  [64-95] 64  Resp:  [16-18] 18  BP: (118-136)/(70-76) 126/74  SpO2:  [90 %-95 %] 95 %  Temp (24hrs), Av 1 °F (36 2 °C), Min:96 7 °F (35 9 °C), Max:97 4 °F (36 3 °C)  Current: Temperature: (!) 97 4 °F (36 3 °C)    Intake/Output Summary (Last 24 hours) at 18 1423  Last data filed at 18 1024   Gross per 24 hour   Intake               50 ml   Output             2200 ml   Net            -2150 ml       Physical Exam:     General Appearance:    Alert, cooperative, no distress   Head:    Normocephalic, without obvious abnormality, atraumatic   Eyes:    Conjunctiva/corneas clear, EOM's intact       Neck:   Supple, no adenopathy, no JVD   Back:     Symmetric, no curvature, ROM normal, no CVA tenderness   Lungs:     Clear to auscultation bilaterally, no wheezing or rhonchi   Heart:    Regular rate and rhythm, S1 and S2 normal, no murmur, rub   or gallop   Abdomen:     Soft, non-tender, bowel sounds active   : clear britany urine   Extremities:   Extremities normal, atraumatic, no cyanosis or edema   Psych:   Flat Affect   Neurologic:   CNII-XII intact  Weak/decondition     Lab, Imaging and other studies:      Results from last 7 days  Lab Units 18  0459   WBC Thousand/uL 7 03   HEMOGLOBIN g/dL 10 7*   HEMATOCRIT % 32 9*   PLATELETS Thousands/uL 229       Results from last 7 days  Lab Units 18  0455   SODIUM mmol/L 141   POTASSIUM mmol/L 3 5   CHLORIDE mmol/L 106   CO2 mmol/L 25   BUN mg/dL 31*   CREATININE mg/dL 1 83*   CALCIUM mg/dL 7 5*   GLUCOSE RANDOM mg/dL 94         Lab Results   Component Value Date    BLOODCX No Growth After 5 Days  2018    BLOODCX No Growth After 5 Days  2018    BLOODCX No Growth After 5 Days   2017    URINECX >100,000 cfu/ml Proteus mirabilis ESBL (A) 2018    URINECX <10,000 cfu/ml Mixed Contaminants X2 2016    URINECX >100,000 cfu/ml Serratia marcescens 06/10/2016       Scheduled Meds:    Current Facility-Administered Medications:  acetaminophen 650 mg Oral Q6H PRN Linda Tijerina PA-C    docusate sodium 100 mg Oral BID Beau Pena MD    ertapenem 1,000 mg Intravenous Q24H Satya Gee MD Last Rate: Stopped (02/02/18 1744)   hydrALAZINE 10 mg Intravenous Q6H PRN Rafael Pandya DO    HYDROcodone-acetaminophen 1 tablet Oral Q6H PRN Dragan Sandra MD    melatonin 6 mg Oral HS Tonja Anne PA-C    mirtazapine 15 mg Oral HS Rafael Pandya DO    ondansetron 4 mg Intravenous Q6H PRN Dragan Sandra MD    oxybutynin 5 mg Oral TID PRN Dragan Sandra MD    sodium chloride 75 mL/hr Intravenous Continuous Beau Pena MD Last Rate: 75 mL/hr (02/03/18 2168)   vancomycin 125 mg Oral Q12H [facility] Satya Gee MD        Continuous Infusions:    sodium chloride 75 mL/hr Last Rate: 75 mL/hr (02/03/18 0322)       PRN Meds:    acetaminophen    hydrALAZINE    HYDROcodone-acetaminophen    ondansetron    oxybutynin      Counseling / Coordination of Care  Total floor / unit time spent today 30 minutes  minutes  Greater than 50% of total time was spent with the patient and / or family counseling and / or coordination of care         This note has been constructed using a voice recognition system

## 2018-02-03 NOTE — PROGRESS NOTES
UROLOGY PROGRESS NOTE   Patient Identifiers: Jane Roman (MRN [id number])  Date of Service: 2/3/2018    Assessment:   71yo M s/p TURP    Plan:   Appreciate ID/psych/Case mgmt input  Patient is uncooperative with planning for discharge to rehab for continued Abx administration  Will continue Abx as recommended by ID  Creatinine rising, unclear if patient is eating or drinking, will add some low level resuscitative fluids  SPT to gravity  Patient to remain in house for continued discharge planning  Family has been contacted and is aware    Subjective:     24 HR EVENTS:   no significant events  Patient has  no complaints        Objective:     VITALS:    Vitals:    02/03/18 0732   BP: 126/74   Pulse: 64   Resp: 18   Temp: (!) 97 4 °F (36 3 °C)   SpO2: 95%       INS & OUTS:  3200cc/24hours    LABS:  Lab Results   Component Value Date    HGB 10 7 (L) 02/03/2018    HCT 32 9 (L) 02/03/2018    WBC 7 03 02/03/2018     02/03/2018   ]    Lab Results   Component Value Date     02/03/2018    K 3 5 02/03/2018     02/03/2018    CO2 25 02/03/2018    BUN 31 (H) 02/03/2018    CREATININE 1 83 (H) 02/03/2018    CALCIUM 7 5 (L) 02/03/2018    GLUCOSE 94 02/03/2018   ]    INPATIENT MEDS:    Current Facility-Administered Medications:     acetaminophen (TYLENOL) tablet 650 mg, 650 mg, Oral, Q6H PRN, Linda Tijerina PA-C    diphenhydrAMINE (BENADRYL) injection 25 mg, 25 mg, Intravenous, Q6H PRN, Monie Chiu MD, 25 mg at 01/29/18 2142    ertapenem (INVanz) 1,000 mg in sodium chloride 0 9 % 50 mL IVPB, 1,000 mg, Intravenous, Q24H, Shameka Dempsey MD, Stopped at 02/02/18 1744    hydrALAZINE (APRESOLINE) injection 10 mg, 10 mg, Intravenous, Q6H PRN, Rin Constantino DO    HYDROcodone-acetaminophen (NORCO) 5-325 mg per tablet 1 tablet, 1 tablet, Oral, Q6H PRN, Monie Chiu MD    melatonin tablet 6 mg, 6 mg, Oral, HS, Tonja Anne PA-C, 6 mg at 01/29/18 2013    mirtazapine (REMERON) tablet 15 mg, 15 mg, Oral, HS, Carlo Vanegas, DO, 15 mg at 02/02/18 2112    morphine (PF) 4 mg/mL injection 4 mg, 4 mg, Intravenous, Q3H PRN, Darion Hutton MD    ondansetron [facility]) injection 4 mg, 4 mg, Intravenous, Q6H PRN, Darion Hutton MD    oxybutynin [facility]) tablet 5 mg, 5 mg, Oral, TID PRN, Darion Hutton MD    temazepam (RESTORIL) capsule 7 5 mg, 7 5 mg, Oral, HS PRN, Wood Honeycutt PA-C, 7 5 mg at 02/01/18 2116    vancomycin (VANCOCIN) oral solution 125 mg, 125 mg, Oral, Q12H [address], Tiana Honeycutt MD, 125 mg at 02/03/18 0424      Physical Exam:   /74 (BP Location: Left arm)   Pulse 64   Temp (!) 97 4 °F (36 3 °C) (Tympanic)   Resp 18   Wt 93 4 kg (206 lb)   SpO2 95%   BMI 28 73 kg/m²   GEN: no acute distress, patient sleeping and difficult to illicit communication from    RESP: breathing comfortably with no accessory muscle use    ABD: soft, non-tender, non-distended   EXT: no significant peripheral edema   SPT: in place draining clear yellow urine      RADIOLOGY:   11/29/17  RENAL ULTRASOUND     INDICATION: Follow-up hydronephrosis      COMPARISON: Renal sonograms from November 17, 2017 and November 24, 2017      TECHNIQUE:   Ultrasound of the retroperitoneum was performed with a curvilinear transducer utilizing volumetric sweeps and still imaging techniques       FINDINGS:     KIDNEYS:     Right kidney:  10 0 x 6 0 cm  Normal echogenicity and contour  Cortical thickness:  Normal   No suspicious masses detected  No hydronephrosis  No shadowing calculi  No perinephric fluid collections      Left kidney:  10 2 x 5 3 cm  Normal echogenicity and contour  Cortical thickness:  Normal   No suspicious masses detected  There is slight fullness of the left renal collecting system without vianney hydronephrosis  No shadowing calculi  No perinephric fluid collections      URETERS:  Nonvisualized      BLADDER:   Wood catheter in place with decompressed bladder    Marked generalized bladder wall thickening with an appearance worrisome for bladder tumor, as has been previously described         IMPRESSION:     1  Resolution of bilateral hydronephrosis with minimal residual fullness of the left pyelocalyceal system  2   Generalized diffuse bladder wall thickening, at least partially due to the nondistended state of the bladder  However, the appearance is suspicious for bladder tumor

## 2018-02-04 LAB
ANION GAP SERPL CALCULATED.3IONS-SCNC: 9 MMOL/L (ref 4–13)
BUN SERPL-MCNC: 27 MG/DL (ref 5–25)
CALCIUM SERPL-MCNC: 7.8 MG/DL (ref 8.3–10.1)
CHLORIDE SERPL-SCNC: 107 MMOL/L (ref 100–108)
CO2 SERPL-SCNC: 24 MMOL/L (ref 21–32)
CREAT SERPL-MCNC: 1.75 MG/DL (ref 0.6–1.3)
GFR SERPL CREATININE-BSD FRML MDRD: 39 ML/MIN/1.73SQ M
GLUCOSE SERPL-MCNC: 95 MG/DL (ref 65–140)
POTASSIUM SERPL-SCNC: 3.5 MMOL/L (ref 3.5–5.3)
SODIUM SERPL-SCNC: 140 MMOL/L (ref 136–145)

## 2018-02-04 PROCEDURE — 99232 SBSQ HOSP IP/OBS MODERATE 35: CPT | Performed by: PHYSICIAN ASSISTANT

## 2018-02-04 PROCEDURE — 80048 BASIC METABOLIC PNL TOTAL CA: CPT | Performed by: PHYSICIAN ASSISTANT

## 2018-02-04 RX ADMIN — VANCOMYCIN 125 MG: KIT at 07:24

## 2018-02-04 RX ADMIN — VANCOMYCIN 125 MG: KIT at 20:57

## 2018-02-04 RX ADMIN — DOCUSATE SODIUM 100 MG: 100 CAPSULE, LIQUID FILLED ORAL at 17:29

## 2018-02-04 RX ADMIN — SODIUM CHLORIDE 75 ML/HR: 0.9 INJECTION, SOLUTION INTRAVENOUS at 12:00

## 2018-02-04 RX ADMIN — MIRTAZAPINE 15 MG: 15 TABLET, FILM COATED ORAL at 21:01

## 2018-02-04 RX ADMIN — HYDROCODONE BITARTRATE AND ACETAMINOPHEN 1 TABLET: 5; 325 TABLET ORAL at 07:26

## 2018-02-04 RX ADMIN — DOCUSATE SODIUM 100 MG: 100 CAPSULE, LIQUID FILLED ORAL at 07:24

## 2018-02-04 RX ADMIN — SODIUM CHLORIDE 1000 MG: 9 INJECTION, SOLUTION INTRAVENOUS at 17:29

## 2018-02-04 RX ADMIN — MELATONIN TAB 3 MG 6 MG: 3 TAB at 20:58

## 2018-02-04 NOTE — PROGRESS NOTES
Progress Note -  Internal Medicine / Hospitalists  Gayatri Carcamo  81 y o  male MRN: [id number]  Unit/Bed#: [facility] -01 Encounter: [id number]      ASSESSMENT AND PLAN:  1   ESBL Proteus UTI-secondary to urinary retention from BPH s/p TURP POD #4 - Per primary team/Uro  Afebrile  Blood culture NG 5dx2   On ertapenem through 2/7/18 per ID   2   Essential hypertension-SBP the last 24 hours 110-143   Lisinopril held secondary to #3  Pt asymptomatic  Continue hydralazine p r n   3   Chronic kidney disease stage 3-creatinine variable during admit as high as 2 3 and low 1 6  Today 1 75 on IVF  Unclear baseline, 1 82 POA  Had acute kidney injury on prior admits  Continue to hold lisinopril  4 Depression - on remeron; refused to talk with Psych this weekend for d/c planning      5  History of C Diff - he is Vanco p o  while on IV antibiotics per ID  6  Asthenia - PT/OT t[facility]'s rehab, and needs placement for IV antibiotics, pt refusing  Psych input appreciated  7  Hypokalemia - resolved  8  Insomnia - patient complains of poor sleep overall, on melatonin and Remeron outpatient, and low dose temazepam added this admit  Appreciate psych input      Dispo: Medically stable; Psych consult & CM following for d/c planning  ______________________________________________________________________    SUBJECTIVE:   Patient seen and examined  Sleeping but easily arousable  Denies any fevers or chills  No chest pain shortness of breath denies abdominal pain   Claims he is eating well and moving his bowels without difficulty    OBJECTIVE:   Principal Problem:    ESBL (extended spectrum beta-lactamase) producing bacteria infection  Active Problems:    Acute urinary retention    Urinary tract infection associated with indwelling urethral catheter (HCC)    CKD (chronic kidney disease) stage 3, GFR 30-59 ml/min    Recurrent major depressive disorder (HCC)      Vitals:   HR:  [62-75] 72  Resp:  [16-18] 17  BP: (110-143)/(69-77) 110/71  SpO2:  [96 %-97 %] 97 %  Temp (24hrs), Av 7 °F (36 5 °C), Min:97 6 °F (36 4 °C), Max:97 8 °F (36 6 °C)  Current: Temperature: 97 8 °F (36 6 °C)    Intake/Output Summary (Last 24 hours) at 18 1103  Last data filed at 18 0201   Gross per 24 hour   Intake               50 ml   Output              900 ml   Net             -850 ml       Physical Exam:     General Appearance:    Sleeping but easily arousable, cooperative, no distress   Head:    Normocephalic, without obvious abnormality, atraumatic   Eyes:    Conjunctiva/corneas clear, EOM's intact       Neck:   Supple, no adenopathy, no JVD   Back:     Symmetric, no curvature, ROM normal, no CVA tenderness   Lungs:     Clear to auscultation bilaterally, no wheezing or rhonchi   Heart:    Regular rate and rhythm, S1 and S2 normal, no murmur, rub   or gallop   Abdomen:     Soft, non-tender, bowel sounds active   : draining clear britany urine   Extremities:   Extremities normal, atraumatic, no cyanosis or edema   Psych:   Flat Affect   Neurologic:   CNII-XII intact  Weak/deconditioned     Lab, Imaging and other studies:      Results from last 7 days  Lab Units 18  0459   WBC Thousand/uL 7 03   HEMOGLOBIN g/dL 10 7*   HEMATOCRIT % 32 9*   PLATELETS Thousands/uL 229       Results from last 7 days  Lab Units 18  0449   SODIUM mmol/L 140   POTASSIUM mmol/L 3 5   CHLORIDE mmol/L 107   CO2 mmol/L 24   BUN mg/dL 27*   CREATININE mg/dL 1 75*   CALCIUM mg/dL 7 8*   GLUCOSE RANDOM mg/dL 95         Lab Results   Component Value Date    BLOODCX No Growth After 5 Days  2018    BLOODCX No Growth After 5 Days  2018    BLOODCX No Growth After 5 Days   2017    URINECX >100,000 cfu/ml Proteus mirabilis ESBL (A) 2018    URINECX <10,000 cfu/ml Mixed Contaminants X2 2016    URINECX >100,000 cfu/ml Serratia marcescens 06/10/2016       Scheduled Meds:    Current Facility-Administered Medications:  acetaminophen 650 mg Oral Q6H PRN Mae Lopez PA-C    docusate sodium 100 mg Oral BID Barbra Hernandez MD    ertapenem 1,000 mg Intravenous Q24H Chayo Burnett MD Last Rate: Stopped (02/03/18 0807)   hydrALAZINE 10 mg Intravenous Q6H PRN Miguel Powell, DO    HYDROcodone-acetaminophen 1 tablet Oral Q6H PRN Samuel Thompson MD    melatonin 6 mg Oral HS Tonja Anne PA-C    mirtazapine 15 mg Oral HS Miguel Powell, DO    ondansetron 4 mg Intravenous Q6H PRN Samuel Thompson MD    oxybutynin 5 mg Oral TID PRN Samuel Thompson MD    sodium chloride 75 mL/hr Intravenous Continuous Barbra Hernandez MD Last Rate: 75 mL/hr (02/03/18 1609)   vancomycin 125 mg Oral Q12H [facility] Chayo Burnett MD        Continuous Infusions:    sodium chloride 75 mL/hr Last Rate: 75 mL/hr (02/03/18 8246)       PRN Meds:    acetaminophen    hydrALAZINE    HYDROcodone-acetaminophen    ondansetron    oxybutynin      Counseling / Coordination of Care  Total floor / unit time spent today 30 minutes  minutes  Greater than 50% of total time was spent with the patient and / or family counseling and / or coordination of care         This note has been constructed using a voice recognition system

## 2018-02-04 NOTE — PROGRESS NOTES
UROLOGY PROGRESS NOTE   Patient Identifiers: Leanne Piña (MRN [id number])  Date of Service: 2/4/2018    Assessment:   82 yo M s/p TURP/SPT     Plan:     Patient is difficult to communicate with, refusing to speak to providers  Awaiting psychiatry re-evaluations  Appreciate ID input, patient needs Ertapenem thru 2/7/18  SPT to gravity  PT/OT if patient will work with them  Awaiting placement    Subjective:     24 HR EVENTS:   no significant events  Patient has  no complaints        Objective:     VITALS:    Vitals:    02/03/18 2300   BP: 112/69   Pulse: 75   Resp: 16   Temp: 97 6 °F (36 4 °C)   SpO2: 96%       INS & OUTS:  1500cc/24hours    LABS:  Lab Results   Component Value Date    HGB 10 7 (L) 02/03/2018    HCT 32 9 (L) 02/03/2018    WBC 7 03 02/03/2018     02/03/2018   ]    Lab Results   Component Value Date     02/04/2018    K 3 5 02/04/2018     02/04/2018    CO2 24 02/04/2018    BUN 27 (H) 02/04/2018    CREATININE 1 75 (H) 02/04/2018    CALCIUM 7 8 (L) 02/04/2018    GLUCOSE 95 02/04/2018   ]    INPATIENT MEDS:    Current Facility-Administered Medications:     acetaminophen (TYLENOL) tablet 650 mg, 650 mg, Oral, Q6H PRN, Linda Tijerina PA-C    docusate sodium (COLACE) capsule 100 mg, 100 mg, Oral, BID, Darleen Bro MD, 100 mg at 02/04/18 0724    ertapenem (INVanz) 1,000 mg in sodium chloride 0 9 % 50 mL IVPB, 1,000 mg, Intravenous, Q24H, Samantha yLons MD, Stopped at 02/03/18 1747    hydrALAZINE (APRESOLINE) injection 10 mg, 10 mg, Intravenous, Q6H PRN, Alicia Ferreira DO    HYDROcodone-acetaminophen [facility]) 5-325 mg per tablet 1 tablet, 1 tablet, Oral, Q6H PRN, Salvador Saldana MD, 1 tablet at 02/04/18 0726    melatonin tablet 6 mg, 6 mg, Oral, HS, Tonja Anne PA-C, 6 mg at 02/03/18 2148    mirtazapine (REMERON) tablet 15 mg, 15 mg, Oral, HS, Alicia Jhon, DO, 15 mg at 02/03/18 2147    ondansetron (ZOFRAN) injection 4 mg, 4 mg, Intravenous, Q6H PRN, Salvador Car, MD    oxybutynin (DITROPAN) tablet 5 mg, 5 mg, Oral, TID PRN, Page Villavicencio MD    sodium chloride 0 9 % infusion, 75 mL/hr, Intravenous, Continuous, Mariama Duran MD, Last Rate: 75 mL/hr at 02/03/18 0922, 75 mL/hr at 02/03/18 0922    vancomycin (VANCOCIN) oral solution 125 mg, 125 mg, Oral, Q12H [address], Evangelista Gracia MD, 125 mg at 02/04/18 0724      Physical Exam:   /69 (BP Location: Right arm)   Pulse 75   Temp 97 6 °F (36 4 °C) (Temporal)   Resp 16   Wt 93 4 kg (206 lb)   SpO2 96%   BMI 28 73 kg/m²   GEN: no acute distress  Sleeping but arousable  RESP: breathing comfortably with no accessory muscle use    ABD: soft, non-tender, non-distended SPT in place  EXT: no significant peripheral edema   SPT: in place draining clear yellow urine

## 2018-02-05 LAB
ANION GAP SERPL CALCULATED.3IONS-SCNC: 5 MMOL/L (ref 4–13)
BUN SERPL-MCNC: 22 MG/DL (ref 5–25)
CALCIUM SERPL-MCNC: 7.6 MG/DL (ref 8.3–10.1)
CHLORIDE SERPL-SCNC: 107 MMOL/L (ref 100–108)
CO2 SERPL-SCNC: 29 MMOL/L (ref 21–32)
CREAT SERPL-MCNC: 1.87 MG/DL (ref 0.6–1.3)
GFR SERPL CREATININE-BSD FRML MDRD: 36 ML/MIN/1.73SQ M
GLUCOSE SERPL-MCNC: 94 MG/DL (ref 65–140)
POTASSIUM SERPL-SCNC: 3.9 MMOL/L (ref 3.5–5.3)
SODIUM SERPL-SCNC: 141 MMOL/L (ref 136–145)

## 2018-02-05 PROCEDURE — 99232 SBSQ HOSP IP/OBS MODERATE 35: CPT | Performed by: PHYSICIAN ASSISTANT

## 2018-02-05 PROCEDURE — 99024 POSTOP FOLLOW-UP VISIT: CPT | Performed by: UROLOGY

## 2018-02-05 PROCEDURE — 80048 BASIC METABOLIC PNL TOTAL CA: CPT | Performed by: PHYSICIAN ASSISTANT

## 2018-02-05 RX ADMIN — SODIUM CHLORIDE 75 ML/HR: 0.9 INJECTION, SOLUTION INTRAVENOUS at 01:49

## 2018-02-05 RX ADMIN — VANCOMYCIN 125 MG: KIT at 21:07

## 2018-02-05 RX ADMIN — MELATONIN TAB 3 MG 6 MG: 3 TAB at 21:08

## 2018-02-05 RX ADMIN — VANCOMYCIN 125 MG: KIT at 09:29

## 2018-02-05 RX ADMIN — SODIUM CHLORIDE 1000 MG: 9 INJECTION, SOLUTION INTRAVENOUS at 17:25

## 2018-02-05 RX ADMIN — MIRTAZAPINE 15 MG: 15 TABLET, FILM COATED ORAL at 21:08

## 2018-02-05 RX ADMIN — DOCUSATE SODIUM 100 MG: 100 CAPSULE, LIQUID FILLED ORAL at 17:28

## 2018-02-05 RX ADMIN — SODIUM CHLORIDE 75 ML/HR: 0.9 INJECTION, SOLUTION INTRAVENOUS at 17:26

## 2018-02-05 RX ADMIN — HYDROCODONE BITARTRATE AND ACETAMINOPHEN 1 TABLET: 5; 325 TABLET ORAL at 09:29

## 2018-02-05 RX ADMIN — DOCUSATE SODIUM 100 MG: 100 CAPSULE, LIQUID FILLED ORAL at 09:29

## 2018-02-05 NOTE — SOCIAL WORK
CM spoke with SHANNAN Yu, regarding pt plan  Pt requiring 2 more days of IV Abx  Both facilities that sister chose currently do not have bed availability  Also, at this time, pt has not been deemed to not have capacity and pt does not want to dc to rehab  Pt is a target and would need [facility] approval for SNF dc  Pt is current with [address] [place] [address]  Referral sent for [facility] and to add PT

## 2018-02-05 NOTE — PROGRESS NOTES
[facility] Internal Medicine Progress Note  Patient: Camelia Rodarte  81 y o  male   MRN: [id number]  PCP: Suzy Azevedo MD  [facility] [address] [place] [address] 226-01 Encounter: [id number]  Date Of Visit: 02/05/18    Assessment:    Principal Problem:    ESBL (extended spectrum beta-lactamase) producing bacteria infection  Active Problems:    Acute urinary retention    Urinary tract infection associated with indwelling urethral catheter (HCC)    CKD (chronic kidney disease) stage 3, GFR 30-59 ml/min    Recurrent major depressive disorder ([facility] [address] )      Plan:    · ESBL Proteus UTI:  Secondary to urinary retention from BPH status post TURP and suprapubic catheter placement postop day # 5  Continued management per primary team, urology  Blood cultures remain negative  On renally dosed ertapentum through 2/7/18 per ID  · Essential hypertension:  BP stable  123/67, 117/54  Lisinopril temporarily held given CKD  He continue IV hydralazine as needed  · Chronic kidney disease stage 3:  Creatinine variable with high of 2 3 and low of 1 6  Today creatinine is 1 87 on IV fluids  She baseline somewhat fluctuant  1 8 to prior to arrival   Will continue to hold lisinopril for now  · Depression:  On Remeron  Patient refused to talk with Psychiatry this weekend for DC planning  Patient tells me today he is very depressed and has not been able to get a good night's sleep  He is agreeable to speak with Psychiatry later today  · History of C diff:  Patient is empirically on oral vancomycin through 2/11/18 for prophylaxis while on IV antibiotics for his UTI  Infectious Disease following  No recent episodes of diarrhea  · Asthenia:  PT/OT recommending rehab  Psychiatry input appreciated  · Hypokalemia:  Potassium normalized  Now resolved  · Insomnia:  Patient reports not having a good night sleep for the past several days  He is on Remeron and melatonin at home  Appreciate additional Psychiatry input        On consult- Medically stable:  Await psychiatry consult and case management following for DC  Patient will need continued antibiotics through the dates listed above  VTE Pharmacologic Prophylaxis:   Pharmacologic: scds  Mechanical VTE Prophylaxis in Place: Yes    Discharge Plan:  Await psychiatry consult / CM pending placement    Discussions with Specialists or Other Care Team Provider:  Dr Ranjeet Diaz    Education and Discussions with Family / Patient:  Patient    Time Spent for Care: 30 minutes  More than 50% of total time spent on counseling and coordination of care as described above  Current Length of Stay: 10 day(s)  Current Patient Status: Inpatient   Code Status: Prior    Subjective:   Pt resting in chair sleeping upon arrival   Patient does know he is quite depressed and has not been able to get a good night's sleep for the past few days  Notes I am seconds place  He is somewhat disgruntled and angry about not being able to sleep well  Denies any any chest pain or pressure, shortness of breath, or diarrhea  Objective:     Vitals:   Temp (24hrs), Av 9 °F (36 6 °C), Min:97 7 °F (36 5 °C), Max:98 3 °F (36 8 °C)    HR:  [66-69] 66  Resp:  [18-20] 18  BP: (117-127)/(54-78) 123/67  SpO2:  [93 %-96 %] 94 %  Body mass index is 28 73 kg/m²  Input and Output Summary (last 24 hours): Intake/Output Summary (Last 24 hours) at 18 1133  Last data filed at 18 1029   Gross per 24 hour   Intake             2650 ml   Output             3150 ml   Net             -500 ml       Physical Exam:     Physical Exam   Constitutional: He is oriented to person, place, and time  He appears well-developed and well-nourished  No distress  HENT:   Head: Normocephalic and atraumatic  Cardiovascular: Normal rate, regular rhythm and normal heart sounds  Abdominal: Soft  Bowel sounds are normal  He exhibits no distension and no mass  There is no tenderness  There is no rebound and no guarding  Genitourinary:   Genitourinary Comments: Urinary catheter in place draining urine with present of blood  Neurological: He is alert and oriented to person, place, and time  Skin: Skin is warm and dry  He is not diaphoretic  Psychiatric: His affect is angry  His speech is not delayed  He is agitated and slowed  Thought content is not paranoid  Cognition and memory are not impaired  He exhibits a depressed mood  Nursing note and vitals reviewed  Additional Data:     Labs:      Results from last 7 days  Lab Units 02/03/18  0459   WBC Thousand/uL 7 03   HEMOGLOBIN g/dL 10 7*   HEMATOCRIT % 32 9*   PLATELETS Thousands/uL 229   NEUTROS PCT % 58   LYMPHS PCT % 27   MONOS PCT % 9   EOS PCT % 5       Results from last 7 days  Lab Units 02/05/18  0448   SODIUM mmol/L 141   POTASSIUM mmol/L 3 9   CHLORIDE mmol/L 107   CO2 mmol/L 29   BUN mg/dL 22   CREATININE mg/dL 1 87*   CALCIUM mg/dL 7 6*   GLUCOSE RANDOM mg/dL 94           * I Have Reviewed All Lab Data Listed Above  * Additional Pertinent Lab Tests Reviewed:  [facility] Admission Reviewed    Imaging:    Imaging Reports Reviewed Today Include:   Imaging Personally Reviewed by Myself Includes:      Recent Cultures (last 7 days):           Last 24 Hours Medication List:     Current Facility-Administered Medications:  acetaminophen 650 mg Oral Q6H PRN Linda Tijerina PA-C    docusate sodium 100 mg Oral BID Rojas Alamo MD    ertapenem 1,000 mg Intravenous Q24H Joshua Moreland MD Last Rate: Stopped (02/04/18 8997)   hydrALAZINE 10 mg Intravenous Q6H PRN Nancy Grain, DO    HYDROcodone-acetaminophen 1 tablet Oral Q6H PRN Kim Brambila MD    melatonin 6 mg Oral HS Tonja Anne PA-C    mirtazapine 15 mg Oral HS Delford Grain, DO    ondansetron 4 mg Intravenous Q6H PRN Kim Brambila MD    oxybutynin 5 mg Oral TID PRN Kim Brambila MD    sodium chloride 75 mL/hr Intravenous Continuous Rojas Alamo MD Last Rate: 75 mL/hr (02/05/18 1029)   vancomycin 125 mg Oral Q12H Ernestina Gerber MD         Today, Patient Was Seen By: Cha Whipple PA-C    ** Please Note: This note has been constructed using a voice recognition system   **

## 2018-02-05 NOTE — PHYSICAL THERAPY NOTE
Physical Therapy Cancellation Note              Pt refusing Pt intervention, stating" I have a headache and my shoulders hurt  I'm sick of it all "  Pt s' nurse offered to medicate pt but pt declined  Mobility training encouraged  Benefits of mobility explained to pt  Pt continued to decline PT services  Will attempt at later time for, gait, transfer and bed mobility training and b/l le strengthening exercises   Efrain Hardin, PTA

## 2018-02-05 NOTE — PROGRESS NOTES
Progress Note - Infectious Disease   Amy Courser  81 y o  male MRN: [id number]  Unit/Bed#: [place] 2 -01 Encounter: [id number]      Impression/Plan:  1   ESBL Proteus UTI-in the setting of urinary retention secondary to BPH   Now status post TURP and suprapubic catheter placement   He seems to be tolerating the antibiotics without difficulty  -continue ertapenem at current dose through 2/7/2018  -monitor CBC with diff and creatinine as needed  -if the GFR decreases to below 30, decrease ertapenem dose to 500 mg IV Q 24 hours  -suprapubic catheter drainage     2   Fever-likely secondary to 1   No other clear source appreciated   Consideration for the possibility of bacteremia although this is less likely  Carolian Monahan does not have any other localizing symptoms   Thus far the blood cultures are negative   Fever seems to have resolved  -antibiotics as above  -follow-up blood cultures  -no additional ID workup for now  -monitor temperature  -continue incentive spirometry in case atelectasis is playing a role     3   History of C difficile colitis-no current symptomatology   With the patient going to be on broad antibiotics for several days he is at increased risk of relapsing   -continue oral vancomycin prophylaxis  through 2/11/18  -monitor for the development of diarrhea     4   Obstructive uropathy-secondary to BPH   The patient is now status post TURP and suprapubic catheter placement   -catheter drainage  -bladder irrigations  -close Urology follow-up     5   History traumatic brain injury     6   Acute kidney injury-complicating chronic kidney disease  Suspect this is a pre renal issue   Consideration for the possibility of sepsis as a cause    Has improved since admission and now has stabilized  -monitor BMP  -fluid management as per the primary service  -dose adjust the ertapenem as above  -no additional ID workup for now     7   Disposition-okay to discharge from infectious disease standpoint once arrangements made for the patient to receive intravenous antibiotics after discharge as outlined above   I suspect the patient will need a transition to subacute rehab to complete his antibiotic course  Antibiotics:  Ertapenem 10  Antibiotics 11  Oral vancomycin 10  Postop day 5    Subjective:  Patient has no fever, chills, sweats; no nausea, vomiting, diarrhea; no cough, shortness of breath; no pain  No new symptoms  He seems tired today  Objective:  Vitals:  HR:  [66-69] 66  Resp:  [18-20] 18  BP: (117-127)/(54-78) 123/67  SpO2:  [93 %-96 %] 94 %  Temp (24hrs), Av 9 °F (36 6 °C), Min:97 7 °F (36 5 °C), Max:98 3 °F (36 8 °C)  Current: Temperature: 98 3 °F (36 8 °C)    Physical Exam:   General Appearance:  Somnolent, arousable, nontoxic, no acute distress  Throat: Oropharynx moist without lesions  Lungs:   Clear to auscultation bilaterally; no wheezes, rhonchi or rales; respirations unlabored   Heart:  RRR; no murmur, rub or gallop   Abdomen:   Soft, non-tender, non-distended, positive bowel sounds  Suprapubic catheter in place    Extremities: No clubbing, cyanosis or edema   Skin: No new rashes or lesions  No draining wounds noted         Labs, Imaging, & Other studies:   All pertinent labs and imaging studies were personally reviewed    Results from last 7 days  Lab Units 18  0459 18  0522 18  0624   WBC Thousand/uL 7 03 7 45 6 51   HEMOGLOBIN g/dL 10 7* 10 2* 10 6*   PLATELETS Thousands/uL 229 178 167       Results from last 7 days  Lab Units 18  0448 18  0449 18  0455   SODIUM mmol/L 141 140 141   POTASSIUM mmol/L 3 9 3 5 3 5   CHLORIDE mmol/L 107 107 106   CO2 mmol/L 29 24 25   ANION GAP mmol/L 5 9 10   BUN mg/dL 22 27* 31*   CREATININE mg/dL 1 87* 1 75* 1 83*   EGFR ml/min/1 73sq m 36 39 37   GLUCOSE RANDOM mg/dL 94 95 94   CALCIUM mg/dL 7 6* 7 8* 7 5*

## 2018-02-05 NOTE — PROGRESS NOTES
No change in disposition  Sleeping  Case management to see today  Awaiting placement but patient uncooperative  Continues with IV antibiotics which will complete 2-7-18  Urine remains clear

## 2018-02-05 NOTE — CASE MANAGEMENT
Continued Stay Review    Date:   2/5/2018    Vital Signs: /67 (BP Location: Left arm)   Pulse 66   Temp 98 3 °F (36 8 °C) (Temporal)   Resp 18   Wt 93 4 kg (206 lb)   SpO2 94%   BMI 28 73 kg/m²     Medications:   Scheduled Meds:   Current Facility-Administered Medications:  acetaminophen 650 mg Oral Q6H PRN Linda Tijerina PA-C    docusate sodium 100 mg Oral BID Kapil Villalta MD    ertapenem 1,000 mg Intravenous Q24H Tiana Honeycutt MD Last Rate: Stopped (02/04/18 1759)   hydrALAZINE 10 mg Intravenous Q6H PRN Carlo Vanegas, DO    HYDROcodone-acetaminophen 1 tablet Oral Q6H PRN Darion Hutton MD    melatonin 6 mg Oral HS Tonja Anne PA-C    mirtazapine 15 mg Oral HS Carlo Vanegas, DO    ondansetron 4 mg Intravenous Q6H PRN Darion Hutton MD    oxybutynin 5 mg Oral TID PRN Darion Hutton MD    sodium chloride 75 mL/hr Intravenous Continuous Kapil Villalta MD Last Rate: 75 mL/hr (02/05/18 1029)   vancomycin 125 mg Oral Q12H [facility] Tiaan Honeycutt MD      Continuous Infusions:   sodium chloride 75 mL/hr Last Rate: 75 mL/hr (02/05/18 1029)     PRN Meds:   acetaminophen    hydrALAZINE    HYDROcodone-acetaminophen    ondansetron    oxybutynin    Abnormal Labs/Diagnostic Results:   Creat  1 87    Age/Sex: 81 y o  male     · Assessment/Plan:   ESBL Proteus UTI:  Secondary to urinary retention from BPH status post TURP and suprapubic catheter placement postop day # 5  Continued management per primary team, urology  Blood cultures remain negative  On renally dosed ertapentum through 2/7/18 per ID      · Essential hypertension:  BP stable  123/67, 117/54  Lisinopril temporarily held given CKD  He continue IV hydralazine as needed      · Chronic kidney disease stage 3:  Creatinine variable with high of 2 3 and low of 1 6  Today creatinine is 1 87 on IV fluids  She baseline somewhat fluctuant  1 8 to prior to arrival   Will continue to hold lisinopril for now      · Depression:  On Remeron  Patient refused to talk with Psychiatry this weekend for DC planning  Patient tells me today he is very depressed and has not been able to get a good night's sleep  He is agreeable to speak with Psychiatry later today      · History of C diff:  Patient is empirically on oral vancomycin through 2/11/18 for prophylaxis while on IV antibiotics for his UTI  Infectious Disease following  No recent episodes of diarrhea      · Asthenia:  PT/OT recommending rehab  Psychiatry input appreciated      · Hypokalemia:  Potassium normalized  Now resolved      · Insomnia:  Patient reports not having a good night sleep for the past several days  He is on Remeron and melatonin at home  Appreciate additional Psychiatry input        Discharge Plan:    Await psychiatry consult and case management following for DC  Patient will need continued antibiotics through the dates listed above

## 2018-02-05 NOTE — OCCUPATIONAL THERAPY NOTE
OT Cancellation Note:    Moo Rogers   2/5/2018    Attempted to see pt this AM, however pt refused OT treatment session at this time 2* increased pain in head and back  Explained benefit and purpose of participating in OT treatment session  Pt continued to refuse and stated he did not want any therapy today, but he would try tomorrow  OT to continue to follow pt as able to assist w/ safe D/C planning and increase safety/independence prior to D/C       Wm Moss OTR/L

## 2018-02-06 LAB
ANION GAP SERPL CALCULATED.3IONS-SCNC: 8 MMOL/L (ref 4–13)
BUN SERPL-MCNC: 18 MG/DL (ref 5–25)
CALCIUM SERPL-MCNC: 7.5 MG/DL (ref 8.3–10.1)
CHLORIDE SERPL-SCNC: 107 MMOL/L (ref 100–108)
CO2 SERPL-SCNC: 26 MMOL/L (ref 21–32)
CREAT SERPL-MCNC: 1.67 MG/DL (ref 0.6–1.3)
ERYTHROCYTE [DISTWIDTH] IN BLOOD BY AUTOMATED COUNT: 13.7 % (ref 11.6–15.1)
GFR SERPL CREATININE-BSD FRML MDRD: 41 ML/MIN/1.73SQ M
GLUCOSE SERPL-MCNC: 89 MG/DL (ref 65–140)
HCT VFR BLD AUTO: 31.8 % (ref 36.5–49.3)
HGB BLD-MCNC: 10.3 G/DL (ref 12–17)
MCH RBC QN AUTO: 28.8 PG (ref 26.8–34.3)
MCHC RBC AUTO-ENTMCNC: 32.4 G/DL (ref 31.4–37.4)
MCV RBC AUTO: 89 FL (ref 82–98)
PLATELET # BLD AUTO: 252 THOUSANDS/UL (ref 149–390)
PMV BLD AUTO: 10.2 FL (ref 8.9–12.7)
POTASSIUM SERPL-SCNC: 3.8 MMOL/L (ref 3.5–5.3)
RBC # BLD AUTO: 3.58 MILLION/UL (ref 3.88–5.62)
SODIUM SERPL-SCNC: 141 MMOL/L (ref 136–145)
WBC # BLD AUTO: 6.58 THOUSAND/UL (ref 4.31–10.16)

## 2018-02-06 PROCEDURE — 80048 BASIC METABOLIC PNL TOTAL CA: CPT | Performed by: PHYSICIAN ASSISTANT

## 2018-02-06 PROCEDURE — 99232 SBSQ HOSP IP/OBS MODERATE 35: CPT | Performed by: INTERNAL MEDICINE

## 2018-02-06 PROCEDURE — 97535 SELF CARE MNGMENT TRAINING: CPT

## 2018-02-06 PROCEDURE — 85027 COMPLETE CBC AUTOMATED: CPT | Performed by: PHYSICIAN ASSISTANT

## 2018-02-06 PROCEDURE — 99024 POSTOP FOLLOW-UP VISIT: CPT | Performed by: PHYSICIAN ASSISTANT

## 2018-02-06 RX ADMIN — MIRTAZAPINE 15 MG: 15 TABLET, FILM COATED ORAL at 21:24

## 2018-02-06 RX ADMIN — MELATONIN TAB 3 MG 6 MG: 3 TAB at 21:25

## 2018-02-06 RX ADMIN — DOCUSATE SODIUM 100 MG: 100 CAPSULE, LIQUID FILLED ORAL at 09:42

## 2018-02-06 RX ADMIN — HYDROCODONE BITARTRATE AND ACETAMINOPHEN 1 TABLET: 5; 325 TABLET ORAL at 10:28

## 2018-02-06 RX ADMIN — VANCOMYCIN 125 MG: KIT at 09:42

## 2018-02-06 RX ADMIN — SODIUM CHLORIDE 1000 MG: 9 INJECTION, SOLUTION INTRAVENOUS at 17:28

## 2018-02-06 RX ADMIN — HYDROCODONE BITARTRATE AND ACETAMINOPHEN 1 TABLET: 5; 325 TABLET ORAL at 17:27

## 2018-02-06 RX ADMIN — DOCUSATE SODIUM 100 MG: 100 CAPSULE, LIQUID FILLED ORAL at 17:23

## 2018-02-06 RX ADMIN — VANCOMYCIN 125 MG: KIT at 21:25

## 2018-02-06 NOTE — SOCIAL WORK
LIO met with patient and sister-in-law, Eduardo Luu, in regards to STR  Options given initially to LIO were [address], however, neither had a bed  Eduardo Luu provided another option of [address], but she was unfamiliar with the remaining choices, so LIO picked   [place] [address] to send additional referrals to, due to the locale to her other options

## 2018-02-06 NOTE — OCCUPATIONAL THERAPY NOTE
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 ([facility] [address] )    Urinary tract infection associated with indwelling urethral catheter ([facility]    CKD (chronic kidney disease) stage 3, GFR 30-59 ml/min    Recurrent major depressive disorder ([facility])    ESBL (extended spectrum beta-lactamase) producing bacteria infection         02/06/18 1119   Restrictions/Precautions   Weight Bearing Precautions Per Order No   Other Precautions Contact/isolation;Cognitive;Multiple lines; Fall Risk;Pain   Lifestyle   Autonomy Pt reports I w/ ADLs, assist from sister in law w/ IADLs, and use of RW for functional mobility/transfers PTA   Reciprocal Relationships Lives alone; supportive family   Service to Others Retired   Pain Assessment   Pain Assessment 0-10   Pain Score 5   Pain Type Chronic pain   Pain Location Shoulder;Back   Pain Orientation Bilateral   Pain Descriptors Sore;Aching   Pain Frequency Constant/continuous   Pain Onset Ongoing   Clinical Progression Gradually improving   Effect of Pain on Daily Activities Increased pain w/ activity    Patient's Stated Pain Goal No pain   Hospital Pain Intervention(s) Repositioned; Ambulation/increased activity; Emotional support   Response to Interventions Tolerated   ADL   Grooming Assistance 5  Supervision/Setup   Grooming Deficit Supervision/safety; Increased time to complete;Standing with assistive device   Grooming Comments Supervision for safety when standing at sink to wash/dry hands and brush hair   LB Dressing Assistance 4  Minimal Assistance   LB Dressing Deficit Steadying;Supervision/safety; Increased time to complete; Requires assistive device for steadying;Pull up over hips   LB Dressing Comments Supervision to don/doff (B) socks while seated in chair; Min A to don hospital pants 2* increased assist required to pull pants over hips, as pt required BUE support on RW when standing    Functional Standing Tolerance   Time 3 mins   Activity Functional mobility from chair to bathroom   Comments No LOB noted; Increased fatigue reported by pt    Bed Mobility   Supine to Sit Unable to assess  (pt OOB in chair at start of session)   Sit to Supine 4  Minimal assistance   Additional items Assist x 1; Increased time required;Verbal cues;LE management   Additional Comments Pt lying supine at end of session w/ call bell and phone within reach  All needs met and pt reported no further questions for OT at this time   Transfers   Sit to Stand 4  Minimal assistance   Additional items Assist x 1; Armrests; Increased time required;Verbal cues   Stand to Sit 4  Minimal assistance   Additional items Assist x 1; Increased time required;Verbal cues   Toilet transfer 3  Moderate assistance   Additional items Assist x 1; Increased time required;Verbal cues;Standard toilet  (use of grab bars)   Additional Comments VCs for safe hand placement during transfers to increase safety/independence   Functional Mobility   Functional Mobility 4  Minimal assistance   Additional Comments Assist x1    Additional items Rolling walker   Toilet Transfers   Toilet Transfer From Rolling walker   Toilet Transfer Type To and from   Toilet Transfer to Standard toilet   Toilet Transfer Technique Ambulating   Toilet Transfers Moderate assistance   Toilet Transfers Comments Extensive use of grab bar w/ BUE to stand from standard toilet   Min A for controlled descent to standard toilet and Mod A to stand 2* decreased ability to initiate forward weight transition   Cognition   Overall Cognitive Status Impaired Arousal/Participation Alert; Cooperative   Attention Within functional limits   Orientation Level Oriented to person;Oriented to place   Memory Decreased long term memory;Decreased recall of recent events;Decreased short term memory   Following Commands Follows one step commands with increased time or repetition   Activity Tolerance   Activity Tolerance Patient limited by fatigue;Patient limited by pain   Medical Staff Made Aware Pt able to be seen per RN Linda Cost    Assessment   Assessment Pt seen for OT treatment session this AM focusing on functional activity tolerance, ADLs (LB dressing, grooming), functional mobility, and transfers to common household surfaces (chair, standard toilet, EOB)  Pt alert and agreeable to OT session, however required increased verbal encouragement for participation throughout session  LB dressing completed in chair w/ overall Min A  Pt demonstrated ability to don/doff (B) socks w/ Supervision while seated in chair, however required Min A to don hospital pants  Assistance required to pull pants over hips 2* pt requiring BUE on RW for support when standing  Min A required for all functional mobility at this time for steadying/safety  Mod A required during toilet transfer to standard toilet w/ Min A for controlled descent to surface and Mod A to stand from standard toilet 2* decreased ability to initiate forward weight transition from lower surface  Extensive use of BUE required to stand from toilet w/ Mod A  Min A required for bed mobility (sit>supine) w/ increased assist required for LE management  Pt is progressing towards LTGs, however remains limited by generalized weakness, deconditioning, fatigue, increased pain, and decreased dynamic standing balance  Continue to recommend STR upon D/C as pt was I w/ ADLs and functional mobility/transfers PTA and currently requires Min A for ADLs, Mod A for functional transfers, and Min A for functional mobility   Pt currently not agreeable to STR, therefore if D/C home, recommend HOME OT  OT to continue to follow pt on caseload  Plan   Treatment Interventions ADL retraining;Functional transfer training;UE strengthening/ROM; Endurance training;Cognitive reorientation;Patient/family training;Equipment evaluation/education; Compensatory technique education; Activityengagement   Goal Expiration Date 02/15/18   Treatment Day 1   OT Frequency 3-5x/wk   Recommendation   OT Discharge Recommendation Short Term Rehab  (Pt currently not agreeable to STR; HOME OT if D/C home)   OT - OK to Discharge Yes  (when medically cleared)   Barthel Index   Feeding 10   Bathing 0   Grooming Score 5   Dressing Score 5   Bladder Score 0   Bowels Score 10   Toilet Use Score 5   Transfers (Bed/Chair) Score 10   Mobility (Level Surface) Score 0   Stairs Score 0   Barthel Index Score 45   Modified Crook Scale   Modified Crook Scale 4     Tanya Simons, OTR/L

## 2018-02-06 NOTE — SOCIAL WORK
Prasanna from [address] () called in re: to an update of patient with discharge planning   Updated her that patient may go to STR and that so far 5 referrals have gone out or if no bed is available he may have to go home with home PT

## 2018-02-06 NOTE — PROGRESS NOTES
Progress Note - Infectious Disease   Ligia Guevara  81 y o  male MRN: [id number]  Unit/Bed#: [facility] 2 -01 Encounter: [id number]      Impression/Plan:  1   ESBL Proteus UTI-in the setting of urinary retention secondary to BPH   Now status post TURP and suprapubic catheter placement   He seems to be tolerating the antibiotics without difficulty   -discontinue ertapenem after dose tomorrow  -monitor CBC with diff and creatinine as needed  -suprapubic catheter drainage     2   History of C difficile colitis-no current symptomatology   With the patient going to be on broad antibiotics for several days he is at increased risk of relapsing   -continue oral vancomycin prophylaxis  through 18  -monitor for the development of diarrhea     3   Obstructive uropathy-secondary to BPH   The patient is now status post TURP and suprapubic catheter placement   -catheter drainage  -bladder irrigations  -close Urology follow-up     4   History traumatic brain injury     5   Acute kidney injury-complicating chronic kidney disease  Suspect this is a pre renal issue   Consideration for the possibility of sepsis as a cause  Has improved since admission and now has stabilized  -monitor BMP  -fluid management as per the primary service  -dose adjust the ertapenem as above  -no additional ID workup for now     6   Disposition-patient for discharge tomorrow    Antibiotics:  Ertapenem 11  Antibiotics 12  Postop day 6  Oral vancomycin 11    Subjective:  Patient has no fever, chills, sweats; no nausea, vomiting, diarrhea; no cough, shortness of breath; no pain  No new symptoms  He seems tired but is a little more interactive      Objective:  Vitals:  HR:  [65-68] 65  Resp:  [18] 18  BP: (120-124)/(65-67) 123/65  SpO2:  [97 %-98 %] 97 %  Temp (24hrs), Av 4 °F (36 9 °C), Min:97 7 °F (36 5 °C), Max:98 9 °F (37 2 °C)  Current: Temperature: 98 9 °F (37 2 °C)    Physical Exam:   General Appearance:  Alert, interactive, nontoxic, no acute distress  Throat: Oropharynx moist without lesions  Lungs:   Clear to auscultation bilaterally; no wheezes, rhonchi or rales; respirations unlabored   Heart:  RRR; no murmur, rub or gallop   Abdomen:   Soft, non-tender, non-distended, positive bowel sounds  Suprapubic catheter in place     Extremities: No clubbing, cyanosis or edema   Skin: No new rashes or lesions  No draining wounds noted         Labs, Imaging, & Other studies:   All pertinent labs and imaging studies were personally reviewed    Results from last 7 days  Lab Units 02/06/18  0611 02/03/18  0459 02/01/18  0522   WBC Thousand/uL 6 58 7 03 7 45   HEMOGLOBIN g/dL 10 3* 10 7* 10 2*   PLATELETS Thousands/uL 252 229 178       Results from last 7 days  Lab Units 02/06/18  0611 02/05/18  0448 02/04/18  0449   SODIUM mmol/L 141 141 140   POTASSIUM mmol/L 3 8 3 9 3 5   CHLORIDE mmol/L 107 107 107   CO2 mmol/L 26 29 24   ANION GAP mmol/L 8 5 9   BUN mg/dL 18 22 27*   CREATININE mg/dL 1 67* 1 87* 1 75*   EGFR ml/min/1 73sq m 41 36 39   GLUCOSE RANDOM mg/dL 89 94 95   CALCIUM mg/dL 7 5* 7 6* 7 8*

## 2018-02-06 NOTE — PHYSICAL THERAPY NOTE
Physical Therapy Cancellation Note    Attempted to see pt this PM for PT Rx session  Pt refused to participate in today's session indicating that his head and back hurt, he was too tired and "if I didn't have this brain depression I would be 1000 times better "  Encouraged pt to participate, however, continued to decline  Inquired where he plans to go upon D/C from this acute care setting, pt indicated "I think I'm going to rehab "    Lacie Roberts, PTA

## 2018-02-06 NOTE — PLAN OF CARE
Problem: OCCUPATIONAL THERAPY ADULT  Goal: Performs self-care activities at highest level of function for planned discharge setting  See evaluation for individualized goals  Treatment Interventions: ADL retraining, Functional transfer training, UE strengthening/ROM, Endurance training, Cognitive reorientation, Patient/family training, Equipment evaluation/education, Compensatory technique education, Continued evaluation, Activityengagement          See flowsheet documentation for full assessment, interventions and recommendations  Outcome: Progressing  Limitation: Decreased ADL status, Decreased cognition, Decreased Safe judgement during ADL, Decreased self-care trans, Decreased high-level ADLs  Prognosis: Fair  Assessment: Pt seen for OT treatment session this AM focusing on functional activity tolerance, ADLs (LB dressing, grooming), functional mobility, and transfers to common household surfaces (chair, standard toilet, EOB)  Pt alert and agreeable to OT session, however required increased verbal encouragement for participation throughout session  LB dressing completed in chair w/ overall Min A  Pt demonstrated ability to don/doff (B) socks w/ Supervision while seated in chair, however required Min A to don hospital pants  Assistance required to pull pants over hips 2* pt requiring BUE on RW for support when standing  Min A required for all functional mobility at this time for steadying/safety  Mod A required during toilet transfer to standard toilet w/ Min A for controlled descent to surface and Mod A to stand from standard toilet 2* decreased ability to initiate forward weight transition from lower surface  Extensive use of BUE required to stand from toilet w/ Mod A  Min A required for bed mobility (sit>supine) w/ increased assist required for LE management   Pt is progressing towards LTGs, however remains limited by generalized weakness, deconditioning, fatigue, increased pain, and decreased dynamic standing balance  Continue to recommend STR upon D/C as pt was I w/ ADLs and functional mobility/transfers PTA and currently requires Min A for ADLs, Mod A for functional transfers, and Min A for functional mobility  Pt currently not agreeable to STR, therefore if D/C home, recommend HOME OT  OT to continue to follow pt on caseload        OT Discharge Recommendation: Short Term Rehab (Pt currently not agreeable to STR; HOME OT if D/C home)  OT - OK to Discharge: Yes (when medically cleared)

## 2018-02-06 NOTE — PROGRESS NOTES
Progress Note - Urology  Marcial Bliss  81 y o  male MRN: [id number]  Unit/Bed#: [facility] [id number] Encounter: [id number]    Assessment & Plan:  1  ESBL Proteus in the urine  Needs her Dipentum until February 7, 2018  Many placement issues, including but not limited to availability of rehabilitation places close to his home, patient's inability to cooperate in choosing a location and his unwillingness to consent to a PICC line for ongoing antibiotics at a short-term rehabilitation facility or skilled nursing facility     See case management note for further details  Have been working closely with case management for discharge planning  Current plan is discharge home tomorrow after he has completed his IV antibiotics  He will resume home nursing skilled nursing for care of his SP tube and home PT for ongoing therapy  Continue SP tube to gravity  Dorita Gibbons PA-C  Date: 2/6/2018 Time: 8:23 AM     Subjective:  No acute events overnight  Objective:    Vitals:   Vitals:    02/05/18 0748 02/05/18 1533 02/06/18 0042 02/06/18 0700   BP: 123/67 120/67 124/66 123/65   BP Location: Left arm Right arm Left arm Left arm   Pulse: 66 68 67 65   Resp: 18 18 18 18   Temp: 98 3 °F (36 8 °C) 97 7 °F (36 5 °C) 98 7 °F (37 1 °C) 98 9 °F (37 2 °C)   TempSrc: Temporal Tympanic Temporal Tympanic   SpO2: 94% 98% 97% 97%   Weight:           I/O:   I/O       02/04 0701 - 02/05 0700 02/05 0701 - 02/06 0700 02/06 0701 - 02/07 0700    I V  (mL/kg) 2000 (21 4) 1168 8 (12 5)     IV Piggyback       Total Intake(mL/kg) 2000 (21 4) 1168 8 (12 5)     Urine (mL/kg/hr) 3150 (1 4) 2250 (1)     Emesis/NG output       Stool 0 (0)      Total Output 3150 2250      Net -1150 -1081  3             Unmeasured Stool Occurrence 1 x            Exam:  General appearance: normal, alert, fatigued, cooperative, sleeping comfortably  No acute distress  Male genitalia:  Abdomen with SP tube in place   Draining clear pink urine freely without evidence of clot  Some portions of this record may have been generated with voice recognition software  There may be translation, syntax,  or grammatical errors  Occasional wrong word or "sound-a-like" substitutions may have occurred due to the inherent limitations of the voice recognition software  Read the chart carefully and recognize, using context, where substations may have occurred  If you have any questions, please contact the dictating provider for clarification or correction, as needed         Chinmay Mckeon PA-C  Date: 2/6/2018 Time: 8:23 AM

## 2018-02-07 PROCEDURE — 99024 POSTOP FOLLOW-UP VISIT: CPT | Performed by: UROLOGY

## 2018-02-07 PROCEDURE — 99232 SBSQ HOSP IP/OBS MODERATE 35: CPT | Performed by: INTERNAL MEDICINE

## 2018-02-07 RX ADMIN — SODIUM CHLORIDE 1000 MG: 9 INJECTION, SOLUTION INTRAVENOUS at 17:31

## 2018-02-07 RX ADMIN — MELATONIN TAB 3 MG 6 MG: 3 TAB at 21:27

## 2018-02-07 RX ADMIN — DOCUSATE SODIUM 100 MG: 100 CAPSULE, LIQUID FILLED ORAL at 17:31

## 2018-02-07 RX ADMIN — DOCUSATE SODIUM 100 MG: 100 CAPSULE, LIQUID FILLED ORAL at 10:02

## 2018-02-07 RX ADMIN — VANCOMYCIN 125 MG: KIT at 10:02

## 2018-02-07 RX ADMIN — VANCOMYCIN 125 MG: KIT at 21:27

## 2018-02-07 RX ADMIN — SODIUM CHLORIDE 75 ML/HR: 0.9 INJECTION, SOLUTION INTRAVENOUS at 13:09

## 2018-02-07 RX ADMIN — MIRTAZAPINE 15 MG: 15 TABLET, FILM COATED ORAL at 21:27

## 2018-02-07 NOTE — CASE MANAGEMENT
Continued Stay Review    Date: 2/7/18    Vital Signs: /73 (BP Location: Right arm)   Pulse 68   Temp 98 °F (36 7 °C) (Tympanic)   Resp 16   Wt 93 4 kg (206 lb)   SpO2 98%   BMI 28 73 kg/m²     Medications:   Scheduled Meds:   Current Facility-Administered Medications:  acetaminophen 650 mg Oral Q6H PRN Linda Tijerina PA-C    docusate sodium 100 mg Oral BID Obed Mahoney MD    ertapenem 1,000 mg Intravenous Q24H Serge Gallardo MD Last Rate: Stopped (02/06/18 1800)   hydrALAZINE 10 mg Intravenous Q6H PRN Katie Cabello, DO    HYDROcodone-acetaminophen 1 tablet Oral Q6H PRN Brooke Ca MD    melatonin 6 mg Oral HS Tonja Anne PA-C    mirtazapine 15 mg Oral HS Katie Cabello, DO    ondansetron 4 mg Intravenous Q6H PRN Brooke Ca MD    oxybutynin 5 mg Oral TID PRN Brooke Ca MD    sodium chloride 75 mL/hr Intravenous Continuous Obed Mahoney MD Last Rate: 75 mL/hr (02/05/18 1726)   vancomycin 125 mg Oral Q12H [facility] Serge Gallardo MD      Continuous Infusions:   sodium chloride 75 mL/hr Last Rate: 75 mL/hr (02/05/18 1726)     PRN Meds:   acetaminophen    hydrALAZINE    HYDROcodone-acetaminophen    ondansetron    oxybutynin    Abnormal Labs/Diagnostic Results: Results from last 7 days  Lab Units 02/06/18  0611 02/03/18  0459 02/01/18  0522   WBC Thousand/uL 6 58 7 03 7 45   HEMOGLOBIN g/dL 10 3* 10 7* 10 2*   PLATELETS Thousands/uL 252 229 178         Results from last 7 days  Lab Units 02/06/18  0611 02/05/18  0448 02/04/18  0449   SODIUM mmol/L 141 141 140   POTASSIUM mmol/L 3 8 3 9 3 5   CHLORIDE mmol/L 107 107 107   CO2 mmol/L 26 29 24   ANION GAP mmol/L 8 5 9   BUN mg/dL 18 22 27*   CREATININE mg/dL 1 67* 1 87* 1 75*   EGFR ml/min/1 73sq m 41 36 39   GLUCOSE RANDOM mg/dL 89 94 95   CALCIUM mg/dL 7 5* 7 6* 7 8*         Age/Sex: 81 y o  male     Assessment/Plan: Impression/Plan:  1   ESBL Proteus UTI-in the setting of urinary retention secondary to BPH   Now status post TURP and suprapubic catheter placement   He seems to be tolerating the antibiotics without difficulty   -discontinue ertapenem after dose today  -monitor CBC with diff and creatinine as needed  -suprapubic catheter drainage     2   History of C difficile colitis-no current symptomatology   With the patient going to be on broad antibiotics for several days he is at increased risk of relapsing   -continue oral vancomycin prophylaxis  through 2/11/18  -monitor for the development of diarrhea     3   Obstructive uropathy-secondary to BPH   The patient is now status post TURP and suprapubic catheter placement   -catheter drainage  -close Urology follow-up     4   History traumatic brain injury     5   Acute kidney injury-complicating chronic kidney disease  Suspect this is a pre renal issue   Consideration for the possibility of sepsis as a cause   Has improved since admission and now has stabilized    No labs done today   -monitor BMP as needed  -no additional ID workup for now     6   Disposition-possible discharge home later today

## 2018-02-07 NOTE — PROGRESS NOTES
One-week status post trans urethral resection of the prostate and suprapubic catheter placement  ESBL Proteus UTI in the setting of urinary retention due to BPH with obstruction  On exam his abdomen is soft nontender nondistended  No masses or organomegaly  In the suprapubic tube is in good position  Is draining to a bag  According to Infectious Disease plan, ertapenem will be discontinued after today's dose  He is afebrile, vital signs are stable  Can be discharged when placement available with suprapubic tube open to a bag and we will do a voiding trial later

## 2018-02-07 NOTE — PROGRESS NOTES
Progress Note - Infectious Disease   Tricia Lin  81 y o  male MRN: [id number]  Unit/Bed#: [facility] 2 - Encounter: [id number]      Impression/Plan:  1   ESBL Proteus UTI-in the setting of urinary retention secondary to BPH   Now status post TURP and suprapubic catheter placement   He seems to be tolerating the antibiotics without difficulty   -discontinue ertapenem after dose today  -monitor CBC with diff and creatinine as needed  -suprapubic catheter drainage     2   History of C difficile colitis-no current symptomatology   With the patient going to be on broad antibiotics for several days he is at increased risk of relapsing   -continue oral vancomycin prophylaxis  through 18  -monitor for the development of diarrhea     3   Obstructive uropathy-secondary to BPH   The patient is now status post TURP and suprapubic catheter placement   -catheter drainage  -close Urology follow-up     4   History traumatic brain injury     5   Acute kidney injury-complicating chronic kidney disease  Suspect this is a pre renal issue   Consideration for the possibility of sepsis as a cause   Has improved since admission and now has stabilized  No labs done today   -monitor BMP as needed  -no additional ID workup for now     6   Disposition-possible discharge home later today  Antibiotics:  Ertapenem 12  Antibiotics 13  Postop day 7  Oral vancomycin 12    Subjective:  Patient has no fever, chills, sweats; no nausea, vomiting, diarrhea; no cough, shortness of breath; no pain  No new symptoms  Objective:  Vitals:  HR:  [66-70] 68  Resp:  [16-18] 16  BP: (127-147)/(66-73) 147/73  SpO2:  [94 %-98 %] 98 %  Temp (24hrs), Av 6 °F (36 4 °C), Min:97 °F (36 1 °C), Max:98 °F (36 7 °C)  Current: Temperature: 98 °F (36 7 °C)    Physical Exam:   General Appearance:  Somnolent, easily arousable, interactive, nontoxic, no acute distress  Throat: Oropharynx moist without lesions      Lungs:   Clear to auscultation bilaterally; no wheezes, rhonchi or rales; respirations unlabored   Heart:  RRR; no murmur, rub or gallop   Abdomen:   Soft, non-tender, non-distended, positive bowel sounds  Suprapubic catheter in place  Extremities: No clubbing, cyanosis or edema   Skin: No new rashes or lesions  No draining wounds noted         Labs, Imaging, & Other studies:   All pertinent labs and imaging studies were personally reviewed    Results from last 7 days  Lab Units 02/06/18  0611 02/03/18  0459 02/01/18  0522   WBC Thousand/uL 6 58 7 03 7 45   HEMOGLOBIN g/dL 10 3* 10 7* 10 2*   PLATELETS Thousands/uL 252 229 178       Results from last 7 days  Lab Units 02/06/18  0611 02/05/18  0448 02/04/18  0449   SODIUM mmol/L 141 141 140   POTASSIUM mmol/L 3 8 3 9 3 5   CHLORIDE mmol/L 107 107 107   CO2 mmol/L 26 29 24   ANION GAP mmol/L 8 5 9   BUN mg/dL 18 22 27*   CREATININE mg/dL 1 67* 1 87* 1 75*   EGFR ml/min/1 73sq m 41 36 39   GLUCOSE RANDOM mg/dL 89 94 95   CALCIUM mg/dL 7 5* 7 6* 7 8*

## 2018-02-08 VITALS
RESPIRATION RATE: 16 BRPM | WEIGHT: 206 LBS | OXYGEN SATURATION: 96 % | SYSTOLIC BLOOD PRESSURE: 127 MMHG | HEART RATE: 73 BPM | TEMPERATURE: 99.2 F | DIASTOLIC BLOOD PRESSURE: 74 MMHG | BODY MASS INDEX: 28.73 KG/M2

## 2018-02-08 PROCEDURE — 99024 POSTOP FOLLOW-UP VISIT: CPT | Performed by: UROLOGY

## 2018-02-08 PROCEDURE — 99232 SBSQ HOSP IP/OBS MODERATE 35: CPT | Performed by: INTERNAL MEDICINE

## 2018-02-08 PROCEDURE — 97530 THERAPEUTIC ACTIVITIES: CPT

## 2018-02-08 PROCEDURE — 97110 THERAPEUTIC EXERCISES: CPT

## 2018-02-08 RX ADMIN — SODIUM CHLORIDE 75 ML/HR: 0.9 INJECTION, SOLUTION INTRAVENOUS at 03:14

## 2018-02-08 RX ADMIN — VANCOMYCIN 125 MG: KIT at 08:15

## 2018-02-08 NOTE — PHYSICAL THERAPY NOTE
Physical Therapy Progress Note     02/08/18 1122   Pain Assessment   Pain Assessment 0-10   Pain Score 7   Pain Type Chronic pain   Pain Location Generalized   Hospital Pain Intervention(s) Ambulation/increased activity;Repositioned   Response to Interventions Poorly Tolerated  Restrictions/Precautions   Weight Bearing Precautions Per Order No   Other Precautions Contact/isolation; Fall Risk;Multiple lines;Pain;Cognitive   General   Chart Reviewed Yes   Response to Previous Treatment Patient reporting fatigue but able to participate  Family/Caregiver Present No   Subjective   Subjective Willing to participate in therapy this AM    Bed Mobility   Rolling R 4  Minimal assistance   Additional items Assist x 1;Bedrails; Increased time required;LE management;Verbal cues   Rolling L 4  Minimal assistance   Additional items Assist x 1;Bedrails; Increased time required;Verbal cues;LE management   Endurance Deficit   Endurance Deficit Yes   Endurance Deficit Description fatigue/pain   Activity Tolerance   Activity Tolerance Patient limited by fatigue;Patient limited by pain   Nurse Made Aware Yes   Exercises   THR Supine;10 reps;AAROM; Bilateral   Assessment   Prognosis Fair   Problem List Decreased strength;Decreased range of motion;Decreased endurance; Impaired balance;Decreased mobility; Decreased cognition; Impaired judgement;Decreased safety awareness;Pain;Decreased skin integrity   Assessment Pt  supine in bed upon my arrival  Reports just returning to bed from bedside chair, due to increased pain  Discussed with pt  mobility during therapy session, however refused due to increased pain  Performance of HEP supine in bed with repositioning supine in bed at end of treatment session  PT will continue to recommend rehab upon d/c for continued improvement of noted impairments above      Barriers to Discharge Decreased caregiver support   Goals   Patient Goals To rest    STG Expiration Date 02/11/18   Treatment Day 1   Plan Treatment/Interventions Functional transfer training;LE strengthening/ROM; Endurance training; Therapeutic exercise; Bed mobility;Gait training;Spoke to nursing;Spoke to case management   Progress Slow progress, decreased activity tolerance   PT Frequency 5x/wk   Recommendation   Recommendation Short-term skilled PT   Equipment Recommended Walker  (RW)   PT - OK to Discharge Yes  (if d/c to STR when medically stable )     Emily Cohen PTA

## 2018-02-08 NOTE — PROGRESS NOTES
AVSS, patient resting comfortably  OK for discharge, off all antibiotics  Awaiting placement  POD #8 s/p cysto/TURP/SPT

## 2018-02-08 NOTE — SOCIAL WORK
CM was informed that patient was cleared for discharge  There was an open bed and an acceptance of patient at [address] in [facility]  LIO arranged a [facility] transport with [email or web address] (as they have the contract with [facility]) for 6:00pm  CM did call his sister-in-law, Michell Scott, prior to arranging the transport to see if she was willing to take him, but she was unable; patient did not qualify for a BLS  Informed of transport: PA, RN, facility via [address], and family was called  CM met with patient at bedside and updated  CM attempted to notify unit clerk for a chart copy; spoke to care coordinator and she was going to make sure it was done prior to discharge as unit clerk was gone for the day

## 2018-02-08 NOTE — PLAN OF CARE
Problem: PHYSICAL THERAPY ADULT  Goal: Performs mobility at highest level of function for planned discharge setting  See evaluation for individualized goals  Treatment/Interventions: Functional transfer training, LE strengthening/ROM, Elevations, Therapeutic exercise, Endurance training, Patient/family training, Equipment eval/education, Bed mobility, Gait training, Spoke to nursing, OT  Equipment Recommended: Lefty Mcdermott       See flowsheet documentation for full assessment, interventions and recommendations  Outcome: Progressing  Prognosis: Fair  Problem List: Decreased strength, Decreased range of motion, Decreased endurance, Impaired balance, Decreased mobility, Decreased cognition, Impaired judgement, Decreased safety awareness, Pain, Decreased skin integrity  Assessment: Pt  supine in bed upon my arrival  Reports just returning to bed from bedside chair, due to increased pain  Discussed with pt  mobility during therapy session, however refused due to increased pain  Performance of HEP supine in bed with repositioning supine in bed at end of treatment session  PT will continue to recommend rehab upon d/c for continued improvement of noted impairments above  Barriers to Discharge: Decreased caregiver support     Recommendation: Short-term skilled PT     PT - OK to Discharge: Yes (if d/c to STR when medically stable )    See flowsheet documentation for full assessment

## 2018-02-08 NOTE — PROGRESS NOTES
Progress Note - Infectious Disease   Barbra Bill  81 y o  male MRN: [id number]  Unit/Bed#: [place] 2 -01 Encounter: [id number]      Impression/Plan:  1   ESBL Proteus UTI-in the setting of urinary retention secondary to BPH   Now status post TURP and suprapubic catheter placement   He seems to be tolerating the antibiotics without difficulty  Patient is status post almost 2 weeks of intravenous ertapenem  -no additional ertapenem  -monitor CBC with diff and creatinine as needed  -suprapubic catheter drainage     2   History of C difficile colitis-no current symptomatology   With the patient going to be on broad antibiotics for several days he is at increased risk of relapsing   -continue oral vancomycin prophylaxis through 18  -monitor for the development of diarrhea     3   Obstructive uropathy-secondary to BPH   The patient is now status post TURP and suprapubic catheter placement   -catheter drainage  -close Urology follow-up     4   History traumatic brain injury     5   Acute kidney injury-complicating chronic kidney disease  Suspect this is a pre renal issue   Consideration for the possibility of sepsis as a cause   Has improved since admission and now has stabilized  No labs done today   -monitor BMP as needed  -no additional ID workup for now     6   Disposition-awaiting placement    Antibiotics:  None status post almost 2 weeks of ertapenem    Subjective:  Patient has no fever, chills, sweats; no nausea, vomiting, diarrhea; no cough, shortness of breath; no pain  No new symptoms  He is feeling tired but he did get out of bed and sit in the chair for a while today  Objective:  Vitals:  HR:  [64-69] 69  Resp:  [16-18] 16  BP: (105-154)/(59-77) 154/77  SpO2:  [96 %-97 %] 96 %  Temp (24hrs), Av 4 °F (36 9 °C), Min:97 7 °F (36 5 °C), Max:99 °F (37 2 °C)  Current: Temperature: 98 6 °F (37 °C)    Physical Exam:   General Appearance:  Alert, interactive, nontoxic, no acute distress  Throat: Oropharynx moist without lesions  Lungs:   Clear to auscultation bilaterally; no wheezes, rhonchi or rales; respirations unlabored   Heart:  RRR; no murmur, rub or gallop   Abdomen:   Soft, non-tender, non-distended, positive bowel sounds  Suprapubic catheter in place  Extremities: No clubbing, cyanosis or edema   Skin: No new rashes or lesions  No draining wounds noted         Labs, Imaging, & Other studies:   All pertinent labs and imaging studies were personally reviewed    Results from last 7 days  Lab Units 02/06/18  0611 02/03/18  0459   WBC Thousand/uL 6 58 7 03   HEMOGLOBIN g/dL 10 3* 10 7*   PLATELETS Thousands/uL 252 229       Results from last 7 days  Lab Units 02/06/18  0611 02/05/18  0448 02/04/18  0449   SODIUM mmol/L 141 141 140   POTASSIUM mmol/L 3 8 3 9 3 5   CHLORIDE mmol/L 107 107 107   CO2 mmol/L 26 29 24   ANION GAP mmol/L 8 5 9   BUN mg/dL 18 22 27*   CREATININE mg/dL 1 67* 1 87* 1 75*   EGFR ml/min/1 73sq m 41 36 39   GLUCOSE RANDOM mg/dL 89 94 95   CALCIUM mg/dL 7 5* 7 6* 7 8*

## 2018-02-08 NOTE — TREATMENT PLAN
Treatment Plan - Urology   Carrington Cabrera  81 y o  male MRN: [id number]  Unit/Bed#: [place] 68 2 [facility] 87 226-01 Encounter: [id number]    Treatment Plan:  Discussed with Carlos Dawson,   After several referrals, one facility accepted Mr Eusebia Johnson, but has no available beds  We are continuing to inquire regarding availability  We also discussed optioning patient, given mental health history, and whether this is required or not  Patient is off abx and ready for d/c       Jhony Hart PA-C  Date: 2/8/2018 Time: 9:42 AM

## 2018-02-08 NOTE — NURSING NOTE
Report called to [address] [facility] in [place]

## 2018-02-09 NOTE — SOCIAL WORK
CM called Prasanna from [address] [facility] () in re: to discharge of patient, but had to [facility]  Informed her patient went to [facility] and she can f/u with them for his d/c

## 2018-02-12 NOTE — DISCHARGE SUMMARY
DISCHARGE SUMMARY    Patient Name: Macie Edwards  Patient MRN: [id number]  Admitting Provider: Javier Saez MD  Discharging Provider: Javier Saez MD  Primary Care Physician at Discharge: Checo Sinclair [phone]   Admission Date: (Not on file)       Discharge Date: 02/0/2018    Admission Diagnosis   Other retention of urine (CODE) [R33 8]  Enlarged prostate with lower urinary tract symptoms (LUTS) [N40 1]    Discharge Diagnoses  Patient Active Problem List   Diagnosis    Fall    Multiple falls    Scalp Laceration    Multiple bruises    Hypothyroidism    Essential hypertension    Acute urinary retention    Clostridium difficile infection    Urinary incontinence    Acute [facility] failure [facility]F) [facility])    Seizure disorder ([facility])    Polyuria    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 ([facility] [address] [address] )    Urinary tract infection associated with indwelling urethral catheter [facility] )    CKD (chronic kidney disease) stage 3, GFR 30-59 ml/min    Recurrent major depressive disorder ([facility] )    ESBL (extended spectrum beta-lactamase) producing bacteria infection         Hospital Course  Patient known to group for office evaluation regarding chronic urinary retention a recurrent UTI After explanation of Risks vs  Benefits and potential complications; patient was agreeable and furnished informed consent for trans urethral resection of prostate  Patient was pre admitted for IV antimicrobials prior to surgical resection  Refer to operative report for details  There were no complications  Medications  Cannot display discharge medications since this is not an admission          Allergies  No Known Allergies    Outpatient Follow-Up  Future Appointments  Date Time Provider Amy Cobos   2/13/2018 1:30 PM MD STEPH Santiago  [facility]   1/7/2019 10:30 AM MD STEPH Santiago [facility]       Active Issues Requiring Follow-Up  Catheter removal    Test Results Pending at Discharge        Discharge Disposition  Short-term rehab    Treatments   Wood catheter & KEENA drain    Consults   ID and Internal Medicine    Procedures   None    Operative Procedures Performed      Pertinent Test Results   Pathology pending    Physical Exam at Discharge  Condition of Patient on Discharge: good  /80   Pulse 81   Temp (!) 97 4 °F (36 3 °C) (Tympanic)   Resp 18   Ht 5' 11" (1 803 m)   Wt 93 4 kg (206 lb)   SpO2 97%   BMI 28 73 kg/m²   General appearance: alert and oriented, in no acute distress, appears stated age and cooperative  Head: Normocephalic, without obvious abnormality, atraumatic  Neck: no adenopathy, no carotid bruit, no JVD, supple, symmetrical, trachea midline and thyroid not enlarged, symmetric, no tenderness/mass/nodules  Lungs: clear to auscultation bilaterally  Heart: regular rate and rhythm, S1, S2 normal, no murmur, click, rub or gallop  Abdomen: soft, non-tender; bowel sounds normal; no masses,  no organomegaly  Extremities: extremities normal, warm and well-perfused; no cyanosis, clubbing, or edema  Pulses: 2+ and symmetric  Neurologic: Grossly normal  Wood clear yellow    Total time spent with patient 20 minutes, >50% spent counseling and/or coordination of care         SHERRIE Peacock

## 2018-02-19 ENCOUNTER — OFFICE VISIT (OUTPATIENT)
Dept: UROLOGY | Facility: MEDICAL CENTER | Age: 73
End: 2018-02-19
Payer: MEDICARE

## 2018-02-19 VITALS
SYSTOLIC BLOOD PRESSURE: 124 MMHG | DIASTOLIC BLOOD PRESSURE: 72 MMHG | WEIGHT: 209 LBS | BODY MASS INDEX: 29.26 KG/M2 | HEIGHT: 71 IN

## 2018-02-19 DIAGNOSIS — N40.1 URINARY RETENTION DUE TO BENIGN PROSTATIC HYPERPLASIA: Primary | ICD-10-CM

## 2018-02-19 DIAGNOSIS — R33.8 URINARY RETENTION DUE TO BENIGN PROSTATIC HYPERPLASIA: Primary | ICD-10-CM

## 2018-02-19 DIAGNOSIS — N13.8 BPH WITH OBSTRUCTION/LOWER URINARY TRACT SYMPTOMS: ICD-10-CM

## 2018-02-19 DIAGNOSIS — N40.1 BPH WITH OBSTRUCTION/LOWER URINARY TRACT SYMPTOMS: ICD-10-CM

## 2018-02-19 PROCEDURE — 99214 OFFICE O/P EST MOD 30 MIN: CPT | Performed by: UROLOGY

## 2018-02-19 NOTE — PROGRESS NOTES
[address] [facility]  [facility]  [address] [facility]  [place], [address] [place] [address]  [phone]  www  [facility]  org      NAME: Sandy Hinojosa  AGE: 81 y o  SEX: male  : 1945   MRN: [id number]    DATE: 2018  TIME: 11:45 AM    Assessment and Plan:  Plug suprapubic tube and he will open every 4 hours at home and measure residuals  Visiting nurses can help with this  Return in 1 week for reassessment and if residuals are less than 100 cc, can remove suprapubic tube at that time  Chief Complaint   No chief complaint on file  History of Present Illness   Status post trans urethral resection of the prostate by me with suprapubic tube placement 2018  He had to be admitted preoperatively for intravenous antibiotics and then had an extended admission due to fevers and need for intravenous antibiotics  He is now at home and will have visiting nurses soon  The suprapubic tube is open to a bag  The following portions of the patient's history were reviewed and updated as appropriate: allergies, current medications, past family history, past medical history, past social history, past surgical history and problem list     Review of Systems   Review of Systems   Constitutional: Negative          Active Problem List     Patient Active Problem List   Diagnosis    Fall    Multiple falls    Scalp Laceration    Multiple bruises    Hypothyroidism    Essential hypertension    Acute urinary retention    Clostridium difficile infection    Urinary incontinence    Acute renal failure (ARF) (HCC)    Seizure disorder (HCC)    Polyuria    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 ([facility] )    Urinary tract infection associated with indwelling urethral catheter (HCC)    CKD (chronic kidney disease) stage 3, GFR 30-59 ml/min    Recurrent major depressive disorder ([facility])    ESBL (extended spectrum beta-lactamase) producing bacteria infection       Objective   /72 (BP Location: Left arm, Patient Position: Sitting)   Ht 5' 11" (1 803 m)   Wt 94 8 kg (209 lb)   BMI 29 15 kg/m²     Physical Exam   Constitutional: He appears well-developed and well-nourished  HENT:   Head: Normocephalic and atraumatic  Eyes: EOM are normal    Neck: Normal range of motion  Pulmonary/Chest: Effort normal    Musculoskeletal: Normal range of motion  Neurological: He is alert  Skin: Skin is warm and dry         Pertinent Laboratory/Diagnostic Studies:  CBC:   Lab Results   Component Value Date/Time    WBC 6 58 02/06/2018 06:11 AM    RBC 3 58 (L) 02/06/2018 06:11 AM    HGB 10 3 (L) 02/06/2018 06:11 AM    HCT 31 8 (L) 02/06/2018 06:11 AM    MCV 89 02/06/2018 06:11 AM    MCH 28 8 02/06/2018 06:11 AM    MCHC 32 4 02/06/2018 06:11 AM    RDW 13 7 02/06/2018 06:11 AM    MPV 10 2 02/06/2018 06:11 AM     02/06/2018 06:11 AM    NRBC 0 02/03/2018 04:59 AM    NEUTOPHILPCT 58 02/03/2018 04:59 AM    LYMPHOPCT 27 02/03/2018 04:59 AM    MONOPCT 9 02/03/2018 04:59 AM    EOSPCT 5 02/03/2018 04:59 AM    BASOPCT 1 02/03/2018 04:59 AM    NEUTROABS 4 09 02/03/2018 04:59 AM    LYMPHSABS 1 92 02/03/2018 04:59 AM    MONOSABS 0 61 02/03/2018 04:59 AM    EOSABS 0 36 02/03/2018 04:59 AM     Chemistry Profile:   Lab Results   Component Value Date/Time     02/06/2018 06:11 AM    K 3 8 02/06/2018 06:11 AM     02/06/2018 06:11 AM    CO2 26 02/06/2018 06:11 AM    ANIONGAP 8 02/06/2018 06:11 AM    BUN 18 02/06/2018 06:11 AM    CREATININE 1 67 (H) 02/06/2018 06:11 AM    GLUCOSE 89 02/06/2018 06:11 AM    GLUCOSE 96 05/08/2016 03:51 PM    CALCIUM 7 5 (L) 02/06/2018 06:11 AM    MG 1 8 11/22/2017 05:45 AM    PHOS 3 5 11/22/2017 05:45 AM    AST 17 01/24/2018 11:46 AM    ALT 21 01/24/2018 11:46 AM    ALKPHOS 48 01/24/2018 11:46 AM    PROT 6 9 01/24/2018 11:46 AM    BILITOT 0 27 01/24/2018 11:46 AM    EGFR 41 02/06/2018 06:11 AM       Current Medications     Current Outpatient Prescriptions:     acetaminophen (TYLENOL 8 HOUR) 650 mg CR tablet, Take by mouth, Disp: , Rfl:     desvenlafaxine succinate (PRISTIQ) 50 mg 24 hr tablet, Take 1 tablet by mouth daily, Disp: 30 tablet, Rfl: 0    lisinopril (ZESTRIL) 5 mg tablet, Take 5 mg by mouth daily, Disp: , Rfl:     melatonin 3 mg, Take 3 mg by mouth daily at bedtime, Disp: , Rfl:     mirtazapine (REMERON) 15 mg tablet, Take 15 mg by mouth daily at bedtime, Disp: , Rfl:     multivitamin-minerals (CENTRUM ADULTS) tablet, Take 1 tablet by mouth daily, Disp: , Rfl:     divalproex sodium (DEPAKOTE ER) 500 mg 24 hr tablet, Take 2 tablets by mouth daily, Disp: , Rfl:     DULoxetine (CYMBALTA) 30 mg delayed release capsule, Take by mouth, Disp: , Rfl:     DULoxetine (CYMBALTA) 60 mg delayed release capsule, Take by mouth, Disp: , Rfl:     levothyroxine 75 mcg tablet, Take by mouth, Disp: , Rfl:     magnesium oxide (MAGOX 400) 400 mg, Take by mouth, Disp: , Rfl:     nortriptyline (PAMELOR) 25 mg capsule, Take by mouth, Disp: , Rfl:     oxyCODONE-acetaminophen (PERCOCET) 5-325 mg per tablet, Take by mouth, Disp: , Rfl:     QUEtiapine (SEROquel) 25 mg tablet, Take by mouth, Disp: , Rfl:     tamsulosin (FLOMAX) 0 4 mg, Take 1 capsule by mouth 2 (two) times a day, Disp: , Rfl:     temazepam (RESTORIL) 15 mg capsule, Take by mouth, Disp: , Rfl:         Ronald Cueto MD

## 2018-02-19 NOTE — LETTER
2018     Courtney [facility] [address] [place] [address]    Patient: Mayank Rolon  YOB: 1945   Date of Visit: 2018       Dear Dr Bashir Jones:    Thank you for referring Letha García to me for evaluation  Below are my notes for this consultation  If you have questions, please do not hesitate to call me  I look forward to following your patient along with you  Sincerely,        Rad Hernandez MD        CC: No Recipients  Rad Hernandez MD  2018 11:54 AM  Sign at close encounter  [address] [facility]  [place] [phone]  www  [facility]  org      NAME: Mayank Rolon  AGE: 81 y o  SEX: male  : 1945   MRN: [id number]    DATE: 2018  TIME: 11:45 AM    Assessment and Plan:  Plug suprapubic tube and he will open every 4 hours at home and measure residuals  Visiting nurses can help with this  Return in 1 week for reassessment and if residuals are less than 100 cc, can remove suprapubic tube at that time  Chief Complaint   No chief complaint on file  History of Present Illness   Status post trans urethral resection of the prostate by me with suprapubic tube placement 2018  He had to be admitted preoperatively for intravenous antibiotics and then had an extended admission due to fevers and need for intravenous antibiotics  He is now at home and will have visiting nurses soon  The suprapubic tube is open to a bag  The following portions of the patient's history were reviewed and updated as appropriate: allergies, current medications, past family history, past medical history, past social history, past surgical history and problem list     Review of Systems   Review of Systems   Constitutional: Negative          Active Problem List     Patient Active Problem List   Diagnosis    Fall    Multiple falls    Scalp Laceration    Multiple bruises    Hypothyroidism    Essential hypertension    Acute urinary retention    Clostridium difficile infection    Urinary incontinence    Acute renal failure (ARF) (HCC)    Seizure disorder (HCC)    Polyuria    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 ([facility] )    Urinary tract infection associated with indwelling urethral catheter (HCC)    CKD (chronic kidney disease) stage 3, GFR 30-59 ml/min    Recurrent major depressive disorder (HCC)    ESBL (extended spectrum beta-lactamase) producing bacteria infection       Objective   /72 (BP Location: Left arm, Patient Position: Sitting)   Ht 5' 11" (1 803 m)   Wt 94 8 kg (209 lb)   BMI 29 15 kg/m²      Physical Exam   Constitutional: He appears well-developed and well-nourished  HENT:   Head: Normocephalic and atraumatic  Eyes: EOM are normal    Neck: Normal range of motion  Pulmonary/Chest: Effort normal    Musculoskeletal: Normal range of motion  Neurological: He is alert  Skin: Skin is warm and dry         Pertinent Laboratory/Diagnostic Studies:  CBC:   Lab Results   Component Value Date/Time    WBC 6 58 02/06/2018 06:11 AM    RBC 3 58 (L) 02/06/2018 06:11 AM    HGB 10 3 (L) 02/06/2018 06:11 AM    HCT 31 8 (L) 02/06/2018 06:11 AM    MCV 89 02/06/2018 06:11 AM    MCH 28 8 02/06/2018 06:11 AM    MCHC 32 4 02/06/2018 06:11 AM    RDW 13 7 02/06/2018 06:11 AM    MPV 10 2 02/06/2018 06:11 AM     02/06/2018 06:11 AM    NRBC 0 02/03/2018 04:59 AM    NEUTOPHILPCT 58 02/03/2018 04:59 AM    LYMPHOPCT 27 02/03/2018 04:59 AM    MONOPCT 9 02/03/2018 04:59 AM    EOSPCT 5 02/03/2018 04:59 AM    BASOPCT 1 02/03/2018 04:59 AM    NEUTROABS 4 09 02/03/2018 04:59 AM    LYMPHSABS 1 92 02/03/2018 04:59 AM    MONOSABS 0 61 02/03/2018 04:59 AM    EOSABS 0 36 02/03/2018 04:59 AM     Chemistry Profile:   Lab Results   Component Value Date/Time     02/06/2018 06:11 AM    K 3 8 02/06/2018 06:11 AM     02/06/2018 06:11 AM    CO2 26 02/06/2018 06:11 AM    ANIONGAP 8 02/06/2018 06:11 AM    BUN 18 02/06/2018 06:11 AM    CREATININE 1 67 (H) 02/06/2018 06:11 AM    GLUCOSE 89 02/06/2018 06:11 AM    GLUCOSE 96 05/08/2016 03:51 PM    CALCIUM 7 5 (L) 02/06/2018 06:11 AM    MG 1 8 11/22/2017 05:45 AM    PHOS 3 5 11/22/2017 05:45 AM    AST 17 01/24/2018 11:46 AM    ALT 21 01/24/2018 11:46 AM    ALKPHOS 48 01/24/2018 11:46 AM    PROT 6 9 01/24/2018 11:46 AM    BILITOT 0 27 01/24/2018 11:46 AM    EGFR 41 02/06/2018 06:11 AM       Current Medications     Current Outpatient Prescriptions:     acetaminophen (TYLENOL 8 HOUR) 650 mg CR tablet, Take by mouth, Disp: , Rfl:     desvenlafaxine succinate (PRISTIQ) 50 mg 24 hr tablet, Take 1 tablet by mouth daily, Disp: 30 tablet, Rfl: 0    lisinopril (ZESTRIL) 5 mg tablet, Take 5 mg by mouth daily, Disp: , Rfl:     melatonin 3 mg, Take 3 mg by mouth daily at bedtime, Disp: , Rfl:     mirtazapine (REMERON) 15 mg tablet, Take 15 mg by mouth daily at bedtime, Disp: , Rfl:     multivitamin-minerals (CENTRUM ADULTS) tablet, Take 1 tablet by mouth daily, Disp: , Rfl:     divalproex sodium (DEPAKOTE ER) 500 mg 24 hr tablet, Take 2 tablets by mouth daily, Disp: , Rfl:     DULoxetine (CYMBALTA) 30 mg delayed release capsule, Take by mouth, Disp: , Rfl:     DULoxetine (CYMBALTA) 60 mg delayed release capsule, Take by mouth, Disp: , Rfl:     levothyroxine 75 mcg tablet, Take by mouth, Disp: , Rfl:     magnesium oxide (MAGOX 400) 400 mg, Take by mouth, Disp: , Rfl:     nortriptyline (PAMELOR) 25 mg capsule, Take by mouth, Disp: , Rfl:     oxyCODONE-acetaminophen (PERCOCET) 5-325 mg per tablet, Take by mouth, Disp: , Rfl:     QUEtiapine (SEROquel) 25 mg tablet, Take by mouth, Disp: , Rfl:     tamsulosin (FLOMAX) 0 4 mg, Take 1 capsule by mouth 2 (two) times a day, Disp: , Rfl:     temazepam (RESTORIL) 15 mg capsule, Take by mouth, Disp: , Rfl:         Sherryle Alice, MD

## 2018-02-19 NOTE — PATIENT INSTRUCTIONS
Keep suprapubic tube plugged , and open every 4 hours  If you feel the urge to urinate, go ahead and urinate into toilet, then open the plug on the suprapubic tube and measure whatever urine is left over  When there is less than 3 ounces or 100cc let over, can remove the suprapubic tube

## 2018-02-20 ENCOUNTER — TELEPHONE (OUTPATIENT)
Dept: UROLOGY | Facility: AMBULATORY SURGERY CENTER | Age: 73
End: 2018-02-20

## 2018-02-20 NOTE — TELEPHONE ENCOUNTER
SPOKE WITH HOME NURSE MIKAYLA, Pt  IS UNABLE TO UNPLUG SP TUBE BY HIMSELF TO DOCUMENT RESIDUAL AND THERE'S NO ONE ELSE AROUND TO HELP HIM  Pt  IS VOIDING REGULARLY AND MIKAYLA IS ONLY ABLE TO HELP HIM ONCE A DAY, ALL THOUGH INSTRUCTIONS SAY'S HE SHOULD BE UN-PLUGGING EVERY 4 HOURS  MIKAYLA WENT TODAY AND GOT A RESIDUAL OF 100CC FROM THE SP TUBE, SO I SAID THAT'S FINE

## 2018-02-20 NOTE — TELEPHONE ENCOUNTER
Pt  Home nurse called in and advised that the pt   Is un able to do pvr multiple times a day she can do it for him but he is un able please call and advise

## 2018-02-26 ENCOUNTER — OFFICE VISIT (OUTPATIENT)
Dept: UROLOGY | Facility: MEDICAL CENTER | Age: 73
End: 2018-02-26

## 2018-02-26 VITALS
SYSTOLIC BLOOD PRESSURE: 140 MMHG | DIASTOLIC BLOOD PRESSURE: 80 MMHG | HEIGHT: 71 IN | WEIGHT: 210 LBS | BODY MASS INDEX: 29.4 KG/M2

## 2018-02-26 DIAGNOSIS — N40.1 BENIGN PROSTATIC HYPERPLASIA WITH URINARY RETENTION: Primary | ICD-10-CM

## 2018-02-26 DIAGNOSIS — R33.8 BENIGN PROSTATIC HYPERPLASIA WITH URINARY RETENTION: Primary | ICD-10-CM

## 2018-02-26 PROCEDURE — 99024 POSTOP FOLLOW-UP VISIT: CPT | Performed by: NURSE PRACTITIONER

## 2018-02-26 NOTE — PROGRESS NOTES
Assessment/Plan:   S/p turp     PLAN:  FU 1 month ; if good then once a year     Diagnoses and all orders for this visit:    Benign prostatic hyperplasia with urinary retention          Subjective:     Patient ID: Yakelin Ledezma  is a 81 y o  male  HPI   Status post TURP now approximately 3 weeks with suprapubic in place  PVRs have ranged between 20 cc at 100 cc  Patient denies flank pain, fever or gross hematuria  Here to see if his tube  can come out  Pathology benign; patient informed ,as well as his brother in law, who is with him      Review of Systems    Review of Systems - General ROS: negative  Respiratory ROS: no cough, shortness of breath, or wheezing  Gastrointestinal ROS: no abdominal pain, change in bowel habits, or black or bloody stools  Genito-Urinary ROS: no dysuria, trouble voiding, or hematuria  Musculoskeletal ROS: negative  Neurological ROS: no TIA or stroke symptoms    Objective:     Physical Exam    Constitutional alert  Respiratory without effort  Abdomen suprapubic in place  Musculoskeletal normal limits  Neuro intact

## 2018-02-27 ENCOUNTER — TELEPHONE (OUTPATIENT)
Dept: NEPHROLOGY | Facility: CLINIC | Age: 73
End: 2018-02-27

## 2018-02-27 NOTE — TELEPHONE ENCOUNTER
I called patient 1/17, 2/1, 2/15, 2/27 and left messages to call us back to schedule hospital follow up appt

## 2018-03-22 ENCOUNTER — TELEPHONE (OUTPATIENT)
Dept: UROLOGY | Facility: MEDICAL CENTER | Age: 73
End: 2018-03-22

## 2018-08-27 ENCOUNTER — HOSPITAL ENCOUNTER (EMERGENCY)
Facility: HOSPITAL | Age: 73
End: 2018-08-28
Attending: EMERGENCY MEDICINE
Payer: MEDICARE

## 2018-08-27 DIAGNOSIS — F32.A DEPRESSION: ICD-10-CM

## 2018-08-27 DIAGNOSIS — N39.0 UTI (URINARY TRACT INFECTION): Primary | ICD-10-CM

## 2018-08-27 LAB
ALBUMIN SERPL BCP-MCNC: 3.7 G/DL (ref 3.5–5)
ALP SERPL-CCNC: 62 U/L (ref 46–116)
ALT SERPL W P-5'-P-CCNC: 22 U/L (ref 12–78)
AMPHETAMINES SERPL QL SCN: NEGATIVE
ANION GAP SERPL CALCULATED.3IONS-SCNC: 7 MMOL/L (ref 4–13)
AST SERPL W P-5'-P-CCNC: 13 U/L (ref 5–45)
BACTERIA UR QL AUTO: ABNORMAL /HPF
BARBITURATES UR QL: NEGATIVE
BASOPHILS # BLD AUTO: 0.06 THOUSANDS/ΜL (ref 0–0.1)
BASOPHILS NFR BLD AUTO: 1 % (ref 0–1)
BENZODIAZ UR QL: NEGATIVE
BILIRUB SERPL-MCNC: 0.3 MG/DL (ref 0.2–1)
BILIRUB UR QL STRIP: NEGATIVE
BUN SERPL-MCNC: 31 MG/DL (ref 5–25)
CALCIUM SERPL-MCNC: 9 MG/DL (ref 8.3–10.1)
CHLORIDE SERPL-SCNC: 105 MMOL/L (ref 100–108)
CLARITY UR: ABNORMAL
CO2 SERPL-SCNC: 31 MMOL/L (ref 21–32)
COCAINE UR QL: NEGATIVE
COLOR UR: YELLOW
CREAT SERPL-MCNC: 1.72 MG/DL (ref 0.6–1.3)
EOSINOPHIL # BLD AUTO: 0.14 THOUSAND/ΜL (ref 0–0.61)
EOSINOPHIL NFR BLD AUTO: 2 % (ref 0–6)
ERYTHROCYTE [DISTWIDTH] IN BLOOD BY AUTOMATED COUNT: 14 % (ref 11.6–15.1)
ETHANOL SERPL-MCNC: <3 MG/DL (ref 0–3)
GFR SERPL CREATININE-BSD FRML MDRD: 39 ML/MIN/1.73SQ M
GLUCOSE SERPL-MCNC: 89 MG/DL (ref 65–140)
GLUCOSE UR STRIP-MCNC: NEGATIVE MG/DL
HCT VFR BLD AUTO: 45.5 % (ref 36.5–49.3)
HGB BLD-MCNC: 14.9 G/DL (ref 12–17)
HGB UR QL STRIP.AUTO: ABNORMAL
IMM GRANULOCYTES # BLD AUTO: 0.04 THOUSAND/UL (ref 0–0.2)
IMM GRANULOCYTES NFR BLD AUTO: 1 % (ref 0–2)
KETONES UR STRIP-MCNC: NEGATIVE MG/DL
LEUKOCYTE ESTERASE UR QL STRIP: ABNORMAL
LYMPHOCYTES # BLD AUTO: 1.59 THOUSANDS/ΜL (ref 0.6–4.47)
LYMPHOCYTES NFR BLD AUTO: 21 % (ref 14–44)
MCH RBC QN AUTO: 28.1 PG (ref 26.8–34.3)
MCHC RBC AUTO-ENTMCNC: 32.7 G/DL (ref 31.4–37.4)
MCV RBC AUTO: 86 FL (ref 82–98)
METHADONE UR QL: NEGATIVE
MONOCYTES # BLD AUTO: 0.54 THOUSAND/ΜL (ref 0.17–1.22)
MONOCYTES NFR BLD AUTO: 7 % (ref 4–12)
NEUTROPHILS # BLD AUTO: 5.17 THOUSANDS/ΜL (ref 1.85–7.62)
NEUTS SEG NFR BLD AUTO: 68 % (ref 43–75)
NITRITE UR QL STRIP: POSITIVE
NON-SQ EPI CELLS URNS QL MICRO: ABNORMAL /HPF
NRBC BLD AUTO-RTO: 0 /100 WBCS
OPIATES UR QL SCN: NEGATIVE
PCP UR QL: NEGATIVE
PH UR STRIP.AUTO: 6.5 [PH] (ref 4.5–8)
PLATELET # BLD AUTO: 225 THOUSANDS/UL (ref 149–390)
PMV BLD AUTO: 10.5 FL (ref 8.9–12.7)
POTASSIUM SERPL-SCNC: 3.8 MMOL/L (ref 3.5–5.3)
PROT SERPL-MCNC: 7.3 G/DL (ref 6.4–8.2)
PROT UR STRIP-MCNC: ABNORMAL MG/DL
RBC # BLD AUTO: 5.3 MILLION/UL (ref 3.88–5.62)
RBC #/AREA URNS AUTO: ABNORMAL /HPF
SODIUM SERPL-SCNC: 143 MMOL/L (ref 136–145)
SP GR UR STRIP.AUTO: 1.02 (ref 1–1.03)
THC UR QL: NEGATIVE
TSH SERPL DL<=0.05 MIU/L-ACNC: 2.16 UIU/ML (ref 0.36–3.74)
UROBILINOGEN UR QL STRIP.AUTO: 0.2 E.U./DL
WBC # BLD AUTO: 7.54 THOUSAND/UL (ref 4.31–10.16)
WBC #/AREA URNS AUTO: ABNORMAL /HPF

## 2018-08-27 PROCEDURE — 81001 URINALYSIS AUTO W/SCOPE: CPT | Performed by: EMERGENCY MEDICINE

## 2018-08-27 PROCEDURE — 87077 CULTURE AEROBIC IDENTIFY: CPT | Performed by: EMERGENCY MEDICINE

## 2018-08-27 PROCEDURE — 87086 URINE CULTURE/COLONY COUNT: CPT | Performed by: EMERGENCY MEDICINE

## 2018-08-27 PROCEDURE — 36415 COLL VENOUS BLD VENIPUNCTURE: CPT | Performed by: EMERGENCY MEDICINE

## 2018-08-27 PROCEDURE — 80307 DRUG TEST PRSMV CHEM ANLYZR: CPT | Performed by: EMERGENCY MEDICINE

## 2018-08-27 PROCEDURE — 84443 ASSAY THYROID STIM HORMONE: CPT | Performed by: EMERGENCY MEDICINE

## 2018-08-27 PROCEDURE — 85025 COMPLETE CBC W/AUTO DIFF WBC: CPT | Performed by: EMERGENCY MEDICINE

## 2018-08-27 PROCEDURE — 80053 COMPREHEN METABOLIC PANEL: CPT | Performed by: EMERGENCY MEDICINE

## 2018-08-27 PROCEDURE — 80320 DRUG SCREEN QUANTALCOHOLS: CPT | Performed by: EMERGENCY MEDICINE

## 2018-08-27 PROCEDURE — 87186 SC STD MICRODIL/AGAR DIL: CPT | Performed by: EMERGENCY MEDICINE

## 2018-08-27 RX ORDER — LANOLIN ALCOHOL/MO/W.PET/CERES
3 CREAM (GRAM) TOPICAL
Status: DISCONTINUED | OUTPATIENT
Start: 2018-08-27 | End: 2018-08-28 | Stop reason: HOSPADM

## 2018-08-27 RX ORDER — CEPHALEXIN 250 MG/1
500 CAPSULE ORAL EVERY 12 HOURS SCHEDULED
Status: DISCONTINUED | OUTPATIENT
Start: 2018-08-27 | End: 2018-08-28 | Stop reason: HOSPADM

## 2018-08-27 RX ORDER — MIRTAZAPINE 15 MG/1
15 TABLET, FILM COATED ORAL
Status: DISCONTINUED | OUTPATIENT
Start: 2018-08-27 | End: 2018-08-28 | Stop reason: HOSPADM

## 2018-08-27 RX ADMIN — MIRTAZAPINE 15 MG: 15 TABLET, FILM COATED ORAL at 23:59

## 2018-08-27 RX ADMIN — MELATONIN TAB 3 MG 3 MG: 3 TAB at 23:58

## 2018-08-27 RX ADMIN — CEPHALEXIN 500 MG: 250 CAPSULE ORAL at 20:04

## 2018-08-27 NOTE — ED NOTES
Pt upset over why a doctor hasn't seen him yet  Currently sitting in room complaining  Will continue to monitor        Sabrina Comment  08/27/18 6233
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
## 2018-08-27 NOTE — ED NOTES
Pt laying in bed in the dark appears to be sleeping  Will continue to monitor        Eddy Muller  08/27/18 2896

## 2018-08-27 NOTE — ED NOTES
Pt received his dinner and is upset with everything here  States how he will not eat his food without silverware  Pt brought his food out of his room and placed it on the counter  Pt now laying in the dark on his bed  Will continue to monitor        Cheryl Barnett  08/27/18 1924

## 2018-08-27 NOTE — ED NOTES
Pt changed into blues no issues or concerns belongings are logged bagged in locker 22   Will continue to monitor      Haydee Yemi  08/27/18 7224
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
## 2018-08-28 ENCOUNTER — HOSPITAL ENCOUNTER (INPATIENT)
Facility: HOSPITAL | Age: 73
LOS: 25 days | Discharge: HOME | DRG: 885 | End: 2018-09-24
Attending: PSYCHIATRY & NEUROLOGY | Admitting: PSYCHIATRY & NEUROLOGY
Payer: MEDICARE

## 2018-08-28 VITALS
HEART RATE: 70 BPM | RESPIRATION RATE: 16 BRPM | DIASTOLIC BLOOD PRESSURE: 76 MMHG | SYSTOLIC BLOOD PRESSURE: 146 MMHG | TEMPERATURE: 98.1 F | OXYGEN SATURATION: 98 %

## 2018-08-28 DIAGNOSIS — N39.0 UTI (URINARY TRACT INFECTION): ICD-10-CM

## 2018-08-28 DIAGNOSIS — E86.0 DEHYDRATION: ICD-10-CM

## 2018-08-28 DIAGNOSIS — I10 ESSENTIAL HYPERTENSION: Primary | ICD-10-CM

## 2018-08-28 DIAGNOSIS — F06.30 MOOD DISORDER AS LATE EFFECT OF TRAUMATIC BRAIN INJURY (HCC): Chronic | ICD-10-CM

## 2018-08-28 DIAGNOSIS — S06.9X9S MOOD DISORDER AS LATE EFFECT OF TRAUMATIC BRAIN INJURY (HCC): Chronic | ICD-10-CM

## 2018-08-28 DIAGNOSIS — R51.9 INTRACTABLE HEADACHE: ICD-10-CM

## 2018-08-28 DIAGNOSIS — G40.909 SEIZURE DISORDER (HCC): ICD-10-CM

## 2018-08-28 DIAGNOSIS — G25.81 RESTLESS LEGS: ICD-10-CM

## 2018-08-28 PROCEDURE — 99285 EMERGENCY DEPT VISIT HI MDM: CPT

## 2018-08-28 RX ORDER — BENZTROPINE MESYLATE 0.5 MG/1
0.5 TABLET ORAL EVERY 6 HOURS PRN
Status: DISCONTINUED | OUTPATIENT
Start: 2018-08-28 | End: 2018-09-24 | Stop reason: HOSPADM

## 2018-08-28 RX ORDER — ACETAMINOPHEN 325 MG/1
975 TABLET ORAL EVERY 6 HOURS PRN
Status: CANCELLED | OUTPATIENT
Start: 2018-08-28

## 2018-08-28 RX ORDER — LORAZEPAM 0.5 MG/1
0.5 TABLET ORAL EVERY 4 HOURS PRN
Status: CANCELLED | OUTPATIENT
Start: 2018-08-28

## 2018-08-28 RX ORDER — MAGNESIUM HYDROXIDE/ALUMINUM HYDROXICE/SIMETHICONE 120; 1200; 1200 MG/30ML; MG/30ML; MG/30ML
30 SUSPENSION ORAL EVERY 4 HOURS PRN
Status: DISCONTINUED | OUTPATIENT
Start: 2018-08-28 | End: 2018-09-24 | Stop reason: HOSPADM

## 2018-08-28 RX ORDER — LANOLIN ALCOHOL/MO/W.PET/CERES
3 CREAM (GRAM) TOPICAL
Status: CANCELLED | OUTPATIENT
Start: 2018-08-28

## 2018-08-28 RX ORDER — ZOLPIDEM TARTRATE 5 MG/1
5 TABLET ORAL ONCE
Status: COMPLETED | OUTPATIENT
Start: 2018-08-28 | End: 2018-08-28

## 2018-08-28 RX ORDER — HYDROXYZINE HYDROCHLORIDE 25 MG/1
25 TABLET, FILM COATED ORAL EVERY 4 HOURS PRN
Status: CANCELLED | OUTPATIENT
Start: 2018-08-28

## 2018-08-28 RX ORDER — LORAZEPAM 0.5 MG/1
0.5 TABLET ORAL EVERY 4 HOURS PRN
Status: DISCONTINUED | OUTPATIENT
Start: 2018-08-28 | End: 2018-09-24 | Stop reason: HOSPADM

## 2018-08-28 RX ORDER — BENZTROPINE MESYLATE 1 MG/ML
1 INJECTION INTRAMUSCULAR; INTRAVENOUS EVERY 8 HOURS PRN
Status: DISCONTINUED | OUTPATIENT
Start: 2018-08-28 | End: 2018-09-24 | Stop reason: HOSPADM

## 2018-08-28 RX ORDER — HALOPERIDOL 5 MG
5 TABLET ORAL EVERY 8 HOURS PRN
Status: CANCELLED | OUTPATIENT
Start: 2018-08-28

## 2018-08-28 RX ORDER — HALOPERIDOL 5 MG
5 TABLET ORAL EVERY 8 HOURS PRN
Status: DISCONTINUED | OUTPATIENT
Start: 2018-08-28 | End: 2018-09-24 | Stop reason: HOSPADM

## 2018-08-28 RX ORDER — LANOLIN ALCOHOL/MO/W.PET/CERES
3 CREAM (GRAM) TOPICAL
Status: DISCONTINUED | OUTPATIENT
Start: 2018-08-28 | End: 2018-09-04

## 2018-08-28 RX ORDER — OLANZAPINE 10 MG/1
2.5 INJECTION, POWDER, LYOPHILIZED, FOR SOLUTION INTRAMUSCULAR EVERY 8 HOURS PRN
Status: CANCELLED | OUTPATIENT
Start: 2018-08-28

## 2018-08-28 RX ORDER — TRAZODONE HYDROCHLORIDE 50 MG/1
25 TABLET ORAL
Status: DISCONTINUED | OUTPATIENT
Start: 2018-08-28 | End: 2018-09-24 | Stop reason: HOSPADM

## 2018-08-28 RX ORDER — OLANZAPINE 10 MG/1
2.5 INJECTION, POWDER, LYOPHILIZED, FOR SOLUTION INTRAMUSCULAR EVERY 8 HOURS PRN
Status: DISCONTINUED | OUTPATIENT
Start: 2018-08-28 | End: 2018-09-24 | Stop reason: HOSPADM

## 2018-08-28 RX ORDER — CEPHALEXIN 500 MG/1
500 CAPSULE ORAL EVERY 12 HOURS SCHEDULED
Status: COMPLETED | OUTPATIENT
Start: 2018-08-28 | End: 2018-09-03

## 2018-08-28 RX ORDER — HYDROXYZINE HYDROCHLORIDE 25 MG/1
25 TABLET, FILM COATED ORAL EVERY 4 HOURS PRN
Status: DISCONTINUED | OUTPATIENT
Start: 2018-08-28 | End: 2018-09-24 | Stop reason: HOSPADM

## 2018-08-28 RX ORDER — MIRTAZAPINE 15 MG/1
15 TABLET, FILM COATED ORAL
Status: CANCELLED | OUTPATIENT
Start: 2018-08-28

## 2018-08-28 RX ORDER — RISPERIDONE 0.5 MG/1
0.5 TABLET, ORALLY DISINTEGRATING ORAL EVERY 8 HOURS PRN
Status: CANCELLED | OUTPATIENT
Start: 2018-08-28

## 2018-08-28 RX ORDER — TRAZODONE HYDROCHLORIDE 50 MG/1
25 TABLET ORAL
Status: CANCELLED | OUTPATIENT
Start: 2018-08-28

## 2018-08-28 RX ORDER — BENZTROPINE MESYLATE 1 MG/ML
1 INJECTION INTRAMUSCULAR; INTRAVENOUS EVERY 8 HOURS PRN
Status: CANCELLED | OUTPATIENT
Start: 2018-08-28

## 2018-08-28 RX ORDER — RISPERIDONE 0.5 MG/1
0.5 TABLET, ORALLY DISINTEGRATING ORAL EVERY 8 HOURS PRN
Status: DISCONTINUED | OUTPATIENT
Start: 2018-08-28 | End: 2018-09-24 | Stop reason: HOSPADM

## 2018-08-28 RX ORDER — MAGNESIUM HYDROXIDE/ALUMINUM HYDROXICE/SIMETHICONE 120; 1200; 1200 MG/30ML; MG/30ML; MG/30ML
30 SUSPENSION ORAL EVERY 4 HOURS PRN
Status: CANCELLED | OUTPATIENT
Start: 2018-08-28

## 2018-08-28 RX ORDER — ACETAMINOPHEN 325 MG/1
650 TABLET ORAL EVERY 4 HOURS PRN
Status: CANCELLED | OUTPATIENT
Start: 2018-08-28

## 2018-08-28 RX ORDER — ACETAMINOPHEN 325 MG/1
650 TABLET ORAL EVERY 6 HOURS PRN
Status: DISCONTINUED | OUTPATIENT
Start: 2018-08-28 | End: 2018-09-24 | Stop reason: HOSPADM

## 2018-08-28 RX ORDER — ACETAMINOPHEN 325 MG/1
650 TABLET ORAL EVERY 6 HOURS PRN
Status: CANCELLED | OUTPATIENT
Start: 2018-08-28

## 2018-08-28 RX ORDER — ACETAMINOPHEN 325 MG/1
975 TABLET ORAL EVERY 6 HOURS PRN
Status: DISCONTINUED | OUTPATIENT
Start: 2018-08-28 | End: 2018-09-24 | Stop reason: HOSPADM

## 2018-08-28 RX ORDER — MIRTAZAPINE 15 MG/1
15 TABLET, FILM COATED ORAL
Status: DISCONTINUED | OUTPATIENT
Start: 2018-08-28 | End: 2018-08-29

## 2018-08-28 RX ORDER — ACETAMINOPHEN 325 MG/1
975 TABLET ORAL ONCE
Status: COMPLETED | OUTPATIENT
Start: 2018-08-28 | End: 2018-08-28

## 2018-08-28 RX ORDER — CEPHALEXIN 250 MG/1
500 CAPSULE ORAL EVERY 12 HOURS SCHEDULED
Status: CANCELLED | OUTPATIENT
Start: 2018-08-28 | End: 2018-09-03

## 2018-08-28 RX ORDER — BENZTROPINE MESYLATE 0.5 MG/1
0.5 TABLET ORAL EVERY 6 HOURS PRN
Status: CANCELLED | OUTPATIENT
Start: 2018-08-28

## 2018-08-28 RX ORDER — ACETAMINOPHEN 325 MG/1
650 TABLET ORAL EVERY 4 HOURS PRN
Status: DISCONTINUED | OUTPATIENT
Start: 2018-08-28 | End: 2018-09-24 | Stop reason: HOSPADM

## 2018-08-28 RX ADMIN — ACETAMINOPHEN 975 MG: 325 TABLET, FILM COATED ORAL at 08:24

## 2018-08-28 RX ADMIN — TRAZODONE HYDROCHLORIDE 25 MG: 50 TABLET ORAL at 22:41

## 2018-08-28 RX ADMIN — ACETAMINOPHEN 975 MG: 325 TABLET, FILM COATED ORAL at 16:06

## 2018-08-28 RX ADMIN — CEPHALEXIN 500 MG: 500 CAPSULE ORAL at 21:34

## 2018-08-28 RX ADMIN — MIRTAZAPINE 15 MG: 15 TABLET, FILM COATED ORAL at 21:34

## 2018-08-28 RX ADMIN — CEPHALEXIN 500 MG: 250 CAPSULE ORAL at 08:24

## 2018-08-28 RX ADMIN — ZOLPIDEM TARTRATE 5 MG: 5 TABLET ORAL at 03:01

## 2018-08-28 RX ADMIN — MELATONIN 3 MG: at 21:34

## 2018-08-28 RX ADMIN — LORAZEPAM 0.5 MG: 0.5 TABLET ORAL at 23:36

## 2018-08-28 NOTE — EMTALA/ACUTE CARE TRANSFER
[address] [address] [address]  Dept: [phone]      EMTALA TRANSFER CONSENT    NAME Sharon Silva   1945                              MRN [id number]    I have been informed of my rights regarding examination, treatment, and transfer   by Dr Rosena Hatchet, MD    Benefits: Other benefits (Include comment)_______________________ (201)    Risks: Other: (Include comment)__________________________, Potential for delay in receiving treatment (201)      Transfer Request   I acknowledge that my medical condition has been evaluated and explained to me by the emergency department physician or other qualified medical person and/or my attending physician who has recommended and offered to me further medical examination and treatment  I understand the Hospital's obligation with respect to the treatment and stabilization of my emergency medical condition  I nevertheless request to be transferred  I release the Hospital, the doctor, and any other persons caring for me from all responsibility or liability for any injury or ill effects that may result from my transfer and agree to accept all responsibility for the consequences of my choice to transfer, rather than receive stabilizing treatment at the Hospital  I understand that because the transfer is my request, my insurance may not provide reimbursement for the services  The Hospital will assist and direct me and my family in how to make arrangements for transfer, but the hospital is not liable for any fees charged by the transport service  In spite of this understanding, I refuse to consent to further medical examination and treatment which has been offered to me, and request transfer to  Ilana Arreola Name, [address] : [facility] AND [facility]   I authorize the performance of emergency medical procedures and treatments upon me in both transit and upon arrival at the receiving facility  Additionally, I authorize the release of any and all medical records to the receiving facility and request they be transported with me, if possible  I authorize the performance of emergency medical procedures and treatments upon me in both transit and upon arrival at the receiving facility  Additionally, I authorize the release of any and all medical records to the receiving facility and request they be transported with me, if possible  I understand that the safest mode of transportation during a medical emergency is an ambulance and that the Hospital advocates the use of this mode of transport  Risks of traveling to the receiving facility by car, including absence of medical control, life sustaining equipment, such as oxygen, and medical personnel has been explained to me and I fully understand them  (GLENN CORRECT BOX BELOW)  [  ]  I consent to the stated transfer and to be transported by ambulance/helicopter  [  ]  I consent to the stated transfer, but refuse transportation by ambulance and accept full responsibility for my transportation by car  I understand the risks of non-ambulance transfers and I exonerate the Hospital and its staff from any deterioration in my condition that results from this refusal     X___________________________________________    DATE  18  TIME________  Signature of patient or legally responsible individual signing on patient behalf           RELATIONSHIP TO PATIENT_________________________          Provider Certification    NAME oBaz Lopez  [facility] 1945                              MRN [id number]    A medical screening exam was performed on the above named patient  Based on the examination:    Condition Necessitating Transfer The primary encounter diagnosis was UTI (urinary tract infection)  A diagnosis of Depression was also pertinent to this visit      Patient Condition: Other (Include comment)_________________________________________ (201)    Reason for Transfer: Level of Care needed not available at this facility, Other (Include comment)____________________ [phone])    Transfer Requirements: Facility [facility]   · Space available and qualified personnel available for treatment as acknowledged by    · Agreed to accept transfer and to provide appropriate medical treatment as acknowledged by       Dr Goldstein   · Appropriate medical records of the examination and treatment of the patient are provided at the time of transfer   [address] _______  · Transfer will be performed by qualified personnel from    and appropriate transfer equipment as required, including the use of necessary and appropriate life support measures  Provider Certification: I have examined the patient and explained the following risks and benefits of being transferred/refusing transfer to the patient/family:  Other: (Include comment)_______________________, The patient is stable for psychiatric transfer because they are medically stable, and is protected from harming him/herself or others during transport (201)      Based on these reasonable risks and benefits to the patient and/or the unborn child(alfred), and based upon the information available at the time of the patients examination, I certify that the medical benefits reasonably to be expected from the provision of appropriate medical treatments at another medical facility outweigh the increasing risks, if any, to the individuals medical condition, and in the case of labor to the unborn child, from effecting the transfer      X____________________________________________ DATE 08/28/18        TIME_______      ORIGINAL - SEND TO MEDICAL RECORDS   COPY - SEND WITH PATIENT DURING TRANSFER

## 2018-08-28 NOTE — ED NOTES
Pt resting on bed  Took pt's vitals and asked pt about breakfast  Placing pt's breakfast order at this time  Pt also complaining of pain on both shoulders and leg        Kemar Galeaan  08/28/18 1980

## 2018-08-28 NOTE — ED NOTES
Pt is currently speaking with crisis worker  Will continue to monitor pt        Glory Box  08/28/18 1009

## 2018-08-28 NOTE — ED NOTES
Pt  Medicated for b/l shoulder pain and right leg pain with extra strength tylenol  Pt  Took pills without difficulty and went right back to sleep  Will await crisis evaluation        Jocy Lutz RN  08/28/18 9940

## 2018-08-28 NOTE — ED NOTES
Pt requesting medication to sleep  Dr Russel Arredondo aware        [address] Devon Bach RN  08/28/18 8694

## 2018-08-28 NOTE — ED NOTES
Verbal report given to next shift ED tech  Pt provided with warm blanket and provider was made aware of medication request  No further concerns at this time        Marco Antonio Mukherjee  08/28/18 4324

## 2018-08-28 NOTE — ED NOTES
Dr Guzmán spoke with Victorina, crisis  States she will be over to evaluate pt shortly        Osmany Estrada RN  08/27/18 1617

## 2018-08-28 NOTE — ED NOTES
Patient presented to ED due to recent SI statement  patient currently having thought of SI, if discharge home he will "just end it"  patient denies plan and denies weapons at home  Patient was sent in via EMS from [facility]  At this time, patient is unable to contract for safety and states he is suffering from "Head Depression"  Patient agreed to signing himself into the hospital  201 signed and will be sent to [place] for review

## 2018-08-28 NOTE — ED NOTES
Pt standing outside of staff box  Pt continues to be agitated even though pt was made aware that pharmacy has to send his medication  Pt finally walked back into room       Daisy Velasquez  08/27/18 0989

## 2018-08-28 NOTE — ED NOTES
Pt laying in bed watching tv, still refusing to eat  Will continue to monitor        Brian Bue  08/27/18 2047

## 2018-08-28 NOTE — ED NOTES
Pt sleeping on bed  TV and light off  Door semi closed  Will continue to monitor pt        Gloriann Standing SIN Box  08/28/18 8216

## 2018-08-28 NOTE — ED NOTES
Patient continues to sleep in room with lights and tv off  No wants or complaints at this time  Will continue to monitor       Lyla Flair  08/28/18 8732

## 2018-08-28 NOTE — ED NOTES
Patient accept at [facility] Dr Maudry Rubinstein  Patient can be setup for transport from [facility] any time  CM called [facility] obtained transport from [facility] to [facility]  Waiting on transport from [facility]  Will update treatment team in few min

## 2018-08-28 NOTE — ED NOTES
Taking over pt observation  Pt was sleeping however he awoke to request a second pillow  Pt made aware that one will be provided  Pt appears agitated  No further requests at the moment  Will continue to monitor pt for safety       Jessie s  08/27/18 1944

## 2018-08-28 NOTE — ED NOTES
Patient left  with [address] [facility] to [facility] all belongings from locker and all proper paperwork along with         Corrie Maddox  08/28/18 1227

## 2018-08-28 NOTE — ED NOTES
Pt laying in bed and appears to be sleeping  Will continue to monitor        [facility]  08/27/18 9695

## 2018-08-28 NOTE — PLAN OF CARE
Alteration in Thoughts and Perception     Treatment Goal: Gain control of psychotic behaviors/thinking, reduce/eliminate presenting symptoms and demonstrate improved reality functioning upon discharge Progressing     Verbalize thoughts and feelings Progressing     Refrain from acting on delusional thinking/internal stimuli Progressing     Agree to be compliant with medication regime, as prescribed and report medication side effects Progressing     Attend and participate in unit activities, including therapeutic, recreational, and educational groups Progressing     Recognize dysfunctional thoughts, communicate reality-based thoughts at the time of discharge Progressing     Complete daily ADLs, including personal hygiene independently, as able Progressing        Anxiety     Anxiety is at manageable level Progressing        Depression     Treatment Goal: Demonstrate behavioral control of depressive symptoms, verbalize feelings of improved mood/affect, and adopt new coping skills prior to discharge Progressing     Verbalize thoughts and feelings Progressing     Refrain from harming self Progressing     Refrain from isolation Progressing     Refrain from self-neglect Progressing     Attend and participate in unit activities, including therapeutic, recreational, and educational groups Progressing     Complete daily ADLs, including personal hygiene independently, as able Progressing        Ineffective Coping     Cooperates with admission process Progressing     Identifies ineffective coping skills Progressing     Identifies healthy coping skills Progressing     Demonstrates healthy coping skills Progressing     Patient/Family participate in treatment and DC plans Progressing     Patient/Family verbalizes awareness of resources Progressing     Understands least restrictive measures Progressing     Free from restraint events Progressing        Risk for Self Injury/Neglect     Treatment Goal: Remain safe during length of stay, learn and adopt new coping skills, and be free of self-injurious ideation, impulses and acts at the time of discharge Progressing     Verbalize thoughts and feelings Progressing     Refrain from harming self Progressing     Attend and participate in unit activities, including therapeutic, recreational, and educational groups Progressing     Recognize maladaptive responses and adopt new coping mechanisms Progressing     Complete daily ADLs, including personal hygiene independently, as able Progressing

## 2018-08-28 NOTE — ED NOTES
Pt awake and appears anxious  Pt asking for "something to help me sleep " RN made aware       Shaun Postin  08/28/18 4206

## 2018-08-28 NOTE — ED NOTES
Pt received breakfast tray  Pt complaining of pain and having to eat breakfast on the bed  Pt currently using the restroom  Will continue to monitor pt        Danni Lopez  08/28/18 2885

## 2018-08-28 NOTE — ED NOTES
Attempt was made to complete intake with pt however pt refused to answer questions and continuously stated "Why are you waking people up during sleeping time?! I haven't slept in 2 weeks I want to sleep!" and "I don't know this depression has me all messed up"  He refused to elaborate as to what he is experiencing or what possible stressors might be occurring  Dr Aden Ochoa was notified and attempted himself and was unsuccessful, it was determined that pt should be evaluated in the AM when he is willing to be more cooperative

## 2018-08-28 NOTE — ED NOTES
Spoke with Elisa Jeffries who is patient's sister in law  Per Elisa Jeffries, she called the [place]'s suicide hotline after patient stated "You better better get over here or I may not be here tomorrow"  Elisa Jeffries stated that patient is living alone in [place]; [address]  Is a patient of the [place], receives medication through the [place], PCP is Dr Christal Parker  [facility] to department  Patient was discharged from [facility] on 2/18/18 after having surgery         Alyssa Carlos 134, [facility]  08/28/18 8548

## 2018-08-28 NOTE — ED NOTES
Have not received updated medication list from [address] at [phone] for update        Luma Bolaños, RN  08/28/18 7445
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
## 2018-08-28 NOTE — ED NOTES
Attempted to wake patient to get an updated medication list from him  Patient states "I dont understand you my heads all messed up" Patient fell back asleep at that point  Adriano, care manager in patients room       Luma Carlos 134, [facility]  08/28/18 5740

## 2018-08-28 NOTE — ED NOTES
Victorina from crisis paged again at this time  Unable to get to ED to evaluate pt at this time  Will place page to Night time crisis  Dr Chilo Ray made aware        Blair Estrada RN  08/27/18 2056

## 2018-08-28 NOTE — ED CARE HANDOFF
Emergency Department Sign Out Note        Sign out and transfer of care from Dr Margarito Marks al  See Separate Emergency Department note  The patient, Moses Billy , was evaluated by the previous provider for suicidal ideation  Workup Completed:  yes    ED Course / Workup Pending (followup):  Crisis worker tried to see patient tonight and patient refused to speak with crisis worker  Crisis worker states crisis worker in AM will need to see patient   Signed out to Dr Lissette Stevens this AM for crisis to see patient and disposition pending crisis evaluation  Patient got [employer] for insomnia during the night  Procedures  MDM  CritCare Time      Disposition  Final diagnoses:   UTI (urinary tract infection)   Depression     Time reflects when diagnosis was documented in both MDM as applicable and the Disposition within this note     Time User Action Codes Description Comment    8/27/2018  5:11 PM Muriel Couch Add [N39 0] UTI (urinary tract infection)     8/27/2018  5:11 PM Muriel Couch Add [F32 9] Depression       ED Disposition     None      Follow-up Information    None       Patient's Medications   Discharge Prescriptions    No medications on file     No discharge procedures on file         ED Provider  Electronically Signed by     Landen Ashford MD  08/28/18 0122       Landen Ashford MD  08/28/18 2281

## 2018-08-28 NOTE — ED NOTES
Patient is straight medicare  No auth needed  CM faxed 12 for review at [facility]  CM will follow patient
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
## 2018-08-28 NOTE — ED NOTES
Pt sleeping on bed  No signs of distress  Will continue to monitor pt        Gloriann Standing [facility]  08/28/18 0518

## 2018-08-29 LAB — BACTERIA UR CULT: ABNORMAL

## 2018-08-29 PROCEDURE — 99222 1ST HOSP IP/OBS MODERATE 55: CPT | Performed by: PSYCHIATRY & NEUROLOGY

## 2018-08-29 RX ORDER — CLONAZEPAM 0.5 MG/1
0.5 TABLET ORAL 2 TIMES DAILY
Status: DISCONTINUED | OUTPATIENT
Start: 2018-08-29 | End: 2018-08-31

## 2018-08-29 RX ORDER — MIRTAZAPINE 30 MG/1
30 TABLET, FILM COATED ORAL
Status: DISCONTINUED | OUTPATIENT
Start: 2018-08-29 | End: 2018-09-03

## 2018-08-29 RX ADMIN — CLONAZEPAM 0.5 MG: 0.5 TABLET ORAL at 11:35

## 2018-08-29 RX ADMIN — MELATONIN 3 MG: at 21:45

## 2018-08-29 RX ADMIN — CLONAZEPAM 0.5 MG: 0.5 TABLET ORAL at 17:18

## 2018-08-29 RX ADMIN — CEPHALEXIN 500 MG: 500 CAPSULE ORAL at 08:42

## 2018-08-29 RX ADMIN — CEPHALEXIN 500 MG: 500 CAPSULE ORAL at 21:45

## 2018-08-29 RX ADMIN — MIRTAZAPINE 30 MG: 30 TABLET, FILM COATED ORAL at 21:45

## 2018-08-29 NOTE — PLAN OF CARE
Problem: Ineffective Coping  Goal: Participates in unit activities  Interventions:  - Provide therapeutic environment   - Provide required programming   - Redirect inappropriate behaviors    Outcome: Progressing  Remained in bed sleeping  No group attendance today
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
## 2018-08-29 NOTE — PROGRESS NOTES
Call placed to behavior Health unit at [facility]  Spoke with nursing staff  Patient has not had any urinary tract infection symptoms at this time to their knowledge  Will pass on to neck shift as well as physician taking care of him  Likely due to colonization from chronic suprapubic catheter
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
## 2018-08-29 NOTE — PLAN OF CARE
Alteration in Thoughts and Perception     Treatment Goal: Gain control of psychotic behaviors/thinking, reduce/eliminate presenting symptoms and demonstrate improved reality functioning upon discharge Progressing     Verbalize thoughts and feelings Progressing     Refrain from acting on delusional thinking/internal stimuli Progressing     Agree to be compliant with medication regime, as prescribed and report medication side effects Progressing     Attend and participate in unit activities, including therapeutic, recreational, and educational groups Progressing     Recognize dysfunctional thoughts, communicate reality-based thoughts at the time of discharge Progressing     Complete daily ADLs, including personal hygiene independently, as able Progressing        Anxiety     Anxiety is at manageable level Progressing        Depression     Treatment Goal: Demonstrate behavioral control of depressive symptoms, verbalize feelings of improved mood/affect, and adopt new coping skills prior to discharge Progressing     Verbalize thoughts and feelings Progressing     Refrain from harming self Progressing     Refrain from isolation Progressing     Refrain from self-neglect Progressing     Attend and participate in unit activities, including therapeutic, recreational, and educational groups Progressing     Complete daily ADLs, including personal hygiene independently, as able [address] Discharge to home or other facility with appropriate resources Progressing        Ineffective Coping     Participates in unit activities Progressing     Participates in unit activities Progressing        Ineffective Coping     Cooperates with admission process Progressing     Identifies ineffective coping skills Progressing     Identifies healthy coping skills Progressing     Demonstrates healthy coping skills Progressing     Patient/Family participate in treatment and DC plans Progressing  Patient/Family verbalizes awareness of resources Progressing     Understands least restrictive measures Progressing     Free from restraint events Progressing        Risk for Self Injury/Neglect     Treatment Goal: Remain safe during length of stay, learn and adopt new coping skills, and be free of self-injurious ideation, impulses and acts at the time of discharge Progressing     Verbalize thoughts and feelings Progressing     Refrain from harming self Progressing     Attend and participate in unit activities, including therapeutic, recreational, and educational groups Progressing     Recognize maladaptive responses and adopt new coping mechanisms Progressing     Complete daily ADLs, including personal hygiene independently, as able Progressing

## 2018-08-29 NOTE — PROGRESS NOTES
Pt tested positive for ESBL in his urine  Made Geneva Velasquez aware of the findings  Pts room changed for use of private bathroom

## 2018-08-29 NOTE — PROGRESS NOTES
Pt secluded to bedroom at this time  Pt states he has "brain depression " Pt states that his anxiety and depression has "not gotten better" at this point  Pt is cooperative with care  Pt denies SI/HI  Pt is medication compliant

## 2018-08-29 NOTE — PROGRESS NOTES
Pt refused to get OOB for dinner, stating he was too weak  Pt attended group briefly, stated he left because he got lightheaded  Pt expresses anxiety and depression  Will continue to monitor
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
## 2018-08-29 NOTE — PROGRESS NOTES
Pt is very anxious and depressed  Only out of rooms for meals  Stated "i only want to sleep and be left alone " Denies SI/HI and  medication compliant  Will continue to monitor

## 2018-08-29 NOTE — PLAN OF CARE
Alteration in Thoughts and Perception     Treatment Goal: Gain control of psychotic behaviors/thinking, reduce/eliminate presenting symptoms and demonstrate improved reality functioning upon discharge Progressing     Verbalize thoughts and feelings Progressing     Refrain from acting on delusional thinking/internal stimuli Progressing     Agree to be compliant with medication regime, as prescribed and report medication side effects Progressing     Attend and participate in unit activities, including therapeutic, recreational, and educational groups Progressing     Recognize dysfunctional thoughts, communicate reality-based thoughts at the time of discharge Progressing     Complete daily ADLs, including personal hygiene independently, as able Progressing        Anxiety     Anxiety is at manageable level Progressing        Depression     Treatment Goal: Demonstrate behavioral control of depressive symptoms, verbalize feelings of improved mood/affect, and adopt new coping skills prior to discharge Progressing     Verbalize thoughts and feelings Progressing     Refrain from harming self Progressing     Refrain from isolation Progressing     Refrain from self-neglect Progressing     Attend and participate in unit activities, including therapeutic, recreational, and educational groups Progressing     Complete daily ADLs, including personal hygiene independently, as able [id number] Discharge to home or other facility with appropriate resources Progressing        Ineffective Coping     Participates in unit activities Progressing     Participates in unit activities Progressing        Ineffective Coping     Cooperates with admission process Progressing     Identifies ineffective coping skills Progressing     Identifies healthy coping skills Progressing     Demonstrates healthy coping skills Progressing     Patient/Family participate in treatment and DC plans Progressing  Patient/Family verbalizes awareness of resources Progressing     Understands least restrictive measures Progressing     Free from restraint events Progressing        Risk for Self Injury/Neglect     Treatment Goal: Remain safe during length of stay, learn and adopt new coping skills, and be free of self-injurious ideation, impulses and acts at the time of discharge Progressing     Verbalize thoughts and feelings Progressing     Refrain from harming self Progressing     Attend and participate in unit activities, including therapeutic, recreational, and educational groups Progressing     Recognize maladaptive responses and adopt new coping mechanisms Progressing     Complete daily ADLs, including personal hygiene independently, as able Progressing

## 2018-08-29 NOTE — PROGRESS NOTES
Pt is wake and frustrated that he cannot fall asleep  Pt states "nothing works"  Pt medicated with prn ativan at this time  Pt remained awake for 2 hours after prn, still upset he cannot sleep  Eventually fell asleep at 0200

## 2018-08-30 PROCEDURE — 99232 SBSQ HOSP IP/OBS MODERATE 35: CPT | Performed by: PSYCHIATRY & NEUROLOGY

## 2018-08-30 RX ADMIN — MELATONIN 3 MG: at 21:14

## 2018-08-30 RX ADMIN — ACETAMINOPHEN 975 MG: 325 TABLET, FILM COATED ORAL at 10:22

## 2018-08-30 RX ADMIN — CLONAZEPAM 0.5 MG: 0.5 TABLET ORAL at 17:42

## 2018-08-30 RX ADMIN — CEPHALEXIN 500 MG: 500 CAPSULE ORAL at 21:16

## 2018-08-30 RX ADMIN — CEPHALEXIN 500 MG: 500 CAPSULE ORAL at 10:22

## 2018-08-30 RX ADMIN — CLONAZEPAM 0.5 MG: 0.5 TABLET ORAL at 10:22

## 2018-08-30 RX ADMIN — MIRTAZAPINE 30 MG: 30 TABLET, FILM COATED ORAL at 21:14

## 2018-08-30 NOTE — PLAN OF CARE
Problem: Ineffective Coping  Goal: Participates in unit activities  Interventions:  - Provide therapeutic environment   - Provide required programming   - Redirect inappropriate behaviors    Outcome: Progressing  Pt beginning to come to the day room for word search puzzles yet refuses to attend any groups,retreats back to his room

## 2018-08-30 NOTE — PROGRESS NOTES
Progress Note - [address]  72 y o  male MRN: [id number]  Unit/Bed#: GE3 561-01 Encounter: [id number]    The patient was seen for continuing care and reviewed with treatment team    Staff reports that the patient has been seclusive, scant, irritable  He is depressed and anxious and has been sleeping a lot during the day  Interviewed the patient and he was very irritable and minimally cooperative  He said he has had "brain depression" and anxiety for 38 years and nothing has ever helped  He is very negative  He stated his mood and sleep are both rotten  His appetite is decreased  He has no intent or plan to hurt himself but is feeling hopeless with passive SI  He stated put me to sleep and be done with it  Mental Status Evaluation:  Appearance:  Poor eye contact and disheveled   Behavior:  guarded and Uncooperative   Mood:  irritable, anxious and depressed   Affect: blunted and constricted   Speech: Sparse   Thought Process:  Goal directed and coherent   Thought Content:  Does not verbalize delusional material   Perceptual Disturbances: Denies hallucinations and does not appear to be responding to internal stimuli   Risk Potential: Hopeless with death wishes   Attention/Concentration [place] than expected   Orientation:   Oriented x3   Gait/Station: Not observed   Motor Activity: No abnormal movement noted     Progress Toward Goals:  No change    Assessment/Plan    Principal Problem:    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 ([facility] [address] )      Recommended Treatment:      Continue mirtazapine 30 mg HS with plans to titrate as tolerated  Continue clonazepam 0 5 mg b i d       Continue melatonin 3 mg hs  Continue with pharmacotherapy, group therapy, milieu therapy and occupational therapy    The patient will be maintained on the following medications:    Current Facility-Administered Medications:  acetaminophen 650 mg Oral Q6H PRN SHERRIE Terry acetaminophen 650 mg Oral Q4H PRN Priscilla Kasie, CRNP   acetaminophen 975 mg Oral Q6H PRN Priscilla Kasie, CRNP   aluminum-magnesium hydroxide-simethicone 30 mL Oral Q4H PRN Priscilla Kasie, CRNP   benztropine 1 mg Intramuscular Q8H PRN Priscilla Kasie, CRNP   benztropine 0 5 mg Oral Q6H PRN Priscilla Kasie, CRNP   cephalexin 500 mg Oral Q12H [facility] Priscilla Kasie, CRNP   clonazePAM 0 5 mg Oral BID Darlyn Herrera MD   haloperidol 5 mg Oral Q8H PRN Priscilla Kasie, CRNP   hydrOXYzine HCL 25 mg Oral Q4H PRN Priscilla Kasie, CRNP   LORazepam 0 5 mg Oral Q4H PRN Priscilla Kasie, CRNP   magnesium hydroxide 30 mL Oral Daily PRN Priscilla Kasie, CRNP   melatonin 3 mg Oral HS Priscilla Kasie, CRNP   mirtazapine 30 mg Oral HS Darlyn Herrera MD   OLANZapine 2 5 mg Intramuscular Q8H PRN Priscilla Kasie, CRNP   risperiDONE 0 5 mg Oral Q8H PRN Priscilla Kasie, CRNP   traZODone 25 mg Oral HS PRN Priscilla Kasie, CRNP       Risks, benefits and possible side effects of Medications:   Patient does not verbalize understanding at this time and will require further explanation

## 2018-08-30 NOTE — PROGRESS NOTES
Pt is medication compliant, is scant in conversation stating "nothing was going to help him"  Pt is seclusive to self despite encouragement  Pt currently denies SI and contracts for safety  Pt denies HI  Will continue to monitor

## 2018-08-30 NOTE — PLAN OF CARE
Alteration in Thoughts and Perception     Treatment Goal: Gain control of psychotic behaviors/thinking, reduce/eliminate presenting symptoms and demonstrate improved reality functioning upon discharge Progressing     Verbalize thoughts and feelings Progressing     Refrain from acting on delusional thinking/internal stimuli Progressing     Agree to be compliant with medication regime, as prescribed and report medication side effects Progressing     Attend and participate in unit activities, including therapeutic, recreational, and educational groups Progressing     Recognize dysfunctional thoughts, communicate reality-based thoughts at the time of discharge Progressing     Complete daily ADLs, including personal hygiene independently, as able Progressing        Anxiety     Anxiety is at manageable level Progressing        Depression     Treatment Goal: Demonstrate behavioral control of depressive symptoms, verbalize feelings of improved mood/affect, and adopt new coping skills prior to discharge Progressing     Verbalize thoughts and feelings Progressing     Refrain from harming self Progressing     Refrain from isolation Progressing     Refrain from self-neglect Progressing     Attend and participate in unit activities, including therapeutic, recreational, and educational groups Progressing     Complete daily ADLs, including personal hygiene independently, as able Progressing        Ineffective Coping     Participates in unit activities Progressing     Participates in unit activities Progressing        Ineffective Coping     Cooperates with admission process Progressing     Identifies ineffective coping skills Progressing     Identifies healthy coping skills Progressing     Demonstrates healthy coping skills Progressing     Patient/Family participate in treatment and DC plans Progressing     Patient/Family verbalizes awareness of resources Progressing     Understands least restrictive measures Progressing     Free from restraint events Progressing        Risk for Self Injury/Neglect     Treatment Goal: Remain safe during length of stay, learn and adopt new coping skills, and be free of self-injurious ideation, impulses and acts at the time of discharge Progressing     Verbalize thoughts and feelings Progressing     Refrain from harming self Progressing     Attend and participate in unit activities, including therapeutic, recreational, and educational groups Progressing     Recognize maladaptive responses and adopt new coping mechanisms Progressing     Complete daily ADLs, including personal hygiene independently, as able Progressing

## 2018-08-30 NOTE — CASE MANAGEMENT
I approached pt as he lay in bed awake and asked if I could review his Tx plan with him and ask him to sign it  He angrily yelled, "Just give me the damn thing and I'll sign it ' I made attempt to review same and he yelled, "I'm sick of all of this just put me to sleep "     Pt did sign the Tx plan and he then accepted myself as an escort to the day room to eat his breakfast, complaining his legs hurt and he can't walk alone

## 2018-08-31 PROCEDURE — 99232 SBSQ HOSP IP/OBS MODERATE 35: CPT | Performed by: PSYCHIATRY & NEUROLOGY

## 2018-08-31 RX ORDER — CLONAZEPAM 0.5 MG/1
0.25 TABLET ORAL 2 TIMES DAILY
Status: DISCONTINUED | OUTPATIENT
Start: 2018-08-31 | End: 2018-09-06

## 2018-08-31 RX ORDER — DESVENLAFAXINE 50 MG/1
50 TABLET, EXTENDED RELEASE ORAL DAILY
Status: DISCONTINUED | OUTPATIENT
Start: 2018-08-31 | End: 2018-09-05

## 2018-08-31 RX ORDER — QUETIAPINE FUMARATE 25 MG/1
25 TABLET, FILM COATED ORAL
Status: DISCONTINUED | OUTPATIENT
Start: 2018-08-31 | End: 2018-09-05

## 2018-08-31 RX ADMIN — DESVENLAFAXINE SUCCINATE 50 MG: 50 TABLET, EXTENDED RELEASE ORAL at 13:50

## 2018-08-31 RX ADMIN — QUETIAPINE 25 MG: 25 TABLET ORAL at 21:51

## 2018-08-31 RX ADMIN — MIRTAZAPINE 30 MG: 30 TABLET, FILM COATED ORAL at 21:51

## 2018-08-31 RX ADMIN — CLONAZEPAM 0.5 MG: 0.5 TABLET ORAL at 09:11

## 2018-08-31 RX ADMIN — CEPHALEXIN 500 MG: 500 CAPSULE ORAL at 21:51

## 2018-08-31 RX ADMIN — MELATONIN 3 MG: at 21:51

## 2018-08-31 RX ADMIN — CLONAZEPAM 0.25 MG: 0.5 TABLET ORAL at 17:28

## 2018-08-31 RX ADMIN — CEPHALEXIN 500 MG: 500 CAPSULE ORAL at 09:11

## 2018-08-31 NOTE — PROGRESS NOTES
Progress Note - [address]  72 y o  male MRN: [id number]  Unit/Bed#: KR5 561-01 Encounter: [id number]    Patient seen, chart reviewed, discussed with treatment team   Nursing staff notes the patient remains irritable and scant in conversation  He is isolative to self and to room  He has not been physically aggressive or agitated and is compliant with medications  The patient was seen this morning lying in bed  Patient states that he feels terrible  He is irritable and labile and asking me to Babita Dyers me a pill and put me into a deep sleep"  Patient states that he continues with depression and anxiety and does not feel he will ever improve  Patient is hopeless, helpless, apathetic  When asked about pain patient states that he has Timothy Pandya  Very negative and pessimistic, dismissive      Behavior over the last 24 hours:  unchanged  Sleep: hypersomnia  Appetite: poor  Medication side effects: No  ROS: Generalized pain  /92 (BP Location: Right arm)   Pulse 60   Temp 97 5 °F (36 4 °C) (Tympanic)   Resp 16   Ht 5' 11" (1 803 m)   Wt 100 kg (220 lb 10 9 oz)   SpO2 98%   BMI 30 78 kg/m²     Medications:   Current Facility-Administered Medications   Medication Dose Route Frequency    acetaminophen (TYLENOL) tablet 650 mg  650 mg Oral Q6H PRN    acetaminophen (TYLENOL) tablet 650 mg  650 mg Oral Q4H PRN    acetaminophen (TYLENOL) tablet 975 mg  975 mg Oral Q6H PRN    aluminum-magnesium hydroxide-simethicone (MYLANTA) 200-200-20 mg/5 mL oral suspension 30 mL  30 mL Oral Q4H PRN    benztropine (COGENTIN) injection 1 mg  1 mg Intramuscular Q8H PRN    benztropine (COGENTIN) tablet 0 5 mg  0 5 mg Oral Q6H PRN    cephalexin (KEFLEX) capsule 500 mg  500 mg Oral Q12H [facility]    clonazePAM (KlonoPIN) tablet 0 25 mg  0 25 mg Oral BID    desvenlafaxine succinate (PRISTIQ) 24 hr tablet 50 mg  50 mg Oral Daily    haloperidol (HALDOL) tablet 5 mg  5 mg Oral Q8H PRN    hydrOXYzine HCL (ATARAX) tablet 25 mg  25 mg Oral Q4H PRN    LORazepam (ATIVAN) tablet 0 5 mg  0 5 mg Oral Q4H PRN    magnesium hydroxide (MILK OF MAGNESIA) 400 mg/5 mL oral suspension 30 mL  30 mL Oral Daily PRN    melatonin tablet 3 mg  3 mg Oral HS    mirtazapine (REMERON) tablet 30 mg  30 mg Oral HS    OLANZapine (ZyPREXA) IM injection 2 5 mg  2 5 mg Intramuscular Q8H PRN    QUEtiapine (SEROquel) tablet 25 mg  25 mg Oral HS    risperiDONE (RisperDAL M-TABS) dispersible tablet 0 5 mg  0 5 mg Oral Q8H PRN    traZODone (DESYREL) tablet 25 mg  25 mg Oral HS PRN       Labs:   No visits with results within 1 Day(s) from this visit     Latest known visit with results is:   Admission on 08/27/2018, Discharged on 08/28/2018   Component Date Value Ref Range Status    WBC 08/27/2018 7 54  4 31 - 10 16 Thousand/uL Final    RBC 08/27/2018 5 30  3 88 - 5 62 Million/uL Final    Hemoglobin 08/27/2018 14 9  12 0 - 17 0 g/dL Final    Hematocrit 08/27/2018 45 5  36 5 - 49 3 % Final    MCV 08/27/2018 86  82 - 98 fL Final    MCH 08/27/2018 28 1  26 8 - 34 3 pg Final    MCHC 08/27/2018 32 7  31 4 - 37 4 g/dL Final    RDW 08/27/2018 14 0  11 6 - 15 1 % Final    MPV 08/27/2018 10 5  8 9 - 12 7 fL Final    Platelets 89/04/2048 225  149 - 390 Thousands/uL Final    nRBC 08/27/2018 0  /100 WBCs Final    Neutrophils Relative 08/27/2018 68  43 - 75 % Final    Immat GRANS % 08/27/2018 1  0 - 2 % Final    Lymphocytes Relative 08/27/2018 21  14 - 44 % Final    Monocytes Relative 08/27/2018 7  4 - 12 % Final    Eosinophils Relative 08/27/2018 2  0 - 6 % Final    Basophils Relative 08/27/2018 1  0 - 1 % Final    Neutrophils Absolute 08/27/2018 5 17  1 85 - 7 62 Thousands/µL Final    Immature Grans Absolute 08/27/2018 0 04  0 00 - 0 20 Thousand/uL Final    Lymphocytes Absolute 08/27/2018 1 59  0 60 - 4 47 Thousands/µL Final    Monocytes Absolute 08/27/2018 0 54  0 17 - 1 22 Thousand/µL Final    Eosinophils Absolute 08/27/2018 0 14  0 00 - 0 61 Thousand/µL Final    Basophils Absolute 08/27/2018 0 06  0 00 - 0 10 Thousands/µL Final    Color, UA 08/27/2018 Yellow   Final    Clarity, UA 08/27/2018 Cloudy   Final    Specific Gravity, UA 08/27/2018 1 020  1 003 - 1 030 Final    pH, UA 08/27/2018 6 5  4 5 - 8 0 Final    Leukocytes, UA 08/27/2018 Moderate* Negative Final    Nitrite, UA 08/27/2018 Positive* Negative Final    Protein, UA 08/27/2018 Trace* Negative mg/dl Final    Glucose, UA 08/27/2018 Negative  Negative mg/dl Final    Ketones, UA 08/27/2018 Negative  Negative mg/dl Final    Urobilinogen, UA 08/27/2018 0 2  0 2, 1 0 E U /dl E U /dl Final    Bilirubin, UA 08/27/2018 Negative  Negative Final    Blood, UA 08/27/2018 Trace-Intact* Negative Final    Amph/Meth UR 08/27/2018 Negative  Negative Final    Barbiturate Ur 08/27/2018 Negative  Negative Final    Benzodiazepine Urine 08/27/2018 Negative  Negative Final    Cocaine Urine 08/27/2018 Negative  Negative Final    Methadone Urine 08/27/2018 Negative  Negative Final    Opiate Urine 08/27/2018 Negative  Negative Final    PCP Ur 08/27/2018 Negative  Negative Final    THC Urine 08/27/2018 Negative  Negative Final    Ethanol Lvl 08/27/2018 <3  0 - 3 mg/dL Final    Sodium 08/27/2018 143  136 - 145 mmol/L Final    Potassium 08/27/2018 3 8  3 5 - 5 3 mmol/L Final    Chloride 08/27/2018 105  100 - 108 mmol/L Final    CO2 08/27/2018 31  21 - 32 mmol/L Final    ANION GAP 08/27/2018 7  4 - 13 mmol/L Final    BUN 08/27/2018 31* 5 - 25 mg/dL Final    Creatinine 08/27/2018 1 72* 0 60 - 1 30 mg/dL Final    Glucose 08/27/2018 89  65 - 140 mg/dL Final    Calcium 08/27/2018 9 0  8 3 - 10 1 mg/dL Final    AST 08/27/2018 13  5 - 45 U/L Final    ALT 08/27/2018 22  12 - 78 U/L Final    Alkaline Phosphatase 08/27/2018 62  46 - 116 U/L Final    Total Protein 08/27/2018 7 3  6 4 - 8 2 g/dL Final    Albumin 08/27/2018 3 7  3 5 - 5 0 g/dL Final    Total Bilirubin 08/27/2018 0 30  0 20 - 1 00 mg/dL Final    eGFR 08/27/2018 39  ml/min/1 73sq m Final    TSH 3RD GENERATON 08/27/2018 2 161  0 358 - 3 740 uIU/mL Final    RBC, UA 08/27/2018 0-1* None Seen, 0-5 /hpf Final    WBC, UA 08/27/2018 Innumerable* None Seen, 0-5, 5-55, 5-65 /hpf Final    Epithelial Cells 08/27/2018 Occasional  None Seen, Occasional /hpf Final    Bacteria, UA 08/27/2018 Innumerable* None Seen, Occasional /hpf Final    Urine Culture 08/27/2018 >100,000 cfu/ml Proteus mirabilis ESBL*  Final       Mental Status Evaluation:  Appearance:  disheveled and Withdrawn, impaired hygiene   Behavior:  guarded, uncooperative and Decreased eye contact   Speech:  Scant   Mood:  anxious and depressed   Affect:  mood-congruent and Irritable   Thought Process:  Poverty of thought   Thought Content:  No delusions voiced   Perceptual Disturbances: Denies auditory or visual hallucinations   Risk Potential: Positive death wish, denies suicidal plan, denies homicidal ideation   Sensorium:  person, place and time/date   Cognition:  grossly intact   Consciousness:  awake    Attention: attention span appeared shorter than expected for age   Insight:  limited   Judgment: limited   Gait/Station: Not observed, patient lying in bed   Motor Activity: no abnormal movements     Progress Toward Goals:   Minimal change    Assessment/Plan   Principal Problem:    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 ([facility] [address] )      Recommended Treatment:  Records reviewed from hospitalization in December of 2017  Patient responded well to a combination of desvenlafaxine, Seroquel and Remeron  Will restart the patient on devenlafaxine 50 mg p  o  daily  Labs reviewed and no hyponatremia noted  Restart the patient on Seroquel 25 mg p o  q h s     Reduce Klonopin 0 2 5 mg b i d  due to concerns for daytime somnolence  Continue to encourage in milieu participations as tolerated       Continue with group therapy, milieu therapy and occupational therapy  Risks, benefits and possible side effects of Medications:   Patient does not verbalize understanding at this time and will require further explanation  Counseling / Coordination of Care  Total floor / unit time spent today 30 minutes  Greater than 50% of total time was spent with the patient and / or family counseling and / or coordination of care  A description of the counseling / coordination of care:   Medication management, chart review, patient interview

## 2018-08-31 NOTE — PROGRESS NOTES
Pt flat, scant in conversation, visible in day room,but not social  Denies SI/HI  Pt c/o pain but is refusing all prn tylenol stated "keep you tylenol I want something strong " pt also stated he unhappy with all staff here and doesn't believe he will get help he needs here  Pt attended groups but did not participate  Took medications with encouragement  Will continue to monitor

## 2018-08-31 NOTE — PLAN OF CARE
Problem: Ineffective Coping  Goal: Participates in unit activities  Interventions:  - Provide therapeutic environment   - Provide required programming   - Redirect inappropriate behaviors    Outcome: Progressing  Pt remained in the day room for both morning groups but kept his head down on word puzzles  He did not respond to direct prompts to engage in group tasks  Is spending more time out of his room however

## 2018-08-31 NOTE — PROGRESS NOTES
Pt can be irritable and demanding  Seclusive to his room and scant in conversation  Denies all s/s and states he wants to get out of here soon  Med compliant

## 2018-09-01 PROCEDURE — 99232 SBSQ HOSP IP/OBS MODERATE 35: CPT | Performed by: STUDENT IN AN ORGANIZED HEALTH CARE EDUCATION/TRAINING PROGRAM

## 2018-09-01 RX ADMIN — CEPHALEXIN 500 MG: 500 CAPSULE ORAL at 21:17

## 2018-09-01 RX ADMIN — CLONAZEPAM 0.25 MG: 0.5 TABLET ORAL at 18:18

## 2018-09-01 RX ADMIN — DESVENLAFAXINE SUCCINATE 50 MG: 50 TABLET, EXTENDED RELEASE ORAL at 09:50

## 2018-09-01 RX ADMIN — CEPHALEXIN 500 MG: 500 CAPSULE ORAL at 09:51

## 2018-09-01 RX ADMIN — CLONAZEPAM 0.25 MG: 0.5 TABLET ORAL at 09:51

## 2018-09-01 RX ADMIN — QUETIAPINE 25 MG: 25 TABLET ORAL at 21:18

## 2018-09-01 RX ADMIN — MIRTAZAPINE 30 MG: 30 TABLET, FILM COATED ORAL at 21:17

## 2018-09-01 RX ADMIN — ACETAMINOPHEN 975 MG: 325 TABLET, FILM COATED ORAL at 19:59

## 2018-09-01 RX ADMIN — MELATONIN 3 MG: at 21:17

## 2018-09-01 NOTE — PROGRESS NOTES
Pt displays flat affect  Not social but visible in milieu, did not attend group  Pt states he is "just pissed" but displays no signs of aggression and would not elaborate  Pt denies all symptoms  States he feels "ready to get out of this place "  Pt is calm and cooperative

## 2018-09-01 NOTE — PLAN OF CARE
Alteration in Thoughts and Perception     Treatment Goal: Gain control of psychotic behaviors/thinking, reduce/eliminate presenting symptoms and demonstrate improved reality functioning upon discharge Progressing     Verbalize thoughts and feelings Progressing     Refrain from acting on delusional thinking/internal stimuli Progressing     Agree to be compliant with medication regime, as prescribed and report medication side effects Progressing     Attend and participate in unit activities, including therapeutic, recreational, and educational groups Progressing     Recognize dysfunctional thoughts, communicate reality-based thoughts at the time of discharge Progressing     Complete daily ADLs, including personal hygiene independently, as able Progressing        Anxiety     Anxiety is at manageable level Progressing        Depression     Treatment Goal: Demonstrate behavioral control of depressive symptoms, verbalize feelings of improved mood/affect, and adopt new coping skills prior to discharge Progressing     Verbalize thoughts and feelings Progressing     Refrain from harming self Progressing     Refrain from isolation Progressing     Refrain from self-neglect Progressing     Attend and participate in unit activities, including therapeutic, recreational, and educational groups Progressing     Complete daily ADLs, including personal hygiene independently, as able Progressing        Risk for Self Injury/Neglect     Treatment Goal: Remain safe during length of stay, learn and adopt new coping skills, and be free of self-injurious ideation, impulses and acts at the time of discharge Progressing     Verbalize thoughts and feelings Progressing     Refrain from harming self Progressing     Attend and participate in unit activities, including therapeutic, recreational, and educational groups Progressing     Recognize maladaptive responses and adopt new coping mechanisms Progressing     Complete daily ADLs, including personal hygiene independently, as able Progressing

## 2018-09-01 NOTE — PROGRESS NOTES
Patient complains of a headache, he was offered tylenol and refused  Patient has been laying in bed today  He reports being depressed  When patient was asked if he had SI he said no but stated, "No I don't have the strength to do it" referring to chronic conditions  Patient states that he wishes he was dead  Patient is irritable

## 2018-09-01 NOTE — PROGRESS NOTES
Progress Note - [address]  72 y o  male MRN: [id number]   Unit/Bed#: NW5 561-01 Encounter: [id number]    Behavior over the last 24 hours: unchanged  Reilly Rust is in his room today when we had his interview  He is irritable, anxious, depressed continues to have a passive death wish  Denies overt suicidal or homicidal ideation at this time denies auditory and visual hallucinations  He is agitated and guarded  He reports having pain everywhere    The nursing team reports that he is complaining of a headache all day though he is refusing Tylenol  His GFR is 38 unable to order NSAIDs  Patient reports that he is having brain depression    The interviewer attempted to understand what this means exactly and Reilly Rust told her he did not want to speak anymore  Patient remained seclusive to his room  Medications were initiated yesterday for his depression will continue to monitor  Patient continues have to have a decreased appetite he reports that he is sleeping without issue      Sleep: normal  Appetite: decreased  Medication side effects: No   ROS: reports "pain everywhere", denies any shortness of breath, chest pain or abdominal pain    Mental Status Evaluation:    Appearance:  disheveled   Behavior:  agitated, guarded, uncooperative   Speech:  scant, loud   Mood:  depressed, anxious, irritable, angry   Affect:  mood-congruent   Thought Process:  concrete   Associations: concrete associations   Thought Content:  no overt delusions, obsessions, negative thoughts   Perceptual Disturbances: denies auditory or visual hallucinations when asked, does not appear responding to internal stimuli   Risk Potential: Suicidal ideation - Passive death wish  Homicidal ideation - None  Potential for aggression - No   Sensorium:  oriented to person and place   Memory:  recent memory intact, remote memory intact   Consciousness:  alert and awake   Attention: decreased concentration and decreased attention span   Insight:  limited   Judgment: limited   Gait/Station: in bed and unable to assess   Motor Activity: no abnormal movements     Vital signs in last 24 hours:    Temp:  [97 7 °F (36 5 °C)] 97 7 °F (36 5 °C)  HR:  [79] 79  Resp:  [16] 16  BP: (128)/(70) 128/70    Laboratory results:    I have personally reviewed all pertinent laboratory/tests results  Most Recent Labs:   Lab Results   Component Value Date    WBC 7 54 08/27/2018    RBC 5 30 08/27/2018    HGB 14 9 08/27/2018    HCT 45 5 08/27/2018     08/27/2018    RDW 14 0 08/27/2018    NEUTROABS 5 17 08/27/2018     08/27/2018    K 3 8 08/27/2018     08/27/2018    CO2 31 08/27/2018    BUN 31 (H) 08/27/2018    CREATININE 1 72 (H) 08/27/2018    GLUC 89 08/27/2018    CALCIUM 9 0 08/27/2018    AST 13 08/27/2018    ALT 22 08/27/2018    ALKPHOS 62 08/27/2018    TP 7 3 08/27/2018    ALB 3 7 08/27/2018    TBILI 0 30 08/27/2018    BILIDIR 0 15 11/17/2017    CHOLESTEROL 195 04/26/2016    HDL 52 04/26/2016    TRIG 76 04/26/2016    LDLCALC 128 (H) 04/26/2016    VALPROICTOT 62 12/09/2017    AMMONIA <10 (L) 05/08/2016    [id number] 2 161 08/27/2018    RPR Non-Reactive 05/09/2016       Progress Toward Goals: no significant improvement, medications were adjusted yesterday will monitor and see how patient is tomorrow  Patient remains irritable continues to have a passive death wish  Assessment/Plan   Principal Problem:    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 ([facility] [address] )    Recommended Treatment:     Planned medication and treatment changes: All current active medications have been reviewed  Encourage group therapy, milieu therapy and occupational therapy  Behavioral Health checks every 5 minutes  Medications were adjusted yesterday    Will continue to monitor  Continue current medications:    Current Facility-Administered Medications:  acetaminophen 650 mg Oral Q6H PRN Stephanie Rocks, CRNP   acetaminophen 650 mg Oral Q4H PRN [employer] [place] Huan, CRNP   acetaminophen 975 mg Oral Q6H PRN Lita De Leon, CRNP   aluminum-magnesium hydroxide-simethicone 30 mL Oral Q4H PRN Lita De Leon, CRNP   benztropine 1 mg Intramuscular Q8H PRN [facility] De Leon, CRNP   benztropine 0 5 mg Oral Q6H PRN Lita De Leon, CRNP   cephalexin 500 mg Oral Q12H [address] Lita De Leon, CRNP   clonazePAM 0 25 mg Oral BID Dwana Nose, PA-C   desvenlafaxine succinate 50 mg Oral Daily Dwana Nose, PA-C   haloperidol 5 mg Oral Q8H PRN Lita De Leon, CRNP   hydrOXYzine HCL 25 mg Oral Q4H PRN Lita De Leon, CRNP   LORazepam 0 5 mg Oral Q4H PRN [facility] De Leon, CRNP   magnesium hydroxide 30 mL Oral Daily PRN Lita De Leon, CRNP   melatonin 3 mg Oral HS [facility] De Leon, CRNP   mirtazapine 30 mg Oral HS Nitza Potter MD   OLANZapine 2 5 mg Intramuscular Q8H PRN [facility] De Leon, CRNP   QUEtiapine 25 mg Oral HS Dwana Nose, PA-C   risperiDONE 0 5 mg Oral Q8H PRN [facility] De Leon, CRNP   traZODone 25 mg Oral HS PRN Lita Pandeyes, CRNP       Risks / Benefits of Treatment:    Risks, benefits, and possible side effects of medications explained to patient and patient verbalizes understanding and agreement for treatment  Counseling / Coordination of Care:    Patient's progress discussed with staff in treatment team meeting  Medications, treatment progress and treatment plan reviewed with patient

## 2018-09-01 NOTE — PLAN OF CARE
Depression     Treatment Goal: Demonstrate behavioral control of depressive symptoms, verbalize feelings of improved mood/affect, and adopt new coping skills prior to discharge Not Progressing        Ineffective Coping     Identifies ineffective coping skills Not Progressing     Identifies healthy coping skills Not Progressing     Demonstrates healthy coping skills Not Progressing        Patient is depressed has been lying in bed and wishes she was dead    Alteration in Thoughts and Perception     Treatment Goal: Gain control of psychotic behaviors/thinking, reduce/eliminate presenting symptoms and demonstrate improved reality functioning upon discharge Progressing     Verbalize thoughts and feelings Progressing     Refrain from acting on delusional thinking/internal stimuli Progressing     Agree to be compliant with medication regime, as prescribed and report medication side effects Progressing     Recognize dysfunctional thoughts, communicate reality-based thoughts at the time of discharge Progressing     Complete daily ADLs, including personal hygiene independently, as able Progressing        Anxiety     Anxiety is at manageable level Progressing        Depression     Verbalize thoughts and feelings Progressing     Refrain from harming self Progressing     Refrain from isolation Progressing     Refrain from self-neglect Progressing     Complete daily ADLs, including personal hygiene independently, as able Progressing        Ineffective Coping     Cooperates with admission process Progressing     Patient/Family participate in treatment and DC plans Progressing     Patient/Family verbalizes awareness of resources Progressing     Understands least restrictive measures Progressing     Free from restraint events Progressing        Risk for Self Injury/Neglect     Treatment Goal: Remain safe during length of stay, learn and adopt new coping skills, and be free of self-injurious ideation, impulses and acts at the time of discharge Progressing     Verbalize thoughts and feelings Progressing     Refrain from harming self Progressing     Recognize maladaptive responses and adopt new coping mechanisms Progressing     Complete daily ADLs, including personal hygiene independently, as able Progressing

## 2018-09-02 PROCEDURE — 99232 SBSQ HOSP IP/OBS MODERATE 35: CPT | Performed by: STUDENT IN AN ORGANIZED HEALTH CARE EDUCATION/TRAINING PROGRAM

## 2018-09-02 RX ADMIN — MELATONIN 3 MG: at 22:07

## 2018-09-02 RX ADMIN — ACETAMINOPHEN 975 MG: 325 TABLET, FILM COATED ORAL at 11:12

## 2018-09-02 RX ADMIN — MIRTAZAPINE 30 MG: 30 TABLET, FILM COATED ORAL at 22:07

## 2018-09-02 RX ADMIN — CLONAZEPAM 0.25 MG: 0.5 TABLET ORAL at 10:39

## 2018-09-02 RX ADMIN — DESVENLAFAXINE SUCCINATE 50 MG: 50 TABLET, EXTENDED RELEASE ORAL at 10:39

## 2018-09-02 RX ADMIN — QUETIAPINE 25 MG: 25 TABLET ORAL at 22:07

## 2018-09-02 RX ADMIN — CEPHALEXIN 500 MG: 500 CAPSULE ORAL at 10:39

## 2018-09-02 RX ADMIN — CEPHALEXIN 500 MG: 500 CAPSULE ORAL at 22:06

## 2018-09-02 NOTE — PROGRESS NOTES
Pt irritable on approach  Pt offered PRN tylenol for c/o headache  Pt stating this will do nothing to help  After a brief pause pt stated just give it to me  Pt took PRN tylenol at that time  Further he stated when questioned as to what he would like to have for his headaches he states he doesn't usually have these headaches  I have brain depression  Pt would not elaborate further  Will monitor

## 2018-09-02 NOTE — PROGRESS NOTES
Pt didn't get out of bed the entire shift, didn't eat today and continues play 'possum'  Pt didn't get up when offered his evening medication- therefore Klonopin returned to [address]

## 2018-09-02 NOTE — PLAN OF CARE
Alteration in Thoughts and Perception     Treatment Goal: Gain control of psychotic behaviors/thinking, reduce/eliminate presenting symptoms and demonstrate improved reality functioning upon discharge Not Progressing     Refrain from acting on delusional thinking/internal stimuli Not Progressing     Attend and participate in unit activities, including therapeutic, recreational, and educational groups Not Progressing     Recognize dysfunctional thoughts, communicate reality-based thoughts at the time of discharge Not Progressing     Complete daily ADLs, including personal hygiene independently, as able Not Progressing        Anxiety     Anxiety is at manageable level Not Progressing        Depression     Treatment Goal: Demonstrate behavioral control of depressive symptoms, verbalize feelings of improved mood/affect, and adopt new coping skills prior to discharge Not Progressing     Verbalize thoughts and feelings Not Progressing     Refrain from isolation Not Progressing     Refrain from self-neglect Not Progressing     Attend and participate in unit activities, including therapeutic, recreational, and educational groups Not Progressing     Complete daily ADLs, including personal hygiene independently, as able Not [address] Discharge to home or other facility with appropriate resources Not Progressing        Ineffective Coping     Participates in unit activities Not Progressing     Participates in unit activities Not Progressing        Ineffective Coping     Cooperates with admission process Not Progressing     Identifies ineffective coping skills Not Progressing     Identifies healthy coping skills Not Progressing     Demonstrates healthy coping skills Not Progressing     Patient/Family participate in treatment and DC plans Not Progressing     Patient/Family verbalizes awareness of resources Not Progressing     Understands least restrictive measures Not Progressing        Risk for Self Injury/Neglect     Treatment Goal: Remain safe during length of stay, learn and adopt new coping skills, and be free of self-injurious ideation, impulses and acts at the time of discharge Not Progressing     Verbalize thoughts and feelings Not Progressing     Attend and participate in unit activities, including therapeutic, recreational, and educational groups Not Progressing     Recognize maladaptive responses and adopt new coping mechanisms Not Progressing     Complete daily ADLs, including personal hygiene independently, as able Not Progressing

## 2018-09-02 NOTE — PROGRESS NOTES
Progress Note - [address]  72 y o  male MRN: [id number]   Unit/Bed#: NW5 561-01 Encounter: [id number]    Behavior over the last 24 hours: unchanged  Devante Ballesteros was interviewed in his room today  He  continues to report an intractable headache that is not relieved with Tylenol  He is reporting his headache at a 10/10 at this time  Due to his decreased GFR unable to prescribed Motrin or any other NSAID  Tylenol gives minimal relief  At this time we will consult Neurology for this intractable headache for their input  Patient does have a history of a traumatic brain injury and seizures  Patient irritable refusing to eat breakfast today does not want to get out of bed  Devante Ballesteros initially refused his morning medications per nursing report after discussion with him he finally agreed  He does have a history of chronic right leg, back, shoulder pain due to an old injury  He is reporting his depression at a 9/10 and his anxiety at a 9/10, 10 being the Ardean Middleton denies active suicidal ideation thoughts, though he does have a passive death wish at this time  He is isabell for safety on the unit and assures me that he will contact a staff member if he becomes actively suicidal   He denies homicidal ideation, he denies auditory hallucinations, visual hallucinations         Sleep: hypersomnia  Appetite: decreased  Medication side effects: No   ROS: reports back pain, headache, leg pain and shoulder pain, denies any shortness of breath, chest pain, abdominal pain, constipation, diarrhea, muscle cramps, tremor or vomiting    Mental Status Evaluation:    Appearance:  disheveled   Behavior:  agitated, guarded, uncooperative   Speech:  scant, loud   Mood:  depressed, anxious, irritable   Affect:  mood-congruent   Thought Process:  circumstantial   Associations: circumstantial associations   Thought Content:  no overt delusions, obsessions, negative thoughts   Perceptual Disturbances: no auditory hallucinations, no visual hallucinations, does not appear responding to internal stimuli   Risk Potential: Suicidal ideation - passive death wish  Homicidal ideation - None  Potential for aggression - No   Sensorium:  oriented to person and place   Memory:  recent memory intact, remote memory intact   Consciousness:  alert and awake   Attention: decreased concentration and decreased attention span   Insight:  limited   Judgment: limited   Gait/Station: in bed and unable to assess   Motor Activity: no abnormal movements     Vital signs in last 24 hours:    Temp:  [97 8 °F (36 6 °C)-98 6 °F (37 °C)] 98 6 °F (37 °C)  HR:  [68-72] 68  Resp:  [16] 16  BP: (116-118)/(60-65) 118/60    Laboratory results:  No new labs to review today  Progress Toward Goals: no significant improvement, will not get out of bed for breakfast this morning, continues to report depression anxiety at a  9/10, 10 being the worst   Remaining seclusive in his room  Assessment/Plan   Principal Problem:    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 ([facility], [place] [address] )    Recommended Treatment:     Planned medication and treatment changes: All current active medications have been reviewed    Encourage group therapy, milieu therapy and occupational therapy  Behavioral Health checks every 7 minutes  Consult Neurology for intractable headache  Continue all other medications:      Current Facility-Administered Medications:  acetaminophen 650 mg Oral Q6H PRN Blondell Halon, CRNP   acetaminophen 650 mg Oral Q4H PRN Blondell Halon, CRNP   acetaminophen 975 mg Oral Q6H PRN Blondell Halon, CRNP   aluminum-magnesium hydroxide-simethicone 30 mL Oral Q4H PRN Blondell Halon, CRNP   benztropine 1 mg Intramuscular Q8H PRN Blondell Halon, CRNP   benztropine 0 5 mg Oral Q6H PRN Blondell Halon, CRNP   cephalexin 500 mg Oral Q12H [address] Blondell Halon, CRNP   clonazePAM 0 25 mg Oral BID Angelia Riddle PA-C   desvenlafaxine succinate 50 mg Oral Daily Sung Clay PA-C   haloperidol 5 mg Oral Q8H PRN Hector Razor, CRNP   hydrOXYzine HCL 25 mg Oral Q4H PRN Hector Razor, CRNP   LORazepam 0 5 mg Oral Q4H PRN Hector Razor, CRNP   magnesium hydroxide 30 mL Oral Daily PRN Hector Razor, CRNP   melatonin 3 mg Oral HS Hector Razor, CRNP   mirtazapine 30 mg Oral HS Dell Rader MD   OLANZapine 2 5 mg Intramuscular Q8H PRN Hector Razor, CRNP   QUEtiapine 25 mg Oral HS Sung Clay PA-C   risperiDONE 0 5 mg Oral Q8H PRN Hector Razor, CRNP   traZODone 25 mg Oral HS PRN Hector Razor, CRNP       Risks / Benefits of Treatment:    Risks, benefits, and possible side effects of medications explained to patient  Patient does not verbalize understanding of treatment at this time and will require further explanation  Counseling / Coordination of Care:    Patient's progress discussed with staff in treatment team meeting  Medications, treatment progress and treatment plan reviewed with patient  Supportive counseling provided to the patient    The

## 2018-09-02 NOTE — PROGRESS NOTES
Pt refused breakfast and didn't get out of bed this morning  Pt irritable on approach and initial refused to sit up to take his medications - he pretended to be sleeping  Pt was compliant with am meds later in the morning, and asked this nurse to "just give me medications that will put me out forever"  Pt contracted for safety while on the unit and remained in bed with head covered  C/o generalized pain 8/10 and was given prn Tylenol at 11:15  Will continue to monitor

## 2018-09-03 PROBLEM — F33.2 SEVERE EPISODE OF RECURRENT MAJOR DEPRESSIVE DISORDER, WITHOUT PSYCHOTIC FEATURES (HCC): Status: ACTIVE | Noted: 2018-01-28

## 2018-09-03 PROCEDURE — 99232 SBSQ HOSP IP/OBS MODERATE 35: CPT | Performed by: STUDENT IN AN ORGANIZED HEALTH CARE EDUCATION/TRAINING PROGRAM

## 2018-09-03 RX ADMIN — CEPHALEXIN 500 MG: 500 CAPSULE ORAL at 09:16

## 2018-09-03 RX ADMIN — CLONAZEPAM 0.25 MG: 0.5 TABLET ORAL at 17:16

## 2018-09-03 RX ADMIN — DESVENLAFAXINE SUCCINATE 50 MG: 50 TABLET, EXTENDED RELEASE ORAL at 09:16

## 2018-09-03 RX ADMIN — CLONAZEPAM 0.25 MG: 0.5 TABLET ORAL at 09:16

## 2018-09-03 RX ADMIN — QUETIAPINE 25 MG: 25 TABLET ORAL at 21:20

## 2018-09-03 RX ADMIN — MIRTAZAPINE 45 MG: 30 TABLET, FILM COATED ORAL at 21:19

## 2018-09-03 RX ADMIN — MELATONIN 3 MG: at 21:20

## 2018-09-03 NOTE — PROGRESS NOTES
Pt had an irritable edge this morning  Pt continues to play possum, and refuse to get out of bed  Pt briefly answered this writer's questions, but was very scant and short  Pt refused to eat breakfast this morning, stating " I am not hungry and you cannot make me eat"  Pt was flat and appeared depressed  Pt denied all symptoms  Pt was medication compliant  Will continue to monitor

## 2018-09-03 NOTE — PROGRESS NOTES
Pt is flat and irritable this evening  Pt is upset that his pain medication is not under better control  Pt is refusing Tylenol  Pt says he has "brain depression " He denies anxiety and SI  Pt is seen in the milieu associating with his peers very little  Pt is medication compliant

## 2018-09-03 NOTE — PLAN OF CARE
Alteration in Thoughts and Perception     Verbalize thoughts and feelings Not Progressing     Attend and participate in unit activities, including therapeutic, recreational, and educational groups Not Progressing     Complete daily ADLs, including personal hygiene independently, as able Not Progressing        Anxiety     Anxiety is at manageable level Not Progressing        Depression     Treatment Goal: Demonstrate behavioral control of depressive symptoms, verbalize feelings of improved mood/affect, and adopt new coping skills prior to discharge Not Progressing     Verbalize thoughts and feelings Not Progressing     Refrain from isolation Not Progressing     Refrain from self-neglect Not Progressing     Attend and participate in unit activities, including therapeutic, recreational, and educational groups Not Progressing     Complete daily ADLs, including personal hygiene independently, as able Not Progressing        Ineffective Coping     Identifies ineffective coping skills Not Progressing     Identifies healthy coping skills Not Progressing     Demonstrates healthy coping skills Not Progressing     Patient/Family participate in treatment and DC plans Not Progressing     Patient/Family verbalizes awareness of resources Not Progressing     Understands least restrictive measures Not Progressing        Risk for Self Injury/Neglect     Treatment Goal: Remain safe during length of stay, learn and adopt new coping skills, and be free of self-injurious ideation, impulses and acts at the time of discharge Not Progressing     Verbalize thoughts and feelings Not Progressing     Attend and participate in unit activities, including therapeutic, recreational, and educational groups Not Progressing     Recognize maladaptive responses and adopt new coping mechanisms Not Progressing     Complete daily ADLs, including personal hygiene independently, as able Not Progressing          Alteration in Thoughts and Perception     Treatment Goal: Gain control of psychotic behaviors/thinking, reduce/eliminate presenting symptoms and demonstrate improved reality functioning upon discharge Progressing     Refrain from acting on delusional thinking/internal stimuli Progressing     Agree to be compliant with medication regime, as prescribed and report medication side effects Progressing     Recognize dysfunctional thoughts, communicate reality-based thoughts at the time of discharge Progressing        Depression     Refrain from harming self Progressing        Ineffective Coping     Cooperates with admission process Progressing     Free from restraint events Progressing        Prexisting or High Potential for Compromised Skin Integrity     Skin integrity is maintained or improved Progressing        Risk for Self Injury/Neglect     Refrain from harming self Progressing

## 2018-09-03 NOTE — PROGRESS NOTES
Pt remained in bed the entire shift  Reports having "brain depression" and anxiety  Denies hallucinations  Admits to passive SI-he wishes to just die, but contracts for safety  Pt is irritable and loud at times  Did not want to talk about his pain-unable to asses, Medication compliant

## 2018-09-03 NOTE — PROGRESS NOTES
Progress Note - [address]  72 y o  male MRN: [id number]   Unit/Bed#: NW5 561-01 Encounter: [id number]    Behavior over the last 24 hours: minimal improvement  Efraín Blocker remains depressed and anxious, he is refusing to get out of bed today and he refused to get out of bed all day yesterday per nursing notes and discussion with nursing team   Per nursing notes he refused to eat breakfast lunch and dinner yesterday he did eat 100% of a snack before bed last evening  He refused to eat breakfast this morning he also refuse to get out of bed today again  He continues to have a passive death wish though he denies active suicidal ideation and does state that he would tell someone if he became suicidal on the floor  He remains irritable and guarded  He continues to have reports of his chronic right leg back shoulder pain and his off/on headache which she reports as intractable  Neurology consult pending for this headache  Patient denies nausea and vomiting patient was encouraged to eat as this may alleviate his headache and discomfort  His vital signs are stable  Since patient's mood is not improving there is a concern that he may not be swallowing his medications so mouth checks have been initiated  His dose of Remeron in the evening has also been increased because his depression is continuing as evidence by a refusal to get out of bed, care for self, as well as eat food  The dose of Remeron was increased from 30 mg p  O  Q h s  To 45 mg p o  Q h s       Sleep: slept off and on  Appetite: poor oral intake  Medication side effects: No   ROS: reports r leg, back, soulders chronic pain and headache, denies any shortness of breath, chest pain, abdominal pain, constipation, diarrhea or dizziness    Mental Status Evaluation:    Appearance:  disheveled   Behavior:  guarded, uncooperative, evasive   Speech:  scant, loud   Mood:  depressed, dysphoric, anxious, irritable   Affect:  flat Thought Process:  circumstantial, decreased rate of thoughts   Associations: circumstantial associations   Thought Content:  no overt delusions, obsessions, negative thinking   Perceptual Disturbances: denies auditory or visual hallucinations when asked, does not appear responding to internal stimuli   Risk Potential: Suicidal ideation - Yes, passive death wish  Homicidal ideation - None  Potential for aggression - No   Sensorium:  oriented to person and place   Memory:  recent memory intact, remote memory intact   Consciousness:  alert and awake   Attention: decreased concentration and decreased attention span   Insight:  poor   Judgment: poor   Gait/Station: in bed and unable to assess   Motor Activity: no abnormal movements     Vital signs in last 24 hours:    Temp:  [98 6 °F (37 °C)] 98 6 °F (37 °C)  HR:  [68] 68  Resp:  [16] 16  BP: (118)/(60) 118/60    Laboratory results:    I have personally reviewed all pertinent laboratory/tests results  Most Recent Labs:   Lab Results   Component Value Date    WBC 7 54 08/27/2018    RBC 5 30 08/27/2018    HGB 14 9 08/27/2018    HCT 45 5 08/27/2018     08/27/2018    RDW 14 0 08/27/2018    NEUTROABS 5 17 08/27/2018     08/27/2018    K 3 8 08/27/2018     08/27/2018    CO2 31 08/27/2018    BUN 31 (H) 08/27/2018    CREATININE 1 72 (H) 08/27/2018    GLUC 89 08/27/2018    CALCIUM 9 0 08/27/2018    AST 13 08/27/2018    ALT 22 08/27/2018    ALKPHOS 62 08/27/2018    TP 7 3 08/27/2018    ALB 3 7 08/27/2018    TBILI 0 30 08/27/2018    [id number] 2 161 08/27/2018     Progress Toward Goals: minimal improvement, patient remained bad, depressed, dysphoric  His refusing to eat all meals yesterday except for 100% of a snack before bed  He refused to get out of bed yesterday and is refusing out of bed so far again today even with encouragement by provider    He is compliant with medication though I am going to place him on mouth checks as he is not improving with medications  Assessment/Plan   Principal Problem:    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 ([facility] [address] )    Recommended Treatment:     Planned medication and treatment changes: All current active medications have been reviewed  Encourage group therapy, milieu therapy and occupational therapy  Behavioral Health checks every 7 minutes  -Start on mouth checks with each medication pass due to lack of improvement   -Increase Remeron from 30 mg po Q HS to 45 mg po QHS for continued depression  -continue Seroquel 25 mg p o  Q h s  Continue Klonopin 0 25 mg p o  B i d   Continue Pristiq 50 mg p o  Daily  Continue melatonin 3 mg p o  Q h s  Current Facility-Administered Medications:  acetaminophen 650 mg [facility]6H [facility]N [facility], [facility]   acetaminophen 650 mg Oral Q4H PRN [facility], CRNP   acetaminophen 975 mg Oral Q6H PRN [facility], CRNP   aluminum-magnesium hydroxide-simethicone 30 mL Oral Q4H PRN [facility], CRNP   benztropine 1 mg Intramuscular Q8H PRN [facility], CRNP   benztropine 0 5 mg Oral Q6H PRN [facility], CRNP   clonazePAM 0 25 mg Oral BID Angel Loco, PA-C   desvenlafaxine succinate 50 mg Oral Daily Blima Loco, PA-C   haloperidol 5 mg Oral Q8H PRN [facility], CRNP   hydrOXYzine HCL 25 mg Oral Q4H PRN [facility], CRNP   LORazepam 0 5 mg Oral Q4H PRN [facility], CRNP   magnesium hydroxide 30 mL Oral Daily PRN [facility], CRNP   melatonin 3 mg Oral HS [facility], CR[facility]   mirtazapine 45 mg Oral HS [facility], CRNP   OLANZapine 2 5 mg Intramuscular Q8H PRN [facility], CRNP   QUEtiapine 25 mg Oral HS Blima Loco, PA-C   risperiDONE 0 5 mg Oral Q8H PRN [facility], CRNP   traZODone 25 mg Oral HS PRN [facility], CRNP       Risks / Benefits of Treatment:    Risks, benefits, and possible side effects of medications explained to patient  Patient does not verbalize understanding of treatment at this time and will require further explanation  Counseling / Coordination of Care:    Patient's progress discussed with staff in treatment team meeting  Medications, treatment progress and treatment plan reviewed with patient  Supportive counseling provided to the patient

## 2018-09-04 PROCEDURE — 99232 SBSQ HOSP IP/OBS MODERATE 35: CPT | Performed by: PSYCHIATRY & NEUROLOGY

## 2018-09-04 RX ORDER — GABAPENTIN 300 MG/1
300 CAPSULE ORAL
Status: DISCONTINUED | OUTPATIENT
Start: 2018-09-04 | End: 2018-09-04

## 2018-09-04 RX ORDER — GABAPENTIN 100 MG/1
100 CAPSULE ORAL
Status: DISCONTINUED | OUTPATIENT
Start: 2018-09-04 | End: 2018-09-14

## 2018-09-04 RX ORDER — CLONAZEPAM 0.5 MG/1
0.5 TABLET ORAL
Status: DISCONTINUED | OUTPATIENT
Start: 2018-09-04 | End: 2018-09-11

## 2018-09-04 RX ORDER — LANOLIN ALCOHOL/MO/W.PET/CERES
6 CREAM (GRAM) TOPICAL
Status: DISCONTINUED | OUTPATIENT
Start: 2018-09-04 | End: 2018-09-16

## 2018-09-04 RX ADMIN — QUETIAPINE 25 MG: 25 TABLET ORAL at 21:09

## 2018-09-04 RX ADMIN — DESVENLAFAXINE SUCCINATE 50 MG: 50 TABLET, EXTENDED RELEASE ORAL at 09:10

## 2018-09-04 RX ADMIN — GABAPENTIN 100 MG: 100 CAPSULE ORAL at 21:09

## 2018-09-04 RX ADMIN — MELATONIN 6 MG: at 21:09

## 2018-09-04 RX ADMIN — MIRTAZAPINE 45 MG: 30 TABLET, FILM COATED ORAL at 21:08

## 2018-09-04 RX ADMIN — CLONAZEPAM 0.25 MG: 0.5 TABLET ORAL at 09:10

## 2018-09-04 RX ADMIN — CLONAZEPAM 0.25 MG: 0.5 TABLET ORAL at 17:27

## 2018-09-04 RX ADMIN — CLONAZEPAM 0.5 MG: 0.5 TABLET ORAL at 21:08

## 2018-09-04 NOTE — PROGRESS NOTES
Pt was irritable with care today  Pt refused to speak to this writer, and required encouragement in order to speak to this nurse  Pt was medication compliant  Pt reported depression, and pain  Pt was offered PRN Tylenol, but pt refused  Pt denied remaining symptoms  Pt remained seclusive to his room today  Will continue to monitor

## 2018-09-04 NOTE — PROGRESS NOTES
Progress Note - [address]  72 y o  male MRN: [id number]  Unit/Bed#: QN5 561-01 Encounter: [id number]    The patient was seen for continuing care and reviewed with treatment team   Staff reports that the patient remains very irritable and somatic  Has multiple complaints of pain including headache an all over body pain  He was angry last night because he has been complaining of restless leg syndrome and does not have medications currently to address that  Has been refusing to get out of bed or eat meals  Patient continues to be irritable  He tells me his mood is not that good  He later stated he was depressed  He said he is "tired all the time, 24/7"  He does not want to get out of bed, although he did tell me he will get out and go to the day room today for lunch  He also expressed interest in taking a shower and getting shaved  He refused breakfast saying he could go forever without eating because he has not been hungry at all  He said he has been having headaches, he aches all over, especially his shoulders, back and right side  He says his legs twitch at night and he can't sleep       Mental Status Evaluation:  Appearance:  Poor eye contact and disheveled   Behavior:  guarded, minimally cooperative   Mood:  irritable, anxious and depressed   Affect: constricted   Speech: Sparse   Thought Process:  Goal directed and coherent   Thought Content:  Does not verbalize delusional material   Perceptual Disturbances: Denies hallucinations and does not appear to be responding to internal stimuli   Risk Potential: No suicidal or homicidal ideation   Attention/Concentration Clay than expected   Orientation:   Oriented to person place   Gait/Station: Not observed   Motor Activity: No abnormal movement noted     Progress Toward Goals:  No change    Assessment/Plan    Principal Problem:    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 [facility])  Active Problems: Hypothyroidism    Essential hypertension    Seizure disorder (HCC)    CKD (chronic kidney disease) stage 3, GFR 30-59 ml/min    Severe episode of recurrent major depressive disorder, without psychotic features (HCC)    ESBL (extended spectrum beta-lactamase) producing bacteria infection      Recommended Treatment:      Continue mirtazapine 45 mg HS  Continue Seroquel 25 mg HS  Continue Pristiq 50 mg daily  Continue Klonopin 0 25 mg b i d       Continue melatonin 3 mg HS  Add gabapentin 100 mg HS for RLS and titrate as tolerated while considering renal function  Continue with pharmacotherapy, group therapy, milieu therapy and occupational therapy    The patient will be maintained on the following medications:    Current Facility-Administered Medications:  acetaminophen 650 mg Oral Q6H PRN Ron Escobar, CRNP   acetaminophen 650 mg Oral Q4H PRN Ron Escobar, CRNP   acetaminophen 975 mg Oral Q6H PRN Ron Escobar, CRNP   aluminum-magnesium hydroxide-simethicone 30 mL Oral Q4H PRN Ron Escobar, CRNP   benztropine 1 mg Intramuscular Q8H PRN Ron Escobar, CRNP   benztropine 0 5 mg Oral Q6H PRN Ron Escobar, CRNP   clonazePAM 0 25 mg Oral BID Dmitri Keturah, PA-C   desvenlafaxine succinate 50 mg Oral Daily Dmitri Keturah, PA-C   gabapentin 300 mg Oral HS Ron Escobar, CRNP   haloperidol 5 mg Oral Q8H PRN Ron Escobar, CRNP   hydrOXYzine HCL 25 mg Oral Q4H PRN Ron Escobar, CRNP   LORazepam 0 5 mg Oral Q4H PRN Ron Escobar, CRNP   magnesium hydroxide 30 mL Oral Daily PRN Ron Escobar, CRNP   melatonin 3 mg Oral HS Ron Escobar, CRNP   mirtazapine 45 mg Oral HS Kelleen Shirts, CRNP   OLANZapine 2 5 mg Intramuscular Q8H PRN Ron Escobar, CRNP   QUEtiapine 25 mg Oral HS Dmitri Keturah, PA-C   risperiDONE 0 5 mg Oral Q8H PRN Ron Escobar, CRNP   traZODone 25 mg Oral HS PRN Ron Escobar, CRNP       Risks, benefits and possible side effects of Medications:   Patient does not verbalize understanding at this time and will require further explanation

## 2018-09-05 PROCEDURE — 99232 SBSQ HOSP IP/OBS MODERATE 35: CPT | Performed by: PSYCHIATRY & NEUROLOGY

## 2018-09-05 RX ORDER — MIRTAZAPINE 30 MG/1
30 TABLET, FILM COATED ORAL
Status: DISCONTINUED | OUTPATIENT
Start: 2018-09-05 | End: 2018-09-14

## 2018-09-05 RX ORDER — QUETIAPINE FUMARATE 25 MG/1
50 TABLET, FILM COATED ORAL
Status: DISCONTINUED | OUTPATIENT
Start: 2018-09-05 | End: 2018-09-08

## 2018-09-05 RX ORDER — DULOXETIN HYDROCHLORIDE 60 MG/1
60 CAPSULE, DELAYED RELEASE ORAL DAILY
Status: DISCONTINUED | OUTPATIENT
Start: 2018-09-05 | End: 2018-09-16

## 2018-09-05 RX ADMIN — QUETIAPINE 50 MG: 25 TABLET ORAL at 21:30

## 2018-09-05 RX ADMIN — CLONAZEPAM 0.25 MG: 0.5 TABLET ORAL at 17:39

## 2018-09-05 RX ADMIN — GABAPENTIN 100 MG: 100 CAPSULE ORAL at 21:30

## 2018-09-05 RX ADMIN — MELATONIN 6 MG: at 21:30

## 2018-09-05 RX ADMIN — DESVENLAFAXINE SUCCINATE 50 MG: 50 TABLET, EXTENDED RELEASE ORAL at 08:24

## 2018-09-05 RX ADMIN — CLONAZEPAM 0.25 MG: 0.5 TABLET ORAL at 08:23

## 2018-09-05 RX ADMIN — DULOXETINE HYDROCHLORIDE 60 MG: 60 CAPSULE, DELAYED RELEASE ORAL at 12:17

## 2018-09-05 RX ADMIN — MIRTAZAPINE 30 MG: 30 TABLET, FILM COATED ORAL at 21:30

## 2018-09-05 RX ADMIN — CLONAZEPAM 0.5 MG: 0.5 TABLET ORAL at 21:30

## 2018-09-05 NOTE — PLAN OF CARE
Problem: Ineffective Coping  Goal: Participates in unit activities  Interventions:  - Provide therapeutic environment   - Provide required programming   - Redirect inappropriate behaviors    Outcome: Progressing  Pt sat in morning group with his back to his peers,working on pen and paper tasks  He left the session as exercise group began  Pt displays min  Interest in groups but is joining the day room more frequently now

## 2018-09-05 NOTE — PROGRESS NOTES
Pt was calm and cooperative with care  Pt was less irritable today  Pt was medication compliant  Pt was more willing to talk today  Pt reported depression but "feels alive"  Pt denied remaining symptoms  Pt remained seclusive to self and room, but was out for meals  Pt ate breakfast today  Pt did not attend group, even with encouragement  Will continue to monitor

## 2018-09-05 NOTE — PROGRESS NOTES
Pt remains irritable on approach  Scant on conversation and seclusive to his room  No interaction with staff or peers  Reports mild depression and states he's not getting any better here  Medication compliant

## 2018-09-05 NOTE — PROGRESS NOTES
Progress Note - [address]  72 y o  male MRN: [id number]  Unit/Bed#: QO2 561-01 Encounter: [id number]    The patient was seen for continuing care and reviewed with treatment team   Staff reports that the patient remains irritable and seclusive  He verbalizes that he is not feeling any better  Very scant  The patient tells me his mood is not that good  He tells me he has been going through depression for 38 years  When my head clears up I'm better, until then no"  I asked him what has ever helped clear his head and he told me that Cymbalta is the only thing that ever worked for him  He told me although his appetite is still poor this morning is the first time that he ate everything on his tray  He said he slept better last night, so the HS klonopin and gabapentin is helping  He took a shower and shaved yesterday and today was observed in the day room completing a crossword puzzle  So he is coming out of his room more      Mental Status Evaluation:  Appearance:  Poor eye contact and disheveled   Behavior:  guarded and Dismissive   Mood:  anxious and depressed   Affect: constricted and mood-congruent   Speech: Sparse   Thought Process:  Goal directed and coherent   Thought Content:  Does not verbalize delusional material   Perceptual Disturbances: Denies hallucinations and does not appear to be responding to internal stimuli   Risk Potential: No suicidal or homicidal ideation   Attention/Concentration [place] than expected   Orientation:   Oriented to person place and date   Gait/Station: Not observed   Motor Activity: No abnormal movement noted     Progress Toward Goals:  Slight improvement    Assessment/Plan    Principal Problem:    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 ([facility] [address] )  Active Problems:    Hypothyroidism    Essential hypertension    Seizure disorder (HCC)    CKD (chronic kidney disease) stage 3, GFR 30-59 ml/min    Severe episode of recurrent major depressive disorder, without psychotic features ([facility] [address] )    ESBL (extended spectrum beta-lactamase) producing bacteria infection      Recommended Treatment:      Discontinue Pristiq and begin Cymbalta 60 mg daily  Decrease mirtazapine to 30 mg HS     Increase Seroquel to 50 mg HS  Continue gabapentin 100 mg HS  Continue melatonin 6 mg H S  Continue clonazepam 0 25 mg b i d  and 0 5 mg HS  Continue with pharmacotherapy, group therapy, milieu therapy and occupational therapy  The patient will be maintained on the following medications:    Current Facility-Administered Medications:  acetaminophen 650 mg Oral Q6H PRN Cleda Pickerel, CRNP   acetaminophen 650 mg Oral Q4H PRN Cleda Pickerel, CRNP   acetaminophen 975 mg Oral Q6H PRN Cleda Pickerel, CRNP   aluminum-magnesium hydroxide-simethicone 30 mL Oral Q4H PRN Cleda Pickerel, CRNP   benztropine 1 mg Intramuscular Q8H PRN Cleda Pickerel, CRNP   benztropine 0 5 mg Oral Q6H PRN Cleda Pickerel, CRNP   clonazePAM 0 25 mg Oral BID Qian Jackson MD   clonazePAM 0 5 mg Oral HS Qian Jackson MD   DULoxetine 60 mg Oral Daily Cleda Pickerel, CRNP   gabapentin 100 mg Oral HS Cleda Pickerel, CRNP   haloperidol 5 mg Oral Q8H PRN Cleda Pickerel, CRNP   hydrOXYzine HCL 25 mg Oral Q4H PRN Cleda Pickerel, CRNP   LORazepam 0 5 mg Oral Q4H PRN Cleda Pickerel, CRNP   magnesium hydroxide 30 mL Oral Daily PRN Cleda Pickerel, CRNP   melatonin 6 mg Oral HS Qian Jackson MD   mirtazapine 30 mg Oral HS Cleda Pickerel, CRNP   OLANZapine 2 5 mg Intramuscular Q8H PRN Cleda Pickerel, CRNP   QUEtiapine 50 mg Oral HS Cleda Pickerel, CRNP   risperiDONE 0 5 mg Oral Q8H PRN Cleda Pickerel, CRNP   traZODone 25 mg Oral HS PRN Cleda Pickerel, CRNP       Risks, benefits and possible side effects of Medications:   Patient does not verbalize understanding at this time and will require further explanation

## 2018-09-05 NOTE — PLAN OF CARE
Alteration in Thoughts and Perception     Attend and participate in unit activities, including therapeutic, recreational, and educational groups Not Progressing        Depression     Refrain from isolation Not Progressing     Attend and participate in unit activities, including therapeutic, recreational, and educational groups Not Progressing        Ineffective Coping     Identifies ineffective coping skills Not Progressing     Identifies healthy coping skills Not Progressing     Demonstrates healthy coping skills Not Progressing        Risk for Self Injury/Neglect     Attend and participate in unit activities, including therapeutic, recreational, and educational groups Not Progressing     Recognize maladaptive responses and adopt new coping mechanisms Not Progressing          Alteration in Thoughts and Perception     Treatment Goal: Gain control of psychotic behaviors/thinking, reduce/eliminate presenting symptoms and demonstrate improved reality functioning upon discharge Progressing     Verbalize thoughts and feelings Progressing     Refrain from acting on delusional thinking/internal stimuli Progressing     Agree to be compliant with medication regime, as prescribed and report medication side effects Progressing     Recognize dysfunctional thoughts, communicate reality-based thoughts at the time of discharge Progressing     Complete daily ADLs, including personal hygiene independently, as able Progressing        Anxiety     Anxiety is at manageable level Progressing        Depression     Treatment Goal: Demonstrate behavioral control of depressive symptoms, verbalize feelings of improved mood/affect, and adopt new coping skills prior to discharge Progressing     Verbalize thoughts and feelings Progressing     Refrain from harming self Progressing     Refrain from self-neglect Progressing     Complete daily ADLs, including personal hygiene independently, as able Progressing        Ineffective Coping     Cooperates with admission process Progressing     Patient/Family participate in treatment and DC plans Progressing     Patient/Family verbalizes awareness of resources Progressing     Understands least restrictive measures Progressing     Free from restraint events Progressing        Prexisting or High Potential for Compromised Skin Integrity     Skin integrity is maintained or improved Progressing        Risk for Self Injury/Neglect     Treatment Goal: Remain safe during length of stay, learn and adopt new coping skills, and be free of self-injurious ideation, impulses and acts at the time of discharge Progressing     Verbalize thoughts and feelings Progressing     Refrain from harming self Progressing     Complete daily ADLs, including personal hygiene independently, as able Progressing

## 2018-09-05 NOTE — CMS CERTIFICATION NOTE
Certification: Based upon physical, mental and social evaluations, I certify that inpatient psychiatric services are medically necessary for this patient for a duration of 7 midnights for the treatment of mood disorder  Available alternative community resources do not meet the patient's mental health care needs  I further attest that an established written individualized plan of care has been implemented and is outlined in the patient's medical records

## 2018-09-06 PROCEDURE — 99232 SBSQ HOSP IP/OBS MODERATE 35: CPT | Performed by: PSYCHIATRY & NEUROLOGY

## 2018-09-06 RX ORDER — CLONAZEPAM 0.5 MG/1
0.25 TABLET ORAL
Status: DISCONTINUED | OUTPATIENT
Start: 2018-09-06 | End: 2018-09-10

## 2018-09-06 RX ADMIN — CLONAZEPAM 0.25 MG: 0.5 TABLET ORAL at 08:33

## 2018-09-06 RX ADMIN — GABAPENTIN 100 MG: 100 CAPSULE ORAL at 21:26

## 2018-09-06 RX ADMIN — CLONAZEPAM 0.25 MG: 0.5 TABLET ORAL at 17:32

## 2018-09-06 RX ADMIN — MIRTAZAPINE 30 MG: 30 TABLET, FILM COATED ORAL at 21:26

## 2018-09-06 RX ADMIN — MELATONIN 6 MG: at 21:26

## 2018-09-06 RX ADMIN — CLONAZEPAM 0.5 MG: 0.5 TABLET ORAL at 21:26

## 2018-09-06 RX ADMIN — QUETIAPINE 50 MG: 25 TABLET ORAL at 21:26

## 2018-09-06 RX ADMIN — DULOXETINE HYDROCHLORIDE 60 MG: 60 CAPSULE, DELAYED RELEASE ORAL at 08:33

## 2018-09-06 NOTE — PROGRESS NOTES
Pt calm, cooperative  Remains irritable and scant during conversation  Makes no attempts to come out of his room  Denies all s/s  Medication compliant and denies any needs

## 2018-09-06 NOTE — PROGRESS NOTES
Progress Note - [address]  72 y o  male MRN: [id number]  Unit/Bed#: DP1 561-01 Encounter: [id number]    The patient was seen for continuing care and reviewed with treatment team   Staff reports that the patient has been slightly less irritable  He remains seclusive and scant  When he does go to group he sits with his back to the group and does not participate  The patient has been sleeping in his bed all day  He has not been up for meals or any groups yet  He did wake up to take his morning medication but immediately went back to sleep  When I attempted to interview him several times he opened his eyes but then closed them again and would not answer me  Mental Status Evaluation:  Appearance:  Poor eye contact and disheveled   Behavior:  Lethargic/sedated   Mood:  Uncertain   Affect: constricted   Speech: Mute/refuses to talk   Thought Process:  Unable to assess due to scant verbalization   Thought Content:  Cannot be assessed due to patient factors   Perceptual Disturbances: Cannot be assessed due to patient factors   Risk Potential: Unable to assess due to patient factors   Attention/Concentration [place]   Orientation:   Oriented x3   Gait/Station: Not observed   Motor Activity: No abnormal movement noted     Progress Toward Goals:  No change    Assessment/Plan    Principal Problem:    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 (HCC)  Active Problems:    Hypothyroidism    Essential hypertension    Seizure disorder (HCC)    CKD (chronic kidney disease) stage 3, GFR 30-59 ml/min    Severe episode of recurrent major depressive disorder, without psychotic features (HCC)    ESBL (extended spectrum beta-lactamase) producing bacteria infection      Recommended Treatment:      Continue Cymbalta 60 mg daily  Continue Seroquel 50 mg HS  Continue mirtazapine 30 mg HS     Continue melatonin 6 mg HS     Continue gabapentin 100 mg HS      Decrease clonazepam from 0 25 mg b i d  to 0 25 mg daily before dinner due to excessive morning sedation  Continue 0 5 mg HS  Continue with pharmacotherapy, group therapy, milieu therapy and occupational therapy  The patient will be maintained on the following medications:    Current Facility-Administered Medications:  acetaminophen 650 mg Oral Q6H PRN Blondell Halon, CRNP   acetaminophen 650 mg Oral Q4H PRN Blondell Halon, CRNP   acetaminophen 975 mg Oral Q6H PRN Blondell Halon, CRNP   aluminum-magnesium hydroxide-simethicone 30 mL Oral Q4H PRN Blondell Halon, CRNP   benztropine 1 mg Intramuscular Q8H PRN Blondell Halon, CRNP   benztropine 0 5 mg Oral Q6H PRN Blondell Halon, CRNP   clonazePAM 0 25 mg Oral BID Dodd Back, MD   clonazePAM 0 5 mg Oral HS Dodd Back, MD   DULoxetine 60 mg Oral Daily Blondell Halon, CRNP   gabapentin 100 mg Oral HS Blondell Halon, CRNP   haloperidol 5 mg Oral Q8H PRN Blondell Halon, CRNP   hydrOXYzine HCL 25 mg Oral Q4H PRN Blondell Halon, CRNP   LORazepam 0 5 mg Oral Q4H PRN Blondell Halon, CRNP   magnesium hydroxide 30 mL Oral Daily PRN Blondell Halon, CRNP   melatonin 6 mg Oral HS Dodd Back, MD   mirtazapine 30 mg Oral HS Blondell Halon, CRNP   OLANZapine 2 5 mg Intramuscular Q8H PRN Blondell Halon, CRNP   QUEtiapine 50 mg Oral HS Blondell Halon, CRNP   risperiDONE 0 5 mg Oral Q8H PRN Blondell Halon, CRNP   traZODone 25 mg Oral HS PRN Blondell Halon, CRNP       Risks, benefits and possible side effects of Medications:   Patient does not verbalize understanding at this time and will require further explanation

## 2018-09-06 NOTE — PLAN OF CARE
Problem: Ineffective Coping  Goal: Participates in unit activities  Interventions:  - Provide therapeutic environment   - Provide required programming   - Redirect inappropriate behaviors    Outcome: Progressing  Pt  Is a loner on the unit yet displayed calmer demeanor  Isolated more in his room today

## 2018-09-06 NOTE — PROGRESS NOTES
Pt was irritable with care today  Pt was medication compliant  Pt was irritable with mouth checks  Pt remained seclusive to his room today  Pt did not attend group today  Will continue to monitor

## 2018-09-07 PROCEDURE — 99231 SBSQ HOSP IP/OBS SF/LOW 25: CPT | Performed by: PSYCHIATRY & NEUROLOGY

## 2018-09-07 RX ADMIN — QUETIAPINE 50 MG: 25 TABLET ORAL at 21:14

## 2018-09-07 RX ADMIN — MELATONIN 6 MG: at 21:14

## 2018-09-07 RX ADMIN — CLONAZEPAM 0.5 MG: 0.5 TABLET ORAL at 21:14

## 2018-09-07 RX ADMIN — MIRTAZAPINE 30 MG: 30 TABLET, FILM COATED ORAL at 21:14

## 2018-09-07 RX ADMIN — GABAPENTIN 100 MG: 100 CAPSULE ORAL at 21:14

## 2018-09-07 RX ADMIN — CLONAZEPAM 0.25 MG: 0.5 TABLET ORAL at 18:02

## 2018-09-07 NOTE — PROGRESS NOTES
Patient is refusing to eat or drink  Asked patient if he knows the last time he ate or drank  Patient could not give me answer  Educated him on why it is important to stay hydrated and have adequate intake  He said "I don't care " Put water by the bedside and told him water was there  He turned to me and said "I don't want " Kept the water by his beside  He rang about 5 minutes  Went to his room  He said he wanted [employer] and Yarelis Doone cookies   Gave him [employer] and Yarelis Doone cookies Pending Prescriptions:                       Disp   Refills    valACYclovir (VALTREX) 500 MG tablet      90 tab*0            Sig: Take 1 tablet (500 mg) by mouth daily    Last OV was 6/7/16. TC to patient. Scheduled AE for 8/30/17. Refill sent to pharmacy.   Mairaelena Smith, RN-BSN

## 2018-09-07 NOTE — PROGRESS NOTES
Pt remains seclusive to his room/bed in spite off encouragement from staff  Pt irritability continues upon approach and didn't eat breakfast or lunch  Will continue to monitor

## 2018-09-07 NOTE — PLAN OF CARE
Alteration in Thoughts and Perception     Treatment Goal: Gain control of psychotic behaviors/thinking, reduce/eliminate presenting symptoms and demonstrate improved reality functioning upon discharge Not Progressing     Verbalize thoughts and feelings Not Progressing     Refrain from acting on delusional thinking/internal stimuli Not Progressing     Agree to be compliant with medication regime, as prescribed and report medication side effects Not Progressing     Attend and participate in unit activities, including therapeutic, recreational, and educational groups Not Progressing     Recognize dysfunctional thoughts, communicate reality-based thoughts at the time of discharge Not Progressing     Complete daily ADLs, including personal hygiene independently, as able Not Progressing        Anxiety     Anxiety is at manageable level Not Progressing        Depression     Treatment Goal: Demonstrate behavioral control of depressive symptoms, verbalize feelings of improved mood/affect, and adopt new coping skills prior to discharge Not Progressing     Verbalize thoughts and feelings Not Progressing     Refrain from isolation Not Progressing     Refrain from self-neglect Not Progressing     Attend and participate in unit activities, including therapeutic, recreational, and educational groups Not Progressing     Complete daily ADLs, including personal hygiene independently, as able Not Progressing        Ineffective Coping     Participates in unit activities Not Progressing     Participates in unit activities Not Progressing        Ineffective [address] [facility] with admission process Not Progressing     Identifies ineffective coping skills Not Progressing     Identifies healthy coping skills Not Progressing     Demonstrates healthy coping skills Not Progressing     Patient/Family participate in treatment and DC plans Not Progressing     Patient/Family verbalizes awareness of resources Not Progressing     Understands least restrictive measures Not Progressing        Risk for Self Injury/Neglect     Treatment Goal: Remain safe during length of stay, learn and adopt new coping skills, and be free of self-injurious ideation, impulses and acts at the time of discharge Not Progressing     Verbalize thoughts and feelings Not Progressing     Attend and participate in unit activities, including therapeutic, recreational, and educational groups Not Progressing     Recognize maladaptive responses and adopt new coping mechanisms Not Progressing

## 2018-09-07 NOTE — PROGRESS NOTES
Pt seclusive to room, denies SI/HI and is irritable  Took medications with encouragement and because agitated when asked to check mouth  Will continue to monitor

## 2018-09-07 NOTE — PROGRESS NOTES
Progress Note - [address]  72 y o  male MRN: [id number]  Unit/Bed#: LM3 561-01 Encounter: [id number]    The patient was seen for continuing care and reviewed with treatment team   The patient remains withdrawn to his room and refuses to talk to me  He asked me to come back later  However he has done this before and when he decides not to talk he will not  Mental Status Evaluation:  Appearance:  Marginal/poor hygiene and Poor eye contact   Behavior:  Dismissive and Uncooperative   Mood:  Uncertain   Thought Content:  Cannot be assessed due to patient factors   Perceptual Disturbances: Cannot be assessed due to patient factors   Risk Potential: Unable to assess due to patient factors   Orientation:            Assessment/Plan    Principal Problem:    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 [address] )  Active Problems:    Hypothyroidism    Essential hypertension    Seizure disorder (HCC)    CKD (chronic kidney disease) stage 3, GFR 30-59 ml/min    Severe episode of recurrent major depressive disorder, without psychotic features (HCC)    ESBL (extended spectrum beta-lactamase) producing bacteria infection      Recommended Treatment: Continue with pharmacotherapy, group therapy, milieu therapy and occupational therapy    The patient will be maintained on the following medications:    Current Facility-Administered Medications:  acetaminophen 650 mg Oral Q6H PRN Laurena Drown, CRNP   acetaminophen 650 mg Oral Q4H PRN Laurena Drown, CRNP   acetaminophen 975 mg Oral Q6H PRN Laurena Drown, CRNP   aluminum-magnesium hydroxide-simethicone 30 mL Oral Q4H PRN Laurena Drown, CRNP   benztropine 1 mg Intramuscular Q8H PRN Laurena Drown, CRNP   benztropine 0 5 mg Oral Q6H PRN Laurena Drown, CRNP   clonazePAM 0 25 mg Oral Before Dinner Laurena Drown, CRNP   clonazePAM 0 5 mg Oral HS Duy Llanes MD   DULoxetine 60 mg Oral Daily Laurena Drown, CRNP   gabapentin 100 mg Oral HS Cirilo Newsome Huan, CRNP   haloperidol 5 mg Oral Q8H PRN Waunita Ni, CRNP   hydrOXYzine HCL 25 mg Oral Q4H PRN Waunita Ni, CRNP   LORazepam 0 5 mg Oral Q4H PRN Waunita Ni, CRNP   magnesium hydroxide 30 mL Oral Daily PRN Waunita Ni, CRNP   melatonin 6 mg Oral HS [place] Alex MD   mirtazapine 30 mg Oral HS Waunita Ni, CRNP   OLANZapine 2 5 mg Intramuscular Q8H PRN Waunita Ni, CRNP   QUEtiapine 50 mg Oral HS Waunita Ni, CRNP   risperiDONE 0 5 mg Oral Q8H PRN Waunita Ni, CRNP   traZODone 25 mg Oral HS PRN Waunita Ni, CRNP

## 2018-09-07 NOTE — PLAN OF CARE
Problem: Ineffective Coping  Goal: Participates in unit activities  Interventions:  - Provide therapeutic environment   - Provide required programming   - Redirect inappropriate behaviors    Outcome: Not Progressing  Pt more isolative,not going to the day room now

## 2018-09-07 NOTE — PROGRESS NOTES
Pt refuses to get out of bed for breakfast, didn't respond or acknowledge nurse presence during med pass (playing possum)  Unable to complete assessment  Will continue to monitor

## 2018-09-08 PROCEDURE — 99232 SBSQ HOSP IP/OBS MODERATE 35: CPT | Performed by: PSYCHIATRY & NEUROLOGY

## 2018-09-08 RX ORDER — QUETIAPINE FUMARATE 25 MG/1
75 TABLET, FILM COATED ORAL
Status: DISCONTINUED | OUTPATIENT
Start: 2018-09-08 | End: 2018-09-10

## 2018-09-08 RX ADMIN — MIRTAZAPINE 30 MG: 30 TABLET, FILM COATED ORAL at 21:22

## 2018-09-08 RX ADMIN — MELATONIN 6 MG: at 21:22

## 2018-09-08 RX ADMIN — DULOXETINE HYDROCHLORIDE 60 MG: 60 CAPSULE, DELAYED RELEASE ORAL at 09:08

## 2018-09-08 RX ADMIN — CLONAZEPAM 0.5 MG: 0.5 TABLET ORAL at 21:21

## 2018-09-08 RX ADMIN — CLONAZEPAM 0.25 MG: 0.5 TABLET ORAL at 17:35

## 2018-09-08 RX ADMIN — QUETIAPINE 75 MG: 25 TABLET ORAL at 21:22

## 2018-09-08 RX ADMIN — GABAPENTIN 100 MG: 100 CAPSULE ORAL at 21:21

## 2018-09-08 NOTE — PROGRESS NOTES
Pt remains seclusive to his room, scant, irritable and blunted in conversation this morning; denies SI/HI  Pt states, "I just wants to be left alone"  He was medication compliant and nurse encouraged pt to get out of bed and shower/socialize, but pt refused  Will continue to monitor

## 2018-09-08 NOTE — PLAN OF CARE
Alteration in Thoughts and Perception     Treatment Goal: Gain control of psychotic behaviors/thinking, reduce/eliminate presenting symptoms and demonstrate improved reality functioning upon discharge Not Progressing     Verbalize thoughts and feelings Not Progressing     Refrain from acting on delusional thinking/internal stimuli Not Progressing     Agree to be compliant with medication regime, as prescribed and report medication side effects Not Progressing     Attend and participate in unit activities, including therapeutic, recreational, and educational groups Not Progressing     Recognize dysfunctional thoughts, communicate reality-based thoughts at the time of discharge Not Progressing     Complete daily ADLs, including personal hygiene independently, as able Not Progressing        Anxiety     Anxiety is at manageable level Not Progressing        Depression     Treatment Goal: Demonstrate behavioral control of depressive symptoms, verbalize feelings of improved mood/affect, and adopt new coping skills prior to discharge Not Progressing     Verbalize thoughts and feelings Not Progressing     Refrain from harming self Not Progressing     Refrain from isolation Not Progressing     Refrain from self-neglect Not Progressing     Attend and participate in unit activities, including therapeutic, recreational, and educational groups Not Progressing     Complete daily ADLs, including personal hygiene independently, as able Not Progressing        Ineffective Coping     Participates in unit activities Not Progressing     Participates in unit activities Not Progressing        [facility] Not Progressing     Identifies ineffective coping skills Not Progressing     Identifies healthy coping skills Not Progressing     Demonstrates healthy coping skills Not Progressing     Patient/Family participate in treatment and DC plans Not Progressing     Patient/Family verbalizes awareness of resources Not Progressing     Understands least restrictive measures Not Progressing        Risk for Self Injury/Neglect     Treatment Goal: Remain safe during length of stay, learn and adopt new coping skills, and be free of self-injurious ideation, impulses and acts at the time of discharge Not Progressing     Verbalize thoughts and feelings Not Progressing     Attend and participate in unit activities, including therapeutic, recreational, and educational groups Not Progressing     Recognize maladaptive responses and adopt new coping mechanisms Not Progressing

## 2018-09-08 NOTE — PROGRESS NOTES
Progress Note - [address]  72 y o  male MRN: [id number]  Unit/Bed#: ZP7 561-01 Encounter: [id number]    [place]  was seen for continuing care and reviewed with treatment team   Pt in room on my arrival and needed much encouragement to participate in the interview, but he did answer questions (mumbled), albeit as briefly as possible  He reported feeling "Under the weather, I'm always tired," and Brain depression " He is also anxious but cannot ID stressors  He denied SI, HI or hallucinations or paranoia  Floor team relayed he mainly secludes himself to his room, with rare group attendance, he is often irritable, though not as much as before overall and he has not been physically aggressive on this weekend  He is difficult to motivate for any activities including ADL  Per Pt and staff he is not eating very well but sleeps adequately  Pt prefers to eat junk foods per nursing  He has been medication compliant for the most part though occasionally refuses a medication (refused Duloxetine yesterday for no good reason )   He is s/p Tx of Proteus mirabilis UTI with Keflex but overall chronic catherization was a consideration  Sleep:Adequate  Appetite: Poor intake  Medication side effects: None per Pt    ROS: No complaints per Pt    Labs/: Reviewed     Mental Status Evaluation:  Appearance:  Dressed in hospital attire, disheveled, unchaven, poor eye contact   Behavior:  Withdrawn, edgy, resistant to interview but cooperated for questions with persistence through repeating my questions      Speech:  mumbling , scant, but normal rate   Mood:  Irritable   Affect:  Blunted   Thought Process:  Poverty of thought   Associations: Intact associations   Thought Content:  No verbalized delusions and but difficult to assess given scant in conversation   Perceptual Disturbances: Pt denies any hallucinations or paranoia and does not appear to be responding to internal stimuli   Risk Potential: Pt presently denies SI or HI    Sensorium:  Self, birthday, Place, Day of the week, Month, Year   Memory:  short term memory difficult to assess since Pt was very scant    Consciousness:  alert, awake   Attention: Attention span appeared shorter than expected for age   Insight:  Limited   Judgment: Limited   Gait/Station: Slow and steady   Motor Activity: No abnormal movements     Vitals:    09/06/18 1559 09/07/18 0717 09/07/18 1527 09/08/18 0708   BP: 128/76 128/74 137/85 150/80   BP Location: Right arm Right arm Right arm Right arm   Pulse: 65 62 83 79   Resp: 18 16 16 16   Temp: 98 1 °F (36 7 °C) (!) 97 2 °F (36 2 °C) 97 9 °F (36 6 °C) 97 5 °F (36 4 °C)   TempSrc: Tympanic Tympanic Tympanic Tympanic   SpO2: 96% 95% 99% 95%   Weight:       Height:           No visits with results within 1 Day(s) from this visit     Latest known visit with results is:   Admission on 08/27/2018, Discharged on 08/28/2018   Component Date Value    WBC 08/27/2018 7 54     RBC 08/27/2018 5 30     Hemoglobin 08/27/2018 14 9     Hematocrit 08/27/2018 45 5     MCV 08/27/2018 86     MCH 08/27/2018 28 1     MCHC 08/27/2018 32 7     RDW 08/27/2018 14 0     MPV 08/27/2018 10 5     Platelets 68/76/5741 225     nRBC 08/27/2018 0     Neutrophils Relative 08/27/2018 68     Immat GRANS % 08/27/2018 1     Lymphocytes Relative 08/27/2018 21     Monocytes Relative 08/27/2018 7     Eosinophils Relative 08/27/2018 2     Basophils Relative 08/27/2018 1     Neutrophils Absolute 08/27/2018 5 17     Immature Grans Absolute 08/27/2018 0 04     Lymphocytes Absolute 08/27/2018 1 59     Monocytes Absolute 08/27/2018 0 54     Eosinophils Absolute 08/27/2018 0 14     Basophils Absolute 08/27/2018 0 06     Color, UA 08/27/2018 Yellow     Clarity, UA 08/27/2018 Cloudy     Specific Gravity, UA 08/27/2018 1 020     pH, UA 08/27/2018 6 5     Leukocytes, UA 08/27/2018 Moderate*    Nitrite, UA 08/27/2018 Positive*    Protein, UA 08/27/2018 Trace*    Glucose, UA 08/27/2018 Negative     Ketones, UA 08/27/2018 Negative     Urobilinogen, UA 08/27/2018 0 2     Bilirubin, UA 08/27/2018 Negative     Blood, UA 08/27/2018 Trace-Intact*    Amph/Meth UR 08/27/2018 Negative     Barbiturate Ur 08/27/2018 Negative     Benzodiazepine Urine 08/27/2018 Negative     Cocaine Urine 08/27/2018 Negative     Methadone Urine 08/27/2018 Negative     Opiate Urine 08/27/2018 Negative     PCP Ur 08/27/2018 Negative     THC Urine 08/27/2018 Negative     Ethanol Lvl 08/27/2018 <3     Sodium 08/27/2018 143     Potassium 08/27/2018 3 8     Chloride 08/27/2018 105     CO2 08/27/2018 31     ANION GAP 08/27/2018 7     BUN 08/27/2018 31*    Creatinine 08/27/2018 1 72*    Glucose 08/27/2018 89     Calcium 08/27/2018 9 0     AST 08/27/2018 13     ALT 08/27/2018 22     Alkaline Phosphatase 08/27/2018 62     Total Protein 08/27/2018 7 3     Albumin 08/27/2018 3 7     Total Bilirubin 08/27/2018 0 30     eGFR 08/27/2018 39     TSH 3RD GENERATON 08/27/2018 2 161     RBC, UA 08/27/2018 0-1*    WBC, UA 08/27/2018 Innumerable*    Epithelial Cells 08/27/2018 Occasional     Bacteria, UA 08/27/2018 Innumerable*    Urine Culture 08/27/2018 >100,000 cfu/ml Proteus mirabilis ESBL*       Progress Toward Goals:   No significant change over the past 24 hours and I will increase Quetiapine to 75mg total qhs for agitation/mood and to stimulate his appetite       Repeat BMP to monitor creatinine which increased    Assessment/Plan   Principal Problem:    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 [facility])  Active Problems:    Hypothyroidism    Essential hypertension    Seizure disorder (HCC)    CKD (chronic kidney disease) stage 3, GFR 30-59 ml/min    Severe episode of recurrent major depressive disorder, without psychotic features ([facility] [address] )    ESBL (extended spectrum beta-lactamase) producing bacteria infection      Recommended Treatment: Continue with pharmacotherapy, group therapy, milieu therapy and occupational therapy    The patient will be maintained on the following medications:    Current Facility-Administered Medications:  acetaminophen 650 mg Oral Q6H PRN Orysia Guile, CRNP   acetaminophen 650 mg Oral Q4H PRN Orysia Guile, CRNP   acetaminophen 975 mg Oral Q6H PRN Orysia Guile, CRNP   aluminum-magnesium hydroxide-simethicone 30 mL Oral Q4H PRN Orysia Guile, CRNP   benztropine 1 mg Intramuscular Q8H PRN Orysia Guile, CRNP   benztropine 0 5 mg Oral Q6H PRN Orysia Guile, CRNP   clonazePAM 0 25 mg Oral Before Dinner Orysia Guile, CRNP   clonazePAM 0 5 mg Oral HS Thad Pratt MD   DULoxetine 60 mg Oral Daily Orysia Guile, CRNP   gabapentin 100 mg Oral HS Orysia Guile, CRNP   haloperidol 5 mg Oral Q8H PRN Orysia Guile, CRNP   hydrOXYzine HCL 25 mg Oral Q4H PRN Orysia Guile, CRNP   LORazepam 0 5 mg Oral Q4H PRN Orysia Guile, CRNP   magnesium hydroxide 30 mL Oral Daily PRN Orysia Guile, CRNP   melatonin 6 mg Oral HS Thad Pratt MD   mirtazapine 30 mg Oral HS Orysia Guile, CRNP   OLANZapine 2 5 mg Intramuscular Q8H PRN Orysia Guile, CRNP   QUEtiapine 75 mg Oral HS Savanna Yip PA-C   risperiDONE 0 5 mg Oral Q8H PRN Orysia Guile, CRNP   traZODone 25 mg Oral HS PRN Orysia Guile, CRNP       Risks, benefits and possible side effects of Medications:   Patient does not verbalize understanding at this time and will require further explanation

## 2018-09-08 NOTE — PROGRESS NOTES
Pt seclusive to his room the remainder of the evening  Maintains irritable edge towards staff during interactions but no aggression noted  He was medication compliant but minimally interactive with writer when assessment questions were asked  He does deny SI at this time  Will monitor

## 2018-09-09 LAB
ANION GAP SERPL CALCULATED.3IONS-SCNC: 7 MMOL/L (ref 4–13)
BUN SERPL-MCNC: 20 MG/DL (ref 5–25)
CALCIUM SERPL-MCNC: 8.2 MG/DL (ref 8.3–10.1)
CHLORIDE SERPL-SCNC: 104 MMOL/L (ref 100–108)
CO2 SERPL-SCNC: 29 MMOL/L (ref 21–32)
CREAT SERPL-MCNC: 1.84 MG/DL (ref 0.6–1.3)
GFR SERPL CREATININE-BSD FRML MDRD: 36 ML/MIN/1.73SQ M
GLUCOSE SERPL-MCNC: 86 MG/DL (ref 65–140)
POTASSIUM SERPL-SCNC: 3.4 MMOL/L (ref 3.5–5.3)
SODIUM SERPL-SCNC: 140 MMOL/L (ref 136–145)

## 2018-09-09 PROCEDURE — 80048 BASIC METABOLIC PNL TOTAL CA: CPT | Performed by: PHYSICIAN ASSISTANT

## 2018-09-09 PROCEDURE — 99232 SBSQ HOSP IP/OBS MODERATE 35: CPT | Performed by: PSYCHIATRY & NEUROLOGY

## 2018-09-09 RX ADMIN — CLONAZEPAM 0.25 MG: 0.5 TABLET ORAL at 17:29

## 2018-09-09 RX ADMIN — CLONAZEPAM 0.5 MG: 0.5 TABLET ORAL at 21:02

## 2018-09-09 RX ADMIN — GABAPENTIN 100 MG: 100 CAPSULE ORAL at 21:02

## 2018-09-09 RX ADMIN — DULOXETINE HYDROCHLORIDE 60 MG: 60 CAPSULE, DELAYED RELEASE ORAL at 11:19

## 2018-09-09 RX ADMIN — MIRTAZAPINE 30 MG: 30 TABLET, FILM COATED ORAL at 21:02

## 2018-09-09 RX ADMIN — MELATONIN 6 MG: at 21:02

## 2018-09-09 RX ADMIN — QUETIAPINE 75 MG: 25 TABLET ORAL at 21:02

## 2018-09-09 NOTE — PROGRESS NOTES
Pt initially refuse to get out of bed for breakfast, didn't respond or acknowledge nurse presence during med pass (playing possum)  He refuse medications this morning  Will continue to monitor

## 2018-09-09 NOTE — PLAN OF CARE
Alteration in Thoughts and Perception     Treatment Goal: Gain control of psychotic behaviors/thinking, reduce/eliminate presenting symptoms and demonstrate improved reality functioning upon discharge Not Progressing     Verbalize thoughts and feelings Not Progressing     Refrain from acting on delusional thinking/internal stimuli Not Progressing     Agree to be compliant with medication regime, as prescribed and report medication side effects Not Progressing     Attend and participate in unit activities, including therapeutic, recreational, and educational groups Not Progressing     Recognize dysfunctional thoughts, communicate reality-based thoughts at the time of discharge Not Progressing     Complete daily ADLs, including personal hygiene independently, as able Not Progressing        Anxiety     Anxiety is at manageable level Not Progressing        Depression     Treatment Goal: Demonstrate behavioral control of depressive symptoms, verbalize feelings of improved mood/affect, and adopt new coping skills prior to discharge Not Progressing     Verbalize thoughts and feelings Not Progressing     Refrain from isolation Not Progressing     Refrain from self-neglect Not Progressing     Attend and participate in unit activities, including therapeutic, recreational, and educational groups Not Progressing     Complete daily ADLs, including personal hygiene independently, as able Not [address] Discharge to home or other facility with appropriate resources Not Progressing        Ineffective Coping     Participates in unit activities Not Progressing     Participates in unit activities Not Progressing        Ineffective Coping     Cooperates with admission process Not Progressing     Identifies ineffective coping skills Not Progressing     Identifies healthy coping skills Not Progressing     Demonstrates healthy coping skills Not Progressing     Patient/Family participate in treatment and DC plans Not Progressing     Patient/Family verbalizes awareness of resources Not Progressing     Understands least restrictive measures Not Progressing        Risk for Self Injury/Neglect     Treatment Goal: Remain safe during length of stay, learn and adopt new coping skills, and be free of self-injurious ideation, impulses and acts at the time of discharge Not Progressing     Verbalize thoughts and feelings Not Progressing     Refrain from harming self Not Progressing     Attend and participate in unit activities, including therapeutic, recreational, and educational groups Not Progressing     Recognize maladaptive responses and adopt new coping mechanisms Not Progressing     Complete daily ADLs, including personal hygiene independently, as able Not Progressing

## 2018-09-09 NOTE — PROGRESS NOTES
Pt is now responding to questions and denies SI/HI and reports anxiety and depression  Pt was compliant with am medications, but refused mouth check  He is malodorous and this nurse advise pt that he needs a shower and sheets change today after lunch  Pt grouled at nurse and cover his head  Will continue to monitor

## 2018-09-09 NOTE — PROGRESS NOTES
Pt has maintained being seclusive to his room  Also remains irritable on approach  Offers little during interactions and is scant overall  He does deny SI at this time and was medication compliant  Will monitor

## 2018-09-09 NOTE — PROGRESS NOTES
Progress Note - [address]  72 y o  male MRN: [id number]  Unit/Bed#: JN9 561-01 Encounter: [id number]    Hardik Zaragoza  was seen for continuing care and reviewed with treatment team   Pt was in bed on my arrival, difficult to engage in conversation  He required much prompting and repeating of questions, to which he would turn away as though sleeping, then face me and ask "What" for me to repeat again  He sounded agitated and said "I don't care if I die" and "Nothing's gonna help me "  He answered very few questions  Floor team relayed Pt has remained seclusive to his room, not interacting with staff or peers, not participating in care of ADL, and not eating much other than junk foods  Sleep:Good per staff  Appetite: Poor intake per staff  Medication side effects: No response by Pt    ROS: No response by Pt    Labs/Tests: Reviewed    Mental Status Evaluation:  Appearance:  Dressed, Disheveled, with poor hygiene, unshaven, and poor eye contact   Behavior:  Withdrawn, Agitated, Refusing to engage in interview    Speech:  Clear when he wanted to speak, but generally scant    Mood:  Appears depressed and agitated at times   Affect:  Blunted   Thought Process:  Negative   Associations: Intact associations   Thought Content:  No overt delusions   Perceptual Disturbances: Pt does not respond to questions regarding hallucinations or paranoia but does not appear to be responding to internal stimuli at time of interview   Risk Potential: Expressed passive death wish but would not answer any direct questions on SI or HI   Sensorium:  Self, birthday--would not answer more questions   Memory:  Difficult to assess given that Pt is unwilling to engage much      Consciousness:  alert, awake   Attention: Attention span appeared shorter than expected for age   Insight:  Limited   Judgment: Limited   Gait/Station: Unobserved--Pt in bed   Motor Activity: No abnormal movements     Vitals: 09/09/18 0728   BP: 103/81   Pulse: 69   Resp: 16   Temp: 97 9 °F (36 6 °C)   SpO2: 97%       Admission on 08/28/2018   Component Date Value    Sodium 09/09/2018 140     Potassium 09/09/2018 3 4*    Chloride 09/09/2018 104     CO2 09/09/2018 29     ANION GAP 09/09/2018 7     BUN 09/09/2018 20     Creatinine 09/09/2018 1 84*    Glucose 09/09/2018 86     Calcium 09/09/2018 8 2*    eGFR 09/09/2018 36        Progress Toward Goals:   No change and I will start Ensure Vanilla bid  Quetiapine just increased last night and will increase gradually as needed, but will observe on the same dose for now  Advised staff to push good healthy po intake and to get Pt out of bed at least into a chair in his room  Pt is still on contact precautions--per ID dept-- for h/o ESBL/Proteus mirabilis UTI  Assessment/Plan   Principal Problem:    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 ([facility] )  Active Problems:    Hypothyroidism    Essential hypertension    Seizure disorder (HCC)    CKD (chronic kidney disease) stage 3, GFR 30-59 ml/min    Severe episode of recurrent major depressive disorder, without psychotic features ([facility] )    ESBL (extended spectrum beta-lactamase) producing bacteria infection      Recommended Treatment: Continue with pharmacotherapy, group therapy, milieu therapy and occupational therapy    The patient will be maintained on the following medications:    Current Facility-Administered Medications:  acetaminophen 650 mg Oral Q6H PRN Blondell Halon, CRNP   acetaminophen 650 mg Oral Q4H PRN Blondell Halon, CRNP   acetaminophen 975 mg Oral Q6H PRN Blondell Halon, CRNP   aluminum-magnesium hydroxide-simethicone 30 mL Oral Q4H PRN Blondell Halon, CRNP   benztropine 1 mg Intramuscular Q8H PRN Blondell Halon, CRNP   benztropine 0 5 mg Oral Q6H PRN Blondell Halon, CRNP   clonazePAM 0 25 mg Oral Before Dinner Blondell Halon, CRNP   clonazePAM 0 5 mg Oral HS Yousif Matson MD   DULoxetine 60 mg Oral Daily Dandre Maharaj Huan, CRNP   gabapentin 100 mg Oral HS Thurlow Dixons, CRNP   haloperidol 5 mg Oral Q8H PRN Thurlow Dixons, CRNP   hydrOXYzine HCL 25 mg Oral Q4H PRN Thurlow Dixons, CRNP   LORazepam 0 5 mg Oral Q4H PRN Thurlow Dixons, CRNP   magnesium hydroxide 30 mL Oral Daily PRN Thurlow Dixons, CRNP   melatonin 6 mg Oral HS Chilo Stephens MD   mirtazapine 30 mg Oral HS Thurlow Dixons, CRNP   OLANZapine 2 5 mg Intramuscular Q8H PRN Thurlow Dixons, CRNP   QUEtiapine 75 mg Oral HS Savanna Yip PA-C   risperiDONE 0 5 mg Oral Q8H PRN Thurlow Dixons, CRNP   traZODone 25 mg Oral HS PRN Thurlow Dixons, CRNP       Risks, benefits and possible side effects of Medications:   Patient does not verbalize understanding at this time and will require further explanation

## 2018-09-10 PROCEDURE — 99232 SBSQ HOSP IP/OBS MODERATE 35: CPT | Performed by: PSYCHIATRY & NEUROLOGY

## 2018-09-10 RX ORDER — QUETIAPINE FUMARATE 100 MG/1
100 TABLET, FILM COATED ORAL
Status: DISCONTINUED | OUTPATIENT
Start: 2018-09-10 | End: 2018-09-11

## 2018-09-10 RX ADMIN — QUETIAPINE FUMARATE 100 MG: 100 TABLET ORAL at 21:37

## 2018-09-10 RX ADMIN — TRAZODONE HYDROCHLORIDE 25 MG: 50 TABLET ORAL at 23:33

## 2018-09-10 RX ADMIN — MELATONIN 6 MG: at 21:17

## 2018-09-10 RX ADMIN — DULOXETINE HYDROCHLORIDE 60 MG: 60 CAPSULE, DELAYED RELEASE ORAL at 08:10

## 2018-09-10 RX ADMIN — MIRTAZAPINE 30 MG: 30 TABLET, FILM COATED ORAL at 21:17

## 2018-09-10 RX ADMIN — CLONAZEPAM 0.5 MG: 0.5 TABLET ORAL at 21:16

## 2018-09-10 RX ADMIN — GABAPENTIN 100 MG: 100 CAPSULE ORAL at 21:17

## 2018-09-10 NOTE — CASE MANAGEMENT
Writer spoke with Colni Crockett, pt's sister-in-law and informed of canceled discharge today  Per Colin Crockett, she was in to visit pt this weekend and he was improved from admission, however, negative at times  Per Colin Crockett, the family will be working on [employer] on Wheels for the patient when he returns home and they are also looking into [employer] at 's that the [facility] provides  Per Colin Crockett, much of what patient is exhibiting is baseline, though she does feel that he had improved and then declined while here  Per Colin Crockett, the family is supportive of discharge whenever the treatment team decides  She also notes that patient had been on Cymbalta in the past and it was effective  She is hopeful that he has been put back on it

## 2018-09-10 NOTE — PLAN OF CARE
Alteration in Thoughts and Perception     Treatment Goal: Gain control of psychotic behaviors/thinking, reduce/eliminate presenting symptoms and demonstrate improved reality functioning upon discharge Not Progressing     Verbalize thoughts and feelings Not Progressing     Refrain from acting on delusional thinking/internal stimuli Not Progressing     Attend and participate in unit activities, including therapeutic, recreational, and educational groups Not Progressing     Recognize dysfunctional thoughts, communicate reality-based thoughts at the time of discharge Not Progressing     Complete daily ADLs, including personal hygiene independently, as able Not Progressing        Anxiety     Anxiety is at manageable level Not Progressing        Depression     Treatment Goal: Demonstrate behavioral control of depressive symptoms, verbalize feelings of improved mood/affect, and adopt new coping skills prior to discharge Not Progressing     Verbalize thoughts and feelings Not Progressing     Refrain from isolation Not Progressing     Refrain from self-neglect Not Progressing     Attend and participate in unit activities, including therapeutic, recreational, and educational groups Not Progressing     Complete daily ADLs, including personal hygiene independently, as able Not Progressing        Ineffective Coping     Cooperates with admission process Not Progressing     Identifies ineffective coping skills Not Progressing     Identifies healthy coping skills Not Progressing     Demonstrates healthy coping skills Not Progressing     Patient/Family participate in treatment and DC plans Not Progressing     Patient/Family verbalizes awareness of resources Not Progressing        Risk for Self Injury/Neglect     Treatment Goal: Remain safe during length of stay, learn and adopt new coping skills, and be free of self-injurious ideation, impulses and acts at the time of discharge Not Progressing     Verbalize thoughts and feelings Not Progressing     Attend and participate in unit activities, including therapeutic, recreational, and educational groups Not Progressing     Recognize maladaptive responses and adopt new coping mechanisms Not Progressing     Complete daily ADLs, including personal hygiene independently, as able Not Progressing          Alteration in Thoughts and Perception     Agree to be compliant with medication regime, as prescribed and report medication side effects Progressing        Depression     Refrain from harming self Progressing        Ineffective Coping     Understands least restrictive measures Progressing     Free from restraint events Progressing        Prexisting or High Potential for Compromised Skin Integrity     Skin integrity is maintained or improved Progressing        Risk for Self Injury/Neglect     Refrain from harming self Progressing

## 2018-09-10 NOTE — PROGRESS NOTES
Progress Note - [address]  72 y o  male MRN: [id number]  Unit/Bed#: VS5 561-01 Encounter: [id number]    The patient was seen for continuing care and reviewed with treatment team   Staff reports that the patient continues to be loose irritable  He has had times where he is refusing medication  He only comes out of his room for meals and sometimes not even then  The patient is lying in his bed and uncooperative with the interview  No eye contact  Very irritable and loudly saying what? and not answering many questions  He said he is sleeping so-so" at night  He says he has no appetite at all  He said he does not feel any better than when he got here  Mental Status Evaluation:  Appearance:  Poor eye contact and disheveled   Behavior:  psychomotor retardation and Uncooperative   Mood:  depressed   Affect: blunted and constricted   Speech: Sparse and Loud   Thought Process:  Poverty of thoughts   Thought Content:  Cannot be assessed due to patient factors   Perceptual Disturbances: Cannot be assessed due to patient factors   Risk Potential: Hopeless with death wishes   Attention/Concentration [place] than expected   Orientation:   Would not cooperate   Gait/Station: Not observed   Motor Activity: No abnormal movement noted     Progress Toward Goals:  No change    Assessment/Plan    Principal Problem:    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 ([facility] [address] )  Active Problems:    Hypothyroidism    Essential hypertension    Seizure disorder (HCC)    CKD (chronic kidney disease) stage 3, GFR 30-59 ml/min    Severe episode of recurrent major depressive disorder, without psychotic features (HCC)    ESBL (extended spectrum beta-lactamase) producing bacteria infection      Recommended Treatment:      Discontinue clonazepam 0 25 before dinner due to excess daytime sedation  Continue 0 5 mg HS  Continue Cymbalta 60 mg daily        Continue gabapentin 100 mg HS for RLS     Continue mirtazapine 30 mg HS  Increase Seroquel to 100 mg HS  Continue with pharmacotherapy, group therapy, milieu therapy and occupational therapy  The patient will be maintained on the following medications:    Current Facility-Administered Medications:  acetaminophen 650 mg Oral Q6H PRN Laurena Drown, CRNP   acetaminophen 650 mg Oral Q4H PRN Laurena Drown, CRNP   acetaminophen 975 mg Oral Q6H PRN Laurena Drown, CRNP   aluminum-magnesium hydroxide-simethicone 30 mL Oral Q4H PRN Laurena Drown, CRNP   benztropine 1 mg Intramuscular Q8H PRN Laurena Drown, CRNP   benztropine 0 5 mg Oral Q6H PRN Laurena Drown, CRNP   clonazePAM 0 25 mg Oral Before Dinner Laurena Drown, CRNP   clonazePAM 0 5 mg Oral HS Duy Llanes MD   DULoxetine 60 mg Oral Daily Laurena Drown, CRNP   gabapentin 100 mg Oral HS Laurena Drown, CRNP   haloperidol 5 mg Oral Q8H PRN Laurena Drown, CRNP   hydrOXYzine HCL 25 mg Oral Q4H PRN Laurena Drown, CRNP   LORazepam 0 5 mg Oral Q4H PRN Laurena Drown, CRNP   magnesium hydroxide 30 mL Oral Daily PRN Laurena Drown, CRNP   melatonin 6 mg Oral HS Duy Llanes MD   mirtazapine 30 mg Oral HS Laurena Drown, CRNP   OLANZapine 2 5 mg Intramuscular Q8H PRN Laurena Drown, CRNP   QUEtiapine 75 mg Oral HS Savanna Yip PA-C   risperiDONE 0 5 mg Oral Q8H PRN Laurena Drown, CRNP   traZODone 25 mg Oral HS PRN Laurena Drown, CRNP       Risks, benefits and possible side effects of Medications:   Patient does not verbalize understanding at this time and will require further explanation

## 2018-09-10 NOTE — PROGRESS NOTES
Pt was irritable upon approach  Pt was medication compliant  Pt became increasingly irritable when asked to perform a mouth check  Pt remained seclusive to his room  Pt was very scant in conversation, only giving short answers to the questions asked  No aggression or agitation noted  Will continue to monitor

## 2018-09-10 NOTE — PLAN OF CARE
Problem: Ineffective Coping  Goal: Participates in unit activities  Interventions:  - Provide therapeutic environment   - Provide required programming   - Redirect inappropriate behaviors    Outcome: Not Progressing  Pt  Isolative to his room today,refusing to join peers in the dayroom nor groups

## 2018-09-10 NOTE — PROGRESS NOTES
Pt seclusive to his room the entire evening  Remains irritable on approach and will not converse at any length with staff members other than with one-word responses to questions  He does remain medication compliant however  Denies SI  Will monitor

## 2018-09-11 PROCEDURE — 99232 SBSQ HOSP IP/OBS MODERATE 35: CPT | Performed by: PSYCHIATRY & NEUROLOGY

## 2018-09-11 RX ORDER — CLONAZEPAM 1 MG/1
1 TABLET ORAL
Status: DISCONTINUED | OUTPATIENT
Start: 2018-09-11 | End: 2018-09-16

## 2018-09-11 RX ORDER — QUETIAPINE FUMARATE 100 MG/1
200 TABLET, FILM COATED ORAL
Status: DISCONTINUED | OUTPATIENT
Start: 2018-09-11 | End: 2018-09-14

## 2018-09-11 RX ADMIN — CLONAZEPAM 1 MG: 1 TABLET ORAL at 21:56

## 2018-09-11 RX ADMIN — DULOXETINE HYDROCHLORIDE 60 MG: 60 CAPSULE, DELAYED RELEASE ORAL at 08:39

## 2018-09-11 RX ADMIN — MELATONIN 6 MG: at 21:56

## 2018-09-11 RX ADMIN — MIRTAZAPINE 30 MG: 30 TABLET, FILM COATED ORAL at 21:56

## 2018-09-11 RX ADMIN — QUETIAPINE FUMARATE 200 MG: 100 TABLET ORAL at 21:56

## 2018-09-11 RX ADMIN — GABAPENTIN 100 MG: 100 CAPSULE ORAL at 21:56

## 2018-09-11 NOTE — PLAN OF CARE
Ineffective Coping     Patient/Family verbalizes awareness of resources Adequate for Discharge          Alteration in Thoughts and Perception     Treatment Goal: Gain control of psychotic behaviors/thinking, reduce/eliminate presenting symptoms and demonstrate improved reality functioning upon discharge Not Progressing     Verbalize thoughts and feelings Not Progressing     Attend and participate in unit activities, including therapeutic, recreational, and educational groups Not Progressing     Recognize dysfunctional thoughts, communicate reality-based thoughts at the time of discharge Not Progressing     Complete daily ADLs, including personal hygiene independently, as able Not Progressing        Depression     Verbalize thoughts and feelings Not Progressing     Refrain from isolation Not Progressing     Refrain from self-neglect Not Progressing     Attend and participate in unit activities, including therapeutic, recreational, and educational groups Not Progressing     Complete daily ADLs, including personal hygiene independently, as able Not Progressing        Ineffective Coping     Identifies ineffective coping skills Not Progressing     Identifies healthy coping skills Not Progressing     Demonstrates healthy coping skills Not Progressing     Patient/Family participate in treatment and DC plans Not Progressing        Risk for Self Injury/Neglect     Verbalize thoughts and feelings Not Progressing     Attend and participate in unit activities, including therapeutic, recreational, and educational groups Not Progressing     Recognize maladaptive responses and adopt new coping mechanisms Not Progressing     Complete daily ADLs, including personal hygiene independently, as able Not Progressing          Alteration in Thoughts and Perception     Refrain from acting on delusional thinking/internal stimuli Progressing     Agree to be compliant with medication regime, as prescribed and report medication side effects Progressing        Anxiety     Anxiety is at manageable level Progressing        Depression     Treatment Goal: Demonstrate behavioral control of depressive symptoms, verbalize feelings of improved mood/affect, and adopt new coping skills prior to discharge Progressing     Refrain from harming self Progressing        Ineffective Coping     Cooperates with admission process Progressing     Understands least restrictive measures Progressing     Free from restraint events Progressing        Prexisting or High Potential for Compromised Skin Integrity     Skin integrity is maintained or improved Progressing        Risk for Self Injury/Neglect     Treatment Goal: Remain safe during length of stay, learn and adopt new coping skills, and be free of self-injurious ideation, impulses and acts at the time of discharge Progressing     Refrain from harming self Progressing

## 2018-09-11 NOTE — PROGRESS NOTES
Patient was sleeping when I came to give him medicine  Woke patient up  He was compliant with medication  Mouth check completed  Does not verbalize thoughts and feelings when ask  No evidence of SI/HI/ hallucinations  Does not want to complete ADLs when encouraged  Patient does not attend group therapies and is isolated to room  Informed patient that breakfast was on his table

## 2018-09-11 NOTE — PROGRESS NOTES
Progress Note - [address]  72 y o  male MRN: [id number]  Unit/Bed#: KW0 561-01 Encounter: [id number]    The patient was seen for continuing care and reviewed with treatment team   The patient has been withdrawn to his room with minimal interaction with others  Does not attend groups  Most of the time, he does not respond or even acknowledge he is being talked to  However when a friend visited him last night, he became quite animated and very happy and was willing to talk to his nurse after this visit  Mental Status Evaluation:  Appearance:  Poor eye contact   Behavior:  Uncooperative   Mood:  Uncertain   Thought Content:  Cannot be assessed due to patient factors   Perceptual Disturbances: Cannot be assessed due to patient factors   Risk Potential: Unable to assess due to patient factors   Orientation:            Assessment/Plan    Principal Problem:    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 ([facility] )  Active Problems:    Hypothyroidism    Essential hypertension    Seizure disorder (HCC)    CKD (chronic kidney disease) stage 3, GFR 30-59 ml/min    Severe episode of recurrent major depressive disorder, without psychotic features (HCC)    ESBL (extended spectrum beta-lactamase) producing bacteria infection      Recommended Treatment:  I will increase the doses of clonazepam and also quetiapine to promote sleep and improve his mood Continue with pharmacotherapy, group therapy, milieu therapy and occupational therapy    The patient will be maintained on the following medications:    Current Facility-Administered Medications:  acetaminophen 650 mg Oral Q6H PRN Ron Escobar, CRNP   acetaminophen 650 mg Oral Q4H PRN Ron Escobar, CRNP   acetaminophen 975 mg Oral Q6H PRN Ron Escobar, CRNP   aluminum-magnesium hydroxide-simethicone 30 mL Oral Q4H PRN Ron Escobar, CRNP   benztropine 1 mg Intramuscular Q8H PRN Ron Escobar, CRNP   benztropine 0 5 mg Oral Q6H PRN Sharon Huan, CRNP   clonazePAM 0 5 mg Oral HS Ana Valente MD   DULoxetine 60 mg Oral Daily [facility], CRNP   gabapentin 100 mg Oral HS [facility], CRNP   haloperidol 5 mg Oral Q8H PRN [facility], CRNP   hydrOXYzine HCL 25 mg Oral Q4H PRN [facility], CRNP   LORazepam 0 5 mg Oral Q4H PRN [facility], CRNP   magnesium hydroxide 30 mL Oral Daily PRN [facility], CRNP   melatonin 6 mg Oral HS Ana Valente MD   mirtazapine 30 mg Oral HS [facility], CRNP   OLANZapine 2 5 mg Intramuscular Q8H PRN [facility], CRNP   QUEtiapine 100 mg Oral HS [facility], CRNP   risperiDONE 0 5 mg Oral Q8H PRN [facility], CRNP   traZODone 25 mg Oral HS PRN [facility], CRNP

## 2018-09-11 NOTE — PROGRESS NOTES
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

## 2018-09-11 NOTE — PLAN OF CARE
Problem: Ineffective Coping  Goal: Participates in unit activities  Interventions:  - Provide therapeutic environment   - Provide required programming   - Redirect inappropriate behaviors    Outcome: Not Progressing  Refuses to attend groups in the dayroom

## 2018-09-12 PROCEDURE — 99232 SBSQ HOSP IP/OBS MODERATE 35: CPT | Performed by: PSYCHIATRY & NEUROLOGY

## 2018-09-12 RX ORDER — TRAMADOL HYDROCHLORIDE 50 MG/1
50 TABLET ORAL EVERY 6 HOURS PRN
Status: DISCONTINUED | OUTPATIENT
Start: 2018-09-12 | End: 2018-09-24 | Stop reason: HOSPADM

## 2018-09-12 RX ORDER — TRAMADOL HYDROCHLORIDE 50 MG/1
50 TABLET ORAL EVERY 6 HOURS PRN
Status: DISCONTINUED | OUTPATIENT
Start: 2018-09-12 | End: 2018-09-12

## 2018-09-12 RX ADMIN — MELATONIN 6 MG: at 21:21

## 2018-09-12 RX ADMIN — GABAPENTIN 100 MG: 100 CAPSULE ORAL at 21:21

## 2018-09-12 RX ADMIN — CLONAZEPAM 1 MG: 1 TABLET ORAL at 21:20

## 2018-09-12 RX ADMIN — QUETIAPINE FUMARATE 200 MG: 100 TABLET ORAL at 21:20

## 2018-09-12 RX ADMIN — TRAMADOL HYDROCHLORIDE 50 MG: 50 TABLET, FILM COATED ORAL at 10:19

## 2018-09-12 RX ADMIN — MIRTAZAPINE 30 MG: 30 TABLET, FILM COATED ORAL at 21:20

## 2018-09-12 RX ADMIN — DULOXETINE HYDROCHLORIDE 60 MG: 60 CAPSULE, DELAYED RELEASE ORAL at 08:59

## 2018-09-12 NOTE — PROGRESS NOTES
Patient was encouraged and show mild force to get out of bed with security and Dr mercado  He was helped up  He walked about 25 feet  He was then wheeled to breakfast in the yina chair  He stated that his right side was in pain  Dr Paty Lion ordered tramadol since patient stated tylenol was not effective  Followed-up with patient and he said tramadol was not effective  He is compliant with his medications  He was up the whole morning and attended groups  He went down for a nap after second group  Spoke with patient at that time and he spoke more and did not sound irritable  He said this is the longest bout of depression he has experienced since his TBI  He said that he does not care if he would die right now, but he has no intention of harming himself  He says he has mild anxiety  He denies all other symptoms  He did get back up for lunch and he is in the dayroom currently

## 2018-09-12 NOTE — PROGRESS NOTES
Progress Note - [address]  72 y o  male MRN: [id number]  Unit/Bed#: CK6 561-01 Encounter: [id number]    The patient was seen for continuing care and reviewed with treatment team   The patient continues to stay in his room and in bed, he refuses to eat stating that he is not hungry and he is afraid of throwing up if he eats anything  He is also complaining of pain which Tylenol is not helping  It is mostly on his right side and related to muscles and joints  He was assisted by myself and staff to get out of bed and walk a few steps to go to the dining area and was pressed to eat or at least drink but he continues to repeat that he is not hungry  His creatinine has increased from 1 7 to on August 27 to 1 84 on September 9  He has been compliant with medications  Mental Status Evaluation:  Appearance:  Poor eye contact and disheveled   Behavior:  psychomotor retardation   Mood:  anxious and depressed   Thought Content:  is somatically preoccupied   Perceptual Disturbances: Uncertain   Risk Potential: No suicidal or homicidal ideation   Orientation:            Assessment/Plan    Principal Problem:    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 ([facility] )  Active Problems:    Hypothyroidism    Essential hypertension    Seizure disorder (HCC)    CKD (chronic kidney disease) stage 3, GFR 30-59 ml/min    Severe episode of recurrent major depressive disorder, without psychotic features ([facility] )    ESBL (extended spectrum beta-lactamase) producing bacteria infection      Recommended Treatment:  The patient's nutritional status can deteriorate rapidly given his refusal to eat  We will continue encouraging him and pressing him to eat and drink  He might need ECT  Continue with pharmacotherapy, group therapy, milieu therapy and occupational therapy    The patient will be maintained on the following medications:    Current Facility-Administered Medications:  acetaminophen 650 mg Oral Q6H PRN Jose J Dean, SHERRIE   acetaminophen 650 mg Oral Q4H PRN Jose J Dean, JAYCEENP   acetaminophen 975 mg Oral Q6H PRN Jose J Daen, SHERRIE   aluminum-magnesium hydroxide-simethicone 30 mL Oral Q4H PRN Jose J Dean, SHERRIE   benztropine 1 mg Intramuscular Q8H PRN Jose J Dean, SHERRIE   benztropine 0 5 mg Oral Q6H PRN SHERRIE Leavitt   clonazePAM 1 mg Oral HS Dmitriy Doherty, MD   DULoxetine 60 mg Oral Daily Jose J Dean, SHERRIE   gabapentin 100 mg Oral HS SHERRIE Leavitt   haloperidol 5 mg Oral Q8H PRN SHERRIE Leavitt   hydrOXYzine HCL 25 mg Oral Q4H PRN Jose J Dean, SHERRIE   LORazepam 0 5 mg Oral Q4H PRN Jose J Dean, SHERRIE   magnesium hydroxide 30 mL Oral Daily PRN Jose J Dean, SHERRIE   melatonin 6 mg Oral HS Dmitriy Doherty, MD   mirtazapine 30 mg Oral HS SHERRIE Leavitt   OLANZapine 2 5 mg Intramuscular Q8H PRN Jose J Dean, SHERRIE   QUEtiapine 200 mg Oral HS Dmitriy Conception, MD   risperiDONE 0 5 mg Oral Q8H PRN SHERRIE Leavitt   traZODone 25 mg Oral HS PRN SHERRIE Leavtit

## 2018-09-12 NOTE — PROGRESS NOTES
Pt is calm  No agitation noted  He remained seclusive to his room and slept most of this shift  He would not verbalize any thoughts or feelings when asked  Will continue to monitor

## 2018-09-12 NOTE — PLAN OF CARE
Problem: Ineffective Coping  Goal: Participates in unit activities  Interventions:  - Provide therapeutic environment   - Provide required programming   - Redirect inappropriate behaviors    Outcome: Progressing  Pt was present for several groups today when brought to them in yina chair  He closed his eyes in structured groups and did not respond to direct cues to introduce self nor to engage in seated arm exercises  He expressed feeling "too full to eat," when morning meal was presented to him yet did try some ensure

## 2018-09-12 NOTE — PLAN OF CARE
Risk for Self Injury/Neglect     Recognize maladaptive responses and adopt new coping mechanisms Not Progressing        Patient has not demonstrated or stated any new coping mechanisms  Alteration in Thoughts and Perception     Treatment Goal: Gain control of psychotic behaviors/thinking, reduce/eliminate presenting symptoms and demonstrate improved reality functioning upon discharge Progressing     Verbalize thoughts and feelings Progressing     Refrain from acting on delusional thinking/internal stimuli Progressing     Agree to be compliant with medication regime, as prescribed and report medication side effects Progressing     Attend and participate in unit activities, including therapeutic, recreational, and educational groups Progressing     Recognize dysfunctional thoughts, communicate reality-based thoughts at the time of discharge Progressing     Complete daily ADLs, including personal hygiene independently, as able Progressing        Anxiety     Anxiety is at manageable level Progressing        Depression     Treatment Goal: Demonstrate behavioral control of depressive symptoms, verbalize feelings of improved mood/affect, and adopt new coping skills prior to discharge Progressing     Verbalize thoughts and feelings Progressing     Refrain from harming self Progressing     Refrain from isolation Progressing     Refrain from self-neglect Progressing     Attend and participate in unit activities, including therapeutic, recreational, and educational groups Progressing     Complete daily ADLs, including personal hygiene independently, as able Progressing        Ineffective Coping     Participates in unit activities Progressing     Participates in unit activities Progressing        Prexisting or High Potential for Compromised Skin Integrity     Skin integrity is maintained or improved Progressing        Risk for Self Injury/Neglect     Treatment Goal: Remain safe during length of stay, learn and adopt new coping skills, and be free of self-injurious ideation, impulses and acts at the time of discharge Progressing     Verbalize thoughts and feelings Progressing     Refrain from harming self Progressing     Attend and participate in unit activities, including therapeutic, recreational, and educational groups Progressing     Complete daily ADLs, including personal hygiene independently, as able Progressing

## 2018-09-13 PROBLEM — E86.0 DEHYDRATION: Status: ACTIVE | Noted: 2018-09-13

## 2018-09-13 LAB
ALBUMIN SERPL BCP-MCNC: 3.3 G/DL (ref 3.5–5)
ALP SERPL-CCNC: 70 U/L (ref 46–116)
ALT SERPL W P-5'-P-CCNC: 16 U/L (ref 12–78)
ANION GAP SERPL CALCULATED.3IONS-SCNC: 3 MMOL/L (ref 4–13)
AST SERPL W P-5'-P-CCNC: 11 U/L (ref 5–45)
BILIRUB SERPL-MCNC: 0.51 MG/DL (ref 0.2–1)
BUN SERPL-MCNC: 21 MG/DL (ref 5–25)
CALCIUM SERPL-MCNC: 8.7 MG/DL (ref 8.3–10.1)
CHLORIDE SERPL-SCNC: 100 MMOL/L (ref 100–108)
CO2 SERPL-SCNC: 34 MMOL/L (ref 21–32)
CREAT SERPL-MCNC: 2.16 MG/DL (ref 0.6–1.3)
GFR SERPL CREATININE-BSD FRML MDRD: 30 ML/MIN/1.73SQ M
GLUCOSE SERPL-MCNC: 92 MG/DL (ref 65–140)
POTASSIUM SERPL-SCNC: 4 MMOL/L (ref 3.5–5.3)
PROT SERPL-MCNC: 6.6 G/DL (ref 6.4–8.2)
SODIUM SERPL-SCNC: 137 MMOL/L (ref 136–145)

## 2018-09-13 PROCEDURE — 80053 COMPREHEN METABOLIC PANEL: CPT | Performed by: NURSE PRACTITIONER

## 2018-09-13 PROCEDURE — 99221 1ST HOSP IP/OBS SF/LOW 40: CPT | Performed by: PHYSICIAN ASSISTANT

## 2018-09-13 PROCEDURE — 99232 SBSQ HOSP IP/OBS MODERATE 35: CPT | Performed by: PSYCHIATRY & NEUROLOGY

## 2018-09-13 RX ORDER — SODIUM CHLORIDE 9 MG/ML
75 INJECTION, SOLUTION INTRAVENOUS CONTINUOUS
Status: DISPENSED | OUTPATIENT
Start: 2018-09-13 | End: 2018-09-14

## 2018-09-13 RX ADMIN — GABAPENTIN 100 MG: 100 CAPSULE ORAL at 21:23

## 2018-09-13 RX ADMIN — MIRTAZAPINE 30 MG: 30 TABLET, FILM COATED ORAL at 21:23

## 2018-09-13 RX ADMIN — QUETIAPINE FUMARATE 200 MG: 100 TABLET ORAL at 21:23

## 2018-09-13 RX ADMIN — TRAZODONE HYDROCHLORIDE 25 MG: 50 TABLET ORAL at 23:12

## 2018-09-13 RX ADMIN — CLONAZEPAM 1 MG: 1 TABLET ORAL at 21:23

## 2018-09-13 RX ADMIN — SODIUM CHLORIDE 75 ML/HR: 0.9 INJECTION, SOLUTION INTRAVENOUS at 21:22

## 2018-09-13 RX ADMIN — DULOXETINE HYDROCHLORIDE 60 MG: 60 CAPSULE, DELAYED RELEASE ORAL at 08:17

## 2018-09-13 RX ADMIN — MELATONIN 6 MG: at 21:23

## 2018-09-13 RX ADMIN — HYDROXYZINE HYDROCHLORIDE 25 MG: 25 TABLET ORAL at 23:12

## 2018-09-13 NOTE — PROGRESS NOTES
Pt was cooperative with care, but had an irritable edge  Pt was medication compliant  Pt was out of bed, and out in the dayroom, but did not interact with her peers  Pt was very scant in conversation  Pt reported depression, but denied remaining symptoms  Will continue to monitor

## 2018-09-13 NOTE — ASSESSMENT & PLAN NOTE
· It appears he was on lisinopril in the past, however this is not on his current medication list  · His blood pressures have been well controlled at present without intervention - continue monitoring

## 2018-09-13 NOTE — PROGRESS NOTES
Pt seclusive to room all shift  Refused to come out for evening meal or group despite much encouragement  Refused evening meal  States he is not hungry  Offered nutrition drink and pt stated,"I have had enough of them I don't want it " Fluids offered and accepted  Denies SI  Will continue to monitor
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
## 2018-09-13 NOTE — ASSESSMENT & PLAN NOTE
· Cr seems to range from 1 6 - 2 3  · Cr 2 16 today; up from 1 84 four days ago  Patient has been refusing his meals/Ensure, with minimal PO intake     · Will start gentle IVF overnight and repeat BMP in AM  · Avoid nephrotoxins

## 2018-09-13 NOTE — PLAN OF CARE
Problem: Ineffective Coping  Goal: Participates in unit activities  Interventions:  - Provide therapeutic environment   - Provide required programming   - Redirect inappropriate behaviors    Outcome: Not Progressing  Pt  quiet while in sessions when brought to them yet no eye contact nor response other than his disinterest in groups

## 2018-09-13 NOTE — PROGRESS NOTES
Progress Note - [address] [facility] [address]  72 y o  male MRN: [id number]  Unit/Bed#: FM3 565-01 Encounter: [id number]    The patient was seen for continuing care and reviewed with treatment team   Staff reports that the patient continues to be seclusive  He continues to refuse his meals and Ensure, but will drink some fluids with much encouragement  Yesterday he told his nurse that this is the longest bout of depression he has ever experienced since his TBI  He is also having some anxiety  He has some passive death wishes but no suicidal ideation  Yesterday he was firmly assisted out of his bed and walked a short time with staff before having to sit down  We will order a PT consult due to his deconditioned state  The patient refused to be interviewed this morning  He would not answer any questions at all or even acknowledge that I was attempting to speak with him  At this point we are looking to pursue ECT for the patient as he is not responding to medications and has extremely poor p o  Intake  Spoke with the patient's sister-in-law Jose Ely and explained our current treatment plan and recommendation for ECT  She said she will discuss with Margi Loo but they are open to anything that will get him better  She believes that the patient had ECT in the past in [place] many years ago but she is not 100% sure  I explained to her that we have an ECT video that we encourage families and patients to watch prior to treatment and she expressed interest in watching the video when she comes to visit tomorrow  This afternoon I also spoke with the patient about ECT and he did actually respond to me during this conversation  He said he does not think it is going to work because he had it years ago and it did not work  Then he closed his eyes and would not talk anymore      Mental Status Evaluation:  Appearance:  Poor eye contact and disheveled   Behavior:  Dismissive and Uncooperative   Mood: irritable, anxious and depressed   Affect: mood-congruent   Speech: Mute/refuses to talk   Thought Process:  Unable to assess due to scant verbalization   Thought Content:  Cannot be assessed due to patient factors   Perceptual Disturbances: Cannot be assessed due to patient factors   Risk Potential: Hopeless with death wishes   Attention/Concentration Inattentive   Orientation:   Unable to assess   Gait/Station: Not observed   Motor Activity: No abnormal movement noted     Progress Toward Goals:  No change    Assessment/Plan    Principal Problem:    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 ([facility] [address] )  Active Problems:    Hypothyroidism    Essential hypertension    Seizure disorder (HCC)    CKD (chronic kidney disease) stage 3, GFR 30-59 ml/min    Severe episode of recurrent major depressive disorder, without psychotic features ([facility])    ESBL (extended spectrum beta-lactamase) producing bacteria infection      Recommended Treatment:      Consult Internal Medicine for possible IV hydration as patient's creatinine continues to rise  Continue Cymbalta 60 mg daily  Continue Seroquel 200 mg HS  Continue clonazepam 1 mg HS  Continue Remeron 30 mg HS  Continue melatonin 6 mg HS  Continue gabapentin 100 mg HS  Continue with pharmacotherapy, group therapy, milieu therapy and occupational therapy    The patient will be maintained on the following medications:    Current Facility-Administered Medications:  acetaminophen 650 mg Oral Q6H PRN Caretha Rosemary, CRNP   acetaminophen 650 mg Oral Q4H PRN Caretha Rosemary, CRNP   acetaminophen 975 mg Oral Q6H PRN Caretha Rosemary, CRNP   aluminum-magnesium hydroxide-simethicone 30 mL Oral Q4H PRN Caretha Rosemary, CRNP   benztropine 1 mg Intramuscular Q8H PRN Caretha Rosemary, CRNP   benztropine 0 5 mg Oral Q6H PRN Caretha Rosemary, CRNP   clonazePAM 1 mg Oral HS Zeeshan An MD   DULoxetine 60 mg Oral Daily Caretha Rosemary, CRNP   gabapentin 100 mg Oral HS Tusharine Tavon, CRNP   haloperidol 5 mg Oral Q8H PRN Tusharine Clauve, CRNP   hydrOXYzine HCL 25 mg Oral Q4H PRN Tusharine Clauve, CRNP   LORazepam 0 5 mg Oral Q4H PRN Sarojldine Clauve, CRNP   magnesium hydroxide 30 mL Oral Daily PRN Tusharine Clauve, CRNP   melatonin 6 mg Oral HS Samantha Medina MD   mirtazapine 30 mg Oral HS Osito Montes De Oca, CRNP   OLANZapine 2 5 mg Intramuscular Q8H PRN Tusharine Clauve, CRNP   QUEtiapine 200 mg Oral HS Samantha Medina MD   risperiDONE 0 5 mg Oral Q8H PRN Tusharine Tavon, CRNP   traMADol 50 mg Oral Q6H PRN Samantha Medina MD   traZODone 25 mg Oral HS PRN Osito Montes De Oca, CRNP       Risks, benefits and possible side effects of Medications:   Patient does not verbalize understanding at this time and will require further explanation

## 2018-09-13 NOTE — PLAN OF CARE
Alteration in Thoughts and Perception     Verbalize thoughts and feelings Not Progressing     Attend and participate in unit activities, including therapeutic, recreational, and educational groups Not Progressing     Recognize dysfunctional thoughts, communicate reality-based thoughts at the time of discharge Not Progressing     Complete daily ADLs, including personal hygiene independently, as able Not Progressing        Depression     Treatment Goal: Demonstrate behavioral control of depressive symptoms, verbalize feelings of improved mood/affect, and adopt new coping skills prior to discharge Not Progressing     Verbalize thoughts and feelings Not Progressing     Refrain from isolation Not Progressing     Refrain from self-neglect Not Progressing     Attend and participate in unit activities, including therapeutic, recreational, and educational groups Not Progressing     Complete daily ADLs, including personal hygiene independently, as able Not Progressing        Ineffective Coping     Identifies ineffective coping skills Not Progressing     Identifies healthy coping skills Not Progressing     Demonstrates healthy coping skills Not Progressing     Patient/Family verbalizes awareness of resources Not Progressing        Risk for Self Injury/Neglect     Treatment Goal: Remain safe during length of stay, learn and adopt new coping skills, and be free of self-injurious ideation, impulses and acts at the time of discharge Not Progressing     Attend and participate in unit activities, including therapeutic, recreational, and educational groups Not Progressing     Recognize maladaptive responses and adopt new coping mechanisms Not Progressing     Complete daily ADLs, including personal hygiene independently, as able Not Progressing          Alteration in Thoughts and Perception     Treatment Goal: Gain control of psychotic behaviors/thinking, reduce/eliminate presenting symptoms and demonstrate improved reality functioning upon discharge Progressing     Refrain from acting on delusional thinking/internal stimuli Progressing     Agree to be compliant with medication regime, as prescribed and report medication side effects Progressing        Anxiety     Anxiety is at manageable level Progressing        Depression     Refrain from harming self Progressing        Ineffective Coping     Cooperates with admission process Progressing     Patient/Family participate in treatment and DC plans Progressing     Understands least restrictive measures Progressing     Free from restraint events Progressing        Prexisting or High Potential for Compromised Skin Integrity     Skin integrity is maintained or improved Progressing        Risk for Self Injury/Neglect     Verbalize thoughts and feelings Progressing     Refrain from harming self Progressing

## 2018-09-13 NOTE — ASSESSMENT & PLAN NOTE
· Patient with decreased PO intake, likely in setting of major depression - mgmt per primary   · Cr 1 8 --> 2 16, baseline Cr appears to be 1 6-2 3, however slight increase from four days ago could be attributed to minimal PO intake and dehydration  · Will give gentle IVF overnight and re-assess BMP

## 2018-09-13 NOTE — ASSESSMENT & PLAN NOTE
· Per primary - psych  · Major depression associated with decreased PO intake resulting in dehydration  · Will start gentle IVF and assess labs in AM - d/w patient and patient's nurse

## 2018-09-13 NOTE — PROGRESS NOTES
Pt became much more cheerful this afternoon  Pt smiled at this writer, and requested a walker to assist with ambulation  Pt used walker to ambulate to the dayroom without encouragement  Pt was social with this writer and thanked this writer for "cheering him up"  Will continue to monitor

## 2018-09-13 NOTE — ASSESSMENT & PLAN NOTE
· Patient takes levothyroxine 75 mcg outpatient  · This was not ordered on admission to behavioral health unit      · Will obtain TSH with reflex to free T4

## 2018-09-14 LAB
ANION GAP SERPL CALCULATED.3IONS-SCNC: 11 MMOL/L (ref 4–13)
BASOPHILS # BLD AUTO: 0.04 THOUSANDS/ΜL (ref 0–0.1)
BASOPHILS NFR BLD AUTO: 1 % (ref 0–1)
BUN SERPL-MCNC: 23 MG/DL (ref 5–25)
CALCIUM SERPL-MCNC: 8.4 MG/DL (ref 8.3–10.1)
CHLORIDE SERPL-SCNC: 104 MMOL/L (ref 100–108)
CO2 SERPL-SCNC: 26 MMOL/L (ref 21–32)
CREAT SERPL-MCNC: 1.93 MG/DL (ref 0.6–1.3)
EOSINOPHIL # BLD AUTO: 0.15 THOUSAND/ΜL (ref 0–0.61)
EOSINOPHIL NFR BLD AUTO: 3 % (ref 0–6)
ERYTHROCYTE [DISTWIDTH] IN BLOOD BY AUTOMATED COUNT: 13.7 % (ref 11.6–15.1)
GFR SERPL CREATININE-BSD FRML MDRD: 34 ML/MIN/1.73SQ M
GLUCOSE SERPL-MCNC: 86 MG/DL (ref 65–140)
HCT VFR BLD AUTO: 42.4 % (ref 36.5–49.3)
HGB BLD-MCNC: 13.8 G/DL (ref 12–17)
IMM GRANULOCYTES # BLD AUTO: 0.01 THOUSAND/UL (ref 0–0.2)
IMM GRANULOCYTES NFR BLD AUTO: 0 % (ref 0–2)
LYMPHOCYTES # BLD AUTO: 1.6 THOUSANDS/ΜL (ref 0.6–4.47)
LYMPHOCYTES NFR BLD AUTO: 31 % (ref 14–44)
MCH RBC QN AUTO: 27.9 PG (ref 26.8–34.3)
MCHC RBC AUTO-ENTMCNC: 32.5 G/DL (ref 31.4–37.4)
MCV RBC AUTO: 86 FL (ref 82–98)
MONOCYTES # BLD AUTO: 0.53 THOUSAND/ΜL (ref 0.17–1.22)
MONOCYTES NFR BLD AUTO: 10 % (ref 4–12)
NEUTROPHILS # BLD AUTO: 2.85 THOUSANDS/ΜL (ref 1.85–7.62)
NEUTS SEG NFR BLD AUTO: 55 % (ref 43–75)
NRBC BLD AUTO-RTO: 0 /100 WBCS
PLATELET # BLD AUTO: 154 THOUSANDS/UL (ref 149–390)
PMV BLD AUTO: 11.1 FL (ref 8.9–12.7)
POTASSIUM SERPL-SCNC: 3.4 MMOL/L (ref 3.5–5.3)
RBC # BLD AUTO: 4.94 MILLION/UL (ref 3.88–5.62)
SODIUM SERPL-SCNC: 141 MMOL/L (ref 136–145)
TSH SERPL DL<=0.05 MIU/L-ACNC: 2.06 UIU/ML (ref 0.36–3.74)
WBC # BLD AUTO: 5.18 THOUSAND/UL (ref 4.31–10.16)

## 2018-09-14 PROCEDURE — 99232 SBSQ HOSP IP/OBS MODERATE 35: CPT | Performed by: PHYSICIAN ASSISTANT

## 2018-09-14 PROCEDURE — 99232 SBSQ HOSP IP/OBS MODERATE 35: CPT | Performed by: PSYCHIATRY & NEUROLOGY

## 2018-09-14 PROCEDURE — 85025 COMPLETE CBC W/AUTO DIFF WBC: CPT | Performed by: PHYSICIAN ASSISTANT

## 2018-09-14 PROCEDURE — 84443 ASSAY THYROID STIM HORMONE: CPT | Performed by: PHYSICIAN ASSISTANT

## 2018-09-14 PROCEDURE — 80048 BASIC METABOLIC PNL TOTAL CA: CPT | Performed by: PHYSICIAN ASSISTANT

## 2018-09-14 RX ORDER — QUETIAPINE FUMARATE 300 MG/1
300 TABLET, FILM COATED ORAL
Status: DISCONTINUED | OUTPATIENT
Start: 2018-09-14 | End: 2018-09-24 | Stop reason: HOSPADM

## 2018-09-14 RX ORDER — SODIUM CHLORIDE 9 MG/ML
75 INJECTION, SOLUTION INTRAVENOUS CONTINUOUS
Status: DISCONTINUED | OUTPATIENT
Start: 2018-09-14 | End: 2018-09-15

## 2018-09-14 RX ORDER — POTASSIUM CHLORIDE 20 MEQ/1
40 TABLET, EXTENDED RELEASE ORAL ONCE
Status: COMPLETED | OUTPATIENT
Start: 2018-09-14 | End: 2018-09-14

## 2018-09-14 RX ADMIN — CLONAZEPAM 1 MG: 1 TABLET ORAL at 21:31

## 2018-09-14 RX ADMIN — MIRTAZAPINE 45 MG: 30 TABLET, FILM COATED ORAL at 21:31

## 2018-09-14 RX ADMIN — MELATONIN 6 MG: at 21:31

## 2018-09-14 RX ADMIN — DULOXETINE HYDROCHLORIDE 60 MG: 60 CAPSULE, DELAYED RELEASE ORAL at 08:57

## 2018-09-14 RX ADMIN — SODIUM CHLORIDE 75 ML/HR: 0.9 INJECTION, SOLUTION INTRAVENOUS at 19:56

## 2018-09-14 RX ADMIN — POTASSIUM CHLORIDE 40 MEQ: 1500 TABLET, EXTENDED RELEASE ORAL at 14:37

## 2018-09-14 RX ADMIN — QUETIAPINE FUMARATE 300 MG: 300 TABLET ORAL at 21:31

## 2018-09-14 NOTE — PROGRESS NOTES
Pt is calm and pleasant  Pt depressed and anxious, states he "feels the same "  Pt denies HI/SI hallucinations or delusions  Pt is brighter today, expressed "thanks" when answering call bell  Pt is reclusive to room, doing puzzles and eating at the desk  Pt is medication compliant

## 2018-09-14 NOTE — ASSESSMENT & PLAN NOTE
· Patient was having decreased PO intake, likely in setting of major depression   · Creatine increased from 1 8 to 2 16  (baseline Cr appears to be 1 6-2 3)  This bump was felt to be 2/2 decreased PO intake/dehydration  IVF were initiated and creatinine improved to 1 93  · Patient with increased PO intake last night into today   Stressed importance of accurate meal charting with the patient's nurse  · Continue IVF overnight and repeat BMP in AM  If stable PO intake and stable creatinine, suspect can discontinue IVF tomorrow

## 2018-09-14 NOTE — ASSESSMENT & PLAN NOTE
· Cr seems to range from 1 6 - 2 3  · Improved to 1 9 today with IVF   Plan as per above  · Avoid nephrotoxins

## 2018-09-14 NOTE — PROGRESS NOTES
Pt states he is tired this evening and won't come out of his room  Otherwise calm and cooperative  Denies any needs  Reports depression, denies SI/HI/AH/VH  Currently waiting for IV placement to start NACL 0 9% per order  Paged x2

## 2018-09-14 NOTE — ASSESSMENT & PLAN NOTE
· Patient has levothyroxine 75 mcg listed in med-rec  This was not ordered on admission to psych unit - Unclear why  · Psych told me that would clarify whether patient was actually taking this medication as outpatient   · If found that patient was no longer on this medication, OK to hold given normal TSH and recommend repeat TFTs in 4-6 weeks     · If found that patient WAS taking levothyroxine, would continue at his outpatient dose given normal TSH and recommend repeat TFTs in 4-6 weeks  · TSH WNL

## 2018-09-14 NOTE — PROGRESS NOTES
Patient has been sleeping most of the morning  Patient states he is "tired" and has "some" depression  Patient is irritable and scant  Patient is cooperative with care and medication  Patient has been eating today but remained in his room

## 2018-09-14 NOTE — PROGRESS NOTES
Progress Note - [address] [facility] [address]  72 y o  male MRN: [id number]  Unit/Bed#: IV5 565-01 Encounter: [id number]    The patient was seen for continuing care and reviewed with treatment team  Internal Medicine has him on fluids for his dehydration and elevated creatinine  Yesterday the patient took a shower with staff assistance  He was less depressed appearing in the afternoon and even requested a walker to assist with ambulation  This morning I went in to interview the patient and he was sitting up in bed eating breakfast   He is looking much more alert  He said he is feeling better today with the IV hydration and has a little bit more of an appetite this morning  He is feeling less depressed  No suicidal thoughts  He told me that he was thinking about our discussion yesterday about ECT and he feels like he might not need it now  He said he thinks the medicine is starting to work  He told me that he only had 3 treatments years ago in [place] and he felt great after 1 treatment but then it stopped working  I explained to him that he probably did not have enough treatments  I also explained to him that if he continues to eat and feel better that we might not need to proceed with ECT but that it is an option if he does not continue to show improvement        Mental Status Evaluation:  Appearance:  Good eye contact and disheveled   Behavior:  calm, cooperative and friendly   Mood:  depressed   Affect: appropriate   Speech: Normal rate and Normal volume   Thought Process:  Goal directed and coherent   Thought Content:  Does not verbalize delusional material   Perceptual Disturbances: Denies hallucinations and does not appear to be responding to internal stimuli   Risk Potential: No suicidal or homicidal ideation   Attention/Concentration [place] than expected   Orientation:   Oriented x3   Gait/Station: Not observed   Motor Activity: No abnormal movement noted     Progress Toward Goals: Slowly improving    Assessment/Plan    Principal Problem:    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 [facility])  Active Problems:    Hypothyroidism    Essential hypertension    Seizure disorder (HCC)    CKD (chronic kidney disease) stage 3, GFR 30-59 ml/min    Severe episode of recurrent major depressive disorder, without psychotic features (HCC)    ESBL (extended spectrum beta-lactamase) producing bacteria infection    Dehydration      Recommended Treatment:      Increase Seroquel to 300 mg HS  Continue Cymbalta 60 mg daily  Increase mirtazapine to 45 mg HS  Discontinue gabapentin 100 mg HS  Continue Klonopin 1 mg HS  Continue melatonin 6 mg HS  Continue with pharmacotherapy, group therapy, milieu therapy and occupational therapy    The patient will be maintained on the following medications:    Current Facility-Administered Medications:  acetaminophen 650 mg Oral Q6H PRN Margene Lemme, CRNP    acetaminophen 650 mg Oral Q4H PRN Margene Lemme, CRNP    acetaminophen 975 mg Oral Q6H PRN Margene Lemme, CRNP    aluminum-magnesium hydroxide-simethicone 30 mL Oral Q4H PRN Margene Lemme, CRNP    benztropine 1 mg Intramuscular Q8H PRN Margene Lemme, CRNP    benztropine 0 5 mg Oral Q6H PRN Margene Lemme, CRNP    clonazePAM 1 mg Oral HS Haylie Griffin MD    DULoxetine 60 mg Oral Daily Margene Lemme, CRNP    gabapentin 100 mg Oral HS Margene Lemme, CRNP    haloperidol 5 mg Oral Q8H PRN Margene Lemme, CRNP    hydrOXYzine HCL 25 mg Oral Q4H PRN Margene Lemme, CRNP    LORazepam 0 5 mg Oral Q4H PRN Margene Lemme, CRNP    magnesium hydroxide 30 mL Oral Daily PRN Margene Lemme, CRNP    melatonin 6 mg Oral HS Haylie Griffin MD    mirtazapine 30 mg Oral HS Margene Lemme, CRNP    OLANZapine 2 5 mg Intramuscular Q8H PRN Margene Lemme, CRNP    QUEtiapine 200 mg Oral HS Haylie Griffin MD    risperiDONE 0 5 mg Oral Q8H PRN Margene Lemme, CRNP    sodium chloride 75 mL/hr Intravenous Continuous Tu Gross PA-C Last Rate: 75 mL/hr (09/13/18 2122)   traMADol 50 mg Oral Q6H PRN Indiana Alex MD    traZODone 25 mg Oral HS PRN SHERRIE Concepcion        Risks, benefits and possible side effects of Medications:   Risks, benefits, and possible side effects of medications explained to patient and patient verbalizes understanding

## 2018-09-14 NOTE — PLAN OF CARE
Problem: Ineffective Coping  Goal: Participates in unit activities  Interventions:  - Provide therapeutic environment   - Provide required programming   - Redirect inappropriate behaviors    Outcome: Progressing  Pt presented with a brighter affect during bedside 1-1 conversation with staff  He reports feeling a little better and was drinking ensure to supplement his appetite  Pt remained in bed on IV   No group participation today

## 2018-09-14 NOTE — PLAN OF CARE
Depression     Treatment Goal: Demonstrate behavioral control of depressive symptoms, verbalize feelings of improved mood/affect, and adopt new coping skills prior to discharge Not Progressing     Verbalize thoughts and feelings Not Progressing     Refrain from isolation Not Progressing     Complete daily ADLs, including personal hygiene independently, as able Not Progressing        Ineffective Coping     Identifies ineffective coping skills Not Progressing     Identifies healthy coping skills Not Progressing     Demonstrates healthy coping skills Not Progressing     Patient/Family verbalizes awareness of resources Not Progressing        Risk for Self Injury/Neglect     Recognize maladaptive responses and adopt new coping mechanisms Not Progressing     Complete daily ADLs, including personal hygiene independently, as able Not Progressing        Patient is withdrawn to his bed  Limited participation in care    Alteration in Thoughts and Perception     Treatment Goal: Gain control of psychotic behaviors/thinking, reduce/eliminate presenting symptoms and demonstrate improved reality functioning upon discharge Progressing     Verbalize thoughts and feelings Progressing     Refrain from acting on delusional thinking/internal stimuli Progressing     Agree to be compliant with medication regime, as prescribed and report medication side effects Progressing     Recognize dysfunctional thoughts, communicate reality-based thoughts at the time of discharge Progressing     Complete daily ADLs, including personal hygiene independently, as able Progressing        Anxiety     Anxiety is at manageable level Progressing        Depression     Refrain from harming self Progressing     Refrain from self-neglect Progressing        Ineffective Coping     Cooperates with admission process Progressing     Patient/Family participate in treatment and DC plans Progressing     Understands least restrictive measures Progressing     Free from restraint events Progressing        Prexisting or High Potential for Compromised Skin Integrity     Skin integrity is maintained or improved Progressing        Risk for Self Injury/Neglect     Treatment Goal: Remain safe during length of stay, learn and adopt new coping skills, and be free of self-injurious ideation, impulses and acts at the time of discharge Progressing     Verbalize thoughts and feelings Progressing     Refrain from harming self Progressing

## 2018-09-14 NOTE — PROGRESS NOTES
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 [facility])   Assessment & Plan    · Management per primary team, psychiatry        Severe episode of recurrent major depressive disorder, without psychotic features ([facility] [address] )   Assessment & Plan    · Per primary, psychiatry  · Less depressed today per primary team        Dehydration   Assessment & Plan    · Patient was having decreased PO intake, likely in setting of major depression   · Creatine increased from 1 8 to 2 16  (baseline Cr appears to be 1 6-2 3)  This bump was felt to be 2/2 decreased PO intake/dehydration  IVF were initiated and creatinine improved to 1 93  · Patient with increased PO intake last night into today  Stressed importance of accurate meal charting with the patient's nurse  · Continue IVF overnight and repeat BMP in AM  If stable PO intake and stable creatinine, suspect can discontinue IVF tomorrow        CKD (chronic kidney disease) stage 3, GFR 30-59 ml/min   Assessment & Plan    · Cr seems to range from 1 6 - 2 3  · Improved to 1 9 today with IVF  Plan as per above  · Avoid nephrotoxins        Hypothyroidism   Assessment & Plan    · Patient has levothyroxine 75 mcg listed in med-rec  This was not ordered on admission to psych unit - Unclear why  · Psych told me that would clarify whether patient was actually taking this medication as outpatient   · If found that patient was no longer on this medication, OK to hold given normal TSH and recommend repeat TFTs in 4-6 weeks     · If found that patient WAS taking levothyroxine, would continue at his outpatient dose given normal TSH and recommend repeat TFTs in 4-6 weeks  · ADDENDUM: Received follow up message from Psych AP - Patient uses [address], which is not the pharmacy listed in his Clinical Information tab  Per the [facility], patient hasn't been dispensed levothyroxine since 2017  Further search shows that the medication list at discharge from 2018 admission does not have levothyroxine listed  Thus, it appears that the patient was NOT taking levothyroxine as an outpatient and in the presence of a normal TSH would not place on this medication at this time  Repeat TFTs as outpatient  · TSH WNL          VTE Pharmacologic Prophylaxis:   Pharmacologic: none per primary, psych  Mechanical VTE Prophylaxis in Place: No    Patient Centered Rounds: I have performed bedside rounds with nursing staff today  Discussions with Specialists or Other Care Team Provider: Psych AP    Education and Discussions with Family / Patient: Patient    Time Spent for Care: 30 minutes  More than 50% of total time spent on counseling and coordination of care as described above  Current Length of Stay: 17 day(s)    Current Patient Status: Inpatient Psych   Certification Statement: per primary, psych    Discharge Plan: Will repeat BMP in AM, continue IVF for now  Psych AP informed me that they will look into outpatient levothyroxine    Code Status: Level 1 - Full Code      Subjective:   Mr Paris Kirkland states that he had been feeling full all the time and that if he ate he was going to [facility] gesture as if he were vomiting]  He states that he ate dinner last night and breakfast today and he ordered lunch  Objective:     Vitals:   Temp (24hrs), Av 8 °F (36 6 °C), Min:97 8 °F (36 6 °C), Max:97 8 °F (36 6 °C)    HR:  [65] 65  Resp:  [18] 18  BP: (129)/(76) 129/76  SpO2:  [97 %] 97 %  Body mass index is 30 78 kg/m²  Input and Output Summary (last 24 hours):        Intake/Output Summary (Last 24 hours) at 18 1426  Last data filed at 18 2122   Gross per 24 hour   Intake             1000 ml   Output                0 ml   Net             1000 ml       Physical Exam: Physical Exam   Constitutional: He is oriented to person, place, and time  No distress  Patient seen lying in bed, initially on his right-hand side sleeping and groans when awoken but becomes more interactive during our conversation and by the end is pleasant and cooperative and thanks me for coming to see him   Cardiovascular: Normal rate and regular rhythm  Pulmonary/Chest: Effort normal and breath sounds normal  No respiratory distress  He has no wheezes  Abdominal: Soft  Bowel sounds are normal  There is no tenderness  Musculoskeletal: He exhibits no edema  Neurological: He is alert and oriented to person, place, and time  Skin: Skin is warm  Psychiatric:   States that his mood is 75% better  Becomes more interactive throughout the duration of my visit   Vitals reviewed  Additional Data:     Labs:      Results from last 7 days  Lab Units 09/14/18  0601   WBC Thousand/uL 5 18   HEMOGLOBIN g/dL 13 8   HEMATOCRIT % 42 4   PLATELETS Thousands/uL 154   NEUTROS PCT % 55   LYMPHS PCT % 31   MONOS PCT % 10   EOS PCT % 3       Results from last 7 days  Lab Units 09/14/18  0601 09/13/18  0651   SODIUM mmol/L 141 137   POTASSIUM mmol/L 3 4* 4 0   CHLORIDE mmol/L 104 100   CO2 mmol/L 26 34*   BUN mg/dL 23 21   CREATININE mg/dL 1 93* 2 16*   CALCIUM mg/dL 8 4 8 7   ALK PHOS U/L  --  70   ALT U/L  --  16   AST U/L  --  11                     * I Have Reviewed All Lab Data Listed Above  * Additional Pertinent Lab Tests Reviewed:  All Labs Within Last 24 Hours Reviewed    Imaging:    Imaging Reports Reviewed Today Include: None  Imaging Personally Reviewed by Myself Includes:  None    Recent Cultures (last 7 days):           Last 24 Hours Medication List:     Current Facility-Administered Medications:  acetaminophen 650 mg Oral Q6H PRN Cathye Loupe, CRNP   acetaminophen 650 mg Oral Q4H PRN Cathye Loupe, CRNP   acetaminophen 975 mg Oral Q6H PRN Cathye Loupe, CRNP   aluminum-magnesium hydroxide-simethicone 30 mL Oral Q4H PRN Davon George, CRNP   benztropine 1 mg Intramuscular Q8H PRN Davon George, JAYCEENP   benztropine 0 5 mg Oral Q6H PRN Davon George, JAYCEENP   clonazePAM 1 mg Oral HS Rudy Almeida MD   DULoxetine 60 mg Oral Daily Davon George, CRNP   haloperidol 5 mg Oral Q8H PRN Davon George, JAYCEENP   hydrOXYzine HCL 25 mg Oral Q4H PRN Davon George, JAYCEENP   LORazepam 0 5 mg Oral Q4H PRN Davon George, CRNP   magnesium hydroxide 30 mL Oral Daily PRN Davon George, SHERRIE   melatonin 6 mg Oral HS Rudy Almeida MD   mirtazapine 45 mg Oral HS Rudy Almeida MD   OLANZapine 2 5 mg Intramuscular Q8H PRN Davon George, SHERRIE   potassium chloride 40 mEq Oral Once Mona Quan PA-C   QUEtiapine 300 mg Oral HS Rudy Almeida MD   risperiDONE 0 5 mg Oral Q8H PRN Davon George, SHERRIE   traMADol 50 mg Oral Q6H PRN Rudy Almeida MD   traZODone 25 mg Oral HS PRN Davon George, SHERRIE        Today, Patient Was Seen By: Mona Quan PA-C    ** Please Note: Dictation voice to text software may have been used in the creation of this document   **

## 2018-09-14 NOTE — PLAN OF CARE
Alteration in Thoughts and Perception     Attend and participate in unit activities, including therapeutic, recreational, and educational groups Not Progressing        Depression     Treatment Goal: Demonstrate behavioral control of depressive symptoms, verbalize feelings of improved mood/affect, and adopt new coping skills prior to discharge Not Progressing     Refrain from isolation Not Progressing     Attend and participate in unit activities, including therapeutic, recreational, and educational groups Not Progressing     Complete daily ADLs, including personal hygiene independently, as able Not Progressing        Ineffective Coping     Identifies ineffective coping skills Not Progressing     Identifies healthy coping skills Not Progressing     Demonstrates healthy coping skills Not Progressing     Patient/Family verbalizes awareness of resources Not Progressing        Risk for Self Injury/Neglect     Attend and participate in unit activities, including therapeutic, recreational, and educational groups Not Progressing     Recognize maladaptive responses and adopt new coping mechanisms Not Progressing     Complete daily ADLs, including personal hygiene independently, as able Not Progressing          Alteration in Thoughts and Perception     Treatment Goal: Gain control of psychotic behaviors/thinking, reduce/eliminate presenting symptoms and demonstrate improved reality functioning upon discharge Progressing     Verbalize thoughts and feelings Progressing     Refrain from acting on delusional thinking/internal stimuli Progressing     Agree to be compliant with medication regime, as prescribed and report medication side effects Progressing     Recognize dysfunctional thoughts, communicate reality-based thoughts at the time of discharge Progressing     Complete daily ADLs, including personal hygiene independently, as able Progressing        Anxiety     Anxiety is at manageable level Progressing Depression     Verbalize thoughts and feelings Progressing     Refrain from harming self Progressing     Refrain from self-neglect Progressing        Ineffective Coping     Cooperates with admission process Progressing     Patient/Family participate in treatment and DC plans Progressing     Understands least restrictive measures Progressing     Free from restraint events Progressing        Prexisting or High Potential for Compromised Skin Integrity     Skin integrity is maintained or improved Progressing        Risk for Self Injury/Neglect     Treatment Goal: Remain safe during length of stay, learn and adopt new coping skills, and be free of self-injurious ideation, impulses and acts at the time of discharge Progressing     Verbalize thoughts and feelings Progressing     Refrain from harming self Progressing

## 2018-09-14 NOTE — PHYSICAL THERAPY NOTE
Physical Therapy Cancellation Note    PT order received  Chart review performed  At this time, PT evaluation cancelled as patient declining 2 attempts to mobilize  PT will follow and evaluate as appropriate      Keisha Hernandez, PT

## 2018-09-14 NOTE — PROGRESS NOTES
Pt is upset that he cannot fall asleep, medicated with prn trazodone and atarax    Effective, pt slept rest of night

## 2018-09-15 LAB
ANION GAP SERPL CALCULATED.3IONS-SCNC: 8 MMOL/L (ref 4–13)
BUN SERPL-MCNC: 25 MG/DL (ref 5–25)
CALCIUM SERPL-MCNC: 8.3 MG/DL (ref 8.3–10.1)
CHLORIDE SERPL-SCNC: 105 MMOL/L (ref 100–108)
CO2 SERPL-SCNC: 28 MMOL/L (ref 21–32)
CREAT SERPL-MCNC: 1.92 MG/DL (ref 0.6–1.3)
GFR SERPL CREATININE-BSD FRML MDRD: 34 ML/MIN/1.73SQ M
GLUCOSE SERPL-MCNC: 85 MG/DL (ref 65–140)
POTASSIUM SERPL-SCNC: 4 MMOL/L (ref 3.5–5.3)
SODIUM SERPL-SCNC: 141 MMOL/L (ref 136–145)

## 2018-09-15 PROCEDURE — 99232 SBSQ HOSP IP/OBS MODERATE 35: CPT | Performed by: PSYCHIATRY & NEUROLOGY

## 2018-09-15 PROCEDURE — 99231 SBSQ HOSP IP/OBS SF/LOW 25: CPT | Performed by: PHYSICIAN ASSISTANT

## 2018-09-15 PROCEDURE — 80048 BASIC METABOLIC PNL TOTAL CA: CPT | Performed by: PHYSICIAN ASSISTANT

## 2018-09-15 RX ORDER — BENZTROPINE MESYLATE 1 MG/1
1 TABLET ORAL
Status: DISCONTINUED | OUTPATIENT
Start: 2018-09-15 | End: 2018-09-16

## 2018-09-15 RX ADMIN — BENZTROPINE MESYLATE 0.5 MG: 0.5 TABLET ORAL at 23:58

## 2018-09-15 RX ADMIN — DULOXETINE HYDROCHLORIDE 60 MG: 60 CAPSULE, DELAYED RELEASE ORAL at 08:35

## 2018-09-15 RX ADMIN — TRAZODONE HYDROCHLORIDE 25 MG: 50 TABLET ORAL at 23:17

## 2018-09-15 RX ADMIN — TRAZODONE HYDROCHLORIDE 25 MG: 50 TABLET ORAL at 02:11

## 2018-09-15 RX ADMIN — MELATONIN 6 MG: at 21:45

## 2018-09-15 RX ADMIN — CLONAZEPAM 1 MG: 1 TABLET ORAL at 21:45

## 2018-09-15 RX ADMIN — BENZTROPINE MESYLATE 1 MG: 1 TABLET ORAL at 21:45

## 2018-09-15 RX ADMIN — SODIUM CHLORIDE 75 ML/HR: 0.9 INJECTION, SOLUTION INTRAVENOUS at 01:52

## 2018-09-15 RX ADMIN — MIRTAZAPINE 45 MG: 30 TABLET, FILM COATED ORAL at 21:45

## 2018-09-15 RX ADMIN — QUETIAPINE FUMARATE 300 MG: 300 TABLET ORAL at 21:45

## 2018-09-15 NOTE — PROGRESS NOTES
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 [facility])   Assessment & Plan    · Management per primary team, psychiatry        Severe episode of recurrent major depressive disorder, without psychotic features ([facility] [address] )   Assessment & Plan    · Per primary, psychiatry        Dehydration   Assessment & Plan    · Patient was having decreased PO intake, likely in setting of major depression   · Creatine had increased from 1 8 to 2 16  (baseline Cr appears to be 1 6-2 3)  This bump was felt to be 2/2 decreased PO intake/dehydration  IVF were initiated and creatinine improved and has remained stable at 1 9  · I discussed with nursing, patient has been eating % of meals  He did not eat breakfast today but told his nurse that he just wanted to wait until later this morning and heat it up  Encouraged PO intake of food and fluids  Patient is also drinking Ensures  · Discontinue IVF  Repeat BMP in 3 days        CKD (chronic kidney disease) stage 3, GFR 30-59 ml/min   Assessment & Plan    · Cr seems to range from 1 6 - 2 3  · Stable at 1 9 today  Patient with increased PO intake  · D/C IVF  · BMP in 3 days  · Avoid nephrotoxins        Hypothyroidism   Assessment & Plan    · Patient has levothyroxine 75 mcg listed in med-rec, however I followed up with psychiatry AP yesterday who received information from the patient's [address] that the patient had not had levothyroxine dispensed since June 2017  Furthermore, med list from January 2018 discharge did not have levothyroxine listed  Therefore, would recommend levothyroxine be removed from the patient's home med rec  TSH WNL   Recommend outpatient TFTs in 4-6 weeks and follow up with PCP          VTE Pharmacologic Prophylaxis: Pharmacologic: none per primary, psych  Mechanical VTE Prophylaxis in Place: No    Patient Centered Rounds: I have performed bedside rounds with nursing staff today  Soto Vickers    Discussions with Specialists or Other Care Team Provider: Nurse    Education and Discussions with Family / Patient: Patient    Time Spent for Care: 20 minutes  More than 50% of total time spent on counseling and coordination of care as described above  Current Length of Stay: 18 day(s)    Current Patient Status: Inpatient Psych   Certification Statement: per primary, psych  Discharge Plan: D/C IVF  Encourage PO intake  Repeat BMP in 3 days  Repeat TFTs in 4-6 weeks as outpatient  Close outpatient follow up  RASHI will sign off  Code Status: Level 1 - Full Code      Subjective:   Mr Alexis Ramos denies any pain today  He told his nurse that he did not want breakfast earlier this AM but later after heated up  He denies CP, SOB, abdominal pain, or swelling in his legs  He reports that he has been drinking Ensure    Objective:     Vitals:   Temp (24hrs), Av 6 °F (36 4 °C), Min:97 4 °F (36 3 °C), Max:97 8 °F (36 6 °C)    HR:  [65-72] 72  Resp:  [18-19] 18  BP: (120-124)/(73-78) 120/73  SpO2:  [93 %] 93 %  Body mass index is 30 78 kg/m²  Input and Output Summary (last 24 hours): Intake/Output Summary (Last 24 hours) at 09/15/18 1017  Last data filed at 09/15/18 0152   Gross per 24 hour   Intake           1797 5 ml   Output                0 ml   Net           1797 5 ml       Physical Exam:     Physical Exam   Constitutional: He is oriented to person, place, and time  No distress  Patient seen lying in bed, pleasant and cooperative   Cardiovascular: Normal rate and regular rhythm  Pulmonary/Chest: Effort normal and breath sounds normal  No respiratory distress  He has no wheezes  Abdominal: Soft  Bowel sounds are normal  There is no tenderness  Musculoskeletal: He exhibits no edema     Neurological: He is alert and oriented to person, place, and time  Skin: Skin is warm  He is not diaphoretic  Psychiatric:   Patient initially not very interactive, only grunting and answering questions with 1 word answers  As the encounter went on, he became more cooperative, smiled and asked me to have a nice day as I was leaving   Vitals reviewed  Additional Data:     Labs:      Results from last 7 days  Lab Units 09/14/18  0601   WBC Thousand/uL 5 18   HEMOGLOBIN g/dL 13 8   HEMATOCRIT % 42 4   PLATELETS Thousands/uL 154   NEUTROS PCT % 55   LYMPHS PCT % 31   MONOS PCT % 10   EOS PCT % 3       Results from last 7 days  Lab Units 09/15/18  0632  09/13/18  0651   SODIUM mmol/L 141  < > 137   POTASSIUM mmol/L 4 0  < > 4 0   CHLORIDE mmol/L 105  < > 100   CO2 mmol/L 28  < > 34*   BUN mg/dL 25  < > 21   CREATININE mg/dL 1 92*  < > 2 16*   CALCIUM mg/dL 8 3  < > 8 7   ALK PHOS U/L  --   --  70   ALT U/L  --   --  16   AST U/L  --   --  11   < > = values in this interval not displayed  * I Have Reviewed All Lab Data Listed Above  * Additional Pertinent Lab Tests Reviewed:  All Labs Within Last 24 Hours Reviewed    Imaging:    Imaging Reports Reviewed Today Include: None  Imaging Personally Reviewed by Myself Includes:  None    Recent Cultures (last 7 days):           Last 24 Hours Medication List:     Current Facility-Administered Medications:  acetaminophen 650 mg Oral Q6H PRN Jordi Lopez, CRNP   acetaminophen 650 mg Oral Q4H PRN Jordi Lopez, CRNP   acetaminophen 975 mg Oral Q6H PRN Jordi Lopez, CRNP   aluminum-magnesium hydroxide-simethicone 30 mL Oral Q4H PRN Jordi Lopez, CRNP   benztropine 1 mg Intramuscular Q8H PRN Jordi Lopez, CRNP   benztropine 0 5 mg Oral Q6H PRN Jordi Lopez, CRNP   clonazePAM 1 mg Oral HS Nasim Felix MD   DULoxetine 60 mg Oral Daily Jordi Lopez, CRNP   haloperidol 5 mg Oral Q8H PRN Jordi Lopez, CRNP   hydrOXYzine HCL 25 mg Oral Q4H PRN Jordi Lopez, CRNP   LORazepam 0 5 mg Oral Q4H PRN Leo Donahue, SHERRIE   magnesium hydroxide 30 mL Oral Daily PRN Leo Donahue, SHERRIE   melatonin 6 mg Oral HS Sandra Garza MD   mirtazapine 45 mg Oral HS Sandra Garza MD   OLANZapine 2 5 mg Intramuscular Q8H PRN Leo Donahue, SHERRIE   QUEtiapine 300 mg Oral HS Sandra Garza MD   risperiDONE 0 5 mg Oral Q8H PRN Leo Donahue, SHERRIE   traMADol 50 mg Oral Q6H PRN Sandra Garza MD   traZODone 25 mg Oral HS PRN Leo Donahue, SHERRIE        Today, Patient Was Seen By: Samra Sood PA-C    ** Please Note: Dictation voice to text software may have been used in the creation of this document   **

## 2018-09-15 NOTE — PROGRESS NOTES
Patient is bright in affect this morning  Patient complains of restless legs at night which prevent him from sleeping, he attributes restless legs to receiving HS medications  Patient denies all other symptoms  Patient reports improvement in condition states he has not eaten breakfast or smiled in over six months and has been doing both lately  Patient refused to eat in the milieu  Patient is calm and cooperative

## 2018-09-15 NOTE — PROGRESS NOTES
Pt calm and pleasant  Pt feels "less depressed and anxious" today than previous days  Pt denies all other s/s  Pt is bright on approach and thankful to staff  Pt is reclusive to room, doing puzzles at his desk, not out for group  Pt is medication compliant

## 2018-09-15 NOTE — ASSESSMENT & PLAN NOTE
· Cr seems to range from 1 6 - 2 3  · Stable at 1 9 today   Patient with increased PO intake  · D/C IVF  · BMP in 3 days  · Avoid nephrotoxins

## 2018-09-15 NOTE — PLAN OF CARE
Depression     Refrain from isolation Not Progressing        Patient refused to leave his room     Alteration in Thoughts and Perception     Treatment Goal: Gain control of psychotic behaviors/thinking, reduce/eliminate presenting symptoms and demonstrate improved reality functioning upon discharge Progressing     Verbalize thoughts and feelings Progressing     Refrain from acting on delusional thinking/internal stimuli Progressing     Agree to be compliant with medication regime, as prescribed and report medication side effects Progressing     Recognize dysfunctional thoughts, communicate reality-based thoughts at the time of discharge Progressing     Complete daily ADLs, including personal hygiene independently, as able Progressing        Anxiety     Anxiety is at manageable level Progressing        Depression     Treatment Goal: Demonstrate behavioral control of depressive symptoms, verbalize feelings of improved mood/affect, and adopt new coping skills prior to discharge Progressing     Verbalize thoughts and feelings Progressing     Refrain from harming self Progressing     Refrain from self-neglect Progressing     Complete daily ADLs, including personal hygiene independently, as able Progressing        Ineffective Coping     Cooperates with admission process Progressing     Identifies ineffective coping skills Progressing     Identifies healthy coping skills Progressing     Demonstrates healthy coping skills Progressing     Patient/Family participate in treatment and DC plans Progressing     Patient/Family verbalizes awareness of resources Progressing     Understands least restrictive measures Progressing     Free from restraint events Progressing        Prexisting or High Potential for Compromised Skin Integrity     Skin integrity is maintained or improved Progressing        Risk for Self Injury/Neglect     Treatment Goal: Remain safe during length of stay, learn and adopt new coping skills, and be free of self-injurious ideation, impulses and acts at the time of discharge Progressing     Verbalize thoughts and feelings Progressing     Refrain from harming self Progressing     Recognize maladaptive responses and adopt new coping mechanisms Progressing     Complete daily ADLs, including personal hygiene independently, as able Progressing

## 2018-09-15 NOTE — PLAN OF CARE
Alteration in Thoughts and Perception     Attend and participate in unit activities, including therapeutic, recreational, and educational groups Not Progressing        Depression     Refrain from isolation Not Progressing     Attend and participate in unit activities, including therapeutic, recreational, and educational groups Not [place]     Discharge to home or other facility with appropriate resources Not Progressing        Risk for Self Injury/Neglect     Attend and participate in unit activities, including therapeutic, recreational, and educational groups Not Progressing          Alteration in Thoughts and Perception     Treatment Goal: Gain control of psychotic behaviors/thinking, reduce/eliminate presenting symptoms and demonstrate improved reality functioning upon discharge Progressing     Verbalize thoughts and feelings Progressing     Refrain from acting on delusional thinking/internal stimuli Progressing     Agree to be compliant with medication regime, as prescribed and report medication side effects Progressing     Recognize dysfunctional thoughts, communicate reality-based thoughts at the time of discharge Progressing     Complete daily ADLs, including personal hygiene independently, as able Progressing        Anxiety     Anxiety is at manageable level Progressing        Depression     Treatment Goal: Demonstrate behavioral control of depressive symptoms, verbalize feelings of improved mood/affect, and adopt new coping skills prior to discharge Progressing     Verbalize thoughts and feelings Progressing     Refrain from harming self Progressing     Refrain from self-neglect Progressing     Complete daily ADLs, including personal hygiene independently, as able Progressing        Prexisting or High Potential for Compromised Skin Integrity     Skin integrity is maintained or improved Progressing        Risk for Self Injury/Neglect     Treatment Goal: Remain safe during length of stay, learn and adopt new coping skills, and be free of self-injurious ideation, impulses and acts at the time of discharge Progressing     Verbalize thoughts and feelings Progressing     Refrain from harming self Progressing     Recognize maladaptive responses and adopt new coping mechanisms Progressing     Complete daily ADLs, including personal hygiene independently, as able Progressing

## 2018-09-15 NOTE — ASSESSMENT & PLAN NOTE
· Patient was having decreased PO intake, likely in setting of major depression   · Creatine had increased from 1 8 to 2 16  (baseline Cr appears to be 1 6-2 3)  This bump was felt to be 2/2 decreased PO intake/dehydration  IVF were initiated and creatinine improved and has remained stable at 1 9  · I discussed with nursing, patient has been eating % of meals  He did not eat breakfast today but told his nurse that he just wanted to wait until later this morning and heat it up  Encouraged PO intake of food and fluids  Patient is also drinking Ensures  · Discontinue IVF   Repeat BMP in 3 days

## 2018-09-15 NOTE — PROGRESS NOTES
Progress Note - [address] [facility] [place]  72 y o  male MRN: [id number]  Unit/Bed#: OJ6 565-01 Encounter: [id number]    The patient was seen for continuing care and reviewed with treatment team   He is more interactive today and even pleasant and cheerful  He has started eating  He has received intravenous fluids for dehydration  He complains of restless legs at night and difficulty sleeping as a result of that  Mental Status Evaluation:  Appearance:  Adequate hygiene and grooming and Good eye contact   Behavior:  calm, cooperative and friendly   Mood:  improving   Thought Content:  Does not verbalize delusional material   Perceptual Disturbances: Denies hallucinations and does not appear to be responding to internal stimuli   Risk Potential: No suicidal or homicidal ideation   Orientation:   Alert and oriented         Assessment/Plan    Principal Problem:    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 ([facility] [address] )  Active Problems:    Hypothyroidism    CKD (chronic kidney disease) stage 3, GFR 30-59 ml/min    Severe episode of recurrent major depressive disorder, without psychotic features ([facility] [address] )    Dehydration      Recommended Treatment: We will start a small dose of benztropine before bedtime Continue with pharmacotherapy, group therapy, milieu therapy and occupational therapy    The patient will be maintained on the following medications:    Current Facility-Administered Medications:  acetaminophen 650 mg Oral Q6H PRN Jordi Page, CRNP   acetaminophen 650 mg Oral Q4H PRN Jordi Page, CRNP   acetaminophen 975 mg Oral Q6H PRN Jordi Page, CRNP   aluminum-magnesium hydroxide-simethicone 30 mL Oral Q4H PRN Jordi Page, CRNP   benztropine 1 mg Intramuscular Q8H PRN Jordi Page, CRNP   benztropine 0 5 mg Oral Q6H PRN Jordi Lopez, CRNP   clonazePAM 1 mg Oral HS Nasim Felix MD   DULoxetine 60 mg Oral Daily Jordi Page, JAYCEENP   haloperidol 5 mg Oral Q8H PRN Jordi Lopez, SHERRIE hydrOXYzine HCL 25 mg Oral Q4H PRN Evlyn Many, CRNP   LORazepam 0 5 mg Oral Q4H PRN Evlyn Many, CRNP   magnesium hydroxide 30 mL Oral Daily PRN Evlyn Many, CRNP   melatonin 6 mg Oral HS Ruth Giordano MD   mirtazapine 45 mg Oral HS Ruth Giordano MD   OLANZapine 2 5 mg Intramuscular Q8H PRN Evlyn Many, CRNP   QUEtiapine 300 mg Oral HS Ruth Giordano MD   risperiDONE 0 5 mg Oral Q8H PRN Evlyn Many, CRNP   traMADol 50 mg Oral Q6H PRN Ruth Giordano MD   traZODone 25 mg Oral HS PRN Evlyn Many, CRNP

## 2018-09-15 NOTE — ASSESSMENT & PLAN NOTE
· Patient has levothyroxine 75 mcg listed in med-rec, however I followed up with psychiatry AP yesterday who received information from the patient's [address] that the patient had not had levothyroxine dispensed since June 2017  Furthermore, med list from January 2018 discharge did not have levothyroxine listed  Therefore, would recommend levothyroxine be removed from the patient's home med rec  TSH WNL   Recommend outpatient TFTs in 4-6 weeks and follow up with PCP

## 2018-09-16 PROBLEM — G25.81 RESTLESS LEGS: Status: ACTIVE | Noted: 2018-09-16

## 2018-09-16 LAB
ALBUMIN SERPL BCP-MCNC: 3.2 G/DL (ref 3.5–5)
ALP SERPL-CCNC: 63 U/L (ref 46–116)
ALT SERPL W P-5'-P-CCNC: 16 U/L (ref 12–78)
ANION GAP SERPL CALCULATED.3IONS-SCNC: 8 MMOL/L (ref 4–13)
AST SERPL W P-5'-P-CCNC: 10 U/L (ref 5–45)
BASOPHILS # BLD AUTO: 0.04 THOUSANDS/ΜL (ref 0–0.1)
BASOPHILS NFR BLD AUTO: 1 % (ref 0–1)
BILIRUB SERPL-MCNC: 0.37 MG/DL (ref 0.2–1)
BUN SERPL-MCNC: 23 MG/DL (ref 5–25)
CALCIUM SERPL-MCNC: 8.4 MG/DL (ref 8.3–10.1)
CHLORIDE SERPL-SCNC: 104 MMOL/L (ref 100–108)
CO2 SERPL-SCNC: 27 MMOL/L (ref 21–32)
CREAT SERPL-MCNC: 1.94 MG/DL (ref 0.6–1.3)
EOSINOPHIL # BLD AUTO: 0.17 THOUSAND/ΜL (ref 0–0.61)
EOSINOPHIL NFR BLD AUTO: 3 % (ref 0–6)
ERYTHROCYTE [DISTWIDTH] IN BLOOD BY AUTOMATED COUNT: 13.7 % (ref 11.6–15.1)
GFR SERPL CREATININE-BSD FRML MDRD: 34 ML/MIN/1.73SQ M
GLUCOSE SERPL-MCNC: 78 MG/DL (ref 65–140)
HCT VFR BLD AUTO: 39.3 % (ref 36.5–49.3)
HGB BLD-MCNC: 12.6 G/DL (ref 12–17)
IMM GRANULOCYTES # BLD AUTO: 0.02 THOUSAND/UL (ref 0–0.2)
IMM GRANULOCYTES NFR BLD AUTO: 0 % (ref 0–2)
LYMPHOCYTES # BLD AUTO: 1.57 THOUSANDS/ΜL (ref 0.6–4.47)
LYMPHOCYTES NFR BLD AUTO: 23 % (ref 14–44)
MCH RBC QN AUTO: 27.8 PG (ref 26.8–34.3)
MCHC RBC AUTO-ENTMCNC: 32.1 G/DL (ref 31.4–37.4)
MCV RBC AUTO: 87 FL (ref 82–98)
MONOCYTES # BLD AUTO: 0.54 THOUSAND/ΜL (ref 0.17–1.22)
MONOCYTES NFR BLD AUTO: 8 % (ref 4–12)
NEUTROPHILS # BLD AUTO: 4.53 THOUSANDS/ΜL (ref 1.85–7.62)
NEUTS SEG NFR BLD AUTO: 65 % (ref 43–75)
NRBC BLD AUTO-RTO: 0 /100 WBCS
PLATELET # BLD AUTO: 156 THOUSANDS/UL (ref 149–390)
PMV BLD AUTO: 11.6 FL (ref 8.9–12.7)
POTASSIUM SERPL-SCNC: 3.5 MMOL/L (ref 3.5–5.3)
PROT SERPL-MCNC: 6.5 G/DL (ref 6.4–8.2)
RBC # BLD AUTO: 4.54 MILLION/UL (ref 3.88–5.62)
SODIUM SERPL-SCNC: 139 MMOL/L (ref 136–145)
WBC # BLD AUTO: 6.87 THOUSAND/UL (ref 4.31–10.16)

## 2018-09-16 PROCEDURE — 85025 COMPLETE CBC W/AUTO DIFF WBC: CPT | Performed by: NURSE PRACTITIONER

## 2018-09-16 PROCEDURE — 99232 SBSQ HOSP IP/OBS MODERATE 35: CPT | Performed by: PSYCHIATRY & NEUROLOGY

## 2018-09-16 PROCEDURE — 80053 COMPREHEN METABOLIC PANEL: CPT | Performed by: NURSE PRACTITIONER

## 2018-09-16 PROCEDURE — 99232 SBSQ HOSP IP/OBS MODERATE 35: CPT | Performed by: PHYSICIAN ASSISTANT

## 2018-09-16 RX ORDER — DULOXETIN HYDROCHLORIDE 30 MG/1
30 CAPSULE, DELAYED RELEASE ORAL DAILY
Status: DISCONTINUED | OUTPATIENT
Start: 2018-09-17 | End: 2018-09-24 | Stop reason: HOSPADM

## 2018-09-16 RX ORDER — DIPHENHYDRAMINE HCL 25 MG
25 TABLET ORAL EVERY 6 HOURS PRN
Status: DISCONTINUED | OUTPATIENT
Start: 2018-09-16 | End: 2018-09-24 | Stop reason: HOSPADM

## 2018-09-16 RX ORDER — MIRTAZAPINE 30 MG/1
30 TABLET, FILM COATED ORAL
Status: DISCONTINUED | OUTPATIENT
Start: 2018-09-16 | End: 2018-09-16

## 2018-09-16 RX ORDER — ROPINIROLE 1 MG/1
1 TABLET, FILM COATED ORAL
Status: DISCONTINUED | OUTPATIENT
Start: 2018-09-16 | End: 2018-09-18

## 2018-09-16 RX ORDER — MIRTAZAPINE 15 MG/1
15 TABLET, FILM COATED ORAL
Status: DISCONTINUED | OUTPATIENT
Start: 2018-09-16 | End: 2018-09-24 | Stop reason: HOSPADM

## 2018-09-16 RX ORDER — TEMAZEPAM 15 MG/1
30 CAPSULE ORAL
Status: DISCONTINUED | OUTPATIENT
Start: 2018-09-16 | End: 2018-09-24 | Stop reason: HOSPADM

## 2018-09-16 RX ADMIN — DIPHENHYDRAMINE HCL 25 MG: 25 TABLET ORAL at 02:03

## 2018-09-16 RX ADMIN — QUETIAPINE FUMARATE 300 MG: 300 TABLET ORAL at 21:36

## 2018-09-16 RX ADMIN — MIRTAZAPINE 15 MG: 15 TABLET, FILM COATED ORAL at 21:36

## 2018-09-16 RX ADMIN — ROPINIROLE 1 MG: 1 TABLET, FILM COATED ORAL at 21:36

## 2018-09-16 RX ADMIN — TEMAZEPAM 30 MG: 15 CAPSULE ORAL at 22:27

## 2018-09-16 RX ADMIN — DULOXETINE HYDROCHLORIDE 60 MG: 60 CAPSULE, DELAYED RELEASE ORAL at 08:11

## 2018-09-16 NOTE — PROGRESS NOTES
Pt c/o his legs" jumping"-saying this was the worst night of his entire admission because of his legs  Gave Cogentin PRN for EPS  Trazodone also given for insomnia

## 2018-09-16 NOTE — PROGRESS NOTES
Pt calm and pleasant  Cooperates with medications  Pt reports decreased depression and anxiety  Pt denies all other S/S  Pt affect is flat, brightens on approach  Pt is reclusive to his room including during meal time and group

## 2018-09-16 NOTE — PROGRESS NOTES
Notified SLIM that Pt c/o restless legs and was unsteady will ambulating and c/o inability to urinate  Pt medicated with cogentin which was not effective for EPS  Trazodone also not effective for sleep  Pt's bladder not distended on palpation  Denied other urinary complaints  Bladder scan pos  For 137 mls  Pt given benadryl as ordered  Will follow

## 2018-09-16 NOTE — ASSESSMENT & PLAN NOTE
· Patient was having decreased PO intake, likely in setting of major depression   · Creatine had increased from 1 8 to 2 16  (baseline Cr appears to be 1 6-2 3)  This bump was felt to be 2/2 decreased PO intake/dehydration  IVF were initiated and creatinine improved and has remained stable at 1 9  · Patient was then and continues to have adequate PO intake  Off IVF   Creatinine stable  · Follow up BMP

## 2018-09-16 NOTE — ASSESSMENT & PLAN NOTE
· Cr seems to range from 1 6 - 2 3  · Stable at 1 9 today   Encourage PO intake  · Monitor off IVF   · Follow up BMP  · Avoid nephrotoxins

## 2018-09-16 NOTE — ASSESSMENT & PLAN NOTE
· Unclear etiology  Restless legs vs serotonergic activity  · He states that the meds last night (benztropine and Benadryl) did not help him, although he does not have symptoms this AM  He states that his symptoms are only present in his legs and occur after he receives his nighttime meds  · Cymbalta and Remeron doses were decreased   Requip was added

## 2018-09-16 NOTE — PROGRESS NOTES
Called by nursing in regard to pt with restless legs and inability to urinate  Pt was bladder scanned with no urine in bladder  He seemed unsteady of feet  Pt on high doses of psych meds  Seroquel increased from 25 mg at home to 300 mg now  Question EPS pt already on cogentin will order po benadryl and have requested nursing to relay concerns to psych md in am  Pt otherwise alert reports to me that his legs are feeling restless and he cant sleep  He states he did take an over the counter med at home TID for his legs, he felt helped him  Denies numbness tingling in lower extremities  Will obtain am  Labs to chk renal function

## 2018-09-16 NOTE — PROGRESS NOTES
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 [facility])   Assessment & Plan    · Management per primary team, psychiatry        Severe episode of recurrent major depressive disorder, without psychotic features ([facility] [address] )   Assessment & Plan    · Per primary, psychiatry        Restless legs   Assessment & Plan    · Unclear etiology  Restless legs vs serotonergic activity  · He states that the meds last night (benztropine and Benadryl) did not help him, although he does not have symptoms this AM  He states that his symptoms are only present in his legs and occur after he receives his nighttime meds  · Cymbalta and Remeron doses were decreased  Requip was added        Dehydration   Assessment & Plan    · Patient was having decreased PO intake, likely in setting of major depression   · Creatine had increased from 1 8 to 2 16  (baseline Cr appears to be 1 6-2 3)  This bump was felt to be 2/2 decreased PO intake/dehydration  IVF were initiated and creatinine improved and has remained stable at 1 9  · Patient was then and continues to have adequate PO intake  Off IVF  Creatinine stable  · Follow up BMP        CKD (chronic kidney disease) stage 3, GFR 30-59 ml/min   Assessment & Plan    · Cr seems to range from 1 6 - 2 3  · Stable at 1 9 today  Encourage PO intake  · Monitor off IVF   · Follow up BMP  · Avoid nephrotoxins          VTE Pharmacologic Prophylaxis:   Pharmacologic: none per primary, psych  Mechanical VTE Prophylaxis in Place: No    Patient Centered Rounds: I have performed bedside rounds with nursing staff today      Discussions with Specialists or Other Care Team Provider: Dr Danyell Horan    Education and Discussions with Family / Patient: Patient    Time Spent for Care: 20 minutes  More than 50% of total time spent on counseling and coordination of care as described above  Current Length of Stay: 19 day(s)    Current Patient Status: Inpatient Psych   Certification Statement: per primary, psych    Discharge Plan: Lico Macias will continue to follow  Code Status: Level 1 - Full Code      Subjective:   Mr Frankie Navas states that he did not sleep well because he could not rest his legs  He states that this onset after his bedtime medications  He states that the meds they gave him to help his restlessness did not help and he is tired today  Objective:     Vitals:   Temp (24hrs), Av 5 °F (36 4 °C), Min:97 4 °F (36 3 °C), Max:97 6 °F (36 4 °C)    HR:  [67-77] 67  Resp:  [16-18] 16  BP: (115-142)/(64-82) 115/64  SpO2:  [94 %-99 %] 94 %  Body mass index is 30 78 kg/m²  Input and Output Summary (last 24 hours): Intake/Output Summary (Last 24 hours) at 18 1116  Last data filed at 18 0600   Gross per 24 hour   Intake                0 ml   Output                1 ml   Net               -1 ml       Physical Exam:     Physical Exam   Constitutional: He is oriented to person, place, and time  No distress  Patient seen lying in bed, he is pleasant and cooperative   Cardiovascular: Normal rate and regular rhythm  Pulmonary/Chest: Effort normal and breath sounds normal  No respiratory distress  He has no wheezes  Abdominal: Soft  Bowel sounds are normal  There is no tenderness  Musculoskeletal: He exhibits no edema  Neurological: He is alert and oriented to person, place, and time  Skin: Skin is warm  Psychiatric: He has a normal mood and affect  Patient the most interactive he has been with me  Brighter affect today   Vitals reviewed        Additional Data:     Labs:      Results from last 7 days  Lab Units 18  0457   WBC Thousand/uL 6 87   HEMOGLOBIN g/dL 12 6   HEMATOCRIT % 39 3   PLATELETS Thousands/uL 156   NEUTROS PCT % 65   LYMPHS PCT % 23   MONOS PCT % 8   EOS PCT % 3       Results from last 7 days  Lab Units 09/16/18  0457   SODIUM mmol/L 139   POTASSIUM mmol/L 3 5   CHLORIDE mmol/L 104   CO2 mmol/L 27   BUN mg/dL 23   CREATININE mg/dL 1 94*   CALCIUM mg/dL 8 4   ALK PHOS U/L 63   ALT U/L 16   AST U/L 10                     * I Have Reviewed All Lab Data Listed Above  * Additional Pertinent Lab Tests Reviewed: All Labs Within Last 24 Hours Reviewed    Imaging:    Imaging Reports Reviewed Today Include: None  Imaging Personally Reviewed by Myself Includes:  None    Recent Cultures (last 7 days):           Last 24 Hours Medication List:     Current Facility-Administered Medications:  acetaminophen 650 mg Oral Q6H PRN Barnett Sabins, CRNP   acetaminophen 650 mg Oral Q4H PRN Barnett Sabins, CRNP   acetaminophen 975 mg Oral Q6H PRN Barnett Sabins, CRNP   aluminum-magnesium hydroxide-simethicone 30 mL Oral Q4H PRN Barnett Sabins, CRNP   benztropine 1 mg Intramuscular Q8H PRN Barnett Sabins, CRNP   benztropine 0 5 mg Oral Q6H PRN Barnett Sabins, CRNP   diphenhydrAMINE 25 mg Oral Q6H PRN SHERRIE De Paz   [START ON 9/17/2018] DULoxetine 30 mg Oral Daily Frank Snyder MD   haloperidol 5 mg Oral Q8H PRN Barnett Sabins, CRNP   hydrOXYzine HCL 25 mg Oral Q4H PRN Barnett Sabins, CRNP   LORazepam 0 5 mg Oral Q4H PRN Barnett Sabins, CRNP   magnesium hydroxide 30 mL Oral Daily PRN Barnett Sabins, CRNP   mirtazapine 15 mg Oral HS Frank Snyder MD   OLANZapine 2 5 mg Intramuscular Q8H PRN Barnett Sabins, CRNP   QUEtiapine 300 mg Oral HS Frank Snyder MD   risperiDONE 0 5 mg Oral Q8H PRN Anibal Sabins, JAYCEENP   rOPINIRole 1 mg Oral HS Frank Snyder MD   temazepam 30 mg Oral HS PRN Frank Snyder MD   traMADol 50 mg Oral Q6H PRN Frank Snyder MD   traZODone 25 mg Oral HS PRN Anibal Stevens, SHERRIE        Today, Patient Was Seen By: Kreg Patella, PA-C    ** Please Note: Dictation voice to text software may have been used in the creation of this document  **

## 2018-09-16 NOTE — PROGRESS NOTES
Pt was calm and cooperative with care this morning  Pt was medication compliant  Pt reported decreased depression, and denied remaining symptoms  Pt was out of bed, but remained seclusive to his room  Pt ate breakfast in his room, stating " I am too shaky to go to go out there"  Pt had a flat affect but brightened upon approach  Pt was social with staff  Will continue to monitor
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
## 2018-09-16 NOTE — PLAN OF CARE
Alteration in Thoughts and Perception     Attend and participate in unit activities, including therapeutic, recreational, and educational groups Not Progressing        Depression     Refrain from isolation Not Progressing     Attend and participate in unit activities, including therapeutic, recreational, and educational groups Not Progressing        Risk for Self Injury/Neglect     Attend and participate in unit activities, including therapeutic, recreational, and educational groups Not Progressing          Alteration in Thoughts and Perception     Treatment Goal: Gain control of psychotic behaviors/thinking, reduce/eliminate presenting symptoms and demonstrate improved reality functioning upon discharge Progressing     Verbalize thoughts and feelings Progressing     Refrain from acting on delusional thinking/internal stimuli Progressing     Agree to be compliant with medication regime, as prescribed and report medication side effects Progressing     Recognize dysfunctional thoughts, communicate reality-based thoughts at the time of discharge Progressing     Complete daily ADLs, including personal hygiene independently, as able Progressing        Anxiety     Anxiety is at manageable level Progressing        Depression     Treatment Goal: Demonstrate behavioral control of depressive symptoms, verbalize feelings of improved mood/affect, and adopt new coping skills prior to discharge Progressing     Verbalize thoughts and feelings Progressing     Refrain from harming self Progressing     Refrain from self-neglect Progressing     Complete daily ADLs, including personal hygiene independently, as able Progressing        Ineffective Coping     Cooperates with admission process Progressing     Identifies ineffective coping skills Progressing     Identifies healthy coping skills Progressing     Demonstrates healthy coping skills Progressing     Patient/Family participate in treatment and DC plans Progressing     Patient/Family verbalizes awareness of resources Progressing     Understands least restrictive measures Progressing     Free from restraint events Progressing        Prexisting or High Potential for Compromised Skin Integrity     Skin integrity is maintained or improved Progressing        Risk for Self Injury/Neglect     Treatment Goal: Remain safe during length of stay, learn and adopt new coping skills, and be free of self-injurious ideation, impulses and acts at the time of discharge Progressing     Verbalize thoughts and feelings Progressing     Refrain from harming self Progressing     Recognize maladaptive responses and adopt new coping mechanisms Progressing     Complete daily ADLs, including personal hygiene independently, as able Progressing

## 2018-09-17 PROCEDURE — G8978 MOBILITY CURRENT STATUS: HCPCS | Performed by: PHYSICAL THERAPIST

## 2018-09-17 PROCEDURE — G8979 MOBILITY GOAL STATUS: HCPCS | Performed by: PHYSICAL THERAPIST

## 2018-09-17 PROCEDURE — 97163 PT EVAL HIGH COMPLEX 45 MIN: CPT | Performed by: PHYSICAL THERAPIST

## 2018-09-17 PROCEDURE — 99232 SBSQ HOSP IP/OBS MODERATE 35: CPT | Performed by: PSYCHIATRY & NEUROLOGY

## 2018-09-17 RX ADMIN — DULOXETINE HYDROCHLORIDE 30 MG: 30 CAPSULE, DELAYED RELEASE ORAL at 08:58

## 2018-09-17 RX ADMIN — MIRTAZAPINE 15 MG: 15 TABLET, FILM COATED ORAL at 21:37

## 2018-09-17 RX ADMIN — QUETIAPINE FUMARATE 300 MG: 300 TABLET ORAL at 21:37

## 2018-09-17 RX ADMIN — ROPINIROLE 1 MG: 1 TABLET, FILM COATED ORAL at 21:36

## 2018-09-17 RX ADMIN — TEMAZEPAM 30 MG: 15 CAPSULE ORAL at 21:37

## 2018-09-17 NOTE — PROGRESS NOTES
Progress Note - [address]  72 y o  male MRN: [id number]  Unit/Bed#: AB0 565-01 Encounter: [id number]    The patient was seen for continuing care and reviewed with treatment team   Reports that the patient remains seclusive but has been slightly more social and bright  He has been eating his meals  The patient is in his room lying in his bed  I attempted to interview him and he turned to his side and said he did want to hear anything and refused to speak  I attempted again and he was irritable and jabbing his finger at me and said he will not speak to anyone but his doctor  Then he abruptly changed moods and said that he was joking and cooperated with interview  Very bizarre  He said that he is 75% good and only 25% still depressed  He said he has an appetite and he has been eating well  He said last night was his best night sleep in 38 years, that he slept like a rock  No suicidal thoughts       Mental Status Evaluation:  Appearance:  Good eye contact and disheveled   Behavior:  guarded and psychomotor retardation   Mood:  irritable and depressed   Affect: mood-congruent   Speech: Normal rate and Normal volume   Thought Process:  Goal directed and coherent   Thought Content:  Does not verbalize delusional material   Perceptual Disturbances: Denies hallucinations and does not appear to be responding to internal stimuli   Risk Potential: No suicidal or homicidal ideation   Attention/Concentration [place] than expected   Orientation:   Oriented x3   Gait/Station: Not observed   Motor Activity: No abnormal movement noted     Progress Toward Goals:  Slowly improving    Assessment/Plan    Principal Problem:    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 ([facility] [address] )  Active Problems:    CKD (chronic kidney disease) stage 3, GFR 30-59 ml/min    Severe episode of recurrent major depressive disorder, without psychotic features (HCC)    Dehydration    Restless legs      Recommended Treatment:      Continue Seroquel 300 mg HS  Continue Cymbalta 30 mg daily  Continue mirtazapine 15 mg HS  Continue Requip 1 mg hs  Continue with pharmacotherapy, group therapy, milieu therapy and occupational therapy  The patient will be maintained on the following medications:    Current Facility-Administered Medications:  acetaminophen 650 mg Oral Q6H PRN Cathye Loupe, CRNP   acetaminophen 650 mg Oral Q4H PRN Cathye Loupe, CRNP   acetaminophen 975 mg Oral Q6H PRN Cathye Loupe, CRNP   aluminum-magnesium hydroxide-simethicone 30 mL Oral Q4H PRN Cathye Loupe, CRNP   benztropine 1 mg Intramuscular Q8H PRN Cathye Loupe, CRNP   benztropine 0 5 mg Oral Q6H PRN Cathye Loupe, CRNP   diphenhydrAMINE 25 mg Oral Q6H PRN Genice Shark, CRNP   DULoxetine 30 mg Oral Daily Ritu Galicia MD   haloperidol 5 mg Oral Q8H PRN Cathye Loupe, CRNP   hydrOXYzine HCL 25 mg Oral Q4H PRN Cathye Loupe, CRNP   LORazepam 0 5 mg Oral Q4H PRN Cathye Loupe, CRNP   magnesium hydroxide 30 mL Oral Daily PRN Cathye Loupe, CRNP   mirtazapine 15 mg Oral HS Ritu Galiica MD   OLANZapine 2 5 mg Intramuscular Q8H PRN Cathye Loupe, CRNP   QUEtiapine 300 mg Oral HS Ritu Galicia MD   risperiDONE 0 5 mg Oral Q8H PRN Cathye Loupe, CRNP   rOPINIRole 1 mg Oral HS Ritu Galicia MD   temazepam 30 mg Oral HS PRN Ritu Galicia MD   traMADol 50 mg Oral Q6H PRN Ritu Galicia MD   traZODone 25 mg Oral HS PRN Cathye Loupe, CRNP       Risks, benefits and possible side effects of Medications:   Patient does not verbalize understanding at this time and will require further explanation

## 2018-09-17 NOTE — PLAN OF CARE
Depression     Refrain from isolation Not Progressing        Patient did not want to get up for breakfast and asked breakfast tray be left in the room   Alteration in Thoughts and Perception     Treatment Goal: Gain control of psychotic behaviors/thinking, reduce/eliminate presenting symptoms and demonstrate improved reality functioning upon discharge Progressing     Verbalize thoughts and feelings Progressing     Refrain from acting on delusional thinking/internal stimuli Progressing     Agree to be compliant with medication regime, as prescribed and report medication side effects Progressing     Attend and participate in unit activities, including therapeutic, recreational, and educational groups Progressing     Recognize dysfunctional thoughts, communicate reality-based thoughts at the time of discharge Progressing     Complete daily ADLs, including personal hygiene independently, as able Progressing        Anxiety     Anxiety is at manageable level Progressing        Depression     Treatment Goal: Demonstrate behavioral control of depressive symptoms, verbalize feelings of improved mood/affect, and adopt new coping skills prior to discharge Progressing     Verbalize thoughts and feelings Progressing     Refrain from harming self Progressing     Refrain from self-neglect Progressing     Attend and participate in unit activities, including therapeutic, recreational, and educational groups Progressing     Complete daily ADLs, including personal hygiene independently, as able [address] Discharge to home or other facility with appropriate resources Progressing        Ineffective Coping     Participates in unit activities Progressing     Participates in unit activities Progressing        Ineffective Coping     Cooperates with admission process Progressing     Identifies ineffective coping skills Progressing     Identifies healthy coping skills Progressing     Demonstrates healthy coping skills Progressing     Patient/Family participate in treatment and DC plans Progressing     Patient/Family verbalizes awareness of resources Progressing     Understands least restrictive measures Progressing     Free from restraint events Progressing        Prexisting or High Potential for Compromised Skin Integrity     Skin integrity is maintained or improved Progressing        Risk for Self Injury/Neglect     Treatment Goal: Remain safe during length of stay, learn and adopt new coping skills, and be free of self-injurious ideation, impulses and acts at the time of discharge Progressing     Verbalize thoughts and feelings Progressing     Refrain from harming self Progressing     Attend and participate in unit activities, including therapeutic, recreational, and educational groups Progressing     Recognize maladaptive responses and adopt new coping mechanisms Progressing     Complete daily ADLs, including personal hygiene independently, as able Progressing

## 2018-09-17 NOTE — PLAN OF CARE
Problem: Ineffective Coping  Goal: Participates in unit activities  Interventions:  - Provide therapeutic environment   - Provide required programming   - Redirect inappropriate behaviors    Outcome: Progressing  Pt displaying jovial nature when conversing with staff on the unit  He reported that he would attend some groups today but remained in bed reading the news

## 2018-09-17 NOTE — PLAN OF CARE
Problem: PHYSICAL THERAPY ADULT  Goal: Performs mobility at highest level of function for planned discharge setting  See evaluation for individualized goals  Treatment/Interventions: Functional transfer training, LE strengthening/ROM, Elevations, Therapeutic exercise, Endurance training, Patient/family training, Equipment eval/education, Gait training, Spoke to nursing  Equipment Recommended: Mihai Mcguire       See flowsheet documentation for full assessment, interventions and recommendations  Prognosis: Fair  Problem List: Decreased strength, Decreased range of motion, Decreased endurance, Impaired balance, Decreased mobility, Decreased coordination, Impaired hearing, Pain  Assessment: pt admitted with mood disorder and recent falls  pt refered to PT  pt lives alone, has local support from family and was not using assistived device until recently  pt reports r knee giving way, falling and having second episode of fall which he believes included down flight of stairs, crawling ot bathroom and being unable to get up, found by family  pt does not have life alert or cell phone  pt demonstrated moderate functoinal limitations due to recent decline and falling, with deficits in strength, balance, shoulder rom with tendonitis l biceps, r supraspinatus and reports of r groin and lateral hip pain, pt did not have xrays  pt was able to amb with rw and supervision  pt did not have any knee buckling but did have difficulty rising from low surface  pt would benefit from skilled PT  willl need to perfrom stairs in order to return home, with home PT  recommend life alert or sr emergency cell phone, increased visitations from family  if unable to  do stairs would recommend rehab     Barriers to Discharge: Inaccessible home environment, Decreased caregiver support     Recommendation: Home with family support, Home PT (increase family support  emergency cell phone)     PT - OK to Discharge: No    See flowsheet documentation for full assessment

## 2018-09-17 NOTE — CMS CERTIFICATION NOTE
Certification: Based upon physical, mental and social evaluations, I certify that inpatient psychiatric services are medically necessary for this patient for a duration of 2 midnights for the treatment of mood disorder  Available alternative community resources do not meet the patient's mental health care needs  I further attest that an established written individualized plan of care has been implemented and is outlined in the patient's medical records

## 2018-09-17 NOTE — PLAN OF CARE
Alteration in Thoughts and Perception     Attend and participate in unit activities, including therapeutic, recreational, and educational groups Not Progressing        Depression     Refrain from isolation Not Progressing        Risk for Self Injury/Neglect     Attend and participate in unit activities, including therapeutic, recreational, and educational groups Not Progressing          Alteration in Thoughts and Perception     Treatment Goal: Gain control of psychotic behaviors/thinking, reduce/eliminate presenting symptoms and demonstrate improved reality functioning upon discharge Progressing     Verbalize thoughts and feelings Progressing     Refrain from acting on delusional thinking/internal stimuli Progressing     Agree to be compliant with medication regime, as prescribed and report medication side effects Progressing     Recognize dysfunctional thoughts, communicate reality-based thoughts at the time of discharge Progressing     Complete daily ADLs, including personal hygiene independently, as able Progressing        Anxiety     Anxiety is at manageable level Progressing        Depression     Treatment Goal: Demonstrate behavioral control of depressive symptoms, verbalize feelings of improved mood/affect, and adopt new coping skills prior to discharge Progressing     Verbalize thoughts and feelings Progressing     Refrain from harming self Progressing     Refrain from self-neglect Progressing     Attend and participate in unit activities, including therapeutic, recreational, and educational groups Progressing     Complete daily ADLs, including personal hygiene independently, as able Progressing        Prexisting or High Potential for Compromised Skin Integrity     Skin integrity is maintained or improved Progressing        Risk for Self Injury/Neglect     Treatment Goal: Remain safe during length of stay, learn and adopt new coping skills, and be free of self-injurious ideation, impulses and acts at the time of discharge Progressing     Verbalize thoughts and feelings Progressing     Refrain from harming self Progressing     Recognize maladaptive responses and adopt new coping mechanisms Progressing     Complete daily ADLs, including personal hygiene independently, as able Progressing

## 2018-09-17 NOTE — PROGRESS NOTES
Patient was sleeping  Awoke patient  Removed IV  Patient was calm and cooperative  He is compliant with medication  Told him breakfast is here  He said he did not want to get up yet and to bring breakfast into the room  He denies all symptoms  He was brightening as I was talking to him  Currently in bed

## 2018-09-17 NOTE — PROGRESS NOTES
Pt is calm and pleasant, compliant with medications  Pt is reclusive to room, only social with staff  Pt states anxiety is "5% as bad as it was" and depression is "75% as bad "  Pt denies all other S/S  Pt expressed satisfaction with sleep, feels medicine changes are helping  Pt smiling and joking with staff

## 2018-09-17 NOTE — PROGRESS NOTES
Patient not seen or examined  I discussed with Dr Rehana Manzano  Vitals and labs reviewed  Psych AP note reviewed  BP stable at 122/57  Creatinine within patient's baseline (1 6-2 3) at 1 9 on yesterday's check  Patient tolerating PO intake  IVF were discontinued  BMP already ordered for tomorrow - Follow up results  Patient reportedly slept the best he has in 38 years last night  Cymbalta, Remeron, and Requip per psych
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
## 2018-09-18 LAB
ANION GAP SERPL CALCULATED.3IONS-SCNC: 4 MMOL/L (ref 4–13)
BUN SERPL-MCNC: 32 MG/DL (ref 5–25)
CALCIUM SERPL-MCNC: 8 MG/DL (ref 8.3–10.1)
CHLORIDE SERPL-SCNC: 106 MMOL/L (ref 100–108)
CO2 SERPL-SCNC: 31 MMOL/L (ref 21–32)
CREAT SERPL-MCNC: 1.95 MG/DL (ref 0.6–1.3)
GFR SERPL CREATININE-BSD FRML MDRD: 34 ML/MIN/1.73SQ M
GLUCOSE P FAST SERPL-MCNC: 84 MG/DL (ref 65–99)
GLUCOSE SERPL-MCNC: 84 MG/DL (ref 65–140)
POTASSIUM SERPL-SCNC: 3.9 MMOL/L (ref 3.5–5.3)
SODIUM SERPL-SCNC: 141 MMOL/L (ref 136–145)

## 2018-09-18 PROCEDURE — 80048 BASIC METABOLIC PNL TOTAL CA: CPT | Performed by: NURSE PRACTITIONER

## 2018-09-18 PROCEDURE — 99232 SBSQ HOSP IP/OBS MODERATE 35: CPT | Performed by: PSYCHIATRY & NEUROLOGY

## 2018-09-18 RX ORDER — ROPINIROLE 1 MG/1
2 TABLET, FILM COATED ORAL
Status: DISCONTINUED | OUTPATIENT
Start: 2018-09-18 | End: 2018-09-24 | Stop reason: HOSPADM

## 2018-09-18 RX ADMIN — DULOXETINE HYDROCHLORIDE 30 MG: 30 CAPSULE, DELAYED RELEASE ORAL at 08:27

## 2018-09-18 RX ADMIN — ROPINIROLE 2 MG: 1 TABLET, FILM COATED ORAL at 21:18

## 2018-09-18 RX ADMIN — QUETIAPINE FUMARATE 300 MG: 300 TABLET ORAL at 21:18

## 2018-09-18 RX ADMIN — TRAMADOL HYDROCHLORIDE 50 MG: 50 TABLET, FILM COATED ORAL at 00:16

## 2018-09-18 RX ADMIN — TEMAZEPAM 30 MG: 15 CAPSULE ORAL at 23:01

## 2018-09-18 RX ADMIN — MIRTAZAPINE 15 MG: 15 TABLET, FILM COATED ORAL at 21:18

## 2018-09-18 NOTE — PROGRESS NOTES
Patient not seen or examined  Chart, vitals, labs reviewed  His Creatinine has been stable and within his baseline since d/c of IVF  Per notes, patient ate breakfast this morning  Will sign off, please call with questions

## 2018-09-18 NOTE — PROGRESS NOTES
Patient did not want to come out of his room to eat lunch  Encouraged him to come out  He said "he knows what works for him after 30+ years dealing with this depression " He says he feels better when he is alone

## 2018-09-18 NOTE — PLAN OF CARE
Alteration in Thoughts and Perception     Attend and participate in unit activities, including therapeutic, recreational, and educational groups Not Progressing        Depression     Attend and participate in unit activities, including therapeutic, recreational, and educational groups Not Progressing        Risk for Self Injury/Neglect     Attend and participate in unit activities, including therapeutic, recreational, and educational groups Not Progressing        Encouraged patient to attend group therapy  Patient is refusing  He said it does not help him    Alteration in Thoughts and Perception     Treatment Goal: Gain control of psychotic behaviors/thinking, reduce/eliminate presenting symptoms and demonstrate improved reality functioning upon discharge Progressing     Verbalize thoughts and feelings Progressing     Refrain from acting on delusional thinking/internal stimuli Progressing     Agree to be compliant with medication regime, as prescribed and report medication side effects Progressing     Recognize dysfunctional thoughts, communicate reality-based thoughts at the time of discharge Progressing     Complete daily ADLs, including personal hygiene independently, as able Progressing        Anxiety     Anxiety is at manageable level Progressing        Depression     Treatment Goal: Demonstrate behavioral control of depressive symptoms, verbalize feelings of improved mood/affect, and adopt new coping skills prior to discharge Progressing     Verbalize thoughts and feelings Progressing     Refrain from harming self Progressing     Refrain from isolation Progressing     Refrain from self-neglect Progressing     Complete daily ADLs, including personal hygiene independently, as able Progressing        Prexisting or High Potential for Compromised Skin Integrity     Skin integrity is maintained or improved Progressing        Risk for Self Injury/Neglect     Treatment Goal: Remain safe during length of stay, learn and adopt new coping skills, and be free of self-injurious ideation, impulses and acts at the time of discharge Progressing     Verbalize thoughts and feelings Progressing     Refrain from harming self Progressing     Recognize maladaptive responses and adopt new coping mechanisms Progressing     Complete daily ADLs, including personal hygiene independently, as able Progressing        es not help him

## 2018-09-18 NOTE — PROGRESS NOTES
Progress Note - [address] [facility] [address]  72 y o  male MRN: [id number]  Unit/Bed#: YB3 565-01 Encounter: [id number]    The patient was seen for continuing care and reviewed with treatment team He slept poorly again due to RLS  Appetite has improved but remains anxious and depressed  Mental Status Evaluation:  Appearance:  Adequate hygiene and grooming and Good eye contact   Behavior:  calm and cooperative   Mood:  anxious and depressed   Thought Content:  Does not verbalize delusional material   Perceptual Disturbances: Denies hallucinations and does not appear to be responding to internal stimuli   Risk Potential: No suicidal or homicidal ideation   Orientation:            Assessment/Plan    Principal Problem:    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 ([facility] [address] )  Active Problems:    CKD (chronic kidney disease) stage 3, GFR 30-59 ml/min    Severe episode of recurrent major depressive disorder, without psychotic features (HCC)    Dehydration    Restless legs      Recommended Treatment: Increase Requip  Continue with pharmacotherapy, group therapy, milieu therapy and occupational therapy    The patient will be maintained on the following medications:    Current Facility-Administered Medications:  acetaminophen 650 mg Oral Q6H PRN Blondell Halon, CRNP   acetaminophen 650 mg Oral Q4H PRN Blondell Halon, CRNP   acetaminophen 975 mg Oral Q6H PRN Blondell Halon, CRNP   aluminum-magnesium hydroxide-simethicone 30 mL Oral Q4H PRN Blondell Halon, CRNP   benztropine 1 mg Intramuscular Q8H PRN Blondell Halon, CRNP   benztropine 0 5 mg Oral Q6H PRN Blondell Halon, CRNP   diphenhydrAMINE 25 mg Oral Q6H PRN Le Riddles, CRNP   DULoxetine 30 mg Oral Daily Yousif Matson MD   haloperidol 5 mg Oral Q8H PRN Blondell Halon, CRNP   hydrOXYzine HCL 25 mg Oral Q4H PRN Blondell Halon, CRNP   LORazepam 0 5 mg Oral Q4H PRN Blondell Halon, CRNP   magnesium hydroxide 30 mL Oral Daily PRN Blondell Halon, CRNP mirtazapine 15 mg Oral HS Rosendo Mckenzie MD   OLANZapine 2 5 mg Intramuscular Q8H PRN Ac Parents, CRNP   QUEtiapine 300 mg Oral HS Rosendo Mckenzie MD   risperiDONE 0 5 mg Oral Q8H PRN Ac Parents, CRNP   rOPINIRole 2 mg Oral HS Rosendo Mckenzie MD   temazepam 30 mg Oral HS PRN Rosendo Mckenzie MD   traMADol 50 mg Oral Q6H PRN Rosendo Mckenzie MD   traZODone 25 mg Oral HS PRN Ac Parents, CRNP

## 2018-09-18 NOTE — PROGRESS NOTES
Patient was awake for breakfast this morning  He wanted to eat in his room  Asked him if he will be attending group today  Patient is refusing  He said it does not help him  Encouraged patient to attend group therapy  He said he slept the best that he has ever in a hospital the past two nights  He denies all symptoms  He is calm, cooperative and medication compliant

## 2018-09-18 NOTE — PLAN OF CARE
Problem: Ineffective Coping  Goal: Participates in unit activities  Interventions:  - Provide therapeutic environment   - Provide required programming   - Redirect inappropriate behaviors    Outcome: Progressing  Pt  refusing to attend groups yet more alert and out of bed in spite of isolating to his room

## 2018-09-19 PROCEDURE — 99232 SBSQ HOSP IP/OBS MODERATE 35: CPT | Performed by: PSYCHIATRY & NEUROLOGY

## 2018-09-19 RX ADMIN — TEMAZEPAM 30 MG: 15 CAPSULE ORAL at 21:08

## 2018-09-19 RX ADMIN — DULOXETINE HYDROCHLORIDE 30 MG: 30 CAPSULE, DELAYED RELEASE ORAL at 09:11

## 2018-09-19 RX ADMIN — ROPINIROLE 2 MG: 1 TABLET, FILM COATED ORAL at 21:08

## 2018-09-19 RX ADMIN — MIRTAZAPINE 15 MG: 15 TABLET, FILM COATED ORAL at 21:08

## 2018-09-19 RX ADMIN — QUETIAPINE FUMARATE 300 MG: 300 TABLET ORAL at 21:08

## 2018-09-19 NOTE — PROGRESS NOTES
Patient spent evening in room sitting in bed reading  Brightens on approach and is pleasant in conversation  States he is feeling much better and hopes he can go home soon  Patient reports difficulty sleeping and requested prn sleeping medication
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
## 2018-09-19 NOTE — PROGRESS NOTES
Progress Note - [address] [facility] [address] [facility] [address]  72 y o  male MRN: [id number]  Unit/Bed#: NK0 565-01 Encounter: [id number]    The patient was seen for continuing care and reviewed with treatment team   Reports a better night of sleep  He has not had any suicidal thoughts  He feels that he is 90% better  Tolerating his medications    Mental Status Evaluation:  Appearance:  Adequate hygiene and grooming and Good eye contact   Behavior:  calm, cooperative and friendly   Mood:  euthymic   Thought Content:  Does not verbalize delusional material   Perceptual Disturbances: Denies hallucinations and does not appear to be responding to internal stimuli   Risk Potential: No suicidal or homicidal ideation   Orientation:            Assessment/Plan    Principal Problem:    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 ([facility] [address] )  Active Problems:    CKD (chronic kidney disease) stage 3, GFR 30-59 ml/min    Severe episode of recurrent major depressive disorder, without psychotic features (HCC)    Dehydration    Restless legs      Recommended Treatment: Continue with pharmacotherapy, group therapy, milieu therapy and occupational therapy    The patient will be maintained on the following medications:    Current Facility-Administered Medications:  acetaminophen 650 mg Oral Q6H PRN Gearold Span, CRNP   acetaminophen 650 mg Oral Q4H PRN Gearold Span, CRNP   acetaminophen 975 mg Oral Q6H PRN Gearold Span, CRNP   aluminum-magnesium hydroxide-simethicone 30 mL Oral Q4H PRN Gearold Span, CRNP   benztropine 1 mg Intramuscular Q8H PRN Gearold Span, CRNP   benztropine 0 5 mg Oral Q6H PRN Gearold Span, CRNP   diphenhydrAMINE 25 mg Oral Q6H PRN Ana Rosa Moreno, CRNP   DULoxetine 30 mg Oral Daily Barbara Angel MD   haloperidol 5 mg Oral Q8H PRN Gearold Span, CRNP   hydrOXYzine HCL 25 mg Oral Q4H PRN Gearold Span, CRNP   LORazepam 0 5 mg Oral Q4H PRN Gearold Span, CRNP   magnesium hydroxide 30 mL Oral Daily PRN Hooppole Chi SHERRIE Pressley   mirtazapine 15 mg Oral HS Lopez Diaz MD   OLANZapine 2 5 mg Intramuscular Q8H PRN SHERRIE Matthews   QUEtiapine 300 mg Oral HS Lopez Diaz MD   risperiDONE 0 5 mg Oral Q8H PRN SHERRIE Matthews   rOPINIRole 2 mg Oral HS Lopez Diaz MD   temazepam 30 mg Oral HS PRN Lopez Diaz MD   traMADol 50 mg Oral Q6H PRN Lopez Diaz MD   traZODone 25 mg Oral HS PRN HSERRIE Matthews

## 2018-09-19 NOTE — PLAN OF CARE
Alteration in Thoughts and Perception     Attend and participate in unit activities, including therapeutic, recreational, and educational groups Not Progressing        Depression     Attend and participate in unit activities, including therapeutic, recreational, and educational groups Not Progressing        Risk for Self Injury/Neglect     Attend and participate in unit activities, including therapeutic, recreational, and educational groups Not Progressing        Patient does not attend group  Alteration in Thoughts and Perception     Treatment Goal: Gain control of psychotic behaviors/thinking, reduce/eliminate presenting symptoms and demonstrate improved reality functioning upon discharge Progressing     Verbalize thoughts and feelings Progressing     Refrain from acting on delusional thinking/internal stimuli Progressing     Agree to be compliant with medication regime, as prescribed and report medication side effects Progressing     Recognize dysfunctional thoughts, communicate reality-based thoughts at the time of discharge Progressing     Complete daily ADLs, including personal hygiene independently, as able Progressing        Anxiety     Anxiety is at manageable level Progressing        Depression     Treatment Goal: Demonstrate behavioral control of depressive symptoms, verbalize feelings of improved mood/affect, and adopt new coping skills prior to discharge Progressing     Verbalize thoughts and feelings Progressing     Refrain from harming self Progressing     Refrain from isolation Progressing     Refrain from self-neglect Progressing     Complete daily ADLs, including personal hygiene independently, as able Progressing        Ineffective Coping     Cooperates with admission process Progressing     Identifies ineffective coping skills Progressing     Identifies healthy coping skills Progressing     Demonstrates healthy coping skills Progressing     Patient/Family participate in treatment and DC plans Progressing     Patient/Family verbalizes awareness of resources Progressing     Understands least restrictive measures Progressing     Free from restraint events Progressing        Prexisting or High Potential for Compromised Skin Integrity     Skin integrity is maintained or improved Progressing        Risk for Self Injury/Neglect     Treatment Goal: Remain safe during length of stay, learn and adopt new coping skills, and be free of self-injurious ideation, impulses and acts at the time of discharge Progressing     Verbalize thoughts and feelings Progressing     Refrain from harming self Progressing     Recognize maladaptive responses and adopt new coping mechanisms Progressing     Complete daily ADLs, including personal hygiene independently, as able Progressing

## 2018-09-19 NOTE — PROGRESS NOTES
Patient isolated to room but bright on approach, medication compliant  He said he feels "75 percent better with his depression " He denies any other symptoms

## 2018-09-19 NOTE — PROGRESS NOTES
Pt calm, and cooperative  Pt states "I am feeling about 80% better today " Pt states that his only compliant is that "I need my toenails cut " Pt is requesting to have someone see his feet prior to discharge  Pt is seclusive to room  Pt is medication compliant

## 2018-09-19 NOTE — PLAN OF CARE
Problem: Ineffective Coping  Goal: Participates in unit activities  Interventions:  - Provide therapeutic environment   - Provide required programming   - Redirect inappropriate behaviors    Outcome: Progressing  Pt out of bed more and intermittently social with staff in his room  He continues to refuse to attend groups and although he has been offered multiple in- room activities,he prefers to work on newspaper word search tasks

## 2018-09-20 PROCEDURE — 97530 THERAPEUTIC ACTIVITIES: CPT

## 2018-09-20 PROCEDURE — 99232 SBSQ HOSP IP/OBS MODERATE 35: CPT | Performed by: PSYCHIATRY & NEUROLOGY

## 2018-09-20 RX ADMIN — MIRTAZAPINE 15 MG: 15 TABLET, FILM COATED ORAL at 21:09

## 2018-09-20 RX ADMIN — QUETIAPINE FUMARATE 300 MG: 300 TABLET ORAL at 21:09

## 2018-09-20 RX ADMIN — DULOXETINE HYDROCHLORIDE 30 MG: 30 CAPSULE, DELAYED RELEASE ORAL at 08:47

## 2018-09-20 RX ADMIN — ROPINIROLE 2 MG: 1 TABLET, FILM COATED ORAL at 21:09

## 2018-09-20 RX ADMIN — TEMAZEPAM 30 MG: 15 CAPSULE ORAL at 21:09

## 2018-09-20 NOTE — PROGRESS NOTES
Pt is cooperative and medication compliant  Pt is bright upon approach and jokes with staff  Pt remains seclusive to his room, advising staff that "he prefers it, as he has always been this way"  Will monitor

## 2018-09-20 NOTE — PLAN OF CARE
Depression     Attend and participate in unit activities, including therapeutic, recreational, and educational groups Not Progressing        Risk for Self Injury/Neglect     Attend and participate in unit activities, including therapeutic, recreational, and educational groups Not Progressing          Alteration in Thoughts and Perception     Treatment Goal: Gain control of psychotic behaviors/thinking, reduce/eliminate presenting symptoms and demonstrate improved reality functioning upon discharge Progressing     Verbalize thoughts and feelings Progressing     Refrain from acting on delusional thinking/internal stimuli Progressing     Agree to be compliant with medication regime, as prescribed and report medication side effects Progressing     Recognize dysfunctional thoughts, communicate reality-based thoughts at the time of discharge Progressing     Complete daily ADLs, including personal hygiene independently, as able Progressing        Anxiety     Anxiety is at manageable level Progressing        Depression     Treatment Goal: Demonstrate behavioral control of depressive symptoms, verbalize feelings of improved mood/affect, and adopt new coping skills prior to discharge Progressing     Verbalize thoughts and feelings Progressing     Refrain from harming self Progressing     Refrain from isolation Progressing     Refrain from self-neglect Progressing     Complete daily ADLs, including personal hygiene independently, as able Progressing        Ineffective Coping     Cooperates with admission process Progressing     Identifies ineffective coping skills Progressing     Identifies healthy coping skills Progressing     Demonstrates healthy coping skills Progressing     Patient/Family participate in treatment and DC plans Progressing     Patient/Family verbalizes awareness of resources Progressing     Understands least restrictive measures Progressing     Free from restraint events Progressing Prexisting or High Potential for Compromised Skin Integrity     Skin integrity is maintained or improved Progressing        Risk for Self Injury/Neglect     Treatment Goal: Remain safe during length of stay, learn and adopt new coping skills, and be free of self-injurious ideation, impulses and acts at the time of discharge Progressing     Verbalize thoughts and feelings Progressing     Refrain from harming self Progressing     Recognize maladaptive responses and adopt new coping mechanisms Progressing     Complete daily ADLs, including personal hygiene independently, as able Progressing

## 2018-09-20 NOTE — PROGRESS NOTES
Progress Note - [address] [facility] [address]  72 y o  male MRN: [id number]  Unit/Bed#: KK0 565-01 Encounter: [id number]    The patient was seen for continuing care and reviewed with treatment team   Staff reports that the patient remains seclusive however he is bright upon approach  He has been medication compliant and voicing to staff that his mood is improving  Today the patient tells me he is 98 9% better  He says he has no anxiety anymore  No suicidal thoughts  He was very talkative and more animated this morning  [facility] saying he is [facility] will [facility]  He says his current medication regimen is allowing him to sleep well for the 1st time in 30 years and he wants to continue on everything he is on here  He said he is now having a good appetite  He does not want to knox his discharge and would like to stay until Monday so all of his aftercare and follow-up can be arranged and his family support be in place  Mental Status Evaluation:  Appearance:  Good eye contact and disheveled   Behavior:  calm, cooperative and friendly   Mood:  improving   Affect: appropriate   Speech: Normal rate and Normal volume   Thought Process:   Tangential   Thought Content:  Does not verbalize delusional material   Perceptual Disturbances: Denies hallucinations and does not appear to be responding to internal stimuli   Risk Potential: No suicidal or homicidal ideation   Attention/Concentration [place] than expected   Orientation:   Oriented x3   Gait/Station: Not observed   Motor Activity: No abnormal movement noted     Progress Toward Goals:  Approaching baseline    Assessment/Plan    Principal Problem:    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 ([facility] [address] )  Active Problems:    CKD (chronic kidney disease) stage 3, GFR 30-59 ml/min    Severe episode of recurrent major depressive disorder, without psychotic features (HCC)    Dehydration    Restless legs      Recommended Treatment:      If the patient remains stable he will be discharged Monday  Continue Cymbalta 30 mg daily  Continue Seroquel 300 mg HS  Continue Remeron 15 mg HS  Continue Requip 2 mg hs  Continue with pharmacotherapy, group therapy, milieu therapy and occupational therapy  The patient will be maintained on the following medications:    Current Facility-Administered Medications:  acetaminophen 650 mg Oral Q6H PRN Benetta Kail, CRNP   acetaminophen 650 mg Oral Q4H PRN Benetta Kail, CRNP   acetaminophen 975 mg Oral Q6H PRN Benetta Kail, CRNP   aluminum-magnesium hydroxide-simethicone 30 mL Oral Q4H PRN Benetta Kail, CRNP   benztropine 1 mg Intramuscular Q8H PRN Benetta Kail, CRNP   benztropine 0 5 mg Oral Q6H PRN Benetta Kail, CRNP   diphenhydrAMINE 25 mg Oral Q6H PRN [place], CRNP   DULoxetine 30 mg Oral Daily Benito Hermoslilo MD   haloperidol 5 mg Oral Q8H PRN Benetta Kail, CRNP   hydrOXYzine HCL 25 mg Oral Q4H PRN Benetta Kail, CRNP   LORazepam 0 5 mg Oral Q4H PRN Benetta Kail, CRNP   magnesium hydroxide 30 mL Oral Daily PRN Benetta Kail, CRNP   mirtazapine 15 mg Oral HS Benito Hermosillo MD   OLANZapine 2 5 mg Intramuscular Q8H PRN Ana Ma Kail, CRNP   QUEtiapine 300 mg Oral HS Benito Hermosillo MD   risperiDONE 0 5 mg Oral Q8H PRN Ana Ma Kail, CRNP   rOPINIRole 2 mg Oral HS Benito Hermosillo MD   temazepam 30 mg Oral HS PRN Benito Hermosillo MD   traMADol 50 mg Oral Q6H PRN Benito Hermosillo MD   traZODone 25 mg Oral HS PRN Ana Ma Kail, CRNP       Risks, benefits and possible side effects of Medications:   Risks, benefits, and possible side effects of medications explained to patient and patient verbalizes understanding

## 2018-09-20 NOTE — PLAN OF CARE
Problem: PHYSICAL THERAPY ADULT  Goal: Performs mobility at highest level of function for planned discharge setting  See evaluation for individualized goals  Treatment/Interventions: Functional transfer training, LE strengthening/ROM, Elevations, Therapeutic exercise, Endurance training, Patient/family training, Equipment eval/education, Gait training, Spoke to nursing  Equipment Recommended: Reyes Woo       See flowsheet documentation for full assessment, interventions and recommendations  Outcome: Progressing  Prognosis: Fair  Problem List: Decreased strength, Decreased endurance, Impaired balance, Decreased mobility, Decreased coordination, Impaired hearing  Assessment: Pt is progressing well with the use of a rolling walker  Slow but steady today and without loss of balance  Cues required for safety with transfers  Denies pain  Pt declined further activity due to receiving lunch tray  Pt would benefit from continued physical therapy to maximize functional mobility and safety  Barriers to Discharge: Inaccessible home environment, Decreased caregiver support     Recommendation: Home with family support, Home PT (Increase family support, emergency cell phone)     PT - OK to Discharge:  (Goals for stairs)    See flowsheet documentation for full assessment

## 2018-09-20 NOTE — PLAN OF CARE
Problem: Ineffective Coping  Goal: Participates in unit activities  Interventions:  - Provide therapeutic environment   - Provide required programming   - Redirect inappropriate behaviors    Outcome: Progressing  Pt expresses desire to go home next week continues to refuse to attend groups  He has been keeping himself out of bed and busy at his desk all day

## 2018-09-20 NOTE — PROGRESS NOTES
Pt is calm, pleasant and cooperative with care  Pt states he is feeling "great" and "can't wait to go home " Pt seclusive to room  Pt is medication compliant

## 2018-09-20 NOTE — PHYSICAL THERAPY NOTE
Physical Therapy Progress Note     09/20/18 1320   Pain Assessment   Pain Assessment No/denies pain   Pain Score No Pain   Restrictions/Precautions   Weight Bearing Precautions Per Order No   Other Precautions Cognitive;Contact/isolation; Fall Risk;Hard of hearing   General   Chart Reviewed Yes   Family/Caregiver Present No   Cognition   Overall Cognitive Status WFL   Arousal/Participation Alert; Responsive   Subjective   Subjective Pt denies pain and agrees to participate  Transfers   Sit to Stand 5  Supervision   Additional items Assist x 1;Verbal cues   Stand to Sit 5  Supervision   Additional items Assist x 1   Ambulation/Elevation   Gait pattern Foward flexed;Decreased foot clearance; Short stride  (slow)   Gait Assistance 5  Supervision   Additional items Assist x 1   Assistive Device Rolling walker   Distance 200 feet and 100 feet   Stair Management Assistance Not tested   Balance   Static Sitting Normal   Dynamic Sitting Good   Static Standing Fair -   Dynamic Standing Fair -   Ambulatory Fair -   Endurance Deficit   Endurance Deficit No   Activity Tolerance   Activity Tolerance Patient tolerated treatment well   Nurse [address]ney to see per RN   Assessment   Prognosis Fair   Problem List Decreased strength;Decreased endurance; Impaired balance;Decreased mobility; Decreased coordination; Impaired hearing   Assessment Pt is progressing well with the use of a rolling walker  Slow but steady today and without loss of balance  Cues required for safety with transfers  Denies pain  Pt declined further activity due to receiving lunch tray  Pt would benefit from continued physical therapy to maximize functional mobility and safety  Barriers to Discharge Inaccessible home environment;Decreased caregiver support   Goals   Patient Goals To go home   STG Expiration Date 10/01/18   Treatment Day 1   Plan   Treatment/Interventions Functional transfer training;LE strengthening/ROM; Elevations; Therapeutic exercise; Endurance training;Patient/family training;Bed mobility;Gait training;Spoke to nursing   Progress Progressing toward goals   PT Frequency (2-3x/week)   Recommendation   Recommendation Home with family support;Home PT  (Increase family support, emergency cell phone)   Equipment Recommended Walker  (RW)   PT - OK to Discharge (Goals for stairs)     Jake Rivers, PTA

## 2018-09-21 PROCEDURE — 99232 SBSQ HOSP IP/OBS MODERATE 35: CPT | Performed by: PSYCHIATRY & NEUROLOGY

## 2018-09-21 RX ADMIN — TEMAZEPAM 30 MG: 15 CAPSULE ORAL at 21:28

## 2018-09-21 RX ADMIN — ROPINIROLE 2 MG: 1 TABLET, FILM COATED ORAL at 21:28

## 2018-09-21 RX ADMIN — QUETIAPINE FUMARATE 300 MG: 300 TABLET ORAL at 21:28

## 2018-09-21 RX ADMIN — DULOXETINE HYDROCHLORIDE 30 MG: 30 CAPSULE, DELAYED RELEASE ORAL at 08:05

## 2018-09-21 RX ADMIN — MIRTAZAPINE 15 MG: 15 TABLET, FILM COATED ORAL at 21:28

## 2018-09-21 NOTE — PROGRESS NOTES
Pt brightens with approach, cooperative, and medication compliant  Denies SI/HI and states he is looking forward to being discharged  Remains mostly  seclusive to room but does wonder halls occasionally

## 2018-09-21 NOTE — PROGRESS NOTES
Progress Note - [address]  72 y o  male MRN: [id number]  Unit/Bed#: TY9 565-01 Encounter: [id number]    The patient was seen for continuing care and reviewed with treatment team   Staff reports that the patient remains seclusive however this is his baseline  They have had no issues with him  He has expressed that his mood is improving  He was able to sleep last night but had an early awakening  He will be discharged Monday  The patient brightens upon approach and jokes around with staff  He said his mood is 99 9% better  He said that the last percent will come when he leaves here Monday  He said his sleep and appetite are both great  He has no suicidal ideation  His only complaint is that he has not had his toenails cut during his admission  However we previously explained to him and again reinforced with him that podiatry cannot be consulted simply for long toenails, there has to be another issue going on  His can be handled on an outpatient basis         Mental Status Evaluation:  Appearance:  Good eye contact and disheveled   Behavior:  calm, cooperative and friendly   Mood:  improving   Affect: appropriate   Speech: Loud- hard of hearing   Thought Process:  Goal directed and coherent   Thought Content:  Does not verbalize delusional material   Perceptual Disturbances: Denies hallucinations and does not appear to be responding to internal stimuli   Risk Potential: No suicidal or homicidal ideation   Attention/Concentration [place] than expected   Orientation:   Oriented x3   Gait/Station:  needs assistive device   Motor Activity: No abnormal movement noted     Progress Toward Goals:  Approaching baseline    Assessment/Plan    Principal Problem:    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 ([facility] [address] )  Active Problems:    CKD (chronic kidney disease) stage 3, GFR 30-59 ml/min    Severe episode of recurrent major depressive disorder, without psychotic features ([facility] )    Dehydration    Restless legs      Recommended Treatment:      If the patient remains stable he will be discharged Monday  Continue Cymbalta 30 mg daily  Continue mirtazapine 15 mg HS  Continue Seroquel 300 mg HS  Continue Requip 2 mg hs  Continue with pharmacotherapy, group therapy, milieu therapy and occupational therapy  The patient will be maintained on the following medications:    Current Facility-Administered Medications:  acetaminophen 650 mg Oral Q6H PRN Cleda Pickerel, CRNP   acetaminophen 650 mg Oral Q4H PRN Cleda Pickerel, CRNP   acetaminophen 975 mg Oral Q6H PRN Cleda Pickerel, CRNP   aluminum-magnesium hydroxide-simethicone 30 mL Oral Q4H PRN Cleda Pickerel, CRNP   benztropine 1 mg Intramuscular Q8H PRN Cleda Pickerel, CRNP   benztropine 0 5 mg Oral Q6H PRN Cleda Pickerel, CRNP   diphenhydrAMINE 25 mg Oral Q6H PRN Abelino [place], CRNP   DULoxetine 30 mg Oral Daily Qian Jackson MD   haloperidol 5 mg Oral Q8H PRN Cleda Pickerel, CRNP   hydrOXYzine HCL 25 mg Oral Q4H PRN Cleda Pickerel, CRNP   LORazepam 0 5 mg Oral Q4H PRN Cleda Pickerel, CRNP   magnesium hydroxide 30 mL Oral Daily PRN Cleda Pickerel, CRNP   mirtazapine 15 mg Oral HS Qian Jackson MD   OLANZapine 2 5 mg Intramuscular Q8H PRN Cleda Pickerel, CRNP   QUEtiapine 300 mg Oral HS Qian Jackson MD   risperiDONE 0 5 mg Oral Q8H PRN Cleda Pickerel, CRNP   rOPINIRole 2 mg Oral HS Qian Jackson MD   temazepam 30 mg Oral HS PRN Qian Jackson MD   traMADol 50 mg Oral Q6H PRN Qian Jackson MD   traZODone 25 mg Oral HS PRN Cleda Pickerel, CRNP       Risks, benefits and possible side effects of Medications:   Risks, benefits, and possible side effects of medications explained to patient and patient verbalizes understanding

## 2018-09-21 NOTE — PLAN OF CARE
Problem: Ineffective Coping  Goal: Participates in unit activities  Interventions:  - Provide therapeutic environment   - Provide required programming   - Redirect inappropriate behaviors    Outcome: Progressing  Pt  Isolated in his room doing pen paper tasks but is receptive to staff conversations especially about [place] cooking  He reports not wanting to come to group because it would remind him of his past trauma and he would rather just stay to himself  Pt more alert and pleasant awaiting discharge next week

## 2018-09-21 NOTE — MEDICAL STUDENT
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)  Active Problems:    CKD (chronic kidney disease) stage 3, GFR 30-59 ml/min    Severe episode of recurrent major depressive disorder, without psychotic features ([address] )    Dehydration    Restless legs      Behavior over the last 24 hours:  Staff reports pt has been calm, pleasant, cooperative, reclusive at night but this morning out of room in the milieu, joking with staff  Pt states he is sleeping okay, and eating well  He denies SI/HI  He is in contact with his sister in law regarding discharge, joking with her on the phone as well  Sleep: normal  Appetite: normal  Medication side effects: No  ROS: no complaints; toe nails need to be clipped    Mental Status Evaluation:  Appearance:  disheveled   Behavior:  normal   Speech:  normal pitch and normal volume   Mood:  normal   Affect:  mood-congruent   Thought Process:  goal directed   Thought Content:  normal   Perceptual Disturbances: None   Risk Potential: denies SI   Sensorium:  person, place, time/date and situation   Cognition:  grossly intact   Consciousness:  alert and awake    Attention: [place] than expected   Insight:  fair   Judgment: fair   Gait/Station: uses a walker   Motor Activity: no abnormal movements     Progress Toward Goals: progressing    Recommended Treatment: Continue with pharmacotherapy, group therapy, milieu therapy and occupational therapy  Risks, benefits and possible side effects of Medications:   Patient does not verbalize understanding at this time and will require further explanation        Medications:   current meds:   Current Facility-Administered Medications   Medication Dose Route Frequency    acetaminophen (TYLENOL) tablet 650 mg  650 mg Oral Q6H PRN    acetaminophen (TYLENOL) tablet 650 mg  650 mg Oral Q4H PRN    acetaminophen (TYLENOL) tablet 975 mg  975 mg Oral Q6H PRN    aluminum-magnesium hydroxide-simethicone (MYLANTA) 200-200-20 mg/5 mL oral suspension 30 mL  30 mL Oral Q4H PRN    benztropine (COGENTIN) injection 1 mg  1 mg Intramuscular Q8H PRN    benztropine (COGENTIN) tablet 0 5 mg  0 5 mg Oral Q6H PRN    diphenhydrAMINE (BENADRYL) tablet 25 mg  25 mg Oral Q6H PRN    DULoxetine (CYMBALTA) delayed release capsule 30 mg  30 mg Oral Daily    haloperidol (HALDOL) tablet 5 mg  5 mg Oral Q8H PRN    hydrOXYzine HCL (ATARAX) tablet 25 mg  25 mg Oral Q4H PRN    LORazepam (ATIVAN) tablet 0 5 mg  0 5 mg Oral Q4H PRN    magnesium hydroxide (MILK OF MAGNESIA) 400 mg/5 mL oral suspension 30 mL  30 mL Oral Daily PRN    mirtazapine (REMERON) tablet 15 mg  15 mg Oral HS    OLANZapine (ZyPREXA) IM injection 2 5 mg  2 5 mg Intramuscular Q8H PRN    QUEtiapine (SEROquel) tablet 300 mg  300 mg Oral HS    risperiDONE (RisperDAL M-TABS) dispersible tablet 0 5 mg  0 5 mg Oral Q8H PRN    rOPINIRole (REQUIP) tablet 2 mg  2 mg Oral HS    temazepam (RESTORIL) capsule 30 mg  30 mg Oral HS PRN    traMADol (ULTRAM) tablet 50 mg  50 mg Oral Q6H PRN    traZODone (DESYREL) tablet 25 mg  25 mg Oral HS PRN     Labs:     Counseling / Coordination of Care  Total floor / unit time spent today 30  minutes  Greater than 50% of total time was spent with the patient and / or family counseling and / or coordination of care   A description of the counseling / coordination of care:

## 2018-09-22 PROCEDURE — 99232 SBSQ HOSP IP/OBS MODERATE 35: CPT | Performed by: PSYCHIATRY & NEUROLOGY

## 2018-09-22 RX ADMIN — QUETIAPINE FUMARATE 300 MG: 300 TABLET ORAL at 21:58

## 2018-09-22 RX ADMIN — TEMAZEPAM 30 MG: 15 CAPSULE ORAL at 23:37

## 2018-09-22 RX ADMIN — MIRTAZAPINE 15 MG: 15 TABLET, FILM COATED ORAL at 21:58

## 2018-09-22 RX ADMIN — ROPINIROLE 2 MG: 1 TABLET, FILM COATED ORAL at 21:58

## 2018-09-22 RX ADMIN — DULOXETINE HYDROCHLORIDE 30 MG: 30 CAPSULE, DELAYED RELEASE ORAL at 09:07

## 2018-09-22 NOTE — PLAN OF CARE
Alteration in Thoughts and Perception     Attend and participate in unit activities, including therapeutic, recreational, and educational groups Not Progressing        Depression     Attend and participate in unit activities, including therapeutic, recreational, and educational groups Not Progressing        Risk for Self Injury/Neglect     Attend and participate in unit activities, including therapeutic, recreational, and educational groups Not Progressing          Alteration in Thoughts and Perception     Treatment Goal: Gain control of psychotic behaviors/thinking, reduce/eliminate presenting symptoms and demonstrate improved reality functioning upon discharge Progressing     Verbalize thoughts and feelings Progressing     Refrain from acting on delusional thinking/internal stimuli Progressing     Agree to be compliant with medication regime, as prescribed and report medication side effects Progressing     Recognize dysfunctional thoughts, communicate reality-based thoughts at the time of discharge Progressing     Complete daily ADLs, including personal hygiene independently, as able Progressing        Anxiety     Anxiety is at manageable level Progressing        Depression     Treatment Goal: Demonstrate behavioral control of depressive symptoms, verbalize feelings of improved mood/affect, and adopt new coping skills prior to discharge Progressing     Verbalize thoughts and feelings Progressing     Refrain from harming self Progressing     Refrain from isolation Progressing     Refrain from self-neglect Progressing     Complete daily ADLs, including personal hygiene independently, as able Progressing        Ineffective Coping     Cooperates with admission process Progressing     Identifies ineffective coping skills Progressing     Identifies healthy coping skills Progressing     Demonstrates healthy coping skills Progressing     Patient/Family participate in treatment and DC plans Progressing  Patient/Family verbalizes awareness of resources Progressing     Understands least restrictive measures Progressing     Free from restraint events Progressing        Prexisting or High Potential for Compromised Skin Integrity     Skin integrity is maintained or improved Progressing        Risk for Self Injury/Neglect     Treatment Goal: Remain safe during length of stay, learn and adopt new coping skills, and be free of self-injurious ideation, impulses and acts at the time of discharge Progressing     Verbalize thoughts and feelings Progressing     Refrain from harming self Progressing     Recognize maladaptive responses and adopt new coping mechanisms Progressing     Complete daily ADLs, including personal hygiene independently, as able Progressing

## 2018-09-22 NOTE — PROGRESS NOTES
Pt is calm and cooperative and continues to be medication compliant  Pt brightens upon approach, stating that he is 99 9% and will be 100% when he is driving out of here on Monday  Pt sits in his room at his desk  Social with staff  Pt currently denies s/s, denies SI and HI  Will continue to monitor

## 2018-09-22 NOTE — PROGRESS NOTES
Pt is calm, pleasant and cooperative  Pt is seen in the milieu at times throughout the evening but mostly seclusive to his room  Pt does not socialize with his peers  Pt states he is feeling "99 9% better " Pt is medication compliant

## 2018-09-23 PROCEDURE — 99232 SBSQ HOSP IP/OBS MODERATE 35: CPT | Performed by: PSYCHIATRY & NEUROLOGY

## 2018-09-23 RX ADMIN — MIRTAZAPINE 15 MG: 15 TABLET, FILM COATED ORAL at 21:47

## 2018-09-23 RX ADMIN — DULOXETINE HYDROCHLORIDE 30 MG: 30 CAPSULE, DELAYED RELEASE ORAL at 09:21

## 2018-09-23 RX ADMIN — TEMAZEPAM 30 MG: 15 CAPSULE ORAL at 21:56

## 2018-09-23 RX ADMIN — ROPINIROLE 2 MG: 1 TABLET, FILM COATED ORAL at 21:47

## 2018-09-23 RX ADMIN — QUETIAPINE FUMARATE 300 MG: 300 TABLET ORAL at 21:47

## 2018-09-23 NOTE — PROGRESS NOTES
Progress Note - [address] [facility]  72 y o  male MRN: [id number]  Unit/Bed#: DN0 565-01 Encounter: [id number]    Patient seen, chart reviewed, discussed with staff  Nursing staff notes the patient has been calm and compliant with medications and treatment plan  He is sleeping well and eating adequately  Patient overall is much brighter and more conversive  He states that he is feeling much better since being here in the hospital   He reports his depression and anxiety has been stable  He is anticipating discharge home this week and looking for to that  He denies any side effects with his medications  Physically he reports that he is feeling well      Behavior over the last 24 hours:  unchanged  Sleep: normal  Appetite: normal  Medication side effects: No  ROS: no complaints  /59 (BP Location: Left arm)   Pulse 79   Temp (!) 96 8 °F (36 °C) (Tympanic)   Resp 18   Ht 5' 11" (1 803 m)   Wt 100 kg (220 lb 10 9 oz)   SpO2 95%   BMI 30 78 kg/m²     Medications:   Current Facility-Administered Medications   Medication Dose Route Frequency    acetaminophen (TYLENOL) tablet 650 mg  650 mg Oral Q6H PRN    acetaminophen (TYLENOL) tablet 650 mg  650 mg Oral Q4H PRN    acetaminophen (TYLENOL) tablet 975 mg  975 mg Oral Q6H PRN    aluminum-magnesium hydroxide-simethicone (MYLANTA) 200-200-20 mg/5 mL oral suspension 30 mL  30 mL Oral Q4H PRN    benztropine (COGENTIN) injection 1 mg  1 mg Intramuscular Q8H PRN    benztropine (COGENTIN) tablet 0 5 mg  0 5 mg Oral Q6H PRN    diphenhydrAMINE (BENADRYL) tablet 25 mg  25 mg Oral Q6H PRN    DULoxetine (CYMBALTA) delayed release capsule 30 mg  30 mg Oral Daily    haloperidol (HALDOL) tablet 5 mg  5 mg Oral Q8H PRN    hydrOXYzine HCL (ATARAX) tablet 25 mg  25 mg Oral Q4H PRN    LORazepam (ATIVAN) tablet 0 5 mg  0 5 mg Oral Q4H PRN    magnesium hydroxide (MILK OF MAGNESIA) 400 mg/5 mL oral suspension 30 mL  30 mL Oral Daily PRN    mirtazapine (REMERON) tablet 15 mg  15 mg Oral HS    OLANZapine (ZyPREXA) IM injection 2 5 mg  2 5 mg Intramuscular Q8H PRN    QUEtiapine (SEROquel) tablet 300 mg  300 mg Oral HS    risperiDONE (RisperDAL M-TABS) dispersible tablet 0 5 mg  0 5 mg Oral Q8H PRN    rOPINIRole (REQUIP) tablet 2 mg  2 mg Oral HS    temazepam (RESTORIL) capsule 30 mg  30 mg Oral HS PRN    traMADol (ULTRAM) tablet 50 mg  50 mg Oral Q6H PRN    traZODone (DESYREL) tablet 25 mg  25 mg Oral HS PRN       Labs:   Admission on 08/28/2018   Component Date Value Ref Range Status    Sodium 09/09/2018 140  136 - 145 mmol/L Final    Potassium 09/09/2018 3 4* 3 5 - 5 3 mmol/L Final    Chloride 09/09/2018 104  100 - 108 mmol/L Final    CO2 09/09/2018 29  21 - 32 mmol/L Final    ANION GAP 09/09/2018 7  4 - 13 mmol/L Final    BUN 09/09/2018 20  5 - 25 mg/dL Final    Creatinine 09/09/2018 1 84* 0 60 - 1 30 mg/dL Final    Glucose 09/09/2018 86  65 - 140 mg/dL Final    Calcium 09/09/2018 8 2* 8 3 - 10 1 mg/dL Final    eGFR 09/09/2018 36  ml/min/1 73sq m Final    Sodium 09/13/2018 137  136 - 145 mmol/L Final    Potassium 09/13/2018 4 0  3 5 - 5 3 mmol/L Final    Chloride 09/13/2018 100  100 - 108 mmol/L Final    CO2 09/13/2018 34* 21 - 32 mmol/L Final    ANION GAP 09/13/2018 3* 4 - 13 mmol/L Final    BUN 09/13/2018 21  5 - 25 mg/dL Final    Creatinine 09/13/2018 2 16* 0 60 - 1 30 mg/dL Final    Glucose 09/13/2018 92  65 - 140 mg/dL Final    Calcium 09/13/2018 8 7  8 3 - 10 1 mg/dL Final    AST 09/13/2018 11  5 - 45 U/L Final    ALT 09/13/2018 16  12 - 78 U/L Final    Alkaline Phosphatase 09/13/2018 70  46 - 116 U/L Final    Total Protein 09/13/2018 6 6  6 4 - 8 2 g/dL Final    Albumin 09/13/2018 3 3* 3 5 - 5 0 g/dL Final    Total Bilirubin 09/13/2018 0 51  0 20 - 1 00 mg/dL Final    eGFR 09/13/2018 30  ml/min/1 73sq m Final    Sodium 09/14/2018 141  136 - 145 mmol/L Final    Potassium 09/14/2018 3 4* 3 5 - 5 3 mmol/L Final  Chloride 09/14/2018 104  100 - 108 mmol/L Final    CO2 09/14/2018 26  21 - 32 mmol/L Final    ANION GAP 09/14/2018 11  4 - 13 mmol/L Final    BUN 09/14/2018 23  5 - 25 mg/dL Final    Creatinine 09/14/2018 1 93* 0 60 - 1 30 mg/dL Final    Glucose 09/14/2018 86  65 - 140 mg/dL Final    Calcium 09/14/2018 8 4  8 3 - 10 1 mg/dL Final    eGFR 09/14/2018 34  ml/min/1 73sq m Final    WBC 09/14/2018 5 18  4 31 - 10 16 Thousand/uL Final    RBC 09/14/2018 4 94  3 88 - 5 62 Million/uL Final    Hemoglobin 09/14/2018 13 8  12 0 - 17 0 g/dL Final    Hematocrit 09/14/2018 42 4  36 5 - 49 3 % Final    MCV 09/14/2018 86  82 - 98 fL Final    MCH 09/14/2018 27 9  26 8 - 34 3 pg Final    MCHC 09/14/2018 32 5  31 4 - 37 4 g/dL Final    RDW 09/14/2018 13 7  11 6 - 15 1 % Final    MPV 09/14/2018 11 1  8 9 - 12 7 fL Final    Platelets 88/08/4654 154  149 - 390 Thousands/uL Final    nRBC 09/14/2018 0  /100 WBCs Final    Neutrophils Relative 09/14/2018 55  43 - 75 % Final    Immat GRANS % 09/14/2018 0  0 - 2 % Final    Lymphocytes Relative 09/14/2018 31  14 - 44 % Final    Monocytes Relative 09/14/2018 10  4 - 12 % Final    Eosinophils Relative 09/14/2018 3  0 - 6 % Final    Basophils Relative 09/14/2018 1  0 - 1 % Final    Neutrophils Absolute 09/14/2018 2 85  1 85 - 7 62 Thousands/µL Final    Immature Grans Absolute 09/14/2018 0 01  0 00 - 0 20 Thousand/uL Final    Lymphocytes Absolute 09/14/2018 1 60  0 60 - 4 47 Thousands/µL Final    Monocytes Absolute 09/14/2018 0 53  0 17 - 1 22 Thousand/µL Final    Eosinophils Absolute 09/14/2018 0 15  0 00 - 0 61 Thousand/µL Final    Basophils Absolute 09/14/2018 0 04  0 00 - 0 10 Thousands/µL Final    TSH 3RD GENERATON 09/14/2018 2 060  0 358 - 3 740 uIU/mL Final    Sodium 09/15/2018 141  136 - 145 mmol/L Final    Potassium 09/15/2018 4 0  3 5 - 5 3 mmol/L Final    Chloride 09/15/2018 105  100 - 108 mmol/L Final    CO2 09/15/2018 28  21 - 32 mmol/L Final    ANION GAP 09/15/2018 8  4 - 13 mmol/L Final    BUN 09/15/2018 25  5 - 25 mg/dL Final    Creatinine 09/15/2018 1 92* 0 60 - 1 30 mg/dL Final    Glucose 09/15/2018 85  65 - 140 mg/dL Final    Calcium 09/15/2018 8 3  8 3 - 10 1 mg/dL Final    eGFR 09/15/2018 34  ml/min/1 73sq m Final    WBC 09/16/2018 6 87  4 31 - 10 16 Thousand/uL Final    RBC 09/16/2018 4 54  3 88 - 5 62 Million/uL Final    Hemoglobin 09/16/2018 12 6  12 0 - 17 0 g/dL Final    Hematocrit 09/16/2018 39 3  36 5 - 49 3 % Final    MCV 09/16/2018 87  82 - 98 fL Final    MCH 09/16/2018 27 8  26 8 - 34 3 pg Final    MCHC 09/16/2018 32 1  31 4 - 37 4 g/dL Final    RDW 09/16/2018 13 7  11 6 - 15 1 % Final    MPV 09/16/2018 11 6  8 9 - 12 7 fL Final    Platelets 56/14/2328 156  149 - 390 Thousands/uL Final    nRBC 09/16/2018 0  /100 WBCs Final    Neutrophils Relative 09/16/2018 65  43 - 75 % Final    Immat GRANS % 09/16/2018 0  0 - 2 % Final    Lymphocytes Relative 09/16/2018 23  14 - 44 % Final    Monocytes Relative 09/16/2018 8  4 - 12 % Final    Eosinophils Relative 09/16/2018 3  0 - 6 % Final    Basophils Relative 09/16/2018 1  0 - 1 % Final    Neutrophils Absolute 09/16/2018 4 53  1 85 - 7 62 Thousands/µL Final    Immature Grans Absolute 09/16/2018 0 02  0 00 - 0 20 Thousand/uL Final    Lymphocytes Absolute 09/16/2018 1 57  0 60 - 4 47 Thousands/µL Final    Monocytes Absolute 09/16/2018 0 54  0 17 - 1 22 Thousand/µL Final    Eosinophils Absolute 09/16/2018 0 17  0 00 - 0 61 Thousand/µL Final    Basophils Absolute 09/16/2018 0 04  0 00 - 0 10 Thousands/µL Final    Sodium 09/16/2018 139  136 - 145 mmol/L Final    Potassium 09/16/2018 3 5  3 5 - 5 3 mmol/L Final    Chloride 09/16/2018 104  100 - 108 mmol/L Final    CO2 09/16/2018 27  21 - 32 mmol/L Final    ANION GAP 09/16/2018 8  4 - 13 mmol/L Final    BUN 09/16/2018 23  5 - 25 mg/dL Final    Creatinine 09/16/2018 1 94* 0 60 - 1 30 mg/dL Final    Glucose 09/16/2018 78  65 - 140 mg/dL Final    Calcium 09/16/2018 8 4  8 3 - 10 1 mg/dL Final    AST 09/16/2018 10  5 - 45 U/L Final    ALT 09/16/2018 16  12 - 78 U/L Final    Alkaline Phosphatase 09/16/2018 63  46 - 116 U/L Final    Total Protein 09/16/2018 6 5  6 4 - 8 2 g/dL Final    Albumin 09/16/2018 3 2* 3 5 - 5 0 g/dL Final    Total Bilirubin 09/16/2018 0 37  0 20 - 1 00 mg/dL Final    eGFR 09/16/2018 34  ml/min/1 73sq m Final    Sodium 09/18/2018 141  136 - 145 mmol/L Final    Potassium 09/18/2018 3 9  3 5 - 5 3 mmol/L Final    Chloride 09/18/2018 106  100 - 108 mmol/L Final    CO2 09/18/2018 31  21 - 32 mmol/L Final    ANION GAP 09/18/2018 4  4 - 13 mmol/L Final    BUN 09/18/2018 32* 5 - 25 mg/dL Final    Creatinine 09/18/2018 1 95* 0 60 - 1 30 mg/dL Final    Glucose 09/18/2018 84  65 - 140 mg/dL Final    Glucose, Fasting 09/18/2018 84  65 - 99 mg/dL Final    Calcium 09/18/2018 8 0* 8 3 - 10 1 mg/dL Final    eGFR 09/18/2018 34  ml/min/1 73sq m Final       Mental Status Evaluation:  Appearance:  age appropriate and casually dressed   Behavior:  Calm, friendly, cooperative   Speech:  normal pitch and normal volume   Mood:  euthymic   Affect:  mood-congruent   Thought Process:  circumstantial   Thought Content:  No delusions voiced   Perceptual Disturbances: No auditory or visual hallucinations   Risk Potential: Suicidal Ideations none, Homicidal Ideations none and Potential for Aggression No   Sensorium:  person, place and time/date   Cognition:  grossly intact   Consciousness:  alert and awake    Attention: attention span appeared shorter than expected for age   Insight:  fair   Judgment: fair   Gait/Station: slow and Ambulates with walker   Motor Activity: no abnormal movements     Progress Toward Goals:  Approaching baseline    Assessment/Plan   Principal Problem:    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 ([facility] [address] )  Active Problems:    CKD (chronic kidney disease) stage 3, GFR 30-59 ml/min    Severe episode of recurrent major depressive disorder, without psychotic features ([facility] [address] )    Dehydration    Restless legs      Recommended Treatment:   Continue with medications as ordered  Cymbalta 30 mg daily  Remeron 15 mg at HS  Seroquel 300 mg at HS  Requip 2 mg at HS      Continue with group therapy, milieu therapy and occupational therapy  Risks, benefits and possible side effects of Medications:   Risks, benefits, and possible side effects of medications explained to patient and patient verbalizes understanding  Counseling / Coordination of Care  Total floor / unit time spent today 25 minutes  Greater than 50% of total time was spent with the patient and / or family counseling and / or coordination of care   A description of the counseling / coordination of care:  Medication management, chart review, patient interview

## 2018-09-23 NOTE — PROGRESS NOTES
Pt cooperative with care and compliant with medications  States he is looking forward to and feels ready for discharge  Remained in room, but more social with staff upon approach  Denies SI and stated his depression is under control  Monitoring

## 2018-09-23 NOTE — PROGRESS NOTES
Pt is cooperative and medication compliant  Brightens upon approach, is friendly and joking with staff  Pt is seclusive to his room, but makes needs known and is social with staff when approached  Pt denies s/s  Will continue to monitor

## 2018-09-23 NOTE — PROGRESS NOTES
Pt very bright and smiling  Denies all s/s  Very complimentary of [facility] staff and physicians  Feels that he has improved and is thankful for same  Requested Restoril for sleep which was effective

## 2018-09-24 VITALS
RESPIRATION RATE: 16 BRPM | HEIGHT: 71 IN | BODY MASS INDEX: 30.9 KG/M2 | SYSTOLIC BLOOD PRESSURE: 145 MMHG | DIASTOLIC BLOOD PRESSURE: 79 MMHG | WEIGHT: 220.68 LBS | TEMPERATURE: 97.8 F | OXYGEN SATURATION: 96 % | HEART RATE: 75 BPM

## 2018-09-24 PROCEDURE — 99239 HOSP IP/OBS DSCHRG MGMT >30: CPT | Performed by: PSYCHIATRY & NEUROLOGY

## 2018-09-24 RX ORDER — QUETIAPINE FUMARATE 300 MG/1
300 TABLET, FILM COATED ORAL
Qty: 30 TABLET | Refills: 0 | Status: SHIPPED | OUTPATIENT
Start: 2018-09-24 | End: 2021-04-16 | Stop reason: HOSPADM

## 2018-09-24 RX ORDER — DIVALPROEX SODIUM 500 MG/1
1000 TABLET, EXTENDED RELEASE ORAL
Qty: 60 TABLET | Refills: 0 | Status: SHIPPED | OUTPATIENT
Start: 2018-09-24 | End: 2021-04-16 | Stop reason: HOSPADM

## 2018-09-24 RX ORDER — MIRTAZAPINE 15 MG/1
15 TABLET, FILM COATED ORAL
Qty: 30 TABLET | Refills: 0 | Status: ON HOLD | OUTPATIENT
Start: 2018-09-24 | End: 2021-04-15 | Stop reason: SDUPTHER

## 2018-09-24 RX ORDER — TEMAZEPAM 30 MG/1
30 CAPSULE ORAL
Qty: 30 CAPSULE | Refills: 0 | Status: SHIPPED | OUTPATIENT
Start: 2018-09-24 | End: 2020-05-20

## 2018-09-24 RX ORDER — DIVALPROEX SODIUM 500 MG/1
1000 TABLET, EXTENDED RELEASE ORAL
Status: DISCONTINUED | OUTPATIENT
Start: 2018-09-24 | End: 2018-09-24 | Stop reason: HOSPADM

## 2018-09-24 RX ORDER — ROPINIROLE 2 MG/1
2 TABLET, FILM COATED ORAL
Qty: 30 TABLET | Refills: 0 | Status: SHIPPED | OUTPATIENT
Start: 2018-09-24 | End: 2022-05-24

## 2018-09-24 RX ORDER — DULOXETIN HYDROCHLORIDE 30 MG/1
30 CAPSULE, DELAYED RELEASE ORAL DAILY
Qty: 30 CAPSULE | Refills: 0 | Status: SHIPPED | OUTPATIENT
Start: 2018-09-25 | End: 2021-04-16 | Stop reason: HOSPADM

## 2018-09-24 RX ADMIN — DULOXETINE HYDROCHLORIDE 30 MG: 30 CAPSULE, DELAYED RELEASE ORAL at 09:18

## 2018-09-24 NOTE — PROGRESS NOTES
Pt is cooperative with care and is medication compliant  Pt resides in his room, doing crosswords puzzles at his desk  Pt is bright upon approach and social with staff  Will monitor

## 2018-09-24 NOTE — NURSING NOTE
AVS reviewed with patient and sister  Scripts reviewed with patient and sister  Belongings returned to patient

## 2018-09-24 NOTE — DISCHARGE SUMMARY
Discharge Summary - [address] [address]  72 y o  male MRN: [id number]  Unit/Bed#: QG7 565-01 Encounter: [id number]     Admission Date: 8/28/2018         Discharge Date: 9/24/2018 11:35 AM    Attending Psychiatrist: No att  providers found    Reason for Admission/HPI:       Meds/Allergies     all current active meds have been reviewed    No Known Allergies    Objective     Vital signs in last 24 hours:  Temp:  [97 2 °F (36 2 °C)-97 8 °F (36 6 °C)] 97 8 °F (36 6 °C)  HR:  [75-78] 75  Resp:  [16-18] 16  BP: (145-156)/(79-84) 145/79    No intake or output data in the 24 hours ending 09/24/18 1207    Hospital Course: The patient was admitted to the inpatient psychiatric unit and started on every 15 minutes precautions  A treatment plan was formed with focus on pharmacotherapy and milieu therapy, group therapy and individual psychotherapy when indicated  Psychiatric medications were titrated over the hospital stay and milieu therapy was utilized  To addressdepressive symptoms and Severe anxiety the patient was started on Cymbalta and Remeron and later quetiapine  He complained of restless legs and ropinirole was added which was quite helpful    Medication doses were titrated during the hospital course  In the beginning he was not communicating and was spending most of his time in bed and refused to talk  He was not eating or drinking and became dehydrated and IV hydration was started  The patient responded favorably to the medications  Patient's symptoms improved gradually over the hospital course  At the end of treatment the patient was doing well  Mood was stable at the time of discharge  The patient denied suicidal ideation, intent or plan at the time of discharge and denied homicidal ideation, intent or plan at the time of discharge  There was no overt psychosis at the time of discharge  Sleep and appetite were improved   The patient was tolerating medications and was not reporting any significant side effects at the time of discharge  There had been questionable history of seizures in the past   I did start him on Depakote a 1000 milligrams at bedtime which he had been prescribed in the past but was noncompliant with  He was referred to Neurology through [facility]    Since the patient was doing well at the end of the hospitalization, treatment team felt that the patient could be safely discharged to outpatient care  The outpatient follow up with a psychiatrist was arranged by the unit  upon discharge      Mental Status at Time of Discharge:   Appearance:  Adequate hygiene and grooming and Good eye contact   Behavior:  calm, cooperative and friendly   Speech:   Language: Normal rate and Normal volume  No overt abnormality   Mood:  euthymic   Affect:   Associations: appropriate and broad  Tightly connected   Thought Process:  Goal directed and coherent   Thought Content:  Does not verbalize delusional material   Perceptual Disturbances: Denies hallucinations and does not appear to be responding to internal stimuli     Risk Potential: No suicidal or homicidal ideation   Orientation   Language Oriented x 3  anomia No   Memory  Fund of knowledge Not tested  Not assessed   Attention/Concentration attention span and concentration were age appropriate   Insight:  Good insight   Judgment: Good judgment   Gait/Station: Not observed   Motor Activity: No abnormal movement noted       Admission Diagnosis:  Principal Problem:    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 ([facility] )  Active Problems:    CKD (chronic kidney disease) stage 3, GFR 30-59 ml/min    Severe episode of recurrent major depressive disorder, without psychotic features (HCC)    Dehydration    Restless legs      Discharge Diagnosis:     Principal Problem:    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 ([facility] )  Active Problems:    CKD (chronic kidney disease) stage 3, GFR 30-59 ml/min    Severe episode of recurrent major depressive disorder, without psychotic features ([address] )    Dehydration    Restless legs  Resolved Problems:    * No resolved hospital problems   *      Lab results:    Admission on 08/28/2018, Discharged on 09/24/2018   Component Date Value    Sodium 09/09/2018 140     Potassium 09/09/2018 3 4*    Chloride 09/09/2018 104     CO2 09/09/2018 29     ANION GAP 09/09/2018 7     BUN 09/09/2018 20     Creatinine 09/09/2018 1 84*    Glucose 09/09/2018 86     Calcium 09/09/2018 8 2*    eGFR 09/09/2018 36     Sodium 09/13/2018 137     Potassium 09/13/2018 4 0     Chloride 09/13/2018 100     CO2 09/13/2018 34*    ANION GAP 09/13/2018 3*    BUN 09/13/2018 21     Creatinine 09/13/2018 2 16*    Glucose 09/13/2018 92     Calcium 09/13/2018 8 7     AST 09/13/2018 11     ALT 09/13/2018 16     Alkaline Phosphatase 09/13/2018 70     Total Protein 09/13/2018 6 6     Albumin 09/13/2018 3 3*    Total Bilirubin 09/13/2018 0 51     eGFR 09/13/2018 30     Sodium 09/14/2018 141     Potassium 09/14/2018 3 4*    Chloride 09/14/2018 104     CO2 09/14/2018 26     ANION GAP 09/14/2018 11     BUN 09/14/2018 23     Creatinine 09/14/2018 1 93*    Glucose 09/14/2018 86     Calcium 09/14/2018 8 4     eGFR 09/14/2018 34     WBC 09/14/2018 5 18     RBC 09/14/2018 4 94     Hemoglobin 09/14/2018 13 8     Hematocrit 09/14/2018 42 4     MCV 09/14/2018 86     MCH 09/14/2018 27 9     MCHC 09/14/2018 32 5     RDW 09/14/2018 13 7     MPV 09/14/2018 11 1     Platelets 96/90/8025 154     nRBC 09/14/2018 0     Neutrophils Relative 09/14/2018 55     Immat GRANS % 09/14/2018 0     Lymphocytes Relative 09/14/2018 31     Monocytes Relative 09/14/2018 10     Eosinophils Relative 09/14/2018 3     Basophils Relative 09/14/2018 1     Neutrophils Absolute 09/14/2018 2 85     Immature Grans Absolute 09/14/2018 0 01     Lymphocytes Absolute 09/14/2018 1 60     Monocytes Absolute 09/14/2018 0 53     Eosinophils Absolute 09/14/2018 0 15     Basophils Absolute 09/14/2018 0 04     TSH 3RD GENERATON 09/14/2018 2 060     Sodium 09/15/2018 141     Potassium 09/15/2018 4 0     Chloride 09/15/2018 105     CO2 09/15/2018 28     ANION GAP 09/15/2018 8     BUN 09/15/2018 25     Creatinine 09/15/2018 1 92*    Glucose 09/15/2018 85     Calcium 09/15/2018 8 3     eGFR 09/15/2018 34     WBC 09/16/2018 6 87     RBC 09/16/2018 4 54     Hemoglobin 09/16/2018 12 6     Hematocrit 09/16/2018 39 3     MCV 09/16/2018 87     MCH 09/16/2018 27 8     MCHC 09/16/2018 32 1     RDW 09/16/2018 13 7     MPV 09/16/2018 11 6     Platelets 35/82/6166 156     nRBC 09/16/2018 0     Neutrophils Relative 09/16/2018 65     Immat GRANS % 09/16/2018 0     Lymphocytes Relative 09/16/2018 23     Monocytes Relative 09/16/2018 8     Eosinophils Relative 09/16/2018 3     Basophils Relative 09/16/2018 1     Neutrophils Absolute 09/16/2018 4 53     Immature Grans Absolute 09/16/2018 0 02     Lymphocytes Absolute 09/16/2018 1 57     Monocytes Absolute 09/16/2018 0 54     Eosinophils Absolute 09/16/2018 0 17     Basophils Absolute 09/16/2018 0 04     Sodium 09/16/2018 139     Potassium 09/16/2018 3 5     Chloride 09/16/2018 104     CO2 09/16/2018 27     ANION GAP 09/16/2018 8     BUN 09/16/2018 23     Creatinine 09/16/2018 1 94*    Glucose 09/16/2018 78     Calcium 09/16/2018 8 4     AST 09/16/2018 10     ALT 09/16/2018 16     Alkaline Phosphatase 09/16/2018 63     Total Protein 09/16/2018 6 5     Albumin 09/16/2018 3 2*    Total Bilirubin 09/16/2018 0 37     eGFR 09/16/2018 34     Sodium 09/18/2018 141     Potassium 09/18/2018 3 9     Chloride 09/18/2018 106     CO2 09/18/2018 31     ANION GAP 09/18/2018 4     BUN 09/18/2018 32*    Creatinine 09/18/2018 1 95*    Glucose 09/18/2018 84     Glucose, Fasting 09/18/2018 84     Calcium 09/18/2018 8 0*    eGFR 09/18/2018 34        Discharge Medications:    See after visit summary for reconciled discharge medications provided to patient and family  Discharge instructions/Information to patient and family:     See after visit summary for information provided to patient and family  Provisions for Follow-Up Care:    See after visit summary for information related to follow-up care and any pertinent home health orders  Discharge Statement     I spent 30 minutes discharging the patient  This time was spent on the day of discharge  I had direct contact with the patient on the day of discharge  Additional documentation is required if more than 30 minutes were spent on discharge:    I reviewed with Jackie Vinson importance of compliance with medications and outpatient treatment after discharge  I discussed the medication regimen and possible side effects of the medications with Jakcie Vinson prior to discharge  At the time of discharge he was tolerating psychiatric medications    I discussed outpatient follow up with Jackie Vinson

## 2018-09-24 NOTE — PLAN OF CARE
Alteration in Thoughts and Perception     Treatment Goal: Gain control of psychotic behaviors/thinking, reduce/eliminate presenting symptoms and demonstrate improved reality functioning upon discharge Adequate for Discharge     Verbalize thoughts and feelings Adequate for Discharge     Refrain from acting on delusional thinking/internal stimuli Adequate for Discharge     Agree to be compliant with medication regime, as prescribed and report medication side effects Adequate for Discharge     Attend and participate in unit activities, including therapeutic, recreational, and educational groups Adequate for Discharge     Recognize dysfunctional thoughts, communicate reality-based thoughts at the time of discharge Adequate for Discharge     Complete daily ADLs, including personal hygiene independently, as able Adequate for Discharge        Anxiety     Anxiety is at manageable level Adequate for Discharge        Depression     Treatment Goal: Demonstrate behavioral control of depressive symptoms, verbalize feelings of improved mood/affect, and adopt new coping skills prior to discharge Adequate for Discharge     Verbalize thoughts and feelings Adequate for Discharge     Refrain from harming self Adequate for Discharge     Refrain from isolation Adequate for Discharge     Refrain from self-neglect Adequate for Discharge     Attend and participate in unit activities, including therapeutic, recreational, and educational groups Adequate for Discharge     Complete daily ADLs, including personal hygiene independently, as able Adequate for Discharge        [id number] [facility]     Discharge to home or other facility with appropriate resources Adequate for Discharge        Ineffective Coping     Participates in unit activities Adequate for Discharge     Participates in unit activities Adequate for Discharge        Ineffective Coping     Cooperates with admission process Adequate for Discharge     Identifies ineffective coping skills Adequate for Discharge     Identifies healthy coping skills Adequate for Discharge     Demonstrates healthy coping skills Adequate for Discharge     Patient/Family participate in treatment and DC plans Adequate for Discharge     Patient/Family verbalizes awareness of resources Adequate for Discharge     Understands least restrictive measures Adequate for Discharge     Free from restraint events Adequate for Discharge        Prexisting or High Potential for Compromised Skin Integrity     Skin integrity is maintained or improved Adequate for Discharge        Risk for Self Injury/Neglect     Treatment Goal: Remain safe during length of stay, learn and adopt new coping skills, and be free of self-injurious ideation, impulses and acts at the time of discharge Adequate for Discharge     Verbalize thoughts and feelings Adequate for Discharge     Refrain from harming self Adequate for Discharge     Attend and participate in unit activities, including therapeutic, recreational, and educational groups Adequate for Discharge     Recognize maladaptive responses and adopt new coping mechanisms Adequate for Discharge     Complete daily ADLs, including personal hygiene independently, as able Adequate for Discharge

## 2020-03-19 NOTE — PROGRESS NOTES
Progress Note - [address]  72 y o  male MRN: [id number]  Unit/Bed#: ZF0 565-01 Encounter: [id number]    Patient seen, chart reviewed, discussed with staff  Nursing staff notes the patient has been calm and compliant with medications and treatment plan  No aggressive or agitated behavior  The patient was seen seated at his desk in his room working on crossword puzzles  The patient is pleasant and smiling on approach  He states that he is feeling much better  He reports his depression and anxiety is improved  He states that he is sleeping and eating adequately  The patient is thankful for the help that he has received here in the hospital   He is anticipating his discharge next week  Physically he offers no complaints or concerns      Behavior over the last 24 hours:  improved  Sleep: normal  Appetite: normal  Medication side effects: No  ROS: no complaints  /57 (BP Location: Right arm)   Pulse 79   Temp 97 5 °F (36 4 °C) (Tympanic)   Resp 18   Ht 5' 11" (1 803 m)   Wt 100 kg (220 lb 10 9 oz)   SpO2 96%   BMI 30 78 kg/m²     Medications:   Current Facility-Administered Medications   Medication Dose Route Frequency    acetaminophen (TYLENOL) tablet 650 mg  650 mg Oral Q6H PRN    acetaminophen (TYLENOL) tablet 650 mg  650 mg Oral Q4H PRN    acetaminophen (TYLENOL) tablet 975 mg  975 mg Oral Q6H PRN    aluminum-magnesium hydroxide-simethicone (MYLANTA) 200-200-20 mg/5 mL oral suspension 30 mL  30 mL Oral Q4H PRN    benztropine (COGENTIN) injection 1 mg  1 mg Intramuscular Q8H PRN    benztropine (COGENTIN) tablet 0 5 mg  0 5 mg Oral Q6H PRN    diphenhydrAMINE (BENADRYL) tablet 25 mg  25 mg Oral Q6H PRN    DULoxetine (CYMBALTA) delayed release capsule 30 mg  30 mg Oral Daily    haloperidol (HALDOL) tablet 5 mg  5 mg Oral Q8H PRN    hydrOXYzine HCL (ATARAX) tablet 25 mg  25 mg Oral Q4H PRN    LORazepam (ATIVAN) tablet 0 5 mg  0 5 mg Oral Q4H PRN    magnesium hydroxide (MILK OF MAGNESIA) 400 mg/5 mL oral suspension 30 mL  30 mL Oral Daily PRN    mirtazapine (REMERON) tablet 15 mg  15 mg Oral HS    OLANZapine (ZyPREXA) IM injection 2 5 mg  2 5 mg Intramuscular Q8H PRN    QUEtiapine (SEROquel) tablet 300 mg  300 mg Oral HS    risperiDONE (RisperDAL M-TABS) dispersible tablet 0 5 mg  0 5 mg Oral Q8H PRN    rOPINIRole (REQUIP) tablet 2 mg  2 mg Oral HS    temazepam (RESTORIL) capsule 30 mg  30 mg Oral HS PRN    traMADol (ULTRAM) tablet 50 mg  50 mg Oral Q6H PRN    traZODone (DESYREL) tablet 25 mg  25 mg Oral HS PRN       Labs:   Admission on 08/28/2018   Component Date Value Ref Range Status    Sodium 09/09/2018 140  136 - 145 mmol/L Final    Potassium 09/09/2018 3 4* 3 5 - 5 3 mmol/L Final    Chloride 09/09/2018 104  100 - 108 mmol/L Final    CO2 09/09/2018 29  21 - 32 mmol/L Final    ANION GAP 09/09/2018 7  4 - 13 mmol/L Final    BUN 09/09/2018 20  5 - 25 mg/dL Final    Creatinine 09/09/2018 1 84* 0 60 - 1 30 mg/dL Final    Glucose 09/09/2018 86  65 - 140 mg/dL Final    Calcium 09/09/2018 8 2* 8 3 - 10 1 mg/dL Final    eGFR 09/09/2018 36  ml/min/1 73sq m Final    Sodium 09/13/2018 137  136 - 145 mmol/L Final    Potassium 09/13/2018 4 0  3 5 - 5 3 mmol/L Final    Chloride 09/13/2018 100  100 - 108 mmol/L Final    CO2 09/13/2018 34* 21 - 32 mmol/L Final    ANION GAP 09/13/2018 3* 4 - 13 mmol/L Final    BUN 09/13/2018 21  5 - 25 mg/dL Final    Creatinine 09/13/2018 2 16* 0 60 - 1 30 mg/dL Final    Glucose 09/13/2018 92  65 - 140 mg/dL Final    Calcium 09/13/2018 8 7  8 3 - 10 1 mg/dL Final    AST 09/13/2018 11  5 - 45 U/L Final    ALT 09/13/2018 16  12 - 78 U/L Final    Alkaline Phosphatase 09/13/2018 70  46 - 116 U/L Final    Total Protein 09/13/2018 6 6  6 4 - 8 2 g/dL Final    Albumin 09/13/2018 3 3* 3 5 - 5 0 g/dL Final    Total Bilirubin 09/13/2018 0 51  0 20 - 1 00 mg/dL Final    eGFR 09/13/2018 30  ml/min/1 73sq m Final    Sodium 09/14/2018 141  136 - 145 mmol/L Final    Potassium 09/14/2018 3 4* 3 5 - 5 3 mmol/L Final    Chloride 09/14/2018 104  100 - 108 mmol/L Final    CO2 09/14/2018 26  21 - 32 mmol/L Final    ANION GAP 09/14/2018 11  4 - 13 mmol/L Final    BUN 09/14/2018 23  5 - 25 mg/dL Final    Creatinine 09/14/2018 1 93* 0 60 - 1 30 mg/dL Final    Glucose 09/14/2018 86  65 - 140 mg/dL Final    Calcium 09/14/2018 8 4  8 3 - 10 1 mg/dL Final    eGFR 09/14/2018 34  ml/min/1 73sq m Final    WBC 09/14/2018 5 18  4 31 - 10 16 Thousand/uL Final    RBC 09/14/2018 4 94  3 88 - 5 62 Million/uL Final    Hemoglobin 09/14/2018 13 8  12 0 - 17 0 g/dL Final    Hematocrit 09/14/2018 42 4  36 5 - 49 3 % Final    MCV 09/14/2018 86  82 - 98 fL Final    MCH 09/14/2018 27 9  26 8 - 34 3 pg Final    MCHC 09/14/2018 32 5  31 4 - 37 4 g/dL Final    RDW 09/14/2018 13 7  11 6 - 15 1 % Final    MPV 09/14/2018 11 1  8 9 - 12 7 fL Final    Platelets 01/45/3872 154  149 - 390 Thousands/uL Final    nRBC 09/14/2018 0  /100 WBCs Final    Neutrophils Relative 09/14/2018 55  43 - 75 % Final    Immat GRANS % 09/14/2018 0  0 - 2 % Final    Lymphocytes Relative 09/14/2018 31  14 - 44 % Final    Monocytes Relative 09/14/2018 10  4 - 12 % Final    Eosinophils Relative 09/14/2018 3  0 - 6 % Final    Basophils Relative 09/14/2018 1  0 - 1 % Final    Neutrophils Absolute 09/14/2018 2 85  1 85 - 7 62 Thousands/µL Final    Immature Grans Absolute 09/14/2018 0 01  0 00 - 0 20 Thousand/uL Final    Lymphocytes Absolute 09/14/2018 1 60  0 60 - 4 47 Thousands/µL Final    Monocytes Absolute 09/14/2018 0 53  0 17 - 1 22 Thousand/µL Final    Eosinophils Absolute 09/14/2018 0 15  0 00 - 0 61 Thousand/µL Final    Basophils Absolute 09/14/2018 0 04  0 00 - 0 10 Thousands/µL Final    TSH 3RD GENERATON 09/14/2018 2 060  0 358 - 3 740 uIU/mL Final    Sodium 09/15/2018 141  136 - 145 mmol/L Final    Potassium 09/15/2018 4 0  3 5 - 5 3 mmol/L Final    Chloride 09/15/2018 105  100 - 108 mmol/L Final    CO2 09/15/2018 28  21 - 32 mmol/L Final    ANION GAP 09/15/2018 8  4 - 13 mmol/L Final    BUN 09/15/2018 25  5 - 25 mg/dL Final    Creatinine 09/15/2018 1 92* 0 60 - 1 30 mg/dL Final    Glucose 09/15/2018 85  65 - 140 mg/dL Final    Calcium 09/15/2018 8 3  8 3 - 10 1 mg/dL Final    eGFR 09/15/2018 34  ml/min/1 73sq m Final    WBC 09/16/2018 6 87  4 31 - 10 16 Thousand/uL Final    RBC 09/16/2018 4 54  3 88 - 5 62 Million/uL Final    Hemoglobin 09/16/2018 12 6  12 0 - 17 0 g/dL Final    Hematocrit 09/16/2018 39 3  36 5 - 49 3 % Final    MCV 09/16/2018 87  82 - 98 fL Final    MCH 09/16/2018 27 8  26 8 - 34 3 pg Final    MCHC 09/16/2018 32 1  31 4 - 37 4 g/dL Final    RDW 09/16/2018 13 7  11 6 - 15 1 % Final    MPV 09/16/2018 11 6  8 9 - 12 7 fL Final    Platelets 92/17/9244 156  149 - 390 Thousands/uL Final    nRBC 09/16/2018 0  /100 WBCs Final    Neutrophils Relative 09/16/2018 65  43 - 75 % Final    Immat GRANS % 09/16/2018 0  0 - 2 % Final    Lymphocytes Relative 09/16/2018 23  14 - 44 % Final    Monocytes Relative 09/16/2018 8  4 - 12 % Final    Eosinophils Relative 09/16/2018 3  0 - 6 % Final    Basophils Relative 09/16/2018 1  0 - 1 % Final    Neutrophils Absolute 09/16/2018 4 53  1 85 - 7 62 Thousands/µL Final    Immature Grans Absolute 09/16/2018 0 02  0 00 - 0 20 Thousand/uL Final    Lymphocytes Absolute 09/16/2018 1 57  0 60 - 4 47 Thousands/µL Final    Monocytes Absolute 09/16/2018 0 54  0 17 - 1 22 Thousand/µL Final    Eosinophils Absolute 09/16/2018 0 17  0 00 - 0 61 Thousand/µL Final    Basophils Absolute 09/16/2018 0 04  0 00 - 0 10 Thousands/µL Final    Sodium 09/16/2018 139  136 - 145 mmol/L Final    Potassium 09/16/2018 3 5  3 5 - 5 3 mmol/L Final    Chloride 09/16/2018 104  100 - 108 mmol/L Final    CO2 09/16/2018 27  21 - 32 mmol/L Final    ANION GAP 09/16/2018 8  4 - 13 mmol/L Final    BUN 09/16/2018 23  5 - 25 mg/dL Final    Creatinine 09/16/2018 1 94* 0 60 - 1 30 mg/dL Final    Glucose 09/16/2018 78  65 - 140 mg/dL Final    Calcium 09/16/2018 8 4  8 3 - 10 1 mg/dL Final    AST 09/16/2018 10  5 - 45 U/L Final    ALT 09/16/2018 16  12 - 78 U/L Final    Alkaline Phosphatase 09/16/2018 63  46 - 116 U/L Final    Total Protein 09/16/2018 6 5  6 4 - 8 2 g/dL Final    Albumin 09/16/2018 3 2* 3 5 - 5 0 g/dL Final    Total Bilirubin 09/16/2018 0 37  0 20 - 1 00 mg/dL Final    eGFR 09/16/2018 34  ml/min/1 73sq m Final    Sodium 09/18/2018 141  136 - 145 mmol/L Final    Potassium 09/18/2018 3 9  3 5 - 5 3 mmol/L Final    Chloride 09/18/2018 106  100 - 108 mmol/L Final    CO2 09/18/2018 31  21 - 32 mmol/L Final    ANION GAP 09/18/2018 4  4 - 13 mmol/L Final    BUN 09/18/2018 32* 5 - 25 mg/dL Final    Creatinine 09/18/2018 1 95* 0 60 - 1 30 mg/dL Final    Glucose 09/18/2018 84  65 - 140 mg/dL Final    Glucose, Fasting 09/18/2018 84  65 - 99 mg/dL Final    Calcium 09/18/2018 8 0* 8 3 - 10 1 mg/dL Final    eGFR 09/18/2018 34  ml/min/1 73sq m Final       Mental Status Evaluation:  Appearance:  age appropriate and casually dressed   Behavior:  Calm, cooperative, good eye contact   Speech:  normal pitch and normal volume   Mood:  euthymic   Affect:  mood-congruent   Thought Process:  goal directed   Thought Content:  No delusions voiced   Perceptual Disturbances: No auditory or visual hallucinations   Risk Potential: Suicidal Ideations none, Homicidal Ideations none and Potential for Aggression No   Sensorium:  person, place and time/date   Cognition:  grossly intact   Consciousness:  alert and awake    Attention: attention span appeared shorter than expected for age   Insight:  limited   Judgment: fair   Gait/Station: Seen seated in chair   Motor Activity: no abnormal movements     Progress Toward Goals:  Approaching baseline    Assessment/Plan   Principal Problem:    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 [facility])  Active Problems:    CKD (chronic kidney disease) stage 3, GFR 30-59 ml/min    Severe episode of recurrent major depressive disorder, without psychotic features ([address] )    Dehydration    Restless legs      Recommended Treatment:   Continue Cymbalta 30 mg p  o  daily  Mirtazapine 15 mg at HS  Seroquel 300 mg at HS  Requip 2 mg at HS  Continue to encourage in milieu participation as tolerated  Continue with group therapy, milieu therapy and occupational therapy  Risks, benefits and possible side effects of Medications:   Risks, benefits, and possible side effects of medications explained to patient and patient verbalizes understanding  Counseling / Coordination of Care  Total floor / unit time spent today 25 minutes  Greater than 50% of total time was spent with the patient and / or family counseling and / or coordination of care  A description of the counseling / coordination of care:  Medication management, chart review, patient interview  none

## 2020-05-20 ENCOUNTER — HOSPITAL ENCOUNTER (EMERGENCY)
Facility: HOSPITAL | Age: 75
Discharge: HOME | End: 2020-05-20
Attending: EMERGENCY MEDICINE | Admitting: EMERGENCY MEDICINE
Payer: MEDICARE

## 2020-05-20 VITALS
HEART RATE: 76 BPM | SYSTOLIC BLOOD PRESSURE: 155 MMHG | RESPIRATION RATE: 18 BRPM | WEIGHT: 220 LBS | DIASTOLIC BLOOD PRESSURE: 67 MMHG | BODY MASS INDEX: 30.68 KG/M2 | TEMPERATURE: 98.3 F | OXYGEN SATURATION: 96 %

## 2020-05-20 DIAGNOSIS — Z00.8 ENCOUNTER FOR PSYCHOLOGICAL EVALUATION: ICD-10-CM

## 2020-05-20 DIAGNOSIS — F32.A DEPRESSION: Primary | ICD-10-CM

## 2020-05-20 DIAGNOSIS — Z65.8 FEELING LONELY: ICD-10-CM

## 2020-05-20 LAB
ALBUMIN SERPL BCP-MCNC: 3.9 G/DL (ref 3.5–5)
ALP SERPL-CCNC: 78 U/L (ref 46–116)
ALT SERPL W P-5'-P-CCNC: 18 U/L (ref 12–78)
ANION GAP SERPL CALCULATED.3IONS-SCNC: 2 MMOL/L (ref 4–13)
AST SERPL W P-5'-P-CCNC: 10 U/L (ref 5–45)
ATRIAL RATE: 78 BPM
BASOPHILS # BLD AUTO: 0.04 THOUSANDS/ΜL (ref 0–0.1)
BASOPHILS NFR BLD AUTO: 1 % (ref 0–1)
BILIRUB SERPL-MCNC: 0.53 MG/DL (ref 0.2–1)
BUN SERPL-MCNC: 23 MG/DL (ref 5–25)
CALCIUM SERPL-MCNC: 9.2 MG/DL (ref 8.3–10.1)
CHLORIDE SERPL-SCNC: 108 MMOL/L (ref 100–108)
CO2 SERPL-SCNC: 29 MMOL/L (ref 21–32)
CREAT SERPL-MCNC: 1.87 MG/DL (ref 0.6–1.3)
EOSINOPHIL # BLD AUTO: 0.18 THOUSAND/ΜL (ref 0–0.61)
EOSINOPHIL NFR BLD AUTO: 3 % (ref 0–6)
ERYTHROCYTE [DISTWIDTH] IN BLOOD BY AUTOMATED COUNT: 14.3 % (ref 11.6–15.1)
ETHANOL EXG-MCNC: 0 MG/DL
GFR SERPL CREATININE-BSD FRML MDRD: 35 ML/MIN/1.73SQ M
GLUCOSE SERPL-MCNC: 95 MG/DL (ref 65–140)
HCT VFR BLD AUTO: 46.9 % (ref 36.5–49.3)
HGB BLD-MCNC: 15.1 G/DL (ref 12–17)
IMM GRANULOCYTES # BLD AUTO: 0.02 THOUSAND/UL (ref 0–0.2)
IMM GRANULOCYTES NFR BLD AUTO: 0 % (ref 0–2)
LYMPHOCYTES # BLD AUTO: 1.14 THOUSANDS/ΜL (ref 0.6–4.47)
LYMPHOCYTES NFR BLD AUTO: 17 % (ref 14–44)
MCH RBC QN AUTO: 28.2 PG (ref 26.8–34.3)
MCHC RBC AUTO-ENTMCNC: 32.2 G/DL (ref 31.4–37.4)
MCV RBC AUTO: 88 FL (ref 82–98)
MONOCYTES # BLD AUTO: 0.44 THOUSAND/ΜL (ref 0.17–1.22)
MONOCYTES NFR BLD AUTO: 6 % (ref 4–12)
NEUTROPHILS # BLD AUTO: 5.08 THOUSANDS/ΜL (ref 1.85–7.62)
NEUTS SEG NFR BLD AUTO: 73 % (ref 43–75)
NRBC BLD AUTO-RTO: 0 /100 WBCS
P AXIS: 59 DEGREES
PLATELET # BLD AUTO: 187 THOUSANDS/UL (ref 149–390)
PMV BLD AUTO: 11 FL (ref 8.9–12.7)
POTASSIUM SERPL-SCNC: 3.5 MMOL/L (ref 3.5–5.3)
PR INTERVAL: 156 MS
PROT SERPL-MCNC: 6.9 G/DL (ref 6.4–8.2)
QRS AXIS: -86 DEGREES
QRSD INTERVAL: 176 MS
QT INTERVAL: 456 MS
QTC INTERVAL: 519 MS
RBC # BLD AUTO: 5.35 MILLION/UL (ref 3.88–5.62)
SODIUM SERPL-SCNC: 139 MMOL/L (ref 136–145)
T WAVE AXIS: 73 DEGREES
TSH SERPL DL<=0.05 MIU/L-ACNC: 2.93 UIU/ML (ref 0.36–3.74)
VENTRICULAR RATE: 78 BPM
WBC # BLD AUTO: 6.9 THOUSAND/UL (ref 4.31–10.16)

## 2020-05-20 PROCEDURE — 85025 COMPLETE CBC W/AUTO DIFF WBC: CPT | Performed by: EMERGENCY MEDICINE

## 2020-05-20 PROCEDURE — 99283 EMERGENCY DEPT VISIT LOW MDM: CPT

## 2020-05-20 PROCEDURE — 36415 COLL VENOUS BLD VENIPUNCTURE: CPT | Performed by: EMERGENCY MEDICINE

## 2020-05-20 PROCEDURE — 93005 ELECTROCARDIOGRAM TRACING: CPT

## 2020-05-20 PROCEDURE — 80053 COMPREHEN METABOLIC PANEL: CPT | Performed by: EMERGENCY MEDICINE

## 2020-05-20 PROCEDURE — 93010 ELECTROCARDIOGRAM REPORT: CPT | Performed by: INTERNAL MEDICINE

## 2020-05-20 PROCEDURE — 84443 ASSAY THYROID STIM HORMONE: CPT | Performed by: EMERGENCY MEDICINE

## 2020-05-20 PROCEDURE — 99282 EMERGENCY DEPT VISIT SF MDM: CPT | Performed by: EMERGENCY MEDICINE

## 2020-05-20 PROCEDURE — 82075 ASSAY OF BREATH ETHANOL: CPT | Performed by: EMERGENCY MEDICINE

## 2020-05-20 RX ORDER — ATORVASTATIN CALCIUM 10 MG/1
10 TABLET, FILM COATED ORAL DAILY
COMMUNITY
End: 2022-05-24

## 2020-10-09 ENCOUNTER — APPOINTMENT (OUTPATIENT)
Dept: RADIOLOGY | Facility: HOSPITAL | Age: 75
DRG: 563 | End: 2020-10-09
Payer: MEDICARE

## 2020-10-09 ENCOUNTER — APPOINTMENT (EMERGENCY)
Dept: RADIOLOGY | Facility: HOSPITAL | Age: 75
DRG: 563 | End: 2020-10-09
Payer: MEDICARE

## 2020-10-09 ENCOUNTER — HOSPITAL ENCOUNTER (INPATIENT)
Facility: HOSPITAL | Age: 75
LOS: 5 days | Discharge: NURSING, REHAB OR LONG-TERM CARE | DRG: 563 | End: 2020-10-15
Attending: SURGERY | Admitting: SURGERY
Payer: MEDICARE

## 2020-10-09 DIAGNOSIS — S61.209A: Primary | ICD-10-CM

## 2020-10-09 DIAGNOSIS — S06.9X0D TRAUMATIC BRAIN INJURY, WITHOUT LOSS OF CONSCIOUSNESS, SUBSEQUENT ENCOUNTER: ICD-10-CM

## 2020-10-09 DIAGNOSIS — S63.259A: Primary | ICD-10-CM
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
## 2020-10-09 LAB
ALBUMIN SERPL BCP-MCNC: 3.8 G/DL (ref 3.5–5)
ALP SERPL-CCNC: 73 U/L (ref 46–116)
ALT SERPL W P-5'-P-CCNC: 18 U/L (ref 12–78)
ANION GAP SERPL CALCULATED.3IONS-SCNC: 5 MMOL/L (ref 4–13)
AST SERPL W P-5'-P-CCNC: 10 U/L (ref 5–45)
ATRIAL RATE: 84 BPM
BASE EXCESS BLDA CALC-SCNC: -1 MMOL/L (ref -2–3)
BASOPHILS # BLD AUTO: 0.04 THOUSANDS/ΜL (ref 0–0.1)
BASOPHILS NFR BLD AUTO: 1 % (ref 0–1)
BILIRUB SERPL-MCNC: 0.57 MG/DL (ref 0.2–1)
BUN SERPL-MCNC: 21 MG/DL (ref 5–25)
CA-I BLD-SCNC: 1.16 MMOL/L (ref 1.12–1.32)
CALCIUM SERPL-MCNC: 8.9 MG/DL (ref 8.3–10.1)
CHLORIDE SERPL-SCNC: 111 MMOL/L (ref 100–108)
CO2 SERPL-SCNC: 26 MMOL/L (ref 21–32)
CREAT SERPL-MCNC: 1.94 MG/DL (ref 0.6–1.3)
EOSINOPHIL # BLD AUTO: 0.26 THOUSAND/ΜL (ref 0–0.61)
EOSINOPHIL NFR BLD AUTO: 5 % (ref 0–6)
ERYTHROCYTE [DISTWIDTH] IN BLOOD BY AUTOMATED COUNT: 14.6 % (ref 11.6–15.1)
GFR SERPL CREATININE-BSD FRML MDRD: 33 ML/MIN/1.73SQ M
GLUCOSE SERPL-MCNC: 86 MG/DL (ref 65–140)
GLUCOSE SERPL-MCNC: 88 MG/DL (ref 65–140)
HCO3 BLDA-SCNC: 23.3 MMOL/L (ref 24–30)
HCT VFR BLD AUTO: 42.9 % (ref 36.5–49.3)
HCT VFR BLD CALC: 41 % (ref 36.5–49.3)
HGB BLD-MCNC: 14.3 G/DL (ref 12–17)
HGB BLDA-MCNC: 13.9 G/DL (ref 12–17)
IMM GRANULOCYTES # BLD AUTO: 0.02 THOUSAND/UL (ref 0–0.2)
IMM GRANULOCYTES NFR BLD AUTO: 0 % (ref 0–2)
LYMPHOCYTES # BLD AUTO: 0.83 THOUSANDS/ΜL (ref 0.6–4.47)
LYMPHOCYTES NFR BLD AUTO: 17 % (ref 14–44)
MCH RBC QN AUTO: 28.5 PG (ref 26.8–34.3)
MCHC RBC AUTO-ENTMCNC: 33.3 G/DL (ref 31.4–37.4)
MCV RBC AUTO: 86 FL (ref 82–98)
MONOCYTES # BLD AUTO: 0.59 THOUSAND/ΜL (ref 0.17–1.22)
MONOCYTES NFR BLD AUTO: 12 % (ref 4–12)
NEUTROPHILS # BLD AUTO: 3.21 THOUSANDS/ΜL (ref 1.85–7.62)
NEUTS SEG NFR BLD AUTO: 65 % (ref 43–75)
NRBC BLD AUTO-RTO: 0 /100 WBCS
P AXIS: 72 DEGREES
PCO2 BLD: 24 MMOL/L (ref 21–32)
PCO2 BLD: 36 MM HG (ref 42–50)
PH BLD: 7.42 [PH] (ref 7.3–7.4)
PLATELET # BLD AUTO: 151 THOUSANDS/UL (ref 149–390)
PMV BLD AUTO: 11 FL (ref 8.9–12.7)
PO2 BLD: 50 MM HG (ref 35–45)
POTASSIUM BLD-SCNC: 3.8 MMOL/L (ref 3.5–5.3)
POTASSIUM SERPL-SCNC: 3.8 MMOL/L (ref 3.5–5.3)
PR INTERVAL: 168 MS
PROT SERPL-MCNC: 6.7 G/DL (ref 6.4–8.2)
QRS AXIS: -79 DEGREES
QRSD INTERVAL: 200 MS
QT INTERVAL: 446 MS
QTC INTERVAL: 527 MS
RBC # BLD AUTO: 5.01 MILLION/UL (ref 3.88–5.62)
SAO2 % BLD FROM PO2: 86 % (ref 60–85)
SODIUM BLD-SCNC: 141 MMOL/L (ref 136–145)
SODIUM SERPL-SCNC: 142 MMOL/L (ref 136–145)
SPECIMEN SOURCE: ABNORMAL
T WAVE AXIS: 64 DEGREES
TROPONIN I SERPL-MCNC: <0.02 NG/ML
VENTRICULAR RATE: 84 BPM
WBC # BLD AUTO: 4.95 THOUSAND/UL (ref 4.31–10.16)

## 2020-10-09 PROCEDURE — 36415 COLL VENOUS BLD VENIPUNCTURE: CPT | Performed by: STUDENT IN AN ORGANIZED HEALTH CARE EDUCATION/TRAINING PROGRAM

## 2020-10-09 PROCEDURE — 72125 CT NECK SPINE W/O DYE: CPT

## 2020-10-09 PROCEDURE — 99218 PR INITIAL OBSERVATION CARE/DAY 30 MINUTES: CPT | Performed by: SURGERY

## 2020-10-09 PROCEDURE — 80053 COMPREHEN METABOLIC PANEL: CPT | Performed by: SURGERY

## 2020-10-09 PROCEDURE — 73130 X-RAY EXAM OF HAND: CPT

## 2020-10-09 PROCEDURE — 84132 ASSAY OF SERUM POTASSIUM: CPT

## 2020-10-09 PROCEDURE — 0RSXXZZ REPOSITION LEFT FINGER PHALANGEAL JOINT, EXTERNAL APPROACH: ICD-10-PCS | Performed by: SURGERY

## 2020-10-09 PROCEDURE — NC001 PR NO CHARGE: Performed by: EMERGENCY MEDICINE

## 2020-10-09 PROCEDURE — G0008 ADMIN INFLUENZA VIRUS VAC: HCPCS | Performed by: SURGERY

## 2020-10-09 PROCEDURE — 99204 OFFICE O/P NEW MOD 45 MIN: CPT | Performed by: INTERNAL MEDICINE

## 2020-10-09 PROCEDURE — 96365 THER/PROPH/DIAG IV INF INIT: CPT

## 2020-10-09 PROCEDURE — 85025 COMPLETE CBC W/AUTO DIFF WBC: CPT | Performed by: SURGERY

## 2020-10-09 PROCEDURE — NC001 PR NO CHARGE: Performed by: SURGERY

## 2020-10-09 PROCEDURE — 93010 ELECTROCARDIOGRAM REPORT: CPT | Performed by: INTERNAL MEDICINE

## 2020-10-09 PROCEDURE — 74177 CT ABD & PELVIS W/CONTRAST: CPT

## 2020-10-09 PROCEDURE — 99285 EMERGENCY DEPT VISIT HI MDM: CPT

## 2020-10-09 PROCEDURE — 84295 ASSAY OF SERUM SODIUM: CPT

## 2020-10-09 PROCEDURE — 90662 IIV NO PRSV INCREASED AG IM: CPT | Performed by: SURGERY

## 2020-10-09 PROCEDURE — 93005 ELECTROCARDIOGRAM TRACING: CPT

## 2020-10-09 PROCEDURE — 85014 HEMATOCRIT: CPT

## 2020-10-09 PROCEDURE — 90715 TDAP VACCINE 7 YRS/> IM: CPT | Performed by: EMERGENCY MEDICINE

## 2020-10-09 PROCEDURE — 0HQFXZZ REPAIR RIGHT HAND SKIN, EXTERNAL APPROACH: ICD-10-PCS | Performed by: SURGERY

## 2020-10-09 PROCEDURE — 84484 ASSAY OF TROPONIN QUANT: CPT | Performed by: STUDENT IN AN ORGANIZED HEALTH CARE EDUCATION/TRAINING PROGRAM

## 2020-10-09 PROCEDURE — 82803 BLOOD GASES ANY COMBINATION: CPT

## 2020-10-09 PROCEDURE — 82947 ASSAY GLUCOSE BLOOD QUANT: CPT

## 2020-10-09 PROCEDURE — 70450 CT HEAD/BRAIN W/O DYE: CPT

## 2020-10-09 PROCEDURE — 90471 IMMUNIZATION ADMIN: CPT

## 2020-10-09 PROCEDURE — 82330 ASSAY OF CALCIUM: CPT

## 2020-10-09 PROCEDURE — 1123F ACP DISCUSS/DSCN MKR DOCD: CPT | Performed by: INTERNAL MEDICINE

## 2020-10-09 RX ORDER — MIRTAZAPINE 7.5 MG/1
7.5 TABLET, FILM COATED ORAL
COMMUNITY
End: 2020-10-09 | Stop reason: CLARIF

## 2020-10-09 RX ORDER — DULOXETIN HYDROCHLORIDE 30 MG/1
90 CAPSULE, DELAYED RELEASE ORAL DAILY
COMMUNITY
End: 2020-10-09 | Stop reason: CLARIF

## 2020-10-09 RX ORDER — LISINOPRIL 5 MG/1
5 TABLET ORAL DAILY
COMMUNITY
End: 2020-10-09 | Stop reason: CLARIF

## 2020-10-09 RX ORDER — NORTRIPTYLINE HYDROCHLORIDE 25 MG/1
25 CAPSULE ORAL
COMMUNITY
End: 2020-10-09 | Stop reason: CLARIF

## 2020-10-09 RX ORDER — MULTIVITAMIN
1 TABLET ORAL DAILY
COMMUNITY
End: 2020-10-09 | Stop reason: CLARIF

## 2020-10-09 RX ORDER — LIDOCAINE HYDROCHLORIDE 10 MG/ML
20 INJECTION, SOLUTION EPIDURAL; INFILTRATION; INTRACAUDAL; PERINEURAL ONCE
Status: COMPLETED | OUTPATIENT
Start: 2020-10-09 | End: 2020-10-09

## 2020-10-09 RX ORDER — VALPROIC ACID 250 MG/1
1500 CAPSULE, LIQUID FILLED ORAL
COMMUNITY
End: 2020-10-09 | Stop reason: CLARIF

## 2020-10-09 RX ORDER — SODIUM CHLORIDE, SODIUM GLUCONATE, SODIUM ACETATE, POTASSIUM CHLORIDE, MAGNESIUM CHLORIDE, SODIUM PHOSPHATE, DIBASIC, AND POTASSIUM PHOSPHATE .53; .5; .37; .037; .03; .012; .00082 G/100ML; G/100ML; G/100ML; G/100ML; G/100ML; G/100ML; G/100ML
75 INJECTION, SOLUTION INTRAVENOUS CONTINUOUS
Status: DISCONTINUED | OUTPATIENT
Start: 2020-10-09 | End: 2020-10-12

## 2020-10-09 RX ORDER — LANOLIN ALCOHOL/MO/W.PET/CERES
3 CREAM (GRAM) TOPICAL
Status: DISCONTINUED | OUTPATIENT
Start: 2020-10-09 | End: 2020-10-15 | Stop reason: HOSPADM

## 2020-10-09 RX ADMIN — CEFAZOLIN SODIUM 2000 MG: 10 INJECTION, POWDER, FOR SOLUTION INTRAVENOUS at 21:01

## 2020-10-09 RX ADMIN — CEFAZOLIN SODIUM 2000 MG: 10 INJECTION, POWDER, FOR SOLUTION INTRAVENOUS at 11:22

## 2020-10-09 RX ADMIN — MELATONIN 3 MG: at 21:57

## 2020-10-09 RX ADMIN — SODIUM CHLORIDE, SODIUM GLUCONATE, SODIUM ACETATE, POTASSIUM CHLORIDE, MAGNESIUM CHLORIDE, SODIUM PHOSPHATE, DIBASIC, AND POTASSIUM PHOSPHATE 75 ML/HR: .53; .5; .37; .037; .03; .012; .00082 INJECTION, SOLUTION INTRAVENOUS at 16:57

## 2020-10-09 RX ADMIN — LIDOCAINE HYDROCHLORIDE 20 ML: 10 INJECTION, SOLUTION EPIDURAL; INFILTRATION; INTRACAUDAL at 14:25

## 2020-10-09 RX ADMIN — INFLUENZA A VIRUS A/MICHIGAN/45/2015 X-275 (H1N1) ANTIGEN (FORMALDEHYDE INACTIVATED), INFLUENZA A VIRUS A/SINGAPORE/INFIMH-16-0019/2016 IVR-186 (H3N2) ANTIGEN (FORMALDEHYDE INACTIVATED), INFLUENZA B VIRUS B/PHUKET/3073/2013 ANTIGEN (FORMALDEHYDE INACTIVATED), AND INFLUENZA B VIRUS B/MARYLAND/15/2016 BX-69A ANTIGEN (FORMALDEHYDE INACTIVATED) 0.7 ML: 60; 60; 60; 60 INJECTION, SUSPENSION INTRAMUSCULAR at 21:29

## 2020-10-09 RX ADMIN — TETANUS TOXOID, REDUCED DIPHTHERIA TOXOID AND ACELLULAR PERTUSSIS VACCINE, ADSORBED 0.5 ML: 5; 2.5; 8; 8; 2.5 SUSPENSION INTRAMUSCULAR at 11:23

## 2020-10-09 RX ADMIN — IOHEXOL 100 ML: 350 INJECTION, SOLUTION INTRAVENOUS at 08:56

## 2020-10-10 PROCEDURE — 99231 SBSQ HOSP IP/OBS SF/LOW 25: CPT | Performed by: SURGERY

## 2020-10-10 PROCEDURE — 97163 PT EVAL HIGH COMPLEX 45 MIN: CPT

## 2020-10-10 PROCEDURE — 97130 THER IVNTJ EA ADDL 15 MIN: CPT

## 2020-10-10 PROCEDURE — 97129 THER IVNTJ 1ST 15 MIN: CPT

## 2020-10-10 PROCEDURE — NC001 PR NO CHARGE: Performed by: SURGERY

## 2020-10-10 PROCEDURE — 97167 OT EVAL HIGH COMPLEX 60 MIN: CPT

## 2020-10-10 RX ORDER — AMOXICILLIN 250 MG
1 CAPSULE ORAL
Status: DISCONTINUED | OUTPATIENT
Start: 2020-10-10 | End: 2020-10-15 | Stop reason: HOSPADM

## 2020-10-10 RX ORDER — ACETAMINOPHEN 325 MG/1
650 TABLET ORAL EVERY 6 HOURS PRN
Status: DISCONTINUED | OUTPATIENT
Start: 2020-10-10 | End: 2020-10-15 | Stop reason: HOSPADM

## 2020-10-10 RX ORDER — HEPARIN SODIUM 5000 [USP'U]/ML
5000 INJECTION, SOLUTION INTRAVENOUS; SUBCUTANEOUS EVERY 8 HOURS SCHEDULED
Status: DISCONTINUED | OUTPATIENT
Start: 2020-10-10 | End: 2020-10-15 | Stop reason: HOSPADM

## 2020-10-10 RX ADMIN — MELATONIN 3 MG: at 22:25

## 2020-10-10 RX ADMIN — DOCUSATE SODIUM AND SENNOSIDES 1 TABLET: 8.6; 5 TABLET ORAL at 22:25

## 2020-10-10 RX ADMIN — CEFAZOLIN SODIUM 2000 MG: 10 INJECTION, POWDER, FOR SOLUTION INTRAVENOUS at 04:10

## 2020-10-10 RX ADMIN — HEPARIN SODIUM 5000 UNITS: 5000 INJECTION INTRAVENOUS; SUBCUTANEOUS at 22:25

## 2020-10-11 PROCEDURE — 99231 SBSQ HOSP IP/OBS SF/LOW 25: CPT | Performed by: SURGERY

## 2020-10-11 RX ORDER — BUPROPION HYDROCHLORIDE 150 MG/1
300 TABLET ORAL DAILY
Status: DISCONTINUED | OUTPATIENT
Start: 2020-10-11 | End: 2020-10-15 | Stop reason: HOSPADM

## 2020-10-11 RX ORDER — ROPINIROLE 1 MG/1
2 TABLET, FILM COATED ORAL
Status: DISCONTINUED | OUTPATIENT
Start: 2020-10-11 | End: 2020-10-15 | Stop reason: HOSPADM

## 2020-10-11 RX ORDER — ATORVASTATIN CALCIUM 10 MG/1
10 TABLET, FILM COATED ORAL
Status: DISCONTINUED | OUTPATIENT
Start: 2020-10-11 | End: 2020-10-15 | Stop reason: HOSPADM

## 2020-10-11 RX ORDER — DULOXETIN HYDROCHLORIDE 60 MG/1
60 CAPSULE, DELAYED RELEASE ORAL DAILY
Status: DISCONTINUED | OUTPATIENT
Start: 2020-10-11 | End: 2020-10-15 | Stop reason: HOSPADM

## 2020-10-11 RX ADMIN — SODIUM CHLORIDE, SODIUM GLUCONATE, SODIUM ACETATE, POTASSIUM CHLORIDE, MAGNESIUM CHLORIDE, SODIUM PHOSPHATE, DIBASIC, AND POTASSIUM PHOSPHATE 75 ML/HR: .53; .5; .37; .037; .03; .012; .00082 INJECTION, SOLUTION INTRAVENOUS at 15:51

## 2020-10-11 RX ADMIN — DOCUSATE SODIUM AND SENNOSIDES 1 TABLET: 8.6; 5 TABLET ORAL at 21:32

## 2020-10-11 RX ADMIN — ATORVASTATIN CALCIUM 10 MG: 10 TABLET, FILM COATED ORAL at 18:34

## 2020-10-11 RX ADMIN — HEPARIN SODIUM 5000 UNITS: 5000 INJECTION INTRAVENOUS; SUBCUTANEOUS at 07:09

## 2020-10-11 RX ADMIN — ROPINIROLE HYDROCHLORIDE 2 MG: 1 TABLET, FILM COATED ORAL at 21:32

## 2020-10-11 RX ADMIN — HEPARIN SODIUM 5000 UNITS: 5000 INJECTION INTRAVENOUS; SUBCUTANEOUS at 21:32

## 2020-10-11 RX ADMIN — HEPARIN SODIUM 5000 UNITS: 5000 INJECTION INTRAVENOUS; SUBCUTANEOUS at 14:30

## 2020-10-11 RX ADMIN — BUPROPION HYDROCHLORIDE 300 MG: 150 TABLET, FILM COATED, EXTENDED RELEASE ORAL at 14:30

## 2020-10-11 RX ADMIN — MELATONIN 3 MG: at 21:32

## 2020-10-11 RX ADMIN — DULOXETINE HYDROCHLORIDE 60 MG: 60 CAPSULE, DELAYED RELEASE ORAL at 14:30

## 2020-10-12 PROCEDURE — 99231 SBSQ HOSP IP/OBS SF/LOW 25: CPT | Performed by: PHYSICIAN ASSISTANT

## 2020-10-12 RX ADMIN — ROPINIROLE HYDROCHLORIDE 2 MG: 1 TABLET, FILM COATED ORAL at 21:06

## 2020-10-12 RX ADMIN — MELATONIN 3 MG: at 21:06

## 2020-10-12 RX ADMIN — SODIUM CHLORIDE, SODIUM GLUCONATE, SODIUM ACETATE, POTASSIUM CHLORIDE, MAGNESIUM CHLORIDE, SODIUM PHOSPHATE, DIBASIC, AND POTASSIUM PHOSPHATE 75 ML/HR: .53; .5; .37; .037; .03; .012; .00082 INJECTION, SOLUTION INTRAVENOUS at 05:11

## 2020-10-12 RX ADMIN — HEPARIN SODIUM 5000 UNITS: 5000 INJECTION INTRAVENOUS; SUBCUTANEOUS at 14:35

## 2020-10-12 RX ADMIN — ATORVASTATIN CALCIUM 10 MG: 10 TABLET, FILM COATED ORAL at 17:34

## 2020-10-12 RX ADMIN — HEPARIN SODIUM 5000 UNITS: 5000 INJECTION INTRAVENOUS; SUBCUTANEOUS at 21:06

## 2020-10-12 RX ADMIN — DOCUSATE SODIUM AND SENNOSIDES 1 TABLET: 8.6; 5 TABLET ORAL at 21:06

## 2020-10-12 RX ADMIN — DULOXETINE HYDROCHLORIDE 60 MG: 60 CAPSULE, DELAYED RELEASE ORAL at 09:59

## 2020-10-12 RX ADMIN — HEPARIN SODIUM 5000 UNITS: 5000 INJECTION INTRAVENOUS; SUBCUTANEOUS at 05:11

## 2020-10-12 RX ADMIN — BUPROPION HYDROCHLORIDE 300 MG: 150 TABLET, FILM COATED, EXTENDED RELEASE ORAL at 10:00

## 2020-10-13 PROCEDURE — 99222 1ST HOSP IP/OBS MODERATE 55: CPT | Performed by: PSYCHIATRY & NEUROLOGY

## 2020-10-13 PROCEDURE — 99231 SBSQ HOSP IP/OBS SF/LOW 25: CPT | Performed by: PHYSICIAN ASSISTANT

## 2020-10-13 RX ORDER — BUPROPION HYDROCHLORIDE 300 MG/1
300 TABLET ORAL DAILY
COMMUNITY
End: 2021-04-16 | Stop reason: HOSPADM

## 2020-10-13 RX ORDER — POLYETHYLENE GLYCOL 3350 17 G/17G
17 POWDER, FOR SOLUTION ORAL DAILY PRN
Status: DISCONTINUED | OUTPATIENT
Start: 2020-10-13 | End: 2020-10-15 | Stop reason: HOSPADM

## 2020-10-13 RX ORDER — ROPINIROLE 2 MG/1
2 TABLET, FILM COATED, EXTENDED RELEASE ORAL
COMMUNITY
End: 2022-05-24

## 2020-10-13 RX ORDER — AMOXICILLIN 250 MG
1 CAPSULE ORAL
Refills: 0
Start: 2020-10-13 | End: 2022-05-24

## 2020-10-13 RX ORDER — ATORVASTATIN CALCIUM 10 MG/1
10 TABLET, FILM COATED ORAL DAILY
COMMUNITY
End: 2021-04-16 | Stop reason: HOSPADM

## 2020-10-13 RX ORDER — ACETAMINOPHEN 325 MG/1
975 TABLET ORAL EVERY 6 HOURS PRN
Refills: 0
Start: 2020-10-13 | End: 2022-05-24

## 2020-10-13 RX ORDER — ROPINIROLE 2 MG/1
2 TABLET, FILM COATED ORAL 3 TIMES DAILY
Status: ON HOLD | COMMUNITY
End: 2020-10-13 | Stop reason: CLARIF

## 2020-10-13 RX ORDER — DULOXETIN HYDROCHLORIDE 60 MG/1
20 CAPSULE, DELAYED RELEASE ORAL DAILY
COMMUNITY
End: 2021-04-16 | Stop reason: HOSPADM

## 2020-10-13 RX ORDER — LANOLIN ALCOHOL/MO/W.PET/CERES
3 CREAM (GRAM) TOPICAL
Refills: 0
Start: 2020-10-13 | End: 2021-04-16 | Stop reason: HOSPADM

## 2020-10-13 RX ADMIN — ATORVASTATIN CALCIUM 10 MG: 10 TABLET, FILM COATED ORAL at 17:52

## 2020-10-13 RX ADMIN — MELATONIN 3 MG: at 21:27

## 2020-10-13 RX ADMIN — DOCUSATE SODIUM AND SENNOSIDES 1 TABLET: 8.6; 5 TABLET ORAL at 21:27

## 2020-10-13 RX ADMIN — HEPARIN SODIUM 5000 UNITS: 5000 INJECTION INTRAVENOUS; SUBCUTANEOUS at 21:27

## 2020-10-13 RX ADMIN — BUPROPION HYDROCHLORIDE 300 MG: 150 TABLET, FILM COATED, EXTENDED RELEASE ORAL at 10:01

## 2020-10-13 RX ADMIN — ACETAMINOPHEN 650 MG: 325 TABLET, FILM COATED ORAL at 10:02

## 2020-10-13 RX ADMIN — HEPARIN SODIUM 5000 UNITS: 5000 INJECTION INTRAVENOUS; SUBCUTANEOUS at 14:46

## 2020-10-13 RX ADMIN — ROPINIROLE HYDROCHLORIDE 2 MG: 1 TABLET, FILM COATED ORAL at 21:27

## 2020-10-13 RX ADMIN — DULOXETINE HYDROCHLORIDE 60 MG: 60 CAPSULE, DELAYED RELEASE ORAL at 10:01

## 2020-10-13 RX ADMIN — HEPARIN SODIUM 5000 UNITS: 5000 INJECTION INTRAVENOUS; SUBCUTANEOUS at 05:19

## 2020-10-14 PROCEDURE — 99231 SBSQ HOSP IP/OBS SF/LOW 25: CPT | Performed by: EMERGENCY MEDICINE

## 2020-10-14 RX ADMIN — DULOXETINE HYDROCHLORIDE 60 MG: 60 CAPSULE, DELAYED RELEASE ORAL at 09:34

## 2020-10-14 RX ADMIN — MELATONIN 3 MG: at 21:01

## 2020-10-14 RX ADMIN — HEPARIN SODIUM 5000 UNITS: 5000 INJECTION INTRAVENOUS; SUBCUTANEOUS at 15:40

## 2020-10-14 RX ADMIN — ATORVASTATIN CALCIUM 10 MG: 10 TABLET, FILM COATED ORAL at 15:43

## 2020-10-14 RX ADMIN — HEPARIN SODIUM 5000 UNITS: 5000 INJECTION INTRAVENOUS; SUBCUTANEOUS at 21:01

## 2020-10-14 RX ADMIN — HEPARIN SODIUM 5000 UNITS: 5000 INJECTION INTRAVENOUS; SUBCUTANEOUS at 05:05

## 2020-10-14 RX ADMIN — DOCUSATE SODIUM AND SENNOSIDES 1 TABLET: 8.6; 5 TABLET ORAL at 21:01

## 2020-10-14 RX ADMIN — ROPINIROLE HYDROCHLORIDE 2 MG: 1 TABLET, FILM COATED ORAL at 21:01

## 2020-10-14 RX ADMIN — BUPROPION HYDROCHLORIDE 300 MG: 150 TABLET, FILM COATED, EXTENDED RELEASE ORAL at 09:34

## 2020-10-15 VITALS
HEART RATE: 80 BPM | RESPIRATION RATE: 18 BRPM | WEIGHT: 205.03 LBS | BODY MASS INDEX: 28.7 KG/M2 | TEMPERATURE: 97.2 F | DIASTOLIC BLOOD PRESSURE: 90 MMHG | HEIGHT: 71 IN | OXYGEN SATURATION: 95 % | SYSTOLIC BLOOD PRESSURE: 153 MMHG

## 2020-10-15 PROBLEM — N18.30 STAGE 3 CHRONIC KIDNEY DISEASE (HCC): Status: ACTIVE | Noted: 2020-10-15

## 2020-10-15 LAB
GLUCOSE SERPL-MCNC: 73 MG/DL (ref 65–140)
GLUCOSE SERPL-MCNC: 85 MG/DL (ref 65–140)
GLUCOSE SERPL-MCNC: 88 MG/DL (ref 65–140)
SARS-COV-2 RNA RESP QL NAA+PROBE: NEGATIVE

## 2020-10-15 PROCEDURE — 82948 REAGENT STRIP/BLOOD GLUCOSE: CPT

## 2020-10-15 PROCEDURE — U0003 INFECTIOUS AGENT DETECTION BY NUCLEIC ACID (DNA OR RNA); SEVERE ACUTE RESPIRATORY SYNDROME CORONAVIRUS 2 (SARS-COV-2) (CORONAVIRUS DISEASE [COVID-19]), AMPLIFIED PROBE TECHNIQUE, MAKING USE OF HIGH THROUGHPUT TECHNOLOGIES AS DESCRIBED BY CMS-2020-01-R: HCPCS | Performed by: PHYSICIAN ASSISTANT

## 2020-10-15 PROCEDURE — NC001 PR NO CHARGE: Performed by: PHYSICIAN ASSISTANT

## 2020-10-15 PROCEDURE — 97530 THERAPEUTIC ACTIVITIES: CPT

## 2020-10-15 PROCEDURE — 97535 SELF CARE MNGMENT TRAINING: CPT

## 2020-10-15 PROCEDURE — 99238 HOSP IP/OBS DSCHRG MGMT 30/<: CPT | Performed by: PHYSICIAN ASSISTANT

## 2020-10-15 RX ADMIN — BUPROPION HYDROCHLORIDE 300 MG: 150 TABLET, FILM COATED, EXTENDED RELEASE ORAL at 09:35

## 2020-10-15 RX ADMIN — HEPARIN SODIUM 5000 UNITS: 5000 INJECTION INTRAVENOUS; SUBCUTANEOUS at 05:01

## 2020-10-15 RX ADMIN — HEPARIN SODIUM 5000 UNITS: 5000 INJECTION INTRAVENOUS; SUBCUTANEOUS at 14:12

## 2020-10-15 RX ADMIN — DULOXETINE HYDROCHLORIDE 60 MG: 60 CAPSULE, DELAYED RELEASE ORAL at 09:35

## 2020-10-16 ENCOUNTER — TELEPHONE (OUTPATIENT)
Dept: OBGYN CLINIC | Facility: HOSPITAL | Age: 75
End: 2020-10-16

## 2020-10-22 ENCOUNTER — TELEPHONE (OUTPATIENT)
Dept: OBGYN CLINIC | Facility: HOSPITAL | Age: 75
End: 2020-10-22

## 2020-10-22 ENCOUNTER — APPOINTMENT (OUTPATIENT)
Dept: RADIOLOGY | Facility: AMBULARY SURGERY CENTER | Age: 75
End: 2020-10-22
Attending: SURGERY
Payer: COMMERCIAL

## 2020-10-22 ENCOUNTER — OFFICE VISIT (OUTPATIENT)
Dept: OBGYN CLINIC | Facility: CLINIC | Age: 75
End: 2020-10-22
Payer: MEDICARE

## 2020-10-22 VITALS
BODY MASS INDEX: 28.7 KG/M2 | SYSTOLIC BLOOD PRESSURE: 126 MMHG | HEIGHT: 71 IN | WEIGHT: 205 LBS | DIASTOLIC BLOOD PRESSURE: 81 MMHG | HEART RATE: 83 BPM

## 2020-10-22 DIAGNOSIS — S62.609A CLOSED FRACTURE DISLOCATION OF PROXIMAL INTERPHALANGEAL (PIP) JOINT OF FINGER, INITIAL ENCOUNTER: ICD-10-CM

## 2020-10-22 DIAGNOSIS — M79.642 LEFT HAND PAIN: ICD-10-CM

## 2020-10-22 DIAGNOSIS — M79.642 LEFT HAND PAIN: Primary | ICD-10-CM

## 2020-10-22 PROCEDURE — 99203 OFFICE O/P NEW LOW 30 MIN: CPT | Performed by: SURGERY

## 2020-10-22 PROCEDURE — 73140 X-RAY EXAM OF FINGER(S): CPT

## 2020-11-23 ENCOUNTER — EVALUATION (OUTPATIENT)
Dept: OCCUPATIONAL THERAPY | Facility: MEDICAL CENTER | Age: 75
End: 2020-11-23
Payer: MEDICARE

## 2020-11-23 DIAGNOSIS — M79.642 LEFT HAND PAIN: ICD-10-CM

## 2020-11-23 DIAGNOSIS — S62.609D CLOSED FRACTURE DISLOCATION OF PROXIMAL INTERPHALANGEAL (PIP) JOINT OF FINGER WITH ROUTINE HEALING, SUBSEQUENT ENCOUNTER: Primary | ICD-10-CM

## 2020-11-23 PROCEDURE — 97165 OT EVAL LOW COMPLEX 30 MIN: CPT

## 2020-11-23 PROCEDURE — 97110 THERAPEUTIC EXERCISES: CPT

## 2020-11-30 ENCOUNTER — OFFICE VISIT (OUTPATIENT)
Dept: OCCUPATIONAL THERAPY | Facility: MEDICAL CENTER | Age: 75
End: 2020-11-30
Payer: MEDICARE

## 2020-11-30 DIAGNOSIS — M79.642 LEFT HAND PAIN: ICD-10-CM

## 2020-11-30 DIAGNOSIS — S62.609D CLOSED FRACTURE DISLOCATION OF PROXIMAL INTERPHALANGEAL (PIP) JOINT OF FINGER WITH ROUTINE HEALING, SUBSEQUENT ENCOUNTER: Primary | ICD-10-CM

## 2020-11-30 PROCEDURE — 97530 THERAPEUTIC ACTIVITIES: CPT

## 2020-11-30 PROCEDURE — 97110 THERAPEUTIC EXERCISES: CPT

## 2020-12-03 ENCOUNTER — OFFICE VISIT (OUTPATIENT)
Dept: OBGYN CLINIC | Facility: CLINIC | Age: 75
End: 2020-12-03
Payer: MEDICARE

## 2020-12-03 DIAGNOSIS — S62.609A CLOSED FRACTURE DISLOCATION OF PROXIMAL INTERPHALANGEAL (PIP) JOINT OF FINGER, INITIAL ENCOUNTER: Primary | ICD-10-CM

## 2020-12-03 PROCEDURE — 99213 OFFICE O/P EST LOW 20 MIN: CPT | Performed by: SURGERY

## 2020-12-07 ENCOUNTER — OFFICE VISIT (OUTPATIENT)
Dept: OCCUPATIONAL THERAPY | Facility: MEDICAL CENTER | Age: 75
End: 2020-12-07
Payer: MEDICARE

## 2020-12-07 DIAGNOSIS — M79.642 LEFT HAND PAIN: ICD-10-CM

## 2020-12-07 DIAGNOSIS — S62.609D CLOSED FRACTURE DISLOCATION OF PROXIMAL INTERPHALANGEAL (PIP) JOINT OF FINGER WITH ROUTINE HEALING, SUBSEQUENT ENCOUNTER: Primary | ICD-10-CM

## 2020-12-07 PROCEDURE — 97530 THERAPEUTIC ACTIVITIES: CPT

## 2020-12-07 PROCEDURE — 97110 THERAPEUTIC EXERCISES: CPT

## 2020-12-07 PROCEDURE — 97140 MANUAL THERAPY 1/> REGIONS: CPT

## 2020-12-14 ENCOUNTER — APPOINTMENT (OUTPATIENT)
Dept: OCCUPATIONAL THERAPY | Facility: MEDICAL CENTER | Age: 75
End: 2020-12-14
Payer: MEDICARE

## 2021-03-08 ENCOUNTER — HOSPITAL ENCOUNTER (INPATIENT)
Facility: HOSPITAL | Age: 76
LOS: 39 days | Discharge: HOME | DRG: 885 | End: 2021-04-16
Attending: PSYCHIATRY & NEUROLOGY | Admitting: PSYCHIATRY & NEUROLOGY
Payer: MEDICARE

## 2021-03-08 ENCOUNTER — HOSPITAL ENCOUNTER (EMERGENCY)
Facility: HOSPITAL | Age: 76
End: 2021-03-08
Attending: EMERGENCY MEDICINE
Payer: MEDICARE

## 2021-03-08 VITALS
HEART RATE: 77 BPM | SYSTOLIC BLOOD PRESSURE: 139 MMHG | RESPIRATION RATE: 16 BRPM | BODY MASS INDEX: 28.23 KG/M2 | WEIGHT: 202.38 LBS | TEMPERATURE: 97.7 F | OXYGEN SATURATION: 97 % | DIASTOLIC BLOOD PRESSURE: 76 MMHG

## 2021-03-08 DIAGNOSIS — R33.8 ACUTE URINARY RETENTION: ICD-10-CM

## 2021-03-08 DIAGNOSIS — N18.30 CKD (CHRONIC KIDNEY DISEASE) STAGE 3, GFR 30-59 ML/MIN (HCC): Chronic | ICD-10-CM

## 2021-03-08 DIAGNOSIS — E55.9 VITAMIN D DEFICIENCY: ICD-10-CM

## 2021-03-08 DIAGNOSIS — N17.9 ACUTE RENAL FAILURE (ARF) (HCC): ICD-10-CM

## 2021-03-08 DIAGNOSIS — E03.9 HYPOTHYROIDISM: ICD-10-CM

## 2021-03-08 DIAGNOSIS — Z16.12 ESBL (EXTENDED SPECTRUM BETA-LACTAMASE) PRODUCING BACTERIA INFECTION: ICD-10-CM

## 2021-03-08 DIAGNOSIS — F06.30 MOOD DISORDER AS LATE EFFECT OF TRAUMATIC BRAIN INJURY (HCC): Chronic | ICD-10-CM

## 2021-03-08 DIAGNOSIS — G40.909 SEIZURE DISORDER (HCC): ICD-10-CM

## 2021-03-08 DIAGNOSIS — F33.2 MDD (MAJOR DEPRESSIVE DISORDER), RECURRENT EPISODE, SEVERE (HCC): Primary | ICD-10-CM

## 2021-03-08 DIAGNOSIS — A49.9 ESBL (EXTENDED SPECTRUM BETA-LACTAMASE) PRODUCING BACTERIA INFECTION: ICD-10-CM

## 2021-03-08 DIAGNOSIS — F32.A DEPRESSION: Primary | ICD-10-CM

## 2021-03-08 DIAGNOSIS — I10 ESSENTIAL HYPERTENSION: ICD-10-CM

## 2021-03-08 DIAGNOSIS — S06.9X9S MOOD DISORDER AS LATE EFFECT OF TRAUMATIC BRAIN INJURY (HCC): Chronic | ICD-10-CM

## 2021-03-08 LAB
ALBUMIN SERPL BCP-MCNC: 4 G/DL (ref 3.5–5)
ALP SERPL-CCNC: 70 U/L (ref 46–116)
ALT SERPL W P-5'-P-CCNC: 21 U/L (ref 12–78)
AMPHETAMINES SERPL QL SCN: NEGATIVE
ANION GAP SERPL CALCULATED.3IONS-SCNC: 6 MMOL/L (ref 4–13)
AST SERPL W P-5'-P-CCNC: 12 U/L (ref 5–45)
ATRIAL RATE: 68 BPM
BACTERIA UR QL AUTO: ABNORMAL /HPF
BARBITURATES UR QL: NEGATIVE
BASOPHILS # BLD AUTO: 0.05 THOUSANDS/ΜL (ref 0–0.1)
BASOPHILS NFR BLD AUTO: 1 % (ref 0–1)
BENZODIAZ UR QL: NEGATIVE
BILIRUB SERPL-MCNC: 0.87 MG/DL (ref 0.2–1)
BILIRUB UR QL STRIP: NEGATIVE
BUN SERPL-MCNC: 26 MG/DL (ref 5–25)
CALCIUM SERPL-MCNC: 8.8 MG/DL (ref 8.3–10.1)
CHLORIDE SERPL-SCNC: 105 MMOL/L (ref 100–108)
CLARITY UR: CLEAR
CO2 SERPL-SCNC: 29 MMOL/L (ref 21–32)
COCAINE UR QL: NEGATIVE
COLOR UR: YELLOW
CREAT SERPL-MCNC: 1.94 MG/DL (ref 0.6–1.3)
EOSINOPHIL # BLD AUTO: 0.05 THOUSAND/ΜL (ref 0–0.61)
EOSINOPHIL NFR BLD AUTO: 1 % (ref 0–6)
ERYTHROCYTE [DISTWIDTH] IN BLOOD BY AUTOMATED COUNT: 14 % (ref 11.6–15.1)
ETHANOL SERPL-MCNC: <3 MG/DL (ref 0–3)
FLUAV RNA RESP QL NAA+PROBE: NEGATIVE
FLUBV RNA RESP QL NAA+PROBE: NEGATIVE
GFR SERPL CREATININE-BSD FRML MDRD: 33 ML/MIN/1.73SQ M
GLUCOSE SERPL-MCNC: 102 MG/DL (ref 65–140)
GLUCOSE UR STRIP-MCNC: NEGATIVE MG/DL
HCT VFR BLD AUTO: 51.7 % (ref 36.5–49.3)
HGB BLD-MCNC: 16.8 G/DL (ref 12–17)
HGB UR QL STRIP.AUTO: NEGATIVE
IMM GRANULOCYTES # BLD AUTO: 0.07 THOUSAND/UL (ref 0–0.2)
IMM GRANULOCYTES NFR BLD AUTO: 1 % (ref 0–2)
KETONES UR STRIP-MCNC: NEGATIVE MG/DL
LEUKOCYTE ESTERASE UR QL STRIP: NEGATIVE
LYMPHOCYTES # BLD AUTO: 1.05 THOUSANDS/ΜL (ref 0.6–4.47)
LYMPHOCYTES NFR BLD AUTO: 13 % (ref 14–44)
MCH RBC QN AUTO: 29.2 PG (ref 26.8–34.3)
MCHC RBC AUTO-ENTMCNC: 32.5 G/DL (ref 31.4–37.4)
MCV RBC AUTO: 90 FL (ref 82–98)
METHADONE UR QL: NEGATIVE
MONOCYTES # BLD AUTO: 0.48 THOUSAND/ΜL (ref 0.17–1.22)
MONOCYTES NFR BLD AUTO: 6 % (ref 4–12)
MUCOUS THREADS UR QL AUTO: ABNORMAL
NEUTROPHILS # BLD AUTO: 6.15 THOUSANDS/ΜL (ref 1.85–7.62)
NEUTS SEG NFR BLD AUTO: 78 % (ref 43–75)
NITRITE UR QL STRIP: NEGATIVE
NON-SQ EPI CELLS URNS QL MICRO: ABNORMAL /HPF
NRBC BLD AUTO-RTO: 0 /100 WBCS
OPIATES UR QL SCN: NEGATIVE
OXYCODONE+OXYMORPHONE UR QL SCN: NEGATIVE
P AXIS: 71 DEGREES
PCP UR QL: NEGATIVE
PH UR STRIP.AUTO: 6 [PH]
PLATELET # BLD AUTO: 218 THOUSANDS/UL (ref 149–390)
PMV BLD AUTO: 10.4 FL (ref 8.9–12.7)
POTASSIUM SERPL-SCNC: 3.8 MMOL/L (ref 3.5–5.3)
PR INTERVAL: 154 MS
PROT SERPL-MCNC: 7.2 G/DL (ref 6.4–8.2)
PROT UR STRIP-MCNC: ABNORMAL MG/DL
QRS AXIS: -85 DEGREES
QRSD INTERVAL: 178 MS
QT INTERVAL: 454 MS
QTC INTERVAL: 482 MS
RBC # BLD AUTO: 5.76 MILLION/UL (ref 3.88–5.62)
RBC #/AREA URNS AUTO: ABNORMAL /HPF
RSV RNA RESP QL NAA+PROBE: NEGATIVE
SARS-COV-2 RNA RESP QL NAA+PROBE: NEGATIVE
SODIUM SERPL-SCNC: 140 MMOL/L (ref 136–145)
SP GR UR STRIP.AUTO: >=1.03 (ref 1–1.03)
T WAVE AXIS: 78 DEGREES
THC UR QL: NEGATIVE
TSH SERPL DL<=0.05 MIU/L-ACNC: 2.73 UIU/ML (ref 0.36–3.74)
UROBILINOGEN UR QL STRIP.AUTO: 0.2 E.U./DL
VENTRICULAR RATE: 68 BPM
WBC # BLD AUTO: 7.85 THOUSAND/UL (ref 4.31–10.16)
WBC #/AREA URNS AUTO: ABNORMAL /HPF

## 2021-03-08 PROCEDURE — 80307 DRUG TEST PRSMV CHEM ANLYZR: CPT | Performed by: EMERGENCY MEDICINE

## 2021-03-08 PROCEDURE — 81001 URINALYSIS AUTO W/SCOPE: CPT | Performed by: EMERGENCY MEDICINE

## 2021-03-08 PROCEDURE — 82077 ASSAY SPEC XCP UR&BREATH IA: CPT | Performed by: EMERGENCY MEDICINE

## 2021-03-08 PROCEDURE — 93010 ELECTROCARDIOGRAM REPORT: CPT | Performed by: INTERNAL MEDICINE

## 2021-03-08 PROCEDURE — 93005 ELECTROCARDIOGRAM TRACING: CPT

## 2021-03-08 PROCEDURE — 85025 COMPLETE CBC W/AUTO DIFF WBC: CPT | Performed by: EMERGENCY MEDICINE

## 2021-03-08 PROCEDURE — 80053 COMPREHEN METABOLIC PANEL: CPT | Performed by: EMERGENCY MEDICINE

## 2021-03-08 PROCEDURE — 99285 EMERGENCY DEPT VISIT HI MDM: CPT

## 2021-03-08 PROCEDURE — 84443 ASSAY THYROID STIM HORMONE: CPT | Performed by: EMERGENCY MEDICINE

## 2021-03-08 PROCEDURE — 36415 COLL VENOUS BLD VENIPUNCTURE: CPT | Performed by: EMERGENCY MEDICINE

## 2021-03-08 PROCEDURE — 99285 EMERGENCY DEPT VISIT HI MDM: CPT | Performed by: EMERGENCY MEDICINE

## 2021-03-08 PROCEDURE — 0241U HB NFCT DS VIR RESP RNA 4 TRGT: CPT | Performed by: EMERGENCY MEDICINE

## 2021-03-08 RX ORDER — AMOXICILLIN 250 MG
1 CAPSULE ORAL DAILY PRN
Status: DISCONTINUED | OUTPATIENT
Start: 2021-03-08 | End: 2021-04-16 | Stop reason: HOSPADM

## 2021-03-08 RX ORDER — MIRTAZAPINE 15 MG/1
7.5 TABLET, FILM COATED ORAL
Status: CANCELLED | OUTPATIENT
Start: 2021-03-08

## 2021-03-08 RX ORDER — DIVALPROEX SODIUM 500 MG/1
500 TABLET, DELAYED RELEASE ORAL EVERY 12 HOURS SCHEDULED
Status: DISCONTINUED | OUTPATIENT
Start: 2021-03-08 | End: 2021-03-09

## 2021-03-08 RX ORDER — HYDROXYZINE HYDROCHLORIDE 25 MG/1
25 TABLET, FILM COATED ORAL
Status: DISCONTINUED | OUTPATIENT
Start: 2021-03-08 | End: 2021-04-16 | Stop reason: HOSPADM

## 2021-03-08 RX ORDER — LORAZEPAM 1 MG/1
1 TABLET ORAL
Status: DISCONTINUED | OUTPATIENT
Start: 2021-03-08 | End: 2021-04-16 | Stop reason: HOSPADM

## 2021-03-08 RX ORDER — OLANZAPINE 2.5 MG/1
5 TABLET ORAL
Status: CANCELLED | OUTPATIENT
Start: 2021-03-08

## 2021-03-08 RX ORDER — ACETAMINOPHEN 325 MG/1
650 TABLET ORAL EVERY 4 HOURS PRN
Status: CANCELLED | OUTPATIENT
Start: 2021-03-08

## 2021-03-08 RX ORDER — ATORVASTATIN CALCIUM 10 MG/1
10 TABLET, FILM COATED ORAL
Status: DISCONTINUED | OUTPATIENT
Start: 2021-03-09 | End: 2021-04-16 | Stop reason: HOSPADM

## 2021-03-08 RX ORDER — OLANZAPINE 10 MG/1
5 INJECTION, POWDER, LYOPHILIZED, FOR SOLUTION INTRAMUSCULAR
Status: DISCONTINUED | OUTPATIENT
Start: 2021-03-08 | End: 2021-04-16 | Stop reason: HOSPADM

## 2021-03-08 RX ORDER — TRAZODONE HYDROCHLORIDE 50 MG/1
50 TABLET ORAL
Status: CANCELLED | OUTPATIENT
Start: 2021-03-08

## 2021-03-08 RX ORDER — ACETAMINOPHEN 325 MG/1
975 TABLET ORAL EVERY 6 HOURS PRN
Status: DISCONTINUED | OUTPATIENT
Start: 2021-03-08 | End: 2021-04-16 | Stop reason: HOSPADM

## 2021-03-08 RX ORDER — LORAZEPAM 0.5 MG/1
0.5 TABLET ORAL
Status: CANCELLED | OUTPATIENT
Start: 2021-03-08

## 2021-03-08 RX ORDER — DULOXETIN HYDROCHLORIDE 60 MG/1
60 CAPSULE, DELAYED RELEASE ORAL DAILY
Status: CANCELLED | OUTPATIENT
Start: 2021-03-09

## 2021-03-08 RX ORDER — RISPERIDONE 0.25 MG/1
0.5 TABLET, FILM COATED ORAL
Status: CANCELLED | OUTPATIENT
Start: 2021-03-08

## 2021-03-08 RX ORDER — DULOXETIN HYDROCHLORIDE 60 MG/1
60 CAPSULE, DELAYED RELEASE ORAL DAILY
Status: DISCONTINUED | OUTPATIENT
Start: 2021-03-09 | End: 2021-03-09

## 2021-03-08 RX ORDER — LORAZEPAM 1 MG/1
1 TABLET ORAL
Status: CANCELLED | OUTPATIENT
Start: 2021-03-08

## 2021-03-08 RX ORDER — OLANZAPINE 5 MG/1
5 TABLET ORAL
Status: DISCONTINUED | OUTPATIENT
Start: 2021-03-08 | End: 2021-04-16 | Stop reason: HOSPADM

## 2021-03-08 RX ORDER — LORAZEPAM 2 MG/ML
1 INJECTION INTRAMUSCULAR
Status: DISCONTINUED | OUTPATIENT
Start: 2021-03-08 | End: 2021-04-16 | Stop reason: HOSPADM

## 2021-03-08 RX ORDER — ACETAMINOPHEN 325 MG/1
650 TABLET ORAL EVERY 4 HOURS PRN
Status: DISCONTINUED | OUTPATIENT
Start: 2021-03-08 | End: 2021-04-16 | Stop reason: HOSPADM

## 2021-03-08 RX ORDER — MIRTAZAPINE 15 MG/1
7.5 TABLET, FILM COATED ORAL
Status: DISCONTINUED | OUTPATIENT
Start: 2021-03-08 | End: 2021-03-09

## 2021-03-08 RX ORDER — LORAZEPAM 0.5 MG/1
0.5 TABLET ORAL
Status: DISCONTINUED | OUTPATIENT
Start: 2021-03-08 | End: 2021-04-16 | Stop reason: HOSPADM

## 2021-03-08 RX ORDER — AMOXICILLIN 250 MG
1 CAPSULE ORAL DAILY PRN
Status: CANCELLED | OUTPATIENT
Start: 2021-03-08

## 2021-03-08 RX ORDER — MAGNESIUM HYDROXIDE/ALUMINUM HYDROXICE/SIMETHICONE 120; 1200; 1200 MG/30ML; MG/30ML; MG/30ML
30 SUSPENSION ORAL EVERY 4 HOURS PRN
Status: DISCONTINUED | OUTPATIENT
Start: 2021-03-08 | End: 2021-04-16 | Stop reason: HOSPADM

## 2021-03-08 RX ORDER — TRAZODONE HYDROCHLORIDE 50 MG/1
50 TABLET ORAL
Status: DISCONTINUED | OUTPATIENT
Start: 2021-03-08 | End: 2021-03-11

## 2021-03-08 RX ORDER — ROPINIROLE 1 MG/1
2 TABLET, FILM COATED ORAL
Status: DISCONTINUED | OUTPATIENT
Start: 2021-03-08 | End: 2021-03-30

## 2021-03-08 RX ORDER — OLANZAPINE 10 MG/1
5 INJECTION, POWDER, LYOPHILIZED, FOR SOLUTION INTRAMUSCULAR
Status: CANCELLED | OUTPATIENT
Start: 2021-03-08

## 2021-03-08 RX ORDER — POLYETHYLENE GLYCOL 3350 17 G/17G
17 POWDER, FOR SOLUTION ORAL DAILY PRN
Status: DISCONTINUED | OUTPATIENT
Start: 2021-03-08 | End: 2021-04-16 | Stop reason: HOSPADM

## 2021-03-08 RX ORDER — ACETAMINOPHEN 325 MG/1
975 TABLET ORAL EVERY 6 HOURS PRN
Status: CANCELLED | OUTPATIENT
Start: 2021-03-08

## 2021-03-08 RX ORDER — MAGNESIUM HYDROXIDE/ALUMINUM HYDROXICE/SIMETHICONE 120; 1200; 1200 MG/30ML; MG/30ML; MG/30ML
30 SUSPENSION ORAL EVERY 4 HOURS PRN
Status: CANCELLED | OUTPATIENT
Start: 2021-03-08

## 2021-03-08 RX ORDER — POLYETHYLENE GLYCOL 3350 17 G/17G
17 POWDER, FOR SOLUTION ORAL DAILY PRN
Status: CANCELLED | OUTPATIENT
Start: 2021-03-08

## 2021-03-08 RX ORDER — LORAZEPAM 2 MG/ML
1 INJECTION INTRAMUSCULAR
Status: CANCELLED | OUTPATIENT
Start: 2021-03-08

## 2021-03-08 RX ORDER — AMOXICILLIN 250 MG
1 CAPSULE ORAL
Status: DISCONTINUED | OUTPATIENT
Start: 2021-03-08 | End: 2021-04-16 | Stop reason: HOSPADM

## 2021-03-08 RX ORDER — HYDROXYZINE HYDROCHLORIDE 25 MG/1
25 TABLET, FILM COATED ORAL
Status: CANCELLED | OUTPATIENT
Start: 2021-03-08

## 2021-03-08 RX ORDER — RISPERIDONE 0.5 MG/1
0.5 TABLET, FILM COATED ORAL
Status: DISCONTINUED | OUTPATIENT
Start: 2021-03-08 | End: 2021-04-16 | Stop reason: HOSPADM

## 2021-03-08 RX ORDER — DIVALPROEX SODIUM 250 MG/1
500 TABLET, DELAYED RELEASE ORAL EVERY 12 HOURS SCHEDULED
Status: CANCELLED | OUTPATIENT
Start: 2021-03-08

## 2021-03-08 RX ADMIN — DIVALPROEX SODIUM 500 MG: 500 TABLET, DELAYED RELEASE ORAL at 22:02

## 2021-03-08 RX ADMIN — ROPINIROLE 2 MG: 1 TABLET, FILM COATED ORAL at 22:49

## 2021-03-08 RX ADMIN — QUETIAPINE FUMARATE 150 MG: 100 TABLET ORAL at 22:02

## 2021-03-08 RX ADMIN — SENNOSIDES AND DOCUSATE SODIUM 1 TABLET: 8.6; 5 TABLET ORAL at 22:50

## 2021-03-08 RX ADMIN — LORAZEPAM 0.5 MG: 0.5 TABLET ORAL at 22:50

## 2021-03-08 RX ADMIN — MIRTAZAPINE 7.5 MG: 15 TABLET, FILM COATED ORAL at 22:02

## 2021-03-08 RX ADMIN — TRAZODONE HYDROCHLORIDE 50 MG: 50 TABLET ORAL at 22:50

## 2021-03-08 NOTE — PLAN OF CARE
Problem: DEPRESSION  Goal: Will be euthymic at discharge  Description: INTERVENTIONS:  - Administer medication as ordered  - Provide emotional support via 1:1 interaction with staff  - Encourage involvement in milieu/groups/activities  - Monitor for social isolation  Outcome: Not Progressing     Problem: SLEEP DISTURBANCE  Goal: Will exhibit normal sleeping pattern  Description: Interventions:  -  Assess the patients sleep pattern, noting recent changes  - Administer medication as ordered  - Decrease environmental stimuli, including noise, as appropriate during the night  - Encourage the patient to actively participate in unit groups and or exercise during the day to enhance ability to achieve adequate sleep at night  - Assess the patient, in the morning, encouraging a description of sleep experience  Outcome: Not Progressing

## 2021-03-08 NOTE — NURSING NOTE
[facility] [address] [facility]  Pt frustrated and irritable at first during admission process then became pleasant and cooperative with redirection  Pt denies SI/HI/AH/VH  Pt states he is here for brain depression and has had it for 40 years  Pt appears anxious due to "no sleep for the past 3-4 nights " Pt requested something to help him sleep tonight  Fluids offered  Q 7 min safety checks initiated  Pt resting in bed at this time

## 2021-03-08 NOTE — ED NOTES
Patient is accepted at [facility]  Patient is accepted by Dr Alicia Dias per JAEL  Transportation is arranged with [facility] via [address]  Transportation is scheduled for 1615  Patient may go to the floor before 1700 or after 2100  Nurse report is to be called to [phone] prior to patient transfer

## 2021-03-08 NOTE — EMTALA/ACUTE CARE TRANSFER
[address] [address]  Dept: [phone]      EMTALA TRANSFER CONSENT    NAME Lucie Ayoub   1945                              MRN [id number]    I have been informed of my rights regarding examination, treatment, and transfer   by Dr Zenobia Philippe DO    Benefits: Specialized equipment and/or services available at the receiving facility (Include comment)________________________, Continuity of care, Other benefits (Include comment)_______________________(inpatient mental health 201)    Risks: Potential for delay in receiving treatment, Potential deterioration of medical condition, Increased discomfort during transfer, Possible worsening of condition or death during transfer      Consent for Transfer:  I acknowledge that my medical condition has been evaluated and explained to me by the emergency department physician or other qualified medical person and/or my attending physician, who has recommended that I be transferred to the service of  Accepting Physician: Dr Jaqui Garcia 81 at [address] [place] [address] Rd Name, [address] : [address] [place], [place]  The above potential benefits of such transfer, the potential risks associated with such transfer, and the probable risks of not being transferred have been explained to me, and I fully understand them  The doctor has explained that, in my case, the benefits of transfer outweigh the risks  I agree to be transferred  I authorize the performance of emergency medical procedures and treatments upon me in both transit and upon arrival at the receiving facility  Additionally, I authorize the release of any and all medical records to the receiving facility and request they be transported with me, if possible    I understand that the safest mode of transportation during a medical emergency is an ambulance and that the [facility] advocates the use of this mode of transport  Risks of traveling to the receiving facility by car, including absence of medical control, life sustaining equipment, such as oxygen, and medical personnel has been explained to me and I fully understand them  (GLENN CORRECT BOX BELOW)  [  ]  I consent to the stated transfer and to be transported by ambulance/helicopter  [  ]  I consent to the stated transfer, but refuse transportation by ambulance and accept full responsibility for my transportation by car  I understand the risks of non-ambulance transfers and I exonerate the Hospital and its staff from any deterioration in my condition that results from this refusal     X___________________________________________    DATE  21  TIME________  Signature of patient or legally responsible individual signing on patient behalf           RELATIONSHIP TO PATIENT_________________________          Provider Certification    NAME Jayne Dietrich  [facility] 1945                              MRN [id number]    A medical screening exam was performed on the above named patient  Based on the examination:    Condition Necessitating Transfer The primary encounter diagnosis was Depression  Diagnoses of Hypothyroidism, Essential hypertension, Acute urinary retention, Acute renal failure (ARF) ([facility]), Seizure disorder ([facility] [address] ), CKD (chronic kidney disease) stage 3, GFR 30-59 ml/min, and ESBL (extended spectrum beta-lactamase) producing bacteria infection were also pertinent to this visit      Patient Condition: The patient has been stabilized such that within reasonable medical probability, no material deterioration of the patient condition or the condition of the unborn child(alfred) is likely to result from the transfer    Reason for Transfer: Level of Care needed not available at this facility, No bed available at level of patient's needs, Other (Include comment)____________________(inpatient mental health 201)    Transfer Requirements: [address]   · Space available and qualified personnel available for treatment as acknowledged by Crisis [phone]  · Agreed to accept transfer and to provide appropriate medical treatment as acknowledged by       Dr Mame Bruner  · Appropriate medical records of the examination and treatment of the patient are provided at the time of transfer   [facility], [address] _______  · Transfer will be performed by qualified personnel from [address] [place] [address]  and appropriate transfer equipment as required, including the use of necessary and appropriate life support measures  Provider Certification: I have examined the patient and explained the following risks and benefits of being transferred/refusing transfer to the patient/family:  General risk, such as traffic hazards, adverse weather conditions, rough terrain or turbulence, possible failure of equipment (including vehicle or aircraft), or consequences of actions of persons outside the control of the transport personnel, Unanticipated needs of medical equipment and personnel during transport, Risk of worsening condition, The possibility of a transport vehicle being unavailable, The patient is stable for psychiatric transfer because they are medically stable, and is protected from harming him/herself or others during transport      Based on these reasonable risks and benefits to the patient and/or the unborn child(alfred), and based upon the information available at the time of the patients examination, I certify that the medical benefits reasonably to be expected from the provision of appropriate medical treatments at another medical facility outweigh the increasing risks, if any, to the individuals medical condition, and in the case of labor to the unborn child, from effecting the transfer      X____________________________________________ DATE 03/08/21        TIME_______      ORIGINAL - SEND TO MEDICAL RECORDS   COPY - SEND WITH PATIENT DURING TRANSFER
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
## 2021-03-08 NOTE — ED NOTES
Patient is a 78 yr old male who arrived to the ED via ambulance after his sister-in-law called 911 due to worsening depression  Patient reports he has been dealing with depression for over 40 years  He also has a history of TBI  Patient presents as very apathetic and nihilistic  While he is oriented and cooperative, he answers most questions with "I don't know, I don't care, or it doesn't matter"  He is extremely negative with hopelessness and helplessness  He reports his depression has been worse since Friday, but he is unable to cite any stressors  He provides little information or elaboration, but this appears to be due to his extreme apathy rather than his inability to provide information  He denies suicidal ideas, plan, or intent - current or past, and denies any homicidal ideas, plan, intent or violence toward others  He reports that he has been less interested and involved in ADLs and hygiene and grooming  He reports he is not sleeping and not eating  Reports he will try to go to bed by 10p m, but is often awake by 2 a m  and lately he feels he is hardly sleeping at all  He reports he has no appetite and has not eaten in days  He does not know if he has lost any weight  He reports he lives alone, but his sister-in-law is involved and she is the one who called 911  He reports a long history of depression with hospital admissions and outpatient therapy, but does not provide details  He denies any hallucinations, delusions, or paranoia  His functioning appears impaired due to his depression and negative thought process  Patient is agreeable to signing a 201  Rights and explanation of 72 hour notice provided

## 2021-03-08 NOTE — PROGRESS NOTES
Patient belongings are as follows:    Room:  Eyeglasses  Blue Tshirt  [place]  Underwear  Black socks    Room contraband cabinet:  Blue jacket (carhart)  Black shoes w/ laces  Brown belt  Yellow arm band (support your vets)    Safe:  Black wallet w/ [employer] picture  Medicare card  [address] [facility] card  [employer] cards X 2  Phamacy cards  Card with phone numbers     Medication list w/ sister in laws number given to RN  No cell phone  No dentures

## 2021-03-08 NOTE — ED NOTES
Insurance Authorization for admission:   Phone call placed to Wendi  Phone number: [phone]  Spoke to [facility]  No authorization or COB required  They follow Medicare guidelines and practices  Primary payor is Medicare  No authorization required for treatment or transportation  EVS (Eligibility Verification System) called - [phone]  Automated system indicates: not on file

## 2021-03-09 PROCEDURE — 82306 VITAMIN D 25 HYDROXY: CPT | Performed by: FAMILY MEDICINE

## 2021-03-09 PROCEDURE — 82746 ASSAY OF FOLIC ACID SERUM: CPT | Performed by: FAMILY MEDICINE

## 2021-03-09 PROCEDURE — 82607 VITAMIN B-12: CPT | Performed by: FAMILY MEDICINE

## 2021-03-09 PROCEDURE — 99222 1ST HOSP IP/OBS MODERATE 55: CPT | Performed by: PSYCHIATRY & NEUROLOGY

## 2021-03-09 RX ORDER — DIVALPROEX SODIUM 500 MG/1
1000 TABLET, DELAYED RELEASE ORAL
Status: DISCONTINUED | OUTPATIENT
Start: 2021-03-09 | End: 2021-03-12

## 2021-03-09 RX ORDER — MIRTAZAPINE 15 MG/1
15 TABLET, FILM COATED ORAL
Status: DISCONTINUED | OUTPATIENT
Start: 2021-03-09 | End: 2021-03-11

## 2021-03-09 RX ORDER — DULOXETIN HYDROCHLORIDE 30 MG/1
30 CAPSULE, DELAYED RELEASE ORAL DAILY
Status: DISCONTINUED | OUTPATIENT
Start: 2021-03-10 | End: 2021-03-11

## 2021-03-09 RX ORDER — QUETIAPINE FUMARATE 200 MG/1
200 TABLET, FILM COATED ORAL
Status: DISCONTINUED | OUTPATIENT
Start: 2021-03-09 | End: 2021-03-11

## 2021-03-09 RX ADMIN — LORAZEPAM 0.5 MG: 0.5 TABLET ORAL at 21:34

## 2021-03-09 RX ADMIN — DIVALPROEX SODIUM 1000 MG: 500 TABLET, DELAYED RELEASE ORAL at 21:34

## 2021-03-09 RX ADMIN — QUETIAPINE FUMARATE 200 MG: 200 TABLET ORAL at 21:35

## 2021-03-09 RX ADMIN — ROPINIROLE 2 MG: 1 TABLET, FILM COATED ORAL at 21:34

## 2021-03-09 RX ADMIN — DULOXETINE HYDROCHLORIDE 60 MG: 60 CAPSULE, DELAYED RELEASE ORAL at 08:40

## 2021-03-09 RX ADMIN — ATORVASTATIN CALCIUM 10 MG: 10 TABLET, FILM COATED ORAL at 16:30

## 2021-03-09 RX ADMIN — TRAZODONE HYDROCHLORIDE 50 MG: 50 TABLET ORAL at 21:35

## 2021-03-09 RX ADMIN — MIRTAZAPINE 15 MG: 15 TABLET, FILM COATED ORAL at 21:34

## 2021-03-09 RX ADMIN — DIVALPROEX SODIUM 500 MG: 500 TABLET, DELAYED RELEASE ORAL at 08:41

## 2021-03-09 RX ADMIN — SENNOSIDES AND DOCUSATE SODIUM 1 TABLET: 8.6; 5 TABLET ORAL at 21:35

## 2021-03-09 RX ADMIN — Medication 1 TABLET: at 08:40

## 2021-03-09 NOTE — SOCIAL WORK
SW attempted to meet with patient in pt room; pt laying in bed, curled up; pt did not make eye contact with SW during interaction; pt declined to meet with SW at this time stating "I'm so tired"; SW agreed to meet with patient at another time to obtain psychosocial information

## 2021-03-09 NOTE — PLAN OF CARE
Problem: Ineffective Coping  Goal: Participates in unit activities  Description: Interventions:  - Provide therapeutic environment   - Provide required programming   - Redirect inappropriate behaviors   Outcome: Not Progressing   Patient has been struggling to sleep before coming into hospital; patient has been sleeping most of day

## 2021-03-09 NOTE — TREATMENT PLAN
TREATMENT PLAN REVIEW - [address]  78 y o  1945 male MRN: [id number]    [address]  Room / Bed: Brittney Patient 210/[facility] [id number] Encounter: [id number]          Admit Date/Time:  3/8/2021  5:30 PM    Treatment Team: Attending Provider: Jaqui Fuentes MD; Certified Nursing Assistant: Artemio Barfield; Consulting Physician: Tila Melendez MD; Certified Nursing Assistant: Lobo Payne; Patient Care Technician: Ciera De Dios; Registered Nurse: Chelo Clancy RN; Patient Care Assistant: Connie Cline; Recreational Therapist: Giovanna Sutherland; Care Manager: Dawson Mccormick RN; : IDALMIS Luna; Certified Nursing Assistant: Marlen Graf; Patient Care Assistant: Maricel Ko;  Registered Nurse: Peng Almonte RN    Diagnosis: Principal Problem:    MDD (major depressive disorder), recurrent episode, severe ([facility] )  Active Problems:    Multiple falls    Hypothyroidism    Essential hypertension    Seizure disorder ([facility] )    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 ([facility] )    CKD (chronic kidney disease) stage 3, GFR 30-59 ml/min    ESBL (extended spectrum beta-lactamase) producing bacteria infection    Restless legs      Patient Strengths/Assets: negotiates basic needs, patient is on a voluntary commitment    Patient Barriers/Limitations: poor insight, poor physical health, self-care deficit    Short Term Goals: decrease in depressive symptoms, decrease in anxiety symptoms, decrease in suicidal thoughts, decrease in level of agitation, ability to stay safe on the unit, improvement in insight, improvement in reasoning ability, improvement in self care, sleep improvement, improvement in appetite, mood stabilization, acceptance of need for psychiatric treatment, acceptance of psychiatric medications    Long Term Goals: improvement in depression, improvement in anxiety, stabilization of mood, free of suicidal thoughts, no self abusive behavior, improvement in reasoning ability, improved insight, able to express basic needs, acceptance of need for psychiatric medications, acceptance of need for psychiatric treatment, adequate self care, adequate sleep, adequate appetite, adequate oral intake, appropriate interaction with peers    Progress Towards Goals: starting psychiatric medications as prescribed    Recommended Treatment: medication management, patient medication education, group therapy, milieu therapy, continued Behavioral Health psychiatric evaluation/assessment process, medical follow up with medical team    Treatment Frequency: daily medication monitoring, group and milieu therapy daily, monitoring through interdisciplinary rounds, monitoring through weekly patient care conferences    Expected Discharge Date: 13 midnights     Discharge Plan: will be determined    Treatment Plan Created/Updated By: Mari Garcia MD

## 2021-03-09 NOTE — PROGRESS NOTES
03/09/21   Team Meeting   Meeting Type Daily Rounds   Team Members Present   Team Members Present Physician;Nurse;;Occupational Therapist   Physician Team Member Dr Leonila Kimball MD; Rachell Funes, [facility]   Nursing Team Member Anand Carroll RN   Care Management Team Member MS Jessie, [facility], [facility]   OT Team Member Esme Weaver, [place]   Patient/Family Present   Patient Present No   Patient's Family Present No   New admission  201  Readmit score 20  From [address] ER, increased depression no si, has not slept in 4 days, at 2250 screaming rocking, irritable and agitated, prns in er  Verbal aggression, but slept reminder of night after PRN, HX of TBI, HX of seizures  From home, sister in law is a support, ESBL in urine, no roommate

## 2021-03-09 NOTE — PROGRESS NOTES
Patient will wait for doctor's evaluation this AM so all labs can be drawn at once thus preventing multiple sticks

## 2021-03-09 NOTE — PLAN OF CARE
Problem: Depression  Goal: Refrain from harming self  Description: Interventions:  - Monitor patient closely, per order   - Supervise medication ingestion, monitor effects and side effects   Outcome: Progressing     Problem: Depression  Goal: Treatment Goal: Demonstrate behavioral control of depressive symptoms, verbalize feelings of improved mood/affect, and adopt new coping skills prior to discharge  Outcome: Not Progressing  Goal: Verbalize thoughts and feelings  Description: Interventions:  - Assess and re-assess patient's level of risk   - Engage patient in 1:1 interactions, daily, for a minimum of 15 minutes   - Encourage patient to express feelings, fears, frustrations, hopes   Outcome: Not Progressing  Goal: Refrain from isolation  Description: Interventions:  - Develop a trusting relationship   - Encourage socialization   Outcome: Not Progressing  Goal: Refrain from self-neglect  Outcome: Not Progressing  Goal: Complete daily ADLs, including personal hygiene independently, as able  Description: Interventions:  - Observe, teach, and assist patient with ADLS  -  Monitor and promote a balance of rest/activity, with adequate nutrition and elimination   Outcome: Not Progressing

## 2021-03-09 NOTE — H&P
Gunner Ladd#  [id number] Naomi Stack  XYX:[id number]    PNF:[id number]  Adm Date: 3/8/2021 1730  5:30 PM   ATT PHY: Ying Alexis, [facility]         Chief Complaint:  Worsening depression symptoms  History of Presenting Illness: Pilar Bernheim  is a(n) 78y o  year old male  Who was admitted from [facility] AT [place] [facility] emergency department where he presented with worsening irritability with depression and anxiety symptoms  Patient examined at bedside  Patient denied any active suicidal homicidal ideations  No Known Allergies    No current facility-administered medications on file prior to encounter        Current Outpatient Medications on File Prior to Encounter   Medication Sig Dispense Refill    atorvastatin (LIPITOR) 10 mg tablet Take 10 mg by mouth daily      buPROPion (WELLBUTRIN XL) 300 mg 24 hr tablet Take 300 mg by mouth daily      divalproex sodium (DEPAKOTE ER) 500 mg 24 hr tablet Take 2 tablets (1,000 mg total) by mouth daily at bedtime 60 tablet 0    DULoxetine (CYMBALTA) 30 mg delayed release capsule Take 1 capsule (30 mg total) by mouth daily 30 capsule 0    DULoxetine (CYMBALTA) 60 mg delayed release capsule Take 20 mg by mouth daily       melatonin 3 mg Take 1 tablet (3 mg total) by mouth daily at bedtime  0    mirtazapine (REMERON) 15 mg tablet Take 1 tablet (15 mg total) by mouth daily at bedtime 30 tablet 0    multivitamin-minerals (CENTRUM ADULTS) tablet Take 1 tablet by mouth daily      QUEtiapine (SEROquel) 300 mg tablet Take 1 tablet (300 mg total) by mouth daily at bedtime 30 tablet 0    rOPINIRole (REQUIP XL) 2 MG 24 hr tablet Take 2 mg by mouth daily at bedtime      rOPINIRole (REQUIP) 2 mg tablet Take 1 tablet (2 mg total) by mouth daily at bedtime 30 tablet 0    senna-docusate sodium (SENOKOT S) 8 6-50 mg per tablet Take 1 tablet by mouth daily at bedtime  0    acetaminophen (TYLENOL) 325 mg tablet Take 3 tablets (975 mg total) by mouth every 6 (six) hours as needed for mild pain  0    atorvastatin (LIPITOR) 10 mg tablet Take 10 mg by mouth daily         Active Ambulatory Problems     Diagnosis Date Noted    Fall 05/08/2016    Multiple falls 05/08/2016    Scalp Laceration 05/08/2016    Multiple bruises 05/08/2016    Hypothyroidism 05/09/2016    Essential hypertension 05/09/2016    Acute urinary retention 05/16/2016    Clostridium difficile infection 05/16/2016    Urinary incontinence 11/17/2017    Acute renal failure (ARF) ([facility] ) 11/17/2017    Seizure disorder ([place] [address] ) 11/20/2017    Polyuria 11/21/2017    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 ([place] [address] ) 12/02/2017    Urinary tract infection associated with indwelling urethral catheter ([place] [address] ) 01/27/2018    CKD (chronic kidney disease) stage 3, GFR 30-59 ml/min 01/28/2018    Severe episode of recurrent major depressive disorder, without psychotic features ([place] [address] ) 01/28/2018    ESBL (extended spectrum beta-lactamase) producing bacteria infection 01/29/2018    Encounter for examination and observation for other specified reasons     Dehydration 09/13/2018    Restless legs 09/16/2018    Open dislocation of finger 10/09/2020    Fall 10/09/2020    Stage 3 chronic kidney disease 10/15/2020     Resolved Ambulatory Problems     Diagnosis Date Noted    Delirium 05/08/2016    Acute uremia 11/17/2017    Hypomagnesemia 11/21/2017     Past Medical History:   Diagnosis Date    Balance problem     BPH (benign prostatic hyperplasia)     Depression     Disease of thyroid gland     DJD (degenerative joint disease)     Flat affect     Wood catheter in place     Gait abnormality     H/O Clostridium difficile infection     History of traumatic brain injury     Hypertension     Poor historian     Retention, urine     Risk for falls     Seizures (HCC)     Shortness of breath     Shoulder pain, bilateral     Teeth missing     Urine retention     Uses walker     Wears glasses        Past Surgical History:   Procedure Laterality Date    COLONOSCOPY      COLONOSCOPY      CYSTOSCOPY      EYE SURGERY      NH INCISE/DRAIN BLADDER N/A 1/31/2018    Procedure: SUPRAPUBIC TUBE PLACEMENT;  Surgeon: Josy Burleson MD;  [facility];  Service: Urology    NH TRANSURETHRAL ELEC-SURG PROSTATECTOM N/A 1/31/2018    Procedure: TRANSURETHRAL RESECTION OF PROSTATE (TURP); Surgeon: Josy Burleson MD;  [facility];  Service: Urology    TRANSURETHRAL RESECTION OF PROSTATE  01/31/2018       Social History:   Social History     Socioeconomic History    Marital status:       Spouse name: None    Number of children: None    Years of education: None    Highest education level: None   Occupational History    None   Social Needs    Financial resource strain: None    Food insecurity     Worry: None     Inability: None    Transportation needs     Medical: None     Non-medical: None   Tobacco Use    Smoking status: Former Smoker    Smokeless tobacco: Never Used    Tobacco comment: quit about 20yrs ago   Substance and Sexual Activity    Alcohol use: Never     Frequency: Never     Binge frequency: Never     Comment: quit 1986    Drug use: No    Sexual activity: Not Currently   Lifestyle    Physical activity     Days per week: None     Minutes per session: None    Stress: None   Relationships    Social connections     Talks on phone: None     Gets together: None     Attends Restorationism service: None     Active member of club or organization: None     Attends meetings of clubs or organizations: None     Relationship status: None    Intimate partner violence     Fear of current or ex partner: None     Emotionally abused: None     Physically abused: None     Forced sexual activity: None   Other Topics Concern    None   Social History Narrative    ** Merged History Encounter **            Family History:   Family History   Problem Relation Age of Onset    Other Other         Patient cannot provide famiyl hx due to TBI and unreliable historian       Review of Systems   Musculoskeletal: Positive for arthralgias, back pain and gait problem  Neurological: Positive for weakness  All other systems reviewed and are negative  Physical Exam   Constitutional: Awake and Alert  Well-developed and well-nourished  No distress  HENT: PERR,EOMI, conjunctiva normal  Head: Normocephalic and atraumatic  Mouth/Throat: Oropharynx is clear and moist     Eyes: Conjunctivae and EOM are normal  Pupils are equal, round, and reactive to light  Right and left eye exhibits no discharge  Neck: Neck supple  No tracheal deviation present  No thyromegaly present  Cardiovascular: Normal rate, regular rhythm and normal heart sounds  Exam reveals no friction rub  No murmur heard  Pulmonary/Chest: Effort normal and breath sounds normal  No respiratory distress  She has no wheezes  Abdominal: Soft  Bowel sounds are normal  She exhibits no distension  There is no tenderness  There is no rebound and no guarding  Neurological: Cranial Nerves grossly intact  No sensory deficit  Coordination normal    Musculoskeletal:   Nontender spine  Skin: Skin is warm and dry  No rash noted  No diaphoresis  No erythema  No edema  No cyanosis  Bruce Cardoso  is a(n) 78y o  year old male with MDD    1  Cardiac with history of dyslipidemia  Patient is on atorvastatin 10 mg daily  2  Seizure disorder  Patient is on Depakote 500 mg b i d  3  Restless leg syndrome  Patient is on ropinirole 2 mg at bedtime  4  Constipation  Patient is on Senokot S   5  DJD/osteoarthritis  Patient may get Tylenol on as needed basis  6  Gait abnormality  I will consult PT OT for further evaluation and treatment  7  Psych with MDD  Patient is being managed by psych  Prognosis: Fair  Discharge Plan: In progress      Advanced Directives: I have discussed in detail the patient the advanced directives  Patient has appointed 1 of his relative Vincent Steiner as patient's POA and her phone number is [phone]  Patient has no living will with advanced healthcare directives  When discussing cardiac and pulmonary resuscitation efforts with the patient as well as patient's POA, the patient wishes to be DNR/DNI  I have spent more than 50 minutes gathering data, doing physical examination, and discussing the advanced directives, which was witnessed by caring staff

## 2021-03-09 NOTE — NURSING NOTE
Pt withdrawn to room today  Pt needed encouragement with coming down to dining room for breakfast  Pt did eat 100% of breakfast then returned to bed  Pt affect flat and mood is despairing  Pt provided walker for more steadiness  Q 7 min safety checks maintained

## 2021-03-09 NOTE — CMS CERTIFICATION NOTE
Certification: Estimated length of stay: More than 15 midnights  I certify that inpatient services are medically necessary for this patient for a duration of greater that 15 midnights  See H&P and MD Progress Notes for additional information about the patient's course of treatment
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
## 2021-03-09 NOTE — PLAN OF CARE
ALLERGIES:  No Known Allergies     CC: Cough and shortness of breath    SUBJECTIVE  HPI:Tatyana Hastings is a 56 year old female who presents today with cough and shortness of breath that has been going on for 3 weeks.  She was diagnosed with the flu 3 weeks ago.  She did have lab workup and CT scan done because of dizziness and weakness.  Some of the flu symptoms are better but she continues to have cough.  She has nasal drainage.  She brings up greenish phlegm.  She has sinus congestion.  Her chest hurt with deep breaths.  She feels tired and short of breath.  Today she started having chills again.  No recorded fever.  No ear pain.  Still feels some dizziness but no passing out episode.       Medication:  Current Outpatient Medications   Medication Sig Dispense Refill   • amoxicillin-clavulanate (AUGMENTIN) 875-125 MG per tablet Take 1 tablet by mouth every 12 hours. Take with food. 20 tablet 0     No current facility-administered medications for this visit.        No past medical history on file.  No past surgical history on file.  No family history on file.  Social History     Tobacco Use   • Smoking status: Not on file   Substance Use Topics   • Alcohol use: Not on file   • Drug use: Not on file           Review of Systems:    negative     OBJECTIVE  Objective   /70   Pulse 80   Temp 98.5 °F (36.9 °C) (Tympanic)   Resp 18   Ht 5' 5\" (1.651 m)   Wt 74.1 kg (163 lb 6.4 oz)   BMI 27.19 kg/m²   BSA 1.82 m²     Physical Exam:  General appearance - The patient is alert and oriented x3. The patient is well appearing. The patient is in no acute distress.  Patient seems to be tired and weak.  Skin - color, texture, turgor are normal, no bruises, rashes or lesions   Head - normocephalic. No masses, lesions, tenderness or abnormalities  Eyes - Conjunctivae/sclerae normal. No erythema, edema or exudate.  Ears - External ears normal. Canals clear. Tympanic membranes are clear with all landmarks  Pt new to unit; initial goal added noted.  Nose/Sinuses - Nares normal. Septum midline. Mucosa normal. No drainage or sinus tenderness., Nasal congestion, otherwise normal  Oropharynx - Lips, mucosa, and tongue normal. Teeth and gums normal. Oropharynx clear, Positive findings: mild oropharyngeal erythema.  Neck - Supple and no cervical adenopathy  Lungs - clear to auscultation and few scattered rhonchi bilateral.  Heart - regular rate and rhythm  Tatyana was seen today for sinus problem and cough.    Diagnoses and all orders for this visit:    Acute bronchitis, unspecified organism  -     XR CHEST PA AND LATERAL 2 VIEWS; Future  -     CBC & AUTO DIFFERENTIAL; Future  -     COMPREHENSIVE MET PNL (RANDOM); Future    Other orders  -     amoxicillin-clavulanate (AUGMENTIN) 875-125 MG per tablet; Take 1 tablet by mouth every 12 hours. Take with food.      Advised to follow-up in 3-4 days.  I discussed common and serious side effects of the antibiotic.  Once chest x-ray and lab results are available we will contact her.  Relevant records,labs and diagnostics were reviewed.    Follow-up if symptoms do not improve or worsen, patient instructed to call the office or go to Immediate Care.    This dictation was created using Dragon voice recognition software and thus transcription errors or variance may occur.    Kiel Cunningham M.D

## 2021-03-09 NOTE — PROGRESS NOTES
Patient very irritable during assessment     Stated he had not slept in 4 days  Given HS medications and reassessed in 1 hour  Very upset that his leg were moving, rocking in bed, high anxiety  Yelling out  Ativan 0 5 mg and Trazodone 50 mg given at 2250 for moderate anxiety and insomnia  Verbal de-escalation used and effective  Currently resting and on q 7 minute checks  Continues to occasionally call out  Will continue to monitor

## 2021-03-10 LAB
25(OH)D3 SERPL-MCNC: 28.1 NG/ML (ref 30–100)
FOLATE SERPL-MCNC: >20 NG/ML (ref 3.1–17.5)
VIT B12 SERPL-MCNC: 640 PG/ML (ref 100–900)

## 2021-03-10 PROCEDURE — 99232 SBSQ HOSP IP/OBS MODERATE 35: CPT | Performed by: PSYCHIATRY & NEUROLOGY

## 2021-03-10 PROCEDURE — 97163 PT EVAL HIGH COMPLEX 45 MIN: CPT

## 2021-03-10 RX ORDER — ERGOCALCIFEROL 1.25 MG/1
50000 CAPSULE ORAL WEEKLY
Status: DISCONTINUED | OUTPATIENT
Start: 2021-03-10 | End: 2021-04-16 | Stop reason: HOSPADM

## 2021-03-10 RX ORDER — MELATONIN
1000 DAILY
Status: DISCONTINUED | OUTPATIENT
Start: 2021-04-15 | End: 2021-04-16 | Stop reason: HOSPADM

## 2021-03-10 RX ADMIN — MIRTAZAPINE 15 MG: 15 TABLET, FILM COATED ORAL at 21:47

## 2021-03-10 RX ADMIN — TRAZODONE HYDROCHLORIDE 50 MG: 50 TABLET ORAL at 21:46

## 2021-03-10 RX ADMIN — ROPINIROLE 2 MG: 1 TABLET, FILM COATED ORAL at 21:47

## 2021-03-10 RX ADMIN — DIVALPROEX SODIUM 1000 MG: 500 TABLET, DELAYED RELEASE ORAL at 21:46

## 2021-03-10 RX ADMIN — DULOXETINE HYDROCHLORIDE 30 MG: 30 CAPSULE, DELAYED RELEASE ORAL at 08:20

## 2021-03-10 RX ADMIN — QUETIAPINE FUMARATE 200 MG: 200 TABLET ORAL at 21:46

## 2021-03-10 RX ADMIN — ATORVASTATIN CALCIUM 10 MG: 10 TABLET, FILM COATED ORAL at 16:19

## 2021-03-10 RX ADMIN — Medication 1 TABLET: at 08:20

## 2021-03-10 RX ADMIN — SENNOSIDES AND DOCUSATE SODIUM 1 TABLET: 8.6; 5 TABLET ORAL at 21:47

## 2021-03-10 NOTE — PROGRESS NOTES
The patient resistive to getting out of bed prior to breakfast meal  Multiple clinical staff members attempted to encourage patient and provide positive reinforcement  The patient kept eyes closed and would not verbally respond to clinical staff  The patient refused to engage with physical therapy services prior to breakfast meal  The patient assisted out of bed at 0930 after much encouragement provided by clinical staff  The patient noted to be flat, depressed, irritable, and agitated with interaction with staff  The patient provides minimal verbal responses or noted to be selectively mute and refusing to engage with clinical staff  Unable to fully complete behavioral health assessment due to unwillingness to respond  The patient completed 75% of both breakfast and lunch meals  No verbal or non-verbal complaints of pain noted  The patient returned to room following lunch meal  Q 7 minute safety checks maintained

## 2021-03-10 NOTE — PLAN OF CARE
Problem: PHYSICAL THERAPY ADULT  Goal: Performs mobility at highest level of function for planned discharge setting  See evaluation for individualized goals  Description: Treatment/Interventions: Functional transfer training, LE strengthening/ROM, Therapeutic exercise, Endurance training, Cognitive reorientation, Patient/family training, Bed mobility, Continued evaluation, Spoke to nursing          See flowsheet documentation for full assessment, interventions and recommendations  Note: Prognosis: Guarded  Problem List: Decreased strength, Decreased endurance, Decreased mobility, Decreased safety awareness, Impaired judgement  Assessment: Pt is 78 y o  male seen for PT evaluation s/p admit to [address] [place] [address] on 3/8/2021 w/ MDD (major depressive disorder), recurrent episode, severe ([facility] [address] )  PT consulted to assess pt's functional mobility and d/c needs  Order placed for PT eval and tx, w/ up and OOB as tolerated order  Comorbidities affecting pt's physical performance at time of assessment include: weakness,major depressive d/o, CKD, ESBL, HTN, mood d/o, multiple falls,seizure d/o  Personal factors affecting pt at time of IE include: communication issues, inability to ambulate household distances, inability to navigate community distances, positive fall history, decreased initiation and engagement, unable to perform physical activity and limited insight into impairments  Please find objective findings from PT assessment regarding body systems outlined above with impairments and limitations including weakness, decreased endurance, impaired coordination, decreased activity tolerance, decreased functional mobility tolerance, decreased safety awareness, impaired judgement and fall risk  The following objective measures performed on IE also reveal limitations: AM-PAC 6-Clicks: 05/04   Pt's clinical presentation is currently unstable/unpredictable seen in pt's presentation of immobility with increased risk of medical complications, lethargy, abnormal lab values, ongoing psychiatric management  Pt to benefit from continued PT tx to address deficits as defined above and maximize level of functional independent mobility and consistency  From PT/mobility standpoint, recommendation at time of d/c would be deferred at this time  Barriers to Discharge: Other (Comment)  Barriers to Discharge Comments: Inability to advance further functional tasks at this time d/t patient's decreased engagement  PT Discharge Recommendation: Other (Comment)(deferred at this time)          See flowsheet documentation for full assessment

## 2021-03-10 NOTE — PROGRESS NOTES
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 ([place] [address] )    CKD (chronic kidney disease) stage 3, GFR 30-59 ml/min    ESBL (extended spectrum beta-lactamase) producing bacteria infection    Restless legs      Behavior over the last 24 hours:  unchanged  Sleep: hypersomnia  Appetite: fair  Medication side effects: No  ROS: low energy, all other systems are negative for acute changes    Mental Status Evaluation:  Appearance:  age appropriate   Behavior:  psychomotor retardation   Speech:  delayed and underproductive   Mood:  decreased range and depressed   Affect:  blunted   Thought Process:  blocked   Thought Content:  no overt delusions   Perceptual Disturbances: None   Risk Potential: Suicidal Ideations without plan  Homicidal Ideations none  Potential for Aggression No   Sensorium:  person and place   Memory:  recent memory mildly impaired   Consciousness:  alert and awake    Attention: attention span appeared shorter than expected for age   Insight:  poor   Judgment: limited   Gait/Station: slow, uses walker   Motor Activity: no abnormal movements     Progress Toward Goals: Patient continues withdrawn, isolated and minimally engaged in a conversation  He reports ongoing low energy and motivation to participate in group and milieu therapy  Still verbalize passive suicidal ideation but denies any active plan or intent to hurt himself  Patient has been compliant with medication and denies any current side effects  Recommended Treatment: Continue with group therapy, milieu therapy and occupational therapy        Risks, benefits and possible side effects of Medications:   Patient does not verbalize understanding at this time and will require further explanation  Medications: all current active meds have been reviewed  Labs: I have personally reviewed all pertinent laboratory/tests results  Most Recent Labs:   Lab Results   Component Value Date    WBC 7 85 03/08/2021    RBC 5 76 (H) 03/08/2021    HGB 16 8 03/08/2021    HCT 51 7 (H) 03/08/2021     03/08/2021    RDW 14 0 03/08/2021    NEUTROABS 6 15 03/08/2021    SODIUM 140 03/08/2021    K 3 8 03/08/2021     03/08/2021    CO2 29 03/08/2021    BUN 26 (H) 03/08/2021    CREATININE 1 94 (H) 03/08/2021    GLUC 102 03/08/2021    GLUF 84 09/18/2018    CALCIUM 8 8 03/08/2021    AST 12 03/08/2021    ALT 21 03/08/2021    ALKPHOS 70 03/08/2021    TP 7 2 03/08/2021    ALB 4 0 03/08/2021    TBILI 0 87 03/08/2021    CHOLESTEROL 195 04/26/2016    HDL 52 04/26/2016    TRIG 76 04/26/2016    LDLCALC 128 (H) 04/26/2016    VALPROICTOT 62 12/09/2017    AMMONIA <10 (L) 05/08/2016    [id number] 2 735 03/08/2021    RPR Non-Reactive 05/09/2016       Counseling / Coordination of Care  Total floor / unit time spent today 20 minutes  Greater than 50% of total time was spent with the patient and / or family counseling and / or coordination of care
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
## 2021-03-10 NOTE — PROGRESS NOTES
03/10/21 1003   Team Meeting   Meeting Type Daily Rounds   Team Members Present   Team Members Present Physician;Nurse;; Occupational Therapist   Physician Team Member Dr Deshawn Cardoza MD   Nursing Team Member Sophia Todd, RN   Social Work Team Member Eric Naik, [facility]   OT Team Member Marisol Hannon, [place]   Patient/Family Present   Patient Present No   Patient's Family Present No     No DC date; withdrawn to room - minimally verbal; refused lunch yesterday; med adjustments; prn ativan & trazodone for anxiety/insomnia; ambulates with walker - unsteady; no roommate due to ESBL in urine

## 2021-03-10 NOTE — PLAN OF CARE
Pt not progressing; nonverbal - profoundly depressed;  No DC date - will return home upon stabilization

## 2021-03-10 NOTE — PROGRESS NOTES
Progress Note - Marcial Bliss  78 y o  male MRN: [id number]    Unit/Bed#: [facility] 210-01 Encounter: [id number]        Subjective:   Patient seen and examined at bedside after reviewing the chart and discussing the case with the caring staff  Patient examined at bedside  Patient has no acute concerns  On review of patient's labs from 03/09/2021, his vitamin-D is low at 28 1 and his vitamin B12 is within normal limits at 640  Physical Exam   Vitals: Blood pressure 109/70, pulse 62, temperature 98 1 °F (36 7 °C), temperature source Temporal, resp  rate 18, height 5' 11" (1 803 m), weight 89 kg (196 lb 3 2 oz), SpO2 97 %  ,Body mass index is 27 36 kg/m²  Constitutional: Patient appears well-developed  HEENT: PERR, EOMI, MMM  Cardiovascular: Normal rate and regular rhythm  Pulmonary/Chest: Effort normal and breath sounds normal    Abdomen: Soft, + BS, NT    Assessment/Plan:  Marcial Bliss  is a(n) 78 y o  male with  MDD      1  Cardiac with history of dyslipidemia  Patient is on atorvastatin 10 mg daily  2  Seizure disorder  Patient is on Depakote 500 mg b i d  3  Restless leg syndrome  Patient is on ropinirole 2 mg at bedtime  4  Constipation  Patient is on Senokot S   5  DJD/osteoarthritis  Patient may get Tylenol on as needed basis  6  Gait abnormality  PT and OT are on consult  7  Vitamin-D deficiency  I will start the patient on D2 23214 units weekly for 6 weeks (ending on 04/14/2021) followed by D3 1000 units daily  The patient was discussed with Dr Mary Jiang and he is in agreement with the above note

## 2021-03-10 NOTE — PLAN OF CARE
Problem: Ineffective Coping  Goal: Participates in unit activities  Description: Interventions:  - Provide therapeutic environment   - Provide required programming   - Redirect inappropriate behaviors   Outcome: Not Progressing   Patient has been minimally verbal and has poor eye contact when RT approaches for groups or admission paper work

## 2021-03-10 NOTE — SOCIAL WORK
SW met with patient in main dining room; pt seated in corner chair; refused eye contact, pt did not respond verbally or nonverbally to SW attempts to start conversation; SW remains unable to obtain psychosocial at this time    SW spoke with RN staff; pt refused to get out of bed this morning; pt nonverbal with staff; med compliant; slept overnight; No ANDREA for family contact

## 2021-03-10 NOTE — PROGRESS NOTES
Patient has been sleeping throughout the shift  No signs or symptoms of pain, discomfort or respiratory distress  Will continue to monitor through 7 minute safety checks

## 2021-03-11 PROCEDURE — 99232 SBSQ HOSP IP/OBS MODERATE 35: CPT | Performed by: PSYCHIATRY & NEUROLOGY

## 2021-03-11 PROCEDURE — 97530 THERAPEUTIC ACTIVITIES: CPT

## 2021-03-11 PROCEDURE — 97167 OT EVAL HIGH COMPLEX 60 MIN: CPT

## 2021-03-11 RX ORDER — DULOXETIN HYDROCHLORIDE 60 MG/1
60 CAPSULE, DELAYED RELEASE ORAL DAILY
Status: DISCONTINUED | OUTPATIENT
Start: 2021-03-12 | End: 2021-03-15

## 2021-03-11 RX ORDER — TRAZODONE HYDROCHLORIDE 50 MG/1
100 TABLET ORAL
Status: DISCONTINUED | OUTPATIENT
Start: 2021-03-11 | End: 2021-03-17

## 2021-03-11 RX ORDER — QUETIAPINE FUMARATE 100 MG/1
100 TABLET, FILM COATED ORAL
Status: DISCONTINUED | OUTPATIENT
Start: 2021-03-11 | End: 2021-03-15

## 2021-03-11 RX ORDER — MIRTAZAPINE 15 MG/1
7.5 TABLET, FILM COATED ORAL
Status: DISCONTINUED | OUTPATIENT
Start: 2021-03-11 | End: 2021-03-21

## 2021-03-11 RX ADMIN — ATORVASTATIN CALCIUM 10 MG: 10 TABLET, FILM COATED ORAL at 16:37

## 2021-03-11 RX ADMIN — ROPINIROLE 2 MG: 1 TABLET, FILM COATED ORAL at 21:48

## 2021-03-11 RX ADMIN — DULOXETINE HYDROCHLORIDE 30 MG: 30 CAPSULE, DELAYED RELEASE ORAL at 08:39

## 2021-03-11 RX ADMIN — SENNOSIDES AND DOCUSATE SODIUM 1 TABLET: 8.6; 5 TABLET ORAL at 21:48

## 2021-03-11 RX ADMIN — MIRTAZAPINE 7.5 MG: 15 TABLET, FILM COATED ORAL at 21:48

## 2021-03-11 RX ADMIN — Medication 1 TABLET: at 08:39

## 2021-03-11 RX ADMIN — QUETIAPINE FUMARATE 100 MG: 100 TABLET ORAL at 21:48

## 2021-03-11 RX ADMIN — DIVALPROEX SODIUM 1000 MG: 500 TABLET, DELAYED RELEASE ORAL at 21:48

## 2021-03-11 NOTE — PROGRESS NOTES
Trazodone effective  Patient observed asleep or resting throughout majority of the night during continual monitoring without signs or symptoms of distress  Non-labored breathing noted  No behaviors  Will remain on safety precautions and continual monitoring

## 2021-03-11 NOTE — PROGRESS NOTES
Progress Note - Mayank Rolon  78 y o  male MRN: [id number]    Unit/Bed#: [facility] 210-01 Encounter: [id number]        Subjective:   Patient seen and examined at bedside after reviewing the chart and discussing the case with the caring staff  Patient examined at bedside  Patient is limited in conversation has no acute concerns does not appear to be in pain or distress  Physical Exam   Vitals: Blood pressure 113/58, pulse 74, temperature 98 8 °F (37 1 °C), temperature source Temporal, resp  rate 18, height 5' 11" (1 803 m), weight 89 kg (196 lb 3 2 oz), SpO2 95 %  ,Body mass index is 27 36 kg/m²  Constitutional: Patient appears well-developed  HEENT: PERR, EOMI, MMM  Cardiovascular: Normal rate and regular rhythm  Pulmonary/Chest: Effort normal and breath sounds normal    Abdomen: Soft, + BS, NT    Assessment/Plan:  Mayank Rolon  is a(n) 78 y o  male with  MDD      1  Cardiac with history of dyslipidemia  Patient is on atorvastatin 10 mg daily  2  Seizure disorder  Patient is on Depakote 500 mg b i d  3  Restless leg syndrome  Patient is on ropinirole 2 mg at bedtime  4  Constipation  Patient is on Senokot S   5  DJD/osteoarthritis  Patient may get Tylenol on as needed basis  6  Gait abnormality  PT and OT are on consult  7  Vitamin-D deficiency  Continue D2 00712 units weekly for 6 weeks (ending on 04/14/2021) followed by D3 1000 units daily  The patient was discussed with Dr Reginald Rushing and he is in agreement with the above note
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
## 2021-03-11 NOTE — NURSING NOTE
Pt observed in room at the time of assessment  Compliant with medications  Resistive to getting out of bed for meals stating "I don't want to eat leave me alone"  Continued to close his eyes and would not respond verbally to staff  Pt appears flat and depressed, irritable  Unwilling to complete behavioral health assessment  Refused group  Will CTM  Q7 minute checks in progress

## 2021-03-11 NOTE — PLAN OF CARE
Problem: OCCUPATIONAL THERAPY ADULT  Goal: Performs self-care activities at highest level of function for planned discharge setting  See evaluation for individualized goals  Description: Treatment Interventions: ADL retraining, Functional transfer training, UE strengthening/ROM, Endurance training, Cognitive reorientation, Patient/family training, Compensatory technique education, Activityengagement, Energy conservation          See flowsheet documentation for full assessment, interventions and recommendations  Note: Limitation: Decreased ADL status, Decreased UE strength, Decreased cognition, Decreased endurance, Decreased self-care trans, Decreased high-level ADLs, Mood limitation  Prognosis: Fair  Assessment: Patient is a 78 y o  male seen for OT evaluation s/p admit to [facility]  on 3/8/2021 w/MDD (major depressive disorder), recurrent episode, severe ([facility] [address] )  Commorbidities affecting patient's functional performance at time of assessment include: CKD, ESBL, HTN, hypothyroidism, mood disorder as late effect of TBI, multiple falls, restless legs and seizure disorder  Orders placed for OT evaluation and treatment and up and OOB as tolerated   Performed at least two patient identifiers during session including name and wristband  Prior to admission, Patient reporting being independent with ADLs, ambulatory with RW and lives alone in a two story house with 3 ANDRAE  Personal factors affecting patient at time of initial evaluation include: limited caregiver support, steps to enter, decreased initiation and engagement, difficulty performing ADLs and difficulty performing IADLs  Upon evaluation, patient requires minimal  assist for UB ADLs, minimal  assist for LB ADLs   Occupational performance is affected by the following deficits: decreased muscle strength, dynamic sit/ stand balance deficit with poor standing tolerance time for self care and functional mobility, decreased activity tolerance, impaired memory, impaired problem solving, impaired interpersonal skills, decreased coping skills and delayed righting and equilibrium reactions  Patient to benefit from continued Occupational Therapy treatment while in the hospital to address deficits as defined above and maximize level of functional independence with ADLs and functional mobility  Occupational Performance areas to address include: grooming , bathing/ shower, dressing, toilet hygiene, transfer to all surfaces and functional ambulation  From OT standpoint, recommendation at time of d/c would be deferred and pending engagement in future sessions         OT Discharge Recommendation: Other (Comment)(TBD: pending engagement in future sessions )  OT - OK to Discharge: Yes(Once medically cleared )     Venu Greene, OT

## 2021-03-11 NOTE — PLAN OF CARE
Problem: PHYSICAL THERAPY ADULT  Goal: Performs mobility at highest level of function for planned discharge setting  See evaluation for individualized goals  Description: Treatment/Interventions: Functional transfer training, LE strengthening/ROM, Therapeutic exercise, Endurance training, Cognitive reorientation, Patient/family training, Bed mobility, Continued evaluation, Spoke to nursing          See flowsheet documentation for full assessment, interventions and recommendations  Outcome: Progressing  Note: Prognosis: Guarded  Problem List: Decreased strength, Decreased endurance, Decreased mobility, Decreased safety awareness, Impaired judgement, Decreased coordination  Assessment: Pt seen for PT treatment session this date with interventions consisting of therapeutic activity consisting of training: supine<>sit transfers, static sitting tolerance at EOB for <5 minutes w/ o UE support and vc and tactile cues for static sitting posture faciliation  Pt agreeable to PT treatment session upon arrival, pt found supine in bed, in no apparent distress  In comparison to previous session, pt with improvements in functional mobility to EOB  Post session: all needs in reach and RN notified of session findings/recommendations Continued recommendation is deferred at this time  Pt continues to be functioning below baseline level, and remains limited 2* factors listed above and including decreased initiation and engagement,depression severity PT will continue to see pt while here in order to address the deficits listed above and provide interventions consistent w/ POC in effort to achieve LTGs  Barriers to Discharge: Other (Comment)  Barriers to Discharge Comments: Inability to assess patient's functional status for WB tasks d/t decreased engagement  PT Discharge Recommendation: Other (Comment)(continued deferral at this time)          See flowsheet documentation for full assessment

## 2021-03-11 NOTE — NURSING NOTE
Pt ambulated with walker to dining room for lunch  Ate 100% of his meal  Returned to his bedroom immediately after eating  Remains withdrawn to self  Refuses to verbally communicate with staff  Will CTM  Q7 minute checks in progress

## 2021-03-11 NOTE — PROGRESS NOTES
Patient out for evening meal and then withdrawn to room  Flat and depressed  Refused eye contact and to speak in dining room  More eye contact and more open in room  Admits to depression and fatigue  Currently denies SI, HI, anxiety, and hallucinations  Soft spoken  Able to make needs known  Trazodone given for sleep  Will continue to monitor and access  Q 7 minute rounds maintained

## 2021-03-11 NOTE — PROGRESS NOTES
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 ([facility] )    CKD (chronic kidney disease) stage 3, GFR 30-59 ml/min    ESBL (extended spectrum beta-lactamase) producing bacteria infection    Restless legs      Behavior over the last 24 hours:  unchanged  Sleep: sleep on and off  Appetite: poor  Medication side effects: No  ROS: chronic pain, all other systems are negative acute changes    Mental Status Evaluation:  Appearance:  age appropriate   Behavior:  evasive   Speech:  delayed and underproductive   Mood:  decreased range and depressed   Affect:  blunted   Thought Process:  concrete and goal directed   Thought Content:  no overt delusions    Perceptual Disturbances: None   Risk Potential: Suicidal Ideations without plan  Homicidal Ideations none  Potential for Aggression No   Sensorium:  person and place   Memory:  recent memory mildly impaired   Consciousness:  alert and awake    Attention: attention span appeared shorter than expected for age   Insight:  poor   Judgment: poor   Gait/Station: in bed   Motor Activity: no abnormal movements     Progress Toward Goals: Patient remains depressed, withdrawn and isolated with no participation and milieu therapy  He easily becomes irritated and refuses to participate in PT or come out for meals  Patient has been compliant with medication and denies any side effects  Recommended Treatment: Continue with group therapy, milieu therapy and occupational therapy        Risks, benefits and possible side effects of Medications:   Risks, benefits, and possible side effects of medications explained to patient and patient verbalizes understanding  Medications: all current active meds have been reviewed  Increase Cymbalta to 60 mg po daily  Labs: I have personally reviewed all pertinent laboratory/tests results  Most Recent Labs:   Lab Results   Component Value Date    WBC 7 85 03/08/2021    RBC 5 76 (H) 03/08/2021    HGB 16 8 03/08/2021    HCT 51 7 (H) 03/08/2021     03/08/2021    RDW 14 0 03/08/2021    NEUTROABS 6 15 03/08/2021    SODIUM 140 03/08/2021    K 3 8 03/08/2021     03/08/2021    CO2 29 03/08/2021    BUN 26 (H) 03/08/2021    CREATININE 1 94 (H) 03/08/2021    GLUC 102 03/08/2021    GLUF 84 09/18/2018    CALCIUM 8 8 03/08/2021    AST 12 03/08/2021    ALT 21 03/08/2021    ALKPHOS 70 03/08/2021    TP 7 2 03/08/2021    ALB 4 0 03/08/2021    TBILI 0 87 03/08/2021    CHOLESTEROL 195 04/26/2016    HDL 52 04/26/2016    TRIG 76 04/26/2016    LDLCALC 128 (H) 04/26/2016    VALPROICTOT 62 12/09/2017    AMMONIA <10 (L) 05/08/2016    [id number] 2 735 03/08/2021    RPR Non-Reactive 05/09/2016       Counseling / Coordination of Care  Total floor / unit time spent today 20 minutes  Greater than 50% of total time was spent with the patient and / or family counseling and / or coordination of care

## 2021-03-11 NOTE — SOCIAL WORK
SW attempted to meet with patient in pt room; pt remains nonverbal in SW attempts to converse; pt does not make eye contact     Staff states pt maintains non verbal communication and resistiveness to leave his room; pt declines some meals and has irritable edge when he does respond to staff inquiries    DEVON still unable to obtain psychosocial or ROIs at this time
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
## 2021-03-11 NOTE — PROGRESS NOTES
03/11/21    Team Meeting   Meeting Type Daily Rounds   Team Members Present   Team Members Present Physician;Nurse;;Occupational Therapist   Physician Team Member Dr Kayla Cesar MD; SHERRIE Ramsay   Nursing Team Member Lula Curling, RN   Care Management Team Member MS Jessie, [facility]   OT Team Member Earl Gray [place]   Patient/Family Present   Patient Present No   Patient's Family Present No   Flat, depressed, selectively mute, slept  Will consider ECT  HX of TBI  Medication compliant

## 2021-03-11 NOTE — PLAN OF CARE
Problem: Ineffective Coping  Goal: Participates in unit activities  Description: Interventions:  - Provide therapeutic environment   - Provide required programming   - Redirect inappropriate behaviors   Outcome: Not Progressing   Patient remains nonverbal today when RT attempted to meet 1:1 or to join groups  RT will continue to attempt to complete admission papers

## 2021-03-12 PROCEDURE — 99232 SBSQ HOSP IP/OBS MODERATE 35: CPT | Performed by: PSYCHIATRY & NEUROLOGY

## 2021-03-12 RX ADMIN — ATORVASTATIN CALCIUM 10 MG: 10 TABLET, FILM COATED ORAL at 16:24

## 2021-03-12 RX ADMIN — DULOXETINE HYDROCHLORIDE 60 MG: 60 CAPSULE, DELAYED RELEASE ORAL at 08:22

## 2021-03-12 RX ADMIN — Medication 1 TABLET: at 08:22

## 2021-03-12 RX ADMIN — MIRTAZAPINE 7.5 MG: 15 TABLET, FILM COATED ORAL at 21:28

## 2021-03-12 RX ADMIN — SENNOSIDES AND DOCUSATE SODIUM 1 TABLET: 8.6; 5 TABLET ORAL at 22:02

## 2021-03-12 RX ADMIN — QUETIAPINE FUMARATE 100 MG: 100 TABLET ORAL at 21:28

## 2021-03-12 RX ADMIN — ROPINIROLE 2 MG: 1 TABLET, FILM COATED ORAL at 21:28

## 2021-03-12 RX ADMIN — DIVALPROEX SODIUM 750 MG: 250 TABLET, DELAYED RELEASE ORAL at 21:28

## 2021-03-12 NOTE — PROGRESS NOTES
03/12/21 0954   Team Meeting   Meeting Type Daily Rounds   Team Members Present   Team Members Present Physician;Nurse;; Occupational Therapist   Physician Team Member Dr Maurilio Gomez MD; SEHRRIE Sesay   Nursing Team Member Jose Armando Wright RN   Social Work Team Member Daren Gaston, [facility]   OT Team Member [place], [place]   Patient/Family Present   Patient Present No   Patient's Family Present No     No DC date; no progression - delayed, nonverbal, withdrawn, irritable with redirection to be visible; med compliant; sleeping

## 2021-03-12 NOTE — PROGRESS NOTES
Patient appeared to be sleeping throughout the shift  No distress or problems noted  No behaviors overnight  Q 7 mins checks in place for safety   Will continue to monitor for behaviors and changes

## 2021-03-12 NOTE — PROGRESS NOTES
Pt is flat & withdrawn  Pt isolates to his room & minimally speaks with staff  Pt c/o feeling depressed, sad & anxious  Q 7 min checks maintained to monitor pt's behavior & safety  Pt denies any hallucinations, suicidal or homicidal ideations  Pt ambulates with walker

## 2021-03-12 NOTE — PROGRESS NOTES
Patient withdrawn to his room  He did not come out for breakfast  He is minimally responsive but did state he is depressed  When asked to rate his depression, he stated, "it's bad " Compliant with medications  No complaints of pain  Will continue monitoring  Surgery is scheduled with Dr. Alonso Cazares at [facility] on 07-20.

## 2021-03-12 NOTE — SOCIAL WORK
SW met with patient in pt room; pt continues to decline to interact with SW; no eye contact, no responses to SW inquiries; pt continues to decline psychosocial assessment     No ROIs at this time

## 2021-03-12 NOTE — PROGRESS NOTES
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 ([facility] )    CKD (chronic kidney disease) stage 3, GFR 30-59 ml/min    ESBL (extended spectrum beta-lactamase) producing bacteria infection    Restless legs      Behavior over the last 24 hours:  unchanged  Sleep: hypersomnia  Appetite: poor  Medication side effects: No  ROS: chronic pain, all other systems are negative for acute changes     Mental Status Evaluation:  Appearance:  age appropriate   Behavior:  psychomotor retardation   Speech:  delayed and scant   Mood:  constricted and decreased range   Affect:  blunted   Thought Process:  blocked   Thought Content:  no overt delusions   Perceptual Disturbances: appears preoccupied   Risk Potential: Suicidal Ideations without plan  Homicidal Ideations none  Potential for Aggression No   Sensorium:  person and place   Memory:  recent and remote memory: unable to assess due to lack of cooperation   Consciousness:  alert and awake    Attention: attention span appeared shorter than expected for age   Insight:  poor   Judgment: limited   Gait/Station: in bed   Motor Activity: no abnormal movements     Progress Toward Goals: Patient remains isolated, withdrawn with flat affect  Still voices passive wish to die and refuses to eat periodically  He presents no participation in group and milieu therapy  He has been compliant with medication and denies any current side effects  Recommended Treatment: Continue with group therapy, milieu therapy and occupational therapy        Risks, benefits and possible side effects of Medications:   Patient does not verbalize understanding at this time and will require further explanation  Medications: all current active meds have been reviewed  Labs: I have personally reviewed all pertinent laboratory/tests results  Most Recent Labs:   Lab Results   Component Value Date    WBC 7 85 03/08/2021    RBC 5 76 (H) 03/08/2021    HGB 16 8 03/08/2021    HCT 51 7 (H) 03/08/2021     03/08/2021    RDW 14 0 03/08/2021    NEUTROABS 6 15 03/08/2021    SODIUM 140 03/08/2021    K 3 8 03/08/2021     03/08/2021    CO2 29 03/08/2021    BUN 26 (H) 03/08/2021    CREATININE 1 94 (H) 03/08/2021    GLUC 102 03/08/2021    GLUF 84 09/18/2018    CALCIUM 8 8 03/08/2021    AST 12 03/08/2021    ALT 21 03/08/2021    ALKPHOS 70 03/08/2021    TP 7 2 03/08/2021    ALB 4 0 03/08/2021    TBILI 0 87 03/08/2021    CHOLESTEROL 195 04/26/2016    HDL 52 04/26/2016    TRIG 76 04/26/2016    LDLCALC 128 (H) 04/26/2016    VALPROICTOT 62 12/09/2017    AMMONIA <10 (L) 05/08/2016    [id number] 2 735 03/08/2021    RPR Non-Reactive 05/09/2016       Counseling / Coordination of Care  Total floor / unit time spent today 20 minutes  Greater than 50% of total time was spent with the patient and / or family counseling and / or coordination of care

## 2021-03-12 NOTE — PLAN OF CARE
Problem: Ineffective Coping  Goal: Participates in unit activities  Description: Interventions:  - Provide therapeutic environment   - Provide required programming   - Redirect inappropriate behaviors   Outcome: Not Progressing   Patient continues to be limited in interactions to non verbal   Patient continues to not join groups or interact with staff to complete admission assessments  He has been requiring several staff to encourage him to get out of bed to eat meals

## 2021-03-12 NOTE — PLAN OF CARE
Problem: Depression  Goal: Treatment Goal: Demonstrate behavioral control of depressive symptoms, verbalize feelings of improved mood/affect, and adopt new coping skills prior to discharge  Outcome: Progressing  Goal: Verbalize thoughts and feelings  Description: Interventions:  - Assess and re-assess patient's level of risk   - Engage patient in 1:1 interactions, daily, for a minimum of 15 minutes   - Encourage patient to express feelings, fears, frustrations, hopes   Outcome: Progressing  Goal: Refrain from harming self  Description: Interventions:  - Monitor patient closely, per order   - Supervise medication ingestion, monitor effects and side effects   Outcome: Progressing  Goal: Refrain from isolation  Description: Interventions:  - Develop a trusting relationship   - Encourage socialization   Outcome: Progressing  Goal: Refrain from self-neglect  Outcome: Progressing  Goal: Complete daily ADLs, including personal hygiene independently, as able  Description: Interventions:  - Observe, teach, and assist patient with ADLS  -  Monitor and promote a balance of rest/activity, with adequate nutrition and elimination   Outcome: Progressing

## 2021-03-12 NOTE — PROGRESS NOTES
Patient refused to come out of his room for snack  Compliant with HS medications  Denies any SI/HI  Patient did not interact with this writer and did not want to talk  No concerns or problems reported  Q 7 mins checks in place for safety  Will continue to monitor for behaviors and changes

## 2021-03-13 PROCEDURE — 99232 SBSQ HOSP IP/OBS MODERATE 35: CPT | Performed by: PSYCHIATRY & NEUROLOGY

## 2021-03-13 RX ADMIN — DIVALPROEX SODIUM 750 MG: 250 TABLET, DELAYED RELEASE ORAL at 22:09

## 2021-03-13 RX ADMIN — Medication 1 TABLET: at 08:04

## 2021-03-13 RX ADMIN — QUETIAPINE FUMARATE 100 MG: 100 TABLET ORAL at 22:10

## 2021-03-13 RX ADMIN — ATORVASTATIN CALCIUM 10 MG: 10 TABLET, FILM COATED ORAL at 16:59

## 2021-03-13 RX ADMIN — MIRTAZAPINE 7.5 MG: 15 TABLET, FILM COATED ORAL at 22:09

## 2021-03-13 RX ADMIN — TRAZODONE HYDROCHLORIDE 100 MG: 50 TABLET ORAL at 23:15

## 2021-03-13 RX ADMIN — DULOXETINE HYDROCHLORIDE 60 MG: 60 CAPSULE, DELAYED RELEASE ORAL at 08:03

## 2021-03-13 RX ADMIN — SENNOSIDES AND DOCUSATE SODIUM 1 TABLET: 8.6; 5 TABLET ORAL at 22:10

## 2021-03-13 RX ADMIN — ROPINIROLE 2 MG: 1 TABLET, FILM COATED ORAL at 22:10

## 2021-03-13 NOTE — NURSING NOTE
[id number]     Pt withdrawn to room  Affect restricted mood irritable  Minimal in conversation  Denies SI or HI  No acute behavioral outburst noted  Q 7 min checks ongoing

## 2021-03-13 NOTE — PROGRESS NOTES
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 ([place] [address] )    CKD (chronic kidney disease) stage 3, GFR 30-59 ml/min    ESBL (extended spectrum beta-lactamase) producing bacteria infection    Restless legs      Behavior over the last 24 hours:  unchanged  Sleep: normal  Appetite: poor  Medication side effects: No  ROS: no complaints    Mental Status Evaluation:  Appearance:  casually dressed   Behavior:  psychomotor retardation   Speech:  soft   Mood:  depressed   Affect:  constricted   Thought Process:  goal directed   Thought Content:  obsessions   Perceptual Disturbances: pt denies   Risk Potential: Suicidal Ideations without plan  Homicidal Ideations none  Potential for Aggression No   Sensorium:  person and place   Memory:  Failed to cooperate with testing   Consciousness:  awake    Attention: attention span and concentration were age appropriate   Insight:  limited   Judgment: limited   Gait/Station: seen lying in bed   Motor Activity: no abnormal movements     Progress Toward Goals: Pt not quite engaged with myself,seen lying in bed,he remains largely withdrawn and isolative with little engagement in the unit milue,his mood cont to be reduced along with poor motivation/or desire to engage in unit activities  Pt also cont to voice passive death wish  He remains medication compliant however with no reported side effects  Recommended Treatment:   1-Cont current treatment  2-Continue with group therapy, milieu therapy and occupational therapy        Risks, benefits and possible side effects of Medications:   Risks, benefits, and possible side effects of medications explained to patient and patient verbalizes understanding        Medications:   current meds:   Current Facility-Administered Medications   Medication Dose Route Frequency    acetaminophen (TYLENOL) tablet 650 mg  650 mg Oral Q4H PRN    acetaminophen (TYLENOL) tablet 650 mg  650 mg Oral Q4H PRN    acetaminophen (TYLENOL) tablet 975 mg  975 mg Oral Q6H PRN    aluminum-magnesium hydroxide-simethicone (MYLANTA) oral suspension 30 mL  30 mL Oral Q4H PRN    atorvastatin (LIPITOR) tablet 10 mg  10 mg Oral Daily With Dinner    [START ON 4/15/2021] cholecalciferol (VITAMIN D3) tablet 1,000 Units  1,000 Units Oral Daily    divalproex sodium (DEPAKOTE) EC tablet 750 mg  750 mg Oral HS    DULoxetine (CYMBALTA) delayed release capsule 60 mg  60 mg Oral Daily    ergocalciferol (VITAMIN D2) capsule 50,000 Units  50,000 Units Oral Weekly    hydrOXYzine HCL (ATARAX) tablet 25 mg  25 mg Oral Q6H PRN Max 4/day    LORazepam (ATIVAN) injection 1 mg  1 mg Intramuscular Q6H PRN Max 3/day    LORazepam (ATIVAN) tablet 0 5 mg  0 5 mg Oral Q6H PRN Max 4/day    LORazepam (ATIVAN) tablet 1 mg  1 mg Oral Q6H PRN Max 3/day    mirtazapine (REMERON) tablet 7 5 mg  7 5 mg Oral HS    multivitamin-minerals (CENTRUM) tablet 1 tablet  1 tablet Oral Daily    OLANZapine (ZyPREXA) IM injection 5 mg  5 mg Intramuscular Q6H PRN Max 3/day    OLANZapine (ZyPREXA) tablet 5 mg  5 mg Oral Q6H PRN Max 3/day    OLANZapine (ZyPREXA) tablet 5 mg  5 mg Oral Q6H PRN Max 3/day    polyethylene glycol (MIRALAX) packet 17 g  17 g Oral Daily PRN    QUEtiapine (SEROquel) tablet 100 mg  100 mg Oral HS    risperiDONE (RisperDAL) tablet 0 5 mg  0 5 mg Oral Q6H PRN Max 3/day    rOPINIRole (REQUIP) tablet 2 mg  2 mg Oral HS    senna-docusate sodium (SENOKOT S) 8 6-50 mg per tablet 1 tablet  1 tablet Oral Daily PRN    senna-docusate sodium (SENOKOT S) 8 6-50 mg per tablet 1 tablet  1 tablet Oral HS    traZODone (DESYREL) tablet 100 mg  100 mg Oral HS PRN      Labs: I have personally reviewed all pertinent laboratory/tests results  Most Recent Labs:   Lab Results   Component Value Date    WBC 7 85 03/08/2021    RBC 5 76 (H) 03/08/2021    HGB 16 8 03/08/2021    HCT 51 7 (H) 03/08/2021     03/08/2021    RDW 14 0 03/08/2021    NEUTROABS 6 15 03/08/2021    SODIUM 140 03/08/2021    K 3 8 03/08/2021     03/08/2021    CO2 29 03/08/2021    BUN 26 (H) 03/08/2021    CREATININE 1 94 (H) 03/08/2021    GLUC 102 03/08/2021    GLUF 84 09/18/2018    CALCIUM 8 8 03/08/2021    AST 12 03/08/2021    ALT 21 03/08/2021    ALKPHOS 70 03/08/2021    TP 7 2 03/08/2021    ALB 4 0 03/08/2021    TBILI 0 87 03/08/2021    CHOLESTEROL 195 04/26/2016    HDL 52 04/26/2016    TRIG 76 04/26/2016    LDLCALC 128 (H) 04/26/2016    VALPROICTOT 62 12/09/2017    AMMONIA <10 (L) 05/08/2016    [id number] 2 735 03/08/2021    RPR Non-Reactive 05/09/2016       Counseling / Coordination of Care  Total floor / unit time spent today 25 minutes  Greater than 50% of total time was spent with the patient and / or family counseling and / or coordination of care   A description of the counseling / coordination of care:

## 2021-03-13 NOTE — NURSING NOTE
[phone]  Pt found to be resting quietly on most of authors rounds  No acute behavioral issues noted  Q 7 min checks maintained  Fall protocol in place

## 2021-03-13 NOTE — PROGRESS NOTES
Patient withdrawn to his room  Did not eat breakfast or lunch  He has an irritable edge and is minimally responsive with this RN  He did rate his depression 9/10 after much prompting but did not comment on his anxiety  No complaints of pain  Takes medications without difficulty  Will continue monitoring

## 2021-03-13 NOTE — PROGRESS NOTES
Progress Note - Amy Linr  78 y o  male MRN: [id number]    Unit/Bed#: [facility] 210-01 Encounter: [id number]        Subjective:   Patient seen and examined at bedside after reviewing the chart and discussing the case with the caring staff  Patient examined at bedside  Patient is limited in conversation has no acute concerns does not appear to be in pain or distress  Physical Exam   Vitals: Blood pressure 140/72, pulse 62, temperature 97 9 °F (36 6 °C), temperature source Temporal, resp  rate 18, height 5' 11" (1 803 m), weight 89 kg (196 lb 3 2 oz), SpO2 97 %  ,Body mass index is 27 36 kg/m²  Constitutional: Patient appears well-developed  HEENT: PERR, EOMI, MMM  Cardiovascular: Normal rate and regular rhythm  Pulmonary/Chest: Effort normal and breath sounds normal    Abdomen: Soft, + BS, NT    Assessment/Plan:  Amy Amaral  is a(n) 78 y o  male with  MDD      1  Cardiac with history of dyslipidemia  Patient is on atorvastatin 10 mg daily  2  Seizure disorder  Patient is on Depakote 500 mg b i d  3  Restless leg syndrome  Patient is on ropinirole 2 mg at bedtime  4  Constipation  Patient is on Senokot S   5  DJD/osteoarthritis  Patient may get Tylenol on as needed basis  6  Gait abnormality  PT and OT are on consult  7  Vitamin-D deficiency  Continue D2 25039 units weekly for 6 weeks (ending on 04/14/2021) followed by D3 1000 units daily  The patient was discussed with Dr Lisa Adams and he is in agreement with the above note

## 2021-03-14 PROCEDURE — 99232 SBSQ HOSP IP/OBS MODERATE 35: CPT | Performed by: PSYCHIATRY & NEUROLOGY

## 2021-03-14 RX ADMIN — SENNOSIDES AND DOCUSATE SODIUM 1 TABLET: 8.6; 5 TABLET ORAL at 21:17

## 2021-03-14 RX ADMIN — MIRTAZAPINE 7.5 MG: 15 TABLET, FILM COATED ORAL at 21:17

## 2021-03-14 RX ADMIN — QUETIAPINE FUMARATE 100 MG: 100 TABLET ORAL at 21:16

## 2021-03-14 RX ADMIN — Medication 1 TABLET: at 08:33

## 2021-03-14 RX ADMIN — ROPINIROLE 2 MG: 1 TABLET, FILM COATED ORAL at 21:16

## 2021-03-14 RX ADMIN — ATORVASTATIN CALCIUM 10 MG: 10 TABLET, FILM COATED ORAL at 16:11

## 2021-03-14 RX ADMIN — LORAZEPAM 1 MG: 1 TABLET ORAL at 21:19

## 2021-03-14 RX ADMIN — DIVALPROEX SODIUM 750 MG: 250 TABLET, DELAYED RELEASE ORAL at 21:16

## 2021-03-14 RX ADMIN — DULOXETINE HYDROCHLORIDE 60 MG: 60 CAPSULE, DELAYED RELEASE ORAL at 08:33

## 2021-03-14 NOTE — PROGRESS NOTES
Patient withdrawn to his room  Did not eat breakfast  He appears flat and disheveled  He is poorly motivated  Endorses depression and anxiety but does not rate them  Takes medications without difficulty  Will continue monitoring

## 2021-03-14 NOTE — NURSING NOTE
[id number]  Pt found to be resting quietly on most of authors rounds  No acute behavioral issues noted  Q 7 min checks maintained  Fall protocol in place

## 2021-03-14 NOTE — PROGRESS NOTES
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 ([place] [address] )    CKD (chronic kidney disease) stage 3, GFR 30-59 ml/min    ESBL (extended spectrum beta-lactamase) producing bacteria infection    Restless legs      Behavior over the last 24 hours:  unchanged  Sleep: normal  Appetite: poor  Medication side effects: No  ROS: no complaints    Mental Status Evaluation:  Appearance:  casually dressed   Behavior:  psychomotor retardation   Speech:  soft   Mood:  depressed   Affect:  constricted   Thought Process:  goal directed   Thought Content:  obsessions   Perceptual Disturbances: pt denies   Risk Potential: Suicidal Ideations without plan  Homicidal Ideations none  Potential for Aggression No   Sensorium:  person and place   Memory:  Failed to cooperate with testing   Consciousness:  awake    Attention: attention span and concentration were age appropriate   Insight:  limited   Judgment: limited   Gait/Station: seen lying in bed   Motor Activity: no abnormal movements     Progress Toward Goals: Pt seen this morning lying in bed asleep and rather difficult to arouse,staff report that he is hard of hearing so this could be a factor as well  However little change in his clinical presentation,spends most of his time in bed,he remains largely withdrawn and isolative with little engagement in the unit milue,his mood cont to be reduced along with poor motivation/or desire to engage in unit activities and he cont also to report anxiety related symptoms  Pt also cont to voice passive death wish but has remained medication compliant with no reported side effects  Staff report no behavioral issues  Recommended Treatment:   1-Cont current treatment  2-Continue with group therapy, milieu therapy and occupational therapy  Risks, benefits and possible side effects of Medications:   Risks, benefits, and possible side effects of medications explained to patient and patient verbalizes understanding        Medications:   current meds:   Current Facility-Administered Medications   Medication Dose Route Frequency    acetaminophen (TYLENOL) tablet 650 mg  650 mg Oral Q4H PRN    acetaminophen (TYLENOL) tablet 650 mg  650 mg Oral Q4H PRN    acetaminophen (TYLENOL) tablet 975 mg  975 mg Oral Q6H PRN    aluminum-magnesium hydroxide-simethicone (MYLANTA) oral suspension 30 mL  30 mL Oral Q4H PRN    atorvastatin (LIPITOR) tablet 10 mg  10 mg Oral Daily With Dinner    [START ON 4/15/2021] cholecalciferol (VITAMIN D3) tablet 1,000 Units  1,000 Units Oral Daily    divalproex sodium (DEPAKOTE) EC tablet 750 mg  750 mg Oral HS    DULoxetine (CYMBALTA) delayed release capsule 60 mg  60 mg Oral Daily    ergocalciferol (VITAMIN D2) capsule 50,000 Units  50,000 Units Oral Weekly    hydrOXYzine HCL (ATARAX) tablet 25 mg  25 mg Oral Q6H PRN Max 4/day    LORazepam (ATIVAN) injection 1 mg  1 mg Intramuscular Q6H PRN Max 3/day    LORazepam (ATIVAN) tablet 0 5 mg  0 5 mg Oral Q6H PRN Max 4/day    LORazepam (ATIVAN) tablet 1 mg  1 mg Oral Q6H PRN Max 3/day    mirtazapine (REMERON) tablet 7 5 mg  7 5 mg Oral HS    multivitamin-minerals (CENTRUM) tablet 1 tablet  1 tablet Oral Daily    OLANZapine (ZyPREXA) IM injection 5 mg  5 mg Intramuscular Q6H PRN Max 3/day    OLANZapine (ZyPREXA) tablet 5 mg  5 mg Oral Q6H PRN Max 3/day    OLANZapine (ZyPREXA) tablet 5 mg  5 mg Oral Q6H PRN Max 3/day    polyethylene glycol (MIRALAX) packet 17 g  17 g Oral Daily PRN    QUEtiapine (SEROquel) tablet 100 mg  100 mg Oral HS    risperiDONE (RisperDAL) tablet 0 5 mg  0 5 mg Oral Q6H PRN Max 3/day    rOPINIRole (REQUIP) tablet 2 mg  2 mg Oral HS    senna-docusate sodium (SENOKOT S) 8 6-50 mg per tablet 1 tablet  1 tablet Oral Daily PRN    senna-docusate sodium (SENOKOT S) 8 6-50 mg per tablet 1 tablet  1 tablet Oral HS    traZODone (DESYREL) tablet 100 mg  100 mg Oral HS PRN     Labs: I have personally reviewed all pertinent laboratory/tests results  Most Recent Labs:   Lab Results   Component Value Date    WBC 7 85 03/08/2021    RBC 5 76 (H) 03/08/2021    HGB 16 8 03/08/2021    HCT 51 7 (H) 03/08/2021     03/08/2021    RDW 14 0 03/08/2021    NEUTROABS 6 15 03/08/2021    SODIUM 140 03/08/2021    K 3 8 03/08/2021     03/08/2021    CO2 29 03/08/2021    BUN 26 (H) 03/08/2021    CREATININE 1 94 (H) 03/08/2021    GLUC 102 03/08/2021    GLUF 84 09/18/2018    CALCIUM 8 8 03/08/2021    AST 12 03/08/2021    ALT 21 03/08/2021    ALKPHOS 70 03/08/2021    TP 7 2 03/08/2021    ALB 4 0 03/08/2021    TBILI 0 87 03/08/2021    CHOLESTEROL 195 04/26/2016    HDL 52 04/26/2016    TRIG 76 04/26/2016    LDLCALC 128 (H) 04/26/2016    VALPROICTOT 62 12/09/2017    AMMONIA <10 (L) 05/08/2016    [id number] 2 735 03/08/2021    RPR Non-Reactive 05/09/2016       Counseling / Coordination of Care  Total floor / unit time spent today 25 minutes  Greater than 50% of total time was spent with the patient and / or family counseling and / or coordination of care   A description of the counseling / coordination of care:

## 2021-03-14 NOTE — PROGRESS NOTES
Progress Note - Marcial Bliss  78 y o  male MRN: [id number]    Unit/Bed#: [facility] 210-01 Encounter: [id number]      Assessment:  1  Cardiac with history of dyslipidemia   Patient is on atorvastatin 10 mg daily  2  Seizure disorder   Patient is on Depakote 500 mg b i d  3  Restless leg syndrome   Patient is on ropinirole 2 mg at bedtime  4  Constipation   Patient is on Senokot S   5  DJD/osteoarthritis   Patient may get Tylenol on as needed basis  6  Gait abnormality   PT and OT are on consult  7  Vitamin-D deficiency  Continue D2 25946 units weekly for 6 weeks (ending on 04/14/2021) followed by D3 1000 units daily  Plan:  As above    Subjective:   None    Objective:     Vitals: Blood pressure 132/70, pulse 67, temperature 98 °F (36 7 °C), temperature source Temporal, resp  rate 17, height 5' 11" (1 803 m), weight 89 kg (196 lb 3 2 oz), SpO2 97 %  ,Body mass index is 27 36 kg/m²        Intake/Output Summary (Last 24 hours) at 3/13/2021 1951  Last data filed at 3/13/2021 1700  Gross per 24 hour   Intake 600 ml   Output --   Net 600 ml       Physical Exam: /70 (BP Location: Right arm)   Pulse 67   Temp 98 °F (36 7 °C) (Temporal)   Resp 17   Ht 5' 11" (1 803 m)   Wt 89 kg (196 lb 3 2 oz)   SpO2 97%   BMI 27 36 kg/m²     General Appearance:    Alert, cooperative, no distress, appears stated age   Head:    Normocephalic, without obvious abnormality, atraumatic                   Neck:   Supple, symmetrical, trachea midline, no adenopathy;        thyroid:  No enlargement/tenderness/nodules; no carotid    bruit or JVD   Back:     Symmetric, no curvature, ROM normal, no CVA tenderness   Lungs:     Clear to auscultation bilaterally, respirations unlabored   Chest wall:    No tenderness or deformity   Heart:    Regular rate and rhythm, S1 and S2 normal, no murmur, rub   or gallop   Abdomen:     Soft, non-tender, bowel sounds active all four quadrants,     no masses, no organomegaly           Extremities: Extremities normal, atraumatic, no cyanosis or edema   Pulses:   2+ and symmetric all extremities   Skin:   Skin color, texture, turgor normal, no rashes or lesions   Lymph nodes:   Cervical, supraclavicular, and axillary nodes normal            Invasive Devices     None                 Lab, Imaging and other studies: I have personally reviewed pertinent reports

## 2021-03-14 NOTE — PROGRESS NOTES
Progress Note - Gary Castro  78 y o  male MRN: [id number]    Unit/Bed#: [facility] 210-01 Encounter: [id number]      Assessment:  1  Cardiac with history of dyslipidemia   Patient is on atorvastatin 10 mg daily  2  Seizure disorder   Patient is on Depakote 500 mg b i d  3  Restless leg syndrome   Patient is on ropinirole 2 mg at bedtime  4  Constipation   Patient is on Senokot S   5  DJD/osteoarthritis   Patient may get Tylenol on as needed basis  6  Gait abnormality   PT and OT are on consult  7  Vitamin-D deficiency  Supplemented    Plan:  See above    Subjective:   No subjective complaints    Objective:     Vitals: Blood pressure 108/66, pulse 65, temperature 98 3 °F (36 8 °C), temperature source Temporal, resp  rate 18, height 5' 11" (1 803 m), weight 89 kg (196 lb 3 2 oz), SpO2 94 %  ,Body mass index is 27 36 kg/m²        Intake/Output Summary (Last 24 hours) at 3/14/2021 1740  Last data filed at 3/13/2021 2045  Gross per 24 hour   Intake 0 ml   Output --   Net 0 ml       Physical Exam: /66 (BP Location: Right arm)   Pulse 65   Temp 98 3 °F (36 8 °C) (Temporal)   Resp 18   Ht 5' 11" (1 803 m)   Wt 89 kg (196 lb 3 2 oz)   SpO2 94%   BMI 27 36 kg/m²     General Appearance:    Alert, cooperative, no distress, appears stated age   Head:    Normocephalic, without obvious abnormality, atraumatic                   Neck:   Supple, symmetrical, trachea midline, no adenopathy;        thyroid:  No enlargement/tenderness/nodules; no carotid    bruit or JVD   Back:     Symmetric, no curvature, ROM normal, no CVA tenderness   Lungs:     Clear to auscultation bilaterally, respirations unlabored   Chest wall:    No tenderness or deformity   Heart:    Regular rate and rhythm, S1 and S2 normal, no murmur, rub   or gallop   Abdomen:     Soft, non-tender, bowel sounds active all four quadrants,     no masses, no organomegaly           Extremities:   Extremities normal, atraumatic, no cyanosis or edema   Pulses: 2+ and symmetric all extremities   Skin:   Skin color, texture, turgor normal, no rashes or lesions   Lymph nodes:   Cervical, supraclavicular, and axillary nodes normal            Invasive Devices     None                 Lab, Imaging and other studies: I have personally reviewed pertinent reports

## 2021-03-14 NOTE — NURSING NOTE
Pt withdrawn  Affect bland  Mood depressed  Mild irritable edge  No SI or HI voiced  Requested/recieved prn sleep aide at end of shift  Minimal in conversation  Q 7 min checks ongoing

## 2021-03-15 PROCEDURE — 99232 SBSQ HOSP IP/OBS MODERATE 35: CPT | Performed by: PSYCHIATRY & NEUROLOGY

## 2021-03-15 RX ORDER — DULOXETIN HYDROCHLORIDE 30 MG/1
30 CAPSULE, DELAYED RELEASE ORAL DAILY
Status: DISCONTINUED | OUTPATIENT
Start: 2021-03-16 | End: 2021-03-18

## 2021-03-15 RX ORDER — DIVALPROEX SODIUM 500 MG/1
500 TABLET, DELAYED RELEASE ORAL
Status: DISCONTINUED | OUTPATIENT
Start: 2021-03-15 | End: 2021-03-22

## 2021-03-15 RX ADMIN — ATORVASTATIN CALCIUM 10 MG: 10 TABLET, FILM COATED ORAL at 16:19

## 2021-03-15 RX ADMIN — MIRTAZAPINE 7.5 MG: 15 TABLET, FILM COATED ORAL at 22:23

## 2021-03-15 RX ADMIN — ROPINIROLE 2 MG: 1 TABLET, FILM COATED ORAL at 22:22

## 2021-03-15 RX ADMIN — DULOXETINE HYDROCHLORIDE 60 MG: 60 CAPSULE, DELAYED RELEASE ORAL at 08:49

## 2021-03-15 RX ADMIN — SENNOSIDES AND DOCUSATE SODIUM 1 TABLET: 8.6; 5 TABLET ORAL at 22:23

## 2021-03-15 RX ADMIN — Medication 1 TABLET: at 08:50

## 2021-03-15 RX ADMIN — QUETIAPINE FUMARATE 75 MG: 50 TABLET ORAL at 22:22

## 2021-03-15 RX ADMIN — DIVALPROEX SODIUM 500 MG: 500 TABLET, DELAYED RELEASE ORAL at 22:22

## 2021-03-15 NOTE — PROGRESS NOTES
03/15/21    Team Meeting   Meeting Type Daily Rounds   Team Members Present   Team Members Present Physician;Nurse;;Occupational Therapist   Physician Team Member Dr Emilie Snyder MD; SHERRIE Villalba   Nursing Team Member Genaro Cárdenas RN   Care Management Team Member MS Jessie, [facility]   OT Team Member Ana Rader, [place]   Patient/Family Present   Patient Present No   Patient's Family Present No   Continues to be flat and depressed, has not completed psycho soc or ANDREA due to being minimally verbal  Sad and depressed  Come out for super on Friday, missed intermittent meals  Sleeps throughout night and throughout day  Showered yesterday

## 2021-03-15 NOTE — PROGRESS NOTES
Pt ambulates with walker  Pt denies anxiety, but c/o depression & feeling sad  Pt denies any hallucinations, suicidal or homicidal ideations  Pt is flat & withdrawn  Pt is isolative to his room & does not socialize with other patients or staff  Q 7 min checks maintained to monitor pt's behavior & safety  Pt is compliant with medications

## 2021-03-15 NOTE — PROGRESS NOTES
Progress Note - Radha Haines  78 y o  male MRN: [id number]    Unit/Bed#: [facility] 210-01 Encounter: [id number]        Subjective:   Patient seen and examined at bedside after reviewing the chart and discussing the case with the caring staff  Patient examined at bedside  Patient is limited in conversation stating, "I'm just tired " He has no acute concerns does not appear to be in pain or distress  Physical Exam   Vitals: Blood pressure 132/88, pulse 63, temperature 98 8 °F (37 1 °C), temperature source Temporal, resp  rate 16, height 5' 11" (1 803 m), weight 89 kg (196 lb 3 2 oz), SpO2 95 %  ,Body mass index is 27 36 kg/m²  Constitutional: Patient appears well-developed  HEENT: PERR, EOMI, MMM  Cardiovascular: Normal rate and regular rhythm  Pulmonary/Chest: Effort normal and breath sounds normal    Abdomen: Soft, + BS, NT    Assessment/Plan:  Radha Haines  is a(n) 78 y o  male with  MDD      1  Cardiac with history of dyslipidemia  Patient is on atorvastatin 10 mg daily  2  Seizure disorder  Patient is on Depakote 500 mg b i d  3  Restless leg syndrome  Patient is on ropinirole 2 mg at bedtime  4  Constipation  Patient is on Senokot S   5  DJD/osteoarthritis  Patient may get Tylenol on as needed basis  6  Gait abnormality  PT and OT are on consult  7  Vitamin-D deficiency  Continue D2 09848 units weekly for 6 weeks (ending on 04/14/2021) followed by D3 1000 units daily  The patient was discussed with Dr David Huitron and he is in agreement with the above note

## 2021-03-15 NOTE — SOCIAL WORK
SW met with patient in pt room; pt laying in bed - withdrawn most of day/evenings; pt not social with peers; pt states that he "just can't get myself out of bed, you don't understand and everyone keeps pushing me to get up, but I just can't  When I am good, I will be up and moving - I usually am but sometimes, when it gets this bad, it takes some time to get there";  SW able to elicit some psychosocial information but in entirety;   Pt lives alone with hx depression and Mood DO; pt admits hx AIP with last AIP "a few years ago"; pt admits med compliance; denies hx SA and access to firearms; pt denies hx abuse; pt is single - never  - no children; pt graduated h/s and worked in a [employer]; pt provided no additional information after this; pt states "I'm not ok and I don't think I ever will be" - SW provided support and reassurance - pt not accepting of such at this time; Pt agreeable to SW meeting with him again to obtain additional information; pt declined ANDREA for sister in law stating "I don't see why she needs to be involved right now"; SW will continue to meet with patient as needed for tx and dc planning

## 2021-03-15 NOTE — NURSING NOTE
[id number]  Pt found to be resting quietly on most of authors rounds  No acute behavioral issues noted  Q 7 min checks maintained  Fall protocol in place

## 2021-03-15 NOTE — PLAN OF CARE
Problem: Ineffective Coping  Goal: Participates in unit activities  Description: Interventions:  - Provide therapeutic environment   - Provide required programming   - Redirect inappropriate behaviors   Outcome: Not Progressing   Patient remains minimally verbal and unwilling to meet with RT to complete paper work for admission  RT will continue to try to encourage patient to meet 1:1 or join groups when comfortable

## 2021-03-15 NOTE — NURSING NOTE
[id number]     Pt withdrawn to room  Affect bland mood depressed and anxious; reports anxiety r/t inability to sleep; requested/recieved prn anxiolytic  No acute behavioral issues noted  Q 7 min checks ongoing

## 2021-03-16 PROCEDURE — 99232 SBSQ HOSP IP/OBS MODERATE 35: CPT | Performed by: PSYCHIATRY & NEUROLOGY

## 2021-03-16 RX ADMIN — DIVALPROEX SODIUM 500 MG: 500 TABLET, DELAYED RELEASE ORAL at 21:32

## 2021-03-16 RX ADMIN — HYDROXYZINE HYDROCHLORIDE 25 MG: 25 TABLET, FILM COATED ORAL at 22:36

## 2021-03-16 RX ADMIN — DULOXETINE HYDROCHLORIDE 30 MG: 30 CAPSULE, DELAYED RELEASE ORAL at 08:44

## 2021-03-16 RX ADMIN — TRAZODONE HYDROCHLORIDE 100 MG: 50 TABLET ORAL at 22:36

## 2021-03-16 RX ADMIN — ROPINIROLE 2 MG: 1 TABLET, FILM COATED ORAL at 21:34

## 2021-03-16 RX ADMIN — ATORVASTATIN CALCIUM 10 MG: 10 TABLET, FILM COATED ORAL at 16:52

## 2021-03-16 RX ADMIN — MIRTAZAPINE 7.5 MG: 15 TABLET, FILM COATED ORAL at 21:34

## 2021-03-16 RX ADMIN — QUETIAPINE FUMARATE 75 MG: 50 TABLET ORAL at 21:33

## 2021-03-16 RX ADMIN — Medication 1 TABLET: at 08:44

## 2021-03-16 RX ADMIN — SENNOSIDES AND DOCUSATE SODIUM 1 TABLET: 8.6; 5 TABLET ORAL at 21:33

## 2021-03-16 NOTE — PROGRESS NOTES
Patient compliant with medications and meals  Needed encouragement to come out for dinner  Patient requested medication to make him sleep and patient was told it was too early at this time  Q 7 mins checks in place for safety  Will continue to monitor for behaviors and changes

## 2021-03-16 NOTE — PROGRESS NOTES
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 ([facility] )    CKD (chronic kidney disease) stage 3, GFR 30-59 ml/min    ESBL (extended spectrum beta-lactamase) producing bacteria infection    Restless legs      Behavior over the last 24 hours: unchanged  Sleep: hypersomnia  Appetite: poor  Medication side effects: No  ROS: no complaints, all other systems are negative for acute changes    Mental Status Evaluation:  Appearance:  age appropriate   Behavior:  psychomotor retardation   Speech:  delayed and underproductive   Mood:  decreased range   Affect:  blunted   Thought Process:  blocked and concrete   Thought Content:  poverty of thoughts   Perceptual Disturbances: None   Risk Potential: Suicidal Ideations without plan  Homicidal Ideations none  Potential for Aggression No   Sensorium:  person and place   Memory:  recent memory mildly impaired   Consciousness:  somnolence    Attention: attention span appeared shorter than expected for age   Insight:  poor   Judgment: poor   Gait/Station: in bed   Motor Activity: no abnormal movements     Progress Toward Goals: Patient continues isolated, withdrawn with limited engagement  He came out for breakfast this morning but refuses to participate in group and milieu therapy  Patient has tolerated well some reduction of his medication regimen  No focal neurological deficit or change of mental status noted at this time  Recommended Treatment: Continue with group therapy, milieu therapy and occupational therapy        Risks, benefits and possible side effects of Medications:   Risks, benefits, and possible side effects of medications explained to patient and patient verbalizes understanding  Medications: all current active meds have been reviewed  Labs: I have personally reviewed all pertinent laboratory/tests results  Most Recent Labs:   Lab Results   Component Value Date    WBC 7 85 03/08/2021    RBC 5 76 (H) 03/08/2021    HGB 16 8 03/08/2021    HCT 51 7 (H) 03/08/2021     03/08/2021    RDW 14 0 03/08/2021    NEUTROABS 6 15 03/08/2021    SODIUM 140 03/08/2021    K 3 8 03/08/2021     03/08/2021    CO2 29 03/08/2021    BUN 26 (H) 03/08/2021    CREATININE 1 94 (H) 03/08/2021    GLUC 102 03/08/2021    GLUF 84 09/18/2018    CALCIUM 8 8 03/08/2021    AST 12 03/08/2021    ALT 21 03/08/2021    ALKPHOS 70 03/08/2021    TP 7 2 03/08/2021    ALB 4 0 03/08/2021    TBILI 0 87 03/08/2021    CHOLESTEROL 195 04/26/2016    HDL 52 04/26/2016    TRIG 76 04/26/2016    LDLCALC 128 (H) 04/26/2016    VALPROICTOT 62 12/09/2017    AMMONIA <10 (L) 05/08/2016    [id number] 2 735 03/08/2021    RPR Non-Reactive 05/09/2016       Counseling / Coordination of Care  Total floor / unit time spent today 20 minutes  Greater than 50% of total time was spent with the patient and / or family counseling and / or coordination of care

## 2021-03-16 NOTE — PLAN OF CARE
Problem: DISCHARGE PLANNING - CARE MANAGEMENT  Goal: Discharge to post-acute care or home with appropriate resources  Description: INTERVENTIONS:  - Conduct assessment to determine patient/family and health care team treatment goals, and need for post-acute services based on payer coverage, community resources, and patient preferences, and barriers to discharge  - Address psychosocial, clinical, and financial barriers to discharge as identified in assessment in conjunction with the patient/family and health care team  - Arrange appropriate level of post-acute services according to patients   needs and preference and payer coverage in collaboration with the physician and health care team  - Communicate with and update the patient/family, physician, and health care team regarding progress on the discharge plan  - Arrange appropriate transportation to post-acute venues  Outcome: Progressing     Pt progressing slowly; more verbal than in past days; continues to be withdrawn to his room;  No DC date - will return home upon stabilization

## 2021-03-16 NOTE — PLAN OF CARE
Problem: Ineffective Coping  Goal: Participates in unit activities  Description: Interventions:  - Provide therapeutic environment   - Provide required programming   - Redirect inappropriate behaviors   Outcome: Not Progressing   Patient continues to not want to get out of bed; he is limited with interactions and requires a lot of encouragement even to get up for meals

## 2021-03-16 NOTE — PROGRESS NOTES
03/16/21 0959   Team Meeting   Meeting Type Daily Rounds   Team Members Present   Team Members Present Physician;Nurse;; Occupational Therapist   Physician Team Member Dr Liyah Greenberg MD; SHERRIE Mcconnell   Nursing Team Member Javed Sullivan RN   Social Work Team Member Russell Cordero [facility]   OT Team Member Ute Fowler [place]   Patient/Family Present   Patient Present No   Patient's Family Present No     No DC date - will return home upon stabilization; withdrawn, selectively mute; dep 10/10; slept all night; refused breakfast

## 2021-03-16 NOTE — PROGRESS NOTES
Pt present in his room throughout the shift  Pt appears flat, depressed and withdrawn  Pt stated he has no desire to go out of his room other than meals  Pt reports depression and poor sleep even though pt was observed to be sleeping throughout the night the previous night  Pt was compliant with medications  Pt denies ah, vh, si, hi  Pt was compliant with medications and asked for ginger ale  No other complaints or concerns  Continuous visual safety checks performed throughout the shift  Safety precautions maintained  Will continue to monitor

## 2021-03-16 NOTE — PLAN OF CARE
Problem: Ineffective Coping  Goal: Identifies healthy coping skills  Outcome: Not Progressing     Problem: Depression  Goal: Treatment Goal: Demonstrate behavioral control of depressive symptoms, verbalize feelings of improved mood/affect, and adopt new coping skills prior to discharge  Outcome: Not Progressing  Goal: Verbalize thoughts and feelings  Description: Interventions:  - Assess and re-assess patient's level of risk   - Engage patient in 1:1 interactions, daily, for a minimum of 15 minutes   - Encourage patient to express feelings, fears, frustrations, hopes   Outcome: Not Progressing  Goal: Refrain from harming self  Description: Interventions:  - Monitor patient closely, per order   - Supervise medication ingestion, monitor effects and side effects   Outcome: Progressing  Goal: Refrain from isolation  Description: Interventions:  - Develop a trusting relationship   - Encourage socialization   Outcome: Not Progressing  Goal: Refrain from self-neglect  Outcome: Progressing

## 2021-03-16 NOTE — PROGRESS NOTES
Progress Note - Amy Amaral  78 y o  male MRN: [id number]    Unit/Bed#: [facility] 210-01 Encounter: [id number]        Subjective:   Patient seen and examined at bedside after reviewing the chart and discussing the case with the caring staff  Patient examined at bedside  Patient is limited in conversation  He has no acute concerns does not appear to be in pain or distress  Physical Exam   Vitals: Blood pressure 119/59, pulse 58, temperature 97 9 °F (36 6 °C), temperature source Temporal, resp  rate 18, height 5' 11" (1 803 m), weight 89 kg (196 lb 3 2 oz), SpO2 95 %  ,Body mass index is 27 36 kg/m²  Constitutional: Patient appears well-developed  HEENT: PERR, EOMI, MMM  Cardiovascular: Normal rate and regular rhythm  Pulmonary/Chest: Effort normal and breath sounds normal    Abdomen: Soft, + BS, NT    Assessment/Plan:  Amy Amaral  is a(n) 78 y o  male with  MDD      1  Cardiac with history of dyslipidemia  Patient is on atorvastatin 10 mg daily  2  Seizure disorder  Patient is on Depakote 500 mg b i d  3  Restless leg syndrome  Patient is on ropinirole 2 mg at bedtime  4  Constipation  Patient is on Senokot S   5  DJD/osteoarthritis  Patient may get Tylenol on as needed basis  6  Gait abnormality  PT and OT are on consult  7  Vitamin-D deficiency  Continue D2 64468 units weekly for 6 weeks (ending on 04/14/2021) followed by D3 1000 units daily  The patient was discussed with Dr Lisa Adams and he is in agreement with the above note

## 2021-03-16 NOTE — PROGRESS NOTES
The patient out of room for lunch meal at this time after multiple attempts of encouragement provided by clinical staff  Q 7 minute safety checks maintained

## 2021-03-16 NOTE — OCCUPATIONAL THERAPY NOTE
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

## 2021-03-16 NOTE — PHYSICAL THERAPY NOTE
Physical Therapy Cancellation Note       03/16/21 1352   PT Last Visit   PT Visit Date 03/16/21   Note Type   Note Type Treatment   Cancel Reasons Other  (pt refused tx )     Akiko Rosado declined interventions with repeated encouragement and education citing " I am not doing anything until I feel better"    Lyric Munoz, PT

## 2021-03-16 NOTE — PROGRESS NOTES
The patient visible resting quietly in room upon rounds throughout the shift  The patient declined to get out of bed for breakfast meal and is currently declining to get out of bed for lunch meal despite much encouragement provided by clinical staff  The patient stated "I don't want to get up, I didn't sleep all night  I laid in bed awake all night " RN provided support and encouragement and education on sleep-wake cycle  The patient declined to answer assessment questions  The patient noted to be compliant with medication administration  No verbal or non-verbal complaints of pain noted  Q 7 minute safety checks maintained

## 2021-03-17 PROCEDURE — 99232 SBSQ HOSP IP/OBS MODERATE 35: CPT | Performed by: PSYCHIATRY & NEUROLOGY

## 2021-03-17 RX ORDER — TRAZODONE HYDROCHLORIDE 150 MG/1
150 TABLET ORAL
Status: DISCONTINUED | OUTPATIENT
Start: 2021-03-17 | End: 2021-03-21

## 2021-03-17 RX ORDER — QUETIAPINE FUMARATE 50 MG/1
50 TABLET, FILM COATED ORAL
Status: DISCONTINUED | OUTPATIENT
Start: 2021-03-17 | End: 2021-03-26

## 2021-03-17 RX ADMIN — ROPINIROLE 2 MG: 1 TABLET, FILM COATED ORAL at 21:53

## 2021-03-17 RX ADMIN — QUETIAPINE FUMARATE 50 MG: 50 TABLET ORAL at 21:53

## 2021-03-17 RX ADMIN — ERGOCALCIFEROL 50000 UNITS: 1.25 CAPSULE, LIQUID FILLED ORAL at 08:42

## 2021-03-17 RX ADMIN — ATORVASTATIN CALCIUM 10 MG: 10 TABLET, FILM COATED ORAL at 17:28

## 2021-03-17 RX ADMIN — DIVALPROEX SODIUM 500 MG: 500 TABLET, DELAYED RELEASE ORAL at 21:53

## 2021-03-17 RX ADMIN — DULOXETINE HYDROCHLORIDE 30 MG: 30 CAPSULE, DELAYED RELEASE ORAL at 08:42

## 2021-03-17 RX ADMIN — Medication 1 TABLET: at 08:42

## 2021-03-17 RX ADMIN — SENNOSIDES AND DOCUSATE SODIUM 1 TABLET: 8.6; 5 TABLET ORAL at 21:53

## 2021-03-17 RX ADMIN — MIRTAZAPINE 7.5 MG: 15 TABLET, FILM COATED ORAL at 21:53

## 2021-03-17 NOTE — PLAN OF CARE
Problem: DISCHARGE PLANNING - CARE MANAGEMENT  Goal: Discharge to post-acute care or home with appropriate resources  Description: INTERVENTIONS:  - Conduct assessment to determine patient/family and health care team treatment goals, and need for post-acute services based on payer coverage, community resources, and patient preferences, and barriers to discharge  - Address psychosocial, clinical, and financial barriers to discharge as identified in assessment in conjunction with the patient/family and health care team  - Arrange appropriate level of post-acute services according to patients   needs and preference and payer coverage in collaboration with the physician and health care team  - Communicate with and update the patient/family, physician, and health care team regarding progress on the discharge plan  - Arrange appropriate transportation to post-acute venues  Outcome: Progressing     Pt progressing slowly; more verbal; still withdrawn to room;  No DC date - will return home upon stabilization

## 2021-03-17 NOTE — PLAN OF CARE
Problem: Ineffective Coping  Goal: Participates in unit activities  Description: Interventions:  - Provide therapeutic environment   - Provide required programming   - Redirect inappropriate behaviors   Outcome: Not Progressing   Patient continues to complain about lack of sleep and just wants to remain in bed, the more encouraged patient is to join the less verbal he is with staff

## 2021-03-17 NOTE — SOCIAL WORK
SW attempted to meet with patient in pt room; pt laying in bed on his side with eyes closed; pt opened his eyes with contact but no eye contact made with SW; pt would not respond to SW questions during this interaction; SW will continue to interact with patient as needed for tx and dc planning

## 2021-03-17 NOTE — PROGRESS NOTES
The patient refusing to get out of bed for dinner meal  The patient noted to be negative in thought process and content of conversation with clinical staff  Moderate amount of encouragement provided by multiple staff members  The patient agreeable to get out of bed after multiple attempts by staff members  The patient ate 100% of dinner then returned to room directly following dinner meal  Q 7 minute safety checks maintained

## 2021-03-17 NOTE — PROGRESS NOTES
Patient has been in room throughout the entire shift to this point  Remains preoccupied with his sleep pattern  Patient said he has brain depression and nobody understands that affects his sleep  Patient was administered atarax 25 mgs  For mild anxiety and trazodone 100 mgs at 2236  Will continue to monitor it's effectiveness through 7 minute safety checks

## 2021-03-17 NOTE — PROGRESS NOTES
Pt is flat, withdrawn & is isolative to his room  Pt refused breakfast  Pt denies any anxiety  Q 7 min checks maintained to monitor pt's behavior & safety  Pt denies any hallucinations, suicidal or homicidal ideations  Pt does ambulate with walker when oob  Pt c/o feeling sad & depressed  Pt is cooperative & compliant with medications

## 2021-03-17 NOTE — PROGRESS NOTES
Progress Note - Ai Blankenship  78 y o  male MRN: [id number]    Unit/Bed#: [facility] 210-01 Encounter: [id number]        Subjective:   Patient seen and examined at bedside after reviewing the chart and discussing the case with the caring staff  Patient examined at bedside  Patient is limited in conversation  He has no acute concerns does not appear to be in pain or distress  Physical Exam   Vitals: Blood pressure 123/68, pulse 61, temperature 98 °F (36 7 °C), temperature source Temporal, resp  rate 18, height 5' 11" (1 803 m), weight 89 kg (196 lb 3 2 oz), SpO2 95 %  ,Body mass index is 27 36 kg/m²  Constitutional: Patient appears well-developed  HEENT: PERR, EOMI, MMM  Cardiovascular: Normal rate and regular rhythm  Pulmonary/Chest: Effort normal and breath sounds normal    Abdomen: Soft, + BS, NT    Assessment/Plan:  Ai Blankenship  is a(n) 78 y o  male with  MDD      1  Cardiac with history of dyslipidemia  Patient is on atorvastatin 10 mg daily  2  Seizure disorder  Patient is on Depakote 500 mg b i d  3  Restless leg syndrome  Patient is on ropinirole 2 mg at bedtime  4  Constipation  Patient is on Senokot S   5  DJD/osteoarthritis  Patient may get Tylenol on as needed basis  6  Gait abnormality  PT and OT are on consult  7  Vitamin D deficiency  Continue D2 38953 units weekly for 6 weeks (ending on 04/14/2021) followed by D3 1000 units daily  The patient was discussed with Dr Barbara Arellano and he is in agreement with the above note

## 2021-03-17 NOTE — PROGRESS NOTES
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 ([facility] )    CKD (chronic kidney disease) stage 3, GFR 30-59 ml/min    ESBL (extended spectrum beta-lactamase) producing bacteria infection    Restless legs      Behavior over the last 24 hours:  unchanged  Sleep: hypersomnia  Appetite: poor  Medication side effects: No  ROS: all other systems are negative for acute changes    Mental Status Evaluation:  Appearance:  age appropriate   Behavior:  evasive   Speech:  delayed and underproductive   Mood:  decreased range   Affect:  mood-congruent   Thought Process:  concrete and goal directed   Thought Content:  no overt delusions   Perceptual Disturbances: None   Risk Potential: Suicidal Ideations none  Homicidal Ideations none  Potential for Aggression No   Sensorium:  person and place   Memory:  recent and remote memory: unable to assess due to lack of cooperation   Consciousness:  alert and awake    Attention: attention span appeared shorter than expected for age   Insight:  poor   Judgment: limited   Gait/Station: in bed   Motor Activity: no abnormal movements     Progress Toward Goals: Patient continues withdrawn, isolated with flat affect and refuses to come out for his meal periodically  He complaint poor sleep despite  staff members notice sleeping throughout the night  Patient has been compliant with medication and denies any current side effects  Recommended Treatment: Continue with group therapy, milieu therapy and occupational therapy        Risks, benefits and possible side effects of Medications:   Patient does not verbalize understanding at this time and will require further explanation  Medications: all current active meds have been reviewed  Increase Trazodone to 150 mg po HS prn  Labs: I have personally reviewed all pertinent laboratory/tests results  Most Recent Labs:   Lab Results   Component Value Date    WBC 7 85 03/08/2021    RBC 5 76 (H) 03/08/2021    HGB 16 8 03/08/2021    HCT 51 7 (H) 03/08/2021     03/08/2021    RDW 14 0 03/08/2021    NEUTROABS 6 15 03/08/2021    SODIUM 140 03/08/2021    K 3 8 03/08/2021     03/08/2021    CO2 29 03/08/2021    BUN 26 (H) 03/08/2021    CREATININE 1 94 (H) 03/08/2021    GLUC 102 03/08/2021    GLUF 84 09/18/2018    CALCIUM 8 8 03/08/2021    AST 12 03/08/2021    ALT 21 03/08/2021    ALKPHOS 70 03/08/2021    TP 7 2 03/08/2021    ALB 4 0 03/08/2021    TBILI 0 87 03/08/2021    CHOLESTEROL 195 04/26/2016    HDL 52 04/26/2016    TRIG 76 04/26/2016    LDLCALC 128 (H) 04/26/2016    VALPROICTOT 62 12/09/2017    AMMONIA <10 (L) 05/08/2016    [id number] 2 735 03/08/2021    RPR Non-Reactive 05/09/2016       Counseling / Coordination of Care  Total floor / unit time spent today 20 minutes  Greater than 50% of total time was spent with the patient and / or family counseling and / or coordination of care

## 2021-03-17 NOTE — PROGRESS NOTES
03/17/21    Team Meeting   Meeting Type Daily Rounds   Team Members Present   Team Members Present Physician;Nurse;;Occupational Therapist   Physician Team Member Dr Marie Douglass; Andrae Mandujano, [address] [place] [address]   Nursing Team Member Acosta Sun, MARIANNA   Care Management Team Member MS Jessie, [facility]   OT Team Member Daryle Bride, [place]   Patient/Family Present   Patient Present No   Patient's Family Present No   Discharge disposition to be determined  PT has declined to sign ROIS at this time  PT difficult to get out of bed this am, PRN's given, slept all night  PT reports not feeling better

## 2021-03-18 PROCEDURE — 99232 SBSQ HOSP IP/OBS MODERATE 35: CPT | Performed by: PSYCHIATRY & NEUROLOGY

## 2021-03-18 RX ORDER — DULOXETIN HYDROCHLORIDE 60 MG/1
60 CAPSULE, DELAYED RELEASE ORAL DAILY
Status: DISCONTINUED | OUTPATIENT
Start: 2021-03-19 | End: 2021-03-24

## 2021-03-18 RX ADMIN — DIVALPROEX SODIUM 500 MG: 500 TABLET, DELAYED RELEASE ORAL at 21:18

## 2021-03-18 RX ADMIN — TRAZODONE HYDROCHLORIDE 150 MG: 150 TABLET ORAL at 22:22

## 2021-03-18 RX ADMIN — ATORVASTATIN CALCIUM 10 MG: 10 TABLET, FILM COATED ORAL at 17:20

## 2021-03-18 RX ADMIN — ROPINIROLE 2 MG: 1 TABLET, FILM COATED ORAL at 21:18

## 2021-03-18 RX ADMIN — Medication 1 TABLET: at 08:04

## 2021-03-18 RX ADMIN — QUETIAPINE FUMARATE 50 MG: 50 TABLET ORAL at 21:19

## 2021-03-18 RX ADMIN — SENNOSIDES AND DOCUSATE SODIUM 1 TABLET: 8.6; 5 TABLET ORAL at 21:19

## 2021-03-18 RX ADMIN — MIRTAZAPINE 7.5 MG: 15 TABLET, FILM COATED ORAL at 21:18

## 2021-03-18 RX ADMIN — DULOXETINE HYDROCHLORIDE 30 MG: 30 CAPSULE, DELAYED RELEASE ORAL at 08:04

## 2021-03-18 NOTE — PROGRESS NOTES
The patient noted to be resting quietly and without difficulty in bed upon rounds throughout the shift  The patient refused to get out of bed for breakfast meal despite encouragement provided by clinical staff  The patient negative in thought content and states "I don't want to do anything " The patient got out of bed briefly prior to breakfast meal for short walk in hallway, but directly returned to room  The patient noted to be compliant with meals and medications  No verbal or non-verbal complaints of pain noted  Q 7 minute safety checks maintained

## 2021-03-18 NOTE — PLAN OF CARE
Problem: DISCHARGE PLANNING - CARE MANAGEMENT  Goal: Discharge to post-acute care or home with appropriate resources  Description: INTERVENTIONS:  - Conduct assessment to determine patient/family and health care team treatment goals, and need for post-acute services based on payer coverage, community resources, and patient preferences, and barriers to discharge  - Address psychosocial, clinical, and financial barriers to discharge as identified in assessment in conjunction with the patient/family and health care team  - Arrange appropriate level of post-acute services according to patients   needs and preference and payer coverage in collaboration with the physician and health care team  - Communicate with and update the patient/family, physician, and health care team regarding progress on the discharge plan  - Arrange appropriate transportation to post-acute venues  Outcome: Progressing     Pt progressing slowly; still withdrawn to room, refusing some meals; med compliant; will return to home upon stabilization

## 2021-03-18 NOTE — PROGRESS NOTES
Patient was not out in the community  Complaint with HS medications  Denies any suicidal thoughts or hallucinations  Negative during assessment  Compliant with medications  Fearful of not sleeping overnight  Maintained on q 7 minute checks  Will continue to monitor

## 2021-03-18 NOTE — PROGRESS NOTES
Patient extremely resistive to getting out of bed for lunch meal  The patient states "I don't want to do anything " The patient noted to become irritable and agitated with staff attempts to get patient out of bed  The patient out of bed with assistance of clinical staff and ambulated to dining room with assistance of roller walker  The patient sat in dining room and pushed lunch tray away and refused to eat  Clinical staff offered the patient alternate food items, but the patient continued to refuse to eat  Rn notified Dago Lange pa-c, of the patient's frequent refusal of meals and poor appetite, new order noted for dietary supplement  Rn notified Dr Janee Timmons at time of team meeting of patient's continued resistance to get out of bed, poor appetite, and continued complaints "not feeling any better" in terms of depression  No verbal or non-verbal complaints of pain noted  Q 7 minute safety checks maintained

## 2021-03-18 NOTE — PROGRESS NOTES
03/18/21 0956   Team Meeting   Meeting Type Daily Rounds   Team Members Present   Team Members Present Physician;Nurse;; Occupational Therapist   Physician Team Member Dr Ang Cruz MD   Nursing Team Member Rebecca Spence RN   Social Work Team Member SHAI Rosenbaum   OT Team Member Vibha Andersen, [place]   Patient/Family Present   Patient Present No   Patient's Family Present No     No DC date - will return home upon stabilization; continues to be withdrawn to room; irritable with contact; refuses breakfast; difficult to get out of bed; c/o "not feeling any better"; negative in thought process; med compliant; sleeping overnight with snoring present but pt c/o not sleeping

## 2021-03-18 NOTE — PLAN OF CARE
Problem: Ineffective Coping  Goal: Participates in unit activities  Description: Interventions:  - Provide therapeutic environment   - Provide required programming   - Redirect inappropriate behaviors   Outcome: Not Progressing   Patient continues to state he is unable to sleep and wants to sleep or remain in bed all day; he has been more verbal at times this AM he was yelling response of No

## 2021-03-18 NOTE — PROGRESS NOTES
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 ([facility] )    CKD (chronic kidney disease) stage 3, GFR 30-59 ml/min    ESBL (extended spectrum beta-lactamase) producing bacteria infection    Restless legs      Behavior over the last 24 hours:  unchanged  Sleep: hypersomnia  Appetite: poor  Medication side effects: No  ROS:  All other systems are negative    Mental Status Evaluation:  Appearance:  age appropriate   Behavior:  psychomotor retardation   Speech:  delayed   Mood:  decreased range and depressed   Affect:  blunted   Thought Process:  concrete and goal directed   Thought Content:  no overt delusions   Perceptual Disturbances: None   Risk Potential: Suicidal Ideations none  Homicidal Ideations none  Potential for Aggression No   Sensorium:  person and place   Memory:  recent memory mildly impaired   Consciousness:  alert and awake    Attention: attention span appeared shorter than expected for age   Insight:  poor   Judgment: limited   Gait/Station: uses walker   Motor Activity: no abnormal movements     Progress Toward Goals:  Patient continues withdrawn, isolated and only comes out for his meals with a lot of staff's encouragement and redirection  Patient reports poor appetite and no energy to participate in group and milieu therapy  Medication compliant has been stable  Recommended Treatment: Continue with group therapy, milieu therapy and occupational therapy  Risks, benefits and possible side effects of Medications:   Risks, benefits, and possible side effects of medications explained to patient and patient verbalizes understanding  Medications: all current active meds have been reviewed  Increase Cymbalta to 60 mg daily  Labs: I have personally reviewed all pertinent laboratory/tests results  Most Recent Labs:   Lab Results   Component Value Date    WBC 7 85 03/08/2021    RBC 5 76 (H) 03/08/2021    HGB 16 8 03/08/2021    HCT 51 7 (H) 03/08/2021     03/08/2021    RDW 14 0 03/08/2021    NEUTROABS 6 15 03/08/2021    SODIUM 140 03/08/2021    K 3 8 03/08/2021     03/08/2021    CO2 29 03/08/2021    BUN 26 (H) 03/08/2021    CREATININE 1 94 (H) 03/08/2021    GLUC 102 03/08/2021    GLUF 84 09/18/2018    CALCIUM 8 8 03/08/2021    AST 12 03/08/2021    ALT 21 03/08/2021    ALKPHOS 70 03/08/2021    TP 7 2 03/08/2021    ALB 4 0 03/08/2021    TBILI 0 87 03/08/2021    CHOLESTEROL 195 04/26/2016    HDL 52 04/26/2016    TRIG 76 04/26/2016    LDLCALC 128 (H) 04/26/2016    VALPROICTOT 62 12/09/2017    AMMONIA <10 (L) 05/08/2016    [id number] 2 735 03/08/2021    RPR Non-Reactive 05/09/2016       Counseling / Coordination of Care  Total floor / unit time spent today 20 minutes  Greater than 50% of total time was spent with the patient and / or family counseling and / or coordination of care

## 2021-03-18 NOTE — PLAN OF CARE
Problem: SLEEP DISTURBANCE  Goal: Will exhibit normal sleeping pattern  Description: Interventions:  -  Assess the patients sleep pattern, noting recent changes  - Administer medication as ordered  - Decrease environmental stimuli, including noise, as appropriate during the night  - Encourage the patient to actively participate in unit groups and or exercise during the day to enhance ability to achieve adequate sleep at night  - Assess the patient, in the morning, encouraging a description of sleep experience  Outcome: Progressing     Problem: Ineffective Coping  Goal: Identifies healthy coping skills  Outcome: Progressing  Goal: Patient/Family participate in treatment and DC plans  Description: Interventions:  - Provide therapeutic environment  Outcome: Progressing  Goal: Patient/Family verbalizes awareness of resources  Outcome: Progressing     Problem: Depression  Goal: Treatment Goal: Demonstrate behavioral control of depressive symptoms, verbalize feelings of improved mood/affect, and adopt new coping skills prior to discharge  Outcome: Progressing  Goal: Verbalize thoughts and feelings  Description: Interventions:  - Assess and re-assess patient's level of risk   - Engage patient in 1:1 interactions, daily, for a minimum of 15 minutes   - Encourage patient to express feelings, fears, frustrations, hopes   Outcome: Progressing  Goal: Refrain from harming self  Description: Interventions:  - Monitor patient closely, per order   - Supervise medication ingestion, monitor effects and side effects   Outcome: Progressing  Goal: Refrain from isolation  Description: Interventions:  - Develop a trusting relationship   - Encourage socialization   Outcome: Progressing  Goal: Refrain from self-neglect  Outcome: Progressing  Goal: Complete daily ADLs, including personal hygiene independently, as able  Description: Interventions:  - Observe, teach, and assist patient with ADLS  -  Monitor and promote a balance of rest/activity, with adequate nutrition and elimination   Outcome: Progressing     Problem: Nutrition/Hydration-ADULT  Goal: Nutrient/Hydration intake appropriate for improving, restoring or maintaining nutritional needs  Description: Monitor and assess patient's nutrition/hydration status for malnutrition  Collaborate with interdisciplinary team and initiate plan and interventions as ordered  Monitor patient's weight and dietary intake as ordered or per policy  Utilize nutrition screening tool and intervene as necessary  Determine patient's food preferences and provide high-protein, high-caloric foods as appropriate       INTERVENTIONS:  - Monitor oral intake, urinary output, labs, and treatment plans  - Assess nutrition and hydration status and recommend course of action  - Evaluate amount of meals eaten  - Assist patient with eating if necessary   - Allow adequate time for meals  - Recommend/ encourage appropriate diets, oral nutritional supplements, and vitamin/mineral supplements  - Order, calculate, and assess calorie counts as needed  - Recommend, monitor, and adjust tube feedings and TPN/PPN based on assessed needs  - Assess need for intravenous fluids  - Provide specific nutrition/hydration education as appropriate  - Include patient/family/caregiver in decisions related to nutrition  Outcome: Progressing

## 2021-03-18 NOTE — PROGRESS NOTES
Progress Note - Lorna Tilley  78 y o  male MRN: [id number]    Unit/Bed#: [facility] 210-01 Encounter: [id number]        Subjective:   Patient seen and examined at bedside after reviewing the chart and discussing the case with the caring staff  Patient examined at bedside  Patient is limited in conversation  He has no acute concerns does not appear to be in pain or distress  Physical Exam   Vitals: Blood pressure 129/67, pulse 61, temperature 97 7 °F (36 5 °C), temperature source Temporal, resp  rate 18, height 5' 11" (1 803 m), weight 92 2 kg (203 lb 3 2 oz), SpO2 98 %  ,Body mass index is 28 34 kg/m²  Constitutional: Patient appears well-developed  HEENT: PERR, EOMI, MMM  Cardiovascular: Normal rate and regular rhythm  Pulmonary/Chest: Effort normal and breath sounds normal    Abdomen: Soft, + BS, NT    Assessment/Plan:  Lorna Tilley  is a(n) 78 y o  male with  MDD      1  Cardiac with history of dyslipidemia  Patient is on atorvastatin 10 mg daily  2  Seizure disorder  Patient is on Depakote 500 mg b i d  3  Restless leg syndrome  Patient is on ropinirole 2 mg at bedtime  4  Constipation  Patient is on Senokot S   5  DJD/osteoarthritis  Patient may get Tylenol on as needed basis  6  Gait abnormality  PT and OT are on consult  7  Vitamin D deficiency  Continue D2 01474 units weekly for 6 weeks (ending on 04/14/2021) followed by D3 1000 units daily  The patient was discussed with Dr Gemma Kearney and he is in agreement with the above note

## 2021-03-18 NOTE — PROGRESS NOTES
Patient observed sleeping during most Q 7 minute safety checks  No SI/HI/AH/VH noted  Patient shows no s/s of distress  No complaints of pain or aspiration risks  Non-labored breathing  Monitoring continues  Fluids at bedside to promote hydration

## 2021-03-19 PROCEDURE — 99232 SBSQ HOSP IP/OBS MODERATE 35: CPT | Performed by: PSYCHIATRY & NEUROLOGY

## 2021-03-19 RX ORDER — DEXTROAMPHETAMINE SACCHARATE, AMPHETAMINE ASPARTATE, DEXTROAMPHETAMINE SULFATE AND AMPHETAMINE SULFATE 2.5; 2.5; 2.5; 2.5 MG/1; MG/1; MG/1; MG/1
5 TABLET ORAL DAILY
Status: DISCONTINUED | OUTPATIENT
Start: 2021-03-20 | End: 2021-03-20

## 2021-03-19 RX ADMIN — ATORVASTATIN CALCIUM 10 MG: 10 TABLET, FILM COATED ORAL at 17:17

## 2021-03-19 RX ADMIN — QUETIAPINE FUMARATE 50 MG: 50 TABLET ORAL at 22:29

## 2021-03-19 RX ADMIN — SENNOSIDES AND DOCUSATE SODIUM 1 TABLET: 8.6; 5 TABLET ORAL at 22:29

## 2021-03-19 RX ADMIN — DULOXETINE HYDROCHLORIDE 60 MG: 60 CAPSULE, DELAYED RELEASE ORAL at 09:19

## 2021-03-19 RX ADMIN — MIRTAZAPINE 7.5 MG: 15 TABLET, FILM COATED ORAL at 22:29

## 2021-03-19 RX ADMIN — ROPINIROLE 2 MG: 1 TABLET, FILM COATED ORAL at 22:29

## 2021-03-19 RX ADMIN — DIVALPROEX SODIUM 500 MG: 500 TABLET, DELAYED RELEASE ORAL at 22:29

## 2021-03-19 RX ADMIN — Medication 1 TABLET: at 09:19

## 2021-03-19 NOTE — PLAN OF CARE
Problem: Ineffective Coping  Goal: Participates in unit activities  Description: Interventions:  - Provide therapeutic environment   - Provide required programming   - Redirect inappropriate behaviors   Outcome: Not Progressing   Patient remains in bed and requires a lot of encouragement to get up just for meals  Patient continues to refuse to speak with RT to complete admission papers; RT will continue to try daily

## 2021-03-19 NOTE — SOCIAL WORK
Sw attempted to meet with patient in pt room; pt laying in bed; pt continues to express that he just wants to be "left alone" and that "nothing is helping me - you can't help me"; pt unable to identify one good thing about the day or his life; pt tells SW "I have brain depression, do you even understand what that is?"; pt would not respond to any additional questions from SW; SW will continue to meet with patient as needed for tx and DC planning

## 2021-03-19 NOTE — PROGRESS NOTES
Progress Note - Pilar Bernheim  78 y o  male MRN: [id number]    Unit/Bed#: [facility]-01 Encounter: [id number]        Subjective:   Patient seen and examined at bedside after reviewing the chart and discussing the case with the caring staff  Patient examined at bedside  Patient is limited in conversation  He reports that he does not feel well and does not want to get out of bed  Patient has been missing meals resting in his room  Patient encouraged to be out of bed during the day and attend all meals  He denies abdominal pain, nausea, vomiting  Physical Exam   Vitals: Blood pressure 104/80, pulse 59, temperature (!) 97 °F (36 1 °C), temperature source Tympanic, resp  rate 17, height 5' 11" (1 803 m), weight 92 2 kg (203 lb 3 2 oz), SpO2 97 %  ,Body mass index is 28 34 kg/m²  Constitutional: Patient appears well-developed  HEENT: PERR, EOMI, MMM  Cardiovascular: Normal rate and regular rhythm  Pulmonary/Chest: Effort normal and breath sounds normal    Abdomen: Soft, + BS, NT    Assessment/Plan:  Pilar Bernheim  is a(n) 78 y o  male with  MDD      1  Cardiac with history of dyslipidemia  Patient is on atorvastatin 10 mg daily  2  Seizure disorder  Patient is on Depakote 500 mg b i d  3  Restless leg syndrome  Patient is on ropinirole 2 mg at bedtime  4  Constipation  Patient is on Senokot S   5  DJD/osteoarthritis  Patient may get Tylenol on as needed basis  6  Gait abnormality  PT and OT are on consult  7  Vitamin D deficiency  Continue D2 26408 units weekly for 6 weeks (ending on 04/14/2021) followed by D3 1000 units daily  The patient was discussed with Dr Ki Marley and he is in agreement with the above note

## 2021-03-19 NOTE — PLAN OF CARE
Problem: DISCHARGE PLANNING - CARE MANAGEMENT  Goal: Discharge to post-acute care or home with appropriate resources  Description: INTERVENTIONS:  - Conduct assessment to determine patient/family and health care team treatment goals, and need for post-acute services based on payer coverage, community resources, and patient preferences, and barriers to discharge  - Address psychosocial, clinical, and financial barriers to discharge as identified in assessment in conjunction with the patient/family and health care team  - Arrange appropriate level of post-acute services according to patients   needs and preference and payer coverage in collaboration with the physician and health care team  - Communicate with and update the patient/family, physician, and health care team regarding progress on the discharge plan  - Arrange appropriate transportation to post-acute venues  Outcome: Not Progressing     Pt not progressing; continues to withdraw to room; negative thought process; hopeless/helpless;  No DC Date

## 2021-03-19 NOTE — PROGRESS NOTES
Progress Note - [address]  78 y o  male MRN: [id number]  Unit/Bed#: [facility] 210-01 Encounter: [id number]    Assessment/Plan   Principal Problem:    MDD (major depressive disorder), recurrent episode, severe ([facility] )  Active Problems:    Multiple falls    Hypothyroidism    Essential hypertension    Seizure disorder (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 ([place] [address] )    CKD (chronic kidney disease) stage 3, GFR 30-59 ml/min    ESBL (extended spectrum beta-lactamase) producing bacteria infection    Restless legs      Behavior over the last 24 hours:  unchanged, continues to be resistive to care, refusing to eat most meals  Sleep: hypersomnia  Appetite: poor  Medication side effects: None reported by patient  ROS: unable to assess, patient refused to answer    Mental Status Evaluation:  Appearance:  older than stated age and disheveled   Behavior:  evasive, guarded and uncooperative   Speech:  delayed and brief, one or two word responses   Mood:  angry and irritable   Affect:  constricted   Thought Process:  negative   Thought Content:  unable to assess, patient refused   Perceptual Disturbances: no evidence of auditory/visual hallucinations, does not appear to be responding to internal stimuli  Risk Potential: Suicidal Ideations none reported  Homicidal Ideations none reported  Potential for Aggression No   Sensorium:  unable to assess   Memory:  patient does not answer   Consciousness:  alert and awake    Attention: attention span and concentration were age appropriate   Insight:  poor   Judgment: limited   Gait/Station: slow and uses walker   Motor Activity: no abnormal movements     Progress Toward Goals: Patient seen this afternoon in the dining room  He kept his eyes shut and refused to answer questions or respond and appears to have had nothing to eat or drink  He remains Irritable with poor eye contact, arms crossed and repeated several times "I don't want to talk"  Per nursing report, he continues to be irritable and refusing meals frequently  Needs significant prompting to get out of his room and he is sleeping throughout the night and naps during the day, frequently found snoring  Took PRN Trazodone last night after coming out of his room irritable, requesting something for sleep  Recommended Treatment: Continue with group therapy, milieu therapy and occupational therapy  Risks, benefits and possible side effects of Medications:   Risks, benefits, and possible side effects of medications explained to patient and patient verbalizes understanding  Medications: all current active meds have been reviewed  Begin Adderall 5  mg po am     Labs: I have personally reviewed all pertinent laboratory/tests results  Most Recent Labs:   Lab Results   Component Value Date    WBC 7 85 03/08/2021    RBC 5 76 (H) 03/08/2021    HGB 16 8 03/08/2021    HCT 51 7 (H) 03/08/2021     03/08/2021    RDW 14 0 03/08/2021    NEUTROABS 6 15 03/08/2021    SODIUM 140 03/08/2021    K 3 8 03/08/2021     03/08/2021    CO2 29 03/08/2021    BUN 26 (H) 03/08/2021    CREATININE 1 94 (H) 03/08/2021    GLUC 102 03/08/2021    GLUF 84 09/18/2018    CALCIUM 8 8 03/08/2021    AST 12 03/08/2021    ALT 21 03/08/2021    ALKPHOS 70 03/08/2021    TP 7 2 03/08/2021    ALB 4 0 03/08/2021    TBILI 0 87 03/08/2021    CHOLESTEROL 195 04/26/2016    HDL 52 04/26/2016    TRIG 76 04/26/2016    LDLCALC 128 (H) 04/26/2016    VALPROICTOT 62 12/09/2017    AMMONIA <10 (L) 05/08/2016    [id number] 2 735 03/08/2021    RPR Non-Reactive 05/09/2016       Counseling / Coordination of Care  Total floor / unit time spent today 20 minutes  Greater than 50% of total time was spent with the patient and / or family counseling and / or coordination of care

## 2021-03-19 NOTE — PROGRESS NOTES
Out in small community room all evening  Negative and nasty to RN  Compliant with medications, poorly social with peers  When asked if he was OK, stated, "I'm tired of this shit!"  Closed down communication with RN  Returned to room without issue  Later was yelling from room that he can't sleep  traZODone (DESYREL) tablet 150 mg given at 2222  Currently effective  Maintained on q 7 minute checks

## 2021-03-19 NOTE — PROGRESS NOTES
The patient declined to get out of bed for breakfast meal and noted to be resting quietly and without difficulty in patient's room upon rounds throughout the shift  Patient became irritable and agitated with clinical staff attempts of encouragement and offer to provide assistance to get out of bed for lunch meal  The patient noted to be negative in speech and thought content  The patient did come out of room and ambulate to dining room after prompting and encouragement provided by staff for 15-20 minutes  The patient refused to eat lunch meal or fluids provided while in the dining room  Dr Ford hurd notified of the patient's continued resistiveness to get out of bed throughout the day  No verbal or non verbal complaints of pain noted  Q 7 minute safety checks continued

## 2021-03-19 NOTE — PROGRESS NOTES
Trazodone was effective for sleep overnight  Observed sleeping during q 7 minute checks  No suicidal ideations noted  Repositions self in bed  Fluids at bedside  Will continue to monitor

## 2021-03-19 NOTE — PROGRESS NOTES
03/19/21    Team Meeting   Meeting Type Daily Rounds   Team Members Present   Team Members Present Physician;Nurse;;Occupational Therapist   Physician Team Member Dr Eller Krabbe, MD   Nursing Team Member Kaela Pettit, RN   Care Management Team Member MS Jessie, [facility]   OT Team Member Jack Severance, [place]   Patient/Family Present   Patient Present No   Patient's Family Present No   Withdrawn to room, greatly depressed, irritable  No D/C date at this time  Medication adjustment  Resistive to get out of bed for breakfast and lunch  Negative, irritable, declines to do things, stays in bed throughout day  Prn for sleep last night  From home  No D/C date at this time

## 2021-03-20 PROCEDURE — 99232 SBSQ HOSP IP/OBS MODERATE 35: CPT | Performed by: PSYCHIATRY & NEUROLOGY

## 2021-03-20 RX ORDER — DEXTROAMPHETAMINE SACCHARATE, AMPHETAMINE ASPARTATE, DEXTROAMPHETAMINE SULFATE AND AMPHETAMINE SULFATE 2.5; 2.5; 2.5; 2.5 MG/1; MG/1; MG/1; MG/1
10 TABLET ORAL DAILY
Status: DISCONTINUED | OUTPATIENT
Start: 2021-03-21 | End: 2021-04-02

## 2021-03-20 RX ADMIN — ROPINIROLE 2 MG: 1 TABLET, FILM COATED ORAL at 21:58

## 2021-03-20 RX ADMIN — Medication 1 TABLET: at 08:49

## 2021-03-20 RX ADMIN — SENNOSIDES AND DOCUSATE SODIUM 1 TABLET: 8.6; 5 TABLET ORAL at 21:58

## 2021-03-20 RX ADMIN — MIRTAZAPINE 7.5 MG: 15 TABLET, FILM COATED ORAL at 21:57

## 2021-03-20 RX ADMIN — ATORVASTATIN CALCIUM 10 MG: 10 TABLET, FILM COATED ORAL at 16:42

## 2021-03-20 RX ADMIN — QUETIAPINE FUMARATE 50 MG: 50 TABLET ORAL at 21:57

## 2021-03-20 RX ADMIN — DULOXETINE HYDROCHLORIDE 60 MG: 60 CAPSULE, DELAYED RELEASE ORAL at 08:49

## 2021-03-20 RX ADMIN — TRAZODONE HYDROCHLORIDE 150 MG: 150 TABLET ORAL at 22:15

## 2021-03-20 RX ADMIN — DEXTROAMPHETAMINE SACCHARATE, AMPHETAMINE ASPARTATE, DEXTROAMPHETAMINE SULFATE AND AMPHETAMINE SULFATE 5 MG: 2.5; 2.5; 2.5; 2.5 TABLET ORAL at 08:50

## 2021-03-20 RX ADMIN — DIVALPROEX SODIUM 500 MG: 500 TABLET, DELAYED RELEASE ORAL at 21:58

## 2021-03-20 NOTE — PROGRESS NOTES
C/O" no no no no, I do not want to do anything'    Report from staff regarding this patient received and record reviewed  prior to seeing this patient   Behavior over the last 24 hours: This fellow is a patient of mine at the [place] and I have known him for the past 10 years, he gets significant depression and also have a history of TBI, he does require significant motivation and intervention with the medication, he has been started on on dextroamphetamine 5 mg twice a day I will increase that to twice a day as he is significantly depressed, and very negatively focused  Sleep:  I do not know  Appetite:dont care  Medication side effects:none  ROS:depressed  Mental Status Evaluation:  Appearance:  Dressed appropraitely, laying in a bed refused to cooperate with the nursing staff   Behavior:  cooperative   Speech:  normal   Mood:  Depressed   Affect:  appropriate     Thought Process:  Negative thought process   Thought Content:  Negative   Perceptual Disturbances: Denied AV hallucination   Risk Potential: NO HARPER    Sensorium:  normal   Cognition:  intact   Consciousness:  Alert, OX2   Attention: Fair   Insight:  poor   Judgment: poor   Gait/Station: With in normal range uses walker   Motor Activity: With in normal range     Progress Toward Goals: working on current treatment goals, no changes  Made in treatment plan   Recommended Treatment: Continue with group therapy, milieu therapy and occupational therapy  Risks, benefits and possible side effects of Medications:   Risks, benefits, and possible side effects of medications explained to patient and patient verbalizes understanding        Medications:   current meds:   Current Facility-Administered Medications   Medication Dose Route Frequency    acetaminophen (TYLENOL) tablet 650 mg  650 mg Oral Q4H PRN    acetaminophen (TYLENOL) tablet 650 mg  650 mg Oral Q4H PRN    acetaminophen (TYLENOL) tablet 975 mg  975 mg Oral Q6H PRN    aluminum-magnesium hydroxide-simethicone (MYLANTA) oral suspension 30 mL  30 mL Oral Q4H PRN    [START ON 3/21/2021] amphetamine-dextroamphetamine (ADDERALL) tablet 10 mg  10 mg Oral Daily    atorvastatin (LIPITOR) tablet 10 mg  10 mg Oral Daily With Dinner    [START ON 4/15/2021] cholecalciferol (VITAMIN D3) tablet 1,000 Units  1,000 Units Oral Daily    divalproex sodium (DEPAKOTE) EC tablet 500 mg  500 mg Oral HS    DULoxetine (CYMBALTA) delayed release capsule 60 mg  60 mg Oral Daily    ergocalciferol (VITAMIN D2) capsule 50,000 Units  50,000 Units Oral Weekly    hydrOXYzine HCL (ATARAX) tablet 25 mg  25 mg Oral Q6H PRN Max 4/day    LORazepam (ATIVAN) injection 1 mg  1 mg Intramuscular Q6H PRN Max 3/day    LORazepam (ATIVAN) tablet 0 5 mg  0 5 mg Oral Q6H PRN Max 4/day    LORazepam (ATIVAN) tablet 1 mg  1 mg Oral Q6H PRN Max 3/day    mirtazapine (REMERON) tablet 7 5 mg  7 5 mg Oral HS    multivitamin-minerals (CENTRUM) tablet 1 tablet  1 tablet Oral Daily    OLANZapine (ZyPREXA) IM injection 5 mg  5 mg Intramuscular Q6H PRN Max 3/day    OLANZapine (ZyPREXA) tablet 5 mg  5 mg Oral Q6H PRN Max 3/day    OLANZapine (ZyPREXA) tablet 5 mg  5 mg Oral Q6H PRN Max 3/day    polyethylene glycol (MIRALAX) packet 17 g  17 g Oral Daily PRN    QUEtiapine (SEROquel) tablet 50 mg  50 mg Oral HS    risperiDONE (RisperDAL) tablet 0 5 mg  0 5 mg Oral Q6H PRN Max 3/day    rOPINIRole (REQUIP) tablet 2 mg  2 mg Oral HS    senna-docusate sodium (SENOKOT S) 8 6-50 mg per tablet 1 tablet  1 tablet Oral Daily PRN    senna-docusate sodium (SENOKOT S) 8 6-50 mg per tablet 1 tablet  1 tablet Oral HS    traZODone (DESYREL) tablet 150 mg  150 mg Oral HS PRN     Labs: NA    Assessment, Diagnosis  and Plan: continue with current meds and goals, F/U tomorrow    Counseling / Coordination of Care  Total floor / unit time spent today20 minutes  minutes   Greater than 50% of total time was spent with the patient and / or family counseling and / or coordination of care   A description of the counseling / coordination of care: changed dextroamphetamine 10 am and noon    Micaela Balderas MD

## 2021-03-20 NOTE — PROGRESS NOTES
Pt is flat & withdrawn  Pt c/o feeling depressed & sad  Pt denies any anxiety  Pt refused breakfast & lunch  Pt denies any hallucinations, suicidal or homicidal ideations  Q 7 min checks maintained to monitor pt's behavior & safety  Pt is compliant with medications  Pt up ad wilner with walker with much encouragement  Pt is irritable

## 2021-03-20 NOTE — PROGRESS NOTES
Progress Note - Pilar Bernheim  78 y o  male MRN: [id number]    Unit/Bed#: [facility] 210-01 Encounter: [id number]        Subjective:   Patient seen and examined at bedside after reviewing the chart and discussing the case with the caring staff  Patient examined at bedside  Patient is limited in conversation  He reports that he does not feel well and does not want to get out of bed  Patient has been missing meals and sleeping in his room  Patient encouraged to be out of bed during the day and attend all meals  He denies abdominal pain, nausea, vomiting  Physical Exam   Vitals: Blood pressure 129/74, pulse 61, temperature 98 2 °F (36 8 °C), temperature source Temporal, resp  rate 16, height 5' 11" (1 803 m), weight 92 2 kg (203 lb 3 2 oz), SpO2 97 %  ,Body mass index is 28 34 kg/m²  Constitutional: Patient appears well-developed  HEENT: PERR, EOMI, MMM  Cardiovascular: Normal rate and regular rhythm  Pulmonary/Chest: Effort normal and breath sounds normal    Abdomen: Soft, + BS, NT    Assessment/Plan:  Pilar Bernheim  is a(n) 78 y o  male with  MDD      1  Cardiac with history of dyslipidemia  Patient is on atorvastatin 10 mg daily  2  Seizure disorder  Patient is on Depakote 500 mg b i d  3  Restless leg syndrome  Patient is on ropinirole 2 mg at bedtime  4  Constipation  Patient is on Senokot S   5  DJD/osteoarthritis  Patient may get Tylenol on as needed basis  6  Gait abnormality  PT and OT are on consult  7  Vitamin D deficiency  Continue D2 19051 units weekly for 6 weeks (ending on 04/14/2021) followed by D3 1000 units daily  The patient was discussed with Dr Ki Marley and he is in agreement with the above note

## 2021-03-20 NOTE — PROGRESS NOTES
Pt present in his room upon assessment  Pt appeared agitated and was annoyed with questions  Pt responded to RN asking if he had any depression or anxiety, "All this depression in my head, I can't stand it " Pt was asked if the medication was helpful to him to which pt replied that it was not and he felt just as bad as when he came in  Pt was asked if he was hungry since he missed all meals and pt stated that he was too depressed to eat, and said it wouldn't do any good anyway  Pt reports no ah, vh  Pt denied si, hi  Compliant with medications  Continuous visual safety checks performed throughout the shift  Safety precautions maintained  Will continue to monitor

## 2021-03-21 PROCEDURE — 99232 SBSQ HOSP IP/OBS MODERATE 35: CPT | Performed by: NURSE PRACTITIONER

## 2021-03-21 RX ORDER — MIRTAZAPINE 15 MG/1
7.5 TABLET, FILM COATED ORAL
Status: DISCONTINUED | OUTPATIENT
Start: 2021-03-21 | End: 2021-03-22

## 2021-03-21 RX ADMIN — DEXTROAMPHETAMINE SACCHARATE, AMPHETAMINE ASPARTATE, DEXTROAMPHETAMINE SULFATE, AND AMPHETAMINE SULFATE 10 MG: 2.5; 2.5; 2.5; 2.5 TABLET ORAL at 08:01

## 2021-03-21 RX ADMIN — MIRTAZAPINE 7.5 MG: 15 TABLET, FILM COATED ORAL at 21:10

## 2021-03-21 RX ADMIN — SENNOSIDES AND DOCUSATE SODIUM 1 TABLET: 8.6; 5 TABLET ORAL at 21:11

## 2021-03-21 RX ADMIN — QUETIAPINE FUMARATE 50 MG: 50 TABLET ORAL at 21:09

## 2021-03-21 RX ADMIN — ROPINIROLE 2 MG: 1 TABLET, FILM COATED ORAL at 21:09

## 2021-03-21 RX ADMIN — Medication 1 TABLET: at 08:01

## 2021-03-21 RX ADMIN — DIVALPROEX SODIUM 500 MG: 500 TABLET, DELAYED RELEASE ORAL at 21:09

## 2021-03-21 RX ADMIN — DULOXETINE HYDROCHLORIDE 60 MG: 60 CAPSULE, DELAYED RELEASE ORAL at 08:01

## 2021-03-21 RX ADMIN — ATORVASTATIN CALCIUM 10 MG: 10 TABLET, FILM COATED ORAL at 16:17

## 2021-03-21 NOTE — PROGRESS NOTES
Pt reports no change in mood or motivation  Pt assessed at bedside  Pt has no energy or motivation to get out of bed, feels profoundly depressed and miserable  Pt is upset because he feels he isn't sleeping at night, has no appetite and can only focus on his depression  Pt denies anxiety, pain and hallucinations  Pt doesn't believe medications are working and his thoughts remain negative and miserable  Pt requested PRN 150mg trazodone PRN for sleep  Pt fell asleep shortly afterwards  Continuous visual safety checks performed throughout the shift  Safety precautions maintained  Will continue to monitor

## 2021-03-21 NOTE — PROGRESS NOTES
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 ([place] [address] )    CKD (chronic kidney disease) stage 3, GFR 30-59 ml/min    ESBL (extended spectrum beta-lactamase) producing bacteria infection    Restless legs  Patient was seen for continuing care and treatment  Case was discussed with the nursing staff  On examination today, the patient is seen lying in his bed  Today he is selectively mute  He does acknowledge that I am in his room trying to speak with him but then mumbles  According to the staff, there has been no improvement in this patient's mood or drive  He is not eating, and staying in bed and not participating in life  We are going to trial low-dose of Remeron at bedtime to see if this helps with his appetite  The treatment team can re-evaluate the medication in the morning  He is now on Adderall to help with his depression and to help get him up and out of his room and functioning  He continues to require inpatient care and treatment for continued persistent depression          Behavior over the last 24 hours:  unchanged  Sleep: hypersomnia  Appetite: poor  Medication side effects: No  ROS: no complaints    Mental Status Evaluation:  Appearance:  casually dressed and disheveled   Behavior:  psychomotor retardation   Speech:  Selectively mute   Mood:  depressed   Affect:  constricted   Thought Process:  blocked   Thought Content:  Difficult to assess since patient is not speaking to me   Perceptual Disturbances: No evidence of hallucinations   Risk Potential: Suicidal Ideations none  Homicidal Ideations none  Potential for Aggression No   Sensorium:  Unable to assess   Memory:  patient is non-verbal   Consciousness:  alert and awake    Attention: Poor   Insight:  Poor   Judgment: Poor   Gait/Station: Utilizes walker   Motor Activity: no abnormal movements     Progress Toward Goals:  Remains very depressed and not function    Recommended Treatment: Continue with group therapy, milieu therapy and occupational therapy  Risks, benefits and possible side effects of Medications:   Patient does not verbalize understanding at this time and will require further explanation  Medications: continue current psychiatric medications  Labs: I have personally reviewed all pertinent laboratory/tests results  Most Recent Labs:   Lab Results   Component Value Date    WBC 7 85 03/08/2021    RBC 5 76 (H) 03/08/2021    HGB 16 8 03/08/2021    HCT 51 7 (H) 03/08/2021     03/08/2021    RDW 14 0 03/08/2021    NEUTROABS 6 15 03/08/2021    SODIUM 140 03/08/2021    K 3 8 03/08/2021     03/08/2021    CO2 29 03/08/2021    BUN 26 (H) 03/08/2021    CREATININE 1 94 (H) 03/08/2021    GLUC 102 03/08/2021    GLUF 84 09/18/2018    CALCIUM 8 8 03/08/2021    AST 12 03/08/2021    ALT 21 03/08/2021    ALKPHOS 70 03/08/2021    TP 7 2 03/08/2021    ALB 4 0 03/08/2021    TBILI 0 87 03/08/2021    CHOLESTEROL 195 04/26/2016    HDL 52 04/26/2016    TRIG 76 04/26/2016    LDLCALC 128 (H) 04/26/2016    VALPROICTOT 62 12/09/2017    AMMONIA <10 (L) 05/08/2016    [id number] 2 735 03/08/2021    RPR Non-Reactive 05/09/2016       Counseling / Coordination of Care  Total floor / unit time spent today 25 minutes  Greater than 50% of total time was spent with the patient and / or family counseling and / or coordination of care  A description of the counseling / coordination of care:  Medication management, chart review and treatment

## 2021-03-21 NOTE — PROGRESS NOTES
Pt's appetite poor for supper  Pt refused breakfast & lunch  SHERRIE Geiger aware of pt's appetite & orders noted  Q 7 min checks maintained to monitor pt's behavior & safety

## 2021-03-21 NOTE — PROGRESS NOTES
Pt ambulates with walker when oob  Pt is flat, irritable & withdrawn  Pt c/o feeling sad all the time  Pt denies any anxiety & c/o feeling depressed  Pt lacks motivation  Pt denies any hallucinations, suicidal or homicidal ideations  Q 7 min checks maintained to monitor pt's behavior & safety  Pt is compliant with medications

## 2021-03-21 NOTE — PROGRESS NOTES
Progress Note - Lorna Tilley  78 y o  male MRN: [id number]    Unit/Bed#: [facility] 210-01 Encounter: [id number]        Subjective:   Patient seen and examined at bedside after reviewing the chart and discussing the case with the caring staff  Patient examined at bedside  Patient is selectively mute with this writer  He does not appear to be in pain or acute distress  Physical Exam   Vitals: Blood pressure 106/58, pulse 60, temperature 98 2 °F (36 8 °C), temperature source Temporal, resp  rate 18, height 5' 11" (1 803 m), weight 92 2 kg (203 lb 3 2 oz), SpO2 95 %  ,Body mass index is 28 34 kg/m²  Constitutional: Patient appears well-developed  HEENT: PERR, EOMI, MMM  Cardiovascular: Normal rate and regular rhythm  Pulmonary/Chest: Effort normal and breath sounds normal    Abdomen: Soft, + BS, NT    Assessment/Plan:  Lorna Tilley  is a(n) 78 y o  male with  MDD      1  Cardiac with history of dyslipidemia  Patient is on atorvastatin 10 mg daily  2  Seizure disorder  Patient is on Depakote 500 mg b i d  3  Restless leg syndrome  Patient is on ropinirole 2 mg at bedtime  4  Constipation  Patient is on Senokot S   5  DJD/osteoarthritis  Patient may get Tylenol on as needed basis  6  Gait abnormality  PT and OT are on consult  7  Vitamin D deficiency  Continue D2 27434 units weekly for 6 weeks (ending on 04/14/2021) followed by D3 1000 units daily  The patient was discussed with Dr Gemma Kearney and he is in agreement with the above note

## 2021-03-22 LAB — VALPROATE SERPL-MCNC: 49.2 UG/ML (ref 50–125)

## 2021-03-22 PROCEDURE — 99233 SBSQ HOSP IP/OBS HIGH 50: CPT | Performed by: PSYCHIATRY & NEUROLOGY

## 2021-03-22 PROCEDURE — 80164 ASSAY DIPROPYLACETIC ACD TOT: CPT | Performed by: NURSE PRACTITIONER

## 2021-03-22 RX ORDER — DIVALPROEX SODIUM 250 MG/1
250 TABLET, DELAYED RELEASE ORAL
Status: DISCONTINUED | OUTPATIENT
Start: 2021-03-22 | End: 2021-03-23

## 2021-03-22 RX ORDER — MIRTAZAPINE 15 MG/1
15 TABLET, FILM COATED ORAL
Status: DISCONTINUED | OUTPATIENT
Start: 2021-03-22 | End: 2021-03-27

## 2021-03-22 RX ORDER — TRAZODONE HYDROCHLORIDE 50 MG/1
50 TABLET ORAL
Status: DISCONTINUED | OUTPATIENT
Start: 2021-03-22 | End: 2021-04-16 | Stop reason: HOSPADM

## 2021-03-22 RX ADMIN — ROPINIROLE 2 MG: 1 TABLET, FILM COATED ORAL at 22:10

## 2021-03-22 RX ADMIN — ATORVASTATIN CALCIUM 10 MG: 10 TABLET, FILM COATED ORAL at 17:16

## 2021-03-22 RX ADMIN — Medication 1 TABLET: at 09:21

## 2021-03-22 RX ADMIN — DIVALPROEX SODIUM 250 MG: 250 TABLET, DELAYED RELEASE ORAL at 22:11

## 2021-03-22 RX ADMIN — SENNOSIDES AND DOCUSATE SODIUM 1 TABLET: 8.6; 5 TABLET ORAL at 22:11

## 2021-03-22 RX ADMIN — QUETIAPINE FUMARATE 50 MG: 50 TABLET ORAL at 22:11

## 2021-03-22 RX ADMIN — DULOXETINE HYDROCHLORIDE 60 MG: 60 CAPSULE, DELAYED RELEASE ORAL at 09:21

## 2021-03-22 RX ADMIN — DEXTROAMPHETAMINE SACCHARATE, AMPHETAMINE ASPARTATE, DEXTROAMPHETAMINE SULFATE, AND AMPHETAMINE SULFATE 10 MG: 2.5; 2.5; 2.5; 2.5 TABLET ORAL at 09:21

## 2021-03-22 RX ADMIN — MIRTAZAPINE 15 MG: 15 TABLET, FILM COATED ORAL at 22:11

## 2021-03-22 NOTE — OCCUPATIONAL THERAPY NOTE
03/22/21 1106   OT Last Visit   OT Visit Date 03/22/21   Note Type   Note Type Treatment   Cancel Reasons Other  (pt  under covers of bed on approach; refused treatment)   patient doesn't "want anything" - reported headache but stated he doesn't "want to be bothered" with medication for same  Made nurse aware of this interaction       HONG Mar/VICKI

## 2021-03-22 NOTE — PLAN OF CARE
Problem: SLEEP DISTURBANCE  Goal: Will exhibit normal sleeping pattern  Description: Interventions:  -  Assess the patients sleep pattern, noting recent changes  - Administer medication as ordered  - Decrease environmental stimuli, including noise, as appropriate during the night  - Encourage the patient to actively participate in unit groups and or exercise during the day to enhance ability to achieve adequate sleep at night  - Assess the patient, in the morning, encouraging a description of sleep experience  Outcome: Progressing     Problem: Depression  Goal: Treatment Goal: Demonstrate behavioral control of depressive symptoms, verbalize feelings of improved mood/affect, and adopt new coping skills prior to discharge  Outcome: Progressing  Goal: Verbalize thoughts and feelings  Description: Interventions:  - Assess and re-assess patient's level of risk   - Engage patient in 1:1 interactions, daily, for a minimum of 15 minutes   - Encourage patient to express feelings, fears, frustrations, hopes   Outcome: Progressing  Goal: Refrain from harming self  Description: Interventions:  - Monitor patient closely, per order   - Supervise medication ingestion, monitor effects and side effects   Outcome: Progressing  Goal: Refrain from isolation  Description: Interventions:  - Develop a trusting relationship   - Encourage socialization   Outcome: Progressing  Goal: Refrain from self-neglect  Outcome: Progressing  Goal: Complete daily ADLs, including personal hygiene independently, as able  Description: Interventions:  - Observe, teach, and assist patient with ADLS  -  Monitor and promote a balance of rest/activity, with adequate nutrition and elimination   Outcome: Progressing

## 2021-03-22 NOTE — CMS CERTIFICATION NOTE
Recertification: Based upon physical, mental and social evaluations, I certify that inpatient psychiatric services continue to be medically necessary for this patient for a duration of 20 midnights for the treatment of MDD (major depressive disorder), recurrent episode, severe ([facility] )   Available alternative community resources still do not meet the patient's mental health care needs  I further attest that an established written individualized plan of care has been updated and is outlined in the patient's medical records

## 2021-03-22 NOTE — PROGRESS NOTES
Progress Note - Casey Lopez  78 y o  male MRN: [id number]    Unit/Bed#: Fab Pearl 210-01 Encounter: [id number]        Subjective:   Patient seen and examined at bedside after reviewing the chart and discussing the case with the caring staff  Patient examined at bedside  Patient is selectively mute with this writer  He does not appear to be in pain or acute distress  Patient was encouraged to ambulate during the daytime hours and attend all meals for adequate nutrition  Physical Exam   Vitals: Blood pressure 95/53, pulse 73, temperature 98 °F (36 7 °C), temperature source Temporal, resp  rate 18, height 5' 11" (1 803 m), weight 92 2 kg (203 lb 3 2 oz), SpO2 97 %  ,Body mass index is 28 34 kg/m²  Constitutional: Patient appears well-developed  HEENT: PERR, EOMI, MMM  Cardiovascular: Normal rate and regular rhythm  Pulmonary/Chest: Effort normal and breath sounds normal    Abdomen: Soft, + BS, NT    Assessment/Plan:  Casey Lopez  is a(n) 78 y o  male with  MDD      1  Cardiac with history of dyslipidemia  Patient is on atorvastatin 10 mg daily  2  Seizure disorder  Patient is on Depakote 500 mg b i d  3  Restless leg syndrome  Patient is on ropinirole 2 mg at bedtime  4  Constipation  Patient is on Senokot S   5  DJD/osteoarthritis  Patient may get Tylenol on as needed basis  6  Gait abnormality  PT and OT are on consult  7  Vitamin D deficiency  Continue D2 29640 units weekly for 6 weeks (ending on 04/14/2021) followed by D3 1000 units daily  The patient was discussed with Dr Zheng Jordan and he is in agreement with the above note

## 2021-03-22 NOTE — PROGRESS NOTES
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 ([place] [address] )    CKD (chronic kidney disease) stage 3, GFR 30-59 ml/min    ESBL (extended spectrum beta-lactamase) producing bacteria infection    Restless legs    Behavior over the last 24 hours: unchanged  Sleep: hypersomnia  Appetite: poor  Medication side effects: No  ROS: all other systems are negative     Mental Status Evaluation:  Appearance:  age appropriate   Behavior:  psychomotor retardation   Speech:  delayed and scant   Mood:  decreased range and depressed   Affect:  blunted   Thought Process:  blocked   Thought Content:  no overt delusions   Perceptual Disturbances: None   Risk Potential: Suicidal Ideations without plan  Homicidal Ideations none  Potential for Aggression No   Sensorium:  person and place   Memory:  recent and remote memory: unable to assess due to lack of cooperation   Consciousness:  somnolence    Attention: decreased   Insight:  poor   Judgment: limited   Gait/Station: slow   Motor Activity: no abnormal movements     Progress Toward Goals: Patient continues isolated, withdrawn and minimally engaged in conversation  His appetite remains poor with poor insight into his physical and mental health decline  Medication compliant has been stable  Recommended Treatment: Continue with group therapy, milieu therapy and occupational therapy  Risks, benefits and possible side effects of Medications:   Risks, benefits, and possible side effects of medications explained to patient and patient verbalizes understanding  Medications: all current active meds have been reviewed    Increase Remeron to 15 mg po HS    Labs: I have personally reviewed all pertinent laboratory/tests results  Most Recent Labs:   Lab Results   Component Value Date    WBC 7 85 03/08/2021    RBC 5 76 (H) 03/08/2021    HGB 16 8 03/08/2021    HCT 51 7 (H) 03/08/2021     03/08/2021    RDW 14 0 03/08/2021    NEUTROABS 6 15 03/08/2021    SODIUM 140 03/08/2021    K 3 8 03/08/2021     03/08/2021    CO2 29 03/08/2021    BUN 26 (H) 03/08/2021    CREATININE 1 94 (H) 03/08/2021    GLUC 102 03/08/2021    GLUF 84 09/18/2018    CALCIUM 8 8 03/08/2021    AST 12 03/08/2021    ALT 21 03/08/2021    ALKPHOS 70 03/08/2021    TP 7 2 03/08/2021    ALB 4 0 03/08/2021    TBILI 0 87 03/08/2021    CHOLESTEROL 195 04/26/2016    HDL 52 04/26/2016    TRIG 76 04/26/2016    LDLCALC 128 (H) 04/26/2016    VALPROICTOT 49 2 (L) 03/22/2021    AMMONIA <10 (L) 05/08/2016    [id number] 2 735 03/08/2021    RPR Non-Reactive 05/09/2016       Counseling / Coordination of Care  Total floor / unit time spent today 20 minutes  Greater than 50% of total time was spent with the patient and / or family counseling and / or coordination of care

## 2021-03-22 NOTE — PLAN OF CARE
Problem: DISCHARGE PLANNING - CARE MANAGEMENT  Goal: Discharge to post-acute care or home with appropriate resources  Description: INTERVENTIONS:  - Conduct assessment to determine patient/family and health care team treatment goals, and need for post-acute services based on payer coverage, community resources, and patient preferences, and barriers to discharge  - Address psychosocial, clinical, and financial barriers to discharge as identified in assessment in conjunction with the patient/family and health care team  - Arrange appropriate level of post-acute services according to patients   needs and preference and payer coverage in collaboration with the physician and health care team  - Communicate with and update the patient/family, physician, and health care team regarding progress on the discharge plan  - Arrange appropriate transportation to post-acute venues  3/22/2021 1154 by IDALMIS Lobo  Outcome: Not Progressing  3/22/2021 0838 by IDALMIS Lobo  Outcome: Progressing     Pt not progressing; Continues to be withdrawn to room; irritable with staff contact;  No DC date - disposition unclear

## 2021-03-22 NOTE — PROGRESS NOTES
03/22/21 1004   Team Meeting   Meeting Type Daily Rounds   Team Members Present   Team Members Present Physician;Nurse;Occupational Therapist;   Physician Team Member Dr Maurilio Gomez MD; Deidra Quarles, [address] [place] [address]   Nursing Team Member Doretha Hall RN   Social Work Team Member Daren Gaston [facility]   OT Team Member [facility] [place]   Patient/Family Present   Patient Present No   Patient's Family Present No     No DC date - will return home upon stabilization; no changes; withdrawn to room; difficult to get out of room; negative; minimally verbal; only ate dinner yesterday

## 2021-03-22 NOTE — PROGRESS NOTES
Pt was assessed in his room  Pt asked how he was feeling and he replied, "terrible!" Pt was asked if he was better than yesterday, if he ate today  Pt ignored RN  Pt would not answer a single assessment question, huffed and sighed a few times  Pt held up his bracelet to be scanned and RN reviewed his meds  Pt appeared to be more annoyed so RN gave RN his medications and pt took them all and handed the cup back  Pt did exchange pleasantries as RN was leaving the room, dismissing RN in a friendly manner  Unable to assess further  Continuous visual safety checks performed throughout the shift  Safety precautions maintained  Will continue to monitor

## 2021-03-22 NOTE — PROGRESS NOTES
Pt snoring most of the night and was resting during all checks  No indication that pt was awake at any time throughout the night  No sign of distress or labored breathing  Continuous visual checks performed q7 minutes throughout the night  Safety precautions maintained  Will continue to monitor

## 2021-03-22 NOTE — NURSING NOTE
Pt withdrawn to room and difficult to coax to come out of room for meals and groups  Pt affect is blunted and mood is irritable  And depressed  Pt is medications compliant  Pt states, " I don't want nothing " Pt is very negative with reassurance  Q 7 min safety checks maintained

## 2021-03-22 NOTE — PLAN OF CARE
Problem: Ineffective Coping  Goal: Participates in unit activities  Description: Interventions:  - Provide therapeutic environment   - Provide required programming   - Redirect inappropriate behaviors   Outcome: Not Progressing   Patient continues to refused groups and some meals  He continues to be unwilling to talk with RT and complete admission papers

## 2021-03-23 PROCEDURE — 99233 SBSQ HOSP IP/OBS HIGH 50: CPT | Performed by: PSYCHIATRY & NEUROLOGY

## 2021-03-23 RX ADMIN — DEXTROAMPHETAMINE SACCHARATE, AMPHETAMINE ASPARTATE, DEXTROAMPHETAMINE SULFATE, AND AMPHETAMINE SULFATE 10 MG: 2.5; 2.5; 2.5; 2.5 TABLET ORAL at 08:52

## 2021-03-23 RX ADMIN — Medication 1 TABLET: at 08:52

## 2021-03-23 RX ADMIN — SENNOSIDES AND DOCUSATE SODIUM 1 TABLET: 8.6; 5 TABLET ORAL at 21:33

## 2021-03-23 RX ADMIN — ATORVASTATIN CALCIUM 10 MG: 10 TABLET, FILM COATED ORAL at 16:49

## 2021-03-23 RX ADMIN — QUETIAPINE FUMARATE 50 MG: 50 TABLET ORAL at 21:33

## 2021-03-23 RX ADMIN — ROPINIROLE 2 MG: 1 TABLET, FILM COATED ORAL at 21:33

## 2021-03-23 RX ADMIN — DULOXETINE HYDROCHLORIDE 60 MG: 60 CAPSULE, DELAYED RELEASE ORAL at 08:52

## 2021-03-23 RX ADMIN — MIRTAZAPINE 15 MG: 15 TABLET, FILM COATED ORAL at 21:32

## 2021-03-23 NOTE — PHYSICAL THERAPY NOTE
PHYSICAL THERAPY NOTE    Patient Name: Barbra Bill  Today's Date: 3/23/2021   Time In: 1321 Time Out: 1327    Physical therapy treatment attempted with PM&R team, then with assistance of [facility]  Yi Courts adamantly refused interventions with repeated attempts  Patient has declined weightbearing/OOB tasks at all attempted/completed sessions 03/10, 03/11, 03/16,03/23/2021  PT interventions will be discontinued at this time  Please re-consult should patient's status change  Thank you for the consult      Jeovanny Grullon, PT

## 2021-03-23 NOTE — PROGRESS NOTES
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 ([place] [address] )    CKD (chronic kidney disease) stage 3, GFR 30-59 ml/min    ESBL (extended spectrum beta-lactamase) producing bacteria infection    Restless legs      Behavior over the last 24 hours:  unchanged  Sleep: hypersomnia  Appetite: poor  Medication side effects: No  ROS: no complaints, all other systems are negative for acute changes    Mental Status Evaluation:  Appearance:  disheveled   Behavior:  evasive   Speech:  delayed and underproductive   Mood:  decreased range and depressed   Affect:  blunted   Thought Process:  blocked and goal directed   Thought Content:  no overt delusions   Perceptual Disturbances: None   Risk Potential: Suicidal Ideations without plan  Homicidal Ideations none  Potential for Aggression No   Sensorium:  person and place   Memory:  recent memory moderately impaired   Consciousness:  alert and awake    Attention: decreased   Insight:  poor   Judgment: limited   Gait/Station: in chair   Motor Activity: no abnormal movements     Progress Toward Goals: Patient continue isolated, withdrawn with no participation in group and milieu therapy  He remained irritable with limited calorie in take and difficult to be redirected  He also has been declining physical therapy  Medication compliant has been stable  Recommended Treatment: Continue with group therapy, milieu therapy and occupational therapy  Risks, benefits and possible side effects of Medications:   Patient does not verbalize understanding at this time and will require further explanation        Medications: all current active meds have been reviewed  Labs: I have personally reviewed all pertinent laboratory/tests results  Most Recent Labs:   Lab Results   Component Value Date    WBC 7 85 03/08/2021    RBC 5 76 (H) 03/08/2021    HGB 16 8 03/08/2021    HCT 51 7 (H) 03/08/2021     03/08/2021    RDW 14 0 03/08/2021    NEUTROABS 6 15 03/08/2021    SODIUM 140 03/08/2021    K 3 8 03/08/2021     03/08/2021    CO2 29 03/08/2021    BUN 26 (H) 03/08/2021    CREATININE 1 94 (H) 03/08/2021    GLUC 102 03/08/2021    GLUF 84 09/18/2018    CALCIUM 8 8 03/08/2021    AST 12 03/08/2021    ALT 21 03/08/2021    ALKPHOS 70 03/08/2021    TP 7 2 03/08/2021    ALB 4 0 03/08/2021    TBILI 0 87 03/08/2021    CHOLESTEROL 195 04/26/2016    HDL 52 04/26/2016    TRIG 76 04/26/2016    LDLCALC 128 (H) 04/26/2016    VALPROICTOT 49 2 (L) 03/22/2021    AMMONIA <10 (L) 05/08/2016    [id number] 2 735 03/08/2021    RPR Non-Reactive 05/09/2016       Counseling / Coordination of Care  Total floor / unit time spent today 20 minutes  Greater than 50% of total time was spent with the patient and / or family counseling and / or coordination of care

## 2021-03-23 NOTE — PROGRESS NOTES
03/23/21    Team Meeting   Meeting Type Daily Rounds   Team Members Present   Team Members Present Physician;Nurse;;Occupational Therapist   Physician Team Member Dr  [employer], MD; Ana Coker, [address] [place] [address]   Nursing Team Member Mendy Burdick RN   Care Management Team Member Renu Asif MS, [facility]   OT Team Member Ewelina Haile [place]   Patient/Family Present   Patient Present No   Patient's Family Present No   PT not talking with others, severely depressed, hopeless, helpless, irritable with encouragement and redirection  PT continues to be irritable and negative, intermittent with meals  Medication adjustment

## 2021-03-23 NOTE — SOCIAL WORK
SW met with patient in pt room; pt opened eyes and closed them throughout conversation; pt denies SI/HI/AH/VH, dep "very high" - denies anxiety; SW spoke to patient about participating in his tx and his daily activities instead of laying in bed and hoping the medications work; SW explained that medications can only do so much and patient has to work toward his recovery if he wants to get back home; SW explained that the only other option at this time would be nursing home placement if patient continues to just lay in bed, not eat and not participate in his recovery; pt replied with negative thoughts including "I feel like death has warmed over" and "it doesn't matter, I'm never going to get better"; SW again encouraged patient to get out of bed and eat his breakfast; RN came in room to provide morning meds - RN asked patient to come out for breakfast, pt agreeable without irritability; SW will continue to meet with patient and encourage him to be more visible on unit

## 2021-03-23 NOTE — NURSING NOTE
Pt encouraged to come out for breakfast, agreeable except would like to sit more secluded from the other peers  Pt ate breakfast and returned to room  Pt continues to be flat and depressed  Less negative talk although minimally verbally  Pt is compliant with medications  No acute behaviors noted  Q 7 min safety checks maintained

## 2021-03-23 NOTE — PLAN OF CARE
Problem: DISCHARGE PLANNING - CARE MANAGEMENT  Goal: Discharge to post-acute care or home with appropriate resources  Description: INTERVENTIONS:  - Conduct assessment to determine patient/family and health care team treatment goals, and need for post-acute services based on payer coverage, community resources, and patient preferences, and barriers to discharge  - Address psychosocial, clinical, and financial barriers to discharge as identified in assessment in conjunction with the patient/family and health care team  - Arrange appropriate level of post-acute services according to patients   needs and preference and payer coverage in collaboration with the physician and health care team  - Communicate with and update the patient/family, physician, and health care team regarding progress on the discharge plan  - Arrange appropriate transportation to post-acute venues  3/23/2021 0838 by IDALMIS Gleason  Outcome: Not Progressing     Pt not progressing; continues to be profoundly depressed, withdrawn, irritable with redirection; no DC date - disposition unclear

## 2021-03-23 NOTE — NURSING NOTE
Pt resistive for PT today  Positive reinforcement  Pt came out for lunch and dinner  Monitoring continues

## 2021-03-23 NOTE — PROGRESS NOTES
Progress Note - Nina Sherman  78 y o  male MRN: [id number]    Unit/Bed#: [facility]on 210-01 Encounter: [id number]        Subjective:   Patient seen and examined at bedside after reviewing the chart and discussing the case with the caring staff  Patient examined at bedside  Patient is limited conversation  States he has no motivation and is a horrible patient    Encouragement provided with suggestion to be up and in the community during the daytime hours  Also encouraged patient to increase his oral intake  Physical Exam   Vitals: Blood pressure 133/58, pulse 57, temperature 98 1 °F (36 7 °C), temperature source Temporal, resp  rate 18, height 5' 11" (1 803 m), weight 92 2 kg (203 lb 3 2 oz), SpO2 95 %  ,Body mass index is 28 34 kg/m²  Constitutional: Patient appears well-developed  HEENT: PERR, EOMI, MMM  Cardiovascular: Normal rate and regular rhythm  Pulmonary/Chest: Effort normal and breath sounds normal    Abdomen: Soft, + BS, NT    Assessment/Plan:  Nina Sherman  is a(n) 78 y o  male with  MDD      1  Cardiac with history of dyslipidemia  Patient is on atorvastatin 10 mg daily  2  Seizure disorder  Patient is on Depakote 500 mg b i d  3  Restless leg syndrome  Patient is on ropinirole 2 mg at bedtime  4  Constipation  Patient is on Senokot S   5  DJD/osteoarthritis  Patient may get Tylenol on as needed basis  6  Gait abnormality  PT and OT are on consult  7  Vitamin D deficiency  Continue D2 44505 units weekly for 6 weeks (ending on 04/14/2021) followed by D3 1000 units daily  The patient was discussed with Dr Imtiaz Shell and he is in agreement with the above note

## 2021-03-23 NOTE — OCCUPATIONAL THERAPY NOTE
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 ([facility] )    CKD (chronic kidney disease) stage 3, GFR 30-59 ml/min    ESBL (extended spectrum beta-lactamase) producing bacteria infection    Restless legs          03/23/21 1400   OT Last Visit   OT Visit Date 03/23/21   Note Type   Note Type Treatment   General   Missed Treatment Reason Patient refusal     Occupational therapy treatment attempted with PT Nikkie Jones and with assistance of Ramin Ghosh  Maximum encouragement provided but patient adamantly refused interventions with repeated attempts  Patient has declined all skilled OT interventions multiple days  Will D/C OT orders  Please re-consult if patient's status change  Thank you for the consult        Racquel Qiu, OT

## 2021-03-24 LAB
ATRIAL RATE: 65 BPM
P AXIS: 74 DEGREES
PR INTERVAL: 178 MS
QRS AXIS: -80 DEGREES
QRSD INTERVAL: 182 MS
QT INTERVAL: 444 MS
QTC INTERVAL: 461 MS
T WAVE AXIS: 71 DEGREES
VENTRICULAR RATE: 65 BPM

## 2021-03-24 PROCEDURE — 93005 ELECTROCARDIOGRAM TRACING: CPT

## 2021-03-24 PROCEDURE — 93010 ELECTROCARDIOGRAM REPORT: CPT | Performed by: INTERNAL MEDICINE

## 2021-03-24 PROCEDURE — 99233 SBSQ HOSP IP/OBS HIGH 50: CPT | Performed by: PSYCHIATRY & NEUROLOGY

## 2021-03-24 RX ADMIN — ROPINIROLE 2 MG: 1 TABLET, FILM COATED ORAL at 21:27

## 2021-03-24 RX ADMIN — QUETIAPINE FUMARATE 50 MG: 50 TABLET ORAL at 21:27

## 2021-03-24 RX ADMIN — ATORVASTATIN CALCIUM 10 MG: 10 TABLET, FILM COATED ORAL at 16:28

## 2021-03-24 RX ADMIN — ERGOCALCIFEROL 50000 UNITS: 1.25 CAPSULE, LIQUID FILLED ORAL at 08:14

## 2021-03-24 RX ADMIN — MIRTAZAPINE 15 MG: 15 TABLET, FILM COATED ORAL at 21:27

## 2021-03-24 RX ADMIN — Medication 1 TABLET: at 08:14

## 2021-03-24 RX ADMIN — DEXTROAMPHETAMINE SACCHARATE, AMPHETAMINE ASPARTATE, DEXTROAMPHETAMINE SULFATE, AND AMPHETAMINE SULFATE 10 MG: 2.5; 2.5; 2.5; 2.5 TABLET ORAL at 08:14

## 2021-03-24 RX ADMIN — SENNOSIDES AND DOCUSATE SODIUM 1 TABLET: 8.6; 5 TABLET ORAL at 21:27

## 2021-03-24 RX ADMIN — DULOXETINE HYDROCHLORIDE 60 MG: 60 CAPSULE, DELAYED RELEASE ORAL at 08:14

## 2021-03-24 RX ADMIN — TRAZODONE HYDROCHLORIDE 50 MG: 50 TABLET ORAL at 22:14

## 2021-03-24 NOTE — PROGRESS NOTES
Less angry tonight and more accepting of HS medications  Improved medication understanding during teaching  No acting out behaviors  Did not come out for snack  Maintained on q 7 minute checks  Will continue to monitor

## 2021-03-24 NOTE — PROGRESS NOTES
03/24/21    Team Meeting   Meeting Type Daily Rounds   Team Members Present   Team Members Present Physician;Nurse;;Occupational Therapist   Physician Team Member Dr José Martínez MD; SHERRIE Joiner   Nursing Team Member Finn Jarrett, MARIANNA   Care Management Team Member MS Jessie, [facility]   OT Team Member [employer], [place]   Patient/Family Present   Patient Present No   Patient's Family Present No   PT responding to SW encouragement to come out for meals  Will return home upon stabilization  PT ate breakfast  PT signed ANDREA

## 2021-03-24 NOTE — PLAN OF CARE
Problem: Ineffective Coping  Goal: Participates in unit activities  Description: Interventions:  - Provide therapeutic environment   - Provide required programming   - Redirect inappropriate behaviors   Outcome: Not Progressing   Patient continues to not join groups or talk with RT for completion of admission papers

## 2021-03-24 NOTE — PROGRESS NOTES
Pt c/o feeling depressed & sad all the time  Pt denies any anxiety, hallucinations, suicidal or homicidal ideations  Q 7 min checks maintained to monitor pt's behavior & safety  Pt denies any pain  Pt is flat & withdrawn  Pt is isolative to his room  Pt is cooperative & compliant with medications  Pt up ad wilner with walker

## 2021-03-24 NOTE — PROGRESS NOTES
Patient observed sleeping during most Q 7 minute safety checks (second portion of shift)  Safety maintained via clutter-free environment, ID band, and given non-skid socks  No suicidal ideations, acting out or homicidal behaviors  No change in medical condition or complaints voiced  Fluids maintained at bedside to promote hydration

## 2021-03-24 NOTE — PLAN OF CARE
Problem: DISCHARGE PLANNING - CARE MANAGEMENT  Goal: Discharge to post-acute care or home with appropriate resources  Description: INTERVENTIONS:  - Conduct assessment to determine patient/family and health care team treatment goals, and need for post-acute services based on payer coverage, community resources, and patient preferences, and barriers to discharge  - Address psychosocial, clinical, and financial barriers to discharge as identified in assessment in conjunction with the patient/family and health care team  - Arrange appropriate level of post-acute services according to patients   needs and preference and payer coverage in collaboration with the physician and health care team  - Communicate with and update the patient/family, physician, and health care team regarding progress on the discharge plan  - Arrange appropriate transportation to post-acute venues  Outcome: Progressing     Pt progressing slowly; ate 2 meals yesterday; no DC date - disposition unclear

## 2021-03-24 NOTE — PROGRESS NOTES
Progress Note - Gideon Kanner  78 y o  male MRN: [id number]    Unit/Bed#: [facility] 210-01 Encounter: [id number]        Subjective:   Patient seen and examined at bedside after reviewing the chart and discussing the case with the caring staff  Patient examined at bedside  Patient is limited in conversation  Patient has no acute concerns  Encouragement provided with suggestion to be up and in the community during the daytime hours  Also encouraged patient to increase his oral intake  Physical Exam   Vitals: Blood pressure 135/66, pulse 65, temperature 98 °F (36 7 °C), temperature source Temporal, resp  rate 18, height 5' 11" (1 803 m), weight 92 2 kg (203 lb 3 2 oz), SpO2 92 %  ,Body mass index is 28 34 kg/m²  Constitutional: Patient appears well-developed  HEENT: PERR, EOMI, MMM  Cardiovascular: Normal rate and regular rhythm  Pulmonary/Chest: Effort normal and breath sounds normal    Abdomen: Soft, + BS, NT    Assessment/Plan:  Gideon Kanner  is a(n) 78 y o  male with  MDD      1  Cardiac with history of dyslipidemia  Patient is on atorvastatin 10 mg daily  2  Seizure disorder  Patient is on Depakote 500 mg b i d  3  Restless leg syndrome  Patient is on ropinirole 2 mg at bedtime  4  Constipation  Patient is on Senokot S   5  DJD/osteoarthritis  Patient may get Tylenol on as needed basis  6  Gait abnormality  PT and OT are on consult  7  Vitamin D deficiency  Continue D2 97701 units weekly for 6 weeks (ending on 04/14/2021) followed by D3 1000 units daily  The patient was discussed with Dr Jose R Browne and he is in agreement with the above note

## 2021-03-24 NOTE — PROGRESS NOTES
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 ([place] [address] )    CKD (chronic kidney disease) stage 3, GFR 30-59 ml/min    ESBL (extended spectrum beta-lactamase) producing bacteria infection    Restless legs      Behavior over the last 24 hours:  unchanged  Sleep: normal  Appetite:  Poor  Medication side effects: No  ROS: no complaints and All other systems negative for acute change     Isabella Ontiveros was seen today for follow-up and case discussed with treatment team this morning  He describes his mood as not good and reports anhedonia, decreased energy, decreased appetite, and decreased motivation  He also states he feels washed out    He rates his depression as high  Isabella Ontiveros was unable to rate his anxiety  He denied any AVH/SI/HI  He does say that he slept well last night but he is still tired  Desiree Hung is often secluded to himself and does not socialize with peers  His appetite remains poor  Patient is encouraged by staff to come out for meals and attend groups      Mental Status Evaluation:  Appearance:  age appropriate, disheveled and Laying in bed   Behavior:  psychomotor retardation and Guarded, cooperative   Speech:  soft and Pauses at times   Mood:  depressed and sad   Affect:  mood-congruent   Thought Process:  perserverative   Thought Content:  No overt delusions   Perceptual Disturbances: None   Risk Potential: Suicidal Ideations none  Homicidal Ideations none  Potential for Aggression No   Sensorium:  person and place   Memory:  recent memory mildly impaired   Consciousness:  alert and awake    Attention: attention span appeared shorter than expected for age   Insight:  Poor   Judgment: limited   Gait/Station: Laying in bed   Motor Activity: no abnormal movements     Progress Toward Goals: Will increase Cymbalta for depressive symptoms  Will also obtain EKG due to prolonged QT interval   Will consider increasing Adderall tomorrow pending EKG results  No discharge date at this time  Recommended Treatment: Continue with group therapy, milieu therapy and occupational therapy  Risks, benefits and possible side effects of Medications:   Risks, benefits, and possible side effects of medications explained to patient and patient verbalizes understanding  Medications: all current active meds have been reviewed and planned medication changes: Increase Cymbalta 90mg PO QD  Labs: I have personally reviewed all pertinent laboratory/tests results  Counseling / Coordination of Care  Total floor / unit time spent today 20 minutes  Greater than 50% of total time was spent with the patient and / or family counseling and / or coordination of care

## 2021-03-25 PROCEDURE — 99232 SBSQ HOSP IP/OBS MODERATE 35: CPT | Performed by: PSYCHIATRY & NEUROLOGY

## 2021-03-25 RX ADMIN — DEXTROAMPHETAMINE SACCHARATE, AMPHETAMINE ASPARTATE, DEXTROAMPHETAMINE SULFATE, AND AMPHETAMINE SULFATE 10 MG: 2.5; 2.5; 2.5; 2.5 TABLET ORAL at 08:43

## 2021-03-25 RX ADMIN — MIRTAZAPINE 15 MG: 15 TABLET, FILM COATED ORAL at 21:46

## 2021-03-25 RX ADMIN — SENNOSIDES AND DOCUSATE SODIUM 1 TABLET: 8.6; 5 TABLET ORAL at 21:47

## 2021-03-25 RX ADMIN — QUETIAPINE FUMARATE 50 MG: 50 TABLET ORAL at 21:46

## 2021-03-25 RX ADMIN — TRAZODONE HYDROCHLORIDE 50 MG: 50 TABLET ORAL at 21:47

## 2021-03-25 RX ADMIN — ROPINIROLE 2 MG: 1 TABLET, FILM COATED ORAL at 21:47

## 2021-03-25 RX ADMIN — LORAZEPAM 1 MG: 1 TABLET ORAL at 22:50

## 2021-03-25 RX ADMIN — DULOXETINE HYDROCHLORIDE 90 MG: 30 CAPSULE, DELAYED RELEASE ORAL at 08:43

## 2021-03-25 RX ADMIN — ATORVASTATIN CALCIUM 10 MG: 10 TABLET, FILM COATED ORAL at 16:31

## 2021-03-25 RX ADMIN — Medication 1 TABLET: at 08:43

## 2021-03-25 NOTE — SOCIAL WORK
SW met with pt in front of elevators during breakfast; pt sitting in front of breakfast tray with arms crossed stating "I am not hungry - I'm not eating"; pt continues to state that he "is no better than the day I came in here  You can't fix this"; pt continues to be negative even with SW stating all of the positive gains patient has made; pt minimally responds to staff and is withdrawn to his room/bed outside of meals;  No questions or concerns for SW; SW will continue to meet with patient as needed for tx and dc planning

## 2021-03-25 NOTE — NURSING NOTE
[id number]     Pt found to be withdrawn to room  Affect bright on approach  Mood is depressed  Reports no improvement in mood  Denies SI or HI  Q 7 min checks ongoing

## 2021-03-25 NOTE — PLAN OF CARE
Problem: SLEEP DISTURBANCE  Goal: Will exhibit normal sleeping pattern  Description: Interventions:  -  Assess the patients sleep pattern, noting recent changes  - Administer medication as ordered  - Decrease environmental stimuli, including noise, as appropriate during the night  - Encourage the patient to actively participate in unit groups and or exercise during the day to enhance ability to achieve adequate sleep at night  - Assess the patient, in the morning, encouraging a description of sleep experience  Outcome: Progressing     Problem: Ineffective Coping  Goal: Participates in unit activities  Description: Interventions:  - Provide therapeutic environment   - Provide required programming   - Redirect inappropriate behaviors   Outcome: Progressing  Goal: Cooperates with admission process  Description: Interventions:   - Complete admission process  Outcome: Progressing  Goal: Identifies healthy coping skills  Outcome: Progressing  Goal: Patient/Family participate in treatment and DC plans  Description: Interventions:  - Provide therapeutic environment  Outcome: Progressing  Goal: Patient/Family verbalizes awareness of resources  Outcome: Progressing     Problem: Depression  Goal: Treatment Goal: Demonstrate behavioral control of depressive symptoms, verbalize feelings of improved mood/affect, and adopt new coping skills prior to discharge  Outcome: Progressing  Goal: Verbalize thoughts and feelings  Description: Interventions:  - Assess and re-assess patient's level of risk   - Engage patient in 1:1 interactions, daily, for a minimum of 15 minutes   - Encourage patient to express feelings, fears, frustrations, hopes   Outcome: Progressing  Goal: Refrain from harming self  Description: Interventions:  - Monitor patient closely, per order   - Supervise medication ingestion, monitor effects and side effects   Outcome: Progressing  Goal: Refrain from isolation  Description: Interventions:  - Develop a trusting relationship   - Encourage socialization   Outcome: Progressing  Goal: Refrain from self-neglect  Outcome: Progressing  Goal: Complete daily ADLs, including personal hygiene independently, as able  Description: Interventions:  - Observe, teach, and assist patient with ADLS  -  Monitor and promote a balance of rest/activity, with adequate nutrition and elimination   Outcome: Progressing     Problem: Nutrition/Hydration-ADULT  Goal: Nutrient/Hydration intake appropriate for improving, restoring or maintaining nutritional needs  Description: Monitor and assess patient's nutrition/hydration status for malnutrition  Collaborate with interdisciplinary team and initiate plan and interventions as ordered  Monitor patient's weight and dietary intake as ordered or per policy  Utilize nutrition screening tool and intervene as necessary  Determine patient's food preferences and provide high-protein, high-caloric foods as appropriate       INTERVENTIONS:  - Monitor oral intake, urinary output, labs, and treatment plans  - Assess nutrition and hydration status and recommend course of action  - Evaluate amount of meals eaten  - Assist patient with eating if necessary   - Allow adequate time for meals  - Recommend/ encourage appropriate diets, oral nutritional supplements, and vitamin/mineral supplements  - Order, calculate, and assess calorie counts as needed  - Recommend, monitor, and adjust tube feedings and TPN/PPN based on assessed needs  - Assess need for intravenous fluids  - Provide specific nutrition/hydration education as appropriate  - Include patient/family/caregiver in decisions related to nutrition  Outcome: Progressing

## 2021-03-25 NOTE — PROGRESS NOTES
Progress Note - Lorna Tilley  78 y o  male MRN: [id number]    Unit/Bed#: [facility]iej Fel[facility] 210-01 Encounter: [id number]        Subjective:   Patient seen and examined at bedside after reviewing the chart and discussing the case with the caring staff  Patient examined at bedside  Patient is limited in conversation  Patient has no acute concerns  Encouragement provided with suggestion to be up and in the community during the daytime hours  Also encouraged patient to increase his oral intake  Physical Exam   Vitals: Blood pressure 111/64, pulse 55, temperature 97 5 °F (36 4 °C), temperature source Temporal, resp  rate 18, height 5' 11" (1 803 m), weight 92 2 kg (203 lb 3 2 oz), SpO2 96 %  ,Body mass index is 28 34 kg/m²  Constitutional: Patient appears well-developed  HEENT: PERR, EOMI, MMM  Cardiovascular: Normal rate and regular rhythm  Pulmonary/Chest: Effort normal and breath sounds normal    Abdomen: Soft, + BS, NT    Assessment/Plan:  Lorna Tilley  is a(n) 78 y o  male with  MDD      1  Cardiac with history of dyslipidemia  Patient is on atorvastatin 10 mg daily  2  Seizure disorder  Patient is on Depakote 500 mg b i d  3  Restless leg syndrome  Patient is on ropinirole 2 mg at bedtime  4  Constipation  Patient is on Senokot S   5  DJD/osteoarthritis  Patient may get Tylenol on as needed basis  6  Gait abnormality  PT and OT are on consult  7  Vitamin D deficiency  Continue D2 94067 units weekly for 6 weeks (ending on 04/14/2021) followed by D3 1000 units daily  The patient was discussed with Dr Gemma Kearney and he is in agreement with the above note

## 2021-03-25 NOTE — NURSING NOTE
Patient observed in the hallway during time of assessment  Pt refused to eat breakfast tray  Pt is irritable, affect is flat and depressed  Pt was compliant w/ medications but states "I don't know why I bother this stuff doesn't work anyway"  Denies pain  Pt does not answer any mental health related questions- ignores questions  Will CTM  Q7 minute safety checks in progress

## 2021-03-25 NOTE — NURSING NOTE
[phone]    Pt found to be resting quietly on most of authors rounds  No acute behavioral issues noted  Q 7 min checks maintained  Fall protocol in place

## 2021-03-25 NOTE — PLAN OF CARE
Problem: DISCHARGE PLANNING - CARE MANAGEMENT  Goal: Discharge to post-acute care or home with appropriate resources  Description: INTERVENTIONS:  - Conduct assessment to determine patient/family and health care team treatment goals, and need for post-acute services based on payer coverage, community resources, and patient preferences, and barriers to discharge  - Address psychosocial, clinical, and financial barriers to discharge as identified in assessment in conjunction with the patient/family and health care team  - Arrange appropriate level of post-acute services according to patients   needs and preference and payer coverage in collaboration with the physician and health care team  - Communicate with and update the patient/family, physician, and health care team regarding progress on the discharge plan  - Arrange appropriate transportation to post-acute venues  Outcome: Not Progressing     Pt not progressing; continues to be negative, depressed, withdrawn; no DC date - disposition unclear

## 2021-03-25 NOTE — PROGRESS NOTES
03/25/21 0955   Team Meeting   Meeting Type Daily Rounds   Team Members Present   Team Members Present Physician;Nurse;Occupational Therapist;   Physician Team Member Dr Mateusz Still MD; SHERRIE Dwyer   Nursing Team Member Renetta Amador RN   Social Work Team Member Trevor Bautista [facility]   OT Team Member Lucille Hall [place]   Patient/Family Present   Patient Present No   Patient's Family Present No     No DC date - disposition unclear; "don't feel better since admission"; EKG yesterday; out for meals; prn utilized for sleep last night; med compliant; withdrawn to room

## 2021-03-25 NOTE — PROGRESS NOTES
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 ([facility] )    CKD (chronic kidney disease) stage 3, GFR 30-59 ml/min    ESBL (extended spectrum beta-lactamase) producing bacteria infection    Restless legs      Behavior over the last 24 hours: minimal improvement  Sleep: slept better   Appetite: poor  Medication side effects: No  ROS: no complaints and all other pertinent ROS negative    Mental Status Evaluation:  Appearance:  older than stated age   Behavior:  sitting up in bed, awake and somewhat conversational   Speech:  normal volume and with slight latency of speech noted   Mood:  depressed, irritable and later becomes more pleasant during the interview   Affect:  constricted and mood-congruent   Thought Process:  blocked and concrete   Thought Content:  no evidence of delusions or obsessions   Perceptual Disturbances: None   Risk Potential: Suicidal Ideations passive SI  Homicidal Ideations none  Potential for Aggression No   Sensorium:  person and place   Memory:  recent and remote memory: unable to assess due to lack of cooperation   Consciousness:  alert and awake    Attention: decreased   Insight:  poor   Judgment: limited and does recognize need for treatment   Gait/Station: uses walker   Motor Activity: no abnormal movements     Progress Toward Goals: Patient is awake, sitting up in bed, dressed in personal clothes and hair better groomed  He is more conversational but affect continues to be fairly flat and poor eye contact  He continues to report "brain depression" with significant problems with sleep, appetite and problems with "my thoughts"   States it is hard to concentrate or remember things because of his depression  Patient states normally he sleeps six hrs a night but he did not sleep well last night, although staff noted patient sleeping throughout the night  He does appear a bit better today, more conversational and awake  Recommended Treatment: Continue with group therapy, milieu therapy and occupational therapy  Risks, benefits and possible side effects of Medications:   Risks, benefits, and possible side effects of medications explained to patient and patient verbalizes understanding  Medications: all current active meds have been reviewed  Labs: I have personally reviewed all pertinent laboratory/tests results  Most Recent Labs:   Lab Results   Component Value Date    WBC 7 85 03/08/2021    RBC 5 76 (H) 03/08/2021    HGB 16 8 03/08/2021    HCT 51 7 (H) 03/08/2021     03/08/2021    RDW 14 0 03/08/2021    NEUTROABS 6 15 03/08/2021    SODIUM 140 03/08/2021    K 3 8 03/08/2021     03/08/2021    CO2 29 03/08/2021    BUN 26 (H) 03/08/2021    CREATININE 1 94 (H) 03/08/2021    GLUC 102 03/08/2021    GLUF 84 09/18/2018    CALCIUM 8 8 03/08/2021    AST 12 03/08/2021    ALT 21 03/08/2021    ALKPHOS 70 03/08/2021    TP 7 2 03/08/2021    ALB 4 0 03/08/2021    TBILI 0 87 03/08/2021    CHOLESTEROL 195 04/26/2016    HDL 52 04/26/2016    TRIG 76 04/26/2016    LDLCALC 128 (H) 04/26/2016    VALPROICTOT 49 2 (L) 03/22/2021    AMMONIA <10 (L) 05/08/2016    LGR7HEMSQJIT 2 735 03/08/2021    RPR Non-Reactive 05/09/2016       Counseling / Coordination of Care  Total floor / unit time spent today 20 minutes  Greater than 50% of total time was spent with the patient and / or family counseling and / or coordination of care

## 2021-03-25 NOTE — PLAN OF CARE
Problem: Ineffective Coping  Goal: Participates in unit activities  Description: Interventions:  - Provide therapeutic environment   - Provide required programming   - Redirect inappropriate behaviors   Outcome: Not Progressing   Patient continues to not join groups and refuses to complete admission paperwork  He is coming out for meals but is eating very little depending on his mood

## 2021-03-26 PROCEDURE — 99233 SBSQ HOSP IP/OBS HIGH 50: CPT | Performed by: NURSE PRACTITIONER

## 2021-03-26 RX ORDER — QUETIAPINE FUMARATE 100 MG/1
100 TABLET, FILM COATED ORAL
Status: DISCONTINUED | OUTPATIENT
Start: 2021-03-26 | End: 2021-03-30

## 2021-03-26 RX ORDER — LANOLIN ALCOHOL/MO/W.PET/CERES
3 CREAM (GRAM) TOPICAL
Status: DISCONTINUED | OUTPATIENT
Start: 2021-03-26 | End: 2021-03-27

## 2021-03-26 RX ADMIN — MELATONIN 3 MG: at 20:44

## 2021-03-26 RX ADMIN — ATORVASTATIN CALCIUM 10 MG: 10 TABLET, FILM COATED ORAL at 16:57

## 2021-03-26 RX ADMIN — SENNOSIDES AND DOCUSATE SODIUM 1 TABLET: 8.6; 5 TABLET ORAL at 20:45

## 2021-03-26 RX ADMIN — Medication 1 TABLET: at 08:37

## 2021-03-26 RX ADMIN — DEXTROAMPHETAMINE SACCHARATE, AMPHETAMINE ASPARTATE, DEXTROAMPHETAMINE SULFATE, AND AMPHETAMINE SULFATE 10 MG: 2.5; 2.5; 2.5; 2.5 TABLET ORAL at 08:37

## 2021-03-26 RX ADMIN — ROPINIROLE 2 MG: 1 TABLET, FILM COATED ORAL at 20:43

## 2021-03-26 RX ADMIN — QUETIAPINE 100 MG: 100 TABLET, FILM COATED ORAL at 20:44

## 2021-03-26 RX ADMIN — MIRTAZAPINE 15 MG: 15 TABLET, FILM COATED ORAL at 20:44

## 2021-03-26 RX ADMIN — DULOXETINE HYDROCHLORIDE 90 MG: 30 CAPSULE, DELAYED RELEASE ORAL at 08:37

## 2021-03-26 NOTE — PROGRESS NOTES
No further anxiety expressed  Slept well from [address]  Slept well during most q 7 minute safety checks overnight  No respiratory distress or changes in medical condition  Sleep has been sound and undisturbed  No suicidal ideations noted  Safety maintained via clutter-free environment, ID band, and given non-skid socks  Will continue to monitor

## 2021-03-26 NOTE — PROGRESS NOTES
03/26/21 0903   Team Meeting   Meeting Type Daily Rounds   Team Members Present   Team Members Present Nurse;Physician;; Occupational Therapist   Physician Team Member Jimenez Ramirez[address]   Nursing Team Member Sari Richardson RN   [facility] Team Member Bruna Oneil [place]   Patient/Family Present   Patient Present No   Patient's Family Present No     No DC date - disposition unclear (home vs SNF); poor sleep - up until 0030a; prn utilized; depressed, irritable, withdrawn; continues to refuse some meals each day

## 2021-03-26 NOTE — PROGRESS NOTES
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 ([place] [address] )    CKD (chronic kidney disease) stage 3, GFR 30-59 ml/min    ESBL (extended spectrum beta-lactamase) producing bacteria infection    Restless legs      Behavior over the last 24 hours:  unchanged  Sleep:  Poor  Appetite:  Fair  Medication side effects: No  ROS: no complaints and All other systems negative for acute change  Kimberley Nolasco was seen today for follow-up and case discussed with treatment team this morning  Patient was somewhat guarded upon encounter but brightened throughout interview  He reports he did not sleep good last night and describes his mood as the same and sad   Rates depression as 10/10 and denies any anxiety  He also denies any AVH  Voiced passive death wish stating sometimes I wish the lights would just stay out    Although, patient denied any active suicidal ideation or homicidal ideation  He states his appetite is Areta Lyme but states he always feels full and said it's like there is a big word that says 'NO' my head when it comes to food  I have always been conscious of my weight    He went on to say that his food tastes like "cardboard" due to his depression  Appetite has been fair  Patient stated his goal at this time is to sleep better" and he requested he be started on melatonin since that helped him sleep at home  Kimberley Nolasco expressed appreciation to this provider at termination of interview and stated Michele schmidt for talking to me, it was nice to have someone to talk to      Mental Status Evaluation:  Appearance:  age appropriate, casually dressed and Laying in bed Behavior:  Somewhat guarded, calm, cooperative   Speech:  Slow   Mood:  depressed   Affect:  blunted   Thought Process:  logical   Thought Content:  No overt delusions   Perceptual Disturbances: None   Risk Potential: Suicidal Ideations none  Homicidal Ideations none  Potential for Aggression No   Sensorium:  person, place and time/date   Memory:  recent and remote memory grossly intact   Consciousness:  alert and awake    Attention: attention span and concentration were age appropriate   Insight:  limited   Judgment: limited   Gait/Station: Laying in bed   Motor Activity: no abnormal movements     Progress Toward Goals: Will add melatonin per patient request for sleep  Will also increase Seroquel to augment antidepressant therapy  Otherwise, continue current psychotropic regimen  Will hold off on increase in Adderall at this time  No discharge date at this time  Recommended Treatment: Continue with group therapy, milieu therapy and occupational therapy  Risks, benefits and possible side effects of Medications:   Risks, benefits, and possible side effects of medications explained to patient and patient verbalizes understanding  Medications: all current active meds have been reviewed and Increase Seroquel 100mg PO QHS  Add melatonin 3mg PO QHS  Labs: I have personally reviewed all pertinent laboratory/tests results  EKG completed 03/24/2029 with QTC of 461  Counseling / Coordination of Care  Total floor / unit time spent today 30 minutes  Greater than 50% of total time was spent with the patient and / or family counseling and / or coordination of care   A description of the counseling / coordination of care:  Medication education, supportive therapy, treatment plan

## 2021-03-26 NOTE — PROGRESS NOTES
Progress Note - Indira Lassiter  78 y o  male MRN: [id number]    Unit/Bed#: [facility] Encounter: [id number]        Subjective:   Patient seen and examined at bedside after reviewing the chart and discussing the case with the caring staff  Patient examined at bedside  Patient is limited in conversation  Patient has no acute concerns  Encouragement provided with suggestion to be up and in the community during the daytime hours  Also encouraged patient to increase his oral intake  Physical Exam   Vitals: Blood pressure 130/73, pulse 61, temperature 97 6 °F (36 4 °C), temperature source Temporal, resp  rate 16, height 5' 11" (1 803 m), weight 92 kg (202 lb 13 2 oz), SpO2 95 %  ,Body mass index is 28 29 kg/m²  Constitutional: Patient appears well-developed  HEENT: PERR, EOMI, MMM  Cardiovascular: Normal rate and regular rhythm  Pulmonary/Chest: Effort normal and breath sounds normal    Abdomen: Soft, + BS, NT    Assessment/Plan:  Indira Lassiter  is a(n) 78 y o  male with  MDD      1  Cardiac with history of dyslipidemia  Patient is on atorvastatin 10 mg daily  2  Seizure disorder  Patient is on Depakote 500 mg b i d  3  Restless leg syndrome  Patient is on ropinirole 2 mg at bedtime  4  Constipation  Patient is on Senokot S   5  DJD/osteoarthritis  Patient may get Tylenol on as needed basis  6  Gait abnormality  PT and OT are on consult  7  Vitamin D deficiency  Continue D2 78623 units weekly for 6 weeks (ending on 04/14/2021) followed by D3 1000 units daily  The patient was discussed with Dr Mary Casillas and he is in agreement with the above note

## 2021-03-26 NOTE — PROGRESS NOTES
At 2147 RN told patient that he would be back in 1 hour to check if he is sleeping  Upon reassessment  Patient very upset and complaining of severe anxiety (2250)  Stating medications are not helping him sleep  Trejo Scale 27  Unable to redirect  Ativan 1 mg given PO at this time  RN told patient that he would be back in 1 hour to check if he was resting (2350)  [facility]  Will continue to monitor

## 2021-03-26 NOTE — PROGRESS NOTES
Pt c/o feeling sad all the time & depressed  Q 7 min checks maintained to monitor pt's behavior & safety  Pt denies any hallucinations, suicidal or homicidal ideations  Pt denies any anxiety  Pt is flat & withdrawn  Pt is compliant with medications  Pt ambulates with walker

## 2021-03-26 NOTE — PROGRESS NOTES
Patient upset over room change and his inability to sleep threw the night  Hopeless, negative, but brightens after 1 to 1 time with staff  Minimizes need for mental dayana care  Denies SI  Withdrawn to room  Encourage to take fluids, Ginger-lynn given  Maintained on q 7 minute checks

## 2021-03-26 NOTE — PLAN OF CARE
Problem: Ineffective Coping  Goal: Participates in unit activities  Description: Interventions:  - Provide therapeutic environment   - Provide required programming   - Redirect inappropriate behaviors   Outcome: Not Progressing   Patient continues not to join groups or be willing to meet with RT 1:1

## 2021-03-26 NOTE — PLAN OF CARE
Problem: DISCHARGE PLANNING - CARE MANAGEMENT  Goal: Discharge to post-acute care or home with appropriate resources  Description: INTERVENTIONS:  - Conduct assessment to determine patient/family and health care team treatment goals, and need for post-acute services based on payer coverage, community resources, and patient preferences, and barriers to discharge  - Address psychosocial, clinical, and financial barriers to discharge as identified in assessment in conjunction with the patient/family and health care team  - Arrange appropriate level of post-acute services according to patients   needs and preference and payer coverage in collaboration with the physician and health care team  - Communicate with and update the patient/family, physician, and health care team regarding progress on the discharge plan  - Arrange appropriate transportation to post-acute venues  Outcome: Progressing     Pt progressing slowly; more verbal; continues to look flat, depressed, minimal eye contact;  No DC date - disposition unclear (home vs SNF)

## 2021-03-27 PROCEDURE — 99232 SBSQ HOSP IP/OBS MODERATE 35: CPT | Performed by: NURSE PRACTITIONER

## 2021-03-27 RX ORDER — MIRTAZAPINE 15 MG/1
22.5 TABLET, FILM COATED ORAL
Status: DISCONTINUED | OUTPATIENT
Start: 2021-03-27 | End: 2021-03-30

## 2021-03-27 RX ORDER — LANOLIN ALCOHOL/MO/W.PET/CERES
6 CREAM (GRAM) TOPICAL
Status: DISCONTINUED | OUTPATIENT
Start: 2021-03-27 | End: 2021-03-30

## 2021-03-27 RX ADMIN — SENNOSIDES AND DOCUSATE SODIUM 1 TABLET: 8.6; 5 TABLET ORAL at 21:01

## 2021-03-27 RX ADMIN — DEXTROAMPHETAMINE SACCHARATE, AMPHETAMINE ASPARTATE, DEXTROAMPHETAMINE SULFATE, AND AMPHETAMINE SULFATE 10 MG: 2.5; 2.5; 2.5; 2.5 TABLET ORAL at 08:17

## 2021-03-27 RX ADMIN — ROPINIROLE 2 MG: 1 TABLET, FILM COATED ORAL at 21:01

## 2021-03-27 RX ADMIN — DULOXETINE HYDROCHLORIDE 90 MG: 30 CAPSULE, DELAYED RELEASE ORAL at 08:17

## 2021-03-27 RX ADMIN — Medication 1 TABLET: at 08:17

## 2021-03-27 RX ADMIN — QUETIAPINE 100 MG: 100 TABLET, FILM COATED ORAL at 21:01

## 2021-03-27 RX ADMIN — MELATONIN 6 MG: at 21:00

## 2021-03-27 RX ADMIN — ATORVASTATIN CALCIUM 10 MG: 10 TABLET, FILM COATED ORAL at 15:43

## 2021-03-27 RX ADMIN — MIRTAZAPINE 22.5 MG: 15 TABLET, FILM COATED ORAL at 21:00

## 2021-03-27 NOTE — NURSING NOTE
Patient withdrawn and isolated to his room  Refused HS group and snack  Patient stated, "I am not going to any class to learn anything! Never did and never will " Upon approach patient's mood and affect is flat and depressed  Patient is negative and despairing stating "Nothing works! I have not seen anyone to help me!" Patient has an irritable edge  Denies HI/AHVH/pain/anxiety  Patient does state, "I wish I can sleep and never wake up " Patient is uninterested in any suggestions as far as coping skills or reasons to get out of bed and participate  Gave reassurance to the patient  Took HS medications without difficulty  Will continue to monitor safety and behaviors every 7 minutes

## 2021-03-27 NOTE — PROGRESS NOTES
Progress Note - Radha Haines  78 y o  male MRN: [id number]    Unit/Bed#: [facility] 200-01 Encounter: [id number]      Assessment:  1  Cardiac with history of dyslipidemia   atorvastatin 10 mg daily  2  Seizure disorder   Depakote 500 mg b i d  3  Restless leg syndrome    ropinirole 2 mg at bedtime  4  Constipation   Senokot S   5  DJD/osteoarthritis  Simple analgesics  6  Gait abnormality  PT OT  7  Vitamin D deficiency  Supplemented daily  Plan:  As above    Subjective:   See above    Objective:     Vitals: Blood pressure 119/67, pulse 79, temperature 98 7 °F (37 1 °C), temperature source Temporal, resp  rate 16, height 5' 11" (1 803 m), weight 92 kg (202 lb 13 2 oz), SpO2 94 %  ,Body mass index is 28 29 kg/m²        Intake/Output Summary (Last 24 hours) at 3/27/2021 1903  Last data filed at 3/27/2021 1700  Gross per 24 hour   Intake 600 ml   Output --   Net 600 ml       Physical Exam: /67 (BP Location: Right arm)   Pulse 79   Temp 98 7 °F (37 1 °C) (Temporal)   Resp 16   Ht 5' 11" (1 803 m)   Wt 92 kg (202 lb 13 2 oz)   SpO2 94%   BMI 28 29 kg/m²     General Appearance:    Alert, cooperative, no distress, appears stated age   Head:    Normocephalic, without obvious abnormality, atraumatic                   Neck:   Supple, symmetrical, trachea midline, no adenopathy;        thyroid:  No enlargement/tenderness/nodules; no carotid    bruit or JVD   Back:     Symmetric, no curvature, ROM normal, no CVA tenderness   Lungs:     Clear to auscultation bilaterally, respirations unlabored   Chest wall:    No tenderness or deformity   Heart:    Regular rate and rhythm, S1 and S2 normal, no murmur, rub   or gallop   Abdomen:     Soft, non-tender, bowel sounds active all four quadrants,     no masses, no organomegaly           Extremities:   Extremities normal, atraumatic, no cyanosis or edema   Pulses:   2+ and symmetric all extremities   Skin:   Skin color, texture, turgor normal, no rashes or lesions Lymph nodes:   Cervical, supraclavicular, and axillary nodes normal            Invasive Devices     None                 Lab, Imaging and other studies: I have personally reviewed pertinent reports

## 2021-03-27 NOTE — PROGRESS NOTES
Pt is flat, withdrawn & irritable  Pt c/o feeling depressed & sad all the time  Pt denies any anxiety  Pt up ad wilner with walker  Pt denies any hallucinations, suicidal or homicidal ideations  Q 7 min checks maintained to monitor pt's behavior & safety  Pt is cooperative & compliant with medications

## 2021-03-28 PROCEDURE — 99232 SBSQ HOSP IP/OBS MODERATE 35: CPT | Performed by: NURSE PRACTITIONER

## 2021-03-28 RX ADMIN — ATORVASTATIN CALCIUM 10 MG: 10 TABLET, FILM COATED ORAL at 17:07

## 2021-03-28 RX ADMIN — Medication 1 TABLET: at 08:53

## 2021-03-28 RX ADMIN — SENNOSIDES AND DOCUSATE SODIUM 1 TABLET: 8.6; 5 TABLET ORAL at 22:05

## 2021-03-28 RX ADMIN — MIRTAZAPINE 22.5 MG: 15 TABLET, FILM COATED ORAL at 22:06

## 2021-03-28 RX ADMIN — MELATONIN 6 MG: at 22:06

## 2021-03-28 RX ADMIN — TRAZODONE HYDROCHLORIDE 50 MG: 50 TABLET ORAL at 23:28

## 2021-03-28 RX ADMIN — DULOXETINE HYDROCHLORIDE 90 MG: 30 CAPSULE, DELAYED RELEASE ORAL at 08:53

## 2021-03-28 RX ADMIN — DEXTROAMPHETAMINE SACCHARATE, AMPHETAMINE ASPARTATE, DEXTROAMPHETAMINE SULFATE, AND AMPHETAMINE SULFATE 10 MG: 2.5; 2.5; 2.5; 2.5 TABLET ORAL at 08:53

## 2021-03-28 RX ADMIN — ROPINIROLE 2 MG: 1 TABLET, FILM COATED ORAL at 22:06

## 2021-03-28 RX ADMIN — QUETIAPINE 100 MG: 100 TABLET, FILM COATED ORAL at 22:05

## 2021-03-28 NOTE — PROGRESS NOTES
Patient withdrawn to his room this morning  He refused to eat breakfast  Appears flat and disheveled, has poor eye contact  He states his anxiety and depression are "bad" but does not rate them  No complaints of pain  Will continue monitoring

## 2021-03-28 NOTE — NURSING NOTE
Patient withdrawn and isolated to his room  Refused HS snack  Upon approach patient's mood and affect is flat and depressed  Patient has an irritable edge  Thoughts are negative and despondent  Patient needs a lot of encouragement; however, is uninterested in having any positive conversation  Denies SI/HI/AHVH/pain  Took HS medication without difficulty  Will continue to monitor safety and behaviors every 7 minutes

## 2021-03-28 NOTE — PROGRESS NOTES
Progress Note - Nina Sherman  78 y o  male MRN: [id number]    Unit/Bed#: [facility] 200-01 Encounter: [id number]      Assessment:  Vital signs stable  1  Cardiac with history of dyslipidemia   atorvastatin 10 mg daily  2  Seizure disorder   Depakote 500 mg b i d  3  Restless leg syndrome    ropinirole 2 mg at bedtime  4  Constipation   Senokot S   5  DJD/osteoarthritis  Simple analgesics  6  Gait abnormality  PT OT  7  Vitamin D deficiency  Supplemented daily        Plan:  See above    Subjective:   None    Objective:     Vitals: Blood pressure 122/74, pulse 69, temperature 99 °F (37 2 °C), temperature source Temporal, resp  rate 16, height 5' 11" (1 803 m), weight 92 kg (202 lb 13 2 oz), SpO2 95 %  ,Body mass index is 28 29 kg/m²        Intake/Output Summary (Last 24 hours) at 3/28/2021 1732  Last data filed at 3/27/2021 2100  Gross per 24 hour   Intake 0 ml   Output --   Net 0 ml       Physical Exam: /74 (BP Location: Left arm)   Pulse 69   Temp 99 °F (37 2 °C) (Temporal)   Resp 16   Ht 5' 11" (1 803 m)   Wt 92 kg (202 lb 13 2 oz)   SpO2 95%   BMI 28 29 kg/m²     General Appearance:    Alert, cooperative, no distress, appears stated age   Head:    Normocephalic, without obvious abnormality, atraumatic                   Neck:   Supple, symmetrical, trachea midline, no adenopathy;        thyroid:  No enlargement/tenderness/nodules; no carotid    bruit or JVD   Back:     Symmetric, no curvature, ROM normal, no CVA tenderness   Lungs:     Clear to auscultation bilaterally, respirations unlabored   Chest wall:    No tenderness or deformity   Heart:    Regular rate and rhythm, S1 and S2 normal, no murmur, rub   or gallop   Abdomen:     Soft, non-tender, bowel sounds active all four quadrants,     no masses, no organomegaly           Extremities:   Extremities normal, atraumatic, no cyanosis or edema   Pulses:   2+ and symmetric all extremities   Skin:   Skin color, texture, turgor normal, no rashes or lesions   Lymph nodes:   Cervical, supraclavicular, and axillary nodes normal            Invasive Devices     None                 Lab, Imaging and other studies: I have personally reviewed pertinent reports

## 2021-03-28 NOTE — PROGRESS NOTES
Progress Note - [address]  78 y o  male MRN: [id number]   Unit/Bed#: [facility] 200-01 Encounter: [id number]    Behavior over the last 24 hours: unchanged  Robin Jacobs continues very negative, hopeless, helpless  Minimal eye contact  Isolated in his room will not eat in the dining room  He states no improvement with sleep with increase of melatonin  Staff report he does appear to sleep through the night  Continues to report no improvement with depression, becomes irritable and ways to be away rather than answer questions about his mood  States 'who cares" when asked about suicidal thoughts or passive death wish  Limited participation in milieu  Sleep: slept off and on  Appetite: fair  Medication side effects: No   ROS: all other systems are negative    Mental Status Evaluation:    Appearance:  age appropriate   Behavior:  guarded, uncooperative   Speech:  normal rate, normal volume   Mood:  depressed   Affect:  labile   Thought Process:  organized   Associations: intact associations   Thought Content:  negative thinking   Perceptual Disturbances: none   Risk Potential: Suicidal ideation - None  Homicidal ideation - None  Potential for aggression - No   Sensorium:  oriented to person, place and time/date   Memory:  recent and remote memory grossly intact   Consciousness:  alert and awake   Attention: decreased concentration and decreased attention span   Insight:  limited   Judgment: limited   Gait/Station: normal gait/station, normal balance   Motor Activity: no abnormal movements     Vital signs in last 24 hours:    Temp:  [97 6 °F (36 4 °C)-98 7 °F (37 1 °C)] 97 6 °F (36 4 °C)  HR:  [64-79] 64  Resp:  [16-18] 18  BP: (102-119)/(61-67) 102/66    Laboratory results: I have personally reviewed all pertinent laboratory/tests results      Suicide/Homicide Risk Assessment:    Risk of Harm to Self:   Current Specific Risk Factors include: passive death wishes  Protective Factors: taking medications as ordered on the unit  Based on today's assessment, Jm [place] presents the following risk of harm to self: moderate    Risk of Harm to Others:  Current Specific Risk Factors include: none  Based on today's assessment, Jm [place] presents the following risk of harm to others: none    The following interventions are recommended: behavioral checks every 7 minutes, continued hospitalization on locked unit     Progress Toward Goals: progressing    Assessment/Plan   Principal Problem:    MDD (major depressive disorder), recurrent episode, severe ([facility] )  Active Problems:    Multiple falls    Hypothyroidism    Essential hypertension    Seizure disorder ([facility] )    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 ([facility] )    CKD (chronic kidney disease) stage 3, GFR 30-59 ml/min    ESBL (extended spectrum beta-lactamase) producing bacteria infection    Restless legs    Recommended Treatment:     Planned medication and treatment changes:     All current active medications have been reviewed  Encourage group therapy, milieu therapy and occupational therapy  Behavioral Health checks every 7 minutes  Continue current medications:  Current Facility-Administered Medications   Medication Dose Route Frequency Provider Last Rate    acetaminophen  650 mg Oral Q4H PRN Wilhemenia SHERRIE Alexis      acetaminophen  650 mg Oral Q4H PRN WilhemSHERRIE Moy      acetaminophen  975 mg Oral Q6H PRN WilhemSHERRIE Moy      aluminum-magnesium hydroxide-simethicone  30 mL Oral Q4H PRN WilSHERRIE Mistry      amphetamine-dextroamphetamine  10 mg Oral Daily Harshil Spence MD      atorvastatin  10 mg Oral Daily With Moreno Ayoub MD      [START ON 4/15/2021] cholecalciferol  1,000 Units Oral Daily Shellie Bowden PA-C      DULoxetine  90 mg Oral Daily SHERRIE Vilchis      ergocalciferol  50,000 Units Oral Weekly Mirian Huerta      hydrOXYzine HCL  25 mg Oral Q6H PRN Max 4/day SHERRIE Vilchis      LORazepam  1 mg Intramuscular Q6H PRN Max 3/day Bethanne Emiliano, CRNP      LORazepam  0 5 mg Oral Q6H PRN Max 4/day Bethanne Emiliano, CRNP      LORazepam  1 mg Oral Q6H PRN Max 3/day Bethanne Emiliano, CRNP      melatonin  6 mg Oral HS Rika Sow, CRNP      mirtazapine  22 5 mg Oral HS Rika Last, CRNP      multivitamin-minerals  1 tablet Oral Daily John Can MD      OLANZapine  5 mg Intramuscular Q6H PRN Max 3/day Bethanne Emiliano, CRNP      OLANZapine  5 mg Oral Q6H PRN Max 3/day Bethanne Emiliano, CRNP      OLANZapine  5 mg Oral Q6H PRN Max 3/day Bethanne Emiliano, CRNP      polyethylene glycol  17 g Oral Daily PRN Bethanne Emiliano, CRNP      QUEtiapine  100 mg Oral HS Bethanne Emiliano, CRNP      risperiDONE  0 5 mg Oral Q6H PRN Max 3/day Bethanne Emiliano, CRNP      rOPINIRole  2 mg Oral HS John Can MD      senna-docusate sodium  1 tablet Oral Daily PRN Bethanne Emiliano, CRNP      senna-docusate sodium  1 tablet Oral HS John Can MD      traZODone  50 mg Oral HS PRN Swapnil Hollis MD         Risks / Benefits of Treatment:    Risks, benefits, and possible side effects of medications explained to patient and patient verbalizes understanding and agreement for treatment  Counseling / Coordination of Care:    Patient's progress discussed with staff in treatment team meeting  Medications, treatment progress and treatment plan reviewed with patient      Rikamanuel SowSHERRIE 03/28/21

## 2021-03-29 PROCEDURE — 99232 SBSQ HOSP IP/OBS MODERATE 35: CPT | Performed by: PSYCHIATRY & NEUROLOGY

## 2021-03-29 RX ADMIN — DEXTROAMPHETAMINE SACCHARATE, AMPHETAMINE ASPARTATE, DEXTROAMPHETAMINE SULFATE, AND AMPHETAMINE SULFATE 10 MG: 2.5; 2.5; 2.5; 2.5 TABLET ORAL at 08:41

## 2021-03-29 RX ADMIN — MELATONIN 6 MG: at 21:23

## 2021-03-29 RX ADMIN — MIRTAZAPINE 22.5 MG: 15 TABLET, FILM COATED ORAL at 21:23

## 2021-03-29 RX ADMIN — QUETIAPINE 100 MG: 100 TABLET, FILM COATED ORAL at 21:23

## 2021-03-29 RX ADMIN — SENNOSIDES AND DOCUSATE SODIUM 1 TABLET: 8.6; 5 TABLET ORAL at 21:24

## 2021-03-29 RX ADMIN — ATORVASTATIN CALCIUM 10 MG: 10 TABLET, FILM COATED ORAL at 17:13

## 2021-03-29 RX ADMIN — DULOXETINE HYDROCHLORIDE 90 MG: 30 CAPSULE, DELAYED RELEASE ORAL at 08:41

## 2021-03-29 RX ADMIN — ROPINIROLE 2 MG: 1 TABLET, FILM COATED ORAL at 21:23

## 2021-03-29 RX ADMIN — Medication 1 TABLET: at 08:40

## 2021-03-29 RX ADMIN — TRAZODONE HYDROCHLORIDE 50 MG: 50 TABLET ORAL at 22:32

## 2021-03-29 NOTE — NURSING NOTE
Patient observed in his room at time of assessment  Pt declined going to breakfast  Remains withdrawn to room  Flat, depressed  Pt would not answer any of this nurse's questions/ ignored questions  Pt compliant with medications  Will CTM  Q7 minute safety checks in progress

## 2021-03-29 NOTE — PLAN OF CARE
Problem: DISCHARGE PLANNING - CARE MANAGEMENT  Goal: Discharge to post-acute care or home with appropriate resources  Description: INTERVENTIONS:  - Conduct assessment to determine patient/family and health care team treatment goals, and need for post-acute services based on payer coverage, community resources, and patient preferences, and barriers to discharge  - Address psychosocial, clinical, and financial barriers to discharge as identified in assessment in conjunction with the patient/family and health care team  - Arrange appropriate level of post-acute services according to patients   needs and preference and payer coverage in collaboration with the physician and health care team  - Communicate with and update the patient/family, physician, and health care team regarding progress on the discharge plan  - Arrange appropriate transportation to post-acute venues  Outcome: Progressing     Pt progressing slowly; more verbal; continues to be negative, withdrawn; no DC date - disposition unclear

## 2021-03-29 NOTE — NURSING NOTE
Patient observed in his room at time of assessment  Pt refuses to come to dinner he states "I'm just not hungry  I don't want to eat"  Compliant with PM medications  Offers no complaints at this time  Will CTM  Q7 minute safety checks in progress

## 2021-03-29 NOTE — NURSING NOTE
[id number]    Pt had difficulty falling requested prn sleep aid  Found to be resting quietly on most of authors rounds  No acute behavioral issues noted  Q 7 min checks maintained  Fall protocol in place

## 2021-03-29 NOTE — PROGRESS NOTES
03/29/21 0721   Team Meeting   Meeting Type Daily Rounds   Team Members Present   Team Members Present Physician;Nurse;Occupational Therapist;   Physician Team Member Dr Mitchell Cohen MD; Brandon Rodriguez [address] [place] [address]   Nursing Team Member [facility], RN   Social Work Team Member Bela Acuña [facility]   OT Team Member Sue Pena [place]   Patient/Family Present   Patient Present No   Patient's Family Present No     No DC date - disposition unclear - home vs SNF; no changes; flat, depressed, negative; c/o poor sleep but pt witnessed sleeping all night Complex Repair And O-L Flap Text: The defect edges were debeveled with a #15 scalpel blade.  The primary defect was closed partially with a complex linear closure.  Given the location of the remaining defect, shape of the defect and the proximity to free margins an O-L flap was deemed most appropriate for complete closure of the defect.  Using a sterile surgical marker, an appropriate flap was drawn incorporating the defect and placing the expected incisions within the relaxed skin tension lines where possible.    The area thus outlined was incised deep to adipose tissue with a #15 scalpel blade.  The skin margins were undermined to an appropriate distance in all directions utilizing iris scissors.

## 2021-03-29 NOTE — PROGRESS NOTES
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 ([facility] )    CKD (chronic kidney disease) stage 3, GFR 30-59 ml/min    ESBL (extended spectrum beta-lactamase) producing bacteria infection    Restless legs      Behavior over the last 24 hours:  regressed  Sleep: decreased   Appetite: poor  Medication side effects: No  ROS: no complaints and all pertinent ROS negative    Mental Status Evaluation:  Appearance:  age appropriate, disheveled and hair unkept   Behavior:  poor eye contact, minimally conversational   Speech:  delayed, increased latency of response and loud at times likely secondary to reduced hearing   Mood:  depressed and irritable   Affect:  blunted and mood-congruent   Thought Process:  goal directed   Thought Content:  no evidence of hallucinations, delusions   Perceptual Disturbances: None   Risk Potential: Suicidal Ideations none  Homicidal Ideations none  Potential for Aggression No   Sensorium:  person, place, time/date and day of week   Memory:  recent memory grossly intact, remote memory moderately impaired   Consciousness:  alert and awake    Attention: decreased   Insight:  poor   Judgment: poor and states nothing is getting better, nothing can be done for him   Gait/Station: normal gait/station and uses a walker to ambulate without difficulty   Motor Activity: no abnormal movements     Progress Toward Goals:   Patient reports ongoing significant depression and poor sleep and states nothing is getting better and he is "just going to give up" but flatly denies any S/I stating "that's just stupid"   More irritable today but denies feeling angry or anxious  Feels his sleep is still poor despite taking Rx for sleep  Is requesting "all his sleeping medications at once instead of having to go back and ask for them"  Reports poor appetite, is not eating breakfast now  States he doesn't have any desire to eat  Objectively, he is more conversational and is out of his room more than previous days but still with significant depression, blunted affect and poor motivation  Recommended Treatment: Continue with group therapy, milieu therapy and occupational therapy  Risks, benefits and possible side effects of Medications:   Risks, benefits, and possible side effects of medications explained to patient and patient verbalizes understanding  Medications: all current active meds have been reviewed  Labs: I have personally reviewed all pertinent laboratory/tests results  Most Recent Labs:   Lab Results   Component Value Date    WBC 7 85 03/08/2021    RBC 5 76 (H) 03/08/2021    HGB 16 8 03/08/2021    HCT 51 7 (H) 03/08/2021     03/08/2021    RDW 14 0 03/08/2021    NEUTROABS 6 15 03/08/2021    SODIUM 140 03/08/2021    K 3 8 03/08/2021     03/08/2021    CO2 29 03/08/2021    BUN 26 (H) 03/08/2021    CREATININE 1 94 (H) 03/08/2021    GLUC 102 03/08/2021    GLUF 84 09/18/2018    CALCIUM 8 8 03/08/2021    AST 12 03/08/2021    ALT 21 03/08/2021    ALKPHOS 70 03/08/2021    TP 7 2 03/08/2021    ALB 4 0 03/08/2021    TBILI 0 87 03/08/2021    CHOLESTEROL 195 04/26/2016    HDL 52 04/26/2016    TRIG 76 04/26/2016    LDLCALC 128 (H) 04/26/2016    VALPROICTOT 49 2 (L) 03/22/2021    AMMONIA <10 (L) 05/08/2016    [id number] 2 735 03/08/2021    RPR Non-Reactive 05/09/2016       Counseling / Coordination of Care  Total floor / unit time spent today 20 minutes  Greater than 50% of total time was spent with the patient and / or family counseling and / or coordination of care

## 2021-03-29 NOTE — SOCIAL WORK
SW met with patient in pt room; pt laying in bed; pt made no eye contact and did not verbally respond to any of SW attempt to make conversation; Hong Elder will continue to meet with patient as needed for tx and dc planning

## 2021-03-29 NOTE — PROGRESS NOTES
Progress Note - Indira Lassiter  78 y o  male MRN: [id number]    Unit/Bed#: Jb Catherine Encounter: [id number]        Subjective:   Patient seen and examined at bedside after reviewing the chart and discussing the case with the caring staff  Patient examined at bedside  Patient is limited in conversation  Patient has no acute concerns  Encouragement provided with suggestion to be up and in the community during the daytime hours  Also encouraged patient to increase his oral intake  Physical Exam   Vitals: Blood pressure 103/58, pulse 59, temperature 98 1 °F (36 7 °C), temperature source Temporal, resp  rate 18, height 5' 11" (1 803 m), weight 92 kg (202 lb 13 2 oz), SpO2 95 %  ,Body mass index is 28 29 kg/m²  Constitutional: Patient appears well-developed  HEENT: PERR, EOMI, MMM  Cardiovascular: Normal rate and regular rhythm  Pulmonary/Chest: Effort normal and breath sounds normal    Abdomen: Soft, + BS, NT    Assessment/Plan:  Indira Lassiter  is a(n) 78 y o  male with  MDD      1  Cardiac with history of dyslipidemia  Patient is on atorvastatin 10 mg daily  2  Seizure disorder  Patient is on Depakote 500 mg b i d  3  Restless leg syndrome  Patient is on ropinirole 2 mg at bedtime  4  Constipation  Patient is on Senokot S   5  DJD/osteoarthritis  Patient may get Tylenol on as needed basis  6  Gait abnormality  PT and OT are on consult  7  Vitamin D deficiency  Continue D2 86212 units weekly for 6 weeks (ending on 04/14/2021) followed by D3 1000 units daily  The patient was discussed with Dr Mary Casillas and he is in agreement with the above note

## 2021-03-30 PROCEDURE — 99232 SBSQ HOSP IP/OBS MODERATE 35: CPT | Performed by: PSYCHIATRY & NEUROLOGY

## 2021-03-30 RX ORDER — LANOLIN ALCOHOL/MO/W.PET/CERES
9 CREAM (GRAM) TOPICAL
Status: DISCONTINUED | OUTPATIENT
Start: 2021-03-30 | End: 2021-04-16 | Stop reason: HOSPADM

## 2021-03-30 RX ORDER — MIRTAZAPINE 15 MG/1
15 TABLET, FILM COATED ORAL
Status: DISCONTINUED | OUTPATIENT
Start: 2021-03-30 | End: 2021-03-31

## 2021-03-30 RX ADMIN — DEXTROAMPHETAMINE SACCHARATE, AMPHETAMINE ASPARTATE, DEXTROAMPHETAMINE SULFATE, AND AMPHETAMINE SULFATE 10 MG: 2.5; 2.5; 2.5; 2.5 TABLET ORAL at 08:32

## 2021-03-30 RX ADMIN — QUETIAPINE 150 MG: 100 TABLET, FILM COATED ORAL at 21:12

## 2021-03-30 RX ADMIN — SENNOSIDES AND DOCUSATE SODIUM 1 TABLET: 8.6; 5 TABLET ORAL at 21:12

## 2021-03-30 RX ADMIN — MIRTAZAPINE 15 MG: 15 TABLET, FILM COATED ORAL at 21:12

## 2021-03-30 RX ADMIN — TRAZODONE HYDROCHLORIDE 50 MG: 50 TABLET ORAL at 22:37

## 2021-03-30 RX ADMIN — ATORVASTATIN CALCIUM 10 MG: 10 TABLET, FILM COATED ORAL at 16:08

## 2021-03-30 RX ADMIN — MELATONIN 9 MG: at 21:12

## 2021-03-30 RX ADMIN — Medication 1 TABLET: at 08:32

## 2021-03-30 RX ADMIN — DULOXETINE HYDROCHLORIDE 90 MG: 30 CAPSULE, DELAYED RELEASE ORAL at 08:32

## 2021-03-30 NOTE — PROGRESS NOTES
Progress Note - Raquel Hall  78 y o  male MRN: [id number]    Unit/Bed#: Chandni Lorenzo Encounter: [id number]        Subjective:   Patient seen and examined at bedside after reviewing the chart and discussing the case with the caring staff  Patient examined at bedside  Patient is limited in conversation  Patient has no acute concerns  Encouragement provided with suggestion to be up and in the community during the daytime hours  Also encouraged patient to increase his oral intake  Physical Exam   Vitals: Blood pressure 122/72, pulse 64, temperature (!) 97 4 °F (36 3 °C), temperature source Temporal, resp  rate 18, height 5' 11" (1 803 m), weight 92 kg (202 lb 13 2 oz), SpO2 97 %  ,Body mass index is 28 29 kg/m²  Constitutional: Patient appears well-developed  HEENT: PERR, EOMI, MMM  Cardiovascular: Normal rate and regular rhythm  Pulmonary/Chest: Effort normal and breath sounds normal    Abdomen: Soft, + BS, NT    Assessment/Plan:  Raquel Hall  is a(n) 78 y o  male with  MDD      1  Cardiac with history of dyslipidemia  Patient is on atorvastatin 10 mg daily  2  Seizure disorder  Patient is on Depakote 500 mg b i d  3  Restless leg syndrome  Patient is on ropinirole 2 mg at bedtime  4  Constipation  Patient is on Senokot S   5  DJD/osteoarthritis  Patient may get Tylenol on as needed basis  6  Gait abnormality  PT and OT are on consult  7  Vitamin D deficiency  Continue D2 49168 units weekly for 6 weeks (ending on 04/14/2021) followed by D3 1000 units daily  The patient was discussed with Dr Manjit Moss and he is in agreement with the above note

## 2021-03-30 NOTE — NURSING NOTE
Patient observed in room at time of assessment  Pt compliant with medications but states "I don't know why I take this stuff anyway it don't work"  Pt refused to come to breakfast, breakfast tray placed in hallway, pt refuses to eat stating "I'll do what I want when I want  Period  And I don't want to eat  You can't bribe me"  Pt remains negative, depressed, withdrawn  Refuses to answer medical/ mental health related questions  Will CTM  Q7 minute checks in progress

## 2021-03-30 NOTE — PLAN OF CARE
Problem: DISCHARGE PLANNING - CARE MANAGEMENT  Goal: Discharge to post-acute care or home with appropriate resources  Description: INTERVENTIONS:  - Conduct assessment to determine patient/family and health care team treatment goals, and need for post-acute services based on payer coverage, community resources, and patient preferences, and barriers to discharge  - Address psychosocial, clinical, and financial barriers to discharge as identified in assessment in conjunction with the patient/family and health care team  - Arrange appropriate level of post-acute services according to patients   needs and preference and payer coverage in collaboration with the physician and health care team  - Communicate with and update the patient/family, physician, and health care team regarding progress on the discharge plan  - Arrange appropriate transportation to post-acute venues  Outcome: Progressing     Pt progressing slowly; remains negative, withdrawn, depressed;  No DC date - disposition unclear

## 2021-03-30 NOTE — NURSING NOTE
Trazodone effective patient slept without difficulty throughout the night  No behavioral issues  Will continue to monitor safety and behaviors every 7 minutes

## 2021-03-30 NOTE — PLAN OF CARE
Problem: Ineffective Coping  Goal: Participates in unit activities  Description: Interventions:  - Provide therapeutic environment   - Provide required programming   - Redirect inappropriate behaviors   Outcome: Not Progressing   Patient remains in his room and non-verbal with RT; continues to be unwilling to fill out admission papers

## 2021-03-30 NOTE — NURSING NOTE
Patient withdrawn to his room refused HS snack and group  Patient is not social with peers or staff  Upon approach patient's mood and affect is flat and depressed  Patient's thoughts are negative and resistive  Patient needs a lot of encouragement and prompting  Denies SI/HI/pain/AHVH  Took HS medication without difficulty  Will continue to monitor safety and behaviors every 7 minutes

## 2021-03-30 NOTE — PROGRESS NOTES
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 ([place] [id number] )    CKD (chronic kidney disease) stage 3, GFR 30-59 ml/min    ESBL (extended spectrum beta-lactamase) producing bacteria infection    Restless legs      Behavior over the last 24 hours:  unchanged  Sleep: insomnia  Appetite: poor  Medication side effects: No  ROS: no complaints and All other systems negative for acute change     Krista Clarke was seen today for follow-up and case discussed with treatment team this morning  Patient laying in bed upon encounter and stated I do not care about anything anymore  Nothing matters    Patient reports he has no energy or intent to do anything  Spoke with patient about potential discharge to home in which he replied what will I do at home? I will just do the same thing I do here  Just lay here and not care about anything    Continues to rate depression as 10/10 and unable to rate anxiety  Patient appeared to be internally preoccupied throughout encounter, but denied any AVH  Krista Clarke denied any SI/HI  Remains compliant with medication regimen and stated it does not matter what you do it the meds, nothing helps    Patient reports he continues to experience insomnia and appetite is poor  Patient verbalized negative thinking throughout duration of encounter and redirection was ineffective    Encouraged to come out for meals and attend group to which patient replied what for?"    Mental Status Evaluation:  Appearance:  disheveled   Behavior:  evasive and uninterested   Speech:  Minimal   Mood:  depressed and sad   Affect:  flat   Thought Process:  perserverative   Thought Content:  Negative thinking   Perceptual Disturbances: Denies AVH, but appears to be internally preoccupied   Risk Potential: Suicidal Ideations none  Homicidal Ideations none  Potential for Aggression No   Sensorium:  person and place   Memory:  recent and remote memory grossly intact   Consciousness:  alert and awake    Attention: attention span appeared shorter than expected for age   Insight:  Poor   Judgment: Poor   Gait/Station: Laying in bed   Motor Activity: no abnormal movements     Progress Toward Goals:  No improvement  Continues to complain of insomnia; will increase melatonin at HS  Otherwise, continue current psychotropic regimen at this time  Will consider decreasing Adderall should patient continue to exhibit internal preoccupation  Discussed with patient disposition options home verses SNF to which he responded I don't care about nothing    Will most likely require SNF placement due to amotivation and inability to care for self  Recommended Treatment: Continue with group therapy, milieu therapy and occupational therapy  Risks, benefits and possible side effects of Medications:   Risks, benefits, and possible side effects of medications explained to patient and patient verbalizes understanding  Medications: all current active meds have been reviewed and planned medication changes: Increase Melatonin 9mg PO QHS  Labs: I have personally reviewed all pertinent laboratory/tests results  Counseling / Coordination of Care  Total floor / unit time spent today 20 minutes  Greater than 50% of total time was spent with the patient and / or family counseling and / or coordination of care

## 2021-03-30 NOTE — NURSING NOTE
Patient complained of insomnia  Gave 50 mg PRN Trazodone as ordered  Will continue to monitor safety and behaviors every 7 minutes

## 2021-03-30 NOTE — PROGRESS NOTES
03/30/21 0932   Team Meeting   Meeting Type Daily Rounds   Team Members Present   Team Members Present Physician;Nurse;; Occupational Therapist   Physician Team Member Dr Maurilio Gomez MD; SHERRIE Sesay   Nursing Team Member Heather Dee RN   Social Work Team Member Daren Gaston, [place]   OT Team Member [place], [place]   Patient/Family Present   Patient Present No   Patient's Family Present No     No DC date - disposition unclear; remains withdrawn, depressed, negative; refused breakfast, lunch, snack; ate 75% dinner; selectively mute; prn utilized for sleep; refused breakfast again this morning

## 2021-03-31 PROBLEM — R62.7 FAILURE TO THRIVE IN ADULT: Status: ACTIVE | Noted: 2021-03-31

## 2021-03-31 PROCEDURE — 99232 SBSQ HOSP IP/OBS MODERATE 35: CPT | Performed by: NURSE PRACTITIONER

## 2021-03-31 RX ORDER — MIRTAZAPINE 15 MG/1
7.5 TABLET, FILM COATED ORAL
Status: DISCONTINUED | OUTPATIENT
Start: 2021-03-31 | End: 2021-04-05

## 2021-03-31 RX ORDER — TRAZODONE HYDROCHLORIDE 50 MG/1
50 TABLET ORAL
Status: DISCONTINUED | OUTPATIENT
Start: 2021-03-31 | End: 2021-04-04

## 2021-03-31 RX ADMIN — TRAZODONE HYDROCHLORIDE 50 MG: 50 TABLET ORAL at 21:09

## 2021-03-31 RX ADMIN — MELATONIN 9 MG: at 21:09

## 2021-03-31 RX ADMIN — ATORVASTATIN CALCIUM 10 MG: 10 TABLET, FILM COATED ORAL at 15:47

## 2021-03-31 RX ADMIN — MIRTAZAPINE 7.5 MG: 15 TABLET, FILM COATED ORAL at 21:09

## 2021-03-31 RX ADMIN — ERGOCALCIFEROL 50000 UNITS: 1.25 CAPSULE, LIQUID FILLED ORAL at 08:02

## 2021-03-31 RX ADMIN — DEXTROAMPHETAMINE SACCHARATE, AMPHETAMINE ASPARTATE, DEXTROAMPHETAMINE SULFATE, AND AMPHETAMINE SULFATE 10 MG: 2.5; 2.5; 2.5; 2.5 TABLET ORAL at 08:02

## 2021-03-31 RX ADMIN — Medication 1 TABLET: at 08:02

## 2021-03-31 RX ADMIN — QUETIAPINE 150 MG: 100 TABLET, FILM COATED ORAL at 21:09

## 2021-03-31 RX ADMIN — SENNOSIDES AND DOCUSATE SODIUM 1 TABLET: 8.6; 5 TABLET ORAL at 21:09

## 2021-03-31 RX ADMIN — DULOXETINE HYDROCHLORIDE 90 MG: 30 CAPSULE, DELAYED RELEASE ORAL at 08:02

## 2021-03-31 NOTE — PLAN OF CARE
Problem: DISCHARGE PLANNING - CARE MANAGEMENT  Goal: Discharge to post-acute care or home with appropriate resources  Description: INTERVENTIONS:  - Conduct assessment to determine patient/family and health care team treatment goals, and need for post-acute services based on payer coverage, community resources, and patient preferences, and barriers to discharge  - Address psychosocial, clinical, and financial barriers to discharge as identified in assessment in conjunction with the patient/family and health care team  - Arrange appropriate level of post-acute services according to patients   needs and preference and payer coverage in collaboration with the physician and health care team  - Communicate with and update the patient/family, physician, and health care team regarding progress on the discharge plan  - Arrange appropriate transportation to post-acute venues  Outcome: Not Progressing     Pt not progressing; continues to be flat, depressed, minimally verbal; no DC date - home vs SNF placement
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
## 2021-03-31 NOTE — PROGRESS NOTES
03/31/21 0932   Team Meeting   Meeting Type Daily Rounds   Team Members Present   Team Members Present Physician;Nurse;Occupational Therapist;   Physician Team Member Dr Carol Sheehan MD; [facility]   Nursing Team Member Raphael Frankel RN   Social Work Team Member [facility]   OT Team Member [place], [place]   Patient/Family Present   Patient Present No   Patient's Family Present No     No DC date - disposition unclear (home vs SNF); sad, depressed; withdrawn; no changes; awake most of night; prns utilized

## 2021-03-31 NOTE — PROGRESS NOTES
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 ([facility] )    CKD (chronic kidney disease) stage 3, GFR 30-59 ml/min    ESBL (extended spectrum beta-lactamase) producing bacteria infection    Restless legs    Failure to thrive in adult      Behavior over the last 24 hours:  unchanged  Sleep: insomnia  Appetite: poor  Medication side effects: No  ROS: no complaints and All other systems negative for acute change    Franklyn Kaur was seen today for follow-up and case discussed with treatment team this morning  He remains withdrawn to room and continues to report insomnia  Meal completions also noted to be poor  Upon interaction, Franklyn Kaur was laying in bed and did not engage in interview  Continues to endorse negative thinking stating nothing matters  Marium Orellana did not appear to be responding to internal stimuli today  He denies any AVH/SI/HI  He continues to be perseverative on negative thoughts; I don't want to do anything  Nothing matters    Will attempt to improve insomnia by decreasing Remeron and adding trazodone to HS med routine  Franklyn Kaur rates his depression as 10/10    Denies any anxiety  Much encouragement provided by staff to get up in out of bed, however, this has been ineffective      Mental Status Evaluation:  Appearance:  age appropriate, casually dressed and Laying in bed   Behavior:  evasive, guarded and Uninterested   Speech:  Minimal   Mood:  depressed   Affect:  flat   Thought Process:  perserverative   Thought Content:  No overt delusions   Perceptual Disturbances: None   Risk Potential: Suicidal Ideations none  Homicidal Ideations none  Potential for Aggression No   Sensorium:  person, place and time/date   Memory:  recent and remote memory grossly intact   Consciousness:  alert and awake    Attention: attention span appeared shorter than expected for age   Insight:  Poor   Judgment: Poor   Gait/Station: Laying in bed   Motor Activity: no abnormal movements     Progress Toward Goals:  No change  Will decrease Remeron and add trazodone at HS to assist with insomnia  Disposition remains unclear-home versus SNF  No discharge date at this time  Recommended Treatment: Continue with group therapy, milieu therapy and occupational therapy  Risks, benefits and possible side effects of Medications:   Risks, benefits, and possible side effects of medications explained to patient and patient verbalizes understanding  Medications: all current active meds have been reviewed and planned medication changes: Decrease Remeron 7 5mg PO QHS  Add Trazodone 50mg PO QHS  Labs: I have personally reviewed all pertinent laboratory/tests results  Counseling / Coordination of Care  Total floor / unit time spent today 20 minutes  Greater than 50% of total time was spent with the patient and / or family counseling and / or coordination of care

## 2021-03-31 NOTE — PROGRESS NOTES
Patient withdrawn to room  Flat and depressed  Out in [place] for dinner  Appetite fair  Complains he is depressed and his medications are not helping him  Trazodone 50mg PRN given for sleep  Will continue to monitor and access

## 2021-03-31 NOTE — PROGRESS NOTES
Progress Note - Macie Edwards  78 y o  male MRN: [id number]    Unit/Bed#: Bharath Espinoza Encounter: [id number]        Subjective:   Patient seen and examined at bedside after reviewing the chart and discussing the case with the caring staff  Patient examined at bedside  Patient is selectively mute but does not appear to be in pain or distress  He offers no complaints  Physical Exam   Vitals: Blood pressure 118/66, pulse 83, temperature 98 3 °F (36 8 °C), temperature source Temporal, resp  rate 18, height 5' 11" (1 803 m), weight 92 kg (202 lb 13 2 oz), SpO2 95 %  ,Body mass index is 28 29 kg/m²  Constitutional: Patient appears well-developed  HEENT: PERR, EOMI, MMM  Cardiovascular: Normal rate and regular rhythm  Pulmonary/Chest: Effort normal and breath sounds normal    Abdomen: Soft, + BS, NT    Assessment/Plan:  Macie Edwards  is a(n) 78 y o  male with  MDD      1  Cardiac with history of dyslipidemia  Patient is on atorvastatin 10 mg daily  2  Seizure disorder  Patient is on Depakote 500 mg b i d  3  Restless leg syndrome  Patient is on ropinirole 2 mg at bedtime  4  Constipation  Patient is on Senokot S   5  DJD/osteoarthritis  Patient may get Tylenol on as needed basis  6  Gait abnormality  PT and OT are on consult  7  Vitamin D deficiency  Continue D2 97634 units weekly for 6 weeks (ending on 04/14/2021) followed by D3 1000 units daily  The patient was discussed with Dr Shaun Ramirez and he is in agreement with the above note

## 2021-03-31 NOTE — PROGRESS NOTES
Pt is flat, withdrawn & irritable  Pt denies any anxiety & c/o feeling depressed & sad all the time  Pt denies any hallucinations, suicidal or homicidal ideations  Pt ambulates with walker  Pt is compliant with medications  Q 7 min checks maintained to monitor pt's behavior & safety

## 2021-03-31 NOTE — PLAN OF CARE
Problem: Ineffective Coping  Goal: Participates in unit activities  Description: Interventions:  - Provide therapeutic environment   - Provide required programming   - Redirect inappropriate behaviors   Outcome: Not Progressing   Patient continues to isolate to his room,staff has to encourage meals and patient will only eat alone in hallway or room

## 2021-03-31 NOTE — NURSING NOTE
Assumed care of this patient from prior shift nurse at [phone]  Patient is sitting quietly at the bed side  He informs he is cold; provided an extra pair of tread socks and another blanket  No further complaints voiced  Will CTM via q7 minute safety checks

## 2021-03-31 NOTE — NURSING NOTE
Patient was awake most of the overnight period despite receiving PRN Trazodone at HS  He has remained quietly in his room  Q7 minute safety checks in progress  Monitoring continues  PGY I Note    S: Pt seen and examined at bedside. Doing well, experiencing pain w/ contractions, not desiring epidural at this time.     Vital Signs Last 24 Hrs  T(F): 98.2 (17 May 2019 08:07), Max: 98.2 (17 May 2019 08:07)  HR: 81 (17 May 2019 12:23) (81 - 103)  BP: 135/91 (17 May 2019 12:23) (114/79 - 135/91)  RR: 18 (17 May 2019 08:07) (18 - 18)    EFM: 120/mod hemant/+accel  TOCO: q2-5min  SVE: 2/50/-2, vtx, intact    Labs:                        12.1   8.08  )-----------( 245      ( 17 May 2019 08:10 )             36.1           134<L>  |  104  |  11  ----------------------------<  75  4.8   |  19  |  0.5<L>    Ca    8.9      17 May 2019 08:10    TPro  6.4  /  Alb  3.6  /  TBili  <0.2  /  DBili  x   /  AST  15  /  ALT  11  /  AlkPhos  118<H>        Urinalysis Basic - ( 17 May 2019 08:10 )    Color: Yellow / Appearance: Clear / S.015 / pH: x  Gluc: x / Ketone: Negative  / Bili: Negative / Urobili: 1.0 mg/dL   Blood: x / Protein: Negative mg/dL / Nitrite: Negative   Leuk Esterase: Negative / RBC: x / WBC x   Sq Epi: x / Non Sq Epi: x / Bacteria: x        Prenatal Syphilis Test: Nonreact (19 @ 08:10)      Meds:  @0915 pit started, now @12mu/min

## 2021-04-01 PROCEDURE — 99232 SBSQ HOSP IP/OBS MODERATE 35: CPT | Performed by: PSYCHIATRY & NEUROLOGY

## 2021-04-01 RX ORDER — OLANZAPINE 5 MG/1
5 TABLET ORAL
Status: DISCONTINUED | OUTPATIENT
Start: 2021-04-01 | End: 2021-04-06

## 2021-04-01 RX ORDER — QUETIAPINE FUMARATE 100 MG/1
100 TABLET, FILM COATED ORAL
Status: DISCONTINUED | OUTPATIENT
Start: 2021-04-01 | End: 2021-04-02

## 2021-04-01 RX ADMIN — ATORVASTATIN CALCIUM 10 MG: 10 TABLET, FILM COATED ORAL at 15:49

## 2021-04-01 RX ADMIN — OLANZAPINE 5 MG: 5 TABLET, FILM COATED ORAL at 21:29

## 2021-04-01 RX ADMIN — DEXTROAMPHETAMINE SACCHARATE, AMPHETAMINE ASPARTATE, DEXTROAMPHETAMINE SULFATE, AND AMPHETAMINE SULFATE 10 MG: 2.5; 2.5; 2.5; 2.5 TABLET ORAL at 08:02

## 2021-04-01 RX ADMIN — MIRTAZAPINE 7.5 MG: 15 TABLET, FILM COATED ORAL at 21:29

## 2021-04-01 RX ADMIN — MELATONIN 9 MG: at 21:29

## 2021-04-01 RX ADMIN — TRAZODONE HYDROCHLORIDE 50 MG: 50 TABLET ORAL at 21:29

## 2021-04-01 RX ADMIN — QUETIAPINE 100 MG: 100 TABLET, FILM COATED ORAL at 21:29

## 2021-04-01 RX ADMIN — DULOXETINE HYDROCHLORIDE 90 MG: 30 CAPSULE, DELAYED RELEASE ORAL at 08:02

## 2021-04-01 RX ADMIN — SENNOSIDES AND DOCUSATE SODIUM 1 TABLET: 8.6; 5 TABLET ORAL at 21:29

## 2021-04-01 RX ADMIN — Medication 1 TABLET: at 08:02

## 2021-04-01 NOTE — PROGRESS NOTES
Progress Note - Radha Haines  78 y o  male MRN: [id number]    Unit/Bed#: Andrea Valentin Encounter: [id number]        Subjective:   Patient seen and examined at bedside after reviewing the chart and discussing the case with the caring staff  Patient examined at bedside  Patient is selectively mute but does not appear to be in pain or distress  He offers no complaints  Physical Exam   Vitals: Blood pressure 116/61, pulse 62, temperature 98 6 °F (37 °C), temperature source Temporal, resp  rate 16, height 5' 11" (1 803 m), weight 92 kg (202 lb 13 2 oz), SpO2 92 %  ,Body mass index is 28 29 kg/m²  Constitutional: Patient appears well-developed  HEENT: PERR, EOMI, MMM  Cardiovascular: Normal rate and regular rhythm  Pulmonary/Chest: Effort normal and breath sounds normal    Abdomen: Soft, + BS, NT    Assessment/Plan:  Radha Haines  is a(n) 78 y o  male with  MDD      1  Cardiac with history of dyslipidemia  Patient is on atorvastatin 10 mg daily  2  Seizure disorder  Patient is on Depakote 500 mg b i d   3  Restless leg syndrome  Patient is on ropinirole 2 mg at bedtime  4  Constipation  Patient is on Senokot S   5  DJD/osteoarthritis  Patient may get Tylenol on as needed basis  6  Gait abnormality  PT and OT are on consult  7  Vitamin D deficiency  Continue D2 31364 units weekly for 6 weeks (ending on 04/14/2021) followed by D3 1000 units daily  The patient was discussed with Dr David Huitron and he is in agreement with the above note

## 2021-04-01 NOTE — NURSING NOTE
Patient continues to be withdrawn to his room  Declined group and snack time informing he is staying in his room  Minimal in conversation  Denies any pain  Denies anxiety, endorses depression, denies SI, HI and hallucinations  Patient was medication compliant at HS  He utilizes a walker for ambulation  Will CTM via q7 minute safety checks

## 2021-04-01 NOTE — PROGRESS NOTES
Pt up ad wilner with walker  Pt denies any hallucinations, suicidal or homicidal ideations  Q 7 min checks maintained to monitor pt's behavior & safety  Pt denies any anxiety & c/o depression  Pt states " I'm sad all the time & really in the dumps"  Pt is flat& irritable  Pt is withdrawn to his room  Pt is compliant with medications

## 2021-04-01 NOTE — NURSING NOTE
Patient appears to have slept through the majority of the overnight hours however was restless  No acute behaviors noted  No complaints voiced  Will CTM via q7 minute safety checks

## 2021-04-01 NOTE — PROGRESS NOTES
04/01/21 0924   Team Meeting   Meeting Type Daily Rounds   Team Members Present   Team Members Present Physician;Nurse;Occupational Therapist;   Physician Team Member Dr Fozia Dubois MD; SHERRIE Dominguez   Nursing Team Member Ligia Fox RN   Social Work Team Member [address] [address] [facility]   OT Team Member Esme Weaver [place]   Patient/Family Present   Patient Present No   Patient's Family Present No     No DC date - home vs SNF placement; no changes; withdrawn to room; ate dinner; sleep

## 2021-04-01 NOTE — PLAN OF CARE
Problem: DISCHARGE PLANNING - CARE MANAGEMENT  Goal: Discharge to post-acute care or home with appropriate resources  Description: INTERVENTIONS:  - Conduct assessment to determine patient/family and health care team treatment goals, and need for post-acute services based on payer coverage, community resources, and patient preferences, and barriers to discharge  - Address psychosocial, clinical, and financial barriers to discharge as identified in assessment in conjunction with the patient/family and health care team  - Arrange appropriate level of post-acute services according to patients   needs and preference and payer coverage in collaboration with the physician and health care team  - Communicate with and update the patient/family, physician, and health care team regarding progress on the discharge plan  - Arrange appropriate transportation to post-acute venues  Outcome: Progressing     Pt progressing slowly; continues to be depressed, withdrawn, negative; DC home vs SNF placement upon stabilization

## 2021-04-01 NOTE — PROGRESS NOTES
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 ([facility] )    CKD (chronic kidney disease) stage 3, GFR 30-59 ml/min    ESBL (extended spectrum beta-lactamase) producing bacteria infection    Restless legs    Failure to thrive in adult      Behavior over the last 24 hours:  unchanged  Sleep:  Intermittent  Appetite:  Fair  Medication side effects: No  ROS: no complaints and All other systems negative for acute change     Nino Hernandez was seen today for follow-up and case discussed with treatment team this morning  Patient was sitting in chair in hallway upon encounter  Poor eye contact  Spoke minimally to provider and did not wish to engage in conversation  Patient ate 100% of meal   Reports he slept off and on last night  Continues to rate his depression as 10/10  Denies any anxiety  Nino Hernandez also denies any AVH/SI/HI associated with his depression  He remains compliant with medication regimen but stated I do not know what is wrong with me, something isn't right with my head    Inquired if patient would like to be discharged home any time soon in which he replied [facility]? To do what I do here? Nothing    Zyprexa added to medication regimen to augment antidepressant therapy      Mental Status Evaluation:  Appearance:  age appropriate and Laying in bed with covers over head   Behavior:  evasive and Uninterested   Speech:  Scant   Mood:  depressed   Affect:  flat   Thought Process:  perserverative   Thought Content:  Negative thinking   Perceptual Disturbances: Patient does not answer, does not appear to be responding to internal stimuli   Risk Potential: Suicidal Ideations none  Homicidal Ideations none  Potential for Aggression No   Sensorium:  person and place   Memory:  recent and remote memory: unable to assess due to lack of cooperation   Consciousness:  alert and awake    Attention: attention span appeared shorter than expected for age   Insight:  Poor   Judgment: Poor   Gait/Station: Laying in bed   Motor Activity: no abnormal movements     Progress Toward Goals: Will decrease HS Seroquel to 100 mg and initiate Zyprexa 5 mg daily to augment antidepressant therapy; will titrate Zyprexa and taper Seroquel to discontinuation  Otherwise, continue current psychotropic regimen  Disposition planning ongoing; home versus SNF placement  No discharge date at this time  Recommended Treatment: Continue with group therapy, milieu therapy and occupational therapy  Risks, benefits and possible side effects of Medications:   Risks, benefits, and possible side effects of medications explained to patient and patient verbalizes understanding  Medications: all current active meds have been reviewed and planned medication changes: Decrease Seroquel 100mg PO QHS  Start Zyprexa 5mg PO QD  Labs: I have personally reviewed all pertinent laboratory/tests results  Counseling / Coordination of Care  Total floor / unit time spent today 20 minutes  Greater than 50% of total time was spent with the patient and / or family counseling and / or coordination of care

## 2021-04-01 NOTE — PLAN OF CARE
Problem: Ineffective Coping  Goal: Participates in unit activities  Description: Interventions:  - Provide therapeutic environment   - Provide required programming   - Redirect inappropriate behaviors   Outcome: Progressing   Patient open to talk with RT today, shared he is not feeling any better  RT brought up things he used to enjoy and asked if they don;t make them happy anymore; he stated not now  RT will continue to meet with patient and encourage groups as patient is able to tolerate

## 2021-04-02 PROCEDURE — 99232 SBSQ HOSP IP/OBS MODERATE 35: CPT | Performed by: PSYCHIATRY & NEUROLOGY

## 2021-04-02 RX ORDER — DEXTROAMPHETAMINE SACCHARATE, AMPHETAMINE ASPARTATE, DEXTROAMPHETAMINE SULFATE AND AMPHETAMINE SULFATE 2.5; 2.5; 2.5; 2.5 MG/1; MG/1; MG/1; MG/1
10 TABLET ORAL DAILY
Status: DISCONTINUED | OUTPATIENT
Start: 2021-04-03 | End: 2021-04-09

## 2021-04-02 RX ADMIN — Medication 1 TABLET: at 09:45

## 2021-04-02 RX ADMIN — DEXTROAMPHETAMINE SACCHARATE, AMPHETAMINE ASPARTATE, DEXTROAMPHETAMINE SULFATE, AND AMPHETAMINE SULFATE 10 MG: 2.5; 2.5; 2.5; 2.5 TABLET ORAL at 09:44

## 2021-04-02 RX ADMIN — MIRTAZAPINE 7.5 MG: 15 TABLET, FILM COATED ORAL at 21:00

## 2021-04-02 RX ADMIN — TRAZODONE HYDROCHLORIDE 50 MG: 50 TABLET ORAL at 21:01

## 2021-04-02 RX ADMIN — DULOXETINE HYDROCHLORIDE 90 MG: 30 CAPSULE, DELAYED RELEASE ORAL at 09:44

## 2021-04-02 RX ADMIN — OLANZAPINE 5 MG: 5 TABLET, FILM COATED ORAL at 21:01

## 2021-04-02 RX ADMIN — ATORVASTATIN CALCIUM 10 MG: 10 TABLET, FILM COATED ORAL at 15:55

## 2021-04-02 RX ADMIN — SENNOSIDES AND DOCUSATE SODIUM 1 TABLET: 8.6; 5 TABLET ORAL at 21:02

## 2021-04-02 RX ADMIN — MELATONIN 9 MG: at 21:00

## 2021-04-02 NOTE — NURSING NOTE
Patient was able to sleep through the majority of the overnight hours, waking once briefly to request a blanket d/t being cold  No acute behaviors during the overnight period  Will CTM via q7 minute safety checks

## 2021-04-02 NOTE — PLAN OF CARE
Problem: Ineffective Coping  Goal: Participates in unit activities  Description: Interventions:  - Provide therapeutic environment   - Provide required programming   - Redirect inappropriate behaviors   Outcome: Not Progressing   Patient isolating again today and unwilling to join group

## 2021-04-02 NOTE — PROGRESS NOTES
04/02/21 0932   Team Meeting   Meeting Type Daily Rounds   Team Members Present   Team Members Present Physician;Nurse;Occupational Therapist;   Physician Team Member Dr Deshawn Cardoza MD; SHERRIE Patricio    Nursing Team Member Lizzette Omalley RN    Social Work Team Member Eric Naik [facility]   OT Team Member [place], [place]   Patient/Family Present   Patient Present No   Patient's Family Present No     No DC date - home vs SNF placement upon stabilization; remains negative but more verbal yesterday; irritable edge; no changes

## 2021-04-02 NOTE — NURSING NOTE
Pt withdrawn to room  Refuses to come to dinging room for meals  Did not eat breakfast and lunch even with encouragement  Declined going to group  Ignores questions  Negative, irritable  Pt does not appear to be in any discomfort of distress  Will CTM  Q7 minute safety checks in progress

## 2021-04-02 NOTE — NURSING NOTE
Pt observed eating meal in hallway  Remains withdrawn to self, irritable  Does not offer conversation  Ignores questions  Ambulates by self w/ walker  Will CTM

## 2021-04-02 NOTE — NURSING NOTE
Patient was withdrawn to his room this evening, declining attendance to group and snack time  He is irritable and dismissive at time of assessment  Endorses depression, denies SI, HI and hallucinations  Patient did take his HS medications however stated, "I don't know why I take these because they don't do sh*t for me "  Denies any pain  Will CTM via q7 minute safety checks

## 2021-04-02 NOTE — PROGRESS NOTES
Progress Note - Yakelin Ledezma  78 y o  male MRN: [id number]    Unit/Bed#: Gerardo Mckeon Encounter: [id number]        Subjective:   Patient seen and examined at bedside after reviewing the chart and discussing the case with the caring staff  Patient examined at bedside  Patient is selectively mute but does not appear to be in pain or distress  He offers no complaints  Physical Exam   Vitals: Blood pressure 102/58, pulse 63, temperature 97 5 °F (36 4 °C), temperature source Temporal, resp  rate 18, height 5' 11" (1 803 m), weight 92 kg (202 lb 13 2 oz), SpO2 93 %  ,Body mass index is 28 29 kg/m²  Constitutional: Patient appears well-developed  HEENT: PERR, EOMI, MMM  Cardiovascular: Normal rate and regular rhythm  Pulmonary/Chest: Effort normal and breath sounds normal    Abdomen: Soft, + BS, NT    Assessment/Plan:  Yakelin Ledezma  is a(n) 78 y o  male with  MDD      1  Cardiac with history of dyslipidemia  Patient is on atorvastatin 10 mg daily  2  Seizure disorder  Patient is on Depakote 500 mg b i d   3  Restless leg syndrome  Patient is on ropinirole 2 mg at bedtime  4  Constipation  Patient is on Senokot S   5  DJD/osteoarthritis  Patient may get Tylenol on as needed basis  6  Gait abnormality  PT and OT are on consult  7  Vitamin D deficiency  Continue D2 35609 units weekly for 6 weeks (ending on 04/14/2021) followed by D3 1000 units daily  The patient was discussed with Dr Kajal Dukes and he is in agreement with the above note

## 2021-04-02 NOTE — PROGRESS NOTES
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 ([facility] )    CKD (chronic kidney disease) stage 3, GFR 30-59 ml/min    ESBL (extended spectrum beta-lactamase) producing bacteria infection    Restless legs    Failure to thrive in adult      Behavior over the last 24 hours:  unchanged  Sleep: hypersomnia  Appetite: fair  Medication side effects: No  ROS: no complaints, all other system are negative for acute changes    Mental Status Evaluation:  Appearance:  age appropriate   Behavior:  evasive   Speech:  delayed and underproductive   Mood:  irritable   Affect:  blunted   Thought Process:  concrete and goal directed   Thought Content:  no overt delusions   Perceptual Disturbances: None   Risk Potential: Suicidal Ideations none  Homicidal Ideations none  Potential for Aggression No   Sensorium:  person and place   Memory:  recent memory mildly impaired   Consciousness:  alert and awake    Attention: attention span appeared shorter than expected for age   Insight:  poor   Judgment: limited   Gait/Station: uses walker   Motor Activity: no abnormal movements     Progress Toward Goals: Patient continues isolated, withdrawn, irritable with minimal participation in groups and milieu therapy  He remains with limited calorie intake with flat affect but has been compliant with his medication  No acute focal neurological deficit or change of mental status noted  Recommended Treatment: Continue with group therapy, milieu therapy and occupational therapy        Risks, benefits and possible side effects of Medications:   Patient does not verbalize understanding at this time and will require further explanation  Medications: all current active meds have been reviewed  Labs: I have personally reviewed all pertinent laboratory/tests results  Most Recent Labs:   Lab Results   Component Value Date    WBC 7 85 03/08/2021    RBC 5 76 (H) 03/08/2021    HGB 16 8 03/08/2021    HCT 51 7 (H) 03/08/2021     03/08/2021    RDW 14 0 03/08/2021    NEUTROABS 6 15 03/08/2021    SODIUM 140 03/08/2021    K 3 8 03/08/2021     03/08/2021    CO2 29 03/08/2021    BUN 26 (H) 03/08/2021    CREATININE 1 94 (H) 03/08/2021    GLUC 102 03/08/2021    GLUF 84 09/18/2018    CALCIUM 8 8 03/08/2021    AST 12 03/08/2021    ALT 21 03/08/2021    ALKPHOS 70 03/08/2021    TP 7 2 03/08/2021    ALB 4 0 03/08/2021    TBILI 0 87 03/08/2021    CHOLESTEROL 195 04/26/2016    HDL 52 04/26/2016    TRIG 76 04/26/2016    LDLCALC 128 (H) 04/26/2016    VALPROICTOT 49 2 (L) 03/22/2021    AMMONIA <10 (L) 05/08/2016    [id number] 2 735 03/08/2021    RPR Non-Reactive 05/09/2016       Counseling / Coordination of Care  Total floor / unit time spent today 20 minutes  Greater than 50% of total time was spent with the patient and / or family counseling and / or coordination of care

## 2021-04-02 NOTE — PLAN OF CARE
Problem: DISCHARGE PLANNING - CARE MANAGEMENT  Goal: Discharge to post-acute care or home with appropriate resources  Description: INTERVENTIONS:  - Conduct assessment to determine patient/family and health care team treatment goals, and need for post-acute services based on payer coverage, community resources, and patient preferences, and barriers to discharge  - Address psychosocial, clinical, and financial barriers to discharge as identified in assessment in conjunction with the patient/family and health care team  - Arrange appropriate level of post-acute services according to patients   needs and preference and payer coverage in collaboration with the physician and health care team  - Communicate with and update the patient/family, physician, and health care team regarding progress on the discharge plan  - Arrange appropriate transportation to post-acute venues  Outcome: Progressing     Pt slowly progressing;  No DC date - home vs SNF placement upon stabilization

## 2021-04-03 PROCEDURE — 99232 SBSQ HOSP IP/OBS MODERATE 35: CPT | Performed by: PSYCHIATRY & NEUROLOGY

## 2021-04-03 RX ADMIN — ATORVASTATIN CALCIUM 10 MG: 10 TABLET, FILM COATED ORAL at 15:38

## 2021-04-03 RX ADMIN — TRAZODONE HYDROCHLORIDE 50 MG: 50 TABLET ORAL at 22:16

## 2021-04-03 RX ADMIN — DEXTROAMPHETAMINE SACCHARATE, AMPHETAMINE ASPARTATE, DEXTROAMPHETAMINE SULFATE, AND AMPHETAMINE SULFATE 10 MG: 2.5; 2.5; 2.5; 2.5 TABLET ORAL at 06:15

## 2021-04-03 RX ADMIN — MELATONIN 9 MG: at 22:15

## 2021-04-03 RX ADMIN — MIRTAZAPINE 7.5 MG: 15 TABLET, FILM COATED ORAL at 22:16

## 2021-04-03 RX ADMIN — OLANZAPINE 5 MG: 5 TABLET, FILM COATED ORAL at 22:16

## 2021-04-03 RX ADMIN — Medication 1 TABLET: at 08:07

## 2021-04-03 RX ADMIN — SENNOSIDES AND DOCUSATE SODIUM 1 TABLET: 8.6; 5 TABLET ORAL at 22:16

## 2021-04-03 RX ADMIN — DULOXETINE HYDROCHLORIDE 90 MG: 30 CAPSULE, DELAYED RELEASE ORAL at 08:06

## 2021-04-03 NOTE — NURSING NOTE
Patient withdrawn to his room this shift  Refused HS snack and group  Upon approach patient's mood and affect is flat and depressed  Thoughts are negative and despairing  Conversation is evasive  Patient needs a lot of encouragement  Denies SI/HI/AHVH/pain  Took HS medication without difficulty  Will continue to monitor safety and behaviors every 7 minutes

## 2021-04-03 NOTE — PROGRESS NOTES
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 ([place] [address] )    CKD (chronic kidney disease) stage 3, GFR 30-59 ml/min    ESBL (extended spectrum beta-lactamase) producing bacteria infection    Restless legs    Failure to thrive in adult      Behavior over the last 24 hours:  unchanged  Sleep: normal  Appetite: poor  Medication side effects: No  ROS: no complaints    Mental Status Evaluation:  Appearance:  casually dressed   Behavior:  psychomotor retardation   Speech:  soft   Mood:  depressed   Affect:  constricted   Thought Process:  goal directed   Thought Content:  obsessions   Perceptual Disturbances: pt denies   Risk Potential: Suicidal Ideations without plan  Homicidal Ideations none  Potential for Aggression No   Sensorium:  person and place   Memory:  Failed to cooperate with testing   Consciousness:  awake    Attention: attention span and concentration were age appropriate   Insight:  limited   Judgment: limited   Gait/Station: seen lying in bed   Motor Activity: no abnormal movements     Progress Toward Goals: Pt seen this morning lying in bed asleep and rather difficult to arouse,staff report that he is hard of hearing so this could be a factor as well  He has remained rather withdrawn and isolative with limited engagement in the unit milue  only coming out for meals  His mood cont to be reduced along with poor motivation/or desire to engage in unit activities and he cont also to report anxiety related symptoms  Pt also cont to voice passive death wish but has remained medication compliant with no reported side effects  Staff report no behavioral issues  Pt was recently commenced on Zyprexa which he tolerates with no reported issues  Recommended Treatment:   1-Cont current treatment  2-Continue with group therapy, milieu therapy and occupational therapy  Risks, benefits and possible side effects of Medications:   Risks, benefits, and possible side effects of medications explained to patient and patient verbalizes understanding        Medications:   current meds:   Current Facility-Administered Medications   Medication Dose Route Frequency    acetaminophen (TYLENOL) tablet 650 mg  650 mg Oral Q4H PRN    acetaminophen (TYLENOL) tablet 650 mg  650 mg Oral Q4H PRN    acetaminophen (TYLENOL) tablet 975 mg  975 mg Oral Q6H PRN    aluminum-magnesium hydroxide-simethicone (MYLANTA) oral suspension 30 mL  30 mL Oral Q4H PRN    amphetamine-dextroamphetamine (ADDERALL) tablet 10 mg  10 mg Oral Daily    atorvastatin (LIPITOR) tablet 10 mg  10 mg Oral Daily With Dinner    [START ON 4/15/2021] cholecalciferol (VITAMIN D3) tablet 1,000 Units  1,000 Units Oral Daily    DULoxetine (CYMBALTA) delayed release capsule 90 mg  90 mg Oral Daily    ergocalciferol (VITAMIN D2) capsule 50,000 Units  50,000 Units Oral Weekly    hydrOXYzine HCL (ATARAX) tablet 25 mg  25 mg Oral Q6H PRN Max 4/day    LORazepam (ATIVAN) injection 1 mg  1 mg Intramuscular Q6H PRN Max 3/day    LORazepam (ATIVAN) tablet 0 5 mg  0 5 mg Oral Q6H PRN Max 4/day    LORazepam (ATIVAN) tablet 1 mg  1 mg Oral Q6H PRN Max 3/day    melatonin tablet 9 mg  9 mg Oral HS    mirtazapine (REMERON) tablet 7 5 mg  7 5 mg Oral HS    multivitamin-minerals (CENTRUM) tablet 1 tablet  1 tablet Oral Daily    OLANZapine (ZyPREXA) IM injection 5 mg  5 mg Intramuscular Q6H PRN Max 3/day    OLANZapine (ZyPREXA) tablet 5 mg  5 mg Oral Q6H PRN Max 3/day    OLANZapine (ZyPREXA) tablet 5 mg  5 mg Oral Q6H PRN Max 3/day    OLANZapine (ZyPREXA) tablet 5 mg  5 mg Oral HS    polyethylene glycol (MIRALAX) packet 17 g  17 g Oral Daily PRN    risperiDONE (RisperDAL) tablet 0 5 mg  0 5 mg Oral Q6H PRN Max 3/day    senna-docusate sodium (SENOKOT S) 8 6-50 mg per tablet 1 tablet  1 tablet Oral Daily PRN    senna-docusate sodium (SENOKOT S) 8 6-50 mg per tablet 1 tablet  1 tablet Oral HS    traZODone (DESYREL) tablet 50 mg  50 mg Oral HS PRN    traZODone (DESYREL) tablet 50 mg  50 mg Oral HS     Labs: I have personally reviewed all pertinent laboratory/tests results  Most Recent Labs:   Lab Results   Component Value Date    WBC 7 85 03/08/2021    RBC 5 76 (H) 03/08/2021    HGB 16 8 03/08/2021    HCT 51 7 (H) 03/08/2021     03/08/2021    RDW 14 0 03/08/2021    NEUTROABS 6 15 03/08/2021    SODIUM 140 03/08/2021    K 3 8 03/08/2021     03/08/2021    CO2 29 03/08/2021    BUN 26 (H) 03/08/2021    CREATININE 1 94 (H) 03/08/2021    GLUC 102 03/08/2021    GLUF 84 09/18/2018    CALCIUM 8 8 03/08/2021    AST 12 03/08/2021    ALT 21 03/08/2021    ALKPHOS 70 03/08/2021    TP 7 2 03/08/2021    ALB 4 0 03/08/2021    TBILI 0 87 03/08/2021    CHOLESTEROL 195 04/26/2016    HDL 52 04/26/2016    TRIG 76 04/26/2016    LDLCALC 128 (H) 04/26/2016    VALPROICTOT 49 2 (L) 03/22/2021    AMMONIA <10 (L) 05/08/2016    [id number] 2 735 03/08/2021    RPR Non-Reactive 05/09/2016       Counseling / Coordination of Care  Total floor / unit time spent today 25 minutes  Greater than 50% of total time was spent with the patient and / or family counseling and / or coordination of care   A description of the counseling / coordination of care:

## 2021-04-03 NOTE — PROGRESS NOTES
Progress Note - Lorna Tilley  78 y o  male MRN: [id number]    Unit/Bed#: Wilfred Espinoza Encounter: [id number]        Subjective:   Patient seen and examined at bedside after reviewing the chart and discussing the case with the caring staff  Patient examined at bedside  Patient is selectively mute but does not appear to be in pain or distress  He offers no complaints  Physical Exam   Vitals: Blood pressure 152/81, pulse 63, temperature 98 3 °F (36 8 °C), temperature source Temporal, resp  rate 18, height 5' 11" (1 803 m), weight 92 kg (202 lb 13 2 oz), SpO2 97 %  ,Body mass index is 28 29 kg/m²  Constitutional: Patient appears well-developed  HEENT: PERR, EOMI, MMM  Cardiovascular: Normal rate and regular rhythm  Pulmonary/Chest: Effort normal and breath sounds normal    Abdomen: Soft, + BS, NT    Assessment/Plan:  Lorna Tilley  is a(n) 78 y o  male with  MDD      1  Cardiac with history of dyslipidemia  Patient is on atorvastatin 10 mg daily  2  Seizure disorder  Patient is on Depakote 500 mg b i d   3  Restless leg syndrome  Patient is on ropinirole 2 mg at bedtime  4  Constipation  Patient is on Senokot S   5  DJD/osteoarthritis  Patient may get Tylenol on as needed basis  6  Gait abnormality  PT and OT are on consult  7  Vitamin D deficiency  Continue D2 86312 units weekly for 6 weeks (ending on 04/14/2021) followed by D3 1000 units daily  The patient was discussed with Dr Gemma Kearney and he is in agreement with the above note

## 2021-04-03 NOTE — PROGRESS NOTES
Pt up ad wilner with walker  Pt c/o feeling depressed & sad all the time  Q 7 min checks maintained to monitor pt's behavior & safety  Pt denies any anxiety  Pt denies any hallucinations, suicidal or homicidal ideations  Pt is flat, withdrawn & irritable  Pt is compliant with medications

## 2021-04-04 PROCEDURE — 99232 SBSQ HOSP IP/OBS MODERATE 35: CPT | Performed by: PSYCHIATRY & NEUROLOGY

## 2021-04-04 RX ORDER — TRAZODONE HYDROCHLORIDE 150 MG/1
75 TABLET ORAL
Status: DISCONTINUED | OUTPATIENT
Start: 2021-04-04 | End: 2021-04-05

## 2021-04-04 RX ADMIN — MIRTAZAPINE 7.5 MG: 15 TABLET, FILM COATED ORAL at 22:31

## 2021-04-04 RX ADMIN — TRAZODONE HYDROCHLORIDE 75 MG: 150 TABLET ORAL at 22:30

## 2021-04-04 RX ADMIN — SENNOSIDES AND DOCUSATE SODIUM 1 TABLET: 8.6; 5 TABLET ORAL at 22:31

## 2021-04-04 RX ADMIN — Medication 1 TABLET: at 08:08

## 2021-04-04 RX ADMIN — DULOXETINE HYDROCHLORIDE 90 MG: 30 CAPSULE, DELAYED RELEASE ORAL at 08:08

## 2021-04-04 RX ADMIN — ATORVASTATIN CALCIUM 10 MG: 10 TABLET, FILM COATED ORAL at 15:37

## 2021-04-04 RX ADMIN — OLANZAPINE 5 MG: 5 TABLET, FILM COATED ORAL at 22:32

## 2021-04-04 RX ADMIN — DEXTROAMPHETAMINE SACCHARATE, AMPHETAMINE ASPARTATE, DEXTROAMPHETAMINE SULFATE, AND AMPHETAMINE SULFATE 10 MG: 2.5; 2.5; 2.5; 2.5 TABLET ORAL at 07:32

## 2021-04-04 RX ADMIN — MELATONIN 9 MG: at 22:30

## 2021-04-04 NOTE — PROGRESS NOTES
Pt denies any hallucinations, suicidal or homicidal ideations  Q 7 min checks maintained to monitor pt's behavior & safety  Pt is withdrawn, flat & irritable  Pt lacks motivation  Pt is compliant with medications  Pt up ad wilner with walker  Pt refused breakfast  Pt is depressed & c/o feeling sad @ all times

## 2021-04-04 NOTE — PROGRESS NOTES
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 ([place] [address] )    CKD (chronic kidney disease) stage 3, GFR 30-59 ml/min    ESBL (extended spectrum beta-lactamase) producing bacteria infection    Restless legs    Failure to thrive in adult      Behavior over the last 24 hours:  unchanged  Sleep: normal  Appetite: poor  Medication side effects: No  ROS: no complaints    Mental Status Evaluation:  Appearance:  casually dressed   Behavior:  psychomotor retardation   Speech:  soft   Mood:  depressed   Affect:  constricted   Thought Process:  goal directed   Thought Content:  obsessions   Perceptual Disturbances: pt denies   Risk Potential: Suicidal Ideations without plan  Homicidal Ideations none  Potential for Aggression No   Sensorium:  person and place   Memory:  Failed to cooperate with testing   Consciousness:  awake    Attention: attention span and concentration were age appropriate   Insight:  limited   Judgment: limited   Gait/Station: seen lying in bed   Motor Activity: no abnormal movements     Progress Toward Goals: Pt seen this morning lying in bed asleep and rather difficult to arouse,staff report difficult sleeping last night   He has remained rather withdrawn and isolative with limited engagement in the unit milue  only coming out for meals  His mood cont to be reduced along with poor motivation/or desire to engage in unit activities he spends a lot of time in bed with apparent difficulties in attending to his ADLs and instrumental activities of daily living  Pt also cont to voice passive death wish but has remained medication compliant with no reported side effects  Staff report no behavioral issues  Pt was recently commenced on Zyprexa which he tolerates with no reported issues  Pt is for skilled nursing facility placement  Recommended Treatment:   1-Increase Zyprexa 7 5mg at bedtime,Cont other treatment  2-Continue with group therapy, milieu therapy and occupational therapy  Risks, benefits and possible side effects of Medications:   Risks, benefits, and possible side effects of medications explained to patient and patient verbalizes understanding        Medications:   current meds:   Current Facility-Administered Medications   Medication Dose Route Frequency    acetaminophen (TYLENOL) tablet 650 mg  650 mg Oral Q4H PRN    acetaminophen (TYLENOL) tablet 650 mg  650 mg Oral Q4H PRN    acetaminophen (TYLENOL) tablet 975 mg  975 mg Oral Q6H PRN    aluminum-magnesium hydroxide-simethicone (MYLANTA) oral suspension 30 mL  30 mL Oral Q4H PRN    amphetamine-dextroamphetamine (ADDERALL) tablet 10 mg  10 mg Oral Daily    atorvastatin (LIPITOR) tablet 10 mg  10 mg Oral Daily With Dinner    [START ON 4/15/2021] cholecalciferol (VITAMIN D3) tablet 1,000 Units  1,000 Units Oral Daily    DULoxetine (CYMBALTA) delayed release capsule 90 mg  90 mg Oral Daily    ergocalciferol (VITAMIN D2) capsule 50,000 Units  50,000 Units Oral Weekly    hydrOXYzine HCL (ATARAX) tablet 25 mg  25 mg Oral Q6H PRN Max 4/day    LORazepam (ATIVAN) injection 1 mg  1 mg Intramuscular Q6H PRN Max 3/day    LORazepam (ATIVAN) tablet 0 5 mg  0 5 mg Oral Q6H PRN Max 4/day    LORazepam (ATIVAN) tablet 1 mg  1 mg Oral Q6H PRN Max 3/day    melatonin tablet 9 mg  9 mg Oral HS    mirtazapine (REMERON) tablet 7 5 mg  7 5 mg Oral HS    multivitamin-minerals (CENTRUM) tablet 1 tablet  1 tablet Oral Daily    OLANZapine (ZyPREXA) IM injection 5 mg  5 mg Intramuscular Q6H PRN Max 3/day    OLANZapine (ZyPREXA) tablet 5 mg  5 mg Oral Q6H PRN Max 3/day    OLANZapine (ZyPREXA) tablet 5 mg  5 mg Oral Q6H PRN Max 3/day    OLANZapine (ZyPREXA) tablet 5 mg  5 mg Oral HS    polyethylene glycol (MIRALAX) packet 17 g  17 g Oral Daily PRN    risperiDONE (RisperDAL) tablet 0 5 mg  0 5 mg Oral Q6H PRN Max 3/day    senna-docusate sodium (SENOKOT S) 8 6-50 mg per tablet 1 tablet  1 tablet Oral Daily PRN    senna-docusate sodium (SENOKOT S) 8 6-50 mg per tablet 1 tablet  1 tablet Oral HS    traZODone (DESYREL) tablet 50 mg  50 mg Oral HS PRN    traZODone (DESYREL) tablet 75 mg  75 mg Oral HS     Labs: I have personally reviewed all pertinent laboratory/tests results  Most Recent Labs:   Lab Results   Component Value Date    WBC 7 85 03/08/2021    RBC 5 76 (H) 03/08/2021    HGB 16 8 03/08/2021    HCT 51 7 (H) 03/08/2021     03/08/2021    RDW 14 0 03/08/2021    NEUTROABS 6 15 03/08/2021    SODIUM 140 03/08/2021    K 3 8 03/08/2021     03/08/2021    CO2 29 03/08/2021    BUN 26 (H) 03/08/2021    CREATININE 1 94 (H) 03/08/2021    GLUC 102 03/08/2021    GLUF 84 09/18/2018    CALCIUM 8 8 03/08/2021    AST 12 03/08/2021    ALT 21 03/08/2021    ALKPHOS 70 03/08/2021    TP 7 2 03/08/2021    ALB 4 0 03/08/2021    TBILI 0 87 03/08/2021    CHOLESTEROL 195 04/26/2016    HDL 52 04/26/2016    TRIG 76 04/26/2016    LDLCALC 128 (H) 04/26/2016    VALPROICTOT 49 2 (L) 03/22/2021    AMMONIA <10 (L) 05/08/2016    [id number] 2 735 03/08/2021    RPR Non-Reactive 05/09/2016       Counseling / Coordination of Care  Total floor / unit time spent today 25 minutes  Greater than 50% of total time was spent with the patient and / or family counseling and / or coordination of care   A description of the counseling / coordination of care:

## 2021-04-04 NOTE — PROGRESS NOTES
Pt present in his room at time of assessment  Pt completely ignored every assessment question, refused to talk to RN and instead chose to just show RN his bracelet and take his medications  20 mins later, pt told BHT that he didn't get his sleeping pills and couldn't sleep  Pt did receive his scheduled trazodone and melatonin  Pt stated, "I guess I'll just stay up all night staring at the ceiling then  Pt was asked what he dose at home to help sleep  Pt ignored RN again  Pt was staring at the ceiling at time of this note  Pt remains isolative and secludes to his room  Continuous visual safety checks performed throughout the shift  Safety precautions maintained  Will continue to monitor

## 2021-04-04 NOTE — PROGRESS NOTES
Progress Note - Yakelin Ledezma  78 y o  male MRN: [id number]    Unit/Bed#: Adella Brittle Encounter: [id number]        Subjective:   Patient seen and examined at bedside after reviewing the chart and discussing the case with the caring staff  Patient examined at bedside  Patient is selectively mute but does not appear to be in pain or distress  He offers no complaints  Physical Exam   Vitals: Blood pressure 111/72, pulse 63, temperature 98 7 °F (37 1 °C), temperature source Temporal, resp  rate 20, height 5' 11" (1 803 m), weight 92 kg (202 lb 13 2 oz), SpO2 97 %  ,Body mass index is 28 29 kg/m²  Constitutional: Patient appears well-developed  HEENT: PERR, EOMI, MMM  Cardiovascular: Normal rate and regular rhythm  Pulmonary/Chest: Effort normal and breath sounds normal    Abdomen: Soft, + BS, NT    Assessment/Plan:  Yakelin Ledezma  is a(n) 78 y o  male with  MDD      1  Cardiac with history of dyslipidemia  Patient is on atorvastatin 10 mg daily  2  Seizure disorder  Patient is on Depakote 500 mg b i d   3  Restless leg syndrome  Patient is on ropinirole 2 mg at bedtime  4  Constipation  Patient is on Senokot S   5  DJD/osteoarthritis  Patient may get Tylenol on as needed basis  6  Gait abnormality  PT and OT are on consult  7  Vitamin D deficiency  Continue D2 72459 units weekly for 6 weeks (ending on 04/14/2021) followed by D3 1000 units daily  The patient was discussed with Dr Kajal Dukes and he is in agreement with the above note

## 2021-04-05 PROCEDURE — 99233 SBSQ HOSP IP/OBS HIGH 50: CPT | Performed by: PSYCHIATRY & NEUROLOGY

## 2021-04-05 RX ORDER — MIRTAZAPINE 15 MG/1
15 TABLET, FILM COATED ORAL
Status: DISCONTINUED | OUTPATIENT
Start: 2021-04-05 | End: 2021-04-16 | Stop reason: HOSPADM

## 2021-04-05 RX ORDER — TRAZODONE HYDROCHLORIDE 50 MG/1
100 TABLET ORAL
Status: DISCONTINUED | OUTPATIENT
Start: 2021-04-05 | End: 2021-04-07

## 2021-04-05 RX ADMIN — DEXTROAMPHETAMINE SACCHARATE, AMPHETAMINE ASPARTATE, DEXTROAMPHETAMINE SULFATE, AND AMPHETAMINE SULFATE 10 MG: 2.5; 2.5; 2.5; 2.5 TABLET ORAL at 09:22

## 2021-04-05 RX ADMIN — MIRTAZAPINE 15 MG: 15 TABLET, FILM COATED ORAL at 21:05

## 2021-04-05 RX ADMIN — OLANZAPINE 5 MG: 5 TABLET, FILM COATED ORAL at 21:05

## 2021-04-05 RX ADMIN — LORAZEPAM 1 MG: 1 TABLET ORAL at 22:50

## 2021-04-05 RX ADMIN — MELATONIN 9 MG: at 21:05

## 2021-04-05 RX ADMIN — SENNOSIDES AND DOCUSATE SODIUM 1 TABLET: 8.6; 5 TABLET ORAL at 21:05

## 2021-04-05 RX ADMIN — Medication 1 TABLET: at 09:23

## 2021-04-05 RX ADMIN — TRAZODONE HYDROCHLORIDE 100 MG: 50 TABLET ORAL at 21:06

## 2021-04-05 RX ADMIN — ATORVASTATIN CALCIUM 10 MG: 10 TABLET, FILM COATED ORAL at 16:47

## 2021-04-05 RX ADMIN — DULOXETINE HYDROCHLORIDE 90 MG: 30 CAPSULE, DELAYED RELEASE ORAL at 09:23

## 2021-04-05 NOTE — PROGRESS NOTES
04/05/21    Team Meeting   Meeting Type Daily Rounds   Team Members Present   Team Members Present Physician;Nurse;;Occupational Therapist   Physician Team Member Dr Keisha Garcia MD   Nursing Team Member Travis Dietz, MARIANNA   Care Management Team Member MS Jessie, [facility]   OT Team Member Sue [place], [place]   Patient/Family Present   Patient Present No   Patient's Family Present No   No D/C date at this time  Flat depressed  Only had dinner yesterday, no sleep last night  Negative outlook, selectively mute, sometimes dose not answer to prompts  Will explore ECT  Medication adjustment  Cognitive decline, will complete cognitive testing

## 2021-04-05 NOTE — PLAN OF CARE
Problem: DISCHARGE PLANNING - CARE MANAGEMENT  Goal: Discharge to post-acute care or home with appropriate resources  Description: INTERVENTIONS:  - Conduct assessment to determine patient/family and health care team treatment goals, and need for post-acute services based on payer coverage, community resources, and patient preferences, and barriers to discharge  - Address psychosocial, clinical, and financial barriers to discharge as identified in assessment in conjunction with the patient/family and health care team  - Arrange appropriate level of post-acute services according to patients   needs and preference and payer coverage in collaboration with the physician and health care team  - Communicate with and update the patient/family, physician, and health care team regarding progress on the discharge plan  - Arrange appropriate transportation to post-acute venues  Outcome: Progressing     Pt progressing slowly; remains depressed, flat, negative; possible EAC referral; No DC date

## 2021-04-05 NOTE — PLAN OF CARE
Problem: Ineffective Coping  Goal: Participates in unit activities  Description: Interventions:  - Provide therapeutic environment   - Provide required programming   - Redirect inappropriate behaviors   Outcome: Not Progressing   Patient continues to isolate

## 2021-04-05 NOTE — CMS CERTIFICATION NOTE
Recertification: Based upon physical, mental and social evaluations, I certify that inpatient psychiatric services continue to be medically necessary for this patient for a duration of 20 midnights for the treatment of MDD (major depressive disorder), recurrent episode, severe ([facility] )   Available alternative community resources still do not meet the patient's mental health care needs  I further attest that an established written individualized plan of care has been updated and is outlined in the patient's medical records

## 2021-04-05 NOTE — PROGRESS NOTES
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 ([facility] )    CKD (chronic kidney disease) stage 3, GFR 30-59 ml/min    ESBL (extended spectrum beta-lactamase) producing bacteria infection    Restless legs    Failure to thrive in adult      Behavior over the last 24 hours:  regressed  Sleep: insomnia  Appetite: poor  Medication side effects: No  ROS: no complaints and all other systems are negative for acute changes    Mental Status Evaluation:  Appearance:  older than stated age and found in bed, blankets up to chin   Behavior:  psychomotor retardation and minimally conversational, poor eye contact   Speech:  increased latency of response and one, two word responses   Mood:  decreased range and irritable   Affect:  blunted   Thought Process:  blocked and concrete   Thought Content:  no overt delusions   Perceptual Disturbances: None   Risk Potential: Suicidal Ideations wish to die  Homicidal Ideations none  Potential for Aggression No   Sensorium:  person and place   Memory:  recent memory mildly impaired   Consciousness:  alert and awake    Attention: attention span appeared shorter than expected for age   Insight:  poor   Judgment: poor   Gait/Station: in bed   Motor Activity: no abnormal movements     Progress Toward Goals: Patient was evasive, irritable, isolated and appears to be sleeping  Staff  Reports patient had ruminating with poor sleep  He continues negative with low motivation, participation and selectively mute with staff periodically  Calorie intake remains limited  He has been compliant with medication and denies any current side effects    Patient refuses ECT at this time  Recommended Treatment: Continue with group therapy, milieu therapy and occupational therapy  Risks, benefits and possible side effects of Medications:   Patient does not verbalize understanding at this time and will require further explanation  Medications: all current active meds have been reviewed  Increase Remeron to 15 mg HS and Trazodone to 100 mg HS  Labs: I have personally reviewed all pertinent laboratory/tests results  Most Recent Labs:   Lab Results   Component Value Date    WBC 7 85 03/08/2021    RBC 5 76 (H) 03/08/2021    HGB 16 8 03/08/2021    HCT 51 7 (H) 03/08/2021     03/08/2021    RDW 14 0 03/08/2021    NEUTROABS 6 15 03/08/2021    SODIUM 140 03/08/2021    K 3 8 03/08/2021     03/08/2021    CO2 29 03/08/2021    BUN 26 (H) 03/08/2021    CREATININE 1 94 (H) 03/08/2021    GLUC 102 03/08/2021    GLUF 84 09/18/2018    CALCIUM 8 8 03/08/2021    AST 12 03/08/2021    ALT 21 03/08/2021    ALKPHOS 70 03/08/2021    TP 7 2 03/08/2021    ALB 4 0 03/08/2021    TBILI 0 87 03/08/2021    CHOLESTEROL 195 04/26/2016    HDL 52 04/26/2016    TRIG 76 04/26/2016    LDLCALC 128 (H) 04/26/2016    VALPROICTOT 49 2 (L) 03/22/2021    AMMONIA <10 (L) 05/08/2016    [id number] 2 735 03/08/2021    RPR Non-Reactive 05/09/2016       Counseling / Coordination of Care  Total floor / unit time spent today 20 minutes  Greater than 50% of total time was spent with the patient and / or family counseling and / or coordination of care

## 2021-04-05 NOTE — PROGRESS NOTES
Patient remains depressed, withdrawn, negative, hopeless, isolative to room, experiencing marked anhedonia and  resistance to any level of encouragement  Selective responses to inquiry  No psychotic sx or lethality evidenced  Remains in room, in bed, throughout the shift  Declined breakfast or lunch  "This is the worst food I've ever had "  Has been cooperative with med administration  Will continue to offer support and encouragement

## 2021-04-05 NOTE — PROGRESS NOTES
Pt tossed and turned all shift, didn't sleep for any extended period of time  Pt stated the medications don't work and that he never sleeps  Pt requested ensure which he drank 2 of throughout the night after missing 2 meals during the day and only eating one sausage at dinner  Pt remained in his bed all shift  Continuous visual safety checks performed throughout the night  No sign of distress or labored breathing  Safety precautions maintained  Will continue to monitor

## 2021-04-05 NOTE — PROGRESS NOTES
Progress Note - Rozella Bloch  78 y o  male MRN: [id number]    Unit/Bed#: Alise Garcia Encounter: [id number]        Subjective:   Patient seen and examined at bedside after reviewing the chart and discussing the case with the caring staff  Patient examined at bedside  Patient has no acute issues except he seems anorexic and only eats 2 times daily  Physical Exam   Vitals: Blood pressure 108/57, pulse 63, temperature 99 4 °F (37 4 °C), temperature source Temporal, resp  rate 16, height 5' 11" (1 803 m), weight 92 kg (202 lb 13 2 oz), SpO2 93 %  ,Body mass index is 28 29 kg/m²  Constitutional: Patient appears well-developed  HEENT: PERR, EOMI, MMM  Cardiovascular: Normal rate and regular rhythm  Pulmonary/Chest: Effort normal and breath sounds normal    Abdomen: Soft, + BS, NT    Assessment/Plan:  Rozella Bloch  is a(n) 78 y o  male with  MDD      1  Cardiac with history of dyslipidemia  Patient is on atorvastatin 10 mg daily  2  Seizure disorder  Patient is on Depakote 500 mg b i d   3  Restless leg syndrome  Patient is on ropinirole 2 mg at bedtime  4  Constipation  Patient is on Senokot S   5  DJD/osteoarthritis  Patient may get Tylenol on as needed basis  6  Gait abnormality  PT and OT are on consult  7  Vitamin D deficiency  Continue D2 87453 units weekly for 6 weeks (ending on 04/14/2021) followed by D3 1000 units daily  8  Anorexia  Patient is currently on Remeron  Patient is also getting Ensure supplements

## 2021-04-05 NOTE — SOCIAL WORK
SW met with patient in pt room; pt remains depressed, hopeless; pt denies SI/HI/AH/VH, dep "it's always there", anx (did not rate); pt continues to decline ECT treatment stating "it doesn't last"; pt continues to feel that the medications aren't working, pt states "doc said he's changing my meds again and then it will be another 4 days until I feel something"; SW provided support as pt allowed;  Pt had no questions or concerns for SW at this time; SW will continue to meet with patient as needed for tx and dc planning

## 2021-04-05 NOTE — PROGRESS NOTES
Pt present in his room at time of assessment  Pt presents as flat, hopeless, depressed, miserable and his affect is flat  Pt states, "there's no use in anything, nothing works, I feel just as bad as when I came in, nothing is worth it " Pt continues to ignore assessment questions but is profoundly depressed and does not believe he will get better  Pt compliant with medications and asked for one of his ensures  Pt provided with his ensure and pt went to bed stating, "another night I won't sleep " Pt made aware of med changes, trazodone increase  Pt verbalized understanding  Continuous visual safety checks performed throughout the shift  Safety precautions maintained  Will continue to monitor

## 2021-04-06 PROCEDURE — 99232 SBSQ HOSP IP/OBS MODERATE 35: CPT | Performed by: PSYCHIATRY & NEUROLOGY

## 2021-04-06 RX ORDER — OLANZAPINE 10 MG/1
10 TABLET ORAL
Status: DISCONTINUED | OUTPATIENT
Start: 2021-04-06 | End: 2021-04-16 | Stop reason: HOSPADM

## 2021-04-06 RX ADMIN — TRAZODONE HYDROCHLORIDE 50 MG: 50 TABLET ORAL at 22:13

## 2021-04-06 RX ADMIN — OLANZAPINE 10 MG: 10 TABLET, FILM COATED ORAL at 20:47

## 2021-04-06 RX ADMIN — ATORVASTATIN CALCIUM 10 MG: 10 TABLET, FILM COATED ORAL at 16:41

## 2021-04-06 RX ADMIN — SENNOSIDES AND DOCUSATE SODIUM 1 TABLET: 8.6; 5 TABLET ORAL at 20:48

## 2021-04-06 RX ADMIN — DEXTROAMPHETAMINE SACCHARATE, AMPHETAMINE ASPARTATE, DEXTROAMPHETAMINE SULFATE, AND AMPHETAMINE SULFATE 10 MG: 2.5; 2.5; 2.5; 2.5 TABLET ORAL at 08:42

## 2021-04-06 RX ADMIN — Medication 1 TABLET: at 08:42

## 2021-04-06 RX ADMIN — MIRTAZAPINE 15 MG: 15 TABLET, FILM COATED ORAL at 20:49

## 2021-04-06 RX ADMIN — TRAZODONE HYDROCHLORIDE 100 MG: 50 TABLET ORAL at 20:48

## 2021-04-06 RX ADMIN — MELATONIN 9 MG: at 20:47

## 2021-04-06 RX ADMIN — DULOXETINE HYDROCHLORIDE 90 MG: 30 CAPSULE, DELAYED RELEASE ORAL at 08:42

## 2021-04-06 NOTE — NURSING NOTE
Patient remains withdrawn to room  Flat, depressed, irritable  Compliant with AM medications  Refused breakfast tray  Did eat lunch in hallway- drank 100% of Ensure  Showered  Endorses depression- rates 8/10  Denies anxiety  Negative thinking stating "I don't care about nothing"  Will CTM  Q7 minute safety checks in progress

## 2021-04-06 NOTE — PROGRESS NOTES
03/10/21 1435   Team Meeting   Meeting Type Tx Team Meeting   Initial Conference Date 03/10/21   Next Conference Date 04/09/21   Team Members Present   Team Members Present Physician;Nurse;   Physician Team Member Dr Maurilio Gomez MD   Nursing Team Member Doretha Hall RN   Social Work Team Member SHAI Frost   Patient/Family Present   Patient Present No  (pt declined meeting with team)   Patient's Family Present No     Initial Conference     Patient declined meeting with team, review plan or sign due to profound depression and non-verbal status; Treatment team met and reviewed pt strengths, limitations, coping skills, treatment plan and goals; pt agreeable and signed; copy on chart

## 2021-04-06 NOTE — PROGRESS NOTES
04/06/21    Team Meeting   Meeting Type Daily Rounds   Team Members Present   Team Members Present Physician;Nurse;;Occupational Therapist   Physician Team Member Dr Hetal MD; [place] [address]   Nursing Team Member Mahnaz Hull, MARIANNA   Care Management Team Member Nicole Mcguire MS, [facility], [place], [facility]   OT Team Member [place], [place]   Patient/Family Present   Patient Present No   Patient's Family Present No   Poor sleep, often in bed, 2 ensure last night, negative, hopeless, PRN ineffective for sleep  Will start focusing on one specific task a day to accomplish and reinforce  No D/C date at this time  PT declined ECT

## 2021-04-06 NOTE — PROGRESS NOTES
Patient given Ensure and began yelling at nurse  Upset, saying his medications are not working for sleep  Very angry, severe anxiety  Unable to de-escalate  Trejo Scale 25  Given LORazepam (ATIVAN) tablet 1 mg at 2250 for severe anxiety  Will monitor for effect

## 2021-04-06 NOTE — PROGRESS NOTES
Progress Note - Carrington Cabrera  78 y o  male MRN: [id number]    Unit/Bed#: Francisco Benites Encounter: [id number]        Subjective:   Patient seen and examined at bedside after reviewing the chart and discussing the case with the caring staff  Patient examined at bedside  Patient has no acute issues  Physical Exam   Vitals: Blood pressure 131/61, pulse 68, temperature 98 8 °F (37 1 °C), temperature source Temporal, resp  rate 18, height 5' 11" (1 803 m), weight 92 kg (202 lb 13 2 oz), SpO2 93 %  ,Body mass index is 28 29 kg/m²  Constitutional: Patient appears well-developed  HEENT: PERR, EOMI, MMM  Cardiovascular: Normal rate and regular rhythm  Pulmonary/Chest: Effort normal and breath sounds normal    Abdomen: Soft, + BS, NT    Assessment/Plan:  Carrington Cabrera  is a(n) 78 y o  male with  MDD      1  Cardiac with history of dyslipidemia  Patient is on atorvastatin 10 mg daily  2  Seizure disorder  Patient is on Depakote 500 mg b i d   3  Restless leg syndrome  Patient is on ropinirole 2 mg at bedtime  4  Constipation  Patient is on Senokot S   5  DJD/osteoarthritis  Patient may get Tylenol on as needed basis  6  Gait abnormality  PT and OT are on consult  7  Vitamin D deficiency  Continue D2 11914 units weekly for 6 weeks (ending on 04/14/2021) followed by D3 1000 units daily  8  Anorexia  Patient is currently on Remeron  Patient is also getting Ensure supplements

## 2021-04-06 NOTE — PLAN OF CARE
Problem: DISCHARGE PLANNING - CARE MANAGEMENT  Goal: Discharge to post-acute care or home with appropriate resources  Description: INTERVENTIONS:  - Conduct assessment to determine patient/family and health care team treatment goals, and need for post-acute services based on payer coverage, community resources, and patient preferences, and barriers to discharge  - Address psychosocial, clinical, and financial barriers to discharge as identified in assessment in conjunction with the patient/family and health care team  - Arrange appropriate level of post-acute services according to patients   needs and preference and payer coverage in collaboration with the physician and health care team  - Communicate with and update the patient/family, physician, and health care team regarding progress on the discharge plan  - Arrange appropriate transportation to post-acute venues  Outcome: Progressing     Pt progressing slowly; remains negative, depressed, poor sleep;  No DC date

## 2021-04-06 NOTE — PROGRESS NOTES
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 ([facility] )    CKD (chronic kidney disease) stage 3, GFR 30-59 ml/min    ESBL (extended spectrum beta-lactamase) producing bacteria infection    Restless legs    Failure to thrive in adult      Behavior over the last 24 hours:  unchanged  Sleep: insomnia  Appetite: poor  Medication side effects: No  ROS: no complaints and All other systems negative for acute change     Kimmy Hartmann was seen today for follow-up and case discussed with treatment team this morning  Kimmy Hartmann remains withdrawn to room and continues to have insomnia and poor appetite  He rates his depression as 8/10 and denies any anxiety  He continues to endorse negative thinking repeatedly saying I don't care about nothing " This provider inquired about [facility] desire to return home in which he replied Jennifer Lopez would I want to go home like this?   Continue to asked patient how we could help him get better and he stated I do not care anymore  Zelda Samson was encouraged to attend group this morning and he briefly considered  He denies any SI/HI  However, he did report he has been having auditory hallucinations of hearing music and further described it sounds like a radio all the time    He denies having auditory hallucinations at this time, but states it kept him up last night  Denies any VH  Will increase Zyprexa for auditory hallucinations and augmentation of antidepressant      Mental Status Evaluation:  Appearance:  disheveled and Laying in bed   Behavior:  guarded, psychomotor retardation and Uninterested   Speech:  Minimal, monotone   Mood:  depressed Affect:  flat   Thought Process:  perserverative   Thought Content:  Negative thinking   Perceptual Disturbances: Auditory hallucinations without commands, describes hearing music it sounds like a radio     Risk Potential: Suicidal Ideations none  Homicidal Ideations none  Potential for Aggression No   Sensorium:  person and place   Memory:  recent memory mildly impaired   Consciousness:  alert and awake    Attention: attention span appeared shorter than expected for age   Insight:  Poor   Judgment: Poor   Gait/Station: Laying in bed   Motor Activity: no abnormal movements     Progress Toward Goals: Will increase Zyprexa to 10 mg at HS for auditory hallucinations and augmentation of antidepressant therapy  Otherwise, continue current psychotropic regimen  Dr Aiden Rhodes spoke with patient about ECT yesterday in which patient declined  No discharge date at this time  Recommended Treatment: Continue with group therapy, milieu therapy and occupational therapy  Risks, benefits and possible side effects of Medications:   Risks, benefits, and possible side effects of medications explained to patient and patient verbalizes understanding  Medications: all current active meds have been reviewed and planned medication changes: Increase Zyprexa 10mg PO QHS  Labs: I have personally reviewed all pertinent laboratory/tests results  Counseling / Coordination of Care  Total floor / unit time spent today 20 minutes  Greater than 50% of total time was spent with the patient and / or family counseling and / or coordination of care

## 2021-04-06 NOTE — PROGRESS NOTES
Pt was up most of the night, restless  Pt didn't sleep for any period of time throughout the night  Pt reports that medication is ineffective  Continuous visual safety checks performed throughout the night  No sign of distress or labored breathing  Safety precautions maintained  Will continue to monitor

## 2021-04-06 NOTE — PLAN OF CARE
Problem: SLEEP DISTURBANCE  Goal: Will exhibit normal sleeping pattern  Description: Interventions:  -  Assess the patients sleep pattern, noting recent changes  - Administer medication as ordered  - Decrease environmental stimuli, including noise, as appropriate during the night  - Encourage the patient to actively participate in unit groups and or exercise during the day to enhance ability to achieve adequate sleep at night  - Assess the patient, in the morning, encouraging a description of sleep experience  Outcome: Progressing     Problem: Ineffective Coping  Goal: Participates in unit activities  Description: Interventions:  - Provide therapeutic environment   - Provide required programming   - Redirect inappropriate behaviors   Outcome: Progressing  Goal: Cooperates with admission process  Description: Interventions:   - Complete admission process  Outcome: Progressing  Goal: Identifies healthy coping skills  Outcome: Progressing  Goal: Patient/Family participate in treatment and DC plans  Description: Interventions:  - Provide therapeutic environment  Outcome: Progressing  Goal: Patient/Family verbalizes awareness of resources  Outcome: Progressing     Problem: Depression  Goal: Treatment Goal: Demonstrate behavioral control of depressive symptoms, verbalize feelings of improved mood/affect, and adopt new coping skills prior to discharge  Outcome: Progressing  Goal: Verbalize thoughts and feelings  Description: Interventions:  - Assess and re-assess patient's level of risk   - Engage patient in 1:1 interactions, daily, for a minimum of 15 minutes   - Encourage patient to express feelings, fears, frustrations, hopes   Outcome: Progressing  Goal: Refrain from harming self  Description: Interventions:  - Monitor patient closely, per order   - Supervise medication ingestion, monitor effects and side effects   Outcome: Progressing  Goal: Refrain from isolation  Description: Interventions:  - Develop a trusting relationship   - Encourage socialization   Outcome: Progressing  Goal: Refrain from self-neglect  Outcome: Progressing  Goal: Complete daily ADLs, including personal hygiene independently, as able  Description: Interventions:  - Observe, teach, and assist patient with ADLS  -  Monitor and promote a balance of rest/activity, with adequate nutrition and elimination   Outcome: Progressing     Problem: Nutrition/Hydration-ADULT  Goal: Nutrient/Hydration intake appropriate for improving, restoring or maintaining nutritional needs  Description: Monitor and assess patient's nutrition/hydration status for malnutrition  Collaborate with interdisciplinary team and initiate plan and interventions as ordered  Monitor patient's weight and dietary intake as ordered or per policy  Utilize nutrition screening tool and intervene as necessary  Determine patient's food preferences and provide high-protein, high-caloric foods as appropriate       INTERVENTIONS:  - Monitor oral intake, urinary output, labs, and treatment plans  - Assess nutrition and hydration status and recommend course of action  - Evaluate amount of meals eaten  - Assist patient with eating if necessary   - Allow adequate time for meals  - Recommend/ encourage appropriate diets, oral nutritional supplements, and vitamin/mineral supplements  - Order, calculate, and assess calorie counts as needed  - Recommend, monitor, and adjust tube feedings and TPN/PPN based on assessed needs  - Assess need for intravenous fluids  - Provide specific nutrition/hydration education as appropriate  - Include patient/family/caregiver in decisions related to nutrition  Outcome: Progressing     Problem: Prexisting or High Potential for Compromised Skin Integrity  Goal: Skin integrity is maintained or improved  Description: INTERVENTIONS:  - Identify patients at risk for skin breakdown  - Assess and monitor skin integrity  - Assess and monitor nutrition and hydration status  - Monitor labs - Assess for incontinence   - Turn and reposition patient  - Assist with mobility/ambulation  - Relieve pressure over bony prominences  - Avoid friction and shearing  - Provide appropriate hygiene as needed including keeping skin clean and dry  - Evaluate need for skin moisturizer/barrier cream  - Collaborate with interdisciplinary team   - Patient/family teaching  - Consider wound care consult   Outcome: Progressing

## 2021-04-07 PROCEDURE — 99232 SBSQ HOSP IP/OBS MODERATE 35: CPT | Performed by: PSYCHIATRY & NEUROLOGY

## 2021-04-07 RX ORDER — TRAZODONE HYDROCHLORIDE 150 MG/1
150 TABLET ORAL
Status: DISCONTINUED | OUTPATIENT
Start: 2021-04-07 | End: 2021-04-16 | Stop reason: HOSPADM

## 2021-04-07 RX ADMIN — OLANZAPINE 10 MG: 10 TABLET, FILM COATED ORAL at 20:59

## 2021-04-07 RX ADMIN — ATORVASTATIN CALCIUM 10 MG: 10 TABLET, FILM COATED ORAL at 16:25

## 2021-04-07 RX ADMIN — Medication 1 TABLET: at 08:42

## 2021-04-07 RX ADMIN — SENNOSIDES AND DOCUSATE SODIUM 1 TABLET: 8.6; 5 TABLET ORAL at 21:00

## 2021-04-07 RX ADMIN — ERGOCALCIFEROL 50000 UNITS: 1.25 CAPSULE, LIQUID FILLED ORAL at 08:42

## 2021-04-07 RX ADMIN — DEXTROAMPHETAMINE SACCHARATE, AMPHETAMINE ASPARTATE, DEXTROAMPHETAMINE SULFATE, AND AMPHETAMINE SULFATE 10 MG: 2.5; 2.5; 2.5; 2.5 TABLET ORAL at 06:26

## 2021-04-07 RX ADMIN — MELATONIN 9 MG: at 20:59

## 2021-04-07 RX ADMIN — MIRTAZAPINE 15 MG: 15 TABLET, FILM COATED ORAL at 21:00

## 2021-04-07 RX ADMIN — DULOXETINE HYDROCHLORIDE 90 MG: 30 CAPSULE, DELAYED RELEASE ORAL at 08:42

## 2021-04-07 RX ADMIN — TRAZODONE HYDROCHLORIDE 150 MG: 150 TABLET ORAL at 21:00

## 2021-04-07 NOTE — NURSING NOTE
Patient withdrawn to his room this shift  Ate HS snack  Refused group  Upon approach patient's mood is flat and depressed  Patient is labile with an irritable edge  Remains uninterested  Patient has negative thinking and dismisses any encouragement  When asked patient how can we help him? Patient stated, "That's your job to figure out! Don't you know?" Denies SI/HI/HAVH/pain  Took medications without difficulty  Will continue to monitor safety and behaviors

## 2021-04-07 NOTE — NURSING NOTE
Trazodone affective  Patient appears to be sleeping without difficulty throughout the night  Will continue to monitor safety and behaviors every 7 minutes

## 2021-04-07 NOTE — SOCIAL WORK
SW met with patient in pt room; pt sitting in chair with closed body language, minimal eye contact; pt continues to express no change in depression levels and states "the meds aren't working"; pt remains withdrawn to room, often laying in bed with minimal interaction with staff or peers; pt remains negative in thought process unable to recognize any change in his affect or mood; no questions or concerns for SW at this time; SW will continue to meet with patient as needed for tx and dc planning

## 2021-04-07 NOTE — NURSING NOTE
Patient complained his sleeping medication was ineffective  Gave 50 mg PRN Trazodone as ordered  Will continue to monitor safety and behaviors every 7 minutes

## 2021-04-07 NOTE — PROGRESS NOTES
Progress Note - Isabel Tuttle  78 y o  male MRN: [id number]    Unit/Bed#: Rudy Villar Encounter: [id number]        Subjective:   Patient seen and examined at bedside after reviewing the chart and discussing the case with the caring staff  Patient examined at bedside  Patient has no acute concerns  Physical Exam   Vitals: Blood pressure 111/74, pulse 61, temperature (!) 97 4 °F (36 3 °C), temperature source Temporal, resp  rate 18, height 5' 11" (1 803 m), weight 92 kg (202 lb 13 2 oz), SpO2 94 %  ,Body mass index is 28 29 kg/m²  Constitutional: Patient appears well-developed  HEENT: PERR, EOMI, MMM  Cardiovascular: Normal rate and regular rhythm  Pulmonary/Chest: Effort normal and breath sounds normal    Abdomen: Soft, + BS, NT    Assessment/Plan:  Isabel Tuttle  is a(n) 78 y o  male with  MDD      1  Cardiac with history of dyslipidemia  Patient is on atorvastatin 10 mg daily  2  Seizure disorder  Patient is on Depakote 500 mg b i d   3  Restless leg syndrome  Patient is on ropinirole 2 mg at bedtime  4  Constipation  Patient is on Senokot S   5  DJD/osteoarthritis  Patient may get Tylenol on as needed basis  6  Gait abnormality  PT and OT are on consult  7  Vitamin D deficiency  Continue D2 70278 units weekly for 6 weeks (ending on 04/14/2021) followed by D3 1000 units daily  8  Anorexia  Patient is currently on Remeron  Patient is also getting Ensure supplements  The patient was discussed with Dr Zahira Maravilla and he is in agreement with the above note

## 2021-04-07 NOTE — PROGRESS NOTES
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 ([place] [address] )    CKD (chronic kidney disease) stage 3, GFR 30-59 ml/min    ESBL (extended spectrum beta-lactamase) producing bacteria infection    Restless legs    Failure to thrive in adult      Behavior over the last 24 hours:  unchanged  Sleep: insomnia  Appetite: poor  Medication side effects: No  ROS: no complaints and all other systems negative for acute change    Diane Diaz was seen today for follow up and case discussed with treatment team this morning  Patient remains withdrawn to room and rates depression as 8/10  He denies any anxiety/AVH/SI/HI  He reports I have no interest in nothing    Patient elaborated that he used to be interested in [employer] that he had at home however he stated I could care less about that anymore    Continues to report insomnia throughout the night; will increase trazodone at HS  Patient was out in milieu for lunch and continues to be encouraged by staff to be more present in the milieu  Appetite remains poor  Diane Diaz is compliant with medication regimen and denies any side effects  He continues to voice negative thoughts frequently stated I see no light at the end of the tunnel  "Much support and encouragement provided to patient to which he was appreciative      Mental Status Evaluation:  Appearance:  age appropriate and casually dressed   Behavior:  psychomotor retardation and withdrawn, calm   Speech:  monotone   Mood:  depressed and sad   Affect:  flat   Thought Process:  circumstantial   Thought Content:  negative thinking   Perceptual Disturbances: None   Risk Potential: Suicidal Ideations none  Homicidal Ideations none  Potential for Aggression No   Sensorium:  person, place and situation   Memory:  recent memory mildly impaired   Consciousness:  alert and awake    Attention: attention span appeared shorter than expected for age   Insight:  poor   Judgment: poor   Gait/Station: slow   Motor Activity: no abnormal movements     Progress Toward Goals: Adjustments made to Zyprexa yesterday  Continues to experience insomnia during the night; will increase Trazodone 150mg PO QHS  Patient encouraged to get out of bed and invited into milieu  Continue current psychotropic regimen  No discharge date at this time  Recommended Treatment: Continue with group therapy, milieu therapy and occupational therapy  Risks, benefits and possible side effects of Medications:   Risks, benefits, and possible side effects of medications explained to patient and patient verbalizes understanding  Medications: all current active meds have been reviewed and planned medication changes: Increase Trazodone 150mg PO QHS  Labs: I have personally reviewed all pertinent laboratory/tests results  Counseling / Coordination of Care  Total floor / unit time spent today 20 minutes  Greater than 50% of total time was spent with the patient and / or family counseling and / or coordination of care

## 2021-04-07 NOTE — PROGRESS NOTES
04/07/21 0932   Team Meeting   Meeting Type Daily Rounds   Team Members Present   Team Members Present Physician;Nurse;Occupational Therapist;   Physician Team Member Dr Liban Macario MD; SHERRIE Smith   Nursing Team Member Khanh Christopher RN   [facility]   OT Team Member Phoenix Streeter [place]   Patient/Family Present   Patient Present No   Patient's Family Present No     C/o insomnia - prn utilized - effective - slept overnight; refused intermittent meals - poor intake; showered

## 2021-04-07 NOTE — PLAN OF CARE
Problem: DISCHARGE PLANNING - CARE MANAGEMENT  Goal: Discharge to post-acute care or home with appropriate resources  Description: INTERVENTIONS:  - Conduct assessment to determine patient/family and health care team treatment goals, and need for post-acute services based on payer coverage, community resources, and patient preferences, and barriers to discharge  - Address psychosocial, clinical, and financial barriers to discharge as identified in assessment in conjunction with the patient/family and health care team  - Arrange appropriate level of post-acute services according to patients   needs and preference and payer coverage in collaboration with the physician and health care team  - Communicate with and update the patient/family, physician, and health care team regarding progress on the discharge plan  - Arrange appropriate transportation to post-acute venues  Outcome: Progressing     Pt progressing slowly;  No DC date - will return to home upon stabilization

## 2021-04-07 NOTE — NURSING NOTE
Patient remains withdrawn to room and self  Compliant with AM medications  Denied breakfast tray  Pt ate lunch in hallway of unit- 25%- drank full can of Ensure supplement  Negative thoughts continue  Ignored questions regarding how he is currently doing  Remains flat, depressed  Will CTM  Q7 minute safety checks in progress

## 2021-04-08 PROCEDURE — 99232 SBSQ HOSP IP/OBS MODERATE 35: CPT | Performed by: PSYCHIATRY & NEUROLOGY

## 2021-04-08 RX ADMIN — MIRTAZAPINE 15 MG: 15 TABLET, FILM COATED ORAL at 21:07

## 2021-04-08 RX ADMIN — OLANZAPINE 10 MG: 10 TABLET, FILM COATED ORAL at 21:07

## 2021-04-08 RX ADMIN — SENNOSIDES AND DOCUSATE SODIUM 1 TABLET: 8.6; 5 TABLET ORAL at 21:07

## 2021-04-08 RX ADMIN — Medication 1 TABLET: at 08:40

## 2021-04-08 RX ADMIN — MELATONIN 9 MG: at 21:06

## 2021-04-08 RX ADMIN — DEXTROAMPHETAMINE SACCHARATE, AMPHETAMINE ASPARTATE, DEXTROAMPHETAMINE SULFATE, AND AMPHETAMINE SULFATE 10 MG: 2.5; 2.5; 2.5; 2.5 TABLET ORAL at 06:14

## 2021-04-08 RX ADMIN — TRAZODONE HYDROCHLORIDE 150 MG: 150 TABLET ORAL at 21:06

## 2021-04-08 RX ADMIN — ATORVASTATIN CALCIUM 10 MG: 10 TABLET, FILM COATED ORAL at 15:48

## 2021-04-08 RX ADMIN — DULOXETINE HYDROCHLORIDE 90 MG: 30 CAPSULE, DELAYED RELEASE ORAL at 08:40

## 2021-04-08 NOTE — NURSING NOTE
Patient remains withdrawn to room  Refused breakfast tray- when asked if he was hungry he shook his head "no"  Pt does not engage in conversation or answer questions  Appears flat, depressed, irritable  Compliant with AM medications  Does not offer any further information or complaints  Will CTM  Q7 minute safety checks in progress

## 2021-04-08 NOTE — PROGRESS NOTES
Progress Note - Isabel Tuttle  78 y o  male MRN: [id number]    Unit/Bed#: Rudy Villar Encounter: [id number]        Subjective:   Patient seen and examined at bedside after reviewing the chart and discussing the case with the caring staff  Patient examined at bedside  Patient is not answering questions  Does not appear to be in pain or distress  Nursing staff reports patient refused breakfast tray this morning  Encouraged patient to eat meals  Physical Exam   Vitals: Blood pressure 106/70, pulse 60, temperature (!) 97 1 °F (36 2 °C), temperature source Temporal, resp  rate 16, height 5' 11" (1 803 m), weight 92 kg (202 lb 13 2 oz), SpO2 95 %  ,Body mass index is 28 29 kg/m²  Constitutional: Patient appears well-developed  HEENT: PERR, EOMI, MMM  Cardiovascular: Normal rate and regular rhythm  Pulmonary/Chest: Effort normal and breath sounds normal    Abdomen: Soft, + BS, NT    Assessment/Plan:  Isabel Tuttle  is a(n) 78 y o  male with  MDD      1  Cardiac with history of dyslipidemia  Patient is on atorvastatin 10 mg daily  2  Seizure disorder  Patient is on Depakote 500 mg b i d   3  Restless leg syndrome  Patient is on ropinirole 2 mg at bedtime  4  Constipation  Patient is on Senokot S   5  DJD/osteoarthritis  Patient may get Tylenol on as needed basis  6  Gait abnormality  PT and OT are on consult  7  Vitamin D deficiency  Continue D2 27155 units weekly for 6 weeks (ending on 04/14/2021) followed by D3 1000 units daily  8  Anorexia  Patient is currently on Remeron  Patient is also getting Ensure supplements  The patient was discussed with Dr Zahira Maravilla and he is in agreement with the above note

## 2021-04-08 NOTE — PROGRESS NOTES
04/08/21 1445   Team Meeting   Meeting Type [place] Team Meeting   Team Members Present   Team Members Present Physician;Nurse;   Physician Team Member Dr Adelia Clark MD   Nursing Team Member Sal Verduzco, RN   Care Management Team Member Russel Villalobos MS, [facility]   Patient/Family Present   Patient Present No   Patient's Family Present No   CM attempted to encourage PT to participate in [place] team meeting, PT declined  [place] team met and reviewed [place] plan and goals, all in agreement and signed

## 2021-04-08 NOTE — PROGRESS NOTES
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 ([place] [address] )    CKD (chronic kidney disease) stage 3, GFR 30-59 ml/min    ESBL (extended spectrum beta-lactamase) producing bacteria infection    Restless legs    Failure to thrive in adult      Behavior over the last 24 hours:  unchanged  Sleep: improving  Appetite: fair  Medication side effects: No  ROS: headache, otherwise all other systems negative for acute change    Holly Pillai was seen today for follow-up and case discussed with treatment team this morning  Patient was sitting up in bed upon encounter and states Nothing helps  I feel the same    Continues to rate his depression as 10/10 and denies anxiety  Complaint of headache and offered to walk with patient to nurse's station for Tylenol to which he refused  He continues to report his appetite is poor, meal completions range from 25-50%  Holly Pillai remains withdrawn to room despite encouragement from staff to come out into the milieu  Holly Pillai denies any AVH/SI/HI and stated I don't have the energy to do anything    He remains negative with his thinking despite support and encouragement provided from staff  Holly Pillai does comply with medication regimen and denies any side effects      Mental Status Evaluation:  Appearance:  disheveled   Behavior:  guarded and psychomotor retardation   Speech:  monotone   Mood:  depressed and sad   Affect:  flat   Thought Process:  perserverative   Thought Content:  negative thinking   Perceptual Disturbances: None   Risk Potential: Suicidal Ideations none  Homicidal Ideations none  Potential for Aggression No   Sensorium:  person, place and situation   Memory:  recent memory mildly impaired   Consciousness:  alert and awake    Attention: attention span appeared shorter than expected for age   Insight:  poor   Judgment: poor   Gait/Station: Laying in bed   Motor Activity: no abnormal movements     Progress Toward Goals:  Some improvement with sleep  Continue current psychotropic regimen  No discharge date at this time  Recommended Treatment: Continue with group therapy, milieu therapy and occupational therapy  Risks, benefits and possible side effects of Medications:   Risks, benefits, and possible side effects of medications explained to patient and patient verbalizes understanding  Medications: all current active meds have been reviewed and continue current psychiatric medications  Labs: I have personally reviewed all pertinent laboratory/tests results  Counseling / Coordination of Care  Total floor / unit time spent today 20 minutes  Greater than 50% of total time was spent with the patient and / or family counseling and / or coordination of care

## 2021-04-08 NOTE — PLAN OF CARE
Problem: Ineffective Coping  Goal: Participates in unit activities  Description: Interventions:  - Provide therapeutic environment   - Provide required programming   - Redirect inappropriate behaviors   Outcome: Not Progressing   Patient continues to not be comfortable in group settings but will speak with staff 1:1

## 2021-04-08 NOTE — PROGRESS NOTES
Patient remains in room  Introduced myself since first time caring for him  Patient did not respond or converse  Compliant with 1600 atorvastatin  Did state initially though " you may as well throw it away"  Flat affect, easily irritated  Q 7 minute checks in place  Compliant with HS meds  Patient calm and thankful and sad  Patient stated " its hard being here with a brain injury among all the depressed people"  Patient requested ensure on ice which he calls a "shake"  Currently resting in room  Will continue to monitor

## 2021-04-08 NOTE — PROGRESS NOTES
04/08/21    Team Meeting   Meeting Type Daily Rounds   Team Members Present   Team Members Present Physician;Nurse;;Occupational Therapist   Physician Team Member Dr Geovany Zheng, [address]; Phoebe Keenan, [facility]   Nursing Team Member Louise Lantigua, MARIANNA   Care Management Team Member Ty Cullen, MS, [facility]   OT Team Member Rosa Hull, [place]   Patient/Family Present   Patient Present No   Patient's Family Present No   Refused breakfast yesterday 25% for lunch, negative, flat, depressed, irritable, withdrawn  Slept  No D/C date at this time  30 day [place] plan due today

## 2021-04-08 NOTE — NURSING NOTE
Patient withdrawn to his room  Refused to come out for HS snack and group  Patient did request an Ensure  Upon approach patients mood and affect is flat and labile  Patient endorses anxiety and depression  Patient is negative and uninterested  Does not want to discuss any positive goals  Denies SI/HI/AHVH/pain  Took HS medication without difficulty  Will continue to monitor safety and behaviors every 7 minutes

## 2021-04-08 NOTE — PLAN OF CARE
Problem: SLEEP DISTURBANCE  Goal: Will exhibit normal sleeping pattern  Description: Interventions:  -  Assess the patients sleep pattern, noting recent changes  - Administer medication as ordered  - Decrease environmental stimuli, including noise, as appropriate during the night  - Encourage the patient to actively participate in unit groups and or exercise during the day to enhance ability to achieve adequate sleep at night  - Assess the patient, in the morning, encouraging a description of sleep experience  Outcome: Progressing     Problem: Ineffective Coping  Goal: Participates in unit activities  Description: Interventions:  - Provide therapeutic environment   - Provide required programming   - Redirect inappropriate behaviors   Outcome: Progressing  Goal: Cooperates with admission process  Description: Interventions:   - Complete admission process  Outcome: Progressing  Goal: Identifies healthy coping skills  Outcome: Progressing  Goal: Patient/Family participate in treatment and DC plans  Description: Interventions:  - Provide therapeutic environment  Outcome: Progressing  Goal: Patient/Family verbalizes awareness of resources  Outcome: Progressing     Problem: Depression  Goal: Treatment Goal: Demonstrate behavioral control of depressive symptoms, verbalize feelings of improved mood/affect, and adopt new coping skills prior to discharge  Outcome: Progressing  Goal: Verbalize thoughts and feelings  Description: Interventions:  - Assess and re-assess patient's level of risk   - Engage patient in 1:1 interactions, daily, for a minimum of 15 minutes   - Encourage patient to express feelings, fears, frustrations, hopes   Outcome: Progressing  Goal: Refrain from harming self  Description: Interventions:  - Monitor patient closely, per order   - Supervise medication ingestion, monitor effects and side effects   Outcome: Progressing  Goal: Refrain from isolation  Description: Interventions:  - Develop a trusting relationship   - Encourage socialization   Outcome: Progressing  Goal: Refrain from self-neglect  Outcome: Progressing  Goal: Complete daily ADLs, including personal hygiene independently, as able  Description: Interventions:  - Observe, teach, and assist patient with ADLS  -  Monitor and promote a balance of rest/activity, with adequate nutrition and elimination   Outcome: Progressing     Problem: DISCHARGE PLANNING - CARE MANAGEMENT  Goal: Discharge to post-acute care or home with appropriate resources  Description: INTERVENTIONS:  - Conduct assessment to determine patient/family and health care team treatment goals, and need for post-acute services based on payer coverage, community resources, and patient preferences, and barriers to discharge  - Address psychosocial, clinical, and financial barriers to discharge as identified in assessment in conjunction with the patient/family and health care team  - Arrange appropriate level of post-acute services according to patients   needs and preference and payer coverage in collaboration with the physician and health care team  - Communicate with and update the patient/family, physician, and health care team regarding progress on the discharge plan  - Arrange appropriate transportation to post-acute venues  Outcome: Progressing     Problem: Nutrition/Hydration-ADULT  Goal: Nutrient/Hydration intake appropriate for improving, restoring or maintaining nutritional needs  Description: Monitor and assess patient's nutrition/hydration status for malnutrition  Collaborate with interdisciplinary team and initiate plan and interventions as ordered  Monitor patient's weight and dietary intake as ordered or per policy  Utilize nutrition screening tool and intervene as necessary  Determine patient's food preferences and provide high-protein, high-caloric foods as appropriate       INTERVENTIONS:  - Monitor oral intake, urinary output, labs, and treatment plans  - Assess nutrition and hydration status and recommend course of action  - Evaluate amount of meals eaten  - Assist patient with eating if necessary   - Allow adequate time for meals  - Recommend/ encourage appropriate diets, oral nutritional supplements, and vitamin/mineral supplements  - Order, calculate, and assess calorie counts as needed  - Recommend, monitor, and adjust tube feedings and TPN/PPN based on assessed needs  - Assess need for intravenous fluids  - Provide specific nutrition/hydration education as appropriate  - Include patient/family/caregiver in decisions related to nutrition  Outcome: Progressing     Problem: Prexisting or High Potential for Compromised Skin Integrity  Goal: Skin integrity is maintained or improved  Description: INTERVENTIONS:  - Identify patients at risk for skin breakdown  - Assess and monitor skin integrity  - Assess and monitor nutrition and hydration status  - Monitor labs   - Assess for incontinence   - Turn and reposition patient  - Assist with mobility/ambulation  - Relieve pressure over bony prominences  - Avoid friction and shearing  - Provide appropriate hygiene as needed including keeping skin clean and dry  - Evaluate need for skin moisturizer/barrier cream  - Collaborate with interdisciplinary team   - Patient/family teaching  - Consider wound care consult   Outcome: Progressing

## 2021-04-09 PROBLEM — E44.0 MODERATE PROTEIN-CALORIE MALNUTRITION (HCC): Status: ACTIVE | Noted: 2021-04-09

## 2021-04-09 PROCEDURE — 99232 SBSQ HOSP IP/OBS MODERATE 35: CPT | Performed by: PSYCHIATRY & NEUROLOGY

## 2021-04-09 RX ORDER — DEXTROAMPHETAMINE SACCHARATE, AMPHETAMINE ASPARTATE, DEXTROAMPHETAMINE SULFATE AND AMPHETAMINE SULFATE 2.5; 2.5; 2.5; 2.5 MG/1; MG/1; MG/1; MG/1
15 TABLET ORAL DAILY
Status: DISCONTINUED | OUTPATIENT
Start: 2021-04-10 | End: 2021-04-16 | Stop reason: HOSPADM

## 2021-04-09 RX ADMIN — SENNOSIDES AND DOCUSATE SODIUM 1 TABLET: 8.6; 5 TABLET ORAL at 20:30

## 2021-04-09 RX ADMIN — TRAZODONE HYDROCHLORIDE 150 MG: 150 TABLET ORAL at 20:31

## 2021-04-09 RX ADMIN — DEXTROAMPHETAMINE SACCHARATE, AMPHETAMINE ASPARTATE, DEXTROAMPHETAMINE SULFATE, AND AMPHETAMINE SULFATE 10 MG: 2.5; 2.5; 2.5; 2.5 TABLET ORAL at 08:20

## 2021-04-09 RX ADMIN — DULOXETINE HYDROCHLORIDE 90 MG: 30 CAPSULE, DELAYED RELEASE ORAL at 08:20

## 2021-04-09 RX ADMIN — OLANZAPINE 10 MG: 10 TABLET, FILM COATED ORAL at 20:31

## 2021-04-09 RX ADMIN — MELATONIN 9 MG: at 20:31

## 2021-04-09 RX ADMIN — ATORVASTATIN CALCIUM 10 MG: 10 TABLET, FILM COATED ORAL at 16:04

## 2021-04-09 RX ADMIN — MIRTAZAPINE 15 MG: 15 TABLET, FILM COATED ORAL at 20:31

## 2021-04-09 RX ADMIN — Medication 1 TABLET: at 08:20

## 2021-04-09 NOTE — PROGRESS NOTES
Progress Note - Wood Galeana  78 y o  male MRN: [id number]    Unit/Bed#: Wilber [facility] Encounter: [id number]        Subjective:   Patient seen and examined at bedside after reviewing the chart and discussing the case with the caring staff  Patient examined at bedside  Patient is withdrawn to his room and selectively mute with this writer  He has lost 9 lbs  over the past 2 weeks  He refuses most meals  Will continue to encourage patient to eat  Physical Exam   Vitals: Blood pressure 134/74, pulse 67, temperature 98 °F (36 7 °C), temperature source Temporal, resp  rate 18, height 5' 11" (1 803 m), weight 86 9 kg (191 lb 9 6 oz), SpO2 96 %  ,Body mass index is 26 72 kg/m²  Constitutional: Patient appears well-developed  HEENT: PERR, EOMI, MMM  Cardiovascular: Normal rate and regular rhythm  Pulmonary/Chest: Effort normal and breath sounds normal    Abdomen: Soft, + BS, NT    Assessment/Plan:  Wood Galeana  is a(n) 78 y o  male with  MDD      1  Cardiac with history of dyslipidemia  Patient is on atorvastatin 10 mg daily  2  Seizure disorder  Patient is on Depakote 500 mg b i d   3  Restless leg syndrome  Patient is on ropinirole 2 mg at bedtime  4  Constipation  Patient is on Senokot S   5  DJD/osteoarthritis  Patient may get Tylenol on as needed basis  6  Gait abnormality  PT and OT are on consult  7  Vitamin D deficiency  Continue D2 84358 units weekly for 6 weeks (ending on 04/14/2021) followed by D3 1000 units daily  8  Anorexia  Patient is currently on Remeron and Ensure supplements but continues to refuse meals  He has lost 9 lbs  over the past 2 weeks  Will continue to encourage patient to eat  The patient was discussed with Dr Coyd Flowers and he is in agreement with the above note

## 2021-04-09 NOTE — PROGRESS NOTES
04/09/21    Team Meeting   Meeting Type Daily Rounds   Team Members Present   Team Members Present Physician;Nurse;;Occupational Therapist   Physician Team Member Dr Chris Baum MD;[facility], [address] [place] [address]   Nursing Team Member Eboni Irizarry, MARIANNA   Care Management Team Member MS Jessie, [facility] [place]   OT Team Member [place], [place]   Declined breakfast, reports being sad and not improving  Appetite poor  Medication adjustment  No motivation   To start EAC referral

## 2021-04-09 NOTE — PROGRESS NOTES
Patient had broken sleep overnight  Maintained on Q 7 minute safety checks   Safety maintained via clutter-free environment, ID band, and given non-skid socks  No suicidal ideations, acting out or homicidal behaviors  No change in medical condition or complaints voiced  Fluids maintained at bedside to promote hydration

## 2021-04-09 NOTE — MALNUTRITION/BMI
This medical record reflects one or more clinical indicators suggestive of malnutrition and/or morbid obesity  Malnutrition Findings:   Adult Malnutrition type: Acute illness  Adult Degree of Malnutrition: Malnutrition of moderate degree(related to inadequate energy intake as evidenced by significant wt  loss, energy intake <75 greater than 7 days treated with regular diet, ensure enlive TID, appetite stimulant)  Malnutrition Characteristics: Weight loss, Inadequate energy    BMI Findings: Body mass index is 26 72 kg/m²  See Nutrition note dated 4/9/21 for additional details  Completed nutrition assessment is viewable in the nutrition documentation

## 2021-04-09 NOTE — PROGRESS NOTES
Pt up ad wilner with walker  Pt is flat, withdrawn & irritable  Pt c/o depression & feeling sad all the time  Q 7 min checks maintained to monitor pt's behavior & safety  Pt denies any anxiety, hallucinations, suicidal or homicidal ideations  Pt refused breakfast  Pt is compliant with medications  Pt socializes minimally with staff  Pt is isolativ to his room

## 2021-04-09 NOTE — PROGRESS NOTES
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 ([facility] )    CKD (chronic kidney disease) stage 3, GFR 30-59 ml/min    ESBL (extended spectrum beta-lactamase) producing bacteria infection    Restless legs    Failure to thrive in adult    Moderate protein-calorie malnutrition (HCC)      Behavior over the last 24 hours:  unchanged  Sleep: improving  Appetite: poor  Medication side effects: No   ROS: no complaints and All other systems negative for acute change     Niralidick Zhang was seen today for follow-up and case discussed with treatment team this morning  Patient was laying in bed during encounter and did not engage in interview  He was selectively mute and was uninterested  Nursing staff reports that patient remains withdrawn and irritable and continues to complain of depression and sadness  Patient lacks motivation to complete ADLs  His sleep has been improving throughout the night, but appetite remains poor  Nirali Zhnag denies any anxiety, AVH, SI/HI to nursing staff      Mental Status Evaluation:  Appearance:  disheveled and laying in bed   Behavior:  unintersted   Speech:  Selectively mute   Mood:  depressed   Affect:  flat   Thought Process:  Unable to assess   Thought Content:  No overt delusions noted   Perceptual Disturbances: Does not appear to be responding to internal stimuli   Risk Potential: Suicidal Ideations none  Homicidal Ideations none  Potential for Aggression No   Sensorium:  person and place   Memory:  recent and remote memory: unable to assess due to lack of cooperation   Consciousness:  awake    Attention: attention span appeared shorter than expected for age   Insight:  limited   Judgment: limited   Gait/Station: Laying in bed   Motor Activity: no abnormal movements     Progress Toward Goals:  Patient continues to be withdrawn and depressed despite encouragement from staff to come mental into the milieu  Will increase a m  dose of Adderall to 15 mg to assist with motivation and completion of ADLs independently  Otherwise, will continue remainder psychotropic medications as ordered  EAC placement was discussed during team meeting this morning   to speak with patient regarding possible EAC placement  No discharge date at this time  Recommended Treatment: Continue with group therapy, milieu therapy and occupational therapy  Risks, benefits and possible side effects of Medications:   Risks, benefits, and possible side effects of medications explained to patient and patient verbalizes understanding  Medications: all current active meds have been reviewed and planned medication changes: Increase Adderall 15mg PO QD  Labs: I have personally reviewed all pertinent laboratory/tests results  Counseling / Coordination of Care  Total floor / unit time spent today 20 minutes  Greater than 50% of total time was spent with the patient and / or family counseling and / or coordination of care

## 2021-04-09 NOTE — SOCIAL WORK
Team suggested EAC referral     SW advised by UR that patient has 80 MC days     SW advised staff psych of pt remaining days to be spent prior to MA benoit and referral; staff psych acknowledged

## 2021-04-09 NOTE — PLAN OF CARE
Problem: Ineffective Coping  Goal: Participates in unit activities  Description: Interventions:  - Provide therapeutic environment   - Provide required programming   - Redirect inappropriate behaviors   Outcome: Not Progressing     Patient continues to not feel comfortable in groups and prefers to talk 1:1 when he is agreeable

## 2021-04-10 PROCEDURE — 99232 SBSQ HOSP IP/OBS MODERATE 35: CPT | Performed by: PSYCHIATRY & NEUROLOGY

## 2021-04-10 RX ADMIN — OLANZAPINE 10 MG: 10 TABLET, FILM COATED ORAL at 21:24

## 2021-04-10 RX ADMIN — ATORVASTATIN CALCIUM 10 MG: 10 TABLET, FILM COATED ORAL at 16:47

## 2021-04-10 RX ADMIN — MELATONIN 9 MG: at 21:25

## 2021-04-10 RX ADMIN — SENNOSIDES AND DOCUSATE SODIUM 1 TABLET: 8.6; 5 TABLET ORAL at 21:25

## 2021-04-10 RX ADMIN — TRAZODONE HYDROCHLORIDE 150 MG: 150 TABLET ORAL at 21:24

## 2021-04-10 RX ADMIN — DEXTROAMPHETAMINE SACCHARATE, AMPHETAMINE ASPARTATE, DEXTROAMPHETAMINE SULFATE, AND AMPHETAMINE SULFATE 15 MG: 2.5; 2.5; 2.5; 2.5 TABLET ORAL at 08:40

## 2021-04-10 RX ADMIN — MIRTAZAPINE 15 MG: 15 TABLET, FILM COATED ORAL at 21:25

## 2021-04-10 RX ADMIN — Medication 1 TABLET: at 08:40

## 2021-04-10 RX ADMIN — DULOXETINE HYDROCHLORIDE 90 MG: 30 CAPSULE, DELAYED RELEASE ORAL at 08:40

## 2021-04-10 NOTE — NURSING NOTE
[phone]  Pt found to be resting quietly on most of authors rounds  No acute behavioral issues noted  Q 7 min checks maintained  Fall protocol in place

## 2021-04-10 NOTE — PROGRESS NOTES
Progress Note - Indira Lassiter  78 y o  male MRN: [id number]    Unit/Bed#: Jb Catherine Encounter: [id number]        Subjective:   Patient seen and examined at bedside after reviewing the chart and discussing the case with the caring staff  Patient examined at bedside  Patient has no acute issues except anorexia  Physical Exam   Vitals: Blood pressure 137/83, pulse 62, temperature (!) 97 3 °F (36 3 °C), temperature source Temporal, resp  rate 18, height 5' 11" (1 803 m), weight 86 9 kg (191 lb 9 6 oz), SpO2 96 %  ,Body mass index is 26 72 kg/m²  Constitutional: Patient appears well-developed  HEENT: PERR, EOMI, MMM  Cardiovascular: Normal rate and regular rhythm  Pulmonary/Chest: Effort normal and breath sounds normal    Abdomen: Soft, + BS, NT    Assessment/Plan:  Indira Lassiter  is a(n) 78 y o  male with  MDD      1  Cardiac with history of dyslipidemia  Patient is on atorvastatin 10 mg daily  2  Seizure disorder  Patient is on Depakote 500 mg b i d   3  Restless leg syndrome  Patient is on ropinirole 2 mg at bedtime  4  Constipation  Patient is on Senokot S   5  DJD/osteoarthritis  Patient may get Tylenol on as needed basis  6  Gait abnormality  PT and OT are on consult  7  Vitamin D deficiency  Continue D2 83275 units weekly for 6 weeks (ending on 04/14/2021) followed by D3 1000 units daily  8  Anorexia  Patient is currently on Remeron and Ensure supplements but continues to refuse meals  He has lost 9 lbs  over the past 2 weeks  Will continue to encourage patient to eat

## 2021-04-10 NOTE — PROGRESS NOTES
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 ([place] [address] )    CKD (chronic kidney disease) stage 3, GFR 30-59 ml/min    ESBL (extended spectrum beta-lactamase) producing bacteria infection    Restless legs    Failure to thrive in adult    Moderate protein-calorie malnutrition (HCC)      Behavior over the last 24 hours:  unchanged  Sleep:  Improving  Appetite:  Decreased, poor  Medication side effects: No  ROS: no complaints and All other systems negative for acute change     Ellie Rodriguez was seen today for follow-up  He continues to display poor motivation and continues to remain withdrawn to room  Support and encouragement provided from staff which has been ineffective  Patient continues to have negative thinking and often states nothing matters  I feel the same    During encounter today, he was irritable and dismissive  Nursing staff reports his sleep has somewhat been improving  Appetite has been poor  Rates his depression as 8/10  Refused to answer if he had any anxiety  Ellie Rodriguez denies AVH/SI/HI  Patient has been compliant with medication regimen and denies any side effects      Mental Status Evaluation:  Appearance:  age appropriate, casually dressed and Laying in bed   Behavior:  evasive, guarded and Uninterested, poor eye contact   Speech:  Monotone, scant   Mood:  depressed and irritable   Affect:  blunted and Irritable   Thought Process:  circumstantial   Thought Content:  Negative thinking   Perceptual Disturbances: None   Risk Potential: Suicidal Ideations none  Homicidal Ideations none  Potential for Aggression No   Sensorium:  person and place   Memory: recent and remote memory: unable to assess due to lack of cooperation   Consciousness:  alert and awake    Attention: attention span appeared shorter than expected for age   Insight:  limited   Judgment: limited   Gait/Station: Laying in bed   Motor Activity: no abnormal movements     Progress Toward Goals:  No change  Adjustments made to Adderall 04/09/2021  Will continue current psychotropic regimen  Will also continue to encourage patient with getting out of bed and coming out to milieu for meals  Case management to speak with patient about possible EAC placement  No discharge date at this time  Recommended Treatment: Continue with group therapy, milieu therapy and occupational therapy  Risks, benefits and possible side effects of Medications:   Risks, benefits, and possible side effects of medications explained to patient and patient verbalizes understanding  Medications: all current active meds have been reviewed and continue current psychiatric medications  Labs: I have personally reviewed all pertinent laboratory/tests results  Counseling / Coordination of Care  Total floor / unit time spent today 20 minutes  Greater than 50% of total time was spent with the patient and / or family counseling and / or coordination of care

## 2021-04-10 NOTE — PROGRESS NOTES
Spoke with patient earlier regarding setting small goals each day  Patient was not receptive and did not wish to set any goals  Will continue monitoring

## 2021-04-10 NOTE — NURSING NOTE
[id number] [phone]     Pt withdrawn to room, affect blunt mood depressed  Denies si or hi  Reports no improvement in his mood  No acute behavioral changes noted  Q 7 min checks ongoing

## 2021-04-11 PROCEDURE — 99232 SBSQ HOSP IP/OBS MODERATE 35: CPT | Performed by: PSYCHIATRY & NEUROLOGY

## 2021-04-11 RX ADMIN — MELATONIN 9 MG: at 21:16

## 2021-04-11 RX ADMIN — SENNOSIDES AND DOCUSATE SODIUM 1 TABLET: 8.6; 5 TABLET ORAL at 21:17

## 2021-04-11 RX ADMIN — Medication 1 TABLET: at 09:26

## 2021-04-11 RX ADMIN — DEXTROAMPHETAMINE SACCHARATE, AMPHETAMINE ASPARTATE, DEXTROAMPHETAMINE SULFATE, AND AMPHETAMINE SULFATE 15 MG: 2.5; 2.5; 2.5; 2.5 TABLET ORAL at 09:26

## 2021-04-11 RX ADMIN — ATORVASTATIN CALCIUM 10 MG: 10 TABLET, FILM COATED ORAL at 17:14

## 2021-04-11 RX ADMIN — MIRTAZAPINE 15 MG: 15 TABLET, FILM COATED ORAL at 21:17

## 2021-04-11 RX ADMIN — OLANZAPINE 10 MG: 10 TABLET, FILM COATED ORAL at 21:16

## 2021-04-11 RX ADMIN — TRAZODONE HYDROCHLORIDE 150 MG: 150 TABLET ORAL at 21:16

## 2021-04-11 RX ADMIN — DULOXETINE HYDROCHLORIDE 90 MG: 30 CAPSULE, DELAYED RELEASE ORAL at 09:25

## 2021-04-11 NOTE — PROGRESS NOTES
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 ([place] [address] )    CKD (chronic kidney disease) stage 3, GFR 30-59 ml/min    ESBL (extended spectrum beta-lactamase) producing bacteria infection    Restless legs    Failure to thrive in adult    Moderate protein-calorie malnutrition (HCC)      Behavior over the last 24 hours:  unchanged  Sleep: improving  Appetite: fair  Medication side effects: No  ROS: no complaints and all other systems are negative for acute changes    Mental Status Evaluation:  Appearance:  age appropriate   Behavior:  psychomotor retardation   Speech:  delayed   Mood:  constricted and decreased range   Affect:  mood-congruent   Thought Process:  concrete   Thought Content:  no overt delusions   Perceptual Disturbances: None   Risk Potential: Suicidal Ideations passive wish to die  Homicidal Ideations none  Potential for Aggression No   Sensorium:  person and place   Memory:  recent memory mildly impaired   Consciousness:  alert and awake    Attention: attention span appeared shorter than expected for age   Insight:  poor   Judgment: poor   Gait/Station: in bed   Motor Activity: no abnormal movements     Progress Toward Goals:  Patient continues with flat affect, minimally engaged with low motivation to participate in group and milieu therapy  His calorie intake is variable and he has been eating meals in the dining area with peers at times  Medication compliant has been stable and denies any current side effects  Recommended Treatment: Continue with group therapy, milieu therapy and occupational therapy        Risks, benefits and possible side effects of Medications:   Patient does not verbalize understanding at this time and will require further explanation  Medications: all current active meds have been reviewed  Labs: I have personally reviewed all pertinent laboratory/tests results  Most Recent Labs:   Lab Results   Component Value Date    WBC 7 85 03/08/2021    RBC 5 76 (H) 03/08/2021    HGB 16 8 03/08/2021    HCT 51 7 (H) 03/08/2021     03/08/2021    RDW 14 0 03/08/2021    NEUTROABS 6 15 03/08/2021    SODIUM 140 03/08/2021    K 3 8 03/08/2021     03/08/2021    CO2 29 03/08/2021    BUN 26 (H) 03/08/2021    CREATININE 1 94 (H) 03/08/2021    GLUC 102 03/08/2021    GLUF 84 09/18/2018    CALCIUM 8 8 03/08/2021    AST 12 03/08/2021    ALT 21 03/08/2021    ALKPHOS 70 03/08/2021    TP 7 2 03/08/2021    ALB 4 0 03/08/2021    TBILI 0 87 03/08/2021    CHOLESTEROL 195 04/26/2016    HDL 52 04/26/2016    TRIG 76 04/26/2016    LDLCALC 128 (H) 04/26/2016    VALPROICTOT 49 2 (L) 03/22/2021    AMMONIA <10 (L) 05/08/2016    [id number] 2 735 03/08/2021    RPR Non-Reactive 05/09/2016       Counseling / Coordination of Care  Total floor / unit time spent today 20 minutes  Greater than 50% of total time was spent with the patient and / or family counseling and / or coordination of care

## 2021-04-11 NOTE — PROGRESS NOTES
Progress Note - Tricia Lin  78 y o  male MRN: [id number]    Unit/Bed#: Toy Arevalo Encounter: [id number]        Subjective:   Patient seen and examined at bedside after reviewing the chart and discussing the case with the caring staff  Patient examined at bedside  Patient has no acute issues except anorexia  Physical Exam   Vitals: Blood pressure 110/63, pulse 90, temperature 97 5 °F (36 4 °C), temperature source Temporal, resp  rate 18, height 5' 11" (1 803 m), weight 86 9 kg (191 lb 9 6 oz), SpO2 97 %  ,Body mass index is 26 72 kg/m²  Constitutional: Patient appears well-developed  HEENT: PERR, EOMI, MMM  Cardiovascular: Normal rate and regular rhythm  Pulmonary/Chest: Effort normal and breath sounds normal    Abdomen: Soft, + BS, NT    Assessment/Plan:  Tricia Lin  is a(n) 78 y o  male with  MDD      1  Cardiac with history of dyslipidemia  Patient is on atorvastatin 10 mg daily  2  Seizure disorder  Patient is on Depakote 500 mg b i d   3  Restless leg syndrome  Patient is on ropinirole 2 mg at bedtime  4  Constipation  Patient is on Senokot S   5  DJD/osteoarthritis  Patient may get Tylenol on as needed basis  6  Gait abnormality  PT and OT are on consult  7  Vitamin D deficiency  Continue D2 16819 units weekly for 6 weeks (ending on 04/14/2021) followed by D3 1000 units daily  8  Anorexia  Patient is currently on Remeron and Ensure supplements but continues to refuse meals  He has lost 9 lbs  over the past 2 weeks  Will continue to encourage patient to eat

## 2021-04-11 NOTE — NURSING NOTE
[id number]  Pt found to be resting quietly on most of authors rounds  No acute behavioral issues noted  Q 7 min checks maintained  Fall protocol in place

## 2021-04-11 NOTE — PROGRESS NOTES
Patient flat and depressed  Rates his depression and anxiety 10/10  He did eat 100 percent of breakfast  Continues to have negative and pessimistic attitude  No complaints of pain  Will continue monitoring

## 2021-04-11 NOTE — NURSING NOTE
[id number]     Pt withdrawn to room  Affect blunt  Mood depressed  Denies SI or HI  Lacks motivation  No acute behavioral issues noted  Resistive to encouragement  Q 7 min checks ongoing

## 2021-04-12 PROCEDURE — 99232 SBSQ HOSP IP/OBS MODERATE 35: CPT | Performed by: PSYCHIATRY & NEUROLOGY

## 2021-04-12 RX ADMIN — DULOXETINE HYDROCHLORIDE 90 MG: 30 CAPSULE, DELAYED RELEASE ORAL at 08:17

## 2021-04-12 RX ADMIN — DEXTROAMPHETAMINE SACCHARATE, AMPHETAMINE ASPARTATE, DEXTROAMPHETAMINE SULFATE, AND AMPHETAMINE SULFATE 15 MG: 2.5; 2.5; 2.5; 2.5 TABLET ORAL at 08:17

## 2021-04-12 RX ADMIN — Medication 1 TABLET: at 08:17

## 2021-04-12 RX ADMIN — ATORVASTATIN CALCIUM 10 MG: 10 TABLET, FILM COATED ORAL at 15:58

## 2021-04-12 RX ADMIN — MIRTAZAPINE 15 MG: 15 TABLET, FILM COATED ORAL at 20:58

## 2021-04-12 RX ADMIN — SENNOSIDES AND DOCUSATE SODIUM 1 TABLET: 8.6; 5 TABLET ORAL at 20:58

## 2021-04-12 RX ADMIN — MELATONIN 9 MG: at 20:57

## 2021-04-12 RX ADMIN — OLANZAPINE 10 MG: 10 TABLET, FILM COATED ORAL at 20:58

## 2021-04-12 RX ADMIN — TRAZODONE HYDROCHLORIDE 150 MG: 150 TABLET ORAL at 20:57

## 2021-04-12 NOTE — NURSING NOTE
[id number]     Pt  Withdrawn to room  Affect bland  Mood depressed  Continues to deny any improvement  No acute behavioral issues noted  Q 7 min checks ongoing

## 2021-04-12 NOTE — PROGRESS NOTES
Progress Note - Radha Haines  78 y o  male MRN: [id number]    Unit/Bed#: Andrea Valentin Encounter: [id number]        Subjective:   Patient seen and examined at bedside after reviewing the chart and discussing the case with the caring staff  Patient examined at bedside  Patient has no acute issues except anorexia  Physical Exam   Vitals: Blood pressure 118/62, pulse 59, temperature 97 8 °F (36 6 °C), temperature source Temporal, resp  rate 16, height 5' 11" (1 803 m), weight 86 9 kg (191 lb 9 6 oz), SpO2 95 %  ,Body mass index is 26 72 kg/m²  Constitutional: Patient appears well-developed  HEENT: PERR, EOMI, MMM  Cardiovascular: Normal rate and regular rhythm  Pulmonary/Chest: Effort normal and breath sounds normal    Abdomen: Soft, + BS, NT    Assessment/Plan:  Radha Haines  is a(n) 78 y o  male with  MDD      1  Cardiac with history of dyslipidemia  Patient is on atorvastatin 10 mg daily  2  Seizure disorder  Patient is on Depakote 500 mg b i d   3  Restless leg syndrome  Patient is on ropinirole 2 mg at bedtime  4  Constipation  Patient is on Senokot S   5  DJD/osteoarthritis  Patient may get Tylenol on as needed basis  6  Gait abnormality  PT and OT are on consult  7  Vitamin D deficiency  Continue D2 21663 units weekly for 6 weeks (ending on 04/14/2021) followed by D3 1000 units daily  8  Anorexia  Patient is currently on Remeron and Ensure supplements but continues to refuse meals  He has lost 9 lbs  over the past 2 weeks  Will continue to encourage patient to eat

## 2021-04-12 NOTE — PROGRESS NOTES
Pt is flat & withdrawn  Pt refused breakfast  Pt c/o feeling sad & depressed  Pt denies any hallucinations, suicidal or homicidal ideations  Q 7 min checks maintained to monitor pt's behavior & safety  Pt is compliant with medications  Pt doesn't socialize with other patients

## 2021-04-12 NOTE — PROGRESS NOTES
04/12/21   Team Meeting   Meeting Type Daily Rounds   Team Members Present   Team Members Present Physician;Nurse;   Physician Team Member Dr Katia Arango MD;[facility] ERIN ALDRICH, [facility] Team Member David Rodriguez, [address] [facility] Management Team Member Beba Singh MS, [place] [facility], [facility]   Patient/Family Present   Patient Present No   Patient's Family Present No   PT seems to be slightly more verbal  Planning for [facility] referral  Will explore discharge dispositions with PT this week  PT reports he gets meals on wheels  Minimal intake  , drinks ensure

## 2021-04-12 NOTE — PLAN OF CARE
Problem: DISCHARGE PLANNING - CARE MANAGEMENT  Goal: Discharge to post-acute care or home with appropriate resources  Description: INTERVENTIONS:  - Conduct assessment to determine patient/family and health care team treatment goals, and need for post-acute services based on payer coverage, community resources, and patient preferences, and barriers to discharge  - Address psychosocial, clinical, and financial barriers to discharge as identified in assessment in conjunction with the patient/family and health care team  - Arrange appropriate level of post-acute services according to patients   needs and preference and payer coverage in collaboration with the physician and health care team  - Communicate with and update the patient/family, physician, and health care team regarding progress on the discharge plan  - Arrange appropriate transportation to post-acute venues  Outcome: Progressing     Pt progressing slowly; eating some meals in dining room with peers; slightly more verbal; no DC date - will return home vs SNF upon stabilization

## 2021-04-12 NOTE — NURSING NOTE
[phone]  Pt found to be resting quietly on most of authors rounds  No acute behavioral issues noted  Q 7 min checks maintained  Fall protocol in place

## 2021-04-12 NOTE — SOCIAL WORK
SW met with patient in pt room; pt sitting on edge of bed with blanket over shoulders; pt minimal in answers to SW inquiries; pt remains negative, hopeless; pt does not rate dep or anx for SW; No questions or concerns for SW at this time; SW will continue to meet with patient as needed for tx and dc planning

## 2021-04-12 NOTE — PLAN OF CARE
Problem: Ineffective Coping  Goal: Participates in unit activities  Description: Interventions:  - Provide therapeutic environment   - Provide required programming   - Redirect inappropriate behaviors   Outcome: Not Progressing   Patient continues to withdraw to room, spending extended periods lying in bed  Selective in responses to approach  Negative and pessimistic; continues to report feelings of futility with regard to current situation  Did not attend either AM RT group

## 2021-04-13 PROCEDURE — 99232 SBSQ HOSP IP/OBS MODERATE 35: CPT | Performed by: NURSE PRACTITIONER

## 2021-04-13 RX ADMIN — ATORVASTATIN CALCIUM 10 MG: 10 TABLET, FILM COATED ORAL at 16:15

## 2021-04-13 RX ADMIN — SENNOSIDES AND DOCUSATE SODIUM 1 TABLET: 8.6; 5 TABLET ORAL at 21:30

## 2021-04-13 RX ADMIN — OLANZAPINE 10 MG: 10 TABLET, FILM COATED ORAL at 21:30

## 2021-04-13 RX ADMIN — TRAZODONE HYDROCHLORIDE 50 MG: 50 TABLET ORAL at 01:03

## 2021-04-13 RX ADMIN — DULOXETINE HYDROCHLORIDE 90 MG: 30 CAPSULE, DELAYED RELEASE ORAL at 08:19

## 2021-04-13 RX ADMIN — MELATONIN 9 MG: at 21:29

## 2021-04-13 RX ADMIN — DEXTROAMPHETAMINE SACCHARATE, AMPHETAMINE ASPARTATE, DEXTROAMPHETAMINE SULFATE, AND AMPHETAMINE SULFATE 15 MG: 2.5; 2.5; 2.5; 2.5 TABLET ORAL at 06:17

## 2021-04-13 RX ADMIN — TRAZODONE HYDROCHLORIDE 150 MG: 150 TABLET ORAL at 21:30

## 2021-04-13 RX ADMIN — Medication 1 TABLET: at 08:19

## 2021-04-13 RX ADMIN — MIRTAZAPINE 15 MG: 15 TABLET, FILM COATED ORAL at 21:30

## 2021-04-13 NOTE — PLAN OF CARE
Problem: DISCHARGE PLANNING - CARE MANAGEMENT  Goal: Discharge to post-acute care or home with appropriate resources  Description: INTERVENTIONS:  - Conduct assessment to determine patient/family and health care team treatment goals, and need for post-acute services based on payer coverage, community resources, and patient preferences, and barriers to discharge  - Address psychosocial, clinical, and financial barriers to discharge as identified in assessment in conjunction with the patient/family and health care team  - Arrange appropriate level of post-acute services according to patients   needs and preference and payer coverage in collaboration with the physician and health care team  - Communicate with and update the patient/family, physician, and health care team regarding progress on the discharge plan  - Arrange appropriate transportation to post-acute venues  Outcome: Progressing     Pt progressing; pt more visible on unit today, more verbal; No DC date - DC home upon stabilization

## 2021-04-13 NOTE — NURSING NOTE
Patient appears to be sleeping without difficulty throughout the night after PRN Trazodone was administered  Will continue to monitor safety and behaviors every 7 minutes

## 2021-04-13 NOTE — PROGRESS NOTES
Pt up ad wilner with walker & did come out for breakfast this AM  Pt does c/o depression & sadness  Q 7 min checks maintained to monitor pt's behavior & safety  Pt denies any anxiety, hallucinations, suicidal or homicidal ideations  Pt is cooperative & compliant with medications  Pt is flat, withdrawn & irritable  Pt keeps to himself & does not socialize with other patients  Detail Level: Detailed Size Of Lesion: 0.8 X Size Of Lesion In Cm (Optional): 0 Incorporate Mauc In Note: Yes

## 2021-04-13 NOTE — PROGRESS NOTES
04/13/21 0933   Team Meeting   Meeting Type Daily Rounds   Team Members Present   Team Members Present Physician;Nurse;   Physician Team Member Dr Daylin Blake MD; SHERRIE Armijo   Nursing Team Member Macy Avina   Social Work Team Member [facility] [place]   Patient/Family Present   Patient Present No   Patient's Family Present No     No DC Date - will return home vs EAC;  80 [facility] days left; came out for breakfast on his own; continues to be negative but more verbal

## 2021-04-13 NOTE — NURSING NOTE
Patient withdrawn to his room  Patient had an Ensure for snack  Upon approach patient's mood and affect is flat and depressed  Patient is sad and hopeless  Patient is negative and has an irritable edge  Patient is is uninterested in engaging in conversation  Denies SI/HI/AHVH/pain  Took HS medication without difficulty  Will continue to monitor safety and behaviors every 7 minutes

## 2021-04-13 NOTE — PROGRESS NOTES
Progress Note - Kathye Meckel  78 y o  male MRN: [id number]    Unit/Bed#: Gisell Pedro Encounter: [id number]        Subjective:   Patient seen and examined at bedside after reviewing the chart and discussing the case with the caring staff  Patient examined at bedside  Patient has no acute issues except anorexia  Physical Exam   Vitals: Blood pressure 136/65, pulse 66, temperature 98 °F (36 7 °C), temperature source Temporal, resp  rate 18, height 5' 11" (1 803 m), weight 86 9 kg (191 lb 9 6 oz), SpO2 98 %  ,Body mass index is 26 72 kg/m²  Constitutional: Patient appears well-developed  HEENT: PERR, EOMI, MMM  Cardiovascular: Normal rate and regular rhythm  Pulmonary/Chest: Effort normal and breath sounds normal    Abdomen: Soft, + BS, NT    Assessment/Plan:  Kathye Meckel  is a(n) 78 y o  male with  MDD      1  Cardiac with history of dyslipidemia  Patient is on atorvastatin 10 mg daily  2  Seizure disorder  Patient is on Depakote 500 mg b i d   3  Restless leg syndrome  Patient is on ropinirole 2 mg at bedtime  4  Constipation  Patient is on Senokot S   5  DJD/osteoarthritis  Patient may get Tylenol on as needed basis  6  Gait abnormality  PT and OT are on consult  7  Vitamin D deficiency  Continue D2 27742 units weekly for 6 weeks (ending on 04/14/2021) followed by D3 1000 units daily  8  Anorexia  Patient is currently on Remeron and Ensure supplements but continues to refuse meals  He has lost 9 lbs  over the past 2 weeks  Will continue to encourage patient to eat

## 2021-04-13 NOTE — PROGRESS NOTES
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 ([facility] )    CKD (chronic kidney disease) stage 3, GFR 30-59 ml/min    ESBL (extended spectrum beta-lactamase) producing bacteria infection    Restless legs    Failure to thrive in adult    Moderate protein-calorie malnutrition (HCC)      Behavior over the last 24 hours:  improved  Sleep: normal  Appetite:  Improving  Medication side effects: No  ROS: no complaints and All other systems negative for acute change     Amisha Santiago was seen today for follow-up and case discussed with treatment team this morning  Upon encounter, patient was brighter and stated I am ready to go home  I think I will tear up more there    He was goal-directed with his speech and spoke more than usual   Amisha Santiago was also visible in the milieu for breakfast and lunch  He states his sleep is somewhat improved  Continue is to rate his depression as pretty high and feels his depression is the same as it was when he was admitted  However, patient appears brighter and has improvement in motivation  He denies any anxiety as well as AVH/SI/HI  He denies any side effects from medications      Mental Status Evaluation:  Appearance:  age appropriate and casually dressed   Behavior:  Cooperative, calm, less evasive   Speech:  normal pitch, normal volume and More verbal   Mood:  Improving, less depressed   Affect:  blunted   Thought Process:  goal directed   Thought Content:  No overt delusions   Perceptual Disturbances: None   Risk Potential: Suicidal Ideations none  Homicidal Ideations none  Potential for Aggression No   Sensorium:  person, place, time/date and situation   Memory:  recent and remote memory grossly intact   Consciousness:  alert and awake    Attention: attention span appeared shorter than expected for age   Insight:  limited   Judgment: limited   Gait/Station: slow and Uses walker   Motor Activity: no abnormal movements     Progress Toward Goals:  Some improvement  Continue with current psychotropic regimen  Will continue to encourage patient to be out and involved in milieu  Plan is to discharge home upon stabilization  No discharge date at this time  Recommended Treatment: Continue with group therapy, milieu therapy and occupational therapy  Risks, benefits and possible side effects of Medications:   Risks, benefits, and possible side effects of medications explained to patient and patient verbalizes understanding  Medications: all current active meds have been reviewed and continue current psychiatric medications  Labs: I have personally reviewed all pertinent laboratory/tests results  Counseling / Coordination of Care  Total floor / unit time spent today 20 minutes  Greater than 50% of total time was spent with the patient and / or family counseling and / or coordination of care

## 2021-04-13 NOTE — NURSING NOTE
Patient complained of insomnia  Gave 50 mg PRN Trazodone as ordered for complaint  Will continue to monitor

## 2021-04-13 NOTE — PROGRESS NOTES
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 ([place] [address] )    CKD (chronic kidney disease) stage 3, GFR 30-59 ml/min    ESBL (extended spectrum beta-lactamase) producing bacteria infection    Restless legs    Failure to thrive in adult    Moderate protein-calorie malnutrition (HCC)      Behavior over the last 24 hours:  unchanged  Sleep: hypersomnia  Appetite: poor  Medication side effects: No  ROS: all other system are negative    Mental Status Evaluation:  Appearance:  age appropriate   Behavior:  evasive   Speech:  underproductive   Mood:  decreased range   Affect:  blunted   Thought Process:  concrete   Thought Content:  no overt delusions   Perceptual Disturbances: None   Risk Potential: Suicidal Ideations none  Homicidal Ideations none  Potential for Aggression No   Sensorium:  person and place   Memory:  recent and remote memory: unable to assess due to lack of cooperation   Consciousness:  alert and awake    Attention: attention span appeared shorter than expected for age   Insight:  poor   Judgment: limited   Gait/Station: uses walker   Motor Activity: no abnormal movements     Progress Toward Goals: Patient continues isolated, minimally engaged but has been eating his meals out of his room periodically  Patient has no interaction with peers with minimal participation in group and milieu therapy  Medication compliant has been stable  Recommended Treatment: Continue with group therapy, milieu therapy and occupational therapy        Risks, benefits and possible side effects of Medications:   Patient does not verbalize understanding at this time and will require further explanation  Medications: all current active meds have been reviewed  Labs: I have personally reviewed all pertinent laboratory/tests results  Most Recent Labs:   Lab Results   Component Value Date    WBC 7 85 03/08/2021    RBC 5 76 (H) 03/08/2021    HGB 16 8 03/08/2021    HCT 51 7 (H) 03/08/2021     03/08/2021    RDW 14 0 03/08/2021    NEUTROABS 6 15 03/08/2021    SODIUM 140 03/08/2021    K 3 8 03/08/2021     03/08/2021    CO2 29 03/08/2021    BUN 26 (H) 03/08/2021    CREATININE 1 94 (H) 03/08/2021    GLUC 102 03/08/2021    GLUF 84 09/18/2018    CALCIUM 8 8 03/08/2021    AST 12 03/08/2021    ALT 21 03/08/2021    ALKPHOS 70 03/08/2021    TP 7 2 03/08/2021    ALB 4 0 03/08/2021    TBILI 0 87 03/08/2021    CHOLESTEROL 195 04/26/2016    HDL 52 04/26/2016    TRIG 76 04/26/2016    LDLCALC 128 (H) 04/26/2016    VALPROICTOT 49 2 (L) 03/22/2021    AMMONIA <10 (L) 05/08/2016    [id number] 2 735 03/08/2021    RPR Non-Reactive 05/09/2016       Counseling / Coordination of Care  Total floor / unit time spent today 20 minutes  Greater than 50% of total time was spent with the patient and / or family counseling and / or coordination of care

## 2021-04-14 PROBLEM — R26.9 GAIT DISTURBANCE: Status: ACTIVE | Noted: 2021-04-14

## 2021-04-14 PROCEDURE — 99232 SBSQ HOSP IP/OBS MODERATE 35: CPT | Performed by: NURSE PRACTITIONER

## 2021-04-14 RX ADMIN — SENNOSIDES AND DOCUSATE SODIUM 1 TABLET: 8.6; 5 TABLET ORAL at 22:10

## 2021-04-14 RX ADMIN — TRAZODONE HYDROCHLORIDE 50 MG: 50 TABLET ORAL at 23:41

## 2021-04-14 RX ADMIN — MIRTAZAPINE 15 MG: 15 TABLET, FILM COATED ORAL at 22:09

## 2021-04-14 RX ADMIN — Medication 1 TABLET: at 08:18

## 2021-04-14 RX ADMIN — OLANZAPINE 10 MG: 10 TABLET, FILM COATED ORAL at 22:10

## 2021-04-14 RX ADMIN — DULOXETINE HYDROCHLORIDE 90 MG: 30 CAPSULE, DELAYED RELEASE ORAL at 08:18

## 2021-04-14 RX ADMIN — DEXTROAMPHETAMINE SACCHARATE, AMPHETAMINE ASPARTATE, DEXTROAMPHETAMINE SULFATE, AND AMPHETAMINE SULFATE 15 MG: 2.5; 2.5; 2.5; 2.5 TABLET ORAL at 08:18

## 2021-04-14 RX ADMIN — ERGOCALCIFEROL 50000 UNITS: 1.25 CAPSULE, LIQUID FILLED ORAL at 08:17

## 2021-04-14 RX ADMIN — MELATONIN 9 MG: at 22:09

## 2021-04-14 RX ADMIN — ATORVASTATIN CALCIUM 10 MG: 10 TABLET, FILM COATED ORAL at 16:17

## 2021-04-14 RX ADMIN — TRAZODONE HYDROCHLORIDE 150 MG: 150 TABLET ORAL at 22:08

## 2021-04-14 NOTE — PROGRESS NOTES
Patient visible on the unit  Pleasant and cooperative  Talking and smiling more today  Denies SI,HI, hallucinations and anxiety  Some depression, would like to go home  Q 7 minute checks maintained

## 2021-04-14 NOTE — PROGRESS NOTES
Progress Note - Spring Chung  78 y o  male MRN: [id number]    Unit/Bed#: Andrew Ryder Encounter: [id number]        Subjective:   Patient seen and examined at bedside after reviewing the chart and discussing the case with the caring staff  Patient examined at bedside  Patient has no acute issues except anorexia  Physical Exam   Vitals: Blood pressure 107/65, pulse 67, temperature 98 °F (36 7 °C), temperature source Temporal, resp  rate 18, height 5' 11" (1 803 m), weight 86 9 kg (191 lb 9 6 oz), SpO2 97 %  ,Body mass index is 26 72 kg/m²  Constitutional: Patient appears well-developed  HEENT: PERR, EOMI, MMM  Cardiovascular: Normal rate and regular rhythm  Pulmonary/Chest: Effort normal and breath sounds normal    Abdomen: Soft, + BS, NT    Assessment/Plan:  Spring Chung  is a(n) 78 y o  male with  MDD      1  Cardiac with history of dyslipidemia  Patient is on atorvastatin 10 mg daily  2  Seizure disorder  Patient is on Depakote 500 mg b i d   3  Restless leg syndrome  Patient is on ropinirole 2 mg at bedtime  4  Constipation  Patient is on Senokot S   5  DJD/osteoarthritis  Patient may get Tylenol on as needed basis  6  Gait abnormality  PT and OT are on consult  7  Vitamin D deficiency  Continue D2 93519 units weekly for 6 weeks (ending on 04/14/2021) followed by D3 1000 units daily  8  Anorexia  Patient is currently on Remeron and Ensure supplements but continues to refuse meals  He has lost 9 lbs  over the past 2 weeks  Will continue to encourage patient to eat

## 2021-04-14 NOTE — PROGRESS NOTES
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 ([facility] )    CKD (chronic kidney disease) stage 3, GFR 30-59 ml/min    ESBL (extended spectrum beta-lactamase) producing bacteria infection    Restless legs    Failure to thrive in adult    Moderate protein-calorie malnutrition (HCC)    Gait disturbance      Behavior over the last 24 hours:  unchanged  Sleep:  Improving  Appetite:  Improving  Medication side effects: No  ROS: no complaints and All other systems negative for acute change     Robin Jacobs was seen today for follow-up and case discussed with treatment team this morning  Robin Jacobs was visible in the milieu today for breakfast and lunch  He appeared somewhat brighter and requested that he is ready to go home  His insight still remains limited, but improving  He rates his depression as not getting any better or any worse    He denies any anxiety  He also denies any AVH/SI/HI  Sleep is improving and his appetite is also improving  Robin Jacobs remains compliant with medication regimen and denies any side effects      Mental Status Evaluation:  Appearance:  age appropriate and casually dressed   Behavior:  Calm and cooperative   Speech:  normal pitch and normal volume   Mood:  Less depressed   Affect:  blunted   Thought Process:  goal directed   Thought Content:  No overt delusions   Perceptual Disturbances: None   Risk Potential: Suicidal Ideations none  Homicidal Ideations none  Potential for Aggression No   Sensorium:  person, place and time/date   Memory:  recent and remote memory grossly intact   Consciousness:  alert and awake    Attention: attention span and concentration were age appropriate   Insight:  limited   Judgment: limited   Gait/Station: slow and Uses walker   Motor Activity: no abnormal movements     Progress Toward Goals:  Some improvement  Will continue with current psychotropic regimen; patient is tolerating well  Plan is to discharge back to home upon stabilization  No discharge date at this time  Recommended Treatment: Continue with group therapy, milieu therapy and occupational therapy  Risks, benefits and possible side effects of Medications:   Risks, benefits, and possible side effects of medications explained to patient and patient verbalizes understanding  Medications: all current active meds have been reviewed and continue current psychiatric medications  Labs: I have personally reviewed all pertinent laboratory/tests results  Counseling / Coordination of Care  Total floor / unit time spent today 20 minutes  Greater than 50% of total time was spent with the patient and / or family counseling and / or coordination of care

## 2021-04-14 NOTE — PROGRESS NOTES
Patient appeared to be sleeping during most Q 7 minute safety checks  No SI/HI/AH/VH noted  Patient shows no s/s of distress  No complaints of pain or aspiration risks  Non-labored breathing  Monitoring continues  Fluids at bedside to promote hydration

## 2021-04-14 NOTE — PROGRESS NOTES
04/14/21    Team Meeting   Meeting Type Daily Rounds   Team Members Present   Team Members Present Physician;Nurse;   Physician Team Member Dr Geovany Zheng MD; Phoebe Keenan MD   Nursing Team Member Elam Fleischer, RN   Care Management Team Member MS Jessie, [facility]   OT Team Member Rosa [place], [place]   Patient/Family Present   Patient Present No   Patient's Family Present No     Potential D/C next week  Will follow up with outpatient services  PT reports feeling better, smiled  Out for meals yesterday  Will order PT/OT
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
## 2021-04-14 NOTE — PLAN OF CARE
Problem: DISCHARGE PLANNING - CARE MANAGEMENT  Goal: Discharge to post-acute care or home with appropriate resources  Description: INTERVENTIONS:  - Conduct assessment to determine patient/family and health care team treatment goals, and need for post-acute services based on payer coverage, community resources, and patient preferences, and barriers to discharge  - Address psychosocial, clinical, and financial barriers to discharge as identified in assessment in conjunction with the patient/family and health care team  - Arrange appropriate level of post-acute services according to patients   needs and preference and payer coverage in collaboration with the physician and health care team  - Communicate with and update the patient/family, physician, and health care team regarding progress on the discharge plan  - Arrange appropriate transportation to post-acute venues  Outcome: Progressing     Pt progressing; more visible on unit; more verbal; DC probable next week with outpt services and Nita Weaver

## 2021-04-14 NOTE — NURSING NOTE
Patient observed in the community, ambulates by self w/ walker  Declined breakfast tray, ate 75% of lunch in hallway  Attended group  Pt Compliant with medication  Pleasant, cooperative  Denies anxiety  Endorses "moderate" depression  Denies SI/HI and hallucinations  Will CTM  Q7 minute safety checks in progress

## 2021-04-15 PROCEDURE — 99232 SBSQ HOSP IP/OBS MODERATE 35: CPT | Performed by: PSYCHIATRY & NEUROLOGY

## 2021-04-15 RX ORDER — LANOLIN ALCOHOL/MO/W.PET/CERES
9 CREAM (GRAM) TOPICAL
Qty: 90 TABLET | Refills: 0 | Status: SHIPPED | OUTPATIENT
Start: 2021-04-15 | End: 2021-05-15

## 2021-04-15 RX ORDER — TRAZODONE HYDROCHLORIDE 150 MG/1
150 TABLET ORAL
Qty: 30 TABLET | Refills: 0 | Status: SHIPPED | OUTPATIENT
Start: 2021-04-15 | End: 2022-05-24

## 2021-04-15 RX ORDER — OLANZAPINE 10 MG/1
10 TABLET ORAL
Qty: 30 TABLET | Refills: 0 | Status: SHIPPED | OUTPATIENT
Start: 2021-04-15 | End: 2022-05-24

## 2021-04-15 RX ORDER — MIRTAZAPINE 15 MG/1
15 TABLET, FILM COATED ORAL
Qty: 30 TABLET | Refills: 0 | Status: SHIPPED | OUTPATIENT
Start: 2021-04-15 | End: 2022-05-24

## 2021-04-15 RX ORDER — DULOXETIN HYDROCHLORIDE 30 MG/1
90 CAPSULE, DELAYED RELEASE ORAL DAILY
Qty: 90 CAPSULE | Refills: 0 | Status: SHIPPED | OUTPATIENT
Start: 2021-04-16 | End: 2022-05-24

## 2021-04-15 RX ORDER — MELATONIN
1000 DAILY
Qty: 30 TABLET | Refills: 0 | Status: SHIPPED | OUTPATIENT
Start: 2021-04-16 | End: 2022-05-24

## 2021-04-15 RX ORDER — DEXTROAMPHETAMINE SACCHARATE, AMPHETAMINE ASPARTATE, DEXTROAMPHETAMINE SULFATE AND AMPHETAMINE SULFATE 2.5; 2.5; 2.5; 2.5 MG/1; MG/1; MG/1; MG/1
15 TABLET ORAL DAILY
Qty: 15 TABLET | Refills: 0 | Status: SHIPPED | OUTPATIENT
Start: 2021-04-16 | End: 2022-05-24

## 2021-04-15 RX ADMIN — TRAZODONE HYDROCHLORIDE 150 MG: 150 TABLET ORAL at 21:14

## 2021-04-15 RX ADMIN — Medication 1 TABLET: at 08:10

## 2021-04-15 RX ADMIN — ATORVASTATIN CALCIUM 10 MG: 10 TABLET, FILM COATED ORAL at 17:08

## 2021-04-15 RX ADMIN — DEXTROAMPHETAMINE SACCHARATE, AMPHETAMINE ASPARTATE, DEXTROAMPHETAMINE SULFATE, AND AMPHETAMINE SULFATE 15 MG: 2.5; 2.5; 2.5; 2.5 TABLET ORAL at 08:09

## 2021-04-15 RX ADMIN — MIRTAZAPINE 15 MG: 15 TABLET, FILM COATED ORAL at 21:14

## 2021-04-15 RX ADMIN — SENNOSIDES AND DOCUSATE SODIUM 1 TABLET: 8.6; 5 TABLET ORAL at 21:14

## 2021-04-15 RX ADMIN — Medication 1000 UNITS: at 08:10

## 2021-04-15 RX ADMIN — DULOXETINE HYDROCHLORIDE 90 MG: 30 CAPSULE, DELAYED RELEASE ORAL at 08:10

## 2021-04-15 RX ADMIN — MELATONIN 9 MG: at 21:14

## 2021-04-15 RX ADMIN — OLANZAPINE 10 MG: 10 TABLET, FILM COATED ORAL at 21:15

## 2021-04-15 NOTE — SOCIAL WORK
DEVON advised patient will DC tomorrow to home; Pt states he follows up at [facility] for psych services     Pt states he will require a ride home - DEVON sent email to [employer] - awaiting transport time     DEVON placed phone call to [facility] - [phone];  spoke to Brianna Morgan at call center - transferred to [facility]; spoke to [facility] - scheduled follow up with Dr Kamron Godoy Monday 4/19 at 1230pm In-Person     Meds e-scripted to [facility] for Meds to Bed - refills sent to [email or web address] for mail order

## 2021-04-15 NOTE — PLAN OF CARE
Problem: Ineffective Coping  Goal: Participates in unit activities  Description: Interventions:  - Provide therapeutic environment   - Provide required programming   - Redirect inappropriate behaviors   Outcome: Not Progressing   Patient still remains uncomfortable in groups but is more visible and interactive

## 2021-04-15 NOTE — PROGRESS NOTES
Progress Note - Marcial Bliss  78 y o  male MRN: [id number]    Unit/Bed#: Melissa Escalera Encounter: [id number]        Subjective:   Patient seen and examined at bedside after reviewing the chart and discussing the case with the caring staff  Patient examined at bedside  Patient is sitting up in bed and more talkative with this writer  He has no acute concerns  Patient is a possible discharge for tomorrow, 04/16/2021  Patient is requesting his prescriptions  I have reviewed and reconciled the patient's problem list and medications  Physical Exam   Vitals: Blood pressure 135/71, pulse 71, temperature 98 2 °F (36 8 °C), temperature source Temporal, resp  rate 18, height 5' 11" (1 803 m), weight 92 4 kg (203 lb 11 3 oz), SpO2 95 %  ,Body mass index is 28 41 kg/m²  Constitutional: Patient appears well-developed  HEENT: PERR, EOMI, MMM  Cardiovascular: Normal rate and regular rhythm  Pulmonary/Chest: Effort normal and breath sounds normal    Abdomen: Soft, + BS, NT    Assessment/Plan:  Marcial Bliss  is a(n) 78 y o  male with  MDD  Medical clearance:  Patient is medically cleared for discharge  All scripts have been written      1  Cardiac with history of dyslipidemia  Patient is on atorvastatin 10 mg daily  2  Seizure disorder  Patient is on Depakote 500 mg b i d   3  Restless leg syndrome  Patient is on ropinirole 2 mg at bedtime  4  Constipation  Patient is on Senokot S   5  DJD/osteoarthritis  Patient may get Tylenol on as needed basis  6  Gait abnormality  PT and OT are on consult  7  Vitamin D deficiency  Continue D2 06376 units weekly for 6 weeks (ending on 04/14/2021) followed by D3 1000 units daily  8  Anorexia  Patient is currently on Remeron and Ensure supplements and has been eating more of his meals  Will continue to encourage patient to eat  The patient was discussed with Dr Mary Jiang and he is in agreement with the above note

## 2021-04-15 NOTE — PLAN OF CARE
Problem: DISCHARGE PLANNING - CARE MANAGEMENT  Goal: Discharge to post-acute care or home with appropriate resources  Description: INTERVENTIONS:  - Conduct assessment to determine patient/family and health care team treatment goals, and need for post-acute services based on payer coverage, community resources, and patient preferences, and barriers to discharge  - Address psychosocial, clinical, and financial barriers to discharge as identified in assessment in conjunction with the patient/family and health care team  - Arrange appropriate level of post-acute services according to patients   needs and preference and payer coverage in collaboration with the physician and health care team  - Communicate with and update the patient/family, physician, and health care team regarding progress on the discharge plan  - Arrange appropriate transportation to post-acute venues  Outcome: Progressing     Pt progressing - DC Friday to home via [employer]; Meds to Bed; follow up with [facility]

## 2021-04-15 NOTE — PROGRESS NOTES
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 ([facility] )    CKD (chronic kidney disease) stage 3, GFR 30-59 ml/min    ESBL (extended spectrum beta-lactamase) producing bacteria infection    Restless legs    Failure to thrive in adult    Moderate protein-calorie malnutrition (HCC)    Gait disturbance      Behavior over the last 24 hours:  improved  Sleep: normal  Appetite: normal  Medication side effects: No  ROS: no complaints and All other systems negative for acute change     Christine Cardona was seen today for follow-up and case discussed with treatment team this morning  He continues to show much improvement and is frequently visible in milieu  He rates his depression as a 7/10 S states I think my depression is getting much better    He voiced readiness to return home and we spoke about a crisis plan to utilize upon discharge  Patient verbalize adequate safety plan which includes calling his sister-in-law all or returning to the emergency department  He denies any anxiety as well as AVH/SI/HI  His thoughts are linear, logical, and goal-directed and his insight appears to be much improved  He reports his sleep is also improved any slept throughout the night last night  Patient is now out in the milieu for all meals and appetite is adequate      Mental Status Evaluation:  Appearance:  age appropriate and casually dressed   Behavior:  Pleasant and cooperative   Speech:  normal pitch and normal volume   Mood:  Improving, less depressed   Affect:  mood-congruent   Thought Process:  goal directed and logical   Thought Content:  normal and No overt delusions   Perceptual Disturbances: None   Risk Potential: Suicidal Ideations none  Homicidal Ideations none  Potential for Aggression No   Sensorium:  person, place, time/date and situation   Memory:  recent and remote memory grossly intact   Consciousness:  alert and awake    Attention: attention span and concentration were age appropriate   Insight:  fair and Improving   Judgment: limited   Gait/Station: slow and Uses walker   Motor Activity: no abnormal movements     Progress Toward Goals:  Improved  Continue current psychotropic regimen  Patient will be discharged to home tomorrow 04/16/2021 with outpatient services  Patient tolerating medication well  Recommended Treatment: Continue with group therapy, milieu therapy and occupational therapy  Risks, benefits and possible side effects of Medications:   Risks, benefits, and possible side effects of medications explained to patient and patient verbalizes understanding  Medications: all current active meds have been reviewed and continue current psychiatric medications  Labs: I have personally reviewed all pertinent laboratory/tests results  Counseling / Coordination of Care  Total floor / unit time spent today 30 minutes  Greater than 50% of total time was spent with the patient and / or family counseling and / or coordination of care   A description of the counseling / coordination of care:  Medication education, treatment plan, discharge planning, supportive therapy

## 2021-04-15 NOTE — DISCHARGE INSTR - APPOINTMENTS
The treatment team recommends ongoing medication management upon discharge with your current psychiatric provider  A follow up appointment has been made on your behalf with [facility], [address], [place], [address], Phone: [phone]  Your appointment is scheduled for Monday, April 19th at 1230pm with Dr Tiffany Zimmerman IN-PERSON  Please plan to arrive 15 minutes prior to your scheduled appointment and bring your insurance card(s), photo ID  You must wear a mask to this appointment  Your discharge will be faxed to this provider for continuity of care  You have identified Dr Leanne Flores, DO, Phone: [phone] as your primary care provider but you have declined a scheduled follow-up appointment on your behalf  Please schedule as needed  Your discharge will be faxed to this provider for continuity of care  Jad Solomon RN, our [employer], will be calling you after your discharge, on the phone number that you provided  She will be available as an additional support, if needed  If you wish to speak with her, you may contact Marietta Milner at [phone]

## 2021-04-15 NOTE — PROGRESS NOTES
Trazodone given at 2341 effective for sleep  Appeared to be sleeping during q 7 minute safety checks  No complaints overnight  Fluids at bedside to promote hydration  No suicidal/homicidal ideation overnight  Will continue to monitor

## 2021-04-15 NOTE — DISCHARGE INSTR - OTHER ORDERS
[facility]  [address]; Phone: [phone]    Triggers you have identified during your hospitalization that led to your admission with distressed mood include increased depression and ineffective coping skills  Coping skills you have identified during your hospitalization include watching tv, racing, the [place] and dogs  If you are unable to deal with your distressed mood alone please call your sister in [place], [phone]  If that is not effective and you continue to have distressed mood, please contact your provider at [address] [place] [address] at [phone], dial [phone] or go to the nearest emergency center  *[facility] Crisis Hotline: 805.416.3190 option 1  *National Suicide Prevention Lifeline:  1-809.194.7012  *[place] on Mental Illness ([place]) HELPLINE: 255.511.2995/Website: www max org  *Substance Abuse and Mental Health Services Administration([facility]) [employer], which is a confidential, free, 24-hour-a-day, 365-day-a-year, information service for individuals and family members facing mental health and/or substance use disorders  This service provides referrals to local treatment facilities, support groups, and community-based organizations  Callers can also order free publications and other information  Call 2-268.815.1882/Website: www [facility] gov  *[employer] 2-1-1: This is a toll free, confidential, 24-hour-a-day service which connects you to a community  in your area who can help you find services and resources that are available to you locally and provide critical services that can improve and save lives  Call: 80  /Website: NamePrediction fi you need to know aboutcoronavirus disease 2019 (COVID-19)     What is coronavirus disease 2019 (COVID-19)? Coronavirus disease 2019 (COVID-19) is a respiratory illness that can spread from person to person  The virus that causes COVID-19 is a novel coronavirus that was first identified during an investigation into an outbreak in [place], [place]  Can people in the [place]  get COVID-19? Yes  COVID-19 is spreading from person to person in parts of the [place]  Risk of infection with COVID-19 is higher for people who are close contacts of someone known to have COVID-19, for example healthcare workers, or household members  Other people at higher risk for infection are those who live in or have recently been in an area with ongoing spread of COVID-19  Learn more about places with ongoing spread at   [facility]  html#geographic  Have there been cases of COVID-19 in the [place] ?   Yes  The first case of COVID-19 in the [place] was reported on January 21, 2020  The current count of cases of COVID-19 in the [place] is available on [employer] at [facility]  How does COVID-19 spread? The virus that causes COVID-19 probably emerged from an animal source, but is now spreading from person to person  The virus is thought to spread mainly between people who are in close contact with one another (within about 6 feet) through respiratory droplets produced when an infected person coughs or sneezes  It also may be possible that a person can get COVID-19 by touching a surface or object that has the virus on it and then touching their own mouth, nose, or possibly their eyes, but this is not thought to be the main way the virus spreads  Learn what is known about the spread of newly emerged coronaviruses at [facility]  What are the symptoms of COVID-19? Patients with COVID-19 have had mild to severe respiratory illness with symptoms of   fever   cough   shortness of breath  What are severe complications from this virus?    Some patients have pneumonia in both lungs, multi-organ failure and in some cases death  How can I help protect myself? People can help protect themselves from respiratory illness with everyday preventive actions  Avoid close contact with people who are sick  Avoid touching your eyes, nose, and mouth withunwashed hands  Wash your hands often with soap and water for at least 20 seconds  Use an alcohol-based hand  that contains at least 60% alcohol if soap and water are not available  If you are sick, to keep from spreading respiratory illness to others, you should   Stay home when you are sick  Cover your cough or sneeze with a tissue, then throw the tissue in the trash  Clean and disinfect frequently touched objectsand surfaces  What should I do if I recently traveled from an area with ongoing spread of COVID-19? If you have traveled from an affected area, there may be restrictions on your movements for up to 2 weeks  If you develop symptoms during that period (fever, cough, trouble breathing), seek medical advice  Call the office of your health care provider before you go, and tell them about your travel and your symptoms  They will give you instructions on how to get care without exposing other people to your illness  While sick, avoid contact with people, don't go out and delay any travel to reduce the possibility of spreading illness to others  Is there a treatment? There is no specific antiviral treatment for COVID-19  People with COVID-19 can seek medical care to helprelieve symptoms  For more information: www cdc gov/WVYSN50KD 108313-R 03/03/2020       What to do if you are sick withcoronavirus disease 2019 (COVID-19)     If you are sick with COVID-19 or suspect you are infected with the virus that causes COVID-19, follow the steps below to help prevent the disease from spreading to people in your home and community     Stay home except to get medical care   You should restrict activities outside your home, except for getting medical care  Do not go to work, school, or public areas  Avoid using public transportation, ride-sharing, or taxis  Separate yourself from other people and animals inyour home  People: As much as possible, you should stay in a specific room and away from other people in your home  Also, you should use a separate bathroom, if available  Animals: Do not handle pets or other animals while sick  See COVID-19 and Animals for more information  Call ahead before visiting your doctor   If you have a medical appointment, call the healthcare provider and tell them that you have or may have COVID-19  This will help the healthcare provider's office take steps to keep other people from getting infected or exposed  Wear a facemask  You should wear a facemask when you are around other people (e g , sharing a room or vehicle) or pets and before you enter a healthcare provider's office  If you are not able to wear a facemask (for example, because it causes trouble breathing), then people who live with you should not stay in the same room with you, or they should wear a facemask if they enteryour room  Cover your coughs and sneezes   Cover your mouth and nose with a tissue when you cough or sneeze  Throw used tissues in a lined trash can; immediately wash your hands with soap and water for at least 20 seconds or clean your hands with an alcohol-based hand  that contains at least 60 to 95% alcohol, covering all surfaces of your hands and rubbing them together until they feel dry  Soap and water should be used preferentially if hands are visibly dirty  Avoid sharing personal household items   You should not share dishes, drinking glasses, cups, eating utensils, towels, or bedding with other people or pets in your home  After using these items, they should be washed thoroughly with soap and water  Clean your hands often  Wash your hands often with soap and water for at least 20 seconds   If soap and water are not available, clean your hands with an alcohol-based hand  that contains at least 60% alcohol, covering all surfaces of your hands and rubbing them together until they feel dry  Soap and water should be used preferentially if hands are visibly dirty  Avoid touching your eyes, nose, and mouth with unwashed hands  Clean all "high-touch" surfaces every day  High touch surfaces include counters, tabletops, doorknobs, bathroom fixtures, toilets, phones, keyboards, tablets, and bedside tables  Also, clean any surfaces that may have blood, stool, or body fluids on them  Use a household cleaning spray or wipe, according to the label instructions  Labels contain instructions for safe and effective use of the cleaning product including precautions you should take when applying the product, such as wearing gloves and making sure you have good ventilation during use of the product  Monitor your symptoms  Seek prompt medical attention if your illness is worsening (e g , difficulty breathing)  Before seeking care, call your healthcare provider and tell them that you have, or are being evaluated for, COVID-19  Put on a facemask before you enter the facility  These steps will help the healthcare provider's office to keep other people in the office or waiting room from getting infectedor exposed  Ask your healthcare provider to call the local or [facility] health department  Persons who are placed under active monitoring or facilitated self-monitoring should follow instructions provided by their local health department or occupational health professionals, as appropriate  If you have a medical emergency and need to call 911, notify the dispatch personnel that you have, or are being evaluated for COVID-19  If possible, put on a facemask before emergency medical services arrive    Discontinuing home isolation  Patients with confirmed COVID-19 should remain under home isolation precautions until the risk of secondary transmission to others is thought to be low  The decision to discontinue home isolation precautions should be made on a case-by-case basis, in consultation with healthcare providers and state and local health departments  For more information: www cdc gov/GMRHV11RH 329246-G 02/24/2020       Stay home when you are sick,except to get medical care  Wash your hands often with soap and water for at least 20 seconds  Cover your cough or sneeze with a tissue, then throw the tissue in the trash  Clean and disinfect frequently touched objects and surfaces  Avoid touching your eyes, nose, and mouth  STOP THE SPREAD OF GERMS  For more information: www cdc gov/COVID19 Avoid close contact with people who are sick  Help prevent the spread of respiratory diseases like COVID-19  Team [place]   COVID-19 CRISIS COUNSELING PROGRAM   GET CONNECTED WITH A FREE CRISIS COUNSELOR   CALL [phone]   Do you feel    Stressed? Overwhelmed? Alone? Afraid? Anxiety? During these uncertain times, you are not alone  We are here to listen  Please call our [facility] and Referral Line   [phone] TTY: [phone]   There are trained professionals available 24/7 ready to help you navigate these unprecedented challenges  These services are FREE & CONFIDENTIAL  This publication was made possible by Dillon Arellano Number [id number], in collaboration with the [address] [place] [address]

## 2021-04-15 NOTE — PROGRESS NOTES
Pt up ad wilner with walker  Pt c/o feeling sad & would like to be discharged today  Pt is irritable & socializes minimally with other patients  Pt did come out of his room for breakfast this AM  Pt denies any anxiety, hallucinations, suicidal or homicidal ideations  Q 7 min checks maintained to monitor pt's behavior & safety  Pt is cooperative & compliant with medications

## 2021-04-15 NOTE — PROGRESS NOTES
Brighter, pleasant during assessment  States he is feeling much better  Compliant with medications  Behaviors are controlled  No longer ruminating over sleeping all night  Focus on being discharged on Friday  Grateful for all care given tonight

## 2021-04-15 NOTE — SOCIAL WORK
LIO spoke with Gilma from [facility] to beds [id number], total without aderall is 64 59, and aderall can be run tomorrow but cash rate is 23 [phone]  They would be able to run and fill and have here by noon tomorrow  Spoke with Supervisor CN, approved for amount above  [phone]  LIO placed call back to [facility] to beds [id number] left message informing of permission received by the supervisor to move forward with fill  Requested return call with any questions or concerns

## 2021-04-15 NOTE — PROGRESS NOTES
Complained of insomnia at 2341  traZODone (DESYREL) tablet 50 mg given at this time, effective at present

## 2021-04-15 NOTE — PROGRESS NOTES
04/15/21   Team Meeting   Meeting Type Daily Rounds   Team Members Present   Team Members Present Physician;Nurse;; Occupational Therapist   Physician Team Member Dr Jacque Jiménez MD   Nursing Team Member Chao Holguin RN   Social Work Team Member Mahendra [place] [facility]   OT Team Member Kayode Maldonado [place]   Patient/Family Present   Patient Present No   Patient's Family Present No     DC Friday to home; clearer, pt advocating for discharge; pleasant, cooperative; attended group; ate all 3 meals yesterday

## 2021-04-16 VITALS
SYSTOLIC BLOOD PRESSURE: 144 MMHG | HEIGHT: 71 IN | TEMPERATURE: 97.8 F | BODY MASS INDEX: 28.52 KG/M2 | RESPIRATION RATE: 18 BRPM | OXYGEN SATURATION: 96 % | DIASTOLIC BLOOD PRESSURE: 88 MMHG | HEART RATE: 62 BPM | WEIGHT: 203.71 LBS

## 2021-04-16 PROCEDURE — 99238 HOSP IP/OBS DSCHRG MGMT 30/<: CPT | Performed by: PSYCHIATRY & NEUROLOGY

## 2021-04-16 RX ADMIN — Medication 1000 UNITS: at 08:27

## 2021-04-16 RX ADMIN — DEXTROAMPHETAMINE SACCHARATE, AMPHETAMINE ASPARTATE, DEXTROAMPHETAMINE SULFATE, AND AMPHETAMINE SULFATE 15 MG: 2.5; 2.5; 2.5; 2.5 TABLET ORAL at 06:11

## 2021-04-16 RX ADMIN — DULOXETINE HYDROCHLORIDE 90 MG: 30 CAPSULE, DELAYED RELEASE ORAL at 08:27

## 2021-04-16 RX ADMIN — Medication 1 TABLET: at 08:27

## 2021-04-16 NOTE — PROGRESS NOTES
Progress Note - Ligia Guevara  78 y o  male MRN: [id number]    Unit/Bed#: [facility] Encounter: [id number]        Subjective:   Patient seen and examined at bedside after reviewing the chart and discussing the case with the caring staff  Patient examined at bedside  Patient is pleasant and polite in conversation with this writer  He reports he feels much better and is ready for discharge today  He has no acute concerns  Patient is being discharged today, 04/16/2021  Patient is requesting his prescriptions  I have reviewed and reconciled the patient's problem list and medications  Physical Exam   Vitals: Blood pressure 144/88, pulse 62, temperature 97 8 °F (36 6 °C), temperature source Temporal, resp  rate 18, height 5' 11" (1 803 m), weight 92 4 kg (203 lb 11 3 oz), SpO2 96 %  ,Body mass index is 28 41 kg/m²  Constitutional: Patient appears well-developed  HEENT: PERR, EOMI, MMM  Cardiovascular: Normal rate and regular rhythm  Pulmonary/Chest: Effort normal and breath sounds normal    Abdomen: Soft, + BS, NT    Assessment/Plan:  Ligia Guevara  is a(n) 78 y o  male with  MDD  Medical clearance:  Patient is medically cleared for discharge  All scripts have been written      1  Cardiac with history of dyslipidemia  Patient is on atorvastatin 10 mg daily  2  Seizure disorder  Patient is on Depakote 500 mg b i d   3  Restless leg syndrome  Patient is on ropinirole 2 mg at bedtime  4  Constipation  Patient is on Senokot S   5  DJD/osteoarthritis  Patient may get Tylenol on as needed basis  6  Gait abnormality  PT and OT are on consult  7  Vitamin D deficiency  Continue D2 17106 units weekly for 6 weeks (ending on 04/14/2021) followed by D3 1000 units daily  8  Anorexia  Patient is currently on Remeron and Ensure supplements and has been eating more of his meals  Will continue to encourage patient to eat      The patient was discussed with Dr Elvira Jean and he is in agreement with the above note

## 2021-04-16 NOTE — NURSING NOTE
Patient ambulated off of unit accompanied by T  AVS reviewed with pt and he verbalizes understanding  All belongings sent with patient

## 2021-04-16 NOTE — DISCHARGE INSTRUCTIONS
Depression   WHAT YOU NEED TO KNOW:   Depression is a medical condition that causes feelings of sadness or hopelessness that do not go away  Depression may cause you to lose interest in things you used to enjoy  These feelings may interfere with your daily life  DISCHARGE INSTRUCTIONS:   Call your local emergency number (911 in the [address]) if:   · You think about harming yourself or someone else  · You have done something on purpose to hurt yourself  Call your therapist or doctor if:   · Your symptoms do not improve  · You cannot make it to your next appointment  · You have new symptoms  · You have questions or concerns about your condition or care  The following resources are available at any time to help you, if needed:   · [facility]: 8-192.128.7756 (3-058-013-NYEW)     · Suicide Hotline: 3-455.322.8290 (3-773-HIECKQK)     · For a list of international numbers: https://save org/find-help/international-resources/    Medicines:   · Antidepressants  may be given to improve or balance your mood  You may need to take this medicine for several weeks before you begin to feel better  · Take your medicine as directed  Contact your healthcare provider if you think your medicine is not helping or if you have side effects  Tell him of her if you are allergic to any medicine  Keep a list of the medicines, vitamins, and herbs you take  Include the amounts, and when and why you take them  Bring the list or the pill bottles to follow-up visits  Carry your medicine list with you in case of an emergency  Therapy  is often used together with medicine to relieve depression  Therapy is a way for you to talk about your feelings and anything that may be causing depression  Therapy can be done alone or in a group  It may also be done with family members or a significant other  Self-care:   · Get regular physical activity  Try to be active for 30 minutes, 3 to 5 days a week   Physical activity can help relieve depression  Work with your healthcare provider to develop a plan that you enjoy  It may help to ask someone to be active with you  · Create a regular sleep schedule  A routine can help you relax before bed  Listen to music, read, or do yoga  Try to go to bed and wake up at the same time every day  Sleep is important for emotional health  · Eat a variety of healthy foods  Healthy foods include fruits, vegetables, whole-grain breads, low-fat dairy products, lean meats, fish, and cooked beans  A healthy meal plan is low in fat, salt, and added sugar  · Do not drink alcohol or use drugs  Alcohol and drugs can make depression worse  Talk to your therapist or doctor if you need help quitting  Follow up with your healthcare provider as directed: Your healthcare provider will monitor your progress at follow-up visits  He or she will also monitor your medicine if you take antidepressants  Your healthcare provider will ask if the medicine is helping  Tell him or her about any side effects or problems you may have with your medicine  The type or amount of medicine may need to be changed  Write down your questions so you remember to ask them during your visits  © Copyright [address] [place] [address] Information is for End User's use only and may not be sold, redistributed or otherwise used for commercial purposes  All illustrations and images included in CareNotes® are the copyrighted property of A D A M , Inc  or [address] [place] [address]  The above information is an  only  It is not intended as medical advice for individual conditions or treatments  Talk to your doctor, nurse or pharmacist before following any medical regimen to see if it is safe and effective for you  Amphetamine/Dextroamphetamine (By mouth)   Treats ADHD  Also treats narcolepsy  Brand Name(s): Adderall, Adderall XR, Mydayis   There may be other brand names for this medicine    When This Medicine Should Not Be Used: This medicine is not right for everyone  Do not use it if you had an allergic reaction to amphetamine, dextroamphetamine, or similar medicines, or if you have glaucoma, an overactive thyroid, or a history of drug abuse  How to Use This Medicine:   Long Acting Capsule, Tablet  · Take your medicine as directed  Your dose may need to be changed several times to find what works best for you  · Extended-release capsule:   ? Take the capsule in the morning right after you wake up  You may have trouble falling asleep at night if you take it in the afternoon or evening  ? Swallow the capsule whole  Do not crush, break, or chew it  ? If you cannot swallow the capsule, you may open it and sprinkle the contents over a spoonful of applesauce  Swallow the mixture right away without chewing  ? You may take the capsule with or without food, but make sure to take it the same way each time  · Tablet: Take the tablet in the morning and early afternoon  You may have trouble falling asleep if you take it at night  · This medicine should come with a Medication Guide  Ask your pharmacist for a copy if you do not have one  · Missed dose: Take a dose as soon as you remember  If it is almost time for your next dose, wait until then and take a regular dose  Do not take extra medicine to make up for a missed dose  · Store the medicine in a closed container at room temperature, away from heat, moisture, and direct light  Drugs and Foods to Avoid:   Ask your doctor or pharmacist before using any other medicine, including over-the-counter medicines, vitamins, and herbal products  · Do not use this medicine if you are using or you have used an MAO inhibitor (MAOI) within the past 14 days  · Some foods and medicines can affect how this medicine works   Tell your doctor if you are using any of the following:   ? Acetazolamide, ammonium chloride, buspirone, chlorpromazine, ethosuximide, fentanyl, glutamic acid, guanethidine, haloperidol, hydrochlorothiazide, lithium, meperidine, methenamine, phenobarbital, phenytoin, propoxyphene, quinidine, reserpine, ritonavir, sodium acid phosphate, Obdulia's wort, tramadol  ? Allergy medicine  ? Antacid or other stomach medicine (including cimetidine, esomeprazole, omeprazole, pantoprazole, sodium bicarbonate)  ? Blood pressure medicine  ? Medicine to treat depression (including desipramine, fluoxetine, paroxetine, protriptyline)  ? Triptan medicine to treat migraine headache  ? Tryptophan supplement  · Fruit juice and vitamin C can affect how your body absorbs this medicine  · Do not drink alcohol while you are using this medicine  Warnings While Using This Medicine:   · Tell your doctor if you are pregnant or breastfeeding, or if you have heart or blood vessel disease (including arteriosclerosis), kidney disease, heart rhythm problems, high blood pressure, or a history of heart attack, stroke, seizures, or Tourette syndrome  Tell your doctor if you or anyone in your family has a history of depression, mental health problems, or drug or alcohol addiction  · This medicine may cause the following problems:   ? Heart or blood vessel problems, including heart attack and stroke  ? Unusual changes in behavior or thoughts  ? Peripheral vasculopathy (blood circulation problem)  ? Slow growth in children  ? Increased risk for seizures  ? Serotonin syndrome (when used with certain medicines)  · This medicine can be habit-forming  Do not use more than your prescribed dose  Call your doctor if you think your medicine is not working  · This medicine may make you dizzy or cause blurred vision  Do not drive or do anything else that could be dangerous until you know how this medicine affects you  · Tell any doctor or dentist who treats you that you are using this medicine  This medicine may affect certain medical test results    · Your doctor will do lab tests at regular visits to check on the effects of this medicine  Keep all appointments  · Keep all medicine out of the reach of children  Never share your medicine with anyone  Possible Side Effects While Using This Medicine:   Call your doctor right away if you notice any of these side effects:  · Allergic reaction: Itching or hives, swelling in your face or hands, swelling or tingling in your mouth or throat, chest tightness, trouble breathing  · Anxiety, fever, sweating, muscle spasms, twitching, nausea, vomiting, diarrhea, seeing or hearing things that are not there  · Blurred vision or vision changes  · Chest pain, trouble breathing, fainting  · Extreme energy or restlessness, confusion, agitation, unusual mood or behavior  · Fast, slow, pounding, or uneven heartbeat  · Numb, cold, pale, or painful fingers or toes, unexplained wounds on the fingers or toes  · Seizures  If you notice these less serious side effects, talk with your doctor:   · Dry mouth, stomach pain  · Headache, dizziness  · Loss of appetite, weight loss  · Trouble sleeping  If you notice other side effects that you think are caused by this medicine, tell your doctor  Call your doctor for medical advice about side effects  You may report side effects to FDA at 1-635-FDA-0893  © Copyright [address] [place] [address] Drive Information is for End User's use only and may not be sold, redistributed or otherwise used for commercial purposes  The above information is an  only  It is not intended as medical advice for individual conditions or treatments  Talk to your doctor, nurse or pharmacist before following any medical regimen to see if it is safe and effective for you  Duloxetine (By mouth)   Duloxetine (doo-LOX-[id number]-teen)  Treats depression, anxiety, diabetic peripheral neuropathy, fibromyalgia, and chronic muscle or bone pain  This medicine is an SSNRI  Brand Name(s): Cymbalta, izOlga Mitchell   There may be other brand names for this medicine    When This Medicine Should Not Be Used: This medicine is not right for everyone  Do not use it if you had an allergic reaction to duloxetine  How to Use This Medicine:   Capsule, Delayed Release Capsule  · Take your medicine as directed  Your dose may need to be changed several times to find what works best for you  · Delayed-release capsule: Swallow the capsule whole  Do not crush, chew, break, or open it  Do not open the Cymbalta® delayed-release capsule and sprinkle the contents on food or in liquids  · If you have trouble swallowing the Garrett Schimke delayed release capsule:   ? You may open the capsule and sprinkle the contents over one tablespoon (15 mL) of applesauce  Swallow the mixture right away and do not save any of the mixture to use later  ? You may open the capsule and pour the contents to an all plastic catheter tip syringe and add 50 mL of water  Do not use other liquids  Gently shake it for 10 seconds, and then use it through a nasogastric tube  Rinse with additional water (about 15 mL) if needed  · This medicine should come with a Medication Guide  Ask your pharmacist for a copy if you do not have one  · Missed dose: Take a dose as soon as you remember  If it is almost time for your next dose, wait until then and take a regular dose  Do not take extra medicine to make up for a missed dose  · Store the medicine in a closed container at room temperature, away from heat, moisture, and direct light  Drugs and Foods to Avoid:   Ask your doctor or pharmacist before using any other medicine, including over-the-counter medicines, vitamins, and herbal products  · Do not take duloxetine if you have used an MAO inhibitor (MAOI) within the past 14 days  Do not start taking an MAO inhibitor within 5 days of stopping duloxetine  · Some medicines can affect how duloxetine works  Tell your doctor if you are using any of the following:  ?  Buspirone, cimetidine, ciprofloxacin, enoxacin, fentanyl, lithium, St Jalil's wort, theophylline, tramadol, tryptophan, or warfarin  ? Amphetamines  ? Blood pressure medicine  ? Diuretic (water pill)  ? Medicine for heart rhythm problems (including flecainide, propafenone, quinidine)  ? Medicine to treat migraine headaches (including triptans)  ? NSAID pain or arthritis medicine (including aspirin, celecoxib, diclofenac, ibuprofen, naproxen)  ? Other medicine to treat depression or mood disorders (including amitriptyline, desipramine, fluoxetine, imipramine, nortriptyline, paroxetine)  ? Phenothiazine medicine (including thioridazine)  · Tell your doctor if you use anything else that makes you sleepy  Some examples are allergy medicine, narcotic pain medicine, and alcohol  · Do not drink alcohol while you are using this medicine  Warnings While Using This Medicine:   · Tell your doctor if you are pregnant or breastfeeding, or if you have kidney disease, liver disease, diabetes, digestion problems, glaucoma, heart disease, high or low blood pressure, or problems with urination  Tell your doctor if you smoke or you have a history of seizures, or drug or alcohol addiction  · This medicine may cause the following problems:   ? Serious liver problems  ? Serotonin syndrome (more likely when used with certain other medicines)  ? Increased risk of bleeding problems  ? Serious skin reactions  ? Low sodium levels in the blood  · This medicine can increase thoughts of suicide  Tell your doctor right away if you start to feel depressed and have thoughts about hurting yourself  · This medicine can cause changes in your blood pressure  This may make you dizzy or drowsy  Do not drive or do anything that could be dangerous until you know how this medicine affects you  Stand up slowly to avoid falls  · Do not stop using this medicine suddenly  Your doctor will need to slowly decrease your dose before you stop it completely    · Your doctor will check your progress and the effects of this medicine at regular visits  Keep all appointments  · Keep all medicine out of the reach of children  Never share your medicine with anyone  Possible Side Effects While Using This Medicine:   Call your doctor right away if you notice any of these side effects:  · Allergic reaction: Itching or hives, swelling in your face or hands, swelling or tingling in your mouth or throat, chest tightness, trouble breathing  · Anxiety, restlessness, fever, fast heartbeat, sweating, muscle spasms, diarrhea, seeing or hearing things that are not there  · Blistering, peeling, red skin rash  · Confusion, weakness, muscle twitching  · Dark urine or pale stools, nausea, vomiting, loss of appetite, stomach pain, yellow skin or eyes  · Decrease in how much or how often you urinate  · Eye pain, vision changes, seeing halos around lights  · Feeling more energetic than usual  · Lightheadedness, dizziness, fainting  · Unusual moods or behaviors, worsening depression, thoughts about hurting yourself, trouble sleeping  · Unusual bleeding or bruising  If you notice these less serious side effects, talk with your doctor:   · Decrease in appetite or weight  · Dry mouth, constipation, mild nausea  · Headache  · Unusual drowsiness, sleepiness, or tiredness  If you notice other side effects that you think are caused by this medicine, tell your doctor  Call your doctor for medical advice about side effects  You may report side effects to FDA at 2-835-FDA-4592  © Copyright [address] Information is for End User's use only and may not be sold, redistributed or otherwise used for commercial purposes  The above information is an  only  It is not intended as medical advice for individual conditions or treatments  Talk to your doctor, nurse or pharmacist before following any medical regimen to see if it is safe and effective for you        Insomnia   WHAT YOU NEED TO KNOW:   Insomnia is a condition that makes it hard to fall or stay asleep  Lack of sleep can lead to attention or memory problems during the day  You may also be siddiqui, depressed, clumsy, or have headaches  DISCHARGE INSTRUCTIONS:   Contact your healthcare provider if:   · Your symptoms do not get better, or they get worse  · You begin to use drugs or alcohol to fall asleep  · You have questions or concerns about your condition or care  Medicines:   · Medicines  may help you sleep more regularly or help you feel less anxious  · Take your medicine as directed  Contact your healthcare provider if you think your medicine is not helping or if you have side effects  Tell him or her if you are allergic to any medicine  Keep a list of the medicines, vitamins, and herbs you take  Include the amounts, and when and why you take them  Bring the list or the pill bottles to follow-up visits  Carry your medicine list with you in case of an emergency  What you can do to improve your sleep:   · Create a sleep schedule  Try to go to sleep and wake up at the same times every day  Keep a record of your sleep patterns, and any sleeping problems you have  Bring the record to follow-up visits with healthcare providers  · Do not take naps  Naps could make it hard for you to fall asleep at bedtime  · Keep your bedroom cool, quiet, and dark  Turn on white noise, such as a fan, to help you relax  Do not use your bed for any activity that will keep you awake  Do not read, exercise, eat, or watch TV in your bedroom  · Get up if you do not fall asleep within 20 minutes  Move to another room and do something relaxing until you become sleepy  · Limit caffeine, alcohol, and food to earlier in the day  Only drink caffeine in the morning  Do not drink alcohol within 6 hours of bedtime  Do not eat a heavy meal right before you go to bed  · Exercise regularly  Daily exercise may help you sleep better  Do not exercise within 4 hours of bedtime      Follow up with your healthcare provider as directed: Your healthcare provider may refer you for cognitive behavioral therapy  A behavioral therapist may help you find ways to relax, decrease stress, and improve sleep  Write down your questions so you remember to ask them during your visits  © Copyright [address] Information is for End User's use only and may not be sold, redistributed or otherwise used for commercial purposes  All illustrations and images included in CareNotes® are the copyrighted property of A D A M , Inc  or [facility] Filipe White   The above information is an  only  It is not intended as medical advice for individual conditions or treatments  Talk to your doctor, nurse or pharmacist before following any medical regimen to see if it is safe and effective for you

## 2021-04-16 NOTE — PROGRESS NOTES
04/16/21 0936   Discharge [address] members;Friends/neighbors   Type of Current Residence Private residence   Current [address]  No   Other Referral/Resources/Interventions Provided:   Financial Resources Provided [employer]; Indigent Medication   Discharge Communications   Discharge planning discussed with: patient   IMM Given (Date): 04/16/21   IMM Given to: Patient   Transportation at Discharge? Yes   Type of Transport Free Local Transportation   Dispatcher Called Yes   Number/Name of Dispatcher Caterina   Transported by [employer] and Unit #) [facility]   ETA of Transport 1300   Transfer Mode Self   Accompanied by Alone   [facility] Medication Program   We would like to be able to fill any required prescriptions on discharge at our [address] [facility] and have them delivered to you at discharge in your room    Would you like to participate in this program?  Yes

## 2021-04-16 NOTE — PROGRESS NOTES
Belongings sent home with patient:    Eyeglasses, blue tshirt, blue jeans, black socks, underwear, blue jacket, black shoes, yellow rubber bracelet, black wallet, $27 00 cash, healthcards

## 2021-04-16 NOTE — BH TRANSITION RECORD
Contact Information: If you have any questions, concerns, pended studies, tests and/or procedures, or emergencies regarding your inpatient behavioral health visit  Please contact [facility] [phone] and ask to speak to a , nurse or physician  A contact is available 24 hours/ 7 days a week at this number  Summary of Procedures Performed During your Stay:  Below is a list of major procedures performed during your hospital stay and a summary of results:  - No major procedures performed  Pending Studies (From admission, onward)    None        If studies are pending at discharge, follow up with your PCP and/or referring provider

## 2021-04-16 NOTE — SOCIAL WORK
Sw met with patient to review DC plan and appointments; pt declined PCP appointment again stating he will call if he needs to; SW explained Meds to Bed due to pt usually getting meds from [place] and time lapse issues in obtaining meds - pt expressed gratitude; pt states that he "feel so much better and I am grateful for everything all of you have done"; pt had no questions or concerns for SW     DC today at 1pm via [facility]     IMM explained to pt who expressed verbal confirmation of understanding  Signed IMM in scan bin to be entered into pt's EHR, copy provided to pt

## 2021-04-16 NOTE — NURSING NOTE
Patient observed eating breakfast in unit hallway at time of assessment  Appetite is good- ate 75% of tray  Pt is pleasant and bright upon approach  Pt stated with a smile "today is the day I'm going home"  Engages in conversation with staff  Compliant with AM medications  Denies pain  Endorses less depression  Denies SI/HI and hallucinations  Will CTM  Q7 minute safety checks in progress

## 2021-04-16 NOTE — PROGRESS NOTES
04/16/21    Team Meeting   Meeting Type Daily Rounds   Team Members Present   Team Members Present Physician;Nurse;;Occupational Therapist   Physician Team Member Dr Anthony Delarosa MD   Nursing Team Member Cecy Kearney RN   Care Management Team Member MS Jessie, [facility]   OT Team Member Adry Lam [place]   Patient/Family Present   Patient Present No   Patient's Family Present No   D/ to home today after lunch, brighter, more polite, pleasant, cooperative, ate 100% this am  Meds to beds

## 2021-04-16 NOTE — NURSING NOTE
Patient withdrawn to his room refused group  Ate HS snack  Upon approach patient's mood and affect is flat and labile  Patient is pleasant and brighter  Engages in conversation  Endorses less depression  Denies anxiety/pain/SI/HI/AHVH  Took HS medication without difficulty  Will continue to monitor safety and behaviors every 7 minutes

## 2021-04-18 PROBLEM — R62.7 FAILURE TO THRIVE IN ADULT: Status: RESOLVED | Noted: 2021-03-31 | Resolved: 2021-04-18

## 2021-04-18 PROBLEM — E44.0 MODERATE PROTEIN-CALORIE MALNUTRITION (HCC): Status: RESOLVED | Noted: 2021-04-09 | Resolved: 2021-04-18

## 2021-04-18 PROBLEM — F33.2 MDD (MAJOR DEPRESSIVE DISORDER), RECURRENT EPISODE, SEVERE (HCC): Status: RESOLVED | Noted: 2018-01-28 | Resolved: 2021-04-18

## 2021-04-18 PROBLEM — A49.9 ESBL (EXTENDED SPECTRUM BETA-LACTAMASE) PRODUCING BACTERIA INFECTION: Status: RESOLVED | Noted: 2018-01-29 | Resolved: 2021-04-18

## 2021-04-18 PROBLEM — Z16.12 ESBL (EXTENDED SPECTRUM BETA-LACTAMASE) PRODUCING BACTERIA INFECTION: Status: RESOLVED | Noted: 2018-01-29 | Resolved: 2021-04-18

## 2021-04-18 NOTE — DISCHARGE SUMMARY
Discharge Summary - 43 [facility] Suzy  78 y o  male MRN: [id number]  Unit/Bed#: [facility] 200-01 Encounter: [id number]     Admission Date: 3/8/2021         Discharge Date: 4/16/2021  1:10 PM    Attending Psychiatrist: Deshawn Cardoza MD    Reason for Admission/HPI: Major depressive disorder, single episode, unspecified [F32 9]  Major depressive disorder [F32 9]    History of Present Illness      Pilar Bernheim  is a 78 y o  male with a history of Major Depressive Disorder, TBI and seizure disorder who was admitted to the inpatient adult psychiatric unit on a voluntary 201 commitment basis due to depression, inability to care for self and suicidal ideation  Patient presented to ED after his sister-in-law call 911 due to worsening of depression symptoms and inability to care for himself  On evaluation in the inpatient psychiatric unit [place] states that he has been struggling with depression over 40 year  He endorses significant worsening of depressed mood, hopeless, helplessness, increased apathy, poor sleep and appetite (has not been eating in days), wish to die without any active plan or intent to hurt himself the past several weeks  He denies any current delusion, hallucination but clearly shows severe psychomotor retardation and inability to care for himself  He admits prior history of past psychiatric admissions but states that his medications has not been effective and he has been taking them inconsistently  Patient agrees to be compliant with medication and treatment plan in the unit         Hospital Course: The patient was admitted to the inpatient psychiatric unit and started on every 7 minutes precautions  During the hospitalization the patient was attending individual therapy, group therapy, milieu therapy and occupational therapy  Psychiatric medications were titrated over the hospital stay   To address depressive symptoms, insomnia and attention and concentration difficulties the patient was started on antidepressant Cymbalta and Remeron, antipsychotic medication Zyprexa, medication to address ADHD symptoms Adderall and hypnotic medication Trazodone  Medication doses were titrated during the hospital course  Prior to beginning of treatment medications risks and benefits and possible side effects including risk of parkinsonian symptoms, Tardive Dyskinesia and metabolic syndrome related to treatment with antipsychotic medications, risk of cardiovascular events in elderly related to treatment with antipsychotic medications and risk of suicidality and serotonin syndrome related to treatment with antidepressants were reviewed with the patient  The patient verbalized understanding and agreement for treatment  Patient's symptoms improved gradually over the hospital course  At the end of treatment the patient was doing well  Mood was stable at the time of discharge  The patient denied suicidal ideation, intent or plan at the time of discharge and denied homicidal ideation, intent or plan at the time of discharge  There was no overt psychosis at the time of discharge  Sleep and appetite were improved  The patient was tolerating medications and was not reporting any significant side effects at the time of discharge  Since the patient was doing well at the end of the hospitalization, treatment team felt that the patient could be safely discharged to outpatient care  The outpatient follow up was arranged by the unit  upon discharge      Mental Status at time of Discharge:     Appearance:  age appropriate   Behavior:  cooperative   Speech:  normal volume   Mood:  decreased range   Affect:  mood-congruent   Thought Process:  goal directed   Thought Content:  no overt delusions   Perceptual Disturbances: None   Risk Potential: Suicidal Ideations none, HI none   Sensorium:  person, place and time/date   Cognition:  recent and remote memory grossly intact   Consciousness:  alert and awake    Attention: attention span and concentration were age appropriate   Insight:  fair   Judgment: fair   Gait/Station: uses walker   Motor Activity: no abnormal movements     Admission Diagnosis:Major depressive disorder, single episode, unspecified [F32 9]  Major depressive disorder [F32 9]    Discharge Diagnosis:   Principal Problem (Resolved):    MDD (major depressive disorder), recurrent episode, severe ([place] [address] )  Active Problems:    Hypothyroidism    Essential hypertension    Seizure disorder ([place] [address] )    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 ([place] [address] )    CKD (chronic kidney disease) stage 3, GFR 30-59 ml/min    Restless legs    Gait disturbance  Resolved Problems:    Multiple falls    ESBL (extended spectrum beta-lactamase) producing bacteria infection    Failure to thrive in adult    Moderate protein-calorie malnutrition ([place] [address] )        Lab results:  Admission on 03/08/2021, Discharged on 04/16/2021   Component Date Value    Vitamin B-12 03/09/2021 640     Vit D, 25-Hydroxy 03/09/2021 28 1*    Folate 03/09/2021 >20 0*    Valproic Acid, Total 03/22/2021 49 2*    Ventricular Rate 03/24/2021 65     Atrial Rate 03/24/2021 65     WV Interval 03/24/2021 178     QRSD Interval 03/24/2021 182     QT Interval 03/24/2021 444     QTC Interval 03/24/2021 461     P Axis 03/24/2021 74     QRS Axis 03/24/2021 -[de-identified]     T Wave [place] 03/24/2021 71        Discharge Medications:  Discharge Medication List as of 4/16/2021 12:11 PM      START taking these medications    Details   amphetamine-dextroamphetamine (ADDERALL) 10 mg tablet Take 1 5 tablets (15 mg total) by mouth daily for 10 daysMax Daily Amount: 15 mg, Starting Fri 4/16/2021, Until Mon 4/26/2021, Normal      cholecalciferol (VITAMIN D3) 1,000 units tablet Take 1 tablet (1,000 Units total) by mouth daily, Starting Fri 4/16/2021, Until Sun 5/16/2021, Normal      OLANZapine (ZyPREXA) 10 mg tablet Take 1 tablet (10 mg total) by mouth daily at bedtime, Starting Thu 4/15/2021, Until Sat 5/15/2021, Normal      traZODone (DESYREL) 150 mg tablet Take 1 tablet (150 mg total) by mouth daily at bedtime, Starting Thu 4/15/2021, Until Sat 5/15/2021, Normal            Discharge Medication List as of 4/16/2021 12:11 PM      STOP taking these medications       buPROPion (WELLBUTRIN XL) 300 mg 24 hr tablet Comments:   Reason for Stopping:         divalproex sodium (DEPAKOTE ER) 500 mg 24 hr tablet Comments:   Reason for Stopping:         QUEtiapine (SEROquel) 300 mg tablet Comments:   Reason for Stopping:              Discharge Medication List as of 4/16/2021 12:11 PM      CONTINUE these medications which have CHANGED    Details   DULoxetine (CYMBALTA) 30 mg delayed release capsule Take 3 capsules (90 mg total) by mouth daily, Starting Fri 4/16/2021, Until Sun 5/16/2021, Normal      melatonin 3 mg Take 3 tablets (9 mg total) by mouth daily at bedtime, Starting u 4/15/2021, Until Sat 5/15/2021, Normal      mirtazapine (REMERON) 15 mg tablet Take 1 tablet (15 mg total) by mouth daily at bedtime, Starting u 4/15/2021, Normal            Discharge Medication List as of 4/16/2021 12:11 PM      CONTINUE these medications which have NOT CHANGED    Details   atorvastatin (LIPITOR) 10 mg tablet Take 10 mg by mouth daily, Historical Med      multivitamin-minerals (CENTRUM ADULTS) tablet Take 1 tablet by mouth daily, Historical Med      rOPINIRole (REQUIP XL) 2 MG 24 hr tablet Take 2 mg by mouth daily at bedtime, Historical Med      rOPINIRole (REQUIP) 2 mg tablet Take 1 tablet (2 mg total) by mouth daily at bedtime, Starting Mon 9/24/2018, Print      senna-docusate sodium (SENOKOT S) 8 6-50 mg per tablet Take 1 tablet by mouth daily at bedtime, Starting Tue 10/13/2020, No Print      acetaminophen (TYLENOL) 325 mg tablet Take 3 tablets (975 mg total) by mouth every 6 (six) hours as needed for mild pain, Starting Tue 10/13/2020, No Print              Discharge instructions/Information to patient and family:   See after visit summary for information provided to patient and family  Provisions for Follow-Up Care:  See after visit summary for information related to follow-up care and any pertinent home health orders  Discharge Statement   I spent 30 minutes discharging the patient  This time was spent on the day of discharge  I had direct contact with the patient on the day of discharge  Additional documentation is required if more than 30 minutes were spent on discharge

## 2021-05-03 NOTE — PROGRESS NOTES
Addended by: KEN KUHN on: 5/3/2021 03:19 PM     Modules accepted: Orders     [facility] Internal Medicine Progress Note  Patient: Gilberto Meyers 81 y o  male   MRN: [id number]  PCP: Kendra Camargo MD  Unit/Bed#: -33 Encounter: [id number]  Date Of Visit: 11/21/17    Assessment:    Principal Problem:    Acute renal failure (ARF) ([address] )  Active Problems:    Multiple falls    Delirium    Hypothyroidism    Depression    Acute urinary retention    Urinary incontinence    Acute uremia    Encephalopathy      Plan:    · Acute renal failure 2/2 obstructive uropathy   · BMP daily  · Appreciate urology input  · Continue frias  · Obstructive uropathy due to bladder outlet obstruction   · continue Frias catheter, Urology input noted   · Renal US:   · Encephalopathy likely toxin mediated due to renal failure  · Improving   · continue to monitor  · Anemia normocytic likely due to chronic inflammation  · monitor hemoglobin   · Hypothyroid  · Depression  · Ambulatory dysfunction history of falls   · discussed with physical therapy, occupational therapy consultation  · PT recommends inpatient rehab    VTE Pharmacologic Prophylaxis:   Pharmacologic: Heparin  Mechanical VTE Prophylaxis in Place: Yes    Patient Centered Rounds: I have performed bedside rounds with nursing staff today  Discussions with Specialists or Other Care Team Provider: none    Education and Discussions with Family / Patient: patient     Time Spent for Care: 20 minutes  More than 50% of total time spent on counseling and coordination of care as described above  Current Length of Stay: 4 day(s)    Current Patient Status: Inpatient   Certification Statement: The patient will continue to require additional inpatient hospital stay due to improving acute kidney injury; requires lab monitoring     Discharge Plan / Estimated Discharge Date: TBD based on clinical course    Code Status: Level 1 - Full Code    Subjective:   Pt is feeling okay   Still complains of some confusion, but reports improvement from on admission     Objective: Vitals:   Temp (24hrs), Av 1 °F (36 7 °C), Min:97 5 °F (36 4 °C), Max:98 4 °F (36 9 °C)    HR:  [79-85] 83  Resp:  [18] 18  BP: (100-129)/(55-74) 124/74  SpO2:  [95 %-100 %] 98 %  Body mass index is 28 96 kg/m²  Input and Output Summary (last 24 hours): Intake/Output Summary (Last 24 hours) at 17 1115  Last data filed at 17 0900   Gross per 24 hour   Intake             3460 ml   Output             4125 ml   Net             -665 ml       Physical Exam:     Physical Exam   Constitutional: He is oriented to person, place, and time  He appears well-developed and well-nourished  No distress  HENT:   Head: Normocephalic and atraumatic  Cardiovascular: Normal rate and regular rhythm  No murmur heard  Pulmonary/Chest: Effort normal and breath sounds normal  No respiratory distress  Abdominal: Soft  Bowel sounds are normal  He exhibits no distension  There is no tenderness  Musculoskeletal: He exhibits no edema  Neurological: He is alert and oriented to person, place, and time  Speech is slowed, but answers appropriate   Skin: Skin is warm and dry  He is not diaphoretic  Psychiatric: He has a normal mood and affect  His behavior is normal    Nursing note and vitals reviewed  Additional Data:     Labs:      Results from last 7 days  Lab Units 17  0555  17  1044   WBC Thousand/uL 5 78  < > 7 01   HEMOGLOBIN g/dL 8 0*  < > 10 8*   HEMATOCRIT % 23 8*  < > 31 8*   PLATELETS Thousands/uL 155  < > 216   NEUTROS PCT %  --   --  74   LYMPHS PCT %  --   --  17   MONOS PCT %  --   --  6   EOS PCT %  --   --  2   < > = values in this interval not displayed      Results from last 7 days  Lab Units 17  0445  17  0436   SODIUM mmol/L 142  < > 136   POTASSIUM mmol/L 4 1  < > 3 2*   CHLORIDE mmol/L 105  < > 101   CO2 mmol/L 27  < > 23   BUN mg/dL 74*  < > 108*   CREATININE mg/dL 6 14*  < > 8 93*   CALCIUM mg/dL 7 8*  < > 7 3*   TOTAL PROTEIN g/dL  --   --  5 0* BILIRUBIN TOTAL mg/dL  --   --  0 30   ALK PHOS U/L  --   --  42*   ALT U/L  --   --  15   AST U/L  --   --  8   GLUCOSE RANDOM mg/dL 100  < > 133   < > = values in this interval not displayed  Results from last 7 days  Lab Units 11/17/17  1044   INR  1 01       * I Have Reviewed All Lab Data Listed Above  * Additional Pertinent Lab Tests Reviewed: Shawn 66 Admission Reviewed    Imaging:    Imaging Reports Reviewed Today Include:   Imaging Personally Reviewed by Myself Includes:      Recent Cultures (last 7 days):       Results from last 7 days  Lab Units 11/17/17  1101 11/17/17  1044   BLOOD CULTURE  No Growth at 72 hrs  No Growth at 72 hrs  Last 24 Hours Medication List:     divalproex sodium 250 mg Oral Daily   docusate sodium 100 mg Oral BID   DULoxetine 30 mg Oral Daily   DULoxetine 60 mg Oral Daily   heparin (porcine) 5,000 Units Subcutaneous Q8H [address]   levothyroxine 75 mcg Oral Daily   melatonin 6 mg Oral HS   mirtazapine 7 5 mg Oral HS   sodium chloride 1,000 mL Intravenous Once   tamsulosin 0 4 mg Oral Daily With Dinner        Today, Patient Was Seen By: Eric Hamm MD    ** Please Note: This note has been constructed using a voice recognition system   **

## 2021-05-17 ENCOUNTER — HOSPITAL ENCOUNTER (EMERGENCY)
Facility: HOSPITAL | Age: 76
End: 2021-05-18
Attending: EMERGENCY MEDICINE
Payer: MEDICARE

## 2021-05-17 DIAGNOSIS — F32.A DEPRESSION, UNSPECIFIED DEPRESSION TYPE: Primary | ICD-10-CM

## 2021-05-17 DIAGNOSIS — R45.84 ANHEDONIA: ICD-10-CM

## 2021-05-17 LAB
ALBUMIN SERPL BCP-MCNC: 4 G/DL (ref 3.5–5)
ALP SERPL-CCNC: 68 U/L (ref 46–116)
ALT SERPL W P-5'-P-CCNC: 18 U/L (ref 12–78)
AMPHETAMINES SERPL QL SCN: NEGATIVE
ANION GAP SERPL CALCULATED.3IONS-SCNC: 9 MMOL/L (ref 4–13)
AST SERPL W P-5'-P-CCNC: 13 U/L (ref 5–45)
BACTERIA UR QL AUTO: NORMAL /HPF
BARBITURATES UR QL: NEGATIVE
BASOPHILS # BLD AUTO: 0.04 THOUSANDS/ΜL (ref 0–0.1)
BASOPHILS NFR BLD AUTO: 1 % (ref 0–1)
BENZODIAZ UR QL: NEGATIVE
BILIRUB SERPL-MCNC: 0.52 MG/DL (ref 0.2–1)
BILIRUB UR QL STRIP: NEGATIVE
BUN SERPL-MCNC: 26 MG/DL (ref 5–25)
CALCIUM SERPL-MCNC: 8.5 MG/DL (ref 8.3–10.1)
CHLORIDE SERPL-SCNC: 106 MMOL/L (ref 100–108)
CLARITY UR: CLEAR
CO2 SERPL-SCNC: 28 MMOL/L (ref 21–32)
COCAINE UR QL: NEGATIVE
COLOR UR: YELLOW
CREAT SERPL-MCNC: 1.54 MG/DL (ref 0.6–1.3)
EOSINOPHIL # BLD AUTO: 0.12 THOUSAND/ΜL (ref 0–0.61)
EOSINOPHIL NFR BLD AUTO: 3 % (ref 0–6)
ERYTHROCYTE [DISTWIDTH] IN BLOOD BY AUTOMATED COUNT: 13.9 % (ref 11.6–15.1)
ETHANOL EXG-MCNC: 0 MG/DL
GFR SERPL CREATININE-BSD FRML MDRD: 44 ML/MIN/1.73SQ M
GLUCOSE SERPL-MCNC: 91 MG/DL (ref 65–140)
GLUCOSE UR STRIP-MCNC: NEGATIVE MG/DL
HCT VFR BLD AUTO: 47.4 % (ref 36.5–49.3)
HGB BLD-MCNC: 15.3 G/DL (ref 12–17)
HGB UR QL STRIP.AUTO: NEGATIVE
IMM GRANULOCYTES # BLD AUTO: 0.01 THOUSAND/UL (ref 0–0.2)
IMM GRANULOCYTES NFR BLD AUTO: 0 % (ref 0–2)
KETONES UR STRIP-MCNC: NEGATIVE MG/DL
LEUKOCYTE ESTERASE UR QL STRIP: ABNORMAL
LYMPHOCYTES # BLD AUTO: 1.01 THOUSANDS/ΜL (ref 0.6–4.47)
LYMPHOCYTES NFR BLD AUTO: 21 % (ref 14–44)
MCH RBC QN AUTO: 29.4 PG (ref 26.8–34.3)
MCHC RBC AUTO-ENTMCNC: 32.3 G/DL (ref 31.4–37.4)
MCV RBC AUTO: 91 FL (ref 82–98)
METHADONE UR QL: NEGATIVE
MONOCYTES # BLD AUTO: 0.38 THOUSAND/ΜL (ref 0.17–1.22)
MONOCYTES NFR BLD AUTO: 8 % (ref 4–12)
NEUTROPHILS # BLD AUTO: 3.17 THOUSANDS/ΜL (ref 1.85–7.62)
NEUTS SEG NFR BLD AUTO: 67 % (ref 43–75)
NITRITE UR QL STRIP: NEGATIVE
NON-SQ EPI CELLS URNS QL MICRO: NORMAL /HPF
NRBC BLD AUTO-RTO: 0 /100 WBCS
OPIATES UR QL SCN: NEGATIVE
OXYCODONE+OXYMORPHONE UR QL SCN: NEGATIVE
PCP UR QL: NEGATIVE
PH UR STRIP.AUTO: 5.5 [PH]
PLATELET # BLD AUTO: 167 THOUSANDS/UL (ref 149–390)
PMV BLD AUTO: 10.6 FL (ref 8.9–12.7)
POTASSIUM SERPL-SCNC: 4 MMOL/L (ref 3.5–5.3)
PROT SERPL-MCNC: 7.1 G/DL (ref 6.4–8.2)
PROT UR STRIP-MCNC: NEGATIVE MG/DL
RBC # BLD AUTO: 5.2 MILLION/UL (ref 3.88–5.62)
RBC #/AREA URNS AUTO: NORMAL /HPF
SARS-COV-2 RNA RESP QL NAA+PROBE: NEGATIVE
SODIUM SERPL-SCNC: 143 MMOL/L (ref 136–145)
SP GR UR STRIP.AUTO: 1.02 (ref 1–1.03)
THC UR QL: NEGATIVE
TSH SERPL DL<=0.05 MIU/L-ACNC: 2.24 UIU/ML (ref 0.36–3.74)
UROBILINOGEN UR QL STRIP.AUTO: 0.2 E.U./DL
WBC # BLD AUTO: 4.73 THOUSAND/UL (ref 4.31–10.16)
WBC #/AREA URNS AUTO: NORMAL /HPF

## 2021-05-17 PROCEDURE — 36415 COLL VENOUS BLD VENIPUNCTURE: CPT | Performed by: EMERGENCY MEDICINE

## 2021-05-17 PROCEDURE — 82075 ASSAY OF BREATH ETHANOL: CPT | Performed by: EMERGENCY MEDICINE

## 2021-05-17 PROCEDURE — U0003 INFECTIOUS AGENT DETECTION BY NUCLEIC ACID (DNA OR RNA); SEVERE ACUTE RESPIRATORY SYNDROME CORONAVIRUS 2 (SARS-COV-2) (CORONAVIRUS DISEASE [COVID-19]), AMPLIFIED PROBE TECHNIQUE, MAKING USE OF HIGH THROUGHPUT TECHNOLOGIES AS DESCRIBED BY CMS-2020-01-R: HCPCS | Performed by: EMERGENCY MEDICINE

## 2021-05-17 PROCEDURE — 80053 COMPREHEN METABOLIC PANEL: CPT | Performed by: EMERGENCY MEDICINE

## 2021-05-17 PROCEDURE — 81001 URINALYSIS AUTO W/SCOPE: CPT | Performed by: EMERGENCY MEDICINE

## 2021-05-17 PROCEDURE — 80307 DRUG TEST PRSMV CHEM ANLYZR: CPT | Performed by: EMERGENCY MEDICINE

## 2021-05-17 PROCEDURE — 85025 COMPLETE CBC W/AUTO DIFF WBC: CPT | Performed by: EMERGENCY MEDICINE

## 2021-05-17 PROCEDURE — 99282 EMERGENCY DEPT VISIT SF MDM: CPT | Performed by: EMERGENCY MEDICINE

## 2021-05-17 PROCEDURE — 1124F ACP DISCUSS-NO DSCNMKR DOCD: CPT | Performed by: EMERGENCY MEDICINE

## 2021-05-17 PROCEDURE — 99285 EMERGENCY DEPT VISIT HI MDM: CPT

## 2021-05-17 PROCEDURE — 84443 ASSAY THYROID STIM HORMONE: CPT | Performed by: EMERGENCY MEDICINE

## 2021-05-17 PROCEDURE — U0005 INFEC AGEN DETEC AMPLI PROBE: HCPCS | Performed by: EMERGENCY MEDICINE

## 2021-05-17 NOTE — ED NOTES
201 signed and placed on pt chart, faxed to [facility] at [phone]  Copies placed on chart        Roly Parra RN  05/17/21 [id number]
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
## 2021-05-17 NOTE — ED NOTES
Patient ordered a cheeseburger for dinner patient has no further complains or needs at this time        Michelle Rebolledo  05/17/21 6619

## 2021-05-17 NOTE — ED NOTES
Patient requesting placement at [facility] or [place]  Informed that [facility], and [facility] is currently at capacity  Per Intake, no OA beds at this time  Anamaria Joaquin at [facility] indicated that they are full       Matthew Lundberg, CRISTIN  05/17/21  6729
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
## 2021-05-17 NOTE — ED NOTES
No [facility] OAA beds  Chandni Mcclure - Per Chantell Ducking with the [facility], they will review; 61 51 81 and chart faxed  [facility] -  Per Adi Mansfield, no available OABHU male beds at either of their [facility] - Per Radha, they have a bed and will review; 201 and chart faxed    Chandni Mcclure and [facility] are reviewing   Crisis will f/u

## 2021-05-18 VITALS
DIASTOLIC BLOOD PRESSURE: 83 MMHG | TEMPERATURE: 98 F | BODY MASS INDEX: 27.06 KG/M2 | HEART RATE: 77 BPM | SYSTOLIC BLOOD PRESSURE: 124 MMHG | OXYGEN SATURATION: 98 % | WEIGHT: 194 LBS | RESPIRATION RATE: 18 BRPM

## 2021-05-18 RX ORDER — ATORVASTATIN CALCIUM 10 MG/1
10 TABLET, FILM COATED ORAL
Status: DISCONTINUED | OUTPATIENT
Start: 2021-05-18 | End: 2021-05-18 | Stop reason: HOSPADM

## 2021-05-18 RX ORDER — MIRTAZAPINE 15 MG/1
15 TABLET, FILM COATED ORAL
Status: DISCONTINUED | OUTPATIENT
Start: 2021-05-18 | End: 2021-05-18 | Stop reason: HOSPADM

## 2021-05-18 RX ORDER — ROPINIROLE 1 MG/1
2 TABLET, FILM COATED ORAL
Status: DISCONTINUED | OUTPATIENT
Start: 2021-05-18 | End: 2021-05-18 | Stop reason: HOSPADM

## 2021-05-18 NOTE — ED NOTES
Patient provided gram crackers and fresh cup of soda  Lights dimmed for patient comfort       Bryson Tucker RN  05/18/21 4797

## 2021-05-18 NOTE — EMTALA/ACUTE CARE TRANSFER
[address] [place] [address]  Dept: [phone]      EMTALA TRANSFER CONSENT    NAME Amy Linr   1945                              MRN [id number]    I have been informed of my rights regarding examination, treatment, and transfer   by Dr Hal Gonzalez DO    Benefits: Specialized equipment and/or services available at the receiving facility (Include comment)________________________    Risks: Potential for delay in receiving treatment, Increased discomfort during transfer, Possible worsening of condition or death during transfer      Consent for Transfer:  I acknowledge that my medical condition has been evaluated and explained to me by the emergency department physician or other qualified medical person and/or my attending physician, who has recommended that I be transferred to the service of  Accepting Physician: Dr Merino See at [facility] Name, [address] : Jose L Lazo  The above potential benefits of such transfer, the potential risks associated with such transfer, and the probable risks of not being transferred have been explained to me, and I fully understand them  The doctor has explained that, in my case, the benefits of transfer outweigh the risks  I agree to be transferred  I authorize the performance of emergency medical procedures and treatments upon me in both transit and upon arrival at the receiving facility  Additionally, I authorize the release of any and all medical records to the receiving facility and request they be transported with me, if possible  I understand that the safest mode of transportation during a medical emergency is an ambulance and that the Hospital advocates the use of this mode of transport   Risks of traveling to the receiving facility by car, including absence of medical control, life sustaining equipment, such as oxygen, and medical personnel has been explained to me and I fully understand them  (GLENN CORRECT BOX BELOW)  [  ]  I consent to the stated transfer and to be transported by ambulance/helicopter  [  ]  I consent to the stated transfer, but refuse transportation by ambulance and accept full responsibility for my transportation by car  I understand the risks of non-ambulance transfers and I exonerate the Hospital and its staff from any deterioration in my condition that results from this refusal     X___________________________________________    DATE  21  TIME________  Signature of patient or legally responsible individual signing on patient behalf           RELATIONSHIP TO PATIENT_________________________          Provider Certification    NAME Nina WEBSTER 1945                              MRN [id number]    A medical screening exam was performed on the above named patient  Based on the examination:    Condition Necessitating Transfer The primary encounter diagnosis was Depression, unspecified depression type  A diagnosis of Anhedonia was also pertinent to this visit      Patient Condition: The patient has been stabilized such that within reasonable medical probability, no material deterioration of the patient condition or the condition of the unborn child(alfred) is likely to result from the transfer    Reason for Transfer: Level of Care needed not available at this facility    Transfer Requirements: [address]   · Space available and qualified personnel available for treatment as acknowledged by Tammi [phone]  · Agreed to accept transfer and to provide appropriate medical treatment as acknowledged by       Dr Carlos Acevedo  · Appropriate medical records of the examination and treatment of the patient are provided at the time of transfer   [address], [address] _______  · Transfer will be performed by qualified personnel from [facility]  and appropriate transfer equipment as required, including the use of necessary and appropriate life support measures  Provider Certification: I have examined the patient and explained the following risks and benefits of being transferred/refusing transfer to the patient/family:  General risk, such as traffic hazards, adverse weather conditions, rough terrain or turbulence, possible failure of equipment (including vehicle or aircraft), or consequences of actions of persons outside the control of the transport personnel, The patient is stable for psychiatric transfer because they are medically stable, and is protected from harming him/herself or others during transport      Based on these reasonable risks and benefits to the patient and/or the unborn child(alfred), and based upon the information available at the time of the patients examination, I certify that the medical benefits reasonably to be expected from the provision of appropriate medical treatments at another medical facility outweigh the increasing risks, if any, to the individuals medical condition, and in the case of labor to the unborn child, from effecting the transfer      X____________________________________________ DATE 05/18/21        TIME_______      ORIGINAL - SEND TO MEDICAL RECORDS   COPY - SEND WITH PATIENT DURING TRANSFER

## 2021-05-18 NOTE — ED NOTES
At this time [facility] is scheduled for 1330  to [address] [place] [address]  If an earlier time is secured please cancel with [facility]

## 2021-05-18 NOTE — ED NOTES
Phone call placed to [address] [place] [address], spoke with Priscila Lynn , updated her with transportation information

## 2021-05-18 NOTE — ED NOTES
Patient is accepted at [address] [place] [address] for admission after 0900 on 5/18/2021  Patient is accepted by Brandie Ramirez MD per [address] is arranged with [facility]    Transportation is scheduled for TBD  Patient may go to the floor after 0900 on 5/18/2021          Nurse report is to be called to [phone] prior to patient transfer

## 2021-09-03 DIAGNOSIS — Z23 NEED FOR COVID-19 VACCINE: Primary | ICD-10-CM

## 2021-09-07 RX ORDER — RNA INGREDIENT BNT-162B2 0.23 G/1.8ML
INJECTION, SUSPENSION INTRAMUSCULAR
Qty: 0.3 ML | Refills: 0 | Status: SHIPPED | OUTPATIENT
Start: 2021-09-07 | End: 2022-05-24

## 2022-05-11 NOTE — PROGRESS NOTES
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 ([facility] )    CKD (chronic kidney disease) stage 3, GFR 30-59 ml/min    ESBL (extended spectrum beta-lactamase) producing bacteria infection    Restless legs      Behavior over the last 24 hours:  unchanged  Sleep: hypersomnia  Appetite: fair  Medication side effects: No  ROS: chronic pain, other systems are negative for acute changes    Mental Status Evaluation:  Appearance:  age appropriate and disheveled   Behavior:  psychomotor retardation   Speech:  delayed and scant   Mood:  decreased range   Affect:  blunted   Thought Process:  concrete   Thought Content:  poverty of thoughts    Perceptual Disturbances: None   Risk Potential: Suicidal Ideations without plan  Homicidal Ideations none  Potential for Aggression No   Sensorium:  person and place   Memory:  recent memory mildly impaired   Consciousness:  somnolence    Attention: attention span appeared shorter than expected for age   Insight:  poor   Judgment: poor   Gait/Station: in bed   Motor Activity: no abnormal movements     Progress Toward Goals: Patient remains withdrawn, isolated with minimal interaction with staff and peers in the unit  He remains selectively mute and refuses to be engaged, stating that he is "very tired and weak"  He continues with poor appetite, increased sleep with very limited insight into his physical and mental decline  He has been compliant with medication and denies any current side effects  Recommended Treatment: Continue with group therapy, milieu therapy and occupational therapy        Risks, benefits and possible side effects of Medications:   Risks, benefits, and possible side effects of medications explained to patient and patient verbalizes understanding  Medications: all current active meds have been reviewed  Labs: I have personally reviewed all pertinent laboratory/tests results  Most Recent Labs:   Lab Results   Component Value Date    WBC 7 85 03/08/2021    RBC 5 76 (H) 03/08/2021    HGB 16 8 03/08/2021    HCT 51 7 (H) 03/08/2021     03/08/2021    RDW 14 0 03/08/2021    NEUTROABS 6 15 03/08/2021    SODIUM 140 03/08/2021    K 3 8 03/08/2021     03/08/2021    CO2 29 03/08/2021    BUN 26 (H) 03/08/2021    CREATININE 1 94 (H) 03/08/2021    GLUC 102 03/08/2021    GLUF 84 09/18/2018    CALCIUM 8 8 03/08/2021    AST 12 03/08/2021    ALT 21 03/08/2021    ALKPHOS 70 03/08/2021    TP 7 2 03/08/2021    ALB 4 0 03/08/2021    TBILI 0 87 03/08/2021    CHOLESTEROL 195 04/26/2016    HDL 52 04/26/2016    TRIG 76 04/26/2016    LDLCALC 128 (H) 04/26/2016    VALPROICTOT 62 12/09/2017    AMMONIA <10 (L) 05/08/2016    UAW5PBQCLOCW 2 735 03/08/2021    RPR Non-Reactive 05/09/2016       Counseling / Coordination of Care  Total floor / unit time spent today 20 minutes  Greater than 50% of total time was spent with the patient and / or family counseling and / or coordination of care  [Constipation] : constipation [Muscle Weakness] : muscle weakness [Unsteady Walking] : ataxia [Negative] : Heme/Lymph [Abdominal Pain] : no abdominal pain [Vomiting] : no vomiting [Melena] : no melena [Joint Pain] : no joint pain [Joint Swelling] : no joint swelling [Back Pain] : no back pain [Headache] : no headache [Fainting] : no fainting [Confusion] : no confusion

## 2022-05-24 DIAGNOSIS — F33.2 SEVERE EPISODE OF RECURRENT MAJOR DEPRESSIVE DISORDER, WITHOUT PSYCHOTIC FEATURES (HCC): Primary | ICD-10-CM

## 2022-05-24 PROBLEM — G40.909 EPILEPSY, UNSPECIFIED, NOT INTRACTABLE, WITHOUT STATUS EPILEPTICUS (HCC): Status: ACTIVE | Noted: 2020-10-15

## 2022-05-24 PROBLEM — F33.1 MODERATE EPISODE OF RECURRENT MAJOR DEPRESSIVE DISORDER (HCC): Status: ACTIVE | Noted: 2021-05-29

## 2022-05-24 PROBLEM — G20 PARKINSON'S DISEASE (HCC): Status: ACTIVE | Noted: 2018-01-10

## 2022-05-24 PROBLEM — G20.A1 PARKINSON'S DISEASE: Status: ACTIVE | Noted: 2018-01-10

## 2022-05-24 PROBLEM — M50.920 CERVICAL DISC DISORDER OF MID-CERVICAL REGION: Status: ACTIVE | Noted: 2020-10-15

## 2022-05-24 PROBLEM — H91.90 HEARING LOSS: Status: ACTIVE | Noted: 2018-01-10

## 2022-05-24 PROBLEM — F41.9 ANXIETY: Status: ACTIVE | Noted: 2022-05-24

## 2022-05-24 PROBLEM — F31.9 BIPOLAR DISORDER (HCC): Status: ACTIVE | Noted: 2017-12-02

## 2022-05-24 PROBLEM — N40.0 BENIGN PROSTATIC HYPERPLASIA WITHOUT URINARY OBSTRUCTION: Status: ACTIVE | Noted: 2022-05-24

## 2022-05-24 PROBLEM — S06.9X0D: Status: ACTIVE | Noted: 2020-10-15

## 2022-05-24 PROBLEM — G21.19 OTHER DRUG INDUCED SECONDARY PARKINSONISM (HCC): Status: ACTIVE | Noted: 2020-10-15

## 2022-05-24 PROBLEM — E78.5 HYPERLIPIDEMIA: Status: ACTIVE | Noted: 2018-01-10

## 2022-05-24 PROBLEM — M72.0 DUPUYTREN'S CONTRACTURE: Status: ACTIVE | Noted: 2022-05-24

## 2022-05-24 RX ORDER — POLYETHYLENE GLYCOL 3350 17 G/17G
POWDER, FOR SOLUTION ORAL
COMMUNITY
Start: 2022-04-22

## 2022-05-24 RX ORDER — DIVALPROEX SODIUM 500 MG/1
TABLET, DELAYED RELEASE ORAL
COMMUNITY
Start: 2022-05-20

## 2022-05-24 RX ORDER — VENLAFAXINE HYDROCHLORIDE 150 MG/1
150 CAPSULE, EXTENDED RELEASE ORAL DAILY
Qty: 30 CAPSULE | Refills: 5 | Status: SHIPPED | OUTPATIENT
Start: 2022-05-24

## 2022-05-24 RX ORDER — VENLAFAXINE HYDROCHLORIDE 75 MG/1
CAPSULE, EXTENDED RELEASE ORAL
COMMUNITY
Start: 2022-04-22 | End: 2022-05-24 | Stop reason: SDUPTHER

## 2022-05-24 RX ORDER — CLONAZEPAM 0.5 MG/1
TABLET ORAL
COMMUNITY
Start: 2022-05-05

## 2022-05-24 RX ORDER — BUPROPION HYDROCHLORIDE 100 MG/1
TABLET, EXTENDED RELEASE ORAL
COMMUNITY
Start: 2022-04-22

## 2022-05-24 RX ORDER — VENLAFAXINE HYDROCHLORIDE 150 MG/1
CAPSULE, EXTENDED RELEASE ORAL
COMMUNITY
Start: 2022-04-22 | End: 2022-05-24 | Stop reason: SDUPTHER

## 2022-05-24 RX ORDER — ATORVASTATIN CALCIUM 20 MG/1
TABLET, FILM COATED ORAL
COMMUNITY
Start: 2022-04-22

## 2022-05-24 RX ORDER — ACETAMINOPHEN 650 MG/1
TABLET, FILM COATED, EXTENDED RELEASE ORAL
COMMUNITY
Start: 2022-05-20

## 2022-05-24 RX ORDER — DOCUSATE SODIUM 100 MG/1
CAPSULE, LIQUID FILLED ORAL
COMMUNITY
Start: 2022-04-22

## 2022-05-24 RX ORDER — VENLAFAXINE HYDROCHLORIDE 75 MG/1
75 CAPSULE, EXTENDED RELEASE ORAL DAILY
Qty: 30 CAPSULE | Refills: 5 | Status: SHIPPED | OUTPATIENT
Start: 2022-05-24

## 2022-05-24 RX ORDER — MELATONIN 5 MG
TABLET ORAL
COMMUNITY
Start: 2022-04-22

## 2022-05-24 RX ORDER — QUETIAPINE FUMARATE 100 MG/1
TABLET, FILM COATED ORAL
COMMUNITY
Start: 2022-04-22

## 2023-03-28 ENCOUNTER — NURSING HOME VISIT (OUTPATIENT)
Dept: GERIATRICS | Facility: OTHER | Age: 78
End: 2023-03-28

## 2023-03-28 DIAGNOSIS — F33.2 SEVERE EPISODE OF RECURRENT MAJOR DEPRESSIVE DISORDER, WITHOUT PSYCHOTIC FEATURES (HCC): ICD-10-CM

## 2023-03-28 DIAGNOSIS — G20 PARKINSON'S DISEASE (HCC): ICD-10-CM

## 2023-03-28 DIAGNOSIS — F31.32 BIPOLAR AFFECTIVE DISORDER, CURRENTLY DEPRESSED, MODERATE (HCC): Primary | ICD-10-CM

## 2023-03-28 DIAGNOSIS — G40.909 SEIZURE DISORDER (HCC): ICD-10-CM

## 2023-03-28 DIAGNOSIS — G21.19 OTHER DRUG INDUCED SECONDARY PARKINSONISM (HCC): ICD-10-CM

## 2023-03-28 DIAGNOSIS — Z87.820 PERSONAL HISTORY OF TRAUMATIC BRAIN INJURY: ICD-10-CM

## 2023-03-28 DIAGNOSIS — F33.1 MODERATE EPISODE OF RECURRENT MAJOR DEPRESSIVE DISORDER (HCC): ICD-10-CM

## 2023-03-29 PROBLEM — Z87.820 PERSONAL HISTORY OF TRAUMATIC BRAIN INJURY: Status: ACTIVE | Noted: 2020-10-15

## 2023-03-29 NOTE — PROGRESS NOTES
MEDICATION MANAGEMENT NOTE        [facility]  POS: 13: [address], [facility] at the [facility]      Name and Date of Birth: Marni Ribera  70 y o  1945 MRN: [id number]    Date of Visit: March 28, 2023    No Known Allergies  SUBJECTIVE:    Jose Mckeon is seen today for a follow up for Bipolar Disorder, cognitive disorder and history of TBI  He continues to experience on and off symptoms since the last visit  Jose Mckeon is seen in his room and presenting with irritable edge and questioning my visit  He then does recall meeting me before  He minimizes his current psychiatric symptoms and previous psychiatric history  However  Staff report he continues to present at times with low motivation, isolative, low mood and irritable  Some mild paranoia, no other overt psychosis  At last visit his Seroquel HS dose was reduced due to daytime low energy  He is presently on Seroquel, melatonin, Remeron at HS  He is currently also on both Wellbutrin and Effexor for depression  Will discontinue Remeron to minimize polypharmacy  He denies any side effects from current psychiatric medications  PLAN:    All medications and routine orders were reviewed and updated as needed  Discontinue Remeron  Continue all other medications:  He will be followed by psychiatric visiting nurse    Medication management every 1 to 2 months  Plan discussed with: Patient    Diagnoses and all orders for this visit:    Bipolar affective disorder, currently depressed, moderate ([facility] )    Parkinson's disease ([facility] )    Severe episode of recurrent major depressive disorder, without psychotic features ([facility] )    Moderate episode of recurrent major depressive disorder ([facility] )        Current Medications  Medications reviewed and updated in facility chart  Psychotherapy Provided:     Individual psychotherapy provided: Supportive counseling provided    Medications, treatment progress and treatment plan reviewed with Anna Vee   Reassurance and supportive therapy provided  HPI ROS Appetite Changes and Sleep:     He reports fluctuating sleep pattern, fluctuating appetite, fluctuating energy levels  Denies homicidal ideation, denies suicidal ideation    Review Of Systems:   all other systems are negative         Mental Status Evaluation:    Appearance:  age appropriate   Behavior:  guarded   Speech:  scant   Mood:  irritable   Affect:  blunted   Thought Process:  coherent   Associations: intact associations   Thought Content:  mild paranoia   Perceptual Disturbances: none   Risk Potential: Suicidal ideation - None  Homicidal ideation - None  Potential for aggression - No   Sensorium:  oriented to person and place   Memory:  recent memory mildly impaired   Consciousness:  alert and awake   Attention: attention span and concentration appear shorter than expected for age   Insight:  limited   Judgment: limited   Gait/Station: in wheelchair   Motor Activity: no abnormal movements     Past Psychiatric History Update:     Inpatient Psychiatric Admission Since Last Encounter:   no  Suicide Attempt Or Self Mutilation Since Last Encounter:   no  Incidence of Violent Behavior Since Last Encounter:   no    Traumatic History Update:     New Onset of Abuse Since Last Encounter:   no  Traumatic Events Since Last Encounter:   no    Social History:    Changes since last encounter:  no    History Review: The following portions of the patient's history were reviewed and updated as appropriate: allergies, current medications, past family history, past medical history, past social history, past surgical history and problem list     OBJECTIVE:     Vital signs in last 24 hours: Vital signs and nursing notes reviewed in facility chart  Laboratory Results: Lab results reviewed in facility chart        Medications Risks/Benefits:      Risks, Benefits And Possible Side Effects Of Medications:    Discussed risks and benefits of treatment with patient including risk of suicidality, serotonin syndrome, increased QTc interval and SIADH related to treatment with antidepressants; Risk of induction of manic symptoms in certain patient populations, risk of parkinsonian symptoms, metabolic syndrome, tardive dyskinesia and neuroleptic malignant syndrome related to treatment with antipsychotic medications and risks of misuse, abuse or dependence, sedation and respiratory depression related to treatment with benzodiazepine medications     Controlled Medication Discussion:     Not applicable - controlled prescriptions are not prescribed by this practice    Evaluation of Psychotropic Drugs for possible gradual dose reductions    Psychotropic medications have been reviewed  Patient continues with symptoms of Bipolar disorder as noted above  Any/or further dose reductions at this time would be clinically contraindicated, as it would be likely to cause worsening of symptoms          SHERRIE Gaines
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
## 2023-08-31 NOTE — PROGRESS NOTES
Please see the attached refill request.   Pt had a difficult time falling asleep and reported this to staff  Pt finally fell asleep a few hours later but tossed and turned all night  Pt got a few hours of broken sleep  Continuous visual safety checks performed throughout the shift  Safety precautions maintained  Will continue to monitor

## 2023-12-21 ENCOUNTER — NURSING HOME VISIT (OUTPATIENT)
Dept: GERIATRICS | Facility: OTHER | Age: 78
End: 2023-12-21

## 2023-12-21 DIAGNOSIS — Z87.820 PERSONAL HISTORY OF TRAUMATIC BRAIN INJURY: ICD-10-CM

## 2023-12-21 DIAGNOSIS — F41.9 ANXIETY: ICD-10-CM

## 2023-12-21 DIAGNOSIS — G21.19 OTHER DRUG INDUCED SECONDARY PARKINSONISM (HCC): ICD-10-CM

## 2023-12-21 DIAGNOSIS — F31.62 BIPOLAR DISORDER, CURRENT EPISODE MIXED, MODERATE (HCC): Primary | ICD-10-CM

## 2023-12-21 DIAGNOSIS — G40.909 SEIZURE DISORDER (HCC): ICD-10-CM

## 2023-12-21 DIAGNOSIS — F33.2 SEVERE EPISODE OF RECURRENT MAJOR DEPRESSIVE DISORDER, WITHOUT PSYCHOTIC FEATURES (HCC): ICD-10-CM

## 2024-01-12 PROBLEM — F31.62 BIPOLAR DISORDER, CURRENT EPISODE MIXED, MODERATE (HCC): Status: ACTIVE | Noted: 2017-12-02

## 2024-01-12 NOTE — PROGRESS NOTES
"    MEDICATION MANAGEMENT NOTE        [facility]  POS: 13: Assisted Living Facility, [facility] the [facility]      Name and Date of Birth:  Bk Ladd Jr. 78 y.o. 1945 MRN: [id number]    Date of Visit: December 21, 2023    No Known Allergies  SUBJECTIVE:    Bk is seen today for a follow up for Bipolar Disorder and Generalized Anxiety Disorder. He continues to experience ongoing symptoms since the last visit. He presents superficially bright with me during out visit.  Currently denying significant mood symptoms and states his depression \"has lifted\".  However, staff report he continues with irritable edge at times.  Can have difficulty getting along with his peers and argumentative.  Poor insight and memory issues evident.  Mood lability evident.    He denies any side effects from current psychiatric medications.    PLAN:    All medications and routine orders were reviewed and updated as needed.    Continue current medications:  Medication management every 1 month      Diagnoses and all orders for this visit:    Bipolar disorder, current episode mixed, moderate (HCC)    Other drug induced secondary parkinsonism (HCC)    Personal history of traumatic brain injury    Seizure disorder (HCC)    Severe episode of recurrent major depressive disorder, without psychotic features (HCC)    Anxiety    Neurocognitive Disorder    Current Medications  Medications reviewed and updated in facility chart.      HPI ROS Appetite Changes and Sleep:     He reports fluctuating sleep pattern, fluctuating appetite, fluctuating energy levels. Denies homicidal ideation, denies suicidal ideation    Review Of Systems:   no complaints, all other systems are negative         Mental Status Evaluation:    Appearance:  age appropriate   Behavior:  cooperative, guarded   Speech:  normal rate, normal volume   Mood:  dysphoric   Affect:  constricted   Thought Process:  " circumstantial   Associations: circumstantial associations   Thought Content:  some paranoia   Perceptual Disturbances: denies auditory hallucinations when asked   Risk Potential: Suicidal ideation - None  Homicidal ideation - None  Potential for aggression - No   Sensorium:  oriented to person, place, and time/date   Memory:  recent and remote memory grossly intact   Consciousness:  alert and awake   Attention: decreased concentration and decreased attention span   Insight:  limited   Judgment: limited   Gait/Station: in wheelchair   Motor Activity: no abnormal movements       History Review:The following portions of the patient's history were reviewed and updated as appropriate: psychiatric history, trauma history allergies, current medications, past family history, past medical history, past social history, past surgical history, and problem list     OBJECTIVE:     Vital signs in last 24 hours: Vital signs and nursing notes reviewed in facility chart.      Laboratory Results: Lab results reviewed in facility chart.      Medications Risks/Benefits:      Risks, Benefits And Possible Side Effects Of Medications:    Discussed risks and benefits of treatment with patient including risk of suicidality, serotonin syndrome, increased QTc interval and SIADH related to treatment with antidepressants; Risk of induction of manic symptoms in certain patient populations, risk of parkinsonian symptoms, metabolic syndrome, tardive dyskinesia and neuroleptic malignant syndrome related to treatment with antipsychotic medications, and risk of liver impairment related to treatment with Depakote     Controlled Medication Discussion:     Bk has been filling controlled prescriptions on time as prescribed according to [place] Prescription Drug Monitoring Program    Evaluation of Psychotropic Drugs for possible gradual dose reductions    Psychotropic medications have been reviewed.  Patient continues with symptoms of mood  lability and agitation as noted above.  Any/or further dose reductions at this time would be clinically contraindicated, as it would be likely to cause worsening of symptoms.        SHERRIE Purvis

## 2024-01-15 ENCOUNTER — NURSING HOME VISIT (OUTPATIENT)
Dept: GERIATRICS | Facility: OTHER | Age: 79
End: 2024-01-15

## 2024-01-15 DIAGNOSIS — G40.909 SEIZURE DISORDER (HCC): ICD-10-CM

## 2024-01-15 DIAGNOSIS — G21.19 OTHER DRUG INDUCED SECONDARY PARKINSONISM (HCC): ICD-10-CM

## 2024-01-15 DIAGNOSIS — S06.9X9S MAJOR NEUROCOGNITIVE DISORDER AS LATE EFFECT OF TRAUMATIC BRAIN INJURY WITH BEHAVIORAL DISTURBANCE (HCC): ICD-10-CM

## 2024-01-15 DIAGNOSIS — F02.818 MAJOR NEUROCOGNITIVE DISORDER AS LATE EFFECT OF TRAUMATIC BRAIN INJURY WITH BEHAVIORAL DISTURBANCE (HCC): ICD-10-CM

## 2024-01-15 DIAGNOSIS — F41.9 ANXIETY: ICD-10-CM

## 2024-01-15 DIAGNOSIS — F31.62 BIPOLAR DISORDER, CURRENT EPISODE MIXED, MODERATE (HCC): Primary | ICD-10-CM
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
## 2024-02-21 PROBLEM — E86.0 DEHYDRATION: Status: RESOLVED | Noted: 2018-09-13 | Resolved: 2024-02-21

## 2024-02-21 PROBLEM — T83.511A URINARY TRACT INFECTION ASSOCIATED WITH INDWELLING URETHRAL CATHETER  (HCC): Status: RESOLVED | Noted: 2018-01-27 | Resolved: 2024-02-21

## 2024-02-21 PROBLEM — N39.0 URINARY TRACT INFECTION ASSOCIATED WITH INDWELLING URETHRAL CATHETER  (HCC): Status: RESOLVED | Noted: 2018-01-27 | Resolved: 2024-02-21

## 2024-08-16 ENCOUNTER — HOSPITAL ENCOUNTER (EMERGENCY)
Facility: HOSPITAL | Age: 79
Discharge: HOME | End: 2024-08-16
Attending: EMERGENCY MEDICINE
Payer: MEDICARE

## 2024-08-16 ENCOUNTER — APPOINTMENT (EMERGENCY)
Dept: CT IMAGING | Facility: HOSPITAL | Age: 79
End: 2024-08-16
Payer: MEDICARE

## 2024-08-16 VITALS
HEART RATE: 72 BPM | DIASTOLIC BLOOD PRESSURE: 81 MMHG | OXYGEN SATURATION: 94 % | RESPIRATION RATE: 22 BRPM | BODY MASS INDEX: 31.58 KG/M2 | TEMPERATURE: 97.5 F | WEIGHT: 226.41 LBS | SYSTOLIC BLOOD PRESSURE: 143 MMHG

## 2024-08-16 DIAGNOSIS — F32.A DEPRESSION: Primary | ICD-10-CM

## 2024-08-16 DIAGNOSIS — R82.81 PYURIA: ICD-10-CM

## 2024-08-16 LAB
2HR DELTA HS TROPONIN: 0 NG/L
ALBUMIN SERPL BCG-MCNC: 3.8 G/DL (ref 3.5–5)
ALP SERPL-CCNC: 36 U/L (ref 34–104)
ALT SERPL W P-5'-P-CCNC: 11 U/L (ref 7–52)
AMORPH URATE CRY URNS QL MICRO: ABNORMAL
ANION GAP SERPL CALCULATED.3IONS-SCNC: 9 MMOL/L (ref 4–13)
AST SERPL W P-5'-P-CCNC: 15 U/L (ref 13–39)
BACTERIA UR QL AUTO: ABNORMAL /HPF
BASOPHILS # BLD AUTO: 0.04 THOUSANDS/ÂΜL (ref 0–0.1)
BASOPHILS NFR BLD AUTO: 1 % (ref 0–1)
BILIRUB SERPL-MCNC: 0.42 MG/DL (ref 0.2–1)
BILIRUB UR QL STRIP: NEGATIVE
BUN SERPL-MCNC: 18 MG/DL (ref 5–25)
CALCIUM SERPL-MCNC: 8.5 MG/DL (ref 8.4–10.2)
CARDIAC TROPONIN I PNL SERPL HS: 7 NG/L
CARDIAC TROPONIN I PNL SERPL HS: 7 NG/L
CHLORIDE SERPL-SCNC: 107 MMOL/L (ref 96–108)
CLARITY UR: CLEAR
CO2 SERPL-SCNC: 24 MMOL/L (ref 21–32)
COLOR UR: YELLOW
CREAT SERPL-MCNC: 1.54 MG/DL (ref 0.6–1.3)
EOSINOPHIL # BLD AUTO: 0.16 THOUSAND/ÂΜL (ref 0–0.61)
EOSINOPHIL NFR BLD AUTO: 3 % (ref 0–6)
ERYTHROCYTE [DISTWIDTH] IN BLOOD BY AUTOMATED COUNT: 14.3 % (ref 11.6–15.1)
FLUAV RNA RESP QL NAA+PROBE: NEGATIVE
FLUBV RNA RESP QL NAA+PROBE: NEGATIVE
GFR SERPL CREATININE-BSD FRML MDRD: 42 ML/MIN/1.73SQ M
GLUCOSE SERPL-MCNC: 121 MG/DL (ref 65–140)
GLUCOSE UR STRIP-MCNC: NEGATIVE MG/DL
HCT VFR BLD AUTO: 44.3 % (ref 36.5–49.3)
HGB BLD-MCNC: 14.6 G/DL (ref 12–17)
HGB UR QL STRIP.AUTO: ABNORMAL
HYALINE CASTS #/AREA URNS LPF: ABNORMAL /LPF
IMM GRANULOCYTES # BLD AUTO: 0.01 THOUSAND/UL (ref 0–0.2)
IMM GRANULOCYTES NFR BLD AUTO: 0 % (ref 0–2)
KETONES UR STRIP-MCNC: ABNORMAL MG/DL
LEUKOCYTE ESTERASE UR QL STRIP: ABNORMAL
LYMPHOCYTES # BLD AUTO: 1.36 THOUSANDS/ÂΜL (ref 0.6–4.47)
LYMPHOCYTES NFR BLD AUTO: 24 % (ref 14–44)
MCH RBC QN AUTO: 29.4 PG (ref 26.8–34.3)
MCHC RBC AUTO-ENTMCNC: 33 G/DL (ref 31.4–37.4)
MCV RBC AUTO: 89 FL (ref 82–98)
MONOCYTES # BLD AUTO: 0.47 THOUSAND/ÂΜL (ref 0.17–1.22)
MONOCYTES NFR BLD AUTO: 8 % (ref 4–12)
MUCOUS THREADS UR QL AUTO: ABNORMAL
NEUTROPHILS # BLD AUTO: 3.53 THOUSANDS/ÂΜL (ref 1.85–7.62)
NEUTS SEG NFR BLD AUTO: 64 % (ref 43–75)
NITRITE UR QL STRIP: NEGATIVE
NON-SQ EPI CELLS URNS QL MICRO: ABNORMAL /HPF
NRBC BLD AUTO-RTO: 0 /100 WBCS
PH UR STRIP.AUTO: 6.5 [PH] (ref 4.5–8)
PLATELET # BLD AUTO: 139 THOUSANDS/UL (ref 149–390)
PMV BLD AUTO: 10.6 FL (ref 8.9–12.7)
POTASSIUM SERPL-SCNC: 3.8 MMOL/L (ref 3.5–5.3)
PROT SERPL-MCNC: 6 G/DL (ref 6.4–8.4)
PROT UR STRIP-MCNC: NEGATIVE MG/DL
RBC # BLD AUTO: 4.97 MILLION/UL (ref 3.88–5.62)
RBC #/AREA URNS AUTO: ABNORMAL /HPF
RSV RNA RESP QL NAA+PROBE: NEGATIVE
SARS-COV-2 RNA RESP QL NAA+PROBE: NEGATIVE
SODIUM SERPL-SCNC: 140 MMOL/L (ref 135–147)
SP GR UR STRIP.AUTO: 1.02 (ref 1–1.03)
TSH SERPL DL<=0.05 MIU/L-ACNC: 2.98 UIU/ML (ref 0.45–4.5)
UROBILINOGEN UR QL STRIP.AUTO: 0.2 E.U./DL
WBC # BLD AUTO: 5.57 THOUSAND/UL (ref 4.31–10.16)
WBC #/AREA URNS AUTO: ABNORMAL /HPF

## 2024-08-16 PROCEDURE — 36415 COLL VENOUS BLD VENIPUNCTURE: CPT | Performed by: EMERGENCY MEDICINE

## 2024-08-16 PROCEDURE — 81001 URINALYSIS AUTO W/SCOPE: CPT

## 2024-08-16 PROCEDURE — 84484 ASSAY OF TROPONIN QUANT: CPT | Performed by: EMERGENCY MEDICINE

## 2024-08-16 PROCEDURE — 99285 EMERGENCY DEPT VISIT HI MDM: CPT | Performed by: EMERGENCY MEDICINE

## 2024-08-16 PROCEDURE — 0241U HB NFCT DS VIR RESP RNA 4 TRGT: CPT | Performed by: EMERGENCY MEDICINE

## 2024-08-16 PROCEDURE — 80053 COMPREHEN METABOLIC PANEL: CPT | Performed by: EMERGENCY MEDICINE

## 2024-08-16 PROCEDURE — 84443 ASSAY THYROID STIM HORMONE: CPT | Performed by: EMERGENCY MEDICINE

## 2024-08-16 PROCEDURE — 96367 TX/PROPH/DG ADDL SEQ IV INF: CPT

## 2024-08-16 PROCEDURE — 93005 ELECTROCARDIOGRAM TRACING: CPT

## 2024-08-16 PROCEDURE — 87086 URINE CULTURE/COLONY COUNT: CPT

## 2024-08-16 PROCEDURE — 70450 CT HEAD/BRAIN W/O DYE: CPT

## 2024-08-16 PROCEDURE — 99285 EMERGENCY DEPT VISIT HI MDM: CPT

## 2024-08-16 PROCEDURE — 96365 THER/PROPH/DIAG IV INF INIT: CPT

## 2024-08-16 PROCEDURE — 85025 COMPLETE CBC W/AUTO DIFF WBC: CPT | Performed by: EMERGENCY MEDICINE

## 2024-08-16 RX ORDER — SODIUM CHLORIDE, SODIUM GLUCONATE, SODIUM ACETATE, POTASSIUM CHLORIDE, MAGNESIUM CHLORIDE, SODIUM PHOSPHATE, DIBASIC, AND POTASSIUM PHOSPHATE .53; .5; .37; .037; .03; .012; .00082 G/100ML; G/100ML; G/100ML; G/100ML; G/100ML; G/100ML; G/100ML
500 INJECTION, SOLUTION INTRAVENOUS ONCE
Status: COMPLETED | OUTPATIENT
Start: 2024-08-16 | End: 2024-08-16

## 2024-08-16 RX ORDER — CEPHALEXIN 500 MG/1
500 CAPSULE ORAL 3 TIMES DAILY
Qty: 21 CAPSULE | Refills: 0 | Status: SHIPPED | OUTPATIENT
Start: 2024-08-16 | End: 2024-08-20

## 2024-08-16 RX ADMIN — DEXTROSE 1000 MG: 50 INJECTION, SOLUTION INTRAVENOUS at 19:14

## 2024-08-16 RX ADMIN — SODIUM CHLORIDE, SODIUM GLUCONATE, SODIUM ACETATE, POTASSIUM CHLORIDE, MAGNESIUM CHLORIDE, SODIUM PHOSPHATE, DIBASIC, AND POTASSIUM PHOSPHATE 500 ML: .53; .5; .37; .037; .03; .012; .00082 INJECTION, SOLUTION INTRAVENOUS at 19:50

## 2024-08-18 LAB
BACTERIA UR CULT: ABNORMAL
BACTERIA UR CULT: ABNORMAL

## 2024-08-19 ENCOUNTER — NURSING HOME VISIT (OUTPATIENT)
Dept: GERIATRICS | Facility: OTHER | Age: 79
End: 2024-08-19

## 2024-08-19 DIAGNOSIS — F31.62 BIPOLAR DISORDER, CURRENT EPISODE MIXED, MODERATE (HCC): Primary | ICD-10-CM

## 2024-08-19 DIAGNOSIS — S06.9X9S MAJOR NEUROCOGNITIVE DISORDER AS LATE EFFECT OF TRAUMATIC BRAIN INJURY WITH BEHAVIORAL DISTURBANCE (HCC): ICD-10-CM

## 2024-08-19 DIAGNOSIS — Z87.820 PERSONAL HISTORY OF TRAUMATIC BRAIN INJURY: ICD-10-CM

## 2024-08-19 DIAGNOSIS — F02.818 MAJOR NEUROCOGNITIVE DISORDER AS LATE EFFECT OF TRAUMATIC BRAIN INJURY WITH BEHAVIORAL DISTURBANCE (HCC): ICD-10-CM

## 2024-08-19 DIAGNOSIS — G21.19 OTHER DRUG INDUCED SECONDARY PARKINSONISM (HCC): ICD-10-CM

## 2024-08-19 LAB
ATRIAL RATE: 71 BPM
ATRIAL RATE: 82 BPM
P AXIS: 54 DEGREES
P AXIS: 66 DEGREES
PR INTERVAL: 166 MS
PR INTERVAL: 170 MS
QRS AXIS: -82 DEGREES
QRS AXIS: -87 DEGREES
QRSD INTERVAL: 188 MS
QRSD INTERVAL: 192 MS
QT INTERVAL: 446 MS
QT INTERVAL: 480 MS
QTC INTERVAL: 521 MS
QTC INTERVAL: 521 MS
T WAVE AXIS: 42 DEGREES
T WAVE AXIS: 56 DEGREES
VENTRICULAR RATE: 71 BPM
VENTRICULAR RATE: 82 BPM

## 2024-08-19 PROCEDURE — 93010 ELECTROCARDIOGRAM REPORT: CPT | Performed by: INTERNAL MEDICINE

## 2024-08-20 RX ORDER — LINEZOLID 600 MG/1
600 TABLET, FILM COATED ORAL EVERY 12 HOURS SCHEDULED
Qty: 14 TABLET | Refills: 0 | Status: SHIPPED | OUTPATIENT
Start: 2024-08-20 | End: 2024-08-27

## 2024-08-25 NOTE — ED PROVIDER NOTES
"History  Chief Complaint   Patient presents with    Weakness - Generalized     Patient arrives via EMS from [facility] with complaints of generalized weakness and increased confusion. Patient lethargic but alert and oriented in triage. Per EMS, patient less lethargic since being put on o2 and given some fluids.      Bk is a 79-year-old male here for evaluation of reported generalized weakness and confusion at his nursing home.  The patient has a history of traumatic brain injury and depression.  On review of his EMR, the majority of his most recent ER visits and hospitalizations have been related to behavioral health complaints with his most recent hospitalization in 2021.  Review of the EMR also notes that he has been seeing psychiatry at his nursing home ([facility]) at least for the past year or so.  The patient himself complains primarily of depression, stating that \"I just never feel happy and nothing brings me rosamaria.\"  Patient denies recent fevers or physical complaints though he does note that he has been having more difficulty ambulating with his walker, which is his baseline.  Though he does complain of depression he denies any thoughts of self-harm.          Prior to Admission Medications   Prescriptions Last Dose Informant Patient Reported? Taking?   Arthritis Pain Relief 650 MG CR tablet   Yes No   Melatonin Maximum Strength 5 MG TABS   Yes No   QUEtiapine (SEROquel) 100 mg tablet   Yes No   atorvastatin (LIPITOR) 20 mg tablet   Yes No   buPROPion (WELLBUTRIN SR) 100 mg 12 hr tablet   Yes No   clonazePAM (KlonoPIN) 0.5 mg tablet   Yes No   divalproex sodium (DEPAKOTE) 500 mg EC tablet   Yes No   docusate sodium (Dioctyl S.S.) 100 mg capsule   Yes No   polyethylene glycol (GLYCOLAX) 17 GM/SCOOP powder   Yes No   venlafaxine (EFFEXOR-XR) 150 mg 24 hr capsule   No No   Sig: Take 1 capsule (150 mg total) by mouth in the morning. In addition to 75 mg dose, total 225 mg.   venlafaxine " "(EFFEXOR-XR) 75 mg 24 hr capsule   No No   Sig: Take 1 capsule (75 mg total) by mouth in the morning. In addition to 150 mg, total 225 mg.      Facility-Administered Medications: None       Past Medical History:   Diagnosis Date    Balance problem     BPH (benign prostatic hyperplasia)     Depression     Disease of thyroid gland     DJD (degenerative joint disease)     Flat affect     Wood catheter in place     Gait abnormality     H/O Clostridium difficile infection     History of traumatic brain injury     \" [place] and an axle hit my head\"    Hypertension     Poor historian     Retention, urine     Risk for falls     Seizures (HCC)     Shortness of breath     Shoulder pain, bilateral     Teeth missing     Urine retention     Uses walker     Wears glasses        Past Surgical History:   Procedure Laterality Date    COLONOSCOPY      COLONOSCOPY      CYSTOSCOPY      EYE SURGERY      [place] CYSTOSTOMY CYSTOTOMY W/DRAINAGE N/A 1/31/2018    Procedure: SUPRAPUBIC TUBE PLACEMENT;  Surgeon: Ubaldo Thompson MD;  Location: [facility];  Service: Urology    [place] TRURL ELECTROSURG RESCJ PROSTATE BLEED COMPLETE N/A 1/31/2018    Procedure: TRANSURETHRAL RESECTION OF PROSTATE (TURP);  Surgeon: Ubaldo Thompson MD;  Location: [facility];  Service: Urology    TRANSURETHRAL RESECTION OF PROSTATE  01/31/2018       Family History   Problem Relation Age of Onset    Other Other         Patient cannot provide famiyl hx due to TBI and unreliable historian     I have reviewed and agree with the history as documented.    E-Cigarette/Vaping    E-Cigarette Use Never User      E-Cigarette/Vaping Substances    Nicotine No     THC No     CBD No     Flavoring No     Other No     Unknown No      Social History     Tobacco Use    Smoking status: Former    Smokeless tobacco: Never    Tobacco comments:     quit about 20yrs ago   Vaping Use    Vaping status: Never Used   Substance Use Topics    Alcohol use: Never     Comment: quit 1986    Drug use: No "       Review of Systems   Constitutional:  Positive for fatigue. Negative for chills and fever.   HENT:  Negative for ear pain and sore throat.    Respiratory:  Negative for cough and shortness of breath.    Cardiovascular:  Negative for chest pain and palpitations.   Gastrointestinal:  Negative for abdominal pain and vomiting.   Genitourinary:  Negative for dysuria and hematuria.   Musculoskeletal:  Negative for arthralgias and back pain.   Skin:  Negative for color change and rash.   Neurological:  Negative for syncope and weakness.   Psychiatric/Behavioral:  Positive for dysphoric mood. Negative for self-injury and suicidal ideas.    All other systems reviewed and are negative.      Physical Exam  Physical Exam  Vitals and nursing note reviewed.   Constitutional:       General: He is not in acute distress.     Appearance: He is well-developed.   HENT:      Head: Normocephalic and atraumatic.      Mouth/Throat:      Mouth: Mucous membranes are moist.   Eyes:      Conjunctiva/sclera: Conjunctivae normal.   Cardiovascular:      Rate and Rhythm: Normal rate and regular rhythm.      Heart sounds: No murmur heard.  Pulmonary:      Effort: Pulmonary effort is normal. No respiratory distress.      Breath sounds: Normal breath sounds.   Abdominal:      Palpations: Abdomen is soft.      Tenderness: There is no abdominal tenderness.   Musculoskeletal:         General: No swelling.      Cervical back: Neck supple.   Skin:     General: Skin is warm and dry.      Capillary Refill: Capillary refill takes less than 2 seconds.   Neurological:      General: No focal deficit present.      Mental Status: He is alert and oriented to person, place, and time.   Psychiatric:         Mood and Affect: Mood normal.         Vital Signs  ED Triage Vitals   Temperature Pulse Respirations Blood Pressure SpO2   08/16/24 1612 08/16/24 1606 08/16/24 1606 08/16/24 1606 08/16/24 1606   97.5 °F (36.4 °C) 83 22 136/66 95 %      Temp Source Heart Rate  Source Patient Position - Orthostatic VS BP Location FiO2 (%)   08/16/24 1612 08/16/24 1606 08/16/24 1606 08/16/24 1606 --   Oral Monitor Sitting Right arm       Pain Score       08/16/24 1606       No Pain           Vitals:    08/16/24 1606 08/16/24 1830 08/16/24 1945   BP: 136/66 99/69 143/81   Pulse: 83 73 72   Patient Position - Orthostatic VS: Sitting Lying Lying         Visual Acuity      ED Medications  Medications   ceftriaxone (ROCEPHIN) 1 g/50 mL in dextrose IVPB (0 mg Intravenous Stopped 8/16/24 1943)   multi-electrolyte (ISOLYTE-S PH 7.4) bolus 500 mL (0 mL Intravenous Stopped 8/16/24 2051)       Diagnostic Studies  Results Reviewed       Procedure Component Value Units Date/Time    Urine culture [[id number]]  (Abnormal) Collected: 08/16/24 1726    Lab Status: Final result Specimen: Urine, Clean Catch Updated: 08/18/24 1307     Urine Culture 80,000-89,000 cfu/ml Corynebacterium species      20,000-29,000 cfu/ml    FLU/RSV/COVID - if FLU/RSV clinically relevant [[id number]]  (Normal) Collected: 08/16/24 1944    Lab Status: Final result Specimen: Nares from Nose Updated: 08/16/24 2034     SARS-CoV-2 Negative     INFLUENZA A PCR Negative     INFLUENZA B PCR Negative     RSV PCR Negative    Narrative:      This test has been performed using the CoV-2/Flu/RSV plus assay on the [employer] GeneXpert platform. This test has been validated by the  and verified by the performing laboratory.     This test is designed to amplify and detect the following: nucleocapsid (N), envelope (E), and RNA-dependent RNA polymerase (RdRP) genes of the SARS-CoV-2 genome; matrix (M), basic polymerase (PB2), and acidic protein (PA) segments of the influenza A genome; matrix (M) and non-structural protein (NS) segments of the influenza B genome, and the nucleocapsid genes of RSV A and RSV B.     Positive results are indicative of the presence of Flu A, Flu B, RSV, and/or SARS-CoV-2 RNA. Positive results for SARS-CoV-2 or  suspected novel influenza should be reported to state, local, or federal health departments according to local reporting requirements.      All results should be assessed in conjunction with clinical presentation and other laboratory markers for clinical management.     FOR PEDIATRIC PATIENTS - copy/paste COVID Guidelines URL to browser: [email or web address]       HS Troponin I 2hr [[id number]]  (Normal) Collected: 08/16/24 1914    Lab Status: Final result Specimen: Blood from Arm, Left Updated: 08/16/24 1954     hs TnI 2hr 7 ng/L      Delta 2hr hsTnI 0 ng/L     TSH, 3rd generation with Free T4 reflex [[id number]]  (Normal) Collected: 08/16/24 1720    Lab Status: Final result Specimen: Blood from Arm, Left Updated: 08/16/24 1807     TSH 3RD GENERATON 2.984 uIU/mL     HS Troponin 0hr (reflex protocol) [[id number]]  (Normal) Collected: 08/16/24 1720    Lab Status: Final result Specimen: Blood from Arm, Left Updated: 08/16/24 1757     hs TnI 0hr 7 ng/L     Comprehensive metabolic panel [[id number]]  (Abnormal) Collected: 08/16/24 1720    Lab Status: Final result Specimen: Blood from Arm, Left Updated: 08/16/24 1753     Sodium 140 mmol/L      Potassium 3.8 mmol/L      Chloride 107 mmol/L      CO2 24 mmol/L      ANION GAP 9 mmol/L      BUN 18 mg/dL      Creatinine 1.54 mg/dL      Glucose 121 mg/dL      Calcium 8.5 mg/dL      AST 15 U/L      ALT 11 U/L      Alkaline Phosphatase 36 U/L      Total Protein 6.0 g/dL      Albumin 3.8 g/dL      Total Bilirubin 0.42 mg/dL      eGFR 42 ml/min/1.73sq m     Narrative:      National Kidney Disease Foundation guidelines for Chronic Kidney Disease (CKD):     Stage 1 with normal or high GFR (GFR > 90 mL/min/1.73 square meters)    Stage 2 Mild CKD (GFR = 60-89 mL/min/1.73 square meters)    Stage 3A Moderate CKD (GFR = 45-59 mL/min/1.73 square meters)    Stage 3B Moderate CKD (GFR = 30-44 mL/min/1.73 square meters)    Stage 4 Severe CKD  (GFR = 15-29 mL/min/1.73 square meters)    Stage 5 End Stage CKD (GFR <15 mL/min/1.73 square meters)  Note: GFR calculation is accurate only with a steady state creatinine    CBC and differential [[id number]]  (Abnormal) Collected: 08/16/24 1720    Lab Status: Final result Specimen: Blood from Arm, Left Updated: 08/16/24 1746     WBC 5.57 Thousand/uL      RBC 4.97 Million/uL      Hemoglobin 14.6 g/dL      Hematocrit 44.3 %      MCV 89 fL      MCH 29.4 pg      MCHC 33.0 g/dL      RDW 14.3 %      MPV 10.6 fL      Platelets 139 Thousands/uL      nRBC 0 /100 WBCs      Segmented % 64 %      Immature Grans % 0 %      Lymphocytes % 24 %      Monocytes % 8 %      Eosinophils Relative 3 %      Basophils Relative 1 %      Absolute Neutrophils 3.53 Thousands/µL      Absolute Immature Grans 0.01 Thousand/uL      Absolute Lymphocytes 1.36 Thousands/µL      Absolute Monocytes 0.47 Thousand/µL      Eosinophils Absolute 0.16 Thousand/µL      Basophils Absolute 0.04 Thousands/µL     Urine Microscopic [[id number]]  (Abnormal) Collected: 08/16/24 1726    Lab Status: Final result Specimen: Urine, Clean Catch Updated: 08/16/24 1741     RBC, UA 1-2 /hpf      WBC, UA Innumerable /hpf      Epithelial Cells None Seen /hpf      Bacteria, UA None Seen /hpf      MUCUS THREADS Occasional     Hyaline Casts, UA 5-10 /lpf      Amorphous Crystals, UA Occasional    Urine Macroscopic, POC [[id number]]  (Abnormal) Collected: 08/16/24 1726    Lab Status: Final result Specimen: Urine Updated: 08/16/24 1727     Color, UA Yellow     Clarity, UA Clear     pH, UA 6.5     Leukocytes, UA Moderate     Nitrite, UA Negative     Protein, UA Negative mg/dl      Glucose, UA Negative mg/dl      Ketones, UA 15 (1+) mg/dl      Urobilinogen, UA 0.2 E.U./dl      Bilirubin, UA Negative     Occult Blood, UA Trace     Specific Gravity, UA 1.020    Narrative:      CLINITEK RESULT                   CT head without contrast   Final Result by Desean Ferreira MD (08/16 1835)  "     No acute intracranial abnormality.      Moderate chronic small vessel ischemic changes and volume loss.            Workstation performed: [id number]                    Procedures  Procedures         ED Course                                 SBIRT 20yo+      Flowsheet Row Most Recent Value   Initial Alcohol Screen: US AUDIT-C     1. How often do you have a drink containing alcohol? 0 Filed at: 08/16/2024 1609   2. How many drinks containing alcohol do you have on a typical day you are drinking?  0 Filed at: 08/16/2024 1609   3a. Male UNDER 65: How often do you have five or more drinks on one occasion? 0 Filed at: 08/16/2024 1609   3b. FEMALE Any Age, or MALE 65+: How often do you have 4 or more drinks on one occassion? 0 Filed at: 08/16/2024 1609   Audit-C Score 0 Filed at: 08/16/2024 1609   MELONIE: How many times in the past year have you...    Used an illegal drug or used a prescription medication for non-medical reasons? Never Filed at: 08/16/2024 1609                      Medical Decision Making  79-year-old male sent from nursing home for generalized weakness.  Patient states his primary concern is worsening depression and feeling \"like the medicines are not working.  Given complaint of some general fatigue and more difficulty ambulating labs were performed to rule out significant anemia, electrolyte abnormalities, cardiac ischemia and were reassuring.  He was noted to have innumerable WBCs in his urine and has history of obstructive uropathy though has been voiding normally for his report and no longer has a Wood catheter.  Given weakness and pyuria would treat with a course of antibiotics for possible UTI.  He was seen and evaluated by the ED crisis worker and it is confirmed that he has follow-up with outpatient psych at his nursing home.  No indication for inpatient treatment at this time.  Will plan for discharge back to his facility.  We discussed return precautions.    Amount and/or Complexity of Data " Reviewed  Labs: ordered. Decision-making details documented in ED Course.  Radiology: ordered and independent interpretation performed. Decision-making details documented in ED Course.  ECG/medicine tests: ordered and independent interpretation performed. Decision-making details documented in ED Course.     Details: Normal sinus rhythm at rate of 71, right bundle branch block with left axis deviation, voltage criteria for LVH, no significant ST elevation or depression to suggest cardiac ischemia.    Risk  Prescription drug management.                 Disposition  Final diagnoses:   Depression   Pyuria     Time reflects when diagnosis was documented in both MDM as applicable and the Disposition within this note       Time User Action Codes Description Comment    8/16/2024  9:09 PM SwHuy mason Add [R53.1] Generalized weakness     8/16/2024  9:09 PM SwHuy mason Remove [R53.1] Generalized weakness     8/16/2024  9:09 PM SwHuy mason Add [N39.0] UTI (urinary tract infection)     8/16/2024  9:09 PM Swank, Huy L Remove [N39.0] UTI (urinary tract infection)     8/16/2024  9:09 PM SwankHuy L Add [F32.A] Depression     8/16/2024  9:10 PM Swmarlon, Huy L Add [R82.81] Pyuria           ED Disposition       ED Disposition   Discharge    Condition   Stable    Date/Time   Fri Aug 16, 2024 2109    Comment   Bk Ladd Jr. discharge to home/self care.                   Follow-up Information       Follow up With Specialties Details Why Contact Info    Amina Abad, DO Family Medicine   [address] [place] [address] [id number] [phone]              Discharge Medication List as of 8/16/2024  9:54 PM        START taking these medications    Details   cephalexin (KEFLEX) 500 mg capsule Take 1 capsule (500 mg total) by mouth 3 (three) times a day for 7 days, Starting Fri 8/16/2024, Until Fri 8/23/2024, Normal           CONTINUE these medications which have NOT CHANGED    Details   Arthritis Pain Relief 650 MG  CR tablet Starting Fri 5/20/2022, Historical Med      atorvastatin (LIPITOR) 20 mg tablet Starting Fri 4/22/2022, Historical Med      buPROPion (WELLBUTRIN SR) 100 mg 12 hr tablet Starting Fri 4/22/2022, Historical Med      clonazePAM (KlonoPIN) 0.5 mg tablet Starting Thu 5/5/2022, Historical Med      divalproex sodium (DEPAKOTE) 500 mg EC tablet Starting Fri 5/20/2022, Historical Med      docusate sodium (Dioctyl S.S.) 100 mg capsule Starting Fri 4/22/2022, Historical Med      Melatonin Maximum Strength 5 MG TABS Starting Fri 4/22/2022, Historical Med      polyethylene glycol (GLYCOLAX) 17 GM/SCOOP powder Starting Fri 4/22/2022, Historical Med      QUEtiapine (SEROquel) 100 mg tablet Starting Fri 4/22/2022, Historical Med      !! venlafaxine (EFFEXOR-XR) 150 mg 24 hr capsule Take 1 capsule (150 mg total) by mouth in the morning. In addition to 75 mg dose, total 225 mg., Starting Tue 5/24/2022, Normal      !! venlafaxine (EFFEXOR-XR) 75 mg 24 hr capsule Take 1 capsule (75 mg total) by mouth in the morning. In addition to 150 mg, total 225 mg., Starting Tue 5/24/2022, Normal       !! - Potential duplicate medications found. Please discuss with provider.          No discharge procedures on file.    PDMP Review         Value Time User    PDMP Reviewed  Yes 4/15/2021 11:58 AM SHERRIE Arroyo            ED Provider  Electronically Signed by             Huy Coulter MD  08/25/24 2064

## 2024-09-17 RX ORDER — LEVOTHYROXINE SODIUM 25 UG/1
TABLET ORAL
COMMUNITY
Start: 2024-08-07

## 2024-09-17 RX ORDER — FLUORIDE TOOTHPASTE
TOOTHPASTE DENTAL
COMMUNITY
Start: 2024-08-15

## 2024-09-17 NOTE — PROGRESS NOTES
MEDICATION MANAGEMENT NOTE        [facility]  POS: 13: [facility]      Name and Date of Birth:  Bk Ladd Jr. 79 y.o. 1945 MRN: [id number]    Date of Visit: August 19, 2024    No Known Allergies  SUBJECTIVE:    Bk is seen today for a follow up for depression, Bipolar Disorder, anxiety, and cognitive disorder. He continues to experience significant symptoms since the last visit. He continues to complain of profound depression and no improvement with transition from Effexor to Wellbutrin.  Wellbutrin has been titrated up to  450 mg .  He continues on Depakoe and Seroquel has been increase to 300 mg at bedtime by medical provider.  He is noted with increased irritability.  We will request an updated valproic acid level.  We will also add Prozac 10 mg to augment his Wellbutrin and continue to monitor.  He expresses passive death wish denies any active suicidal thoughts.    He denies any side effects from current psychiatric medications.    PLAN:    All medications and routine orders were reviewed and updated as needed.    Add Prozac 10 mg daily to augment his Wellbutrin  Continue Wellbutrin  mg daily for depression  Continue Depakote 250 mg p.o. 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      HPI ROS Appetite Changes and Sleep:     He reports fluctuating sleep pattern, fluctuating appetite, fluctuating energy levels. Denies homicidal ideation, denies suicidal ideation    Review Of Systems:   no complaints         Mental Status Evaluation:    Appearance:  age appropriate   Behavior:  angry   Speech:  normal rate, normal volume   Mood:  depressed, dysphoric, irritable   Affect:  labile   Thought Process:  coherent   Associations: concrete associations   Thought Content:  mild paranoia   Perceptual Disturbances: denies auditory hallucinations when asked   Risk Potential: Suicidal ideation - Yes, passive death wish, No active suicidal ideation  Homicidal ideation - None  Potential for aggression - No   Sensorium:  oriented to person and place   Memory:  recent memory mildly impaired   Consciousness:  alert and awake   Attention: poor concentration and poor attention span   Insight:  poor   Judgment: poor   Gait/Station: in wheelchair   Motor Activity: no abnormal movements       History Review:The following portions of the patient's history were reviewed and updated as appropriate: psychiatric history, trauma history allergies, current medications, past family history, past medical history, past social history, past surgical history, and problem list     OBJECTIVE:     Vital signs in last 24 hours: Vital signs and nursing notes reviewed in facility chart.      Laboratory Results: Lab results reviewed in facility chart.      Medications Risks/Benefits:      Risks, Benefits And Possible Side Effects Of Medications:    Discussed risks and benefits of treatment with patient including risk of suicidality, serotonin syndrome, increased QTc interval and SIADH related to treatment with antidepressants; Risk of induction of manic symptoms in certain patient populations, risk of  parkinsonian symptoms, metabolic syndrome, tardive dyskinesia and neuroleptic malignant syndrome related to treatment with antipsychotic medications, and risk of liver impairment related to treatment with Depakote     Controlled Medication Discussion:     No recent records found for controlled prescriptions according to [place] Prescription Drug Monitoring Program  Not applicable - controlled prescriptions are not prescribed by this practice    Evaluation of Psychotropic Drugs for possible gradual dose reductions    Psychotropic medications have been reviewed.  Patient continues with symptoms of worsening depression and irritability as noted above.  Any/or further dose reductions at this time would be clinically contraindicated, as it would be likely to cause worsening of symptoms.        SHERRIE Purvis

## 2024-09-18 ENCOUNTER — HOSPITAL ENCOUNTER (EMERGENCY)
Facility: HOSPITAL | Age: 79
Discharge: HOME | End: 2024-09-18
Attending: EMERGENCY MEDICINE
Payer: MEDICARE

## 2024-09-18 ENCOUNTER — APPOINTMENT (EMERGENCY)
Dept: CT IMAGING | Facility: HOSPITAL | Age: 79
End: 2024-09-18
Payer: MEDICARE

## 2024-09-18 VITALS
DIASTOLIC BLOOD PRESSURE: 76 MMHG | RESPIRATION RATE: 21 BRPM | SYSTOLIC BLOOD PRESSURE: 159 MMHG | TEMPERATURE: 98.1 F | OXYGEN SATURATION: 96 % | BODY MASS INDEX: 28.41 KG/M2 | WEIGHT: 203.71 LBS | HEART RATE: 75 BPM
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
## 2024-09-18 DIAGNOSIS — S09.90XA MINOR HEAD INJURY, INITIAL ENCOUNTER: ICD-10-CM

## 2024-09-18 LAB
ATRIAL RATE: 76 BPM
P AXIS: 64 DEGREES
PR INTERVAL: 144 MS
QRS AXIS: -78 DEGREES
QRSD INTERVAL: 190 MS
QT INTERVAL: 450 MS
QTC INTERVAL: 506 MS
T WAVE AXIS: 64 DEGREES
VENTRICULAR RATE: 76 BPM

## 2024-09-18 PROCEDURE — 99284 EMERGENCY DEPT VISIT MOD MDM: CPT | Performed by: EMERGENCY MEDICINE

## 2024-09-18 PROCEDURE — 70450 CT HEAD/BRAIN W/O DYE: CPT

## 2024-09-18 PROCEDURE — 93010 ELECTROCARDIOGRAM REPORT: CPT | Performed by: STUDENT IN AN ORGANIZED HEALTH CARE EDUCATION/TRAINING PROGRAM

## 2024-09-18 PROCEDURE — 93005 ELECTROCARDIOGRAM TRACING: CPT

## 2024-09-18 PROCEDURE — 99284 EMERGENCY DEPT VISIT MOD MDM: CPT

## 2024-09-18 RX ORDER — FLUOXETINE 10 MG/1
10 CAPSULE ORAL DAILY
COMMUNITY

## 2024-09-18 RX ORDER — BUPROPION HYDROCHLORIDE 300 MG/1
300 TABLET ORAL DAILY
COMMUNITY

## 2024-09-18 RX ORDER — PRAMIPEXOLE DIHYDROCHLORIDE 0.25 MG/1
0.25 TABLET ORAL
COMMUNITY

## 2024-09-18 RX ORDER — PRAMIPEXOLE DIHYDROCHLORIDE 0.5 MG/1
0.5 TABLET ORAL
COMMUNITY

## 2024-09-18 RX ORDER — METHENAMINE HIPPURATE 1000 MG/1
1 TABLET ORAL 2 TIMES DAILY WITH MEALS
COMMUNITY

## 2024-09-18 NOTE — ED NOTES
[facility] transportation pickup set for 2115 back to [facility].     Alanis Crain RN  09/18/24 1917

## 2024-09-18 NOTE — ED NOTES
[facility] charge nurse updated with discharge and pickup time.     Alanis Crain RN  09/18/24 1922

## 2024-09-18 NOTE — DISCHARGE INSTRUCTIONS
It is importantly follow-up with your family doctor regards the following Ct findings:       No acute intracranial abnormality. 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     Moderate white matter chronic microangiopathic changes again demonstrated. Diffuse cerebral atrophy again demonstrated.

## 2024-09-18 NOTE — ED PROVIDER NOTES
No diagnosis found.  ED Disposition       None          Assessment & Plan       Medical Decision Making  79-year-old male with history of TBI, seizures presenting for evaluation of mechanical fall.  Patient fell out of his wheelchair.  Hit the left side of his head.  Denies LOC.  Is not on any blood thinners.  Denies any complaints at this time.  Given head strike, will obtain CT head.  No other signs of trauma.  No C, T or L-spine tenderness, no chest wall tenderness or abdominal tenderness.  Do not believe further imaging is required    Amount and/or Complexity of Data Reviewed  Radiology: ordered.                       Medications - No data to display    History of Present Illness       79-year-old male with history of TBI presenting for evaluation of mechanical fall.  Patient says that he was going to get into his wheelchair, only had one of the wheels locked, and fell out of it.  Says he hit the left side of his head.  Denies LOC and is not on blood thinners.  Patient denies headache, lightheadedness, nausea or vomiting.  Denies neck, back pain, chest pain, shortness of breath or abdominal pain.  No other signs of trauma.  No neurologic deficits on exam            Review of Systems   Constitutional:  Negative for chills, fever and unexpected weight change.   HENT:  Negative for congestion, sore throat and trouble swallowing.    Eyes:  Negative for pain, discharge and itching.   Respiratory:  Negative for cough, chest tightness, shortness of breath and wheezing.    Cardiovascular:  Negative for chest pain, palpitations and leg swelling.   Gastrointestinal:  Negative for abdominal pain, blood in stool, diarrhea, nausea and vomiting.   Endocrine: Negative for polyuria.   Genitourinary:  Negative for difficulty urinating, dysuria, frequency and hematuria.   Musculoskeletal:  Negative for arthralgias and back pain.   Neurological:  Negative for dizziness, syncope, weakness, light-headedness and headaches.            Objective     ED Triage Vitals [09/18/24 1626]   Temperature Pulse Blood Pressure Respirations SpO2 Patient Position - Orthostatic VS   98.1 °F (36.7 °C) 75 152/82 18 98 % Sitting      Temp Source Heart Rate Source BP Location FiO2 (%) Pain Score    Oral Monitor Left arm -- --        Physical Exam  Vitals and nursing note reviewed.   Constitutional:       General: He is not in acute distress.     Appearance: He is well-developed.   HENT:      Head: Normocephalic and atraumatic.      Right Ear: External ear normal.      Left Ear: External ear normal.   Eyes:      Conjunctiva/sclera: Conjunctivae normal.      Pupils: Pupils are equal, round, and reactive to light.   Cardiovascular:      Rate and Rhythm: Normal rate and regular rhythm.      Heart sounds: Normal heart sounds. No murmur heard.     No friction rub. No gallop.   Pulmonary:      Effort: Pulmonary effort is normal. No respiratory distress.      Breath sounds: Normal breath sounds. No wheezing or rales.   Abdominal:      General: Bowel sounds are normal. There is no distension.      Palpations: Abdomen is soft.      Tenderness: There is no abdominal tenderness. There is no guarding.   Musculoskeletal:         General: No tenderness or deformity. Normal range of motion.      Cervical back: Normal range of motion.   Lymphadenopathy:      Cervical: No cervical adenopathy.   Skin:     General: Skin is warm and dry.   Neurological:      General: No focal deficit present.      Mental Status: He is alert and oriented to person, place, and time. Mental status is at baseline.      Cranial Nerves: No cranial nerve deficit.      Sensory: No sensory deficit.      Motor: No weakness or abnormal muscle tone.   Psychiatric:         Behavior: Behavior normal.         Labs Reviewed - No data to display  CT head without contrast    (Results Pending)       Procedures       Tony Hatch,   09/18/24 1183       Tony Hatch,   09/18/24 6440

## 2024-10-23 ENCOUNTER — NURSING HOME VISIT (OUTPATIENT)
Dept: GERIATRICS | Facility: OTHER | Age: 79
End: 2024-10-23

## 2024-10-23 DIAGNOSIS — S06.9X9S MAJOR NEUROCOGNITIVE DISORDER AS LATE EFFECT OF TRAUMATIC BRAIN INJURY WITH BEHAVIORAL DISTURBANCE (HCC): ICD-10-CM

## 2024-10-23 DIAGNOSIS — G21.19 OTHER DRUG INDUCED SECONDARY PARKINSONISM (HCC): ICD-10-CM

## 2024-10-23 DIAGNOSIS — G40.909 SEIZURE DISORDER (HCC): ICD-10-CM

## 2024-10-23 DIAGNOSIS — F31.62 BIPOLAR DISORDER, CURRENT EPISODE MIXED, MODERATE (HCC): Primary | ICD-10-CM

## 2024-10-23 DIAGNOSIS — F02.818 MAJOR NEUROCOGNITIVE DISORDER AS LATE EFFECT OF TRAUMATIC BRAIN INJURY WITH BEHAVIORAL DISTURBANCE (HCC): ICD-10-CM

## 2024-10-23 DIAGNOSIS — G20.A1 PARKINSON'S DISEASE, UNSPECIFIED WHETHER DYSKINESIA PRESENT, UNSPECIFIED WHETHER MANIFESTATIONS FLUCTUATE (HCC): ICD-10-CM

## 2024-10-23 DIAGNOSIS — Z87.820 PERSONAL HISTORY OF TRAUMATIC BRAIN INJURY: ICD-10-CM

## 2024-10-23 DIAGNOSIS — F41.9 ANXIETY: ICD-10-CM

## 2024-11-11 RX ORDER — ASPIRIN 81 MG
TABLET, DELAYED RELEASE (ENTERIC COATED) ORAL
COMMUNITY
Start: 2024-09-04

## 2024-11-11 NOTE — PROGRESS NOTES
MEDICATION MANAGEMENT NOTE        [facility]  POS: 13: [facility]      Name and Date of Birth:  Bk Ladd Jr. 79 y.o. 1945 MRN: [id number]    Date of Visit: October 23, 2024    No Known Allergies  SUBJECTIVE:    Bk is seen today for a follow up for Bipolar Disorder, Generalized Anxiety Disorder, and dementia. He continues to improve gradually since the last visit.  He continues to report no improvement in his depression.  We have been transitioning him from Effexor to Wellbutrin and now augmented with Prozac.  He presents brighter.  He is seen out of his room and more social.  We will continue to increase his Prozac.  He has psychiatric visiting nursing on order.  He continues on his Depakote for bipolar disorder and most recent valproic acid level is therapeutic at 53.  His Seroquel was increased to 300 mg at bedtime by his PCP for mood disorder.  He is also on Mirapex for restless leg syndrome.  No complaints of sleep issues.    He denies any side effects from current psychiatric medications.    PLAN:    All medications and routine orders were reviewed and updated as needed.    He has referral for visiting nurse for psychiatric nursing  Increase Prozac to 20 mg daily x 2 weeks then increase to 40 mg  He continues on Depakote 500 mg 3 times a day for his bipolar disorder, Seroquel  mg at bedtime for his bipolar disorder  Medication management every 2 months  Aware of need to follow up with family physician for medical issues      Diagnoses and all orders for this visit:    Bipolar disorder, current episode mixed, moderate (HCC)    Seizure disorder (HCC)    Personal history of traumatic brain injury    Other drug induced secondary parkinsonism (HCC)    Parkinson's disease, unspecified whether dyskinesia present, unspecified whether manifestations fluctuate (HCC)    Major 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      HPI ROS Appetite Changes and Sleep:     He reports fluctuating sleep pattern, fluctuating appetite, fluctuating energy levels. Denies homicidal ideation, denies suicidal ideation    Review Of Systems:   no complaints, all other systems are negative         Mental Status Evaluation:    Appearance:  age appropriate   Behavior:  cooperative, bizarre   Speech:  increased latency of response   Mood:  dysphoric, irritable   Affect:  constricted   Thought Process:  goal directed   Associations: intact associations   Thought Content:  Moodno overt delusions   Perceptual Disturbances: none   Risk Potential: Suicidal ideation - None  Homicidal ideation - None  Potential for aggression - No   Sensorium:  oriented to person, place, and time/date   Memory:  recent and remote memory grossly intact   Consciousness:  alert and awake   Attention: decreased concentration and decreased attention span   Insight:  poor   Judgment: poor   Gait/Station: in wheelchair   Motor Activity: no abnormal movements       History Review:The following portions of the patient's history were reviewed and updated as appropriate: psychiatric history, trauma history allergies, current medications, past family history, past medical history, past social history, past surgical history, and problem list     OBJECTIVE:     Vital signs in last 24 hours: Vital signs and nursing notes reviewed in facility chart.      Laboratory Results: Lab results reviewed in facility chart.      Medications Risks/Benefits:      Risks, Benefits And Possible Side Effects Of Medications:    Discussed risks and benefits of treatment with patient including risk of suicidality, serotonin syndrome, increased QTc interval and SIADH related to  treatment with antidepressants; Risk of induction of manic symptoms in certain patient populations, risk of parkinsonian symptoms, metabolic syndrome, tardive dyskinesia and neuroleptic malignant syndrome related to treatment with antipsychotic medications, and risk of liver impairment related to treatment with Depakote     Controlled Medication Discussion:     No recent records found for controlled prescriptions according to [place] Prescription Drug Monitoring Program  Not applicable - controlled prescriptions are not prescribed by this practice    Evaluation of Psychotropic Drugs for possible gradual dose reductions    Psychotropic medications have been reviewed.  Patient continues with symptoms of bipolar disorder as noted above.  Any/or further dose reductions at this time would be clinically contraindicated, as it would be likely to cause worsening of symptoms.        SHERRIE Purvis

## 2024-11-12 NOTE — CONSULTS
Patient refused to talk to this writer  Will attempt to talk to him again  Medical Assessment Completed on: 18-Nov-2024 16:57

## 2024-11-14 ENCOUNTER — HOSPITAL ENCOUNTER (EMERGENCY)
Facility: HOSPITAL | Age: 79
Discharge: HOME | End: 2024-11-14
Payer: MEDICARE

## 2024-11-14 ENCOUNTER — APPOINTMENT (EMERGENCY)
Dept: CT IMAGING | Facility: HOSPITAL | Age: 79
End: 2024-11-14
Payer: MEDICARE

## 2024-11-14 VITALS
HEART RATE: 67 BPM | WEIGHT: 212.52 LBS | SYSTOLIC BLOOD PRESSURE: 183 MMHG | TEMPERATURE: 97.9 F | RESPIRATION RATE: 16 BRPM | OXYGEN SATURATION: 98 % | BODY MASS INDEX: 29.64 KG/M2 | DIASTOLIC BLOOD PRESSURE: 92 MMHG
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
## 2024-11-14 DIAGNOSIS — S09.90XA CLOSED HEAD INJURY, INITIAL ENCOUNTER: ICD-10-CM

## 2024-11-14 PROCEDURE — 72128 CT CHEST SPINE W/O DYE: CPT

## 2024-11-14 PROCEDURE — 70450 CT HEAD/BRAIN W/O DYE: CPT

## 2024-11-14 PROCEDURE — 99284 EMERGENCY DEPT VISIT MOD MDM: CPT

## 2024-11-14 PROCEDURE — 99285 EMERGENCY DEPT VISIT HI MDM: CPT

## 2024-11-14 PROCEDURE — 72125 CT NECK SPINE W/O DYE: CPT

## 2024-11-14 RX ORDER — ACETAMINOPHEN 325 MG/1
650 TABLET ORAL ONCE
Status: COMPLETED | OUTPATIENT
Start: 2024-11-14 | End: 2024-11-14

## 2024-11-14 RX ORDER — LIDOCAINE 50 MG/G
1 PATCH TOPICAL ONCE
Status: DISCONTINUED | OUTPATIENT
Start: 2024-11-14 | End: 2024-11-14 | Stop reason: HOSPADM

## 2024-11-14 RX ADMIN — LIDOCAINE 1 PATCH: 50 PATCH TOPICAL at 05:17

## 2024-11-14 RX ADMIN — ACETAMINOPHEN 650 MG: 325 TABLET, FILM COATED ORAL at 05:15

## 2024-11-14 NOTE — DISCHARGE INSTRUCTIONS
Your evaluation suggests that your symptoms are due to a non emergent cause following your fall.    Please follow up with your primary care physician within one week.    Return to the Emergency Department if you experience worsening or concerning symptoms.    Thank you for choosing us for your care.

## 2024-11-14 NOTE — ED NOTES
Called report to Cathie at [facility]     Giovanna Knapp RN  11/14/24 0750

## 2024-11-14 NOTE — ED PROVIDER NOTES
Time reflects when diagnosis was documented in both MDM as applicable and the Disposition within this note       Time User Action Codes Description Comment    11/14/2024  6:04 AM Luther Dyer Add [W19.XXXA] Fall, initial encounter     11/14/2024  6:04 AM Ravin Dyert Add [S09.90XA] Closed head injury, initial encounter           ED Disposition       ED Disposition   Discharge    Condition   Stable    Date/Time   Thu Nov 14, 2024  6:09 AM    Comment   Bk Ladd Jr. discharge to home/self care.                   Assessment & Plan       Medical Decision Making  Patient with history as below presented with a fall. History obtained from patient.    Differential diagnosis includes: Acute intracranial bleed, acute cervical fracture, acute thoracic fracture, chronic upper back pain    Plan: CT head, CT cervical spine, CT thoracic spine    Independently reviewed imaging without acute emergent pathology. Patient was treated with below with improvement in symptoms. Reassessed the patient and they continue to be well appearing. Presentation most consistent with nonemergent closed head injury following a mechanical fall. Stable for outpatient management.    Disposition: Discharged with instructions to obtain outpatient follow up of patient's symptoms and findings, with strict return precautions if patient develops new or worsening symptoms.  Instructions written on AVS for nursing facility. All questions answered. Patient discharged home with return precautions.    Amount and/or Complexity of Data Reviewed  Radiology: ordered.    Risk  OTC drugs.  Prescription drug management.             Medications   lidocaine (LIDODERM) 5 % patch 1 patch (1 patch Topical Medication Applied 11/14/24 0517)   acetaminophen (TYLENOL) tablet 650 mg (650 mg Oral Given 11/14/24 0515)       ED Risk Strat Scores                           SBIRT 22yo+      Flowsheet Row Most Recent Value   Initial Alcohol Screen: US AUDIT-C     1. How often  "do you have a drink containing alcohol? 0 Filed at: 11/14/2024 0507   2. How many drinks containing alcohol do you have on a typical day you are drinking?  0 Filed at: 11/14/2024 0507   3a. Male UNDER 65: How often do you have five or more drinks on one occasion? 0 Filed at: 11/14/2024 0507   3b. FEMALE Any Age, or MALE 65+: How often do you have 4 or more drinks on one occassion? 0 Filed at: 11/14/2024 0507   Audit-C Score 0 Filed at: 11/14/2024 0507   MELONIE: How many times in the past year have you...    Used an illegal drug or used a prescription medication for non-medical reasons? Never Filed at: 11/14/2024 0507                            History of Present Illness       Chief Complaint   Patient presents with    Fall     Pt coming from [facility]. Per staff pt fell out of bed around 4am and might of hit his head. Pt negative for thinners.        Past Medical History:   Diagnosis Date    Balance problem     BPH (benign prostatic hyperplasia)     Depression     Disease of thyroid gland     DJD (degenerative joint disease)     Flat affect     Wood catheter in place     Gait abnormality     H/O Clostridium difficile infection     History of traumatic brain injury     \" [place] an [place] hit my head\"    Hypertension     Poor historian     Retention, urine     Risk for falls     Seizures (HCC)     Shortness of breath     Shoulder pain, bilateral     Teeth missing     Urine retention     Uses walker     Wears glasses       Past Surgical History:   Procedure Laterality Date    COLONOSCOPY      COLONOSCOPY      CYSTOSCOPY      EYE SURGERY      AL CYSTOSTOMY CYSTOTOMY W/DRAINAGE N/A 1/31/2018    Procedure: SUPRAPUBIC TUBE PLACEMENT;  Surgeon: Ubaldo Thompson MD;  [facility]: [facility];  Service: Urology    AL TRURL ELECTROSURG RESCJ PROSTATE BLEED COMPLETE N/A 1/31/2018    Procedure: TRANSURETHRAL RESECTION OF PROSTATE (TURP);  Surgeon: Ubaldo Thompson MD;  Location: [facility];  Service: Urology    " TRANSURETHRAL RESECTION OF PROSTATE  01/31/2018      Family History   Problem Relation Age of Onset    Other Other         Patient cannot provide famiyl hx due to TBI and unreliable historian      Social History     Tobacco Use    Smoking status: Former    Smokeless tobacco: Never    Tobacco comments:     quit about 20yrs ago   Vaping Use    Vaping status: Never Used   Substance Use Topics    Alcohol use: Never     Comment: quit 1986    Drug use: No      E-Cigarette/Vaping    E-Cigarette Use Never User       E-Cigarette/Vaping Substances    Nicotine No     THC No     CBD No     Flavoring No     Other No     Unknown No       I have reviewed and agree with the history as documented.     Patient is a 79-year-old male with a significant past medical history of dementia, bipolar disorder, hyperlipidemia, presenting from his assisted living facility secondary to a fall.  Patient reportedly rolled out of bed and struck his head against the ground.  He does take aspirin.  He is not on any other anticoagulants.  He did not reportedly lose consciousness.  He says that he has some pain in his upper back which seems to indicate that this is more of a chronic than an acute issue.  He did not injure anything else during the events.  He is otherwise without complaint.        Review of Systems   Gastrointestinal:  Negative for vomiting.   Musculoskeletal:  Positive for back pain. Negative for neck pain.   Neurological:  Negative for weakness, numbness and headaches.           Objective       ED Triage Vitals   Temperature Pulse Blood Pressure Respirations SpO2 Patient Position - Orthostatic VS   11/14/24 0505 11/14/24 0505 11/14/24 0507 11/14/24 0505 11/14/24 0505 11/14/24 0505   97.9 °F (36.6 °C) 65 (!) 193/105 (!) 2 96 % Lying      Temp Source Heart Rate Source BP Location FiO2 (%) Pain Score    11/14/24 0505 11/14/24 0505 11/14/24 0505 -- 11/14/24 0505    Oral Monitor Right arm  No Pain      Vitals      Date and Time Temp Pulse  SpO2 Resp BP Pain Score FACES Pain Rating User   11/14/24 0515 -- 65 97 % 18 175/82 4 -- KS   11/14/24 0508 -- -- -- -- -- No Pain -- KS   11/14/24 0507 -- -- -- -- 193/105 -- -- KS   11/14/24 0506 -- -- -- 16 -- -- -- KS   11/14/24 0505 97.9 °F (36.6 °C) 65 96 % 2 -- No Pain -- KS            Physical Exam  Vitals and nursing note reviewed.   Constitutional:       General: He is not in acute distress.     Appearance: Normal appearance. He is not ill-appearing or toxic-appearing.   HENT:      Head: Normocephalic and atraumatic.      Right Ear: External ear normal.      Left Ear: External ear normal.      Nose: Nose normal.   Eyes:      General: No scleral icterus.        Right eye: No discharge.         Left eye: No discharge.      Extraocular Movements: Extraocular movements intact.      Conjunctiva/sclera: Conjunctivae normal.   Cardiovascular:      Rate and Rhythm: Normal rate.      Heart sounds: Normal heart sounds. No murmur heard.     No friction rub. No gallop.   Pulmonary:      Effort: Pulmonary effort is normal. No respiratory distress.      Breath sounds: Normal breath sounds.   Abdominal:      General: Abdomen is flat. There is no distension.      Palpations: Abdomen is soft. There is no mass.      Tenderness: There is no abdominal tenderness.   Genitourinary:     Comments: Deferred  Musculoskeletal:      Comments: Minimal tenderness over the upper thoracic midline.  No step-offs or deformities.  No other trauma noted.   Skin:     General: Skin is warm and dry.   Neurological:      General: No focal deficit present.      Mental Status: He is alert.         Results Reviewed       None            CT head without contrast   Final Interpretation by Huy Benoit MD (11/14 0559)      No acute intracranial abnormality.  Chronic microangiopathic changes.                  Workstation performed: [id number]         CT cervical spine without contrast   Final Interpretation by Huy Benoit MD (11/14 0604)       No cervical spine fracture or traumatic malalignment.   Multilevel degenerative changes including mild to moderate spinal canal stenosis C5-C6 and varying degrees of neural foraminal stenosis as described above.               Workstation performed: [id number]         CT thoracic spine without contrast   Final Interpretation by Huy Benoit MD (11/14 0606)      No acute osseous injury of the thoracic spine.            Workstation performed: [id number]             Procedures    ED Medication and Procedure Management   Prior to Admission Medications   Prescriptions Last Dose Informant Patient Reported? Taking?   Arthritis Pain Relief 650 MG CR tablet   Yes No   FLUoxetine (PROzac) 10 mg capsule   Yes No   Sig: Take 10 mg by mouth daily   Melatonin Maximum Strength 5 MG TABS   Yes No   Mouthwashes (Biotene Dry Mouth) LIQD   Yes No   QUEtiapine (SEROquel) 100 mg tablet   Yes No   atorvastatin (LIPITOR) 20 mg tablet   Yes No   buPROPion (WELLBUTRIN SR) 100 mg 12 hr tablet   Yes No   Sig: Take 150 mg by mouth in the morning   buPROPion (WELLBUTRIN XL) 300 mg 24 hr tablet   Yes No   Sig: Take 300 mg by mouth daily   clonazePAM (KlonoPIN) 0.5 mg tablet   Yes No   Patient not taking: Reported on 9/18/2024   divalproex sodium (DEPAKOTE) 500 mg EC tablet   Yes No   docusate sodium (Dioctyl S.S.) 100 mg capsule   Yes No   levothyroxine 25 mcg tablet   Yes No   methenamine hippurate (HIPREX) 1 g tablet   Yes No   Sig: Take 1 g by mouth 2 (two) times a day with meals   multivitamin-minerals therapeutic (THERA-M)   Yes No   polyethylene glycol (GLYCOLAX) 17 GM/SCOOP powder   Yes No   pramipexole (MIRAPEX) 0.25 mg tablet   Yes No   Sig: Take 0.25 mg by mouth daily at bedtime as needed   pramipexole (MIRAPEX) 0.5 mg tablet   Yes No   Sig: Take 0.5 mg by mouth daily at bedtime      Facility-Administered Medications: None     Patient's Medications   Discharge Prescriptions    No medications on file     No discharge procedures on  file.  ED SEPSIS DOCUMENTATION   Time reflects when diagnosis was documented in both MDM as applicable and the Disposition within this note       Time User Action Codes Description Comment    11/14/2024  6:04 AM Luther Dyer [W19.XXXA] Fall, initial encounter     11/14/2024  6:04 AM Luther Dyer [S09.90XA] Closed head injury, initial encounter                  Luther Dyer DO  11/14/24 0633

## (undated) DEVICE — BAG DECANTER

## (undated) DEVICE — EVACUATOR BLADDER ELLIK DISP STRL

## (undated) DEVICE — CATH FOLEY 18FR 5ML 2 WAY SILICONE ELASTIMER

## (undated) DEVICE — GLOVE INDICATOR PI UNDERGLOVE SZ 7.5 BLUE

## (undated) DEVICE — Device: Brand: OLYMPUS

## (undated) DEVICE — TUBING SUCTION 5MM X 12 FT

## (undated) DEVICE — LUBRICANT SURGILUBE TUBE 4 OZ  FLIP TOP

## (undated) DEVICE — SKIN MARKER DUAL TIP WITH RULER CAP, FLEXIBLE RULER AND LABELS: Brand: DEVON

## (undated) DEVICE — UROCATCH BAG

## (undated) DEVICE — STRL UNIVERSAL MINOR VAGINAL: Brand: CARDINAL HEALTH

## (undated) DEVICE — CATH FOLEY HEMATURIA 24FR 30ML 3 WAY LUBRICATH

## (undated) DEVICE — BAG URINE DRAINAGE 2000ML ANTI RFLX LF

## (undated) DEVICE — BASIC SINGLE BASIN-LF: Brand: MEDLINE INDUSTRIES, INC.

## (undated) DEVICE — GLOVE SRG BIOGEL 7

## (undated) DEVICE — TELFA NON-ADHERENT ABSORBENT DRESSING: Brand: TELFA

## (undated) DEVICE — CONNECTOR PH 6-IN-1 Y ST: Brand: CARDINAL HEALTH

## (undated) DEVICE — 3M™ STERI-STRIP™ COMPOUND BENZOIN TINCTURE 40 BAGS/CARTON 4 CARTONS/CASE C1544: Brand: 3M™ STERI-STRIP™

## (undated) DEVICE — SCD SEQUENTIAL COMPRESSION COMFORT SLEEVE MEDIUM KNEE LENGTH: Brand: KENDALL SCD

## (undated) DEVICE — GAUZE SPONGES,16 PLY: Brand: CURITY

## (undated) DEVICE — 3M™ DURAPORE™ SURGICAL TAPE 2 INCHES X 10YARDS (5.0CM X 9.1M) 6ROLLS/CARTON 10CARTONS/CASE 1538-2: Brand: 3M™ DURAPORE™

## (undated) DEVICE — REM POLYHESIVE ADULT PATIENT RETURN ELECTRODE: Brand: VALLEYLAB

## (undated) DEVICE — FACIAL INCISE NEEDLE-URO CART

## (undated) DEVICE — CYSTO TUBING TUR Y IRRIGATION

## (undated) DEVICE — PACK TUR

## (undated) DEVICE — GLOVE SRG BIOGEL 7.5

## (undated) DEVICE — CATH SUPRAPUBIC PEELAWAY SET  20FR X 12CM

## (undated) DEVICE — CATH SECURE FOLEY

## (undated) DEVICE — INTENDED FOR TISSUE SEPARATION, AND OTHER PROCEDURES THAT REQUIRE A SHARP SURGICAL BLADE TO PUNCTURE OR CUT.: Brand: BARD-PARKER SAFETY BLADES SIZE 11, STERILE